# Patient Record
Sex: FEMALE | Race: BLACK OR AFRICAN AMERICAN | Employment: OTHER
[De-identification: names, ages, dates, MRNs, and addresses within clinical notes are randomized per-mention and may not be internally consistent; named-entity substitution may affect disease eponyms.]

---

## 2017-01-20 ENCOUNTER — OFFICE VISIT (OUTPATIENT)
Dept: INTERNAL MEDICINE | Facility: CLINIC | Age: 57
End: 2017-01-20

## 2017-01-20 VITALS
RESPIRATION RATE: 22 BRPM | BODY MASS INDEX: 40.95 KG/M2 | SYSTOLIC BLOOD PRESSURE: 110 MMHG | WEIGHT: 245.8 LBS | DIASTOLIC BLOOD PRESSURE: 80 MMHG | HEIGHT: 65 IN | OXYGEN SATURATION: 98 % | TEMPERATURE: 98 F | HEART RATE: 88 BPM

## 2017-01-20 DIAGNOSIS — J45.20 MILD INTERMITTENT ASTHMA WITHOUT COMPLICATION: ICD-10-CM

## 2017-01-20 DIAGNOSIS — B35.1 ONYCHOMYCOSIS: ICD-10-CM

## 2017-01-20 DIAGNOSIS — M21.069 GENU VALGUM, UNSPECIFIED LATERALITY: ICD-10-CM

## 2017-01-20 DIAGNOSIS — M20.21 HALLUX RIGIDUS OF RIGHT FOOT: Primary | ICD-10-CM

## 2017-01-20 DIAGNOSIS — I10 ESSENTIAL HYPERTENSION: ICD-10-CM

## 2017-01-20 DIAGNOSIS — M10.00 IDIOPATHIC GOUT, UNSPECIFIED CHRONICITY, UNSPECIFIED SITE: ICD-10-CM

## 2017-01-20 DIAGNOSIS — M21.41 PES PLANUS OF BOTH FEET: ICD-10-CM

## 2017-01-20 DIAGNOSIS — E66.9 OBESITY (BMI 30-39.9): ICD-10-CM

## 2017-01-20 DIAGNOSIS — M21.42 PES PLANUS OF BOTH FEET: ICD-10-CM

## 2017-01-20 PROCEDURE — 99214 OFFICE O/P EST MOD 30 MIN: CPT | Performed by: FAMILY MEDICINE

## 2017-01-20 RX ORDER — NAPROXEN 500 MG/1
TABLET ORAL
Refills: 0 | COMMUNITY
Start: 2016-10-14 | End: 2017-03-14 | Stop reason: SDUPTHER

## 2017-01-20 RX ORDER — NAPROXEN 500 MG/1
500 TABLET ORAL 2 TIMES DAILY WITH MEALS
Qty: 60 TABLET | Refills: 0 | Status: SHIPPED | OUTPATIENT
Start: 2017-01-20 | End: 2017-03-09 | Stop reason: SDUPTHER

## 2017-01-20 ASSESSMENT — ENCOUNTER SYMPTOMS
EYES NEGATIVE: 1
ALLERGIC/IMMUNOLOGIC NEGATIVE: 1
GASTROINTESTINAL NEGATIVE: 1
RESPIRATORY NEGATIVE: 1

## 2017-01-20 ASSESSMENT — PATIENT HEALTH QUESTIONNAIRE - PHQ9
2. FEELING DOWN, DEPRESSED OR HOPELESS: 0
1. LITTLE INTEREST OR PLEASURE IN DOING THINGS: 0
SUM OF ALL RESPONSES TO PHQ QUESTIONS 1-9: 0
SUM OF ALL RESPONSES TO PHQ9 QUESTIONS 1 & 2: 0

## 2017-02-28 ENCOUNTER — OFFICE VISIT (OUTPATIENT)
Dept: PODIATRY | Facility: CLINIC | Age: 57
End: 2017-02-28

## 2017-02-28 VITALS
HEIGHT: 65 IN | BODY MASS INDEX: 39.99 KG/M2 | DIASTOLIC BLOOD PRESSURE: 71 MMHG | HEART RATE: 74 BPM | WEIGHT: 240 LBS | SYSTOLIC BLOOD PRESSURE: 113 MMHG

## 2017-02-28 DIAGNOSIS — S90.31XA CONTUSION OF RIGHT FOOT, INITIAL ENCOUNTER: Primary | ICD-10-CM

## 2017-02-28 DIAGNOSIS — I73.9 PERIPHERAL VASCULAR DISEASE (HCC): ICD-10-CM

## 2017-02-28 DIAGNOSIS — B35.1 ONYCHOMYCOSIS OF TOENAIL: ICD-10-CM

## 2017-02-28 DIAGNOSIS — R60.0 EDEMA OF RIGHT FOOT: ICD-10-CM

## 2017-02-28 DIAGNOSIS — M79.674 PAIN IN TOES OF BOTH FEET: ICD-10-CM

## 2017-02-28 DIAGNOSIS — M79.675 PAIN IN TOES OF BOTH FEET: ICD-10-CM

## 2017-02-28 DIAGNOSIS — M79.671 RIGHT FOOT PAIN: ICD-10-CM

## 2017-02-28 PROCEDURE — 73630 X-RAY EXAM OF FOOT: CPT | Performed by: PODIATRIST

## 2017-02-28 PROCEDURE — 11721 DEBRIDE NAIL 6 OR MORE: CPT | Performed by: PODIATRIST

## 2017-02-28 PROCEDURE — 99203 OFFICE O/P NEW LOW 30 MIN: CPT | Performed by: PODIATRIST

## 2017-02-28 RX ORDER — LOSARTAN POTASSIUM 100 MG/1
100 TABLET ORAL DAILY
COMMUNITY
End: 2017-03-12

## 2017-02-28 ASSESSMENT — ENCOUNTER SYMPTOMS
NAUSEA: 0
SHORTNESS OF BREATH: 0
COLOR CHANGE: 0
BACK PAIN: 0
DIARRHEA: 0

## 2017-03-09 RX ORDER — LEVOTHYROXINE SODIUM 175 UG/1
TABLET ORAL
Qty: 30 TABLET | Refills: 3 | Status: SHIPPED | OUTPATIENT
Start: 2017-03-09 | End: 2017-07-09 | Stop reason: SDUPTHER

## 2017-03-12 ENCOUNTER — APPOINTMENT (OUTPATIENT)
Dept: GENERAL RADIOLOGY | Age: 57
End: 2017-03-12
Payer: COMMERCIAL

## 2017-03-12 ENCOUNTER — HOSPITAL ENCOUNTER (EMERGENCY)
Age: 57
Discharge: HOME | End: 2017-03-12
Attending: EMERGENCY MEDICINE
Payer: COMMERCIAL

## 2017-03-12 VITALS
DIASTOLIC BLOOD PRESSURE: 100 MMHG | TEMPERATURE: 97.2 F | SYSTOLIC BLOOD PRESSURE: 130 MMHG | HEART RATE: 81 BPM | OXYGEN SATURATION: 99 % | RESPIRATION RATE: 16 BRPM

## 2017-03-12 DIAGNOSIS — M79.18 MUSCULOSKELETAL PAIN: ICD-10-CM
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
## 2017-03-12 PROCEDURE — 99284 EMERGENCY DEPT VISIT MOD MDM: CPT

## 2017-03-12 PROCEDURE — 72072 X-RAY EXAM THORAC SPINE 3VWS: CPT

## 2017-03-12 RX ORDER — CYCLOBENZAPRINE HCL 5 MG
5 TABLET ORAL 3 TIMES DAILY PRN
Qty: 10 TABLET | Refills: 0 | Status: SHIPPED | OUTPATIENT
Start: 2017-03-12 | End: 2017-03-22

## 2017-03-12 ASSESSMENT — ENCOUNTER SYMPTOMS
VOMITING: 0
COLOR CHANGE: 0
BACK PAIN: 1
FACIAL SWELLING: 0
COUGH: 0
CONSTIPATION: 0
NAUSEA: 0
SORE THROAT: 0
ABDOMINAL PAIN: 0
DIARRHEA: 0
SHORTNESS OF BREATH: 0

## 2017-03-12 ASSESSMENT — PAIN DESCRIPTION - PAIN TYPE: TYPE: ACUTE PAIN

## 2017-03-12 ASSESSMENT — PAIN DESCRIPTION - ONSET: ONSET: AWAKENED FROM SLEEP

## 2017-03-12 ASSESSMENT — PAIN DESCRIPTION - PROGRESSION: CLINICAL_PROGRESSION: NOT CHANGED

## 2017-03-12 ASSESSMENT — PAIN DESCRIPTION - ORIENTATION: ORIENTATION: UPPER

## 2017-03-12 ASSESSMENT — PAIN DESCRIPTION - LOCATION: LOCATION: BACK

## 2017-03-12 ASSESSMENT — PAIN DESCRIPTION - DESCRIPTORS: DESCRIPTORS: CONSTANT;HEAVINESS

## 2017-03-12 ASSESSMENT — PAIN DESCRIPTION - FREQUENCY: FREQUENCY: CONTINUOUS

## 2017-03-12 ASSESSMENT — PAIN SCALES - GENERAL: PAINLEVEL_OUTOF10: 8

## 2017-03-14 ENCOUNTER — OFFICE VISIT (OUTPATIENT)
Dept: INTERNAL MEDICINE CLINIC | Age: 57
End: 2017-03-14
Payer: COMMERCIAL

## 2017-03-14 VITALS
HEART RATE: 72 BPM | WEIGHT: 247 LBS | SYSTOLIC BLOOD PRESSURE: 118 MMHG | DIASTOLIC BLOOD PRESSURE: 80 MMHG | RESPIRATION RATE: 20 BRPM | BODY MASS INDEX: 41.15 KG/M2 | TEMPERATURE: 98.4 F | HEIGHT: 65 IN

## 2017-03-14 DIAGNOSIS — M79.671 RIGHT FOOT PAIN: ICD-10-CM

## 2017-03-14 DIAGNOSIS — M54.2 NECK PAIN: ICD-10-CM

## 2017-03-14 DIAGNOSIS — M54.5 ACUTE BILATERAL LOW BACK PAIN, WITH SCIATICA PRESENCE UNSPECIFIED: ICD-10-CM
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
## 2017-03-14 PROCEDURE — 99214 OFFICE O/P EST MOD 30 MIN: CPT | Performed by: INTERNAL MEDICINE

## 2017-03-14 RX ORDER — NAPROXEN 500 MG/1
TABLET ORAL
Qty: 60 TABLET | Refills: 0 | Status: SHIPPED | OUTPATIENT
Start: 2017-03-14 | End: 2017-05-08 | Stop reason: SDUPTHER

## 2017-03-20 ENCOUNTER — TELEPHONE (OUTPATIENT)
Dept: INTERNAL MEDICINE CLINIC | Age: 57
End: 2017-03-20
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
## 2017-03-20 DIAGNOSIS — M54.2 NECK PAIN: Primary | ICD-10-CM

## 2017-03-20 DIAGNOSIS — M54.5 ACUTE LOW BACK PAIN, UNSPECIFIED BACK PAIN LATERALITY, WITH SCIATICA PRESENCE UNSPECIFIED: ICD-10-CM

## 2017-03-27 ENCOUNTER — HOSPITAL ENCOUNTER (OUTPATIENT)
Dept: PHYSICAL THERAPY | Facility: CLINIC | Age: 57
Setting detail: THERAPIES SERIES
Discharge: HOME | End: 2017-03-27
Payer: COMMERCIAL

## 2017-03-27 PROCEDURE — 97110 THERAPEUTIC EXERCISES: CPT

## 2017-03-27 PROCEDURE — G0283 ELEC STIM OTHER THAN WOUND: HCPCS

## 2017-03-27 PROCEDURE — 97162 PT EVAL MOD COMPLEX 30 MIN: CPT

## 2017-03-29 ENCOUNTER — HOSPITAL ENCOUNTER (OUTPATIENT)
Dept: PHYSICAL THERAPY | Facility: CLINIC | Age: 57
Setting detail: THERAPIES SERIES
Discharge: HOME | End: 2017-03-29
Payer: COMMERCIAL

## 2017-03-29 PROCEDURE — G0283 ELEC STIM OTHER THAN WOUND: HCPCS

## 2017-03-29 PROCEDURE — 97110 THERAPEUTIC EXERCISES: CPT

## 2017-04-03 ENCOUNTER — HOSPITAL ENCOUNTER (OUTPATIENT)
Dept: PHYSICAL THERAPY | Facility: CLINIC | Age: 57
Setting detail: THERAPIES SERIES
Discharge: HOME | End: 2017-04-03
Payer: COMMERCIAL

## 2017-04-03 PROCEDURE — 97110 THERAPEUTIC EXERCISES: CPT

## 2017-04-03 PROCEDURE — G0283 ELEC STIM OTHER THAN WOUND: HCPCS

## 2017-04-05 ENCOUNTER — HOSPITAL ENCOUNTER (OUTPATIENT)
Dept: PHYSICAL THERAPY | Facility: CLINIC | Age: 57
Setting detail: THERAPIES SERIES
Discharge: HOME | End: 2017-04-05
Payer: COMMERCIAL

## 2017-04-05 PROCEDURE — 97140 MANUAL THERAPY 1/> REGIONS: CPT

## 2017-04-05 PROCEDURE — 97110 THERAPEUTIC EXERCISES: CPT

## 2017-04-05 PROCEDURE — G0283 ELEC STIM OTHER THAN WOUND: HCPCS

## 2017-04-10 ENCOUNTER — HOSPITAL ENCOUNTER (OUTPATIENT)
Dept: PHYSICAL THERAPY | Facility: CLINIC | Age: 57
Setting detail: THERAPIES SERIES
Discharge: HOME | End: 2017-04-10

## 2017-05-09 RX ORDER — NAPROXEN 500 MG/1
TABLET ORAL
Qty: 60 TABLET | Refills: 0 | Status: SHIPPED | OUTPATIENT
Start: 2017-05-09 | End: 2017-07-17 | Stop reason: SDUPTHER

## 2017-06-01 ENCOUNTER — OFFICE VISIT (OUTPATIENT)
Dept: INTERNAL MEDICINE CLINIC | Age: 57
End: 2017-06-01
Payer: COMMERCIAL

## 2017-06-01 VITALS
TEMPERATURE: 98.1 F | SYSTOLIC BLOOD PRESSURE: 136 MMHG | DIASTOLIC BLOOD PRESSURE: 76 MMHG | HEIGHT: 65 IN | WEIGHT: 243 LBS | HEART RATE: 72 BPM | RESPIRATION RATE: 16 BRPM | BODY MASS INDEX: 40.48 KG/M2

## 2017-06-01 DIAGNOSIS — L24.3 IRRITANT CONTACT DERMATITIS DUE TO COSMETICS: Primary | ICD-10-CM

## 2017-06-01 PROCEDURE — 99213 OFFICE O/P EST LOW 20 MIN: CPT | Performed by: INTERNAL MEDICINE

## 2017-06-01 RX ORDER — DESONIDE 0.5 MG/G
CREAM TOPICAL
Qty: 90 G | Refills: 0 | Status: SHIPPED | OUTPATIENT
Start: 2017-06-01 | End: 2019-12-16 | Stop reason: SDUPTHER

## 2017-06-01 RX ORDER — PREDNISONE 10 MG/1
10 TABLET ORAL
Qty: 15 TABLET | Refills: 0 | Status: SHIPPED | OUTPATIENT
Start: 2017-06-01 | End: 2017-06-06

## 2017-06-09 ENCOUNTER — TELEPHONE (OUTPATIENT)
Dept: INTERNAL MEDICINE CLINIC | Age: 57
End: 2017-06-09

## 2017-06-09 DIAGNOSIS — L50.9 HIVES: Primary | ICD-10-CM

## 2017-06-09 RX ORDER — PREDNISONE 20 MG/1
TABLET ORAL
Qty: 18 TABLET | Refills: 0 | Status: SHIPPED | OUTPATIENT
Start: 2017-06-09 | End: 2017-07-17 | Stop reason: CLARIF

## 2017-07-02 ENCOUNTER — HOSPITAL ENCOUNTER (EMERGENCY)
Age: 57
Discharge: HOME | End: 2017-07-02
Attending: EMERGENCY MEDICINE
Payer: COMMERCIAL

## 2017-07-02 ENCOUNTER — APPOINTMENT (OUTPATIENT)
Dept: GENERAL RADIOLOGY | Age: 57
End: 2017-07-02
Payer: COMMERCIAL

## 2017-07-02 VITALS
HEART RATE: 85 BPM | OXYGEN SATURATION: 97 % | DIASTOLIC BLOOD PRESSURE: 77 MMHG | RESPIRATION RATE: 16 BRPM | TEMPERATURE: 97 F | SYSTOLIC BLOOD PRESSURE: 121 MMHG

## 2017-07-02 DIAGNOSIS — R53.83 OTHER MALAISE AND FATIGUE: Primary | ICD-10-CM

## 2017-07-02 DIAGNOSIS — R19.7 DIARRHEA, UNSPECIFIED TYPE: ICD-10-CM

## 2017-07-02 DIAGNOSIS — R53.81 OTHER MALAISE AND FATIGUE: Primary | ICD-10-CM

## 2017-07-02 LAB
ABSOLUTE EOS #: 0.3 K/UL (ref 0–0.4)
ABSOLUTE LYMPH #: 2.5 K/UL (ref 1–4.8)
ABSOLUTE MONO #: 0.8 K/UL (ref 0.1–1.2)
ALBUMIN SERPL-MCNC: 3.5 G/DL (ref 3.5–5.2)
ALBUMIN/GLOBULIN RATIO: 1 (ref 1–2.5)
ALP BLD-CCNC: 98 U/L (ref 35–104)
ALT SERPL-CCNC: 26 U/L (ref 5–33)
ANION GAP SERPL CALCULATED.3IONS-SCNC: 16 MMOL/L (ref 9–17)
AST SERPL-CCNC: 30 U/L
BASOPHILS # BLD: 1 %
BASOPHILS ABSOLUTE: 0 K/UL (ref 0–0.2)
BILIRUB SERPL-MCNC: 0.25 MG/DL (ref 0.3–1.2)
BNP INTERPRETATION: NORMAL
BUN BLDV-MCNC: 13 MG/DL (ref 6–20)
BUN/CREAT BLD: ABNORMAL (ref 9–20)
CALCIUM SERPL-MCNC: 9.4 MG/DL (ref 8.6–10.4)
CHLORIDE BLD-SCNC: 100 MMOL/L (ref 98–107)
CO2: 24 MMOL/L (ref 20–31)
CREAT SERPL-MCNC: 0.62 MG/DL (ref 0.5–0.9)
D-DIMER QUANTITATIVE: 0.44 MG/L FEU
DIFFERENTIAL TYPE: NORMAL
EOSINOPHILS RELATIVE PERCENT: 3 %
GFR AFRICAN AMERICAN: >60 ML/MIN
GFR NON-AFRICAN AMERICAN: >60 ML/MIN
GFR SERPL CREATININE-BSD FRML MDRD: ABNORMAL ML/MIN/{1.73_M2}
GFR SERPL CREATININE-BSD FRML MDRD: ABNORMAL ML/MIN/{1.73_M2}
GLUCOSE BLD-MCNC: 137 MG/DL (ref 70–99)
HCT VFR BLD CALC: 44.9 % (ref 36–46)
HEMOGLOBIN: 15.1 G/DL (ref 12–16)
LIPASE: 23 U/L (ref 13–60)
LYMPHOCYTES # BLD: 29 %
MCH RBC QN AUTO: 32.1 PG (ref 26–34)
MCHC RBC AUTO-ENTMCNC: 33.6 G/DL (ref 31–37)
MCV RBC AUTO: 95.6 FL (ref 80–100)
MONOCYTES # BLD: 9 %
PDW BLD-RTO: 13.2 % (ref 12.5–15.4)
PLATELET # BLD: 330 K/UL (ref 140–450)
PLATELET ESTIMATE: NORMAL
PMV BLD AUTO: 7.3 FL (ref 6–12)
POC TROPONIN I: 0 NG/ML (ref 0–0.1)
POC TROPONIN I: 0 NG/ML (ref 0–0.1)
POC TROPONIN INTERP: NORMAL
POC TROPONIN INTERP: NORMAL
POTASSIUM SERPL-SCNC: 3.6 MMOL/L (ref 3.7–5.3)
PRO-BNP: 44 PG/ML
RBC # BLD: 4.7 M/UL (ref 4–5.2)
RBC # BLD: NORMAL 10*6/UL
SEG NEUTROPHILS: 58 %
SEGMENTED NEUTROPHILS ABSOLUTE COUNT: 5 K/UL (ref 1.8–7.7)
SODIUM BLD-SCNC: 140 MMOL/L (ref 135–144)
THYROXINE, FREE: 1.8 NG/DL (ref 0.93–1.7)
TOTAL PROTEIN: 7.1 G/DL (ref 6.4–8.3)
TSH SERPL DL<=0.05 MIU/L-ACNC: <0.01 MIU/L (ref 0.3–5)
WBC # BLD: 8.5 K/UL (ref 3.5–11)
WBC # BLD: NORMAL 10*3/UL

## 2017-07-02 PROCEDURE — 85379 FIBRIN DEGRADATION QUANT: CPT

## 2017-07-02 PROCEDURE — 83880 ASSAY OF NATRIURETIC PEPTIDE: CPT

## 2017-07-02 PROCEDURE — 71020 XR CHEST STANDARD TWO VW: CPT

## 2017-07-02 PROCEDURE — 84443 ASSAY THYROID STIM HORMONE: CPT

## 2017-07-02 PROCEDURE — 93005 ELECTROCARDIOGRAM TRACING: CPT

## 2017-07-02 PROCEDURE — 83690 ASSAY OF LIPASE: CPT

## 2017-07-02 PROCEDURE — 84484 ASSAY OF TROPONIN QUANT: CPT

## 2017-07-02 PROCEDURE — 84439 ASSAY OF FREE THYROXINE: CPT

## 2017-07-02 PROCEDURE — 80053 COMPREHEN METABOLIC PANEL: CPT

## 2017-07-02 PROCEDURE — 99284 EMERGENCY DEPT VISIT MOD MDM: CPT

## 2017-07-02 PROCEDURE — 85025 COMPLETE CBC W/AUTO DIFF WBC: CPT

## 2017-07-02 ASSESSMENT — ENCOUNTER SYMPTOMS
DIARRHEA: 1
BLOOD IN STOOL: 0
SHORTNESS OF BREATH: 1
ABDOMINAL PAIN: 1
NAUSEA: 1
RHINORRHEA: 0
COUGH: 0
VOMITING: 0
SORE THROAT: 0
CONSTIPATION: 0

## 2017-07-02 ASSESSMENT — PAIN DESCRIPTION - LOCATION: LOCATION: HEAD

## 2017-07-02 ASSESSMENT — PAIN SCALES - GENERAL: PAINLEVEL_OUTOF10: 7

## 2017-07-10 RX ORDER — HYDROCHLOROTHIAZIDE 25 MG/1
TABLET ORAL
Qty: 30 TABLET | Refills: 5 | Status: SHIPPED | OUTPATIENT
Start: 2017-07-10 | End: 2017-07-17 | Stop reason: CLARIF

## 2017-07-10 RX ORDER — LEVOTHYROXINE SODIUM 175 UG/1
TABLET ORAL
Qty: 30 TABLET | Refills: 5 | Status: SHIPPED | OUTPATIENT
Start: 2017-07-10 | End: 2018-01-12 | Stop reason: SDUPTHER

## 2017-07-11 LAB
EKG ATRIAL RATE: 92 BPM
EKG P AXIS: 43 DEGREES
EKG P-R INTERVAL: 136 MS
EKG Q-T INTERVAL: 336 MS
EKG QRS DURATION: 70 MS
EKG QTC CALCULATION (BAZETT): 415 MS
EKG R AXIS: -3 DEGREES
EKG T AXIS: 19 DEGREES
EKG VENTRICULAR RATE: 92 BPM

## 2017-07-17 ENCOUNTER — OFFICE VISIT (OUTPATIENT)
Dept: INTERNAL MEDICINE CLINIC | Age: 57
End: 2017-07-17
Payer: COMMERCIAL

## 2017-07-17 ENCOUNTER — HOSPITAL ENCOUNTER (OUTPATIENT)
Dept: VASCULAR LAB | Age: 57
Discharge: HOME | End: 2017-07-17
Payer: COMMERCIAL

## 2017-07-17 ENCOUNTER — TELEPHONE (OUTPATIENT)
Dept: INTERNAL MEDICINE CLINIC | Age: 57
End: 2017-07-17

## 2017-07-17 VITALS
DIASTOLIC BLOOD PRESSURE: 82 MMHG | BODY MASS INDEX: 41.35 KG/M2 | SYSTOLIC BLOOD PRESSURE: 136 MMHG | HEART RATE: 64 BPM | TEMPERATURE: 98.2 F | HEIGHT: 65 IN | RESPIRATION RATE: 16 BRPM | WEIGHT: 248.2 LBS

## 2017-07-17 DIAGNOSIS — L03.115 CELLULITIS OF RIGHT LEG: ICD-10-CM

## 2017-07-17 DIAGNOSIS — I80.9 PHLEBITIS: ICD-10-CM

## 2017-07-17 DIAGNOSIS — L03.115 CELLULITIS OF RIGHT LEG: Primary | ICD-10-CM

## 2017-07-17 PROCEDURE — 99213 OFFICE O/P EST LOW 20 MIN: CPT | Performed by: INTERNAL MEDICINE

## 2017-07-17 PROCEDURE — 93971 EXTREMITY STUDY: CPT

## 2017-07-17 RX ORDER — NAPROXEN 500 MG/1
TABLET ORAL
Qty: 30 TABLET | Refills: 1 | Status: SHIPPED | OUTPATIENT
Start: 2017-07-17 | End: 2017-09-15 | Stop reason: SDUPTHER

## 2017-07-17 RX ORDER — CEPHALEXIN 500 MG/1
500 CAPSULE ORAL 3 TIMES DAILY
Qty: 21 CAPSULE | Refills: 0 | Status: SHIPPED | OUTPATIENT
Start: 2017-07-17 | End: 2018-01-31 | Stop reason: CLARIF

## 2017-07-18 ENCOUNTER — TELEPHONE (OUTPATIENT)
Dept: INTERNAL MEDICINE CLINIC | Age: 57
End: 2017-07-18

## 2017-07-20 ENCOUNTER — TELEPHONE (OUTPATIENT)
Dept: INTERNAL MEDICINE CLINIC | Age: 57
End: 2017-07-20

## 2017-07-20 DIAGNOSIS — M79.89 LEG SWELLING: Primary | ICD-10-CM

## 2017-07-20 DIAGNOSIS — M79.606 PAIN OF LOWER EXTREMITY, UNSPECIFIED LATERALITY: ICD-10-CM

## 2017-08-10 DIAGNOSIS — M79.671 RIGHT FOOT PAIN: ICD-10-CM

## 2017-08-10 RX ORDER — METOPROLOL SUCCINATE 100 MG/1
TABLET, EXTENDED RELEASE ORAL
Qty: 30 TABLET | Refills: 5 | Status: SHIPPED | OUTPATIENT
Start: 2017-08-10 | End: 2018-02-12 | Stop reason: SDUPTHER

## 2017-08-16 DIAGNOSIS — M79.671 RIGHT FOOT PAIN: ICD-10-CM

## 2017-08-17 RX ORDER — METOPROLOL SUCCINATE 100 MG/1
TABLET, EXTENDED RELEASE ORAL
Qty: 30 TABLET | Refills: 5 | OUTPATIENT
Start: 2017-08-17

## 2017-08-27 ENCOUNTER — HOSPITAL ENCOUNTER (EMERGENCY)
Age: 57
Discharge: HOME | End: 2017-08-27
Attending: EMERGENCY MEDICINE
Payer: COMMERCIAL

## 2017-08-27 VITALS
OXYGEN SATURATION: 99 % | RESPIRATION RATE: 18 BRPM | TEMPERATURE: 98 F | SYSTOLIC BLOOD PRESSURE: 123 MMHG | DIASTOLIC BLOOD PRESSURE: 85 MMHG | HEART RATE: 83 BPM

## 2017-08-27 DIAGNOSIS — S61.219A LACERATION OF FINGER, INITIAL ENCOUNTER: Primary | ICD-10-CM

## 2017-08-27 PROCEDURE — G0381 LEV 2 HOSP TYPE B ED VISIT: HCPCS

## 2017-08-27 RX ORDER — DIAPER,BRIEF,INFANT-TODD,DISP
EACH MISCELLANEOUS
Qty: 30 G | Refills: 1 | Status: SHIPPED | OUTPATIENT
Start: 2017-08-27 | End: 2017-09-06

## 2017-08-27 RX ORDER — GINSENG 100 MG
CAPSULE ORAL ONCE
Status: DISCONTINUED | OUTPATIENT
Start: 2017-08-27 | End: 2017-08-27 | Stop reason: HOSPADM

## 2017-08-27 ASSESSMENT — PAIN DESCRIPTION - DESCRIPTORS: DESCRIPTORS: DISCOMFORT

## 2017-08-27 ASSESSMENT — PAIN DESCRIPTION - ORIENTATION: ORIENTATION: LEFT

## 2017-08-27 ASSESSMENT — PAIN SCALES - GENERAL: PAINLEVEL_OUTOF10: 3

## 2017-08-27 ASSESSMENT — PAIN DESCRIPTION - LOCATION: LOCATION: FINGER (COMMENT WHICH ONE)

## 2017-09-18 RX ORDER — NAPROXEN 500 MG/1
TABLET ORAL
Qty: 30 TABLET | Refills: 1 | Status: SHIPPED | OUTPATIENT
Start: 2017-09-18 | End: 2017-11-17 | Stop reason: SDUPTHER

## 2017-11-20 RX ORDER — NAPROXEN 500 MG/1
TABLET ORAL
Qty: 30 TABLET | Refills: 1 | Status: SHIPPED | OUTPATIENT
Start: 2017-11-20 | End: 2018-01-23 | Stop reason: SDUPTHER

## 2018-01-12 RX ORDER — HYDROCHLOROTHIAZIDE 25 MG/1
TABLET ORAL
Qty: 30 TABLET | Refills: 1 | Status: SHIPPED | OUTPATIENT
Start: 2018-01-12 | End: 2018-01-31 | Stop reason: CLARIF

## 2018-01-12 RX ORDER — LEVOTHYROXINE SODIUM 175 UG/1
TABLET ORAL
Qty: 30 TABLET | Refills: 1 | Status: SHIPPED | OUTPATIENT
Start: 2018-01-12 | End: 2018-03-12 | Stop reason: DRUGHIGH

## 2018-01-31 ENCOUNTER — OFFICE VISIT (OUTPATIENT)
Dept: INTERNAL MEDICINE CLINIC | Age: 58
End: 2018-01-31
Payer: COMMERCIAL

## 2018-01-31 VITALS
TEMPERATURE: 97.9 F | DIASTOLIC BLOOD PRESSURE: 64 MMHG | HEART RATE: 76 BPM | SYSTOLIC BLOOD PRESSURE: 102 MMHG | RESPIRATION RATE: 16 BRPM | WEIGHT: 238 LBS | BODY MASS INDEX: 39.65 KG/M2 | HEIGHT: 65 IN

## 2018-01-31 DIAGNOSIS — R13.14 PHARYNGOESOPHAGEAL DYSPHAGIA: ICD-10-CM

## 2018-01-31 DIAGNOSIS — I10 ESSENTIAL HYPERTENSION: Primary | ICD-10-CM

## 2018-01-31 DIAGNOSIS — E03.9 HYPOTHYROIDISM, UNSPECIFIED TYPE: ICD-10-CM

## 2018-01-31 DIAGNOSIS — R07.9 CHEST PAIN, UNSPECIFIED TYPE: ICD-10-CM

## 2018-01-31 DIAGNOSIS — R53.83 OTHER FATIGUE: ICD-10-CM

## 2018-01-31 DIAGNOSIS — R06.02 SOB (SHORTNESS OF BREATH) ON EXERTION: ICD-10-CM

## 2018-01-31 PROCEDURE — 93000 ELECTROCARDIOGRAM COMPLETE: CPT | Performed by: INTERNAL MEDICINE

## 2018-01-31 PROCEDURE — 99214 OFFICE O/P EST MOD 30 MIN: CPT | Performed by: INTERNAL MEDICINE

## 2018-01-31 RX ORDER — BUDESONIDE AND FORMOTEROL FUMARATE DIHYDRATE 160; 4.5 UG/1; UG/1
2 AEROSOL RESPIRATORY (INHALATION) 2 TIMES DAILY
Qty: 1 INHALER | Refills: 0 | COMMUNITY
Start: 2018-01-31 | End: 2019-02-14 | Stop reason: CLARIF

## 2018-01-31 RX ORDER — PANTOPRAZOLE SODIUM 40 MG/1
40 TABLET, DELAYED RELEASE ORAL DAILY
Qty: 30 TABLET | Refills: 0 | Status: SHIPPED | OUTPATIENT
Start: 2018-01-31 | End: 2018-02-13

## 2018-01-31 NOTE — PROGRESS NOTES
Jenn Burgos is a 65 y.o. female who presents for   Chief Complaint   Patient presents with    Shortness of Breath     notices SOB when walking, some chest tightness noted also    Fatigue     notices fatigue, awakens every morning at 2 am and is awake for 1 hour then falls back sleep    Dysphagia     co difficulty swallowing liquids and food- stated her sister and dad has same issue    and follow up of chronic medical problems. Patient Active Problem List   Diagnosis    Asthma    Anxiety    Obesity    Hyperlipidemia    Hypothyroidism    Colon polyps    Vertigo    Mass of left hip region     HPI  Here for follow-up on blood pressure and patient complains of extreme fatigue and and also patient mentions that she is having difficulty swallowing sometimes and also choking sensation when she was eating and it is similar for both the solids and liquids   Shortness of breath on exertion mostly when she was in the cold weather outside but when she was walking inside she doesn't feel short of breath  Also had some chest pain but not now    Current Outpatient Prescriptions   Medication Sig Dispense Refill    pantoprazole (PROTONIX) 40 MG tablet Take 1 tablet by mouth daily 30 tablet 0    budesonide-formoterol (SYMBICORT) 160-4.5 MCG/ACT AERO Inhale 2 puffs into the lungs 2 times daily 1 Inhaler 0    naproxen (NAPROSYN) 500 MG tablet take 1 tablet by mouth once daily 30 tablet 0    levothyroxine (SYNTHROID) 175 MCG tablet take 1 tablet by mouth once daily 30 tablet 1    metoprolol succinate (TOPROL XL) 100 MG extended release tablet take 1 tablet by mouth once daily 30 tablet 5    Compression Stockings MISC Calf length 20-30 mm 1 each 0    desonide (DESOWEN) 0.05 % cream Apply topically 2 times daily.  90 g 0    aspirin 81 MG tablet Take 81 mg by mouth daily      hydrochlorothiazide (HYDRODIURIL) 25 MG tablet take 1 tablet by mouth once daily 30 tablet 2    albuterol sulfate HFA (PROVENTIL HFA) 108 (90

## 2018-02-12 DIAGNOSIS — M79.671 RIGHT FOOT PAIN: ICD-10-CM

## 2018-02-13 ENCOUNTER — OFFICE VISIT (OUTPATIENT)
Dept: GASTROENTEROLOGY | Age: 58
End: 2018-02-13
Payer: COMMERCIAL

## 2018-02-13 VITALS
HEART RATE: 83 BPM | SYSTOLIC BLOOD PRESSURE: 138 MMHG | WEIGHT: 241.5 LBS | HEIGHT: 65 IN | RESPIRATION RATE: 14 BRPM | DIASTOLIC BLOOD PRESSURE: 89 MMHG | BODY MASS INDEX: 40.24 KG/M2

## 2018-02-13 DIAGNOSIS — R53.83 OTHER FATIGUE: ICD-10-CM

## 2018-02-13 DIAGNOSIS — R13.14 PHARYNGOESOPHAGEAL DYSPHAGIA: ICD-10-CM

## 2018-02-13 DIAGNOSIS — K63.5 POLYP OF COLON, UNSPECIFIED PART OF COLON, UNSPECIFIED TYPE: Primary | ICD-10-CM

## 2018-02-13 DIAGNOSIS — E66.8 MODERATE OBESITY: ICD-10-CM

## 2018-02-13 PROCEDURE — 99244 OFF/OP CNSLTJ NEW/EST MOD 40: CPT | Performed by: INTERNAL MEDICINE

## 2018-02-13 RX ORDER — METOPROLOL SUCCINATE 100 MG/1
TABLET, EXTENDED RELEASE ORAL
Qty: 30 TABLET | Refills: 5 | Status: SHIPPED | OUTPATIENT
Start: 2018-02-13 | End: 2018-08-24 | Stop reason: SDUPTHER

## 2018-02-13 ASSESSMENT — ENCOUNTER SYMPTOMS
VOMITING: 0
SORE THROAT: 0
COUGH: 1
NAUSEA: 1
ABDOMINAL DISTENTION: 0
ABDOMINAL PAIN: 0
RECTAL PAIN: 0
DIARRHEA: 0
ANAL BLEEDING: 0
ALLERGIC/IMMUNOLOGIC NEGATIVE: 1
CONSTIPATION: 0
TROUBLE SWALLOWING: 1
BLOOD IN STOOL: 0
VOICE CHANGE: 0

## 2018-02-14 RX ORDER — POLYETHYLENE GLYCOL 3350 17 G/17G
POWDER, FOR SOLUTION ORAL
Qty: 255 G | Refills: 0 | Status: SHIPPED | OUTPATIENT
Start: 2018-02-14 | End: 2018-03-15

## 2018-03-12 ENCOUNTER — OFFICE VISIT (OUTPATIENT)
Dept: INTERNAL MEDICINE CLINIC | Age: 58
End: 2018-03-12
Payer: COMMERCIAL

## 2018-03-12 ENCOUNTER — HOSPITAL ENCOUNTER (OUTPATIENT)
Age: 58
Discharge: HOME | End: 2018-03-12
Payer: COMMERCIAL

## 2018-03-12 ENCOUNTER — HOSPITAL ENCOUNTER (OUTPATIENT)
Dept: GENERAL RADIOLOGY | Age: 58
Discharge: HOME | End: 2018-03-14
Payer: COMMERCIAL

## 2018-03-12 ENCOUNTER — TELEPHONE (OUTPATIENT)
Dept: INTERNAL MEDICINE CLINIC | Age: 58
End: 2018-03-12

## 2018-03-12 ENCOUNTER — HOSPITAL ENCOUNTER (OUTPATIENT)
Age: 58
Discharge: HOME | End: 2018-03-14
Payer: COMMERCIAL

## 2018-03-12 VITALS
RESPIRATION RATE: 15 BRPM | SYSTOLIC BLOOD PRESSURE: 128 MMHG | HEART RATE: 68 BPM | WEIGHT: 239.8 LBS | BODY MASS INDEX: 39.95 KG/M2 | HEIGHT: 65 IN | TEMPERATURE: 98.3 F | DIASTOLIC BLOOD PRESSURE: 76 MMHG

## 2018-03-12 DIAGNOSIS — R06.02 SOB (SHORTNESS OF BREATH) ON EXERTION: ICD-10-CM

## 2018-03-12 DIAGNOSIS — I10 ESSENTIAL HYPERTENSION: ICD-10-CM

## 2018-03-12 DIAGNOSIS — R53.83 OTHER FATIGUE: ICD-10-CM

## 2018-03-12 DIAGNOSIS — E03.9 HYPOTHYROIDISM, UNSPECIFIED TYPE: ICD-10-CM

## 2018-03-12 DIAGNOSIS — R13.14 PHARYNGOESOPHAGEAL DYSPHAGIA: ICD-10-CM

## 2018-03-12 DIAGNOSIS — E03.9 HYPOTHYROIDISM, UNSPECIFIED TYPE: Primary | ICD-10-CM

## 2018-03-12 DIAGNOSIS — E53.8 VITAMIN B12 DEFICIENCY: ICD-10-CM

## 2018-03-12 DIAGNOSIS — R07.9 CHEST PAIN, UNSPECIFIED TYPE: ICD-10-CM

## 2018-03-12 DIAGNOSIS — E53.8 LOW VITAMIN B12 LEVEL: ICD-10-CM

## 2018-03-12 LAB
ALBUMIN SERPL-MCNC: 3.7 G/DL (ref 3.5–5.2)
ALBUMIN/GLOBULIN RATIO: 1.2 (ref 1–2.5)
ALP BLD-CCNC: 110 U/L (ref 35–104)
ALT SERPL-CCNC: 12 U/L (ref 5–33)
ANION GAP SERPL CALCULATED.3IONS-SCNC: 13 MMOL/L (ref 9–17)
AST SERPL-CCNC: 17 U/L
BILIRUB SERPL-MCNC: 0.27 MG/DL (ref 0.3–1.2)
BUN BLDV-MCNC: 16 MG/DL (ref 6–20)
BUN/CREAT BLD: ABNORMAL (ref 9–20)
CALCIUM SERPL-MCNC: 9.4 MG/DL (ref 8.6–10.4)
CHLORIDE BLD-SCNC: 101 MMOL/L (ref 98–107)
CHOLESTEROL/HDL RATIO: 3.5
CHOLESTEROL: 159 MG/DL
CO2: 28 MMOL/L (ref 20–31)
CREAT SERPL-MCNC: 0.49 MG/DL (ref 0.5–0.9)
GFR AFRICAN AMERICAN: >60 ML/MIN
GFR NON-AFRICAN AMERICAN: >60 ML/MIN
GFR SERPL CREATININE-BSD FRML MDRD: ABNORMAL ML/MIN/{1.73_M2}
GFR SERPL CREATININE-BSD FRML MDRD: ABNORMAL ML/MIN/{1.73_M2}
GLUCOSE BLD-MCNC: 94 MG/DL (ref 70–99)
HCT VFR BLD CALC: 39.4 % (ref 36.3–47.1)
HDLC SERPL-MCNC: 46 MG/DL
HEMOGLOBIN: 13.5 G/DL (ref 11.9–15.1)
LDL CHOLESTEROL: 93 MG/DL (ref 0–130)
MCH RBC QN AUTO: 35.3 PG (ref 25.2–33.5)
MCHC RBC AUTO-ENTMCNC: 34.3 G/DL (ref 28.4–34.8)
MCV RBC AUTO: 103.1 FL (ref 82.6–102.9)
NRBC AUTOMATED: 0 PER 100 WBC
PDW BLD-RTO: 13 % (ref 11.8–14.4)
PLATELET # BLD: 374 K/UL (ref 138–453)
PMV BLD AUTO: 9.3 FL (ref 8.1–13.5)
POTASSIUM SERPL-SCNC: 3.6 MMOL/L (ref 3.7–5.3)
RBC # BLD: 3.82 M/UL (ref 3.95–5.11)
SODIUM BLD-SCNC: 142 MMOL/L (ref 135–144)
THYROXINE, FREE: 1.78 NG/DL (ref 0.93–1.7)
TOTAL PROTEIN: 6.7 G/DL (ref 6.4–8.3)
TRIGL SERPL-MCNC: 99 MG/DL
TSH SERPL DL<=0.05 MIU/L-ACNC: <0.01 MIU/L (ref 0.3–5)
VITAMIN B-12: <150 PG/ML (ref 232–1245)
VLDLC SERPL CALC-MCNC: NORMAL MG/DL (ref 1–30)
WBC # BLD: 7.4 K/UL (ref 3.5–11.3)

## 2018-03-12 PROCEDURE — 80061 LIPID PANEL: CPT

## 2018-03-12 PROCEDURE — 96372 THER/PROPH/DIAG INJ SC/IM: CPT | Performed by: INTERNAL MEDICINE

## 2018-03-12 PROCEDURE — 82607 VITAMIN B-12: CPT

## 2018-03-12 PROCEDURE — 84439 ASSAY OF FREE THYROXINE: CPT

## 2018-03-12 PROCEDURE — 80053 COMPREHEN METABOLIC PANEL: CPT

## 2018-03-12 PROCEDURE — 36415 COLL VENOUS BLD VENIPUNCTURE: CPT

## 2018-03-12 PROCEDURE — 99214 OFFICE O/P EST MOD 30 MIN: CPT | Performed by: INTERNAL MEDICINE

## 2018-03-12 PROCEDURE — 71046 X-RAY EXAM CHEST 2 VIEWS: CPT

## 2018-03-12 PROCEDURE — 85027 COMPLETE CBC AUTOMATED: CPT

## 2018-03-12 PROCEDURE — 84443 ASSAY THYROID STIM HORMONE: CPT

## 2018-03-12 RX ORDER — BUDESONIDE AND FORMOTEROL FUMARATE DIHYDRATE 80; 4.5 UG/1; UG/1
2 AEROSOL RESPIRATORY (INHALATION) 2 TIMES DAILY
Qty: 1 INHALER | Refills: 0 | COMMUNITY
Start: 2018-03-12 | End: 2018-06-18 | Stop reason: ALTCHOICE

## 2018-03-12 RX ORDER — LEVOTHYROXINE SODIUM 0.15 MG/1
150 TABLET ORAL DAILY
COMMUNITY
End: 2018-03-12 | Stop reason: SDUPTHER

## 2018-03-12 RX ORDER — LEVOTHYROXINE SODIUM 0.15 MG/1
150 TABLET ORAL DAILY
Qty: 30 TABLET | Refills: 5 | Status: SHIPPED | OUTPATIENT
Start: 2018-03-12 | End: 2018-09-21 | Stop reason: SDUPTHER

## 2018-03-12 RX ORDER — HYDROCHLOROTHIAZIDE 25 MG/1
TABLET ORAL
Qty: 30 TABLET | Refills: 5 | Status: SHIPPED | OUTPATIENT
Start: 2018-03-12 | End: 2018-09-21 | Stop reason: SDUPTHER

## 2018-03-12 RX ORDER — CYANOCOBALAMIN 1000 UG/ML
1000 INJECTION INTRAMUSCULAR; SUBCUTANEOUS ONCE
Status: COMPLETED | OUTPATIENT
Start: 2018-03-12 | End: 2018-03-12

## 2018-03-12 RX ORDER — NAPROXEN 500 MG/1
TABLET ORAL
Qty: 30 TABLET | Refills: 5 | Status: SHIPPED | OUTPATIENT
Start: 2018-03-12 | End: 2018-09-21 | Stop reason: SDUPTHER

## 2018-03-12 RX ADMIN — CYANOCOBALAMIN 1000 MCG: 1000 INJECTION INTRAMUSCULAR; SUBCUTANEOUS at 16:17

## 2018-03-12 ASSESSMENT — PATIENT HEALTH QUESTIONNAIRE - PHQ9
SUM OF ALL RESPONSES TO PHQ QUESTIONS 1-9: 0
2. FEELING DOWN, DEPRESSED OR HOPELESS: 0
SUM OF ALL RESPONSES TO PHQ9 QUESTIONS 1 & 2: 0
1. LITTLE INTEREST OR PLEASURE IN DOING THINGS: 0

## 2018-03-12 NOTE — PROGRESS NOTES
Hypothyroidism, unspecified type    2. Pharyngoesophageal dysphagia    3. Vitamin B12 deficiency        Plan:   patient's TSH was less than by 0.1 and patient is on 175 µg o Synthroid and advised patient to decrease to 150 µg of Synthroid and repeat labs in 2 months   Patient's vitamin B12 levels were low at less than 150 and advised patient to get vitamin B12 injections and repeat labs in 2 months   Patient has seen gastroenterology for dysphagia and will be getting an EGD done in May and patient never picked. the prescription for Protonix given by me and so advised patient to  the prescription and start taking the medications   Patient's symptoms of shortness of breath have improved on Symbicort and advised patient to continue same and as we do not have a sample of 160/4.5 I gave her a sample off for 80/4.5 and advised to continue 2 puffs twice a day   Patient never got the stress test done and will reorder    Patient complains of lump and tenderness over the occipital region of her scalp but I do not feel any significant lumps and will order a CT scan to evaluate   Review in 3 months             1.  Bartolome Lira received counseling on the following healthy behaviors: nutrition and exercise    2. Prior labs and health maintenance reviewed. 3.  Discussed use, benefit, and side effects of prescribed medications. Barriers to medication compliance addressed. All her questions were answered. Pt voiced understanding. Bartolome Lira will continue current medications, diet and exercise. No orders of the defined types were placed in this encounter.          Completed Refills               Requested Prescriptions     Pending Prescriptions Disp Refills    levothyroxine (SYNTHROID) 150 MCG tablet 30 tablet 5     Sig: Take 1 tablet by mouth Daily    naproxen (NAPROSYN) 500 MG tablet 30 tablet 5     Sig: take 1 tablet by mouth once daily    hydrochlorothiazide (HYDRODIURIL) 25 MG tablet 30 tablet 5

## 2018-03-12 NOTE — PROGRESS NOTES
Chronic Disease Visit Information    BP Readings from Last 3 Encounters:   02/13/18 138/89   01/31/18 102/64   08/27/17 123/85          LDL Cholesterol (mg/dL)   Date Value   03/12/2018 93     HDL (mg/dL)   Date Value   03/12/2018 46     BUN (mg/dL)   Date Value   03/12/2018 16     CREATININE (mg/dL)   Date Value   03/12/2018 0.49 (L)     Glucose (mg/dL)   Date Value   03/12/2018 94            Have you changed or started any medications since your last visit including any over-the-counter medicines, vitamins, or herbal medicines? no   Are you having any side effects from any of your medications? -  no  Have you stopped taking any of your medications? Is so, why? -  no    Have you seen any other physician or provider since your last visit? No  Have you had any other diagnostic tests since your last visit? Yes - Records Obtained  Have you been seen in the emergency room and/or had an admission to a hospital since we last saw you? No  Have you had your annual diabetic retinal (eye) exam? No  Have you had your routine dental cleaning in the past 6 months? yes -     Have you activated your for[MD] account? If not, what are your barriers?  Yes     Patient Care Team:  Haritha Waggoner MD as PCP - General  Haritha Waggoner MD as PCP - S Attributed Provider  Tutu Jones MD as Consulting Physician (Gastroenterology)         Medical History Review  Past Medical, Family, and Social History reviewed and does contribute to the patient presenting condition    Health Maintenance   Topic Date Due    Flu vaccine (1) 03/20/2019 (Originally 9/1/2017)    Pneumococcal med risk (1 of 1 - PPSV23) 03/20/2019 (Originally 9/28/1980)    DTaP/Tdap/Td vaccine (1 - Tdap) 03/21/2019 (Originally 9/28/1980)    Hepatitis C screen  03/21/2019 (Originally 1960)    Cervical cancer screen  03/22/2019 (Originally 1/1/2016)    Shingles Vaccine (1 of 2 - 2 Dose Series) 03/22/2019 (Originally 9/28/2011)    HIV screen  03/22/2019 (Originally 9/28/1976)    TSH testing  07/02/2018    Breast cancer screen  10/07/2018    Colon cancer screen colonoscopy  08/03/2019    Lipid screen  08/07/2019

## 2018-03-12 NOTE — TELEPHONE ENCOUNTER
LVM for return call  Decrease synthroid to 150 mcg- repeat  TSH, T4 Free and vitamin B12 in 2 months. Labs ordered, mailed to pt.

## 2018-03-20 ENCOUNTER — NURSE ONLY (OUTPATIENT)
Dept: INTERNAL MEDICINE CLINIC | Age: 58
End: 2018-03-20
Payer: COMMERCIAL

## 2018-03-20 DIAGNOSIS — E53.8 LOW SERUM VITAMIN B12: Primary | ICD-10-CM

## 2018-03-20 PROCEDURE — 96372 THER/PROPH/DIAG INJ SC/IM: CPT | Performed by: INTERNAL MEDICINE

## 2018-03-20 RX ORDER — CYANOCOBALAMIN 1000 UG/ML
1000 INJECTION INTRAMUSCULAR; SUBCUTANEOUS ONCE
Status: COMPLETED | OUTPATIENT
Start: 2018-03-20 | End: 2018-03-20

## 2018-03-20 RX ADMIN — CYANOCOBALAMIN 1000 MCG: 1000 INJECTION INTRAMUSCULAR; SUBCUTANEOUS at 11:18

## 2018-03-26 ENCOUNTER — NURSE ONLY (OUTPATIENT)
Dept: INTERNAL MEDICINE CLINIC | Age: 58
End: 2018-03-26
Payer: COMMERCIAL

## 2018-03-26 DIAGNOSIS — E53.8 LOW VITAMIN B12 LEVEL: Primary | ICD-10-CM

## 2018-03-26 PROCEDURE — 96372 THER/PROPH/DIAG INJ SC/IM: CPT | Performed by: NURSE PRACTITIONER

## 2018-03-26 RX ORDER — CYANOCOBALAMIN 1000 UG/ML
1000 INJECTION INTRAMUSCULAR; SUBCUTANEOUS ONCE
Status: COMPLETED | OUTPATIENT
Start: 2018-03-26 | End: 2018-03-26

## 2018-03-26 RX ADMIN — CYANOCOBALAMIN 1000 MCG: 1000 INJECTION INTRAMUSCULAR; SUBCUTANEOUS at 10:03

## 2018-04-02 ENCOUNTER — HOSPITAL ENCOUNTER (OUTPATIENT)
Dept: CT IMAGING | Age: 58
Discharge: HOME | End: 2018-04-04
Payer: COMMERCIAL

## 2018-04-02 DIAGNOSIS — E03.9 HYPOTHYROIDISM, UNSPECIFIED TYPE: ICD-10-CM

## 2018-04-02 DIAGNOSIS — E53.8 VITAMIN B12 DEFICIENCY: ICD-10-CM

## 2018-04-02 DIAGNOSIS — R13.14 PHARYNGOESOPHAGEAL DYSPHAGIA: ICD-10-CM

## 2018-04-02 PROCEDURE — 70450 CT HEAD/BRAIN W/O DYE: CPT

## 2018-04-04 ENCOUNTER — NURSE ONLY (OUTPATIENT)
Dept: INTERNAL MEDICINE CLINIC | Age: 58
End: 2018-04-04
Payer: COMMERCIAL

## 2018-04-04 DIAGNOSIS — R53.83 FATIGUE, UNSPECIFIED TYPE: Primary | ICD-10-CM

## 2018-04-04 PROCEDURE — 96372 THER/PROPH/DIAG INJ SC/IM: CPT | Performed by: INTERNAL MEDICINE

## 2018-04-04 RX ORDER — CYANOCOBALAMIN 1000 UG/ML
1000 INJECTION INTRAMUSCULAR; SUBCUTANEOUS ONCE
Status: COMPLETED | OUTPATIENT
Start: 2018-04-04 | End: 2018-04-04

## 2018-04-04 RX ADMIN — CYANOCOBALAMIN 1000 MCG: 1000 INJECTION INTRAMUSCULAR; SUBCUTANEOUS at 08:14

## 2018-05-04 ENCOUNTER — NURSE ONLY (OUTPATIENT)
Dept: INTERNAL MEDICINE CLINIC | Age: 58
End: 2018-05-04
Payer: COMMERCIAL

## 2018-05-04 DIAGNOSIS — E53.8 VITAMIN B 12 DEFICIENCY: Primary | ICD-10-CM

## 2018-05-04 PROCEDURE — 96372 THER/PROPH/DIAG INJ SC/IM: CPT | Performed by: INTERNAL MEDICINE

## 2018-05-04 RX ORDER — CYANOCOBALAMIN 1000 UG/ML
1000 INJECTION INTRAMUSCULAR; SUBCUTANEOUS ONCE
Status: COMPLETED | OUTPATIENT
Start: 2018-05-04 | End: 2018-05-04

## 2018-05-04 RX ADMIN — CYANOCOBALAMIN 1000 MCG: 1000 INJECTION INTRAMUSCULAR; SUBCUTANEOUS at 09:31

## 2018-05-07 ENCOUNTER — TELEPHONE (OUTPATIENT)
Dept: GASTROENTEROLOGY | Age: 58
End: 2018-05-07

## 2018-06-18 ENCOUNTER — OFFICE VISIT (OUTPATIENT)
Dept: INTERNAL MEDICINE CLINIC | Age: 58
End: 2018-06-18
Payer: COMMERCIAL

## 2018-06-18 VITALS
TEMPERATURE: 98 F | HEIGHT: 65 IN | WEIGHT: 242 LBS | BODY MASS INDEX: 40.32 KG/M2 | OXYGEN SATURATION: 97 % | SYSTOLIC BLOOD PRESSURE: 110 MMHG | HEART RATE: 80 BPM | DIASTOLIC BLOOD PRESSURE: 80 MMHG

## 2018-06-18 DIAGNOSIS — E03.9 HYPOTHYROIDISM, UNSPECIFIED TYPE: ICD-10-CM

## 2018-06-18 DIAGNOSIS — R13.14 PHARYNGOESOPHAGEAL DYSPHAGIA: ICD-10-CM

## 2018-06-18 DIAGNOSIS — E53.8 VITAMIN B 12 DEFICIENCY: Primary | ICD-10-CM

## 2018-06-18 PROCEDURE — 96372 THER/PROPH/DIAG INJ SC/IM: CPT | Performed by: INTERNAL MEDICINE

## 2018-06-18 PROCEDURE — 99214 OFFICE O/P EST MOD 30 MIN: CPT | Performed by: INTERNAL MEDICINE

## 2018-06-18 RX ORDER — CYANOCOBALAMIN 1000 UG/ML
1000 INJECTION INTRAMUSCULAR; SUBCUTANEOUS ONCE
Status: COMPLETED | OUTPATIENT
Start: 2018-06-18 | End: 2018-06-18

## 2018-06-18 RX ADMIN — CYANOCOBALAMIN 1000 MCG: 1000 INJECTION INTRAMUSCULAR; SUBCUTANEOUS at 15:40

## 2018-06-26 ENCOUNTER — TELEPHONE (OUTPATIENT)
Dept: GASTROENTEROLOGY | Age: 58
End: 2018-06-26

## 2018-06-28 ENCOUNTER — TELEPHONE (OUTPATIENT)
Dept: GASTROENTEROLOGY | Age: 58
End: 2018-06-28

## 2018-07-05 NOTE — TELEPHONE ENCOUNTER
PLEASE CALL PATIENT TO R/S ANYTIME BETWEEN NOW AND Monday [phone]/9 [phone] PATIENT IS OFF WORK UNTIL THEN. Gagan Roberts

## 2018-07-10 RX ORDER — POLYETHYLENE GLYCOL 3350 17 G/17G
POWDER, FOR SOLUTION ORAL
Qty: 255 G | Refills: 0 | Status: SHIPPED | OUTPATIENT
Start: 2018-07-10 | End: 2018-08-08

## 2018-07-19 ENCOUNTER — NURSE ONLY (OUTPATIENT)
Dept: INTERNAL MEDICINE CLINIC | Age: 58
End: 2018-07-19
Payer: COMMERCIAL

## 2018-07-19 PROCEDURE — 96372 THER/PROPH/DIAG INJ SC/IM: CPT | Performed by: INTERNAL MEDICINE

## 2018-07-19 RX ORDER — CYANOCOBALAMIN 1000 UG/ML
1000 INJECTION INTRAMUSCULAR; SUBCUTANEOUS ONCE
Status: COMPLETED | OUTPATIENT
Start: 2018-07-19 | End: 2018-07-19

## 2018-07-19 RX ADMIN — CYANOCOBALAMIN 1000 MCG: 1000 INJECTION INTRAMUSCULAR; SUBCUTANEOUS at 15:08

## 2018-08-24 DIAGNOSIS — M79.671 RIGHT FOOT PAIN: ICD-10-CM

## 2018-08-27 RX ORDER — METOPROLOL SUCCINATE 100 MG/1
TABLET, EXTENDED RELEASE ORAL
Qty: 30 TABLET | Refills: 3 | Status: SHIPPED | OUTPATIENT
Start: 2018-08-27 | End: 2018-08-29 | Stop reason: SDUPTHER

## 2018-08-29 DIAGNOSIS — M79.671 RIGHT FOOT PAIN: ICD-10-CM

## 2018-08-29 RX ORDER — METOPROLOL SUCCINATE 100 MG/1
TABLET, EXTENDED RELEASE ORAL
Qty: 30 TABLET | Refills: 3 | Status: SHIPPED | OUTPATIENT
Start: 2018-08-29 | End: 2018-09-21 | Stop reason: SDUPTHER

## 2018-08-30 ENCOUNTER — NURSE ONLY (OUTPATIENT)
Dept: INTERNAL MEDICINE CLINIC | Age: 58
End: 2018-08-30
Payer: COMMERCIAL

## 2018-08-30 DIAGNOSIS — E53.8 B12 DEFICIENCY: Primary | ICD-10-CM

## 2018-08-30 PROCEDURE — 96372 THER/PROPH/DIAG INJ SC/IM: CPT | Performed by: INTERNAL MEDICINE

## 2018-08-30 RX ORDER — CYANOCOBALAMIN 1000 UG/ML
1000 INJECTION INTRAMUSCULAR; SUBCUTANEOUS ONCE
Status: COMPLETED | OUTPATIENT
Start: 2018-08-30 | End: 2018-08-30

## 2018-08-30 RX ADMIN — CYANOCOBALAMIN 1000 MCG: 1000 INJECTION INTRAMUSCULAR; SUBCUTANEOUS at 14:57

## 2018-08-30 NOTE — PROGRESS NOTES
After obtaining consent, and per orders of Dr. Darrick Sylvester, injection of B12 given in Right deltoid by Brenna Hansen. Patient instructed to remain in clinic for 20 minutes afterwards, and to report any adverse reaction to me immediately.

## 2018-09-21 ENCOUNTER — TELEPHONE (OUTPATIENT)
Dept: GASTROENTEROLOGY | Age: 58
End: 2018-09-21

## 2018-09-21 DIAGNOSIS — M79.671 RIGHT FOOT PAIN: ICD-10-CM

## 2018-09-21 RX ORDER — LEVOTHYROXINE SODIUM 0.15 MG/1
150 TABLET ORAL DAILY
Qty: 30 TABLET | Refills: 0 | Status: SHIPPED | OUTPATIENT
Start: 2018-09-21 | End: 2018-10-23 | Stop reason: SDUPTHER

## 2018-09-21 RX ORDER — NAPROXEN 500 MG/1
TABLET ORAL
Qty: 30 TABLET | Refills: 0 | Status: SHIPPED | OUTPATIENT
Start: 2018-09-21 | End: 2018-10-23 | Stop reason: SDUPTHER

## 2018-09-21 RX ORDER — METOPROLOL SUCCINATE 100 MG/1
TABLET, EXTENDED RELEASE ORAL
Qty: 30 TABLET | Refills: 0 | Status: SHIPPED | OUTPATIENT
Start: 2018-09-21 | End: 2019-04-26 | Stop reason: SDUPTHER

## 2018-09-21 RX ORDER — HYDROCHLOROTHIAZIDE 25 MG/1
TABLET ORAL
Qty: 30 TABLET | Refills: 0 | Status: SHIPPED | OUTPATIENT
Start: 2018-09-21 | End: 2019-06-13 | Stop reason: SDUPTHER

## 2018-09-24 RX ORDER — HYDROCHLOROTHIAZIDE 25 MG/1
TABLET ORAL
Qty: 30 TABLET | Refills: 5 | Status: SHIPPED | OUTPATIENT
Start: 2018-09-24 | End: 2019-02-14 | Stop reason: CLARIF

## 2018-09-24 RX ORDER — LEVOTHYROXINE SODIUM 0.15 MG/1
TABLET ORAL
Qty: 30 TABLET | Refills: 2 | OUTPATIENT
Start: 2018-09-24

## 2018-09-26 ENCOUNTER — TELEPHONE (OUTPATIENT)
Dept: INTERNAL MEDICINE CLINIC | Age: 58
End: 2018-09-26

## 2018-10-22 ENCOUNTER — HOSPITAL ENCOUNTER (OUTPATIENT)
Age: 58
Discharge: HOME | End: 2018-10-22
Payer: COMMERCIAL

## 2018-10-22 ENCOUNTER — TELEPHONE (OUTPATIENT)
Dept: INTERNAL MEDICINE CLINIC | Age: 58
End: 2018-10-22

## 2018-10-22 DIAGNOSIS — R13.14 PHARYNGOESOPHAGEAL DYSPHAGIA: ICD-10-CM

## 2018-10-22 DIAGNOSIS — E07.9 THYROID CONDITION: Primary | ICD-10-CM

## 2018-10-22 DIAGNOSIS — E66.01 CLASS 3 SEVERE OBESITY DUE TO EXCESS CALORIES WITHOUT SERIOUS COMORBIDITY WITH BODY MASS INDEX (BMI) OF 40.0 TO 44.9 IN ADULT (HCC): ICD-10-CM

## 2018-10-22 DIAGNOSIS — E03.9 HYPOTHYROIDISM, UNSPECIFIED TYPE: ICD-10-CM

## 2018-10-22 DIAGNOSIS — E53.8 LOW VITAMIN B12 LEVEL: ICD-10-CM

## 2018-10-22 DIAGNOSIS — R79.89 LOW TSH LEVEL: ICD-10-CM

## 2018-10-22 DIAGNOSIS — E53.8 VITAMIN B 12 DEFICIENCY: ICD-10-CM

## 2018-10-22 LAB
FOLATE: >20 NG/ML
THYROXINE, FREE: 1.52 NG/DL (ref 0.93–1.7)
TSH SERPL DL<=0.05 MIU/L-ACNC: <0.01 MIU/L (ref 0.3–5)
VITAMIN B-12: 225 PG/ML (ref 232–1245)

## 2018-10-22 PROCEDURE — 84443 ASSAY THYROID STIM HORMONE: CPT

## 2018-10-22 PROCEDURE — 36415 COLL VENOUS BLD VENIPUNCTURE: CPT

## 2018-10-22 PROCEDURE — 82746 ASSAY OF FOLIC ACID SERUM: CPT

## 2018-10-22 PROCEDURE — 84439 ASSAY OF FREE THYROXINE: CPT

## 2018-10-22 PROCEDURE — 82607 VITAMIN B-12: CPT

## 2018-10-22 NOTE — TELEPHONE ENCOUNTER
Pt notified to get ultrasound of thyroid and to have labs done.  Ordered thyroid antibodies done now and vitamin b12 levels in 2 months

## 2018-10-24 RX ORDER — LEVOTHYROXINE SODIUM 0.15 MG/1
TABLET ORAL
Qty: 30 TABLET | Refills: 3 | Status: SHIPPED | OUTPATIENT
Start: 2018-10-24 | End: 2019-02-14 | Stop reason: SDUPTHER

## 2018-10-24 RX ORDER — NAPROXEN 500 MG/1
TABLET ORAL
Qty: 30 TABLET | Refills: 1 | Status: SHIPPED | OUTPATIENT
Start: 2018-10-24 | End: 2018-12-28 | Stop reason: SDUPTHER

## 2018-10-31 ENCOUNTER — HOSPITAL ENCOUNTER (OUTPATIENT)
Dept: ULTRASOUND IMAGING | Age: 58
Discharge: HOME | End: 2018-11-02
Payer: COMMERCIAL

## 2018-10-31 ENCOUNTER — HOSPITAL ENCOUNTER (OUTPATIENT)
Age: 58
Discharge: HOME | End: 2018-10-31
Payer: COMMERCIAL

## 2018-10-31 ENCOUNTER — TELEPHONE (OUTPATIENT)
Dept: INTERNAL MEDICINE CLINIC | Age: 58
End: 2018-10-31

## 2018-10-31 DIAGNOSIS — R94.6 ABNORMAL THYROID EXAM: Primary | ICD-10-CM

## 2018-10-31 DIAGNOSIS — E07.9 THYROID CONDITION: ICD-10-CM

## 2018-10-31 DIAGNOSIS — E53.8 LOW VITAMIN B12 LEVEL: ICD-10-CM

## 2018-10-31 DIAGNOSIS — R79.89 LOW TSH LEVEL: ICD-10-CM

## 2018-10-31 LAB
THYROGLOBULIN AB: <20 IU/ML (ref 0–40)
THYROID PEROXIDASE (TPO) AB: 371 IU/ML (ref 0–35)
VITAMIN B-12: 462 PG/ML (ref 232–1245)

## 2018-10-31 PROCEDURE — 82607 VITAMIN B-12: CPT

## 2018-10-31 PROCEDURE — 86376 MICROSOMAL ANTIBODY EACH: CPT

## 2018-10-31 PROCEDURE — 86800 THYROGLOBULIN ANTIBODY: CPT

## 2018-10-31 PROCEDURE — 36415 COLL VENOUS BLD VENIPUNCTURE: CPT

## 2018-10-31 PROCEDURE — 76536 US EXAM OF HEAD AND NECK: CPT

## 2018-11-19 ENCOUNTER — TELEPHONE (OUTPATIENT)
Dept: GASTROENTEROLOGY | Age: 58
End: 2018-11-19

## 2018-11-20 ENCOUNTER — NURSE ONLY (OUTPATIENT)
Dept: INTERNAL MEDICINE CLINIC | Age: 58
End: 2018-11-20
Payer: COMMERCIAL

## 2018-11-20 DIAGNOSIS — E53.8 LOW VITAMIN B12 LEVEL: Primary | ICD-10-CM

## 2018-11-20 PROCEDURE — 96372 THER/PROPH/DIAG INJ SC/IM: CPT | Performed by: INTERNAL MEDICINE

## 2018-11-20 RX ORDER — CYANOCOBALAMIN 1000 UG/ML
1000 INJECTION INTRAMUSCULAR; SUBCUTANEOUS ONCE
Status: COMPLETED | OUTPATIENT
Start: 2018-11-20 | End: 2018-11-20

## 2018-11-20 RX ADMIN — CYANOCOBALAMIN 1000 MCG: 1000 INJECTION INTRAMUSCULAR; SUBCUTANEOUS at 16:55

## 2018-12-31 RX ORDER — NAPROXEN 500 MG/1
TABLET ORAL
Qty: 30 TABLET | Refills: 0 | Status: SHIPPED | OUTPATIENT
Start: 2018-12-31 | End: 2019-01-26 | Stop reason: SDUPTHER

## 2019-01-02 ENCOUNTER — TELEPHONE (OUTPATIENT)
Dept: GASTROENTEROLOGY | Age: 59
End: 2019-01-02

## 2019-01-14 ENCOUNTER — HOSPITAL ENCOUNTER (OUTPATIENT)
Age: 59
Discharge: HOME | End: 2019-01-14
Payer: COMMERCIAL

## 2019-01-14 LAB — VITAMIN B-12: 511 PG/ML (ref 232–1245)

## 2019-01-14 PROCEDURE — 82607 VITAMIN B-12: CPT

## 2019-01-14 PROCEDURE — 36415 COLL VENOUS BLD VENIPUNCTURE: CPT

## 2019-01-28 RX ORDER — NAPROXEN 500 MG/1
TABLET ORAL
Qty: 30 TABLET | Refills: 0 | Status: SHIPPED | OUTPATIENT
Start: 2019-01-28 | End: 2019-09-12

## 2019-02-14 ENCOUNTER — OFFICE VISIT (OUTPATIENT)
Dept: INTERNAL MEDICINE CLINIC | Age: 59
End: 2019-02-14
Payer: COMMERCIAL

## 2019-02-14 VITALS
HEART RATE: 76 BPM | WEIGHT: 253.2 LBS | DIASTOLIC BLOOD PRESSURE: 88 MMHG | BODY MASS INDEX: 42.19 KG/M2 | HEIGHT: 65 IN | TEMPERATURE: 98.6 F | RESPIRATION RATE: 16 BRPM | SYSTOLIC BLOOD PRESSURE: 124 MMHG

## 2019-02-14 DIAGNOSIS — E53.8 LOW VITAMIN B12 LEVEL: Primary | ICD-10-CM

## 2019-02-14 DIAGNOSIS — Z12.39 ENCOUNTER FOR SCREENING BREAST EXAMINATION: ICD-10-CM

## 2019-02-14 DIAGNOSIS — I10 ESSENTIAL HYPERTENSION: ICD-10-CM

## 2019-02-14 DIAGNOSIS — E03.9 HYPOTHYROIDISM, UNSPECIFIED TYPE: ICD-10-CM

## 2019-02-14 PROCEDURE — 99214 OFFICE O/P EST MOD 30 MIN: CPT | Performed by: INTERNAL MEDICINE

## 2019-02-14 RX ORDER — CHOLECALCIFEROL (VITAMIN D3) 125 MCG
500 CAPSULE ORAL DAILY
COMMUNITY
End: 2022-01-28 | Stop reason: ALTCHOICE

## 2019-02-14 RX ORDER — LEVOTHYROXINE SODIUM 0.15 MG/1
TABLET ORAL
Qty: 30 TABLET | Refills: 5 | Status: SHIPPED | OUTPATIENT
Start: 2019-02-14 | End: 2019-08-26 | Stop reason: SDUPTHER

## 2019-02-14 RX ORDER — NAPROXEN 500 MG/1
TABLET ORAL
Qty: 30 TABLET | Refills: 3 | Status: CANCELLED | OUTPATIENT
Start: 2019-02-14

## 2019-02-14 ASSESSMENT — PATIENT HEALTH QUESTIONNAIRE - PHQ9
SUM OF ALL RESPONSES TO PHQ9 QUESTIONS 1 & 2: 0
SUM OF ALL RESPONSES TO PHQ QUESTIONS 1-9: 0
2. FEELING DOWN, DEPRESSED OR HOPELESS: 0
SUM OF ALL RESPONSES TO PHQ QUESTIONS 1-9: 0
1. LITTLE INTEREST OR PLEASURE IN DOING THINGS: 0

## 2019-02-20 ENCOUNTER — TELEPHONE (OUTPATIENT)
Dept: GASTROENTEROLOGY | Age: 59
End: 2019-02-20

## 2019-02-20 ENCOUNTER — INITIAL CONSULT (OUTPATIENT)
Dept: ENDOCRINOLOGY | Age: 59
End: 2019-02-20
Payer: COMMERCIAL

## 2019-02-20 ENCOUNTER — HOSPITAL ENCOUNTER (OUTPATIENT)
Age: 59
Setting detail: SPECIMEN
Discharge: HOME | End: 2019-02-20
Payer: COMMERCIAL

## 2019-02-20 VITALS
DIASTOLIC BLOOD PRESSURE: 70 MMHG | SYSTOLIC BLOOD PRESSURE: 102 MMHG | WEIGHT: 249 LBS | HEART RATE: 76 BPM | TEMPERATURE: 98.6 F | OXYGEN SATURATION: 96 % | BODY MASS INDEX: 41.48 KG/M2 | HEIGHT: 65 IN

## 2019-02-20 DIAGNOSIS — E06.3 HYPOTHYROIDISM DUE TO HASHIMOTO'S THYROIDITIS: Primary | ICD-10-CM

## 2019-02-20 DIAGNOSIS — E03.8 HYPOTHYROIDISM DUE TO HASHIMOTO'S THYROIDITIS: ICD-10-CM

## 2019-02-20 DIAGNOSIS — E06.3 HYPOTHYROIDISM DUE TO HASHIMOTO'S THYROIDITIS: ICD-10-CM

## 2019-02-20 DIAGNOSIS — E28.319 PREMATURE MENOPAUSE: ICD-10-CM

## 2019-02-20 DIAGNOSIS — E03.8 HYPOTHYROIDISM DUE TO HASHIMOTO'S THYROIDITIS: Primary | ICD-10-CM

## 2019-02-20 LAB
THYROXINE, FREE: 1.76 NG/DL (ref 0.93–1.7)
TSH SERPL DL<=0.05 MIU/L-ACNC: <0.01 MIU/L (ref 0.3–5)

## 2019-02-20 PROCEDURE — 99214 OFFICE O/P EST MOD 30 MIN: CPT | Performed by: INTERNAL MEDICINE

## 2019-02-24 ENCOUNTER — APPOINTMENT (OUTPATIENT)
Dept: GENERAL RADIOLOGY | Age: 59
End: 2019-02-24
Payer: COMMERCIAL

## 2019-02-24 ENCOUNTER — HOSPITAL ENCOUNTER (EMERGENCY)
Age: 59
Discharge: HOME | End: 2019-02-24
Attending: EMERGENCY MEDICINE
Payer: COMMERCIAL

## 2019-02-24 ENCOUNTER — APPOINTMENT (OUTPATIENT)
Dept: CT IMAGING | Age: 59
End: 2019-02-24
Payer: COMMERCIAL

## 2019-02-24 VITALS
OXYGEN SATURATION: 98 % | DIASTOLIC BLOOD PRESSURE: 96 MMHG | BODY MASS INDEX: 41.48 KG/M2 | HEIGHT: 65 IN | RESPIRATION RATE: 24 BRPM | TEMPERATURE: 99.5 F | WEIGHT: 249 LBS | HEART RATE: 70 BPM | SYSTOLIC BLOOD PRESSURE: 139 MMHG

## 2019-02-24 DIAGNOSIS — R10.84 GENERALIZED ABDOMINAL PAIN: Primary | ICD-10-CM

## 2019-02-24 LAB
-: NORMAL
ABSOLUTE EOS #: 0.26 K/UL (ref 0–0.44)
ABSOLUTE IMMATURE GRANULOCYTE: <0.03 K/UL (ref 0–0.3)
ABSOLUTE LYMPH #: 2.31 K/UL (ref 1.1–3.7)
ABSOLUTE MONO #: 0.59 K/UL (ref 0.1–1.2)
ALBUMIN SERPL-MCNC: 3.5 G/DL (ref 3.5–5.2)
ALBUMIN/GLOBULIN RATIO: 1 (ref 1–2.5)
ALP BLD-CCNC: 96 U/L (ref 35–104)
ALT SERPL-CCNC: 17 U/L (ref 5–33)
AMORPHOUS: NORMAL
ANION GAP SERPL CALCULATED.3IONS-SCNC: 11 MMOL/L (ref 9–17)
AST SERPL-CCNC: 20 U/L
BACTERIA: NORMAL
BASOPHILS # BLD: 0 % (ref 0–2)
BASOPHILS ABSOLUTE: <0.03 K/UL (ref 0–0.2)
BILIRUB SERPL-MCNC: 0.16 MG/DL (ref 0.3–1.2)
BILIRUBIN URINE: NEGATIVE
BUN BLDV-MCNC: 8 MG/DL (ref 6–20)
BUN/CREAT BLD: ABNORMAL (ref 9–20)
CALCIUM SERPL-MCNC: 8.8 MG/DL (ref 8.6–10.4)
CASTS UA: NORMAL /LPF (ref 0–8)
CHLORIDE BLD-SCNC: 102 MMOL/L (ref 98–107)
CO2: 27 MMOL/L (ref 20–31)
COLOR: YELLOW
COMMENT UA: ABNORMAL
CREAT SERPL-MCNC: 0.61 MG/DL (ref 0.5–0.9)
CRYSTALS, UA: NORMAL /HPF
DIFFERENTIAL TYPE: ABNORMAL
EOSINOPHILS RELATIVE PERCENT: 4 % (ref 1–4)
EPITHELIAL CELLS UA: NORMAL /HPF (ref 0–5)
GFR AFRICAN AMERICAN: >60 ML/MIN
GFR NON-AFRICAN AMERICAN: >60 ML/MIN
GFR SERPL CREATININE-BSD FRML MDRD: ABNORMAL ML/MIN/{1.73_M2}
GFR SERPL CREATININE-BSD FRML MDRD: ABNORMAL ML/MIN/{1.73_M2}
GLUCOSE BLD-MCNC: 127 MG/DL (ref 70–99)
GLUCOSE URINE: NEGATIVE
HCT VFR BLD CALC: 44.7 % (ref 36.3–47.1)
HEMOGLOBIN: 14.7 G/DL (ref 11.9–15.1)
IMMATURE GRANULOCYTES: 0 %
KETONES, URINE: NEGATIVE
LACTIC ACID, SEPSIS WHOLE BLOOD: 1.3 MMOL/L (ref 0.5–1.9)
LACTIC ACID, SEPSIS: NORMAL MMOL/L (ref 0.5–1.9)
LEUKOCYTE ESTERASE, URINE: NEGATIVE
LIPASE: 33 U/L (ref 13–60)
LYMPHOCYTES # BLD: 31 % (ref 24–43)
MCH RBC QN AUTO: 28.3 PG (ref 25.2–33.5)
MCHC RBC AUTO-ENTMCNC: 32.9 G/DL (ref 28.4–34.8)
MCV RBC AUTO: 86 FL (ref 82.6–102.9)
MONOCYTES # BLD: 8 % (ref 3–12)
MUCUS: NORMAL
NITRITE, URINE: NEGATIVE
NRBC AUTOMATED: 0 PER 100 WBC
OTHER OBSERVATIONS UA: NORMAL
PDW BLD-RTO: 12.2 % (ref 11.8–14.4)
PH UA: 5 (ref 5–8)
PLATELET # BLD: 378 K/UL (ref 138–453)
PLATELET ESTIMATE: ABNORMAL
PMV BLD AUTO: 9.4 FL (ref 8.1–13.5)
POTASSIUM SERPL-SCNC: 2.9 MMOL/L (ref 3.7–5.3)
PROTEIN UA: NEGATIVE
RBC # BLD: 5.2 M/UL (ref 3.95–5.11)
RBC # BLD: ABNORMAL 10*6/UL
RBC UA: NORMAL /HPF (ref 0–4)
RENAL EPITHELIAL, UA: NORMAL /HPF
SEG NEUTROPHILS: 57 % (ref 36–65)
SEGMENTED NEUTROPHILS ABSOLUTE COUNT: 4.18 K/UL (ref 1.5–8.1)
SODIUM BLD-SCNC: 140 MMOL/L (ref 135–144)
SPECIFIC GRAVITY UA: 1.02 (ref 1–1.03)
TOTAL PROTEIN: 7 G/DL (ref 6.4–8.3)
TRICHOMONAS: NORMAL
TROPONIN INTERP: NORMAL
TROPONIN T: NORMAL NG/ML
TROPONIN, HIGH SENSITIVITY: <6 NG/L (ref 0–14)
TURBIDITY: ABNORMAL
URINE HGB: NEGATIVE
UROBILINOGEN, URINE: NORMAL
WBC # BLD: 7.4 K/UL (ref 3.5–11.3)
WBC # BLD: ABNORMAL 10*3/UL
WBC UA: NORMAL /HPF (ref 0–5)
YEAST: NORMAL

## 2019-02-24 PROCEDURE — 81001 URINALYSIS AUTO W/SCOPE: CPT

## 2019-02-24 PROCEDURE — 84484 ASSAY OF TROPONIN QUANT: CPT

## 2019-02-24 PROCEDURE — 83605 ASSAY OF LACTIC ACID: CPT

## 2019-02-24 PROCEDURE — 80053 COMPREHEN METABOLIC PANEL: CPT

## 2019-02-24 PROCEDURE — 71046 X-RAY EXAM CHEST 2 VIEWS: CPT

## 2019-02-24 PROCEDURE — 6360000004 HC RX CONTRAST MEDICATION: Performed by: EMERGENCY MEDICINE

## 2019-02-24 PROCEDURE — 99284 EMERGENCY DEPT VISIT MOD MDM: CPT

## 2019-02-24 PROCEDURE — 83690 ASSAY OF LIPASE: CPT

## 2019-02-24 PROCEDURE — 96365 THER/PROPH/DIAG IV INF INIT: CPT

## 2019-02-24 PROCEDURE — 74177 CT ABD & PELVIS W/CONTRAST: CPT

## 2019-02-24 PROCEDURE — 85025 COMPLETE CBC W/AUTO DIFF WBC: CPT

## 2019-02-24 PROCEDURE — 6370000000 HC RX 637 (ALT 250 FOR IP): Performed by: EMERGENCY MEDICINE

## 2019-02-24 PROCEDURE — 93005 ELECTROCARDIOGRAM TRACING: CPT

## 2019-02-24 PROCEDURE — 6360000002 HC RX W HCPCS: Performed by: EMERGENCY MEDICINE

## 2019-02-24 PROCEDURE — 2580000003 HC RX 258: Performed by: EMERGENCY MEDICINE

## 2019-02-24 RX ORDER — FAMOTIDINE 20 MG/1
20 TABLET, FILM COATED ORAL 2 TIMES DAILY PRN
Qty: 60 TABLET | Refills: 0 | Status: SHIPPED | OUTPATIENT
Start: 2019-02-24 | End: 2019-06-13

## 2019-02-24 RX ORDER — POTASSIUM CHLORIDE 20 MEQ/1
40 TABLET, EXTENDED RELEASE ORAL ONCE
Status: COMPLETED | OUTPATIENT
Start: 2019-02-24 | End: 2019-02-24

## 2019-02-24 RX ORDER — POTASSIUM CHLORIDE 7.45 MG/ML
40 INJECTION INTRAVENOUS ONCE
Status: COMPLETED | OUTPATIENT
Start: 2019-02-24 | End: 2019-02-24

## 2019-02-24 RX ORDER — POTASSIUM CHLORIDE 20 MEQ/1
40 TABLET, EXTENDED RELEASE ORAL ONCE
Status: DISCONTINUED | OUTPATIENT
Start: 2019-02-24 | End: 2019-02-24

## 2019-02-24 RX ORDER — DICYCLOMINE HYDROCHLORIDE 10 MG/1
10 CAPSULE ORAL EVERY 6 HOURS PRN
Qty: 20 CAPSULE | Refills: 0 | Status: SHIPPED | OUTPATIENT
Start: 2019-02-24 | End: 2019-06-13

## 2019-02-24 RX ORDER — DICYCLOMINE HYDROCHLORIDE 10 MG/1
10 CAPSULE ORAL ONCE
Status: COMPLETED | OUTPATIENT
Start: 2019-02-24 | End: 2019-02-24

## 2019-02-24 RX ORDER — 0.9 % SODIUM CHLORIDE 0.9 %
1000 INTRAVENOUS SOLUTION INTRAVENOUS ONCE
Status: COMPLETED | OUTPATIENT
Start: 2019-02-24 | End: 2019-02-24

## 2019-02-24 RX ADMIN — SODIUM CHLORIDE 1000 ML: 9 INJECTION, SOLUTION INTRAVENOUS at 15:13

## 2019-02-24 RX ADMIN — DICYCLOMINE HYDROCHLORIDE 10 MG: 10 CAPSULE ORAL at 15:13

## 2019-02-24 RX ADMIN — POTASSIUM CHLORIDE 10 MEQ: 7.46 INJECTION, SOLUTION INTRAVENOUS at 16:14

## 2019-02-24 RX ADMIN — IOVERSOL 75 ML: 741 INJECTION INTRA-ARTERIAL; INTRAVENOUS at 16:12

## 2019-02-24 RX ADMIN — POTASSIUM CHLORIDE 40 MEQ: 20 TABLET, EXTENDED RELEASE ORAL at 16:49

## 2019-02-24 ASSESSMENT — ENCOUNTER SYMPTOMS
DIARRHEA: 0
SHORTNESS OF BREATH: 0
SORE THROAT: 0
CONSTIPATION: 1
BACK PAIN: 0
ABDOMINAL PAIN: 1
VOMITING: 0
COUGH: 0
NAUSEA: 0

## 2019-02-26 LAB
EKG ATRIAL RATE: 69 BPM
EKG P AXIS: 55 DEGREES
EKG P-R INTERVAL: 134 MS
EKG Q-T INTERVAL: 402 MS
EKG QRS DURATION: 80 MS
EKG QTC CALCULATION (BAZETT): 430 MS
EKG R AXIS: -5 DEGREES
EKG T AXIS: -18 DEGREES
EKG VENTRICULAR RATE: 69 BPM

## 2019-03-11 ENCOUNTER — TELEPHONE (OUTPATIENT)
Dept: GASTROENTEROLOGY | Age: 59
End: 2019-03-11

## 2019-03-26 PROBLEM — K63.5 COLON POLYP: Status: ACTIVE | Noted: 2019-02-21

## 2019-03-26 PROBLEM — D3A.8 NEUROENDOCRINE TUMOR: Status: ACTIVE | Noted: 2019-02-21

## 2019-03-27 ENCOUNTER — TELEPHONE (OUTPATIENT)
Dept: GASTROENTEROLOGY | Age: 59
End: 2019-03-27

## 2019-04-02 ENCOUNTER — TELEPHONE (OUTPATIENT)
Dept: INTERNAL MEDICINE CLINIC | Age: 59
End: 2019-04-02

## 2019-04-02 NOTE — TELEPHONE ENCOUNTER
rec'd notice pt No showed for mammogram and DEXA. LM for pt to call scheduling dept to r/s or call office with questions. Notice scanned in. Mirza Salinas MD ([phone])   Sent: Choco Adam March 31, 2019  8:17 PM   To:  Hedwig Kayser, MA   Cc: Yarelis Tyler      Message  Check with patient

## 2019-04-22 RX ORDER — HYDROCHLOROTHIAZIDE 25 MG/1
TABLET ORAL
Qty: 30 TABLET | Refills: 5 | Status: SHIPPED | OUTPATIENT
Start: 2019-04-22 | End: 2019-10-14 | Stop reason: SDUPTHER

## 2019-04-23 ENCOUNTER — HOSPITAL ENCOUNTER (OUTPATIENT)
Age: 59
Setting detail: SPECIMEN
Discharge: HOME | End: 2019-04-23
Payer: COMMERCIAL

## 2019-04-23 ENCOUNTER — OFFICE VISIT (OUTPATIENT)
Dept: ENDOCRINOLOGY | Age: 59
End: 2019-04-23
Payer: COMMERCIAL

## 2019-04-23 VITALS
BODY MASS INDEX: 42.78 KG/M2 | TEMPERATURE: 98.2 F | WEIGHT: 256.8 LBS | HEIGHT: 65 IN | SYSTOLIC BLOOD PRESSURE: 110 MMHG | HEART RATE: 84 BPM | OXYGEN SATURATION: 98 % | DIASTOLIC BLOOD PRESSURE: 70 MMHG

## 2019-04-23 DIAGNOSIS — E03.8 HYPOTHYROIDISM DUE TO HASHIMOTO'S THYROIDITIS: ICD-10-CM

## 2019-04-23 DIAGNOSIS — E06.3 HYPOTHYROIDISM DUE TO HASHIMOTO'S THYROIDITIS: ICD-10-CM

## 2019-04-23 DIAGNOSIS — E03.8 HYPOTHYROIDISM DUE TO HASHIMOTO'S THYROIDITIS: Primary | ICD-10-CM

## 2019-04-23 DIAGNOSIS — D3A.8 NEUROENDOCRINE NEOPLASM OF STOMACH: ICD-10-CM

## 2019-04-23 DIAGNOSIS — E06.3 HYPOTHYROIDISM DUE TO HASHIMOTO'S THYROIDITIS: Primary | ICD-10-CM

## 2019-04-23 LAB
THYROXINE, FREE: 1.2 NG/DL (ref 0.93–1.7)
TSH SERPL DL<=0.05 MIU/L-ACNC: 0.02 MIU/L (ref 0.3–5)

## 2019-04-23 PROCEDURE — 99214 OFFICE O/P EST MOD 30 MIN: CPT | Performed by: INTERNAL MEDICINE

## 2019-04-23 NOTE — PROGRESS NOTES
Dr. Luann Jolly [facility]  [phone] [facility]. [place]    Chief Complaint   Patient presents with    Hypothyroidism     2 month f/u     Menopause    Discuss Labs     Done 2-20-19    Results     Dexa Scan and Mammogram not done yet          /70 (Site: Left Upper Arm, Position: Sitting, Cuff Size: Medium Adult)   Pulse 84 Comment: REGULAR  Temp 98.2 °F (36.8 °C)   Ht 5' 5\" (1.651 m)   Wt 256 lb 12.8 oz (116.5 kg)   LMP 01/01/1994   SpO2 98%   BMI 42.73 kg/m²      Wt Readings from Last 3 Encounters:   04/23/19 256 lb 12.8 oz (116.5 kg)   02/24/19 249 lb (112.9 kg)   02/20/19 249 lb (112.9 kg)     Body mass index is 42.73 kg/m². BP Readings from Last 3 Encounters:   04/23/19 110/70   02/24/19 (!) 139/96   02/20/19 102/70       There are no preventive care reminders to display for this patient. HPI:     HPI  AND REVIEW OF SYSTEMS    PATIENT COMES IN FOR FOLLOW UP OF     HYPOTHYROIDISM. POSITIVE PEROXIDASE ANTIBODIES  SUGGESTS HASHIMOTO'S THYROIDITIS  ON LEVOTHYROXINE     HYPERTENSION    MORBID OBESITY    OCCASIONAL ACID REFLUX    POST MENOPAUSAL  PREMATURE  MENOPAUSLOW BACK PAIN WEIGHT 7LB 2/24    CURRENT MEDS REVIEWED AND PREVIOUS  VISIT FROM 2/20/19 REVIEWED    TAKING LEVOTHYROXINE 150 MCG DAILY 5 DAYS A WEEK SINCE  LAST THYROID LAB. 2/22/19    PATIENT DENIES ANY CHEST PAIN OR PALPITATIONS    FEELS TIRED  WHEN SHE CLIMBS  STAIRS    NO SHORTNESS OF BREATH. NO HX OF SLEEP APNEA. HAD HX OF ASTHMA IN PAST , NO PROBLEMS NOW. NOT USING ANY INHALERS    NO HEAT OR COLD INTOLERANCE    BOWELS REGULAR. HAS OCCASIONAL ACID REFLUX    NO EYE SYMPTOMS    NO HX OF HEAD AND NECK RADIATION IN PAST    NO HX OF THYROID SURGERIES    NO HX OF  GOITER. NO PRESSURE SYMPTOMS IN NECK    NO URINARY SYMPTOMS    NO HEAD ACHES OR DIZZINESS     SHE HAS BEEN OBESE MOST OF HER LIFE    PATIENT HAD MENOPAUSE AT AGE 36.  [address] [facility]    OFF AND ON LOW BACK PAIN  AND RIGHT FOOT PAIN  TAKES NAPROXEN PRN     PATIENT HAS HX OF HYPERTENSION  TAKES METOPROLOL  MG DAILY, HCTZ 25 MG DAILY      NO HX OF HYPERCHOLESTEROLEMIA     NO HX OF DIABETES MELLITUS. PATIENT HAD COLONOSCOPY AND EGD  ON 2/21/19 PATH REPORT SHOWED  Stomach, antrum, biopsy:       Reactive gastropathy.       No histological evidence of H. pylori infection on routine stain. 2. Stomach, biopsy:       Neuroendocrine tumor, grade 2.       Intestinal metapalsia present. 3. Stomach, body, biopsy:       Neuroendocrine tumor, grade 2.           4. Sigmoid colon, polypectomy:       Hyperplastic polyp. 5. Rectum, polypectomy:       Tubular adenoma    Comment: Immunohistochemical stains are performed on 2A and 3A and demonstrate that the tumor cells are diffusely positive for AE1/AE3, synaptophysin, and chromogranin A, and negative for  with adequate controls.  Ki67 stains highlight approximately   5% of the tumor cells with adequate controls. Past Medical History:   Diagnosis Date    Anxiety     Arthritis     knee    Asthma     Colon polyp 02/21/2019    tubular adenoma; hyperplastic polyp    Colon polyps     Cyst of kidney, acquired     bilat.     Fatigue     Hypertension     Hypothyroidism     Neuroendocrine tumor 02/21/2019    of stomach    Obesity     Pharyngoesophageal dysphagia       Past Surgical History:   Procedure Laterality Date    CHOLECYSTECTOMY  10/09/2014    COLONOSCOPY  02/21/2019    tubular adenoma; hyperplastic polyp    COLONOSCOPY      over 5 yrs ago per pt with polyps (2-)    CYSTOURETHROSCOPY/STENT REMOVAL  5/3/11    ERCP      HIP SURGERY Left     THROAT SURGERY      polops removed    UPPER GASTROINTESTINAL ENDOSCOPY  02/21/2019    Neuroendocrine tumor     Family History   Problem Relation Age of Onset    High Blood Pressure Mother     High Blood Pressure Sister     Stomach Cancer Sister     Other Sister epilepsy     Social History     Tobacco Use    Smoking status: Former Smoker     Packs/day: 1.00     Years: 25.00     Pack years: 25.00     Last attempt to quit: 2012     Years since quittin.8    Smokeless tobacco: Never Used    Tobacco comment: quit 2 years   Substance Use Topics    Alcohol use: Yes     Comment: 3 times a month        Current Outpatient Medications   Medication Sig Dispense Refill    hydrochlorothiazide (HYDRODIURIL) 25 MG tablet take 1 tablet by mouth once daily 30 tablet 5    famotidine (PEPCID) 20 MG tablet Take 1 tablet by mouth 2 times daily as needed (indigestion) 60 tablet 0    dicyclomine (BENTYL) 10 MG capsule Take 1 capsule by mouth every 6 hours as needed (cramps) 20 capsule 0    vitamin B-12 (CYANOCOBALAMIN) 500 MCG tablet Take 500 mcg by mouth daily      levothyroxine (SYNTHROID) 150 MCG tablet take 1 tablet by mouth once daily 30 tablet 5    naproxen (NAPROSYN) 500 MG tablet take 1 tablet by mouth once daily 30 tablet 0    metoprolol succinate (TOPROL XL) 100 MG extended release tablet take 1 tablet by mouth once daily 30 tablet 0    hydrochlorothiazide (HYDRODIURIL) 25 MG tablet take 1 tablet by mouth once daily 30 tablet 0    Compression Stockings MISC Calf length 20-30 mm 1 each 0    desonide (DESOWEN) 0.05 % cream Apply topically 2 times daily. 90 g 0    aspirin 81 MG tablet Take 81 mg by mouth daily      albuterol sulfate HFA (PROVENTIL HFA) 108 (90 BASE) MCG/ACT inhaler Inhale 2 puffs into the lungs every 6 hours as needed for Wheezing 1 Inhaler 0     No current facility-administered medications for this visit. Allergies   Allergen Reactions    Erythromycin Diarrhea       Subjective:     Review of Systems    CONSTITUTIONAL: NO SIGNIFICANT WEIGHT CHANGE, NO FATIGUE OR FEVER. Head: NO HEAD ACHES  Eyes: NO VISION CHANGES  ENT: NO COMPLAINTS  Cardiovascular: HYPERTENSION. AND HYPERCHOLESTEROLEMIA AS NOTED IN HPI. NO CHEST PAIN OR ANGINA.   Peripheral controls.  Ki67 stains highlight approximately   5% of the tumor cells with adequate controls. 1/14/2019  9:34 AM - [place], pn Incoming Lab Results From [facility]     Component Results     Component Value Ref Range & Units Status Collected Lab   Vitamin B-12 511        10/31/2018  2:48 PM - [place], pn Incoming Lab Results From [facility]     Component Results     Component Value Ref Range & Units Status Collected Lab   Thyroglobulin Ab <20.0  0.0 - 40.0 IU/mL Final 10/31/2018 12:35  [address]   Thyroid Peroxidase (Tpo) Ab 371.0   0.0 - 35.0 IU/mL Final       10/22/2018  9:50 AM - [place], pn Incoming Lab Results From [facility]     Component Results     Component Value Ref Range & Units Status Collected Lab   Folate >20.0  >4.8 ng/mL Final       10/22/2018  9:35 AM - [place], pn Incoming Lab Results From [facility]     Component Results     Component Value Ref Range & Units Status Collected Lab   TSH <0.01   0.30 - 5.00 mIU/L Final 10/22/2018  8:45  [address]     10/22/2018  9:35 AM - [place], [facility] Incoming Lab Results From [facility] Results     Component Value Ref Range & Units Status Collected Lab   Thyroxine, Free 1.52  0.93 - 1.70 ng/dL Final 10/22/2018  8:45 AM      EXAMINATION:   THYROID ULTRASOUND       10/31/2018         FINDINGS:   Right thyroid lobe:  2.7 x 1.0 x 1.5 cm       Left thyroid lobe:  3.0 x 1.3 x 1.1 cm       Isthmus:  3 mm       Thyroid Gland:  Thyroid gland demonstrates heterogeneous echotexture and   normal vascularity.       Nodules: No definite nodules are identified.  Small areas of measured 7 mm   areas of thyroid parenchyma are noted, which do not appear discretely   distinct from the background heterogeneous parenchyma.       Cervical lymphadenopathy: No abnormal lymph nodes in the imaged portions of   the neck.  A benign-appearing 9 mm lymph node in the area of swelling on the   right side is noted.      Small heterogeneous thyroid, suggestive of chronic thyroiditis.       Subcentimeter benign-appearing lymph node in the area of right neck swelling   described by the patient.         3/12/2018  8:27 AM - [place], [facility] Incoming Lab Results From [facility]     Component Results     Component Value Ref Range & Units Status Collected Lab   WBC 7.4  3.5 - 11.3 k/uL Final 03/12/2018  7:15  [address]   RBC 3.82   3.95 - 5.11 m/uL Final 03/12/2018  7:15  [address]   Hemoglobin 13.5  11.9 - 15.1 g/dL Final 03/12/2018  7:15  [address]   Hematocrit 39.4  36.3 - 47.1 % Final 03/12/2018  7:15  [address]   .1   82.6 - 102.9 fL Final 03/12/2018  7:15  [address]   MCH 35.3   25.2 - 33.5 pg Final 03/12/2018  7:15  [address]   MCHC 34.3  28.4 - 34.8 g/dL Final 03/12/2018  7:15  [address]   RDW 13.0  11.8 - 14.4 % Final 03/12/2018  7:15  [address]   Platelets 923  872 - 453 k/uL Final 03/12/2018  7:15  [address]   MPV 9.3          3/12/2018  8:49 AM - Duran, [facility] Incoming Lab Results From [facility]     Component Results     Component Value Ref Range & Units Status Collected Lab   Glucose 94  70 - 99 mg/dL Final 03/12/2018  7:15  [address]   BUN 16  6 - 20 mg/dL Final 03/12/2018  7:15  [address]   CREATININE 0.49   0.50 - 0.90 mg/dL Final 03/12/2018  7:15  [address]   Bun/Cre Ratio NOT REPORTED  9 - 20 Final 03/12/2018  7:15  [address]   Calcium 9.4  8.6 - 10.4 mg/dL Final 03/12/2018  7:15  [address]   Sodium 142  135 - 144 mmol/L Final 03/12/2018  7:15  [address]   Potassium 3.6   3.7 - 5.3 mmol/L Final 03/12/2018  7:15  [address]   Chloride 101  98 - 107 mmol/L Final 03/12/2018  7:15  [address]   CO2 28  20 - 31 mmol/L Final 03/12/2018  7:15 AM [address]   Anion Gap 13  9 - 17 mmol/L Final 03/12/2018  7:15  [address]   Alkaline Phosphatase 110   35 - 104 U/L Final 03/12/2018  7:15  [address]   ALT 12  5 - 33 U/L Final 03/12/2018  7:15  [address]   AST 17  <32 U/L Final 03/12/2018  7:15  [address]   Total Bilirubin 0.27   0.3 - 1.2 mg/dL Final 03/12/2018  7:15  [address]   Total Protein 6.7  6.4 - 8.3 g/dL Final 03/12/2018  7:15  [address]   Alb 3.7  3.5 - 5.2 g/dL Final 03/12/2018  7:15  [address]   Albumin/Globulin Ratio 1.2          3/12/2018  8:49 AM - [place], pn Incoming Lab Results From [facility]     Component Results     Component Value Ref Range & Units Status Collected Lab   Cholesterol 159  <200 mg/dL Final 03/12/2018  7:15  [address]              HDL 46  >40 mg/dL Final 03/12/2018  7:15  [address]              LDL Cholesterol 93  0 - 130 mg/dL Final 03/12/2018  7:15  [address]                     Triglycerides 99  <150 mg/dL Final 03/12/2018  7:15  [address]              3/12/2018  9:27 AM - [place], pn Incoming Lab Results From [facility] Results     Component Value Ref Range & Units Status Collected Lab   TSH <0.01   0.30 - 5.00 mIU/L        3/12/2018  8:53 AM - [place], pn Incoming Lab Results From [facility]     Component Results     Component Value Ref Range & Units Status Collected Lab   Thyroxine, Free 1.78   0.93 - 1.70 ng/dL            Impression:       HYPOTHYROIDISM. POSITIVE PEROXIDASE ANTIBODIES  SUGGESTS HASHIMOTO'S THYROIDITIS    HYPERTENSION    MORBID OBESITY    OCCASIONAL ACID REFLUX    GRADE 2 NEUROENDOCRINE TUMOR OF STOMACH  REPORTED ON PATH REPORT ON EGD DONE ON 2/21/19    POST MENOPAUSAL  PREMATURE  MENOPAUSE    1. Hypothyroidism due to Hashimoto's thyroiditis          Plan:      1.  ALL

## 2019-04-23 NOTE — PROGRESS NOTES
Chronic Disease Visit Information    BP Readings from Last 3 Encounters:   04/23/19 110/70   02/24/19 (!) 139/96   02/20/19 102/70          LDL Cholesterol (mg/dL)   Date Value   03/12/2018 93     HDL (mg/dL)   Date Value   03/12/2018 46     BUN (mg/dL)   Date Value   02/24/2019 8     CREATININE (mg/dL)   Date Value   02/24/2019 0.61     Glucose (mg/dL)   Date Value   02/24/2019 127 (H)            Have you changed or started any medications since your last visit including any over-the-counter medicines, vitamins, or herbal medicines? no   Are you having any side effects from any of your medications? -  no  Have you stopped taking any of your medications? Is so, why? -  no    Have you seen any other physician or provider since your last visit? Yes - Records Obtained  Have you had any other diagnostic tests since your last visit? Yes - Records Obtained  Have you been seen in the emergency room and/or had an admission to a hospital since we last saw you? No  Have you had your annual diabetic retinal (eye) exam? No  Have you had your routine dental cleaning in the past 6 months? no    Have you activated your [facility] account? If not, what are your barriers?  Yes     Patient Care Team:  David Polanco MD as PCP - General Odette Dai MD as Consulting Physician (Gastroenterology)         Medical History Review  Past Medical, Family, and Social History reviewed and does contribute to the patient presenting condition    Health Maintenance   Topic Date Due    Hepatitis C screen  1960    Pneumococcal 0-64 years Vaccine (1 of 1 - PPSV23) 09/28/1967    HIV screen  09/28/1976    DTaP/Tdap/Td vaccine (1 - Tdap) 09/28/1980    Shingles Vaccine (1 of 2) 09/28/2011    Cervical cancer screen  01/01/2016    Breast cancer screen  10/07/2018    Flu vaccine (Season Ended) 09/01/2019    TSH testing  02/20/2020    Potassium monitoring  02/24/2020    Creatinine monitoring  02/24/2020    Diabetes screen  07/02/2020    Colon cancer screen colonoscopy  02/21/2022    Lipid screen  03/12/2023

## 2019-04-24 ENCOUNTER — TELEPHONE (OUTPATIENT)
Dept: ENDOCRINOLOGY | Age: 59
End: 2019-04-24

## 2019-04-26 DIAGNOSIS — M79.671 RIGHT FOOT PAIN: ICD-10-CM

## 2019-04-26 RX ORDER — METOPROLOL SUCCINATE 100 MG/1
TABLET, EXTENDED RELEASE ORAL
Qty: 30 TABLET | Refills: 3 | Status: SHIPPED | OUTPATIENT
Start: 2019-04-26 | End: 2019-10-06 | Stop reason: SDUPTHER

## 2019-05-06 ENCOUNTER — TELEPHONE (OUTPATIENT)
Dept: GASTROENTEROLOGY | Age: 59
End: 2019-05-06

## 2019-05-09 ENCOUNTER — HOSPITAL ENCOUNTER (OUTPATIENT)
Dept: WOMENS IMAGING | Age: 59
Discharge: HOME | End: 2019-05-11
Payer: COMMERCIAL

## 2019-05-09 DIAGNOSIS — E28.319 PREMATURE MENOPAUSE: ICD-10-CM

## 2019-05-09 DIAGNOSIS — Z12.39 ENCOUNTER FOR SCREENING BREAST EXAMINATION: ICD-10-CM

## 2019-05-09 PROCEDURE — 77080 DXA BONE DENSITY AXIAL: CPT

## 2019-05-09 PROCEDURE — 77063 BREAST TOMOSYNTHESIS BI: CPT

## 2019-06-13 ENCOUNTER — OFFICE VISIT (OUTPATIENT)
Dept: INTERNAL MEDICINE CLINIC | Age: 59
End: 2019-06-13
Payer: COMMERCIAL

## 2019-06-13 VITALS
TEMPERATURE: 98.6 F | WEIGHT: 254.8 LBS | DIASTOLIC BLOOD PRESSURE: 80 MMHG | HEART RATE: 76 BPM | BODY MASS INDEX: 45.15 KG/M2 | RESPIRATION RATE: 16 BRPM | HEIGHT: 63 IN | SYSTOLIC BLOOD PRESSURE: 136 MMHG

## 2019-06-13 DIAGNOSIS — E66.01 CLASS 3 SEVERE OBESITY DUE TO EXCESS CALORIES WITH SERIOUS COMORBIDITY AND BODY MASS INDEX (BMI) OF 45.0 TO 49.9 IN ADULT (HCC): ICD-10-CM

## 2019-06-13 DIAGNOSIS — E53.8 VITAMIN B 12 DEFICIENCY: ICD-10-CM

## 2019-06-13 DIAGNOSIS — E03.9 HYPOTHYROIDISM, UNSPECIFIED TYPE: ICD-10-CM

## 2019-06-13 DIAGNOSIS — R79.89 LOW TSH LEVEL: ICD-10-CM

## 2019-06-13 DIAGNOSIS — J40 BRONCHITIS: Primary | ICD-10-CM

## 2019-06-13 PROCEDURE — 99214 OFFICE O/P EST MOD 30 MIN: CPT | Performed by: INTERNAL MEDICINE

## 2019-06-13 RX ORDER — PREDNISONE 10 MG/1
10 TABLET ORAL
Qty: 15 TABLET | Refills: 0 | Status: SHIPPED | OUTPATIENT
Start: 2019-06-13 | End: 2019-06-18

## 2019-06-13 RX ORDER — CEFUROXIME AXETIL 500 MG/1
500 TABLET ORAL 2 TIMES DAILY
Qty: 20 TABLET | Refills: 0 | Status: SHIPPED | OUTPATIENT
Start: 2019-06-13 | End: 2019-06-23

## 2019-06-13 NOTE — PROGRESS NOTES
Visit Information    Have you changed or started any medications since your last visit including any over-the-counter medicines, vitamins, or herbal medicines? no   Are you having any side effects from any of your medications? -  no  Have you stopped taking any of your medications? Is so, why? -  no    Have you seen any other physician or provider since your last visit? Yes - Records Obtained  Have you had any other diagnostic tests since your last visit? Yes - Records Obtained  Have you been seen in the emergency room and/or had an admission to a hospital since we last saw you? No  Have you had your routine dental cleaning in the past 6 months? no    Have you activated your [facility] account? If not, what are your barriers?  Yes     Patient Care Team:  Richa Rodrigez MD as PCP - General  Richa Rodrigez MD as PCP - [facility]  Ismael Bey MD as Consulting Physician (Gastroenterology)    Medical History Review  Past Medical, Family, and Social History reviewed and does not contribute to the patient presenting condition    Health Maintenance   Topic Date Due    Hepatitis C screen  1960    Pneumococcal 0-64 years Vaccine (1 of 1 - PPSV23) 09/28/1967    HIV screen  09/28/1976    DTaP/Tdap/Td vaccine (1 - Tdap) 09/28/1980    Shingles Vaccine (1 of 2) 09/28/2011    Cervical cancer screen  01/01/2016    Flu vaccine (Season Ended) 09/01/2019    Potassium monitoring  02/24/2020    Creatinine monitoring  02/24/2020    TSH testing  04/23/2020    Diabetes screen  07/02/2020    Breast cancer screen  05/09/2021    Colon cancer screen colonoscopy  02/21/2022    Lipid screen  03/12/2023

## 2019-06-13 NOTE — PROGRESS NOTES
Mina Henderson is a 63 y.o. female who presents for   Chief Complaint   Patient presents with    Follow-up     for b12 levels; has a chest cold,and wants to ge it checked, sister had pneumonia     Health Maintenance     will discuss with provider    and follow up of chronic medical problems. Patient Active Problem List   Diagnosis    Asthma    Anxiety    Obesity    Hyperlipidemia    Hypothyroidism    Colon polyps    Vertigo    Mass of left hip region    Fatigue    Pharyngoesophageal dysphagia    Colon polyp    Neuroendocrine tumor     HPI  Here for follow-up on blood pressure and complains of cough and congestion and wants to discuss lab work    Current Outpatient Medications   Medication Sig Dispense Refill    metoprolol succinate (TOPROL XL) 100 MG extended release tablet take 1 tablet by mouth once daily 30 tablet 3    hydrochlorothiazide (HYDRODIURIL) 25 MG tablet take 1 tablet by mouth once daily 30 tablet 5    vitamin B-12 (CYANOCOBALAMIN) 500 MCG tablet Take 500 mcg by mouth daily      levothyroxine (SYNTHROID) 150 MCG tablet take 1 tablet by mouth once daily (Patient taking differently: Take 150 mcg by mouth Daily take 1 tablet by mouth daily for five days every week) 30 tablet 5    naproxen (NAPROSYN) 500 MG tablet take 1 tablet by mouth once daily (Patient taking differently: take 1 tablet by mouth as needed) 30 tablet 0    Compression Stockings MISC Calf length 20-30 mm 1 each 0    desonide (DESOWEN) 0.05 % cream Apply topically 2 times daily. 90 g 0    aspirin 81 MG tablet Take 81 mg by mouth daily      albuterol sulfate HFA (PROVENTIL HFA) 108 (90 BASE) MCG/ACT inhaler Inhale 2 puffs into the lungs every 6 hours as needed for Wheezing 1 Inhaler 0     No current facility-administered medications for this visit.         Allergies   Allergen Reactions    Erythromycin Diarrhea       Past Medical History:   Diagnosis Date    Anxiety     Arthritis     knee    Asthma     Colon polyp 02/21/2019    tubular adenoma; hyperplastic polyp    Colon polyps     Cyst of kidney, acquired     bilat.     Fatigue     Hypertension     Hypothyroidism     Neuroendocrine tumor 02/21/2019    of stomach    Obesity     Pharyngoesophageal dysphagia        Past Surgical History:   Procedure Laterality Date    CHOLECYSTECTOMY  10/09/2014    COLONOSCOPY  02/21/2019    tubular adenoma; hyperplastic polyp    COLONOSCOPY      over 5 yrs ago per pt with polyps (2-)    CYSTOURETHROSCOPY/STENT REMOVAL  5/3/11    ERCP      HIP SURGERY Left     THROAT SURGERY      polops removed    UPPER GASTROINTESTINAL ENDOSCOPY  02/21/2019    Neuroendocrine tumor       Family History   Problem Relation Age of Onset    High Blood Pressure Mother     High Blood Pressure Sister     Stomach Cancer Sister     Other Sister         epilepsy     ROS   Constitutional:  Negative for fatigue, loss of appetite and unexpected weight change   HEENT            : Negative for neck stiffness and pain, positive for congestion or sinus pressure   Eyes                : No visual disturbance or pain   Cardiovascular: No chest pain or palpitations or leg swelling   Respiratory      : Positive for cough, but no shortness of breath or wheezing   Gastrointestinal: Negative for abdominal pain, constipation or diarrhea and bloating No nausea or vomiting   Genitourinary:     No urgency or frequency, no burning or hematuria   Musculoskeletal: No arthralgias, back pain or myalgias   Skin                  : Negative for rash or erythema   Neurological    : Negative for dizziness, weakness, tremors ,light headedness or syncope   Psychiatric       : Negative for dysphoric mood, sleep disturbances, nervous or anxious, or decreased concentration   All other review of systems was negative    Objective  Physical Examination:    Nursing note reviewed    /80 (Site: Right Upper Arm, Position: Sitting, Cuff Size: Large Adult)   Pulse 76 Temp 98.6 °F (37 °C) (Oral)   Resp 16   Ht 5' 3\" (1.6 m)   Wt 254 lb 12.8 oz (115.6 kg)   LMP 01/01/1994   BMI 45.14 kg/m²   BP Readings from Last 3 Encounters:   06/13/19 136/80   04/23/19 110/70   02/24/19 (!) 139/96         Constitutional:  Cecelia Greene is oriented to place, person and time ,appears well-developed and well-nourished  HEENT:  Atraumatic and normocephalic, external ears normal bilaterally, nose normal no oropharyngeal exudate and is clear and moist  Eyes:  EOCM normal; conjunctivae normal; PERRLA bilaterally  Neck:  Normal range of motion, neck supple, no JVD and no thyromegaly  Cardiovascular:  RRR, normal heart sounds and intact distal pulses  Pulmonary:  effort normal and breath sounds normal bilaterally,no wheezes or rales, no respiratory distress  Abdominal:  Soft, non-tender; normal bowel sounds, no masses  Musculoskeletal:  Normal range of motion and no edema or tenderness bilaterally  No lymphadenopathy  Neurological:  alert, oriented, and normal reflexes bilaterally  Skin: warm and dry  Psychiatric:  normal mood and effect; behavior normal.    Labs:   No results found for: LABA1C  Lab Results   Component Value Date    CHOL 159 03/12/2018     Lab Results   Component Value Date    HDL 46 03/12/2018     No results found for: [facility]  Lab Results   Component Value Date    TRIG 99 03/12/2018     No components found for: [place], [place]  Lab Results   Component Value Date    WBC 7.4 02/24/2019    HGB 14.7 02/24/2019    HCT 44.7 02/24/2019    MCV 86.0 02/24/2019     02/24/2019     No results found for: INR, PROTIME  Lab Results   Component Value Date    GLUCOSE 127 (H) 02/24/2019    CREATININE 0.61 02/24/2019    BUN 8 02/24/2019     02/24/2019    K 2.9 (LL) 02/24/2019     02/24/2019    CO2 27 02/24/2019     Lab Results   Component Value Date    ALT 17 02/24/2019    AST 20 02/24/2019    ALKPHOS 96 02/24/2019    BILITOT 0.16 (L) 02/24/2019     Lab Results   Component Value Date questions were answered. Pt voiced understanding. Nely Keene will continue current medications, diet and exercise. No orders of the defined types were placed in this encounter. Completed Refills               Requested Prescriptions      No prescriptions requested or ordered in this encounter     4. Patient given educational materials - see patient instructions    5. Was a self-tracking handout given in paper form or via [employer]? NO    No orders of the defined types were placed in this encounter. Return in about 6 weeks (around 7/25/2019). Patient voiced understanding and agreed to treatment plan. Electronically signed by Sony Rhodes MD on 6/13/2019 at 3:30 PM    This note is created with a voice recognition program and while intend to generate a document that accurately reflects the content of the visit, no guarantee can be provided that every mistake has been identified and corrected by editing.

## 2019-07-22 ENCOUNTER — HOSPITAL ENCOUNTER (OUTPATIENT)
Age: 59
Discharge: HOME | End: 2019-07-22
Payer: COMMERCIAL

## 2019-07-22 PROCEDURE — 36415 COLL VENOUS BLD VENIPUNCTURE: CPT

## 2019-07-22 PROCEDURE — 86316 IMMUNOASSAY TUMOR OTHER: CPT

## 2019-07-22 NOTE — PROGRESS NOTES
Use    Smoking status: Former Smoker     Packs/day: 1.00     Years: 25.00     Pack years: 25.00     Last attempt to quit: 2012     Years since quittin.1    Smokeless tobacco: Never Used    Tobacco comment: quit 2 years   Substance Use Topics    Alcohol use: Yes     Comment: 3 times a month        Current Outpatient Medications   Medication Sig Dispense Refill    metoprolol succinate (TOPROL XL) 100 MG extended release tablet take 1 tablet by mouth once daily 30 tablet 3    hydrochlorothiazide (HYDRODIURIL) 25 MG tablet take 1 tablet by mouth once daily 30 tablet 5    vitamin B-12 (CYANOCOBALAMIN) 500 MCG tablet Take 500 mcg by mouth daily      levothyroxine (SYNTHROID) 150 MCG tablet take 1 tablet by mouth once daily (Patient taking differently: Take 150 mcg by mouth Daily take 1 tablet by mouth daily for five days every week) 30 tablet 5    naproxen (NAPROSYN) 500 MG tablet take 1 tablet by mouth once daily (Patient taking differently: take 1 tablet by mouth as needed) 30 tablet 0    Compression Stockings MISC Calf length 20-30 mm 1 each 0    desonide (DESOWEN) 0.05 % cream Apply topically 2 times daily. 90 g 0    aspirin 81 MG tablet Take 81 mg by mouth daily      albuterol sulfate HFA (PROVENTIL HFA) 108 (90 BASE) MCG/ACT inhaler Inhale 2 puffs into the lungs every 6 hours as needed for Wheezing 1 Inhaler 0     No current facility-administered medications for this visit. Allergies   Allergen Reactions    Erythromycin Diarrhea       Subjective:     Review of Systems    AS NOTED IN HPI      Objective:     Physical Exam    63 y.o.  FEMALE MORBIDLY OBESE    IN NO DISTRESS  VITALS REVIEWED. Eyes: GUILLERMO.   ENT: THROAT CLEAR. Betha Lute HEARING- NORMAL  Neck: NO MASSES. NO ADENOPATHY. THYROID NOT ENLARGED. NO CAROTID BRUIT  Heart: REGULAR. NO MURMUR   Lungs: BREATHING COMFORTABLY. LUNGS CLEAR. NO WHEEZES  Abdomen: SOFT. NO TENDERNESS.  NO MASSES LIVER AND SPLEEN NOT From [facility]     Component Results     Component Value Ref Range & Units Status Collected Lab   Vitamin B-12 511        10/31/2018  2:48 PM - Duran, pn Incoming Lab Results From [facility]     Component Results     Component Value Ref Range & Units Status Collected Lab   Thyroglobulin Ab <20.0  0.0 - 40.0 IU/mL Final 10/31/2018 12:35  [address]   Thyroid Peroxidase (Tpo) Ab 371.0   0.0 - 35.0 IU/mL Final       10/22/2018  9:50 AM - Duran, pn Incoming Lab Results From [facility]     Component Results     Component Value Ref Range & Units Status Collected Lab   Folate >20.0  >4.8 ng/mL Final       10/22/2018  9:35 AM - Duran, pn Incoming Lab Results From [facility]     Component Results     Component Value Ref Range & Units Status Collected Lab   TSH <0.01   0.30 - 5.00 mIU/L Final 10/22/2018  8:45  [address]     10/22/2018  9:35 AM - Duran, pn Incoming Lab Results From [facility] Results     Component Value Ref Range & Units Status Collected Lab   Thyroxine, Free 1.52  0.93 - 1.70 ng/dL Final 10/22/2018  8:45 AM      EXAMINATION:   THYROID ULTRASOUND       10/31/2018         FINDINGS:   Right thyroid lobe:  2.7 x 1.0 x 1.5 cm       Left thyroid lobe:  3.0 x 1.3 x 1.1 cm       Isthmus:  3 mm       Thyroid Gland:  Thyroid gland demonstrates heterogeneous echotexture and   normal vascularity.       Nodules: No definite nodules are identified.  Small areas of measured 7 mm   areas of thyroid parenchyma are noted, which do not appear discretely   distinct from the background heterogeneous parenchyma.       Cervical lymphadenopathy: No abnormal lymph nodes in the imaged portions of   the neck.  A benign-appearing 9 mm lymph node in the area of swelling on the   right side is noted.      Small heterogeneous thyroid, suggestive of chronic thyroiditis.       Subcentimeter benign-appearing lymph node in the area of right neck swelling   described by the patient.         3/12/2018  8:27 AM - Duran, pn Incoming Lab Results From [facility]     Component Results     Component Value Ref Range & Units Status Collected Lab   WBC 7.4  3.5 - 11.3 k/uL Final 03/12/2018  7:15  [address]   RBC 3.82   3.95 - 5.11 m/uL Final 03/12/2018  7:15  [address]   Hemoglobin 13.5  11.9 - 15.1 g/dL Final 03/12/2018  7:15  [address]   Hematocrit 39.4  36.3 - 47.1 % Final 03/12/2018  7:15  [address]   .1   82.6 - 102.9 fL Final 03/12/2018  7:15  [address]   MCH 35.3   25.2 - 33.5 pg Final 03/12/2018  7:15  [address]   MCHC 34.3  28.4 - 34.8 g/dL Final 03/12/2018  7:15  [address]   RDW 13.0  11.8 - 14.4 % Final 03/12/2018  7:15  [address]   Platelets 328  758 - 453 k/uL Final 03/12/2018  7:15  [address]   MPV 9.3          3/12/2018  8:49 AM - Duran, heidi Incoming Lab Results From [facility]     Component Results     Component Value Ref Range & Units Status Collected Lab   Glucose 94  70 - 99 mg/dL Final 03/12/2018  7:15  [address]   BUN 16  6 - 20 mg/dL Final 03/12/2018  7:15  [address]   CREATININE 0.49   0.50 - 0.90 mg/dL Final 03/12/2018  7:15  [address]   Bun/Cre Ratio NOT REPORTED  9 - 20 Final 03/12/2018  7:15  [address]   Calcium 9.4  8.6 - 10.4 mg/dL Final 03/12/2018  7:15  [address]   Sodium 142  135 - 144 mmol/L Final 03/12/2018  7:15  [address]   Potassium 3.6   3.7 - 5.3 mmol/L Final 03/12/2018  7:15  [address]   Chloride 101  98 - 107 mmol/L Final 03/12/2018  7:15  [address]   CO2 28  20 - 31 mmol/L Final 03/12/2018  7:15  [facility]   Anion Gap 13  9 - 17 mmol/L Final 03/12/2018  7:15  [facility]   Alkaline Phosphatase 110   35 - 104 U/L Final 03/12/2018  7:15  [address]   ALT 12  5 - 33 U/L Final 03/12/2018  7:15  [address]   AST 17  <32 U/L Final 03/12/2018  7:15  [address]   Total Bilirubin 0.27   0.3 - 1.2 mg/dL Final 03/12/2018  7:15  [address]   Total Protein 6.7  6.4 - 8.3 g/dL Final 03/12/2018  7:15  [address]   Alb 3.7  3.5 - 5.2 g/dL Final 03/12/2018  7:15  [address]   Albumin/Globulin Ratio 1.2          3/12/2018  8:49 AM - Duran, Mhpn Incoming Lab Results From [facility]     Component Results     Component Value Ref Range & Units Status Collected Lab   Cholesterol 159  <200 mg/dL Final 03/12/2018  7:15  [address]              HDL 46  >40 mg/dL Final 03/12/2018  7:15  [address]              LDL Cholesterol 93  0 - 130 mg/dL Final 03/12/2018  7:15  [address]                     Triglycerides 99  <150 mg/dL Final 03/12/2018  7:15  [address]              3/12/2018  9:27 AM - Duran, Mhpn Incoming Lab Results From [facility] Results     Component Value Ref Range & Units Status Collected Lab   TSH <0.01   0.30 - 5.00 mIU/L        3/12/2018  8:53 AM - Duran, M[facility] Incoming Lab Results From [facility]     Component Results     Component Value Ref Range & Units Status Collected Lab   Thyroxine, Free 1.78   0.93 - 1.70 ng/dL            Impression:       HYPOTHYROIDISM. POSITIVE PEROXIDASE ANTIBODIES  SUGGESTS HASHIMOTO'S THYROIDITIS    HYPERTENSION    MORBID OBESITY    OCCASIONAL ACID REFLUX    GRADE 2 NEUROENDOCRINE TUMOR OF STOMACH  REPORTED ON PATH REPORT ON EGD DONE ON 2/21/19    POST MENOPAUSAL      OSTEOPENIA SPINE    1. Hypothyroidism due to Hashimoto's thyroiditis    2. Neuroendocrine neoplasm of stomach    3. Premature menopause          Plan:      1. DEXA SCAN REPORT FROM 5/9/19 DISCUSSED  2.  CHROMOGRANIN PENDING  3. CONTINUE

## 2019-07-23 ENCOUNTER — OFFICE VISIT (OUTPATIENT)
Dept: ENDOCRINOLOGY | Age: 59
End: 2019-07-23
Payer: COMMERCIAL

## 2019-07-23 VITALS
SYSTOLIC BLOOD PRESSURE: 110 MMHG | DIASTOLIC BLOOD PRESSURE: 82 MMHG | BODY MASS INDEX: 45.18 KG/M2 | HEART RATE: 80 BPM | HEIGHT: 63 IN | WEIGHT: 255 LBS | TEMPERATURE: 98.2 F | RESPIRATION RATE: 16 BRPM

## 2019-07-23 DIAGNOSIS — E03.8 HYPOTHYROIDISM DUE TO HASHIMOTO'S THYROIDITIS: Primary | ICD-10-CM

## 2019-07-23 DIAGNOSIS — E06.3 HYPOTHYROIDISM DUE TO HASHIMOTO'S THYROIDITIS: Primary | ICD-10-CM

## 2019-07-23 DIAGNOSIS — D3A.8 NEUROENDOCRINE NEOPLASM OF STOMACH: ICD-10-CM

## 2019-07-23 DIAGNOSIS — E28.319 PREMATURE MENOPAUSE: ICD-10-CM

## 2019-07-23 LAB — CHROMOGRANIN A: 1553 NG/ML (ref 0–95)

## 2019-07-23 PROCEDURE — 99213 OFFICE O/P EST LOW 20 MIN: CPT | Performed by: INTERNAL MEDICINE

## 2019-08-07 ENCOUNTER — TELEPHONE (OUTPATIENT)
Dept: INTERNAL MEDICINE CLINIC | Age: 59
End: 2019-08-07

## 2019-08-07 DIAGNOSIS — D3A.8 NEUROENDOCRINE TUMOR: Primary | ICD-10-CM

## 2019-08-07 NOTE — TELEPHONE ENCOUNTER
Pt called in to inquire about lab results. Emphasized importance of seeing (appt was given for next week). Pt states she has seen GI and has f/u appt with them next month (this could not be tracked).

## 2019-08-07 NOTE — TELEPHONE ENCOUNTER
Patient was supposed to follow-up with GI after the biopsy showed neuroendocrine tumor and we did discuss about this issue during her last visit and apparently patient has never made an appointment please make an appointment with Dr. Lacie Fermin as soon as possible- Dr Trinidad Medrano    Per Dr Trinidad Medrano referral placed

## 2019-08-07 NOTE — TELEPHONE ENCOUNTER
----- Message from Pawel Juares MD sent at 8/5/2019  6:25 PM EDT -----  GIVE THIS RESULT TO DR. SHERMAN AND HAVE HIM SEE HER ON FOLLOW UP . I LEFT A MESSAGE WITH PATIENT.  SHE HAS GASTRIC INCIDENTAL NEUROENDOCRINE TUMOR ON ENDOSCOPY BIOPSY

## 2019-08-12 ENCOUNTER — TELEPHONE (OUTPATIENT)
Dept: GASTROENTEROLOGY | Age: 59
End: 2019-08-12

## 2019-08-14 ENCOUNTER — TELEPHONE (OUTPATIENT)
Dept: ENDOCRINOLOGY | Age: 59
End: 2019-08-14

## 2019-08-26 RX ORDER — LEVOTHYROXINE SODIUM 0.15 MG/1
150 TABLET ORAL DAILY
Qty: 20 TABLET | Refills: 2 | Status: SHIPPED | OUTPATIENT
Start: 2019-08-26 | End: 2019-12-30

## 2019-09-11 ENCOUNTER — TELEPHONE (OUTPATIENT)
Dept: GASTROENTEROLOGY | Age: 59
End: 2019-09-11

## 2019-09-12 ENCOUNTER — OFFICE VISIT (OUTPATIENT)
Dept: GASTROENTEROLOGY | Age: 59
End: 2019-09-12
Payer: COMMERCIAL

## 2019-09-12 ENCOUNTER — TELEPHONE (OUTPATIENT)
Dept: GASTROENTEROLOGY | Age: 59
End: 2019-09-12

## 2019-09-12 VITALS
HEART RATE: 82 BPM | SYSTOLIC BLOOD PRESSURE: 135 MMHG | WEIGHT: 256.5 LBS | DIASTOLIC BLOOD PRESSURE: 92 MMHG | BODY MASS INDEX: 45.44 KG/M2

## 2019-09-12 DIAGNOSIS — E66.8 MODERATE OBESITY: ICD-10-CM

## 2019-09-12 DIAGNOSIS — C49.A2 GASTROINTESTINAL STROMAL TUMOR (GIST) OF STOMACH (HCC): ICD-10-CM

## 2019-09-12 DIAGNOSIS — R13.14 PHARYNGOESOPHAGEAL DYSPHAGIA: Primary | ICD-10-CM

## 2019-09-12 PROCEDURE — 99214 OFFICE O/P EST MOD 30 MIN: CPT | Performed by: INTERNAL MEDICINE

## 2019-09-12 ASSESSMENT — ENCOUNTER SYMPTOMS
VOMITING: 0
ANAL BLEEDING: 0
DIARRHEA: 0
CONSTIPATION: 0
ABDOMINAL DISTENTION: 0
BLOOD IN STOOL: 0
ABDOMINAL PAIN: 0
ALLERGIC/IMMUNOLOGIC NEGATIVE: 1
RECTAL PAIN: 0
SORE THROAT: 0
GASTROINTESTINAL NEGATIVE: 1
NAUSEA: 0
COUGH: 1
VOICE CHANGE: 1
TROUBLE SWALLOWING: 0

## 2019-09-12 NOTE — PROGRESS NOTES
Negative. Musculoskeletal: Negative. Skin: Negative. Allergic/Immunologic: Negative. Neurological: Negative for dizziness, weakness and light-headedness. Hematological: Negative. Psychiatric/Behavioral: Negative. Reviewed and agree  Objective:   Physical Exam   Constitutional: She is oriented to person, place, and time. She appears well-developed and well-nourished. Anxious   Mod obesity     HENT:   Head: Normocephalic and atraumatic. Mouth/Throat: Oropharynx is clear and moist.   Eyes: Pupils are equal, round, and reactive to light. Conjunctivae are normal.   Neck: Normal range of motion. Neck supple. Cardiovascular: Normal heart sounds and intact distal pulses. Pulmonary/Chest: Effort normal and breath sounds normal.   Abdominal: Soft. Bowel sounds are normal.   NON TENDER, NON DISTENTED  LIVER SPLEEN AND HERNIAS ARE NOT  PALPABLE  BOWEL SOUNDS ARE POSITIVE      Musculoskeletal: Normal range of motion. Neurological: She is alert and oriented to person, place, and time. Skin: Skin is warm. Psychiatric: Her behavior is normal.   Vitals reviewed.       Assessment:      Patient Active Problem List   Diagnosis    Asthma    Anxiety    Obesity    Hyperlipidemia    Hypothyroidism    Colon polyps    Vertigo    Mass of left hip region    Fatigue    Pharyngoesophageal dysphagia    Colon polyp    Neuroendocrine tumor           Plan:      I will plan upper endoscopy in this patient to reevaluate her gastrointestinal stromal tumor status      The Endoscopic procedure was explained to the patient in detail  The prep and NPO were explained  All the Risks, Benefits, and Alternatives were explained  Risk of Bleeding, Perforation and Cardio Respiratory risks were explained  her questions were answered  The procedure has been scheduled with the  in the office  Patient was asked to give us a call for any questions  The patient has verbalized understanding and agreement to this plan.       She was subsequently need a referral to a surgery for possible partial gastrectomy versus removal of those lesions may also need endoscopy ultrasound    Will order MRCP to evaluate that area in particular     We will also need a referral to hematology oncology    Some lifestyle dietary modifications were explained to her    Her questions were answered    The patient has verbalized understanding and agreement to this plan

## 2019-09-16 ENCOUNTER — TELEPHONE (OUTPATIENT)
Dept: GASTROENTEROLOGY | Age: 59
End: 2019-09-16

## 2019-09-16 NOTE — TELEPHONE ENCOUNTER
LVM for patient to return call to confirm procedure at [address] for Monday 9/23/2019 @ 12:30pm w/arrival at 11:00am   Please confirm patient has a  and if there are any questions regarding prep.

## 2019-09-18 ENCOUNTER — HOSPITAL ENCOUNTER (OUTPATIENT)
Dept: MRI IMAGING | Age: 59
Discharge: HOME | End: 2019-09-20
Payer: COMMERCIAL

## 2019-09-18 DIAGNOSIS — R13.14 PHARYNGOESOPHAGEAL DYSPHAGIA: ICD-10-CM

## 2019-09-18 DIAGNOSIS — C49.A2 GASTROINTESTINAL STROMAL TUMOR (GIST) OF STOMACH (HCC): ICD-10-CM

## 2019-09-18 DIAGNOSIS — E66.8 MODERATE OBESITY: ICD-10-CM

## 2019-09-18 LAB
GFR NON-AFRICAN AMERICAN: >60 ML/MIN
GFR SERPL CREATININE-BSD FRML MDRD: >60 ML/MIN
GFR SERPL CREATININE-BSD FRML MDRD: NORMAL ML/MIN/{1.73_M2}
POC CREATININE: 0.7 MG/DL (ref 0.51–1.19)

## 2019-09-18 PROCEDURE — 6360000004 HC RX CONTRAST MEDICATION: Performed by: INTERNAL MEDICINE

## 2019-09-18 PROCEDURE — A9576 INJ PROHANCE MULTIPACK: HCPCS | Performed by: INTERNAL MEDICINE

## 2019-09-18 PROCEDURE — 74183 MRI ABD W/O CNTR FLWD CNTR: CPT

## 2019-09-18 PROCEDURE — 82565 ASSAY OF CREATININE: CPT

## 2019-09-18 RX ORDER — SODIUM CHLORIDE 0.9 % (FLUSH) 0.9 %
30 SYRINGE (ML) INJECTION PRN
Status: DISCONTINUED | OUTPATIENT
Start: 2019-09-18 | End: 2019-09-21 | Stop reason: HOSPADM

## 2019-09-18 RX ADMIN — GADOTERIDOL 20 ML: 279.3 INJECTION, SOLUTION INTRAVENOUS at 09:11

## 2019-09-20 ENCOUNTER — ANESTHESIA EVENT (OUTPATIENT)
Dept: OPERATING ROOM | Age: 59
End: 2019-09-20
Payer: COMMERCIAL

## 2019-09-23 ENCOUNTER — ANESTHESIA (OUTPATIENT)
Dept: OPERATING ROOM | Age: 59
End: 2019-09-23
Payer: COMMERCIAL

## 2019-09-23 ENCOUNTER — HOSPITAL ENCOUNTER (OUTPATIENT)
Age: 59
Setting detail: OUTPATIENT SURGERY
Discharge: HOME | End: 2019-09-23
Attending: INTERNAL MEDICINE | Admitting: INTERNAL MEDICINE
Payer: COMMERCIAL

## 2019-09-23 VITALS
HEART RATE: 61 BPM | RESPIRATION RATE: 16 BRPM | OXYGEN SATURATION: 99 % | HEIGHT: 64 IN | TEMPERATURE: 96.8 F | BODY MASS INDEX: 43.58 KG/M2 | SYSTOLIC BLOOD PRESSURE: 155 MMHG | DIASTOLIC BLOOD PRESSURE: 95 MMHG | WEIGHT: 255.25 LBS

## 2019-09-23 VITALS — DIASTOLIC BLOOD PRESSURE: 86 MMHG | OXYGEN SATURATION: 99 % | SYSTOLIC BLOOD PRESSURE: 148 MMHG

## 2019-09-23 PROCEDURE — 3700000001 HC ADD 15 MINUTES (ANESTHESIA): Performed by: INTERNAL MEDICINE

## 2019-09-23 PROCEDURE — 88342 IMHCHEM/IMCYTCHM 1ST ANTB: CPT

## 2019-09-23 PROCEDURE — 2580000003 HC RX 258: Performed by: ANESTHESIOLOGY

## 2019-09-23 PROCEDURE — 7100000010 HC PHASE II RECOVERY - FIRST 15 MIN: Performed by: INTERNAL MEDICINE

## 2019-09-23 PROCEDURE — 2500000003 HC RX 250 WO HCPCS: Performed by: NURSE ANESTHETIST, CERTIFIED REGISTERED

## 2019-09-23 PROCEDURE — 6360000002 HC RX W HCPCS: Performed by: NURSE ANESTHETIST, CERTIFIED REGISTERED

## 2019-09-23 PROCEDURE — 88360 TUMOR IMMUNOHISTOCHEM/MANUAL: CPT

## 2019-09-23 PROCEDURE — 3700000000 HC ANESTHESIA ATTENDED CARE: Performed by: INTERNAL MEDICINE

## 2019-09-23 PROCEDURE — 3609012400 HC EGD TRANSORAL BIOPSY SINGLE/MULTIPLE: Performed by: INTERNAL MEDICINE

## 2019-09-23 PROCEDURE — 88305 TISSUE EXAM BY PATHOLOGIST: CPT

## 2019-09-23 PROCEDURE — 2709999900 HC NON-CHARGEABLE SUPPLY: Performed by: INTERNAL MEDICINE

## 2019-09-23 PROCEDURE — 43239 EGD BIOPSY SINGLE/MULTIPLE: CPT | Performed by: INTERNAL MEDICINE

## 2019-09-23 PROCEDURE — 7100000011 HC PHASE II RECOVERY - ADDTL 15 MIN: Performed by: INTERNAL MEDICINE

## 2019-09-23 RX ORDER — PROPOFOL 10 MG/ML
INJECTION, EMULSION INTRAVENOUS PRN
Status: DISCONTINUED | OUTPATIENT
Start: 2019-09-23 | End: 2019-09-23 | Stop reason: SDUPTHER

## 2019-09-23 RX ORDER — LIDOCAINE HYDROCHLORIDE 10 MG/ML
1 INJECTION, SOLUTION EPIDURAL; INFILTRATION; INTRACAUDAL; PERINEURAL
Status: DISCONTINUED | OUTPATIENT
Start: 2019-09-23 | End: 2019-09-23 | Stop reason: HOSPADM

## 2019-09-23 RX ORDER — LIDOCAINE HYDROCHLORIDE 20 MG/ML
INJECTION, SOLUTION EPIDURAL; INFILTRATION; INTRACAUDAL; PERINEURAL PRN
Status: DISCONTINUED | OUTPATIENT
Start: 2019-09-23 | End: 2019-09-23 | Stop reason: SDUPTHER

## 2019-09-23 RX ORDER — SODIUM CHLORIDE, SODIUM LACTATE, POTASSIUM CHLORIDE, CALCIUM CHLORIDE 600; 310; 30; 20 MG/100ML; MG/100ML; MG/100ML; MG/100ML
INJECTION, SOLUTION INTRAVENOUS CONTINUOUS
Status: DISCONTINUED | OUTPATIENT
Start: 2019-09-23 | End: 2019-09-23 | Stop reason: HOSPADM

## 2019-09-23 RX ADMIN — PROPOFOL 20 MG: 10 INJECTION, EMULSION INTRAVENOUS at 12:04

## 2019-09-23 RX ADMIN — SODIUM CHLORIDE, POTASSIUM CHLORIDE, SODIUM LACTATE AND CALCIUM CHLORIDE: 600; 310; 30; 20 INJECTION, SOLUTION INTRAVENOUS at 11:40

## 2019-09-23 RX ADMIN — PROPOFOL 20 MG: 10 INJECTION, EMULSION INTRAVENOUS at 12:06

## 2019-09-23 RX ADMIN — PROPOFOL 50 MG: 10 INJECTION, EMULSION INTRAVENOUS at 12:00

## 2019-09-23 RX ADMIN — SODIUM CHLORIDE, POTASSIUM CHLORIDE, SODIUM LACTATE AND CALCIUM CHLORIDE: 600; 310; 30; 20 INJECTION, SOLUTION INTRAVENOUS at 11:57

## 2019-09-23 RX ADMIN — LIDOCAINE HYDROCHLORIDE 100 MG: 20 INJECTION, SOLUTION EPIDURAL; INFILTRATION; INTRACAUDAL; PERINEURAL at 11:59

## 2019-09-23 RX ADMIN — PROPOFOL 20 MG: 10 INJECTION, EMULSION INTRAVENOUS at 12:08

## 2019-09-23 RX ADMIN — PROPOFOL 20 MG: 10 INJECTION, EMULSION INTRAVENOUS at 12:02

## 2019-09-23 RX ADMIN — PROPOFOL 50 MG: 10 INJECTION, EMULSION INTRAVENOUS at 11:59

## 2019-09-23 ASSESSMENT — PAIN - FUNCTIONAL ASSESSMENT: PAIN_FUNCTIONAL_ASSESSMENT: 0-10

## 2019-09-23 ASSESSMENT — PULMONARY FUNCTION TESTS
PIF_VALUE: 1

## 2019-09-23 ASSESSMENT — PAIN SCALES - GENERAL
PAINLEVEL_OUTOF10: 0
PAINLEVEL_OUTOF10: 0

## 2019-09-23 NOTE — ANESTHESIA PRE PROCEDURE
Department of Anesthesiology  Preprocedure Note       Name:  Beatriz Ortega   Age:  63 y.o.  :  1960                                          MRN:  [id number]         Date:  2019      Surgeon: Elvis Benson):  Cally Barnhart MD    Procedure: EGD ESOPHAGOGASTRODUODENOSCOPY (N/A )    Medications prior to admission:   Prior to Admission medications    Medication Sig Start Date End Date Taking? Authorizing Provider   levothyroxine (SYNTHROID) 150 MCG tablet Take 1 tablet by mouth Daily take 1 tablet by mouth daily for five days every week 19  Yes Amalia Castano MD   metoprolol succinate (TOPROL XL) 100 MG extended release tablet take 1 tablet by mouth once daily 19  Yes Amalia Castano MD   hydrochlorothiazide (HYDRODIURIL) 25 MG tablet take 1 tablet by mouth once daily 19  Yes Amalia Castano MD   vitamin B-12 (CYANOCOBALAMIN) 500 MCG tablet Take 500 mcg by mouth daily   Yes Historical Provider, MD   desonide (DESOWEN) 0.05 % cream Apply topically 2 times daily. 17  Yes Amalia Castano MD   Compression Stockings MISC Calf length 20-30 mm 3/12/18   Amalia Castano MD   aspirin 81 MG tablet Take 81 mg by mouth daily    Historical Provider, MD   albuterol sulfate HFA (PROVENTIL HFA) 108 (90 BASE) MCG/ACT inhaler Inhale 2 puffs into the lungs every 6 hours as needed for Wheezing 16   Amalia Castano MD       Current medications:    Current Facility-Administered Medications   Medication Dose Route Frequency Provider Last Rate Last Dose    lactated ringers infusion   Intravenous Continuous Srikanth Jones MD 50 mL/hr at 19 1140      lidocaine PF 1 % injection 1 mL  1 mL Intradermal Once PRN Srikanth Jones MD           Allergies:     Allergies   Allergen Reactions    Erythromycin Diarrhea       Problem List:    Patient Active Problem List   Diagnosis Code    Asthma J45.909    Anxiety F41.9    Obesity E66.9    Hyperlipidemia E78.5    Hypothyroidism E03.9    Colon polyps Date of last solid food consumption: 09/22/19    BMI:   Wt Readings from Last 3 Encounters:   09/23/19 255 lb 4 oz (115.8 kg)   09/12/19 256 lb 8 oz (116.3 kg)   07/23/19 255 lb (115.7 kg)     Body mass index is 43.81 kg/m². CBC:   Lab Results   Component Value Date    WBC 7.4 02/24/2019    RBC 5.20 02/24/2019    HGB 14.7 02/24/2019    HCT 44.7 02/24/2019    MCV 86.0 02/24/2019    RDW 12.2 02/24/2019     02/24/2019       CMP:   Lab Results   Component Value Date     02/24/2019    K 2.9 02/24/2019     02/24/2019    CO2 27 02/24/2019    BUN 8 02/24/2019    CREATININE 0.70 09/18/2019    CREATININE 0.61 02/24/2019    GFRAA >60 02/24/2019    LABGLOM >60 09/18/2019    GLUCOSE 127 02/24/2019    PROT 7.0 02/24/2019    CALCIUM 8.8 02/24/2019    BILITOT 0.16 02/24/2019    ALKPHOS 96 02/24/2019    AST 20 02/24/2019    ALT 17 02/24/2019       POC Tests: No results for input(s): POCGLU, POCNA, POCK, POCCL, POCBUN, POCHEMO, POCHCT in the last 72 hours.     Coags: No results found for: PROTIME, INR, APTT    HCG (If Applicable): No results found for: PREGTESTUR, PREGSERUM, HCG, HCGQUANT     ABGs: No results found for: PHART, PO2ART, MXX7ZAL, VTD4JDX, BEART, V1OMJESC     Type & Screen (If Applicable):  No results found for: [facility]    Anesthesia Evaluation  Patient summary reviewed and Nursing notes reviewed no history of anesthetic complications:   Airway: Mallampati: II  TM distance: >3 FB   Neck ROM: full  Mouth opening: > = 3 FB Dental: normal exam         Pulmonary:normal exam    (+) asthma:                            Cardiovascular:  Exercise tolerance: no interval change,   (+) hypertension:,     (-) CAD        Rate: normal                    Neuro/Psych:      (-) CVA           GI/Hepatic/Renal:        (-) GERD       Endo/Other:    (+) hypothyroidism::., .                 Abdominal:           Vascular:                                        Anesthesia Plan      MAC and general

## 2019-09-23 NOTE — ANESTHESIA POSTPROCEDURE EVALUATION
Department of Anesthesiology  Postprocedure Note    Patient: Davida Rosales  MRN: [id number]  YOB: 1960  Date of evaluation: 9/23/2019  Time:  12:52 PM     Procedure Summary     Date:  09/23/19 Room / Location:  [facility]    Anesthesia Start:  1157 Anesthesia Stop:  1218    Procedure:  EGD BIOPSY (N/A ) Diagnosis:  (DX GERD,DYSPHAGIA)    Surgeon:  Tammy Gay MD Responsible Provider:  Rosio Schmitz DO    Anesthesia Type:  MAC, general ASA Status:  3          Anesthesia Type: MAC, general    Reji Phase I:      Reji Phase II: Reji Score: 5    Last vitals: Reviewed and per EMR flowsheets.        Anesthesia Post Evaluation    Patient location during evaluation: PACU  Patient participation: complete - patient participated  Level of consciousness: awake and alert  Airway patency: patent  Nausea & Vomiting: no nausea and no vomiting  Complications: no  Cardiovascular status: hemodynamically stable  Respiratory status: acceptable  Hydration status: stable

## 2019-09-25 LAB — SURGICAL PATHOLOGY REPORT: NORMAL

## 2019-09-30 ENCOUNTER — TELEPHONE (OUTPATIENT)
Dept: GASTROENTEROLOGY | Age: 59
End: 2019-09-30

## 2019-09-30 DIAGNOSIS — C49.A2 GASTROINTESTINAL STROMAL TUMOR (GIST) OF STOMACH (HCC): Primary | ICD-10-CM

## 2019-10-02 ENCOUNTER — TELEPHONE (OUTPATIENT)
Dept: GASTROENTEROLOGY | Age: 59
End: 2019-10-02

## 2019-10-06 DIAGNOSIS — M79.671 RIGHT FOOT PAIN: ICD-10-CM

## 2019-10-07 RX ORDER — METOPROLOL SUCCINATE 100 MG/1
TABLET, EXTENDED RELEASE ORAL
Qty: 30 TABLET | Refills: 3 | Status: SHIPPED | OUTPATIENT
Start: 2019-10-07 | End: 2019-10-14 | Stop reason: SDUPTHER

## 2019-10-09 ENCOUNTER — TELEPHONE (OUTPATIENT)
Dept: GASTROENTEROLOGY | Age: 59
End: 2019-10-09

## 2019-10-14 ENCOUNTER — OFFICE VISIT (OUTPATIENT)
Dept: INTERNAL MEDICINE CLINIC | Age: 59
End: 2019-10-14
Payer: COMMERCIAL

## 2019-10-14 VITALS
DIASTOLIC BLOOD PRESSURE: 84 MMHG | WEIGHT: 262 LBS | HEART RATE: 72 BPM | TEMPERATURE: 97.4 F | SYSTOLIC BLOOD PRESSURE: 124 MMHG | BODY MASS INDEX: 44.73 KG/M2 | HEIGHT: 64 IN | RESPIRATION RATE: 16 BRPM

## 2019-10-14 DIAGNOSIS — D3A.8 NEUROENDOCRINE TUMOR: ICD-10-CM

## 2019-10-14 DIAGNOSIS — I10 ESSENTIAL HYPERTENSION: Primary | ICD-10-CM

## 2019-10-14 DIAGNOSIS — E03.9 HYPOTHYROIDISM, UNSPECIFIED TYPE: ICD-10-CM

## 2019-10-14 PROCEDURE — 99214 OFFICE O/P EST MOD 30 MIN: CPT | Performed by: INTERNAL MEDICINE

## 2019-10-14 RX ORDER — HYDROCHLOROTHIAZIDE 25 MG/1
TABLET ORAL
Qty: 90 TABLET | Refills: 1 | Status: SHIPPED | OUTPATIENT
Start: 2019-10-14 | End: 2020-04-21

## 2019-10-14 RX ORDER — METOPROLOL SUCCINATE 100 MG/1
TABLET, EXTENDED RELEASE ORAL
Qty: 90 TABLET | Refills: 1 | Status: SHIPPED | OUTPATIENT
Start: 2019-10-14 | End: 2020-01-16

## 2019-10-23 ENCOUNTER — ANESTHESIA (OUTPATIENT)
Dept: ENDOSCOPY | Age: 59
End: 2019-10-23
Payer: COMMERCIAL

## 2019-10-23 ENCOUNTER — ANESTHESIA EVENT (OUTPATIENT)
Dept: ENDOSCOPY | Age: 59
End: 2019-10-23
Payer: COMMERCIAL

## 2019-10-23 ENCOUNTER — HOSPITAL ENCOUNTER (OUTPATIENT)
Age: 59
Setting detail: OUTPATIENT SURGERY
Discharge: HOME | End: 2019-10-23
Attending: INTERNAL MEDICINE | Admitting: INTERNAL MEDICINE
Payer: COMMERCIAL

## 2019-10-23 ENCOUNTER — TELEPHONE (OUTPATIENT)
Dept: GASTROENTEROLOGY | Age: 59
End: 2019-10-23

## 2019-10-23 VITALS
RESPIRATION RATE: 32 BRPM | SYSTOLIC BLOOD PRESSURE: 148 MMHG | OXYGEN SATURATION: 97 % | DIASTOLIC BLOOD PRESSURE: 94 MMHG

## 2019-10-23 VITALS
WEIGHT: 255 LBS | RESPIRATION RATE: 19 BRPM | SYSTOLIC BLOOD PRESSURE: 164 MMHG | HEART RATE: 65 BPM | BODY MASS INDEX: 43.54 KG/M2 | TEMPERATURE: 98.4 F | HEIGHT: 64 IN | OXYGEN SATURATION: 95 % | DIASTOLIC BLOOD PRESSURE: 93 MMHG

## 2019-10-23 DIAGNOSIS — D49.0: Primary | ICD-10-CM

## 2019-10-23 PROCEDURE — 2709999900 HC NON-CHARGEABLE SUPPLY: Performed by: INTERNAL MEDICINE

## 2019-10-23 PROCEDURE — 43254 EGD ENDO MUCOSAL RESECTION: CPT | Performed by: INTERNAL MEDICINE

## 2019-10-23 PROCEDURE — 2500000003 HC RX 250 WO HCPCS: Performed by: NURSE ANESTHETIST, CERTIFIED REGISTERED

## 2019-10-23 PROCEDURE — 3700000001 HC ADD 15 MINUTES (ANESTHESIA): Performed by: INTERNAL MEDICINE

## 2019-10-23 PROCEDURE — 3609155300 HC EGD W ENDOSCOPIC MUCOSAL RESECTION: Performed by: INTERNAL MEDICINE

## 2019-10-23 PROCEDURE — 88305 TISSUE EXAM BY PATHOLOGIST: CPT

## 2019-10-23 PROCEDURE — 88360 TUMOR IMMUNOHISTOCHEM/MANUAL: CPT

## 2019-10-23 PROCEDURE — 3700000000 HC ANESTHESIA ATTENDED CARE: Performed by: INTERNAL MEDICINE

## 2019-10-23 PROCEDURE — 6360000002 HC RX W HCPCS: Performed by: INTERNAL MEDICINE

## 2019-10-23 PROCEDURE — 6360000002 HC RX W HCPCS: Performed by: NURSE ANESTHETIST, CERTIFIED REGISTERED

## 2019-10-23 PROCEDURE — 7100000011 HC PHASE II RECOVERY - ADDTL 15 MIN: Performed by: INTERNAL MEDICINE

## 2019-10-23 PROCEDURE — 2720000010 HC SURG SUPPLY STERILE: Performed by: INTERNAL MEDICINE

## 2019-10-23 PROCEDURE — 2580000003 HC RX 258: Performed by: NURSE ANESTHETIST, CERTIFIED REGISTERED

## 2019-10-23 PROCEDURE — 7100000010 HC PHASE II RECOVERY - FIRST 15 MIN: Performed by: INTERNAL MEDICINE

## 2019-10-23 RX ORDER — PROPOFOL 10 MG/ML
INJECTION, EMULSION INTRAVENOUS PRN
Status: DISCONTINUED | OUTPATIENT
Start: 2019-10-23 | End: 2019-10-23 | Stop reason: SDUPTHER

## 2019-10-23 RX ORDER — PROPOFOL 10 MG/ML
INJECTION, EMULSION INTRAVENOUS CONTINUOUS PRN
Status: DISCONTINUED | OUTPATIENT
Start: 2019-10-23 | End: 2019-10-23 | Stop reason: SDUPTHER

## 2019-10-23 RX ORDER — SUCRALFATE 1 G/1
1 TABLET ORAL 4 TIMES DAILY
Qty: 120 TABLET | Refills: 0 | Status: SHIPPED | OUTPATIENT
Start: 2019-10-23 | End: 2019-11-15

## 2019-10-23 RX ORDER — FENTANYL CITRATE 50 UG/ML
50 INJECTION, SOLUTION INTRAMUSCULAR; INTRAVENOUS ONCE
Status: COMPLETED | OUTPATIENT
Start: 2019-10-23 | End: 2019-10-23

## 2019-10-23 RX ORDER — GLYCOPYRROLATE 1 MG/5 ML
SYRINGE (ML) INTRAVENOUS PRN
Status: DISCONTINUED | OUTPATIENT
Start: 2019-10-23 | End: 2019-10-23 | Stop reason: SDUPTHER

## 2019-10-23 RX ORDER — LIDOCAINE HYDROCHLORIDE 10 MG/ML
INJECTION, SOLUTION EPIDURAL; INFILTRATION; INTRACAUDAL; PERINEURAL PRN
Status: DISCONTINUED | OUTPATIENT
Start: 2019-10-23 | End: 2019-10-23 | Stop reason: SDUPTHER

## 2019-10-23 RX ORDER — PANTOPRAZOLE SODIUM 40 MG/1
40 TABLET, DELAYED RELEASE ORAL
Qty: 60 TABLET | Refills: 0 | Status: SHIPPED | OUTPATIENT
Start: 2019-10-23 | End: 2019-11-15 | Stop reason: SINTOL

## 2019-10-23 RX ORDER — SODIUM CHLORIDE 9 MG/ML
INJECTION, SOLUTION INTRAVENOUS CONTINUOUS PRN
Status: DISCONTINUED | OUTPATIENT
Start: 2019-10-23 | End: 2019-10-23 | Stop reason: SDUPTHER

## 2019-10-23 RX ORDER — FENTANYL CITRATE 50 UG/ML
50 INJECTION, SOLUTION INTRAMUSCULAR; INTRAVENOUS
Status: DISCONTINUED | OUTPATIENT
Start: 2019-10-23 | End: 2019-10-23 | Stop reason: HOSPADM

## 2019-10-23 RX ADMIN — FENTANYL CITRATE 50 MCG: 50 INJECTION INTRAMUSCULAR; INTRAVENOUS at 15:51

## 2019-10-23 RX ADMIN — SODIUM CHLORIDE: 9 INJECTION, SOLUTION INTRAVENOUS at 15:25

## 2019-10-23 RX ADMIN — LIDOCAINE HYDROCHLORIDE 50 MG: 10 INJECTION, SOLUTION EPIDURAL; INFILTRATION; INTRACAUDAL at 14:44

## 2019-10-23 RX ADMIN — LIDOCAINE HYDROCHLORIDE 50 MG: 10 INJECTION, SOLUTION EPIDURAL; INFILTRATION; INTRACAUDAL at 14:39

## 2019-10-23 RX ADMIN — PROPOFOL 70 MG: 10 INJECTION, EMULSION INTRAVENOUS at 14:40

## 2019-10-23 RX ADMIN — PROPOFOL 150 MCG/KG/MIN: 10 INJECTION, EMULSION INTRAVENOUS at 14:39

## 2019-10-23 RX ADMIN — PROPOFOL 30 MG: 10 INJECTION, EMULSION INTRAVENOUS at 14:43

## 2019-10-23 RX ADMIN — SODIUM CHLORIDE: 9 INJECTION, SOLUTION INTRAVENOUS at 11:49

## 2019-10-23 RX ADMIN — Medication 0.2 MG: at 14:39

## 2019-10-23 ASSESSMENT — PAIN SCALES - GENERAL
PAINLEVEL_OUTOF10: 1
PAINLEVEL_OUTOF10: 9
PAINLEVEL_OUTOF10: 3
PAINLEVEL_OUTOF10: 4
PAINLEVEL_OUTOF10: 7

## 2019-10-23 ASSESSMENT — PAIN DESCRIPTION - LOCATION
LOCATION: ABDOMEN
LOCATION: ABDOMEN

## 2019-10-23 ASSESSMENT — PAIN DESCRIPTION - DESCRIPTORS
DESCRIPTORS: ACHING
DESCRIPTORS: ACHING

## 2019-10-23 ASSESSMENT — PAIN DESCRIPTION - PROGRESSION: CLINICAL_PROGRESSION: GRADUALLY IMPROVING

## 2019-10-23 ASSESSMENT — PAIN - FUNCTIONAL ASSESSMENT: PAIN_FUNCTIONAL_ASSESSMENT: 0-10

## 2019-10-23 ASSESSMENT — PAIN DESCRIPTION - ORIENTATION: ORIENTATION: MID;UPPER;LOWER

## 2019-10-23 ASSESSMENT — PAIN DESCRIPTION - FREQUENCY: FREQUENCY: INTERMITTENT

## 2019-10-23 ASSESSMENT — PAIN DESCRIPTION - PAIN TYPE: TYPE: ACUTE PAIN

## 2019-10-25 ENCOUNTER — TELEPHONE (OUTPATIENT)
Dept: INTERNAL MEDICINE CLINIC | Age: 59
End: 2019-10-25

## 2019-10-26 ENCOUNTER — HOSPITAL ENCOUNTER (OUTPATIENT)
Age: 59
Setting detail: OBSERVATION
Discharge: HOME | DRG: 378 | End: 2019-10-27
Attending: EMERGENCY MEDICINE | Admitting: EMERGENCY MEDICINE
Payer: COMMERCIAL

## 2019-10-26 ENCOUNTER — APPOINTMENT (OUTPATIENT)
Dept: CT IMAGING | Age: 59
DRG: 378 | End: 2019-10-26
Payer: COMMERCIAL

## 2019-10-26 ENCOUNTER — APPOINTMENT (OUTPATIENT)
Dept: GENERAL RADIOLOGY | Age: 59
DRG: 378 | End: 2019-10-26
Payer: COMMERCIAL

## 2019-10-26 DIAGNOSIS — R06.02 SHORTNESS OF BREATH: Primary | ICD-10-CM

## 2019-10-26 PROBLEM — R07.9 CHEST PAIN: Status: ACTIVE | Noted: 2019-10-26

## 2019-10-26 LAB
ABSOLUTE EOS #: 0.12 K/UL (ref 0–0.44)
ABSOLUTE IMMATURE GRANULOCYTE: 0.03 K/UL (ref 0–0.3)
ABSOLUTE LYMPH #: 2.08 K/UL (ref 1.1–3.7)
ABSOLUTE MONO #: 0.57 K/UL (ref 0.1–1.2)
ANION GAP SERPL CALCULATED.3IONS-SCNC: 15 MMOL/L (ref 9–17)
BASOPHILS # BLD: 0 % (ref 0–2)
BASOPHILS ABSOLUTE: 0.03 K/UL (ref 0–0.2)
BUN BLDV-MCNC: 22 MG/DL (ref 6–20)
BUN/CREAT BLD: ABNORMAL (ref 9–20)
CALCIUM SERPL-MCNC: 8.8 MG/DL (ref 8.6–10.4)
CHLORIDE BLD-SCNC: 101 MMOL/L (ref 98–107)
CO2: 22 MMOL/L (ref 20–31)
CREAT SERPL-MCNC: 0.6 MG/DL (ref 0.5–0.9)
DIFFERENTIAL TYPE: ABNORMAL
EOSINOPHILS RELATIVE PERCENT: 1 % (ref 1–4)
GFR AFRICAN AMERICAN: >60 ML/MIN
GFR NON-AFRICAN AMERICAN: >60 ML/MIN
GFR SERPL CREATININE-BSD FRML MDRD: ABNORMAL ML/MIN/{1.73_M2}
GFR SERPL CREATININE-BSD FRML MDRD: ABNORMAL ML/MIN/{1.73_M2}
GLUCOSE BLD-MCNC: 136 MG/DL (ref 70–99)
HCT VFR BLD CALC: 41.5 % (ref 36.3–47.1)
HEMOGLOBIN: 13.7 G/DL (ref 11.9–15.1)
IMMATURE GRANULOCYTES: 0 %
LYMPHOCYTES # BLD: 24 % (ref 24–43)
MCH RBC QN AUTO: 28.7 PG (ref 25.2–33.5)
MCHC RBC AUTO-ENTMCNC: 33 G/DL (ref 28.4–34.8)
MCV RBC AUTO: 86.8 FL (ref 82.6–102.9)
MONOCYTES # BLD: 7 % (ref 3–12)
NRBC AUTOMATED: 0 PER 100 WBC
PDW BLD-RTO: 12.9 % (ref 11.8–14.4)
PLATELET # BLD: 385 K/UL (ref 138–453)
PLATELET ESTIMATE: ABNORMAL
PMV BLD AUTO: 8.9 FL (ref 8.1–13.5)
POTASSIUM SERPL-SCNC: 3.4 MMOL/L (ref 3.7–5.3)
RBC # BLD: 4.78 M/UL (ref 3.95–5.11)
RBC # BLD: ABNORMAL 10*6/UL
SEG NEUTROPHILS: 68 % (ref 36–65)
SEGMENTED NEUTROPHILS ABSOLUTE COUNT: 5.78 K/UL (ref 1.5–8.1)
SODIUM BLD-SCNC: 138 MMOL/L (ref 135–144)
TROPONIN INTERP: NORMAL
TROPONIN T: NORMAL NG/ML
TROPONIN, HIGH SENSITIVITY: <6 NG/L (ref 0–14)
WBC # BLD: 8.6 K/UL (ref 3.5–11.3)
WBC # BLD: ABNORMAL 10*3/UL

## 2019-10-26 PROCEDURE — G0378 HOSPITAL OBSERVATION PER HR: HCPCS

## 2019-10-26 PROCEDURE — 6360000004 HC RX CONTRAST MEDICATION: Performed by: STUDENT IN AN ORGANIZED HEALTH CARE EDUCATION/TRAINING PROGRAM

## 2019-10-26 PROCEDURE — 80048 BASIC METABOLIC PNL TOTAL CA: CPT

## 2019-10-26 PROCEDURE — 6370000000 HC RX 637 (ALT 250 FOR IP): Performed by: STUDENT IN AN ORGANIZED HEALTH CARE EDUCATION/TRAINING PROGRAM

## 2019-10-26 PROCEDURE — 96374 THER/PROPH/DIAG INJ IV PUSH: CPT

## 2019-10-26 PROCEDURE — 2580000003 HC RX 258: Performed by: STUDENT IN AN ORGANIZED HEALTH CARE EDUCATION/TRAINING PROGRAM

## 2019-10-26 PROCEDURE — 36415 COLL VENOUS BLD VENIPUNCTURE: CPT

## 2019-10-26 PROCEDURE — 84484 ASSAY OF TROPONIN QUANT: CPT

## 2019-10-26 PROCEDURE — 85025 COMPLETE CBC W/AUTO DIFF WBC: CPT

## 2019-10-26 PROCEDURE — 71260 CT THORAX DX C+: CPT

## 2019-10-26 PROCEDURE — 6360000002 HC RX W HCPCS: Performed by: STUDENT IN AN ORGANIZED HEALTH CARE EDUCATION/TRAINING PROGRAM

## 2019-10-26 PROCEDURE — 6370000000 HC RX 637 (ALT 250 FOR IP): Performed by: GENERAL PRACTICE

## 2019-10-26 PROCEDURE — 99285 EMERGENCY DEPT VISIT HI MDM: CPT

## 2019-10-26 PROCEDURE — 93005 ELECTROCARDIOGRAM TRACING: CPT | Performed by: STUDENT IN AN ORGANIZED HEALTH CARE EDUCATION/TRAINING PROGRAM

## 2019-10-26 PROCEDURE — 71046 X-RAY EXAM CHEST 2 VIEWS: CPT

## 2019-10-26 RX ORDER — SODIUM CHLORIDE 0.9 % (FLUSH) 0.9 %
10 SYRINGE (ML) INJECTION PRN
Status: DISCONTINUED | OUTPATIENT
Start: 2019-10-26 | End: 2019-10-27 | Stop reason: HOSPADM

## 2019-10-26 RX ORDER — ASPIRIN 81 MG/1
324 TABLET, CHEWABLE ORAL ONCE
Status: COMPLETED | OUTPATIENT
Start: 2019-10-26 | End: 2019-10-26

## 2019-10-26 RX ORDER — SODIUM CHLORIDE 0.9 % (FLUSH) 0.9 %
10 SYRINGE (ML) INJECTION EVERY 12 HOURS SCHEDULED
Status: DISCONTINUED | OUTPATIENT
Start: 2019-10-26 | End: 2019-10-27 | Stop reason: HOSPADM

## 2019-10-26 RX ORDER — METOPROLOL SUCCINATE 100 MG/1
100 TABLET, EXTENDED RELEASE ORAL DAILY
Status: DISCONTINUED | OUTPATIENT
Start: 2019-10-26 | End: 2019-10-27 | Stop reason: HOSPADM

## 2019-10-26 RX ORDER — LEVOTHYROXINE SODIUM 0.07 MG/1
150 TABLET ORAL DAILY
Status: DISCONTINUED | OUTPATIENT
Start: 2019-10-26 | End: 2019-10-27 | Stop reason: HOSPADM

## 2019-10-26 RX ORDER — 0.9 % SODIUM CHLORIDE 0.9 %
1000 INTRAVENOUS SOLUTION INTRAVENOUS ONCE
Status: COMPLETED | OUTPATIENT
Start: 2019-10-26 | End: 2019-10-26

## 2019-10-26 RX ORDER — ONDANSETRON 2 MG/ML
4 INJECTION INTRAMUSCULAR; INTRAVENOUS EVERY 6 HOURS PRN
Status: DISCONTINUED | OUTPATIENT
Start: 2019-10-26 | End: 2019-10-27 | Stop reason: HOSPADM

## 2019-10-26 RX ORDER — CHOLECALCIFEROL (VITAMIN D3) 125 MCG
500 CAPSULE ORAL DAILY
Status: DISCONTINUED | OUTPATIENT
Start: 2019-10-26 | End: 2019-10-27 | Stop reason: HOSPADM

## 2019-10-26 RX ORDER — SIMETHICONE 80 MG
80 TABLET,CHEWABLE ORAL EVERY 6 HOURS PRN
Status: DISCONTINUED | OUTPATIENT
Start: 2019-10-26 | End: 2019-10-27 | Stop reason: HOSPADM

## 2019-10-26 RX ORDER — ACETAMINOPHEN 325 MG/1
650 TABLET ORAL EVERY 4 HOURS PRN
Status: DISCONTINUED | OUTPATIENT
Start: 2019-10-26 | End: 2019-10-27 | Stop reason: HOSPADM

## 2019-10-26 RX ADMIN — SODIUM CHLORIDE 1000 ML: 9 INJECTION, SOLUTION INTRAVENOUS at 14:23

## 2019-10-26 RX ADMIN — IOHEXOL 75 ML: 350 INJECTION, SOLUTION INTRAVENOUS at 15:55

## 2019-10-26 RX ADMIN — ONDANSETRON 4 MG: 2 INJECTION INTRAMUSCULAR; INTRAVENOUS at 14:23

## 2019-10-26 RX ADMIN — SIMETHICONE 80 MG: 80 TABLET, CHEWABLE ORAL at 22:30

## 2019-10-26 RX ADMIN — ASPIRIN 324 MG: 81 TABLET ORAL at 14:24

## 2019-10-26 RX ADMIN — SODIUM CHLORIDE, PRESERVATIVE FREE 10 ML: 5 INJECTION INTRAVENOUS at 22:12

## 2019-10-26 ASSESSMENT — ENCOUNTER SYMPTOMS
SHORTNESS OF BREATH: 1
NAUSEA: 1
COUGH: 0
ABDOMINAL PAIN: 0

## 2019-10-26 ASSESSMENT — PAIN DESCRIPTION - DIRECTION: RADIATING_TOWARDS: CHEST HEAVINESS

## 2019-10-26 ASSESSMENT — PAIN DESCRIPTION - DESCRIPTORS: DESCRIPTORS: CONSTANT

## 2019-10-26 ASSESSMENT — PAIN SCALES - GENERAL
PAINLEVEL_OUTOF10: 6
PAINLEVEL_OUTOF10: 0
PAINLEVEL_OUTOF10: 3

## 2019-10-27 VITALS
DIASTOLIC BLOOD PRESSURE: 69 MMHG | BODY MASS INDEX: 43.02 KG/M2 | RESPIRATION RATE: 18 BRPM | SYSTOLIC BLOOD PRESSURE: 103 MMHG | TEMPERATURE: 98.4 F | HEIGHT: 64 IN | OXYGEN SATURATION: 96 % | WEIGHT: 252 LBS | HEART RATE: 79 BPM

## 2019-10-27 LAB
ABSOLUTE EOS #: 0.21 K/UL (ref 0–0.44)
ABSOLUTE IMMATURE GRANULOCYTE: 0.06 K/UL (ref 0–0.3)
ABSOLUTE LYMPH #: 3.49 K/UL (ref 1.1–3.7)
ABSOLUTE MONO #: 0.93 K/UL (ref 0.1–1.2)
ANION GAP SERPL CALCULATED.3IONS-SCNC: 11 MMOL/L (ref 9–17)
BASOPHILS # BLD: 0 % (ref 0–2)
BASOPHILS ABSOLUTE: 0.04 K/UL (ref 0–0.2)
BUN BLDV-MCNC: 31 MG/DL (ref 6–20)
BUN/CREAT BLD: ABNORMAL (ref 9–20)
CALCIUM SERPL-MCNC: 9.1 MG/DL (ref 8.6–10.4)
CHLORIDE BLD-SCNC: 100 MMOL/L (ref 98–107)
CO2: 25 MMOL/L (ref 20–31)
CREAT SERPL-MCNC: 0.65 MG/DL (ref 0.5–0.9)
DIFFERENTIAL TYPE: ABNORMAL
EOSINOPHILS RELATIVE PERCENT: 2 % (ref 1–4)
GFR AFRICAN AMERICAN: >60 ML/MIN
GFR NON-AFRICAN AMERICAN: >60 ML/MIN
GFR SERPL CREATININE-BSD FRML MDRD: ABNORMAL ML/MIN/{1.73_M2}
GFR SERPL CREATININE-BSD FRML MDRD: ABNORMAL ML/MIN/{1.73_M2}
GLUCOSE BLD-MCNC: 125 MG/DL (ref 70–99)
HCT VFR BLD CALC: 36.3 % (ref 36.3–47.1)
HCT VFR BLD CALC: 36.9 % (ref 36.3–47.1)
HEMOGLOBIN: 11.9 G/DL (ref 11.9–15.1)
HEMOGLOBIN: 12.1 G/DL (ref 11.9–15.1)
IMMATURE GRANULOCYTES: 1 %
LYMPHOCYTES # BLD: 31 % (ref 24–43)
MCH RBC QN AUTO: 28.6 PG (ref 25.2–33.5)
MCH RBC QN AUTO: 28.7 PG (ref 25.2–33.5)
MCHC RBC AUTO-ENTMCNC: 32.8 G/DL (ref 28.4–34.8)
MCHC RBC AUTO-ENTMCNC: 32.8 G/DL (ref 28.4–34.8)
MCV RBC AUTO: 87.3 FL (ref 82.6–102.9)
MCV RBC AUTO: 87.6 FL (ref 82.6–102.9)
MONOCYTES # BLD: 8 % (ref 3–12)
NRBC AUTOMATED: 0 PER 100 WBC
NRBC AUTOMATED: 0 PER 100 WBC
PDW BLD-RTO: 13.2 % (ref 11.8–14.4)
PDW BLD-RTO: 13.3 % (ref 11.8–14.4)
PLATELET # BLD: 364 K/UL (ref 138–453)
PLATELET # BLD: 391 K/UL (ref 138–453)
PLATELET ESTIMATE: ABNORMAL
PMV BLD AUTO: 9.1 FL (ref 8.1–13.5)
PMV BLD AUTO: 9.9 FL (ref 8.1–13.5)
POTASSIUM SERPL-SCNC: 3 MMOL/L (ref 3.7–5.3)
RBC # BLD: 4.16 M/UL (ref 3.95–5.11)
RBC # BLD: 4.21 M/UL (ref 3.95–5.11)
RBC # BLD: ABNORMAL 10*6/UL
SEG NEUTROPHILS: 58 % (ref 36–65)
SEGMENTED NEUTROPHILS ABSOLUTE COUNT: 6.56 K/UL (ref 1.5–8.1)
SODIUM BLD-SCNC: 136 MMOL/L (ref 135–144)
TROPONIN INTERP: NORMAL
TROPONIN T: NORMAL NG/ML
TROPONIN, HIGH SENSITIVITY: <6 NG/L (ref 0–14)
WBC # BLD: 10.9 K/UL (ref 3.5–11.3)
WBC # BLD: 11.3 K/UL (ref 3.5–11.3)
WBC # BLD: ABNORMAL 10*3/UL

## 2019-10-27 PROCEDURE — 85027 COMPLETE CBC AUTOMATED: CPT

## 2019-10-27 PROCEDURE — 36415 COLL VENOUS BLD VENIPUNCTURE: CPT

## 2019-10-27 PROCEDURE — 85025 COMPLETE CBC W/AUTO DIFF WBC: CPT

## 2019-10-27 PROCEDURE — 80048 BASIC METABOLIC PNL TOTAL CA: CPT

## 2019-10-27 PROCEDURE — G0378 HOSPITAL OBSERVATION PER HR: HCPCS

## 2019-10-27 PROCEDURE — 2580000003 HC RX 258: Performed by: STUDENT IN AN ORGANIZED HEALTH CARE EDUCATION/TRAINING PROGRAM

## 2019-10-27 PROCEDURE — 93005 ELECTROCARDIOGRAM TRACING: CPT | Performed by: STUDENT IN AN ORGANIZED HEALTH CARE EDUCATION/TRAINING PROGRAM

## 2019-10-27 PROCEDURE — 99222 1ST HOSP IP/OBS MODERATE 55: CPT | Performed by: INTERNAL MEDICINE

## 2019-10-27 PROCEDURE — 6370000000 HC RX 637 (ALT 250 FOR IP): Performed by: STUDENT IN AN ORGANIZED HEALTH CARE EDUCATION/TRAINING PROGRAM

## 2019-10-27 RX ORDER — POTASSIUM CHLORIDE 20 MEQ/1
40 TABLET, EXTENDED RELEASE ORAL ONCE
Status: COMPLETED | OUTPATIENT
Start: 2019-10-27 | End: 2019-10-27

## 2019-10-27 RX ORDER — PANTOPRAZOLE SODIUM 40 MG/1
40 TABLET, DELAYED RELEASE ORAL
Status: DISCONTINUED | OUTPATIENT
Start: 2019-10-27 | End: 2019-10-27 | Stop reason: HOSPADM

## 2019-10-27 RX ORDER — PANTOPRAZOLE SODIUM 40 MG/1
40 TABLET, DELAYED RELEASE ORAL ONCE
Status: COMPLETED | OUTPATIENT
Start: 2019-10-27 | End: 2019-10-27

## 2019-10-27 RX ADMIN — POTASSIUM CHLORIDE 40 MEQ: 1500 TABLET, EXTENDED RELEASE ORAL at 13:21

## 2019-10-27 RX ADMIN — LEVOTHYROXINE SODIUM 150 MCG: 75 TABLET ORAL at 06:06

## 2019-10-27 RX ADMIN — PANTOPRAZOLE SODIUM 40 MG: 40 TABLET, DELAYED RELEASE ORAL at 15:27

## 2019-10-27 RX ADMIN — SODIUM CHLORIDE, PRESERVATIVE FREE 10 ML: 5 INJECTION INTRAVENOUS at 08:27

## 2019-10-27 ASSESSMENT — ENCOUNTER SYMPTOMS
VOMITING: 0
RHINORRHEA: 0
BACK PAIN: 0

## 2019-10-28 ENCOUNTER — TELEPHONE (OUTPATIENT)
Dept: INTERNAL MEDICINE CLINIC | Age: 59
End: 2019-10-28

## 2019-10-28 ENCOUNTER — HOSPITAL ENCOUNTER (INPATIENT)
Age: 59
LOS: 4 days | Discharge: HOME | DRG: 378 | End: 2019-11-01
Attending: EMERGENCY MEDICINE | Admitting: INTERNAL MEDICINE
Payer: COMMERCIAL

## 2019-10-28 ENCOUNTER — TELEPHONE (OUTPATIENT)
Dept: GASTROENTEROLOGY | Age: 59
End: 2019-10-28

## 2019-10-28 DIAGNOSIS — K92.1 HEMATOCHEZIA: ICD-10-CM

## 2019-10-28 DIAGNOSIS — K92.1 MELENA: Primary | ICD-10-CM

## 2019-10-28 PROBLEM — D3A.8 NEUROENDOCRINE NEOPLASM OF STOMACH: Status: ACTIVE | Noted: 2019-10-28

## 2019-10-28 PROBLEM — K92.2 GI BLEED: Status: ACTIVE | Noted: 2019-10-28

## 2019-10-28 PROBLEM — I10 ESSENTIAL HYPERTENSION: Status: ACTIVE | Noted: 2019-10-28

## 2019-10-28 PROBLEM — K31.7 POLYP, STOMACH: Status: ACTIVE | Noted: 2019-10-28

## 2019-10-28 LAB
ABO/RH: NORMAL
ABSOLUTE EOS #: 0.24 K/UL (ref 0–0.44)
ABSOLUTE IMMATURE GRANULOCYTE: 0.04 K/UL (ref 0–0.3)
ABSOLUTE LYMPH #: 2.68 K/UL (ref 1.1–3.7)
ABSOLUTE MONO #: 0.58 K/UL (ref 0.1–1.2)
ALBUMIN SERPL-MCNC: 3.7 G/DL (ref 3.5–5.2)
ALBUMIN/GLOBULIN RATIO: 1 (ref 1–2.5)
ALP BLD-CCNC: 87 U/L (ref 35–104)
ALT SERPL-CCNC: 20 U/L (ref 5–33)
ANION GAP SERPL CALCULATED.3IONS-SCNC: 12 MMOL/L (ref 9–17)
ANTIBODY SCREEN: NEGATIVE
ARM BAND NUMBER: NORMAL
AST SERPL-CCNC: 23 U/L
BASOPHILS # BLD: 0 % (ref 0–2)
BASOPHILS ABSOLUTE: 0.04 K/UL (ref 0–0.2)
BILIRUB SERPL-MCNC: <0.1 MG/DL (ref 0.3–1.2)
BUN BLDV-MCNC: 19 MG/DL (ref 6–20)
BUN/CREAT BLD: ABNORMAL (ref 9–20)
CALCIUM SERPL-MCNC: 9.1 MG/DL (ref 8.6–10.4)
CHLORIDE BLD-SCNC: 100 MMOL/L (ref 98–107)
CO2: 26 MMOL/L (ref 20–31)
CREAT SERPL-MCNC: 0.64 MG/DL (ref 0.5–0.9)
DIFFERENTIAL TYPE: NORMAL
EKG ATRIAL RATE: 97 BPM
EKG P AXIS: 48 DEGREES
EKG P-R INTERVAL: 148 MS
EKG Q-T INTERVAL: 348 MS
EKG QRS DURATION: 70 MS
EKG QTC CALCULATION (BAZETT): 441 MS
EKG R AXIS: -11 DEGREES
EKG T AXIS: 14 DEGREES
EKG VENTRICULAR RATE: 97 BPM
EOSINOPHILS RELATIVE PERCENT: 2 % (ref 1–4)
EXPIRATION DATE: NORMAL
GFR AFRICAN AMERICAN: >60 ML/MIN
GFR NON-AFRICAN AMERICAN: >60 ML/MIN
GFR SERPL CREATININE-BSD FRML MDRD: ABNORMAL ML/MIN/{1.73_M2}
GFR SERPL CREATININE-BSD FRML MDRD: ABNORMAL ML/MIN/{1.73_M2}
GLUCOSE BLD-MCNC: 115 MG/DL (ref 70–99)
HCT VFR BLD CALC: 36.5 % (ref 36.3–47.1)
HEMOGLOBIN: 12 G/DL (ref 11.9–15.1)
IMMATURE GRANULOCYTES: 0 %
INR BLD: 0.9
LYMPHOCYTES # BLD: 27 % (ref 24–43)
MCH RBC QN AUTO: 28.6 PG (ref 25.2–33.5)
MCHC RBC AUTO-ENTMCNC: 32.9 G/DL (ref 28.4–34.8)
MCV RBC AUTO: 87.1 FL (ref 82.6–102.9)
MONOCYTES # BLD: 6 % (ref 3–12)
NRBC AUTOMATED: 0 PER 100 WBC
PARTIAL THROMBOPLASTIN TIME: 25.8 SEC (ref 20.5–30.5)
PDW BLD-RTO: 13 % (ref 11.8–14.4)
PLATELET # BLD: 372 K/UL (ref 138–453)
PLATELET ESTIMATE: NORMAL
PMV BLD AUTO: 9.4 FL (ref 8.1–13.5)
POTASSIUM SERPL-SCNC: 3 MMOL/L (ref 3.7–5.3)
PROTHROMBIN TIME: 9.9 SEC (ref 9–12)
RBC # BLD: 4.19 M/UL (ref 3.95–5.11)
RBC # BLD: NORMAL 10*6/UL
SEG NEUTROPHILS: 65 % (ref 36–65)
SEGMENTED NEUTROPHILS ABSOLUTE COUNT: 6.51 K/UL (ref 1.5–8.1)
SODIUM BLD-SCNC: 138 MMOL/L (ref 135–144)
SURGICAL PATHOLOGY REPORT: NORMAL
TOTAL PROTEIN: 7.3 G/DL (ref 6.4–8.3)
WBC # BLD: 10.1 K/UL (ref 3.5–11.3)
WBC # BLD: NORMAL 10*3/UL

## 2019-10-28 PROCEDURE — 0W3P8ZZ CONTROL BLEEDING IN GASTROINTESTINAL TRACT, VIA NATURAL OR ARTIFICIAL OPENING ENDOSCOPIC: ICD-10-PCS | Performed by: INTERNAL MEDICINE

## 2019-10-28 PROCEDURE — C9113 INJ PANTOPRAZOLE SODIUM, VIA: HCPCS | Performed by: STUDENT IN AN ORGANIZED HEALTH CARE EDUCATION/TRAINING PROGRAM

## 2019-10-28 PROCEDURE — 86900 BLOOD TYPING SEROLOGIC ABO: CPT

## 2019-10-28 PROCEDURE — 85730 THROMBOPLASTIN TIME PARTIAL: CPT

## 2019-10-28 PROCEDURE — 80053 COMPREHEN METABOLIC PANEL: CPT

## 2019-10-28 PROCEDURE — 2060000000 HC ICU INTERMEDIATE R&B

## 2019-10-28 PROCEDURE — 93010 ELECTROCARDIOGRAM REPORT: CPT | Performed by: INTERNAL MEDICINE

## 2019-10-28 PROCEDURE — 96366 THER/PROPH/DIAG IV INF ADDON: CPT

## 2019-10-28 PROCEDURE — 2580000003 HC RX 258: Performed by: STUDENT IN AN ORGANIZED HEALTH CARE EDUCATION/TRAINING PROGRAM

## 2019-10-28 PROCEDURE — 99284 EMERGENCY DEPT VISIT MOD MDM: CPT

## 2019-10-28 PROCEDURE — 96365 THER/PROPH/DIAG IV INF INIT: CPT

## 2019-10-28 PROCEDURE — 86850 RBC ANTIBODY SCREEN: CPT

## 2019-10-28 PROCEDURE — 85610 PROTHROMBIN TIME: CPT

## 2019-10-28 PROCEDURE — 6360000002 HC RX W HCPCS: Performed by: STUDENT IN AN ORGANIZED HEALTH CARE EDUCATION/TRAINING PROGRAM

## 2019-10-28 PROCEDURE — 99223 1ST HOSP IP/OBS HIGH 75: CPT | Performed by: NURSE PRACTITIONER

## 2019-10-28 PROCEDURE — 86901 BLOOD TYPING SEROLOGIC RH(D): CPT

## 2019-10-28 PROCEDURE — 85025 COMPLETE CBC W/AUTO DIFF WBC: CPT

## 2019-10-28 RX ORDER — ONDANSETRON 2 MG/ML
4 INJECTION INTRAMUSCULAR; INTRAVENOUS EVERY 6 HOURS PRN
Status: DISCONTINUED | OUTPATIENT
Start: 2019-10-28 | End: 2019-11-01 | Stop reason: HOSPADM

## 2019-10-28 RX ORDER — ACETAMINOPHEN 325 MG/1
650 TABLET ORAL EVERY 4 HOURS PRN
Status: DISCONTINUED | OUTPATIENT
Start: 2019-10-28 | End: 2019-11-01 | Stop reason: HOSPADM

## 2019-10-28 RX ORDER — 0.9 % SODIUM CHLORIDE 0.9 %
1000 INTRAVENOUS SOLUTION INTRAVENOUS ONCE
Status: COMPLETED | OUTPATIENT
Start: 2019-10-28 | End: 2019-10-28

## 2019-10-28 RX ORDER — SODIUM CHLORIDE 0.9 % (FLUSH) 0.9 %
10 SYRINGE (ML) INJECTION EVERY 12 HOURS SCHEDULED
Status: DISCONTINUED | OUTPATIENT
Start: 2019-10-28 | End: 2019-11-01 | Stop reason: HOSPADM

## 2019-10-28 RX ORDER — SODIUM CHLORIDE 9 MG/ML
INJECTION, SOLUTION INTRAVENOUS CONTINUOUS
Status: DISCONTINUED | OUTPATIENT
Start: 2019-10-28 | End: 2019-10-29 | Stop reason: SDUPTHER

## 2019-10-28 RX ORDER — SODIUM CHLORIDE 0.9 % (FLUSH) 0.9 %
10 SYRINGE (ML) INJECTION PRN
Status: DISCONTINUED | OUTPATIENT
Start: 2019-10-28 | End: 2019-10-29

## 2019-10-28 RX ADMIN — SODIUM CHLORIDE 1000 ML: 9 INJECTION, SOLUTION INTRAVENOUS at 17:45

## 2019-10-28 RX ADMIN — SODIUM CHLORIDE 8 MG/HR: 9 INJECTION, SOLUTION INTRAVENOUS at 17:53

## 2019-10-28 ASSESSMENT — ENCOUNTER SYMPTOMS
NAUSEA: 0
BLOOD IN STOOL: 1
DIARRHEA: 1
RHINORRHEA: 0
SHORTNESS OF BREATH: 1
EYE REDNESS: 0
SORE THROAT: 1
BACK PAIN: 0
EYE ITCHING: 0
COUGH: 1
ABDOMINAL PAIN: 1
VOMITING: 0

## 2019-10-28 ASSESSMENT — PAIN SCALES - GENERAL: PAINLEVEL_OUTOF10: 8

## 2019-10-28 ASSESSMENT — PAIN DESCRIPTION - LOCATION: LOCATION: ABDOMEN

## 2019-10-28 ASSESSMENT — PAIN DESCRIPTION - PAIN TYPE: TYPE: ACUTE PAIN

## 2019-10-28 ASSESSMENT — PAIN DESCRIPTION - FREQUENCY: FREQUENCY: INTERMITTENT

## 2019-10-28 ASSESSMENT — PAIN DESCRIPTION - DESCRIPTORS: DESCRIPTORS: SHOOTING;SHARP

## 2019-10-28 ASSESSMENT — PAIN DESCRIPTION - ORIENTATION: ORIENTATION: LOWER

## 2019-10-29 ENCOUNTER — ANESTHESIA (OUTPATIENT)
Dept: ENDOSCOPY | Age: 59
DRG: 378 | End: 2019-10-29
Payer: COMMERCIAL

## 2019-10-29 ENCOUNTER — ANESTHESIA EVENT (OUTPATIENT)
Dept: ENDOSCOPY | Age: 59
DRG: 378 | End: 2019-10-29
Payer: COMMERCIAL

## 2019-10-29 VITALS
SYSTOLIC BLOOD PRESSURE: 111 MMHG | DIASTOLIC BLOOD PRESSURE: 63 MMHG | OXYGEN SATURATION: 91 % | RESPIRATION RATE: 32 BRPM

## 2019-10-29 LAB
ABSOLUTE EOS #: 0.3 K/UL (ref 0–0.44)
ABSOLUTE IMMATURE GRANULOCYTE: 0.03 K/UL (ref 0–0.3)
ABSOLUTE LYMPH #: 2.23 K/UL (ref 1.1–3.7)
ABSOLUTE MONO #: 0.49 K/UL (ref 0.1–1.2)
BASOPHILS # BLD: 0 % (ref 0–2)
BASOPHILS ABSOLUTE: 0.03 K/UL (ref 0–0.2)
DIFFERENTIAL TYPE: ABNORMAL
EKG ATRIAL RATE: 94 BPM
EKG P AXIS: 64 DEGREES
EKG P-R INTERVAL: 154 MS
EKG Q-T INTERVAL: 366 MS
EKG QRS DURATION: 78 MS
EKG QTC CALCULATION (BAZETT): 457 MS
EKG R AXIS: 13 DEGREES
EKG T AXIS: 5 DEGREES
EKG VENTRICULAR RATE: 94 BPM
EOSINOPHILS RELATIVE PERCENT: 4 % (ref 1–4)
HCT VFR BLD CALC: 31.6 % (ref 36.3–47.1)
HCT VFR BLD CALC: 36.2 % (ref 36.3–47.1)
HEMOGLOBIN: 10.2 G/DL (ref 11.9–15.1)
HEMOGLOBIN: 11.4 G/DL (ref 11.9–15.1)
IMMATURE GRANULOCYTES: 0 %
LYMPHOCYTES # BLD: 28 % (ref 24–43)
MAGNESIUM: 1.8 MG/DL (ref 1.6–2.6)
MCH RBC QN AUTO: 29.1 PG (ref 25.2–33.5)
MCHC RBC AUTO-ENTMCNC: 32.3 G/DL (ref 28.4–34.8)
MCV RBC AUTO: 90.3 FL (ref 82.6–102.9)
MONOCYTES # BLD: 6 % (ref 3–12)
NRBC AUTOMATED: 0 PER 100 WBC
PDW BLD-RTO: 13.1 % (ref 11.8–14.4)
PLATELET # BLD: 299 K/UL (ref 138–453)
PLATELET ESTIMATE: ABNORMAL
PMV BLD AUTO: 9.3 FL (ref 8.1–13.5)
POTASSIUM SERPL-SCNC: 3.3 MMOL/L (ref 3.7–5.3)
RBC # BLD: 3.5 M/UL (ref 3.95–5.11)
RBC # BLD: ABNORMAL 10*6/UL
SEG NEUTROPHILS: 62 % (ref 36–65)
SEGMENTED NEUTROPHILS ABSOLUTE COUNT: 4.91 K/UL (ref 1.5–8.1)
WBC # BLD: 8 K/UL (ref 3.5–11.3)
WBC # BLD: ABNORMAL 10*3/UL

## 2019-10-29 PROCEDURE — 2580000003 HC RX 258: Performed by: INTERNAL MEDICINE

## 2019-10-29 PROCEDURE — 36415 COLL VENOUS BLD VENIPUNCTURE: CPT

## 2019-10-29 PROCEDURE — 84132 ASSAY OF SERUM POTASSIUM: CPT

## 2019-10-29 PROCEDURE — 6360000002 HC RX W HCPCS: Performed by: INTERNAL MEDICINE

## 2019-10-29 PROCEDURE — C9113 INJ PANTOPRAZOLE SODIUM, VIA: HCPCS | Performed by: INTERNAL MEDICINE

## 2019-10-29 PROCEDURE — 2500000003 HC RX 250 WO HCPCS: Performed by: NURSE ANESTHETIST, CERTIFIED REGISTERED

## 2019-10-29 PROCEDURE — 6370000000 HC RX 637 (ALT 250 FOR IP): Performed by: INTERNAL MEDICINE

## 2019-10-29 PROCEDURE — 43255 EGD CONTROL BLEEDING ANY: CPT | Performed by: INTERNAL MEDICINE

## 2019-10-29 PROCEDURE — 3700000000 HC ANESTHESIA ATTENDED CARE: Performed by: INTERNAL MEDICINE

## 2019-10-29 PROCEDURE — 3609016200 HC ESOPHAGOSCOPY CONTROL HEMORRHAGE: Performed by: INTERNAL MEDICINE

## 2019-10-29 PROCEDURE — 2720000010 HC SURG SUPPLY STERILE: Performed by: INTERNAL MEDICINE

## 2019-10-29 PROCEDURE — 6360000002 HC RX W HCPCS: Performed by: NURSE PRACTITIONER

## 2019-10-29 PROCEDURE — 7100000011 HC PHASE II RECOVERY - ADDTL 15 MIN: Performed by: INTERNAL MEDICINE

## 2019-10-29 PROCEDURE — 2709999900 HC NON-CHARGEABLE SUPPLY: Performed by: INTERNAL MEDICINE

## 2019-10-29 PROCEDURE — 85018 HEMOGLOBIN: CPT

## 2019-10-29 PROCEDURE — 93010 ELECTROCARDIOGRAM REPORT: CPT | Performed by: INTERNAL MEDICINE

## 2019-10-29 PROCEDURE — 43235 EGD DIAGNOSTIC BRUSH WASH: CPT | Performed by: INTERNAL MEDICINE

## 2019-10-29 PROCEDURE — 96366 THER/PROPH/DIAG IV INF ADDON: CPT

## 2019-10-29 PROCEDURE — 99254 IP/OBS CNSLTJ NEW/EST MOD 60: CPT | Performed by: NURSE PRACTITIONER

## 2019-10-29 PROCEDURE — 6360000002 HC RX W HCPCS: Performed by: NURSE ANESTHETIST, CERTIFIED REGISTERED

## 2019-10-29 PROCEDURE — 3700000001 HC ADD 15 MINUTES (ANESTHESIA): Performed by: INTERNAL MEDICINE

## 2019-10-29 PROCEDURE — 83735 ASSAY OF MAGNESIUM: CPT

## 2019-10-29 PROCEDURE — 99232 SBSQ HOSP IP/OBS MODERATE 35: CPT | Performed by: INTERNAL MEDICINE

## 2019-10-29 PROCEDURE — 7100000010 HC PHASE II RECOVERY - FIRST 15 MIN: Performed by: INTERNAL MEDICINE

## 2019-10-29 PROCEDURE — 85014 HEMATOCRIT: CPT

## 2019-10-29 PROCEDURE — 85025 COMPLETE CBC W/AUTO DIFF WBC: CPT

## 2019-10-29 PROCEDURE — 1200000000 HC SEMI PRIVATE

## 2019-10-29 RX ORDER — POTASSIUM CHLORIDE 7.45 MG/ML
10 INJECTION INTRAVENOUS PRN
Status: DISCONTINUED | OUTPATIENT
Start: 2019-10-29 | End: 2019-11-01 | Stop reason: HOSPADM

## 2019-10-29 RX ORDER — POTASSIUM CHLORIDE AND SODIUM CHLORIDE 900; 300 MG/100ML; MG/100ML
INJECTION, SOLUTION INTRAVENOUS CONTINUOUS
Status: DISCONTINUED | OUTPATIENT
Start: 2019-10-29 | End: 2019-10-29 | Stop reason: CLARIF

## 2019-10-29 RX ORDER — SODIUM CHLORIDE, SODIUM LACTATE, POTASSIUM CHLORIDE, CALCIUM CHLORIDE 600; 310; 30; 20 MG/100ML; MG/100ML; MG/100ML; MG/100ML
INJECTION, SOLUTION INTRAVENOUS CONTINUOUS
Status: DISCONTINUED | OUTPATIENT
Start: 2019-10-29 | End: 2019-10-29

## 2019-10-29 RX ORDER — CHOLECALCIFEROL (VITAMIN D3) 125 MCG
500 CAPSULE ORAL DAILY
Status: DISCONTINUED | OUTPATIENT
Start: 2019-10-29 | End: 2019-11-01 | Stop reason: HOSPADM

## 2019-10-29 RX ORDER — ALBUTEROL SULFATE 90 UG/1
2 AEROSOL, METERED RESPIRATORY (INHALATION) EVERY 6 HOURS PRN
Status: DISCONTINUED | OUTPATIENT
Start: 2019-10-29 | End: 2019-11-01 | Stop reason: HOSPADM

## 2019-10-29 RX ORDER — ONDANSETRON 2 MG/ML
4 INJECTION INTRAMUSCULAR; INTRAVENOUS ONCE
Status: DISCONTINUED | OUTPATIENT
Start: 2019-10-29 | End: 2019-11-01 | Stop reason: HOSPADM

## 2019-10-29 RX ORDER — SODIUM CHLORIDE 9 MG/ML
INJECTION, SOLUTION INTRAVENOUS CONTINUOUS
Status: DISCONTINUED | OUTPATIENT
Start: 2019-10-29 | End: 2019-11-01 | Stop reason: HOSPADM

## 2019-10-29 RX ORDER — SODIUM CHLORIDE 0.9 % (FLUSH) 0.9 %
10 SYRINGE (ML) INJECTION PRN
Status: DISCONTINUED | OUTPATIENT
Start: 2019-10-29 | End: 2019-11-01 | Stop reason: HOSPADM

## 2019-10-29 RX ORDER — LEVOTHYROXINE SODIUM 0.07 MG/1
150 TABLET ORAL DAILY
Status: DISCONTINUED | OUTPATIENT
Start: 2019-10-29 | End: 2019-11-01 | Stop reason: HOSPADM

## 2019-10-29 RX ORDER — SODIUM CHLORIDE 0.9 % (FLUSH) 0.9 %
10 SYRINGE (ML) INJECTION EVERY 12 HOURS SCHEDULED
Status: DISCONTINUED | OUTPATIENT
Start: 2019-10-29 | End: 2019-10-29

## 2019-10-29 RX ORDER — PROPOFOL 10 MG/ML
INJECTION, EMULSION INTRAVENOUS PRN
Status: DISCONTINUED | OUTPATIENT
Start: 2019-10-29 | End: 2019-10-29 | Stop reason: SDUPTHER

## 2019-10-29 RX ORDER — LIDOCAINE HYDROCHLORIDE 10 MG/ML
INJECTION, SOLUTION EPIDURAL; INFILTRATION; INTRACAUDAL; PERINEURAL PRN
Status: DISCONTINUED | OUTPATIENT
Start: 2019-10-29 | End: 2019-10-29 | Stop reason: SDUPTHER

## 2019-10-29 RX ORDER — METOPROLOL SUCCINATE 25 MG/1
25 TABLET, EXTENDED RELEASE ORAL DAILY
Status: DISCONTINUED | OUTPATIENT
Start: 2019-10-29 | End: 2019-11-01 | Stop reason: HOSPADM

## 2019-10-29 RX ORDER — FENTANYL CITRATE 50 UG/ML
25 INJECTION, SOLUTION INTRAMUSCULAR; INTRAVENOUS ONCE
Status: DISCONTINUED | OUTPATIENT
Start: 2019-10-29 | End: 2019-11-01 | Stop reason: HOSPADM

## 2019-10-29 RX ADMIN — POTASSIUM CHLORIDE 10 MEQ: 7.46 INJECTION, SOLUTION INTRAVENOUS at 13:05

## 2019-10-29 RX ADMIN — SODIUM CHLORIDE: 9 INJECTION, SOLUTION INTRAVENOUS at 05:09

## 2019-10-29 RX ADMIN — POTASSIUM CHLORIDE 10 MEQ: 7.46 INJECTION, SOLUTION INTRAVENOUS at 20:00

## 2019-10-29 RX ADMIN — POTASSIUM CHLORIDE: 149 INJECTION, SOLUTION, CONCENTRATE INTRAVENOUS at 13:00

## 2019-10-29 RX ADMIN — POTASSIUM CHLORIDE 10 MEQ: 7.46 INJECTION, SOLUTION INTRAVENOUS at 06:59

## 2019-10-29 RX ADMIN — POTASSIUM CHLORIDE 10 MEQ: 7.46 INJECTION, SOLUTION INTRAVENOUS at 16:32

## 2019-10-29 RX ADMIN — POTASSIUM CHLORIDE 10 MEQ: 7.46 INJECTION, SOLUTION INTRAVENOUS at 05:46

## 2019-10-29 RX ADMIN — SODIUM CHLORIDE: 9 INJECTION, SOLUTION INTRAVENOUS at 10:02

## 2019-10-29 RX ADMIN — SODIUM CHLORIDE 8 MG/HR: 9 INJECTION, SOLUTION INTRAVENOUS at 16:33

## 2019-10-29 RX ADMIN — Medication 10 ML: at 13:01

## 2019-10-29 RX ADMIN — LEVOTHYROXINE SODIUM 150 MCG: 75 TABLET ORAL at 06:38

## 2019-10-29 RX ADMIN — LIDOCAINE HYDROCHLORIDE 100 MG: 10 INJECTION, SOLUTION EPIDURAL; INFILTRATION; INTRACAUDAL at 11:06

## 2019-10-29 RX ADMIN — POTASSIUM CHLORIDE 10 MEQ: 7.46 INJECTION, SOLUTION INTRAVENOUS at 04:29

## 2019-10-29 RX ADMIN — SODIUM CHLORIDE 8 MG/HR: 9 INJECTION, SOLUTION INTRAVENOUS at 04:26

## 2019-10-29 RX ADMIN — PROPOFOL 600 MG: 10 INJECTION, EMULSION INTRAVENOUS at 11:06

## 2019-10-29 ASSESSMENT — PAIN SCALES - GENERAL
PAINLEVEL_OUTOF10: 0

## 2019-10-30 LAB
ALBUMIN SERPL-MCNC: 3.1 G/DL (ref 3.5–5.2)
ALBUMIN/GLOBULIN RATIO: 1.1 (ref 1–2.5)
ALP BLD-CCNC: 74 U/L (ref 35–104)
ALT SERPL-CCNC: 17 U/L (ref 5–33)
ANION GAP SERPL CALCULATED.3IONS-SCNC: 13 MMOL/L (ref 9–17)
AST SERPL-CCNC: 20 U/L
BILIRUB SERPL-MCNC: 0.21 MG/DL (ref 0.3–1.2)
BUN BLDV-MCNC: 7 MG/DL (ref 6–20)
BUN/CREAT BLD: ABNORMAL (ref 9–20)
CALCIUM SERPL-MCNC: 8.2 MG/DL (ref 8.6–10.4)
CHLORIDE BLD-SCNC: 107 MMOL/L (ref 98–107)
CO2: 19 MMOL/L (ref 20–31)
CREAT SERPL-MCNC: 0.66 MG/DL (ref 0.5–0.9)
GFR AFRICAN AMERICAN: >60 ML/MIN
GFR NON-AFRICAN AMERICAN: >60 ML/MIN
GFR SERPL CREATININE-BSD FRML MDRD: ABNORMAL ML/MIN/{1.73_M2}
GFR SERPL CREATININE-BSD FRML MDRD: ABNORMAL ML/MIN/{1.73_M2}
GLUCOSE BLD-MCNC: 171 MG/DL (ref 70–99)
HCT VFR BLD CALC: 31.3 % (ref 36.3–47.1)
HCT VFR BLD CALC: 31.5 % (ref 36.3–47.1)
HEMOGLOBIN: 10 G/DL (ref 11.9–15.1)
HEMOGLOBIN: 10.1 G/DL (ref 11.9–15.1)
POTASSIUM SERPL-SCNC: 3.9 MMOL/L (ref 3.7–5.3)
POTASSIUM SERPL-SCNC: 3.9 MMOL/L (ref 3.7–5.3)
SODIUM BLD-SCNC: 139 MMOL/L (ref 135–144)
TOTAL PROTEIN: 6 G/DL (ref 6.4–8.3)

## 2019-10-30 PROCEDURE — 99233 SBSQ HOSP IP/OBS HIGH 50: CPT | Performed by: INTERNAL MEDICINE

## 2019-10-30 PROCEDURE — 1200000000 HC SEMI PRIVATE

## 2019-10-30 PROCEDURE — 36415 COLL VENOUS BLD VENIPUNCTURE: CPT

## 2019-10-30 PROCEDURE — 6370000000 HC RX 637 (ALT 250 FOR IP): Performed by: INTERNAL MEDICINE

## 2019-10-30 PROCEDURE — APPNB45 APP NON BILLABLE 31-45 MINUTES: Performed by: INTERNAL MEDICINE

## 2019-10-30 PROCEDURE — C9113 INJ PANTOPRAZOLE SODIUM, VIA: HCPCS | Performed by: INTERNAL MEDICINE

## 2019-10-30 PROCEDURE — 2580000003 HC RX 258: Performed by: INTERNAL MEDICINE

## 2019-10-30 PROCEDURE — 6360000002 HC RX W HCPCS: Performed by: INTERNAL MEDICINE

## 2019-10-30 PROCEDURE — 99232 SBSQ HOSP IP/OBS MODERATE 35: CPT | Performed by: INTERNAL MEDICINE

## 2019-10-30 PROCEDURE — 85014 HEMATOCRIT: CPT

## 2019-10-30 PROCEDURE — 85018 HEMOGLOBIN: CPT

## 2019-10-30 PROCEDURE — 80053 COMPREHEN METABOLIC PANEL: CPT

## 2019-10-30 RX ADMIN — SODIUM CHLORIDE 8 MG/HR: 9 INJECTION, SOLUTION INTRAVENOUS at 15:12

## 2019-10-30 RX ADMIN — Medication 500 MCG: at 08:56

## 2019-10-30 RX ADMIN — LEVOTHYROXINE SODIUM 150 MCG: 75 TABLET ORAL at 08:56

## 2019-10-30 RX ADMIN — METOPROLOL SUCCINATE 25 MG: 25 TABLET, FILM COATED, EXTENDED RELEASE ORAL at 08:55

## 2019-10-30 RX ADMIN — SODIUM CHLORIDE 8 MG/HR: 9 INJECTION, SOLUTION INTRAVENOUS at 02:36

## 2019-10-30 RX ADMIN — SODIUM CHLORIDE: 9 INJECTION, SOLUTION INTRAVENOUS at 19:12

## 2019-10-30 ASSESSMENT — PAIN SCALES - GENERAL: PAINLEVEL_OUTOF10: 0

## 2019-10-31 LAB
ALBUMIN SERPL-MCNC: 3 G/DL (ref 3.5–5.2)
ALBUMIN/GLOBULIN RATIO: 1 (ref 1–2.5)
ALP BLD-CCNC: 72 U/L (ref 35–104)
ALT SERPL-CCNC: 14 U/L (ref 5–33)
ANION GAP SERPL CALCULATED.3IONS-SCNC: 9 MMOL/L (ref 9–17)
AST SERPL-CCNC: 17 U/L
BILIRUB SERPL-MCNC: 0.2 MG/DL (ref 0.3–1.2)
BUN BLDV-MCNC: 6 MG/DL (ref 6–20)
BUN/CREAT BLD: ABNORMAL (ref 9–20)
CALCIUM SERPL-MCNC: 8.4 MG/DL (ref 8.6–10.4)
CHLORIDE BLD-SCNC: 114 MMOL/L (ref 98–107)
CO2: 22 MMOL/L (ref 20–31)
CREAT SERPL-MCNC: 0.56 MG/DL (ref 0.5–0.9)
GFR AFRICAN AMERICAN: >60 ML/MIN
GFR NON-AFRICAN AMERICAN: >60 ML/MIN
GFR SERPL CREATININE-BSD FRML MDRD: ABNORMAL ML/MIN/{1.73_M2}
GFR SERPL CREATININE-BSD FRML MDRD: ABNORMAL ML/MIN/{1.73_M2}
GLUCOSE BLD-MCNC: 91 MG/DL (ref 70–99)
HCT VFR BLD CALC: 31.2 % (ref 36.3–47.1)
HCT VFR BLD CALC: 31.7 % (ref 36.3–47.1)
HCT VFR BLD CALC: 32.1 % (ref 36.3–47.1)
HEMOGLOBIN: 10 G/DL (ref 11.9–15.1)
POTASSIUM SERPL-SCNC: 3.8 MMOL/L (ref 3.7–5.3)
SODIUM BLD-SCNC: 145 MMOL/L (ref 135–144)
TOTAL PROTEIN: 5.9 G/DL (ref 6.4–8.3)

## 2019-10-31 PROCEDURE — 6370000000 HC RX 637 (ALT 250 FOR IP): Performed by: INTERNAL MEDICINE

## 2019-10-31 PROCEDURE — 85018 HEMOGLOBIN: CPT

## 2019-10-31 PROCEDURE — 1200000000 HC SEMI PRIVATE

## 2019-10-31 PROCEDURE — 80053 COMPREHEN METABOLIC PANEL: CPT

## 2019-10-31 PROCEDURE — 2580000003 HC RX 258: Performed by: INTERNAL MEDICINE

## 2019-10-31 PROCEDURE — 6360000002 HC RX W HCPCS: Performed by: INTERNAL MEDICINE

## 2019-10-31 PROCEDURE — APPNB45 APP NON BILLABLE 31-45 MINUTES: Performed by: INTERNAL MEDICINE

## 2019-10-31 PROCEDURE — 99233 SBSQ HOSP IP/OBS HIGH 50: CPT | Performed by: INTERNAL MEDICINE

## 2019-10-31 PROCEDURE — G0008 ADMIN INFLUENZA VIRUS VAC: HCPCS | Performed by: INTERNAL MEDICINE

## 2019-10-31 PROCEDURE — 36415 COLL VENOUS BLD VENIPUNCTURE: CPT

## 2019-10-31 PROCEDURE — 90686 IIV4 VACC NO PRSV 0.5 ML IM: CPT | Performed by: INTERNAL MEDICINE

## 2019-10-31 PROCEDURE — 85014 HEMATOCRIT: CPT

## 2019-10-31 PROCEDURE — C9113 INJ PANTOPRAZOLE SODIUM, VIA: HCPCS | Performed by: INTERNAL MEDICINE

## 2019-10-31 RX ADMIN — LEVOTHYROXINE SODIUM 150 MCG: 75 TABLET ORAL at 09:53

## 2019-10-31 RX ADMIN — SODIUM CHLORIDE 8 MG/HR: 9 INJECTION, SOLUTION INTRAVENOUS at 12:48

## 2019-10-31 RX ADMIN — SODIUM CHLORIDE: 9 INJECTION, SOLUTION INTRAVENOUS at 18:33

## 2019-10-31 RX ADMIN — SODIUM CHLORIDE 8 MG/HR: 9 INJECTION, SOLUTION INTRAVENOUS at 01:27

## 2019-10-31 RX ADMIN — METOPROLOL SUCCINATE 25 MG: 25 TABLET, FILM COATED, EXTENDED RELEASE ORAL at 09:53

## 2019-10-31 RX ADMIN — Medication 10 ML: at 20:06

## 2019-10-31 RX ADMIN — Medication 500 MCG: at 09:53

## 2019-10-31 RX ADMIN — INFLUENZA A VIRUS A/BRISBANE/02/2018 IVR-190 (H1N1) ANTIGEN (PROPIOLACTONE INACTIVATED), INFLUENZA A VIRUS A/KANSAS/14/2017 X-327 (H3N2) ANTIGEN (PROPIOLACTONE INACTIVATED), INFLUENZA B VIRUS B/MARYLAND/15/2016 ANTIGEN (PROPIOLACTONE INACTIVATED), INFLUENZA B VIRUS B/PHUKET/3073/2013 BVR-1B ANTIGEN (PROPIOLACTONE INACTIVATED) 0.5 ML: 15; 15; 15; 15 INJECTION, SUSPENSION INTRAMUSCULAR at 15:36

## 2019-10-31 ASSESSMENT — PAIN SCALES - GENERAL: PAINLEVEL_OUTOF10: 0

## 2019-11-01 VITALS
HEIGHT: 64 IN | DIASTOLIC BLOOD PRESSURE: 95 MMHG | WEIGHT: 261.91 LBS | RESPIRATION RATE: 16 BRPM | TEMPERATURE: 97.8 F | HEART RATE: 91 BPM | OXYGEN SATURATION: 97 % | BODY MASS INDEX: 44.71 KG/M2 | SYSTOLIC BLOOD PRESSURE: 158 MMHG

## 2019-11-01 LAB
HCT VFR BLD CALC: 33.5 % (ref 36.3–47.1)
HEMOGLOBIN: 10.5 G/DL (ref 11.9–15.1)

## 2019-11-01 PROCEDURE — 2580000003 HC RX 258: Performed by: INTERNAL MEDICINE

## 2019-11-01 PROCEDURE — C9113 INJ PANTOPRAZOLE SODIUM, VIA: HCPCS | Performed by: INTERNAL MEDICINE

## 2019-11-01 PROCEDURE — 99239 HOSP IP/OBS DSCHRG MGMT >30: CPT | Performed by: INTERNAL MEDICINE

## 2019-11-01 PROCEDURE — 6360000002 HC RX W HCPCS: Performed by: INTERNAL MEDICINE

## 2019-11-01 PROCEDURE — 85018 HEMOGLOBIN: CPT

## 2019-11-01 PROCEDURE — 6370000000 HC RX 637 (ALT 250 FOR IP): Performed by: INTERNAL MEDICINE

## 2019-11-01 PROCEDURE — 85014 HEMATOCRIT: CPT

## 2019-11-01 PROCEDURE — 36415 COLL VENOUS BLD VENIPUNCTURE: CPT

## 2019-11-01 RX ORDER — PANTOPRAZOLE SODIUM 40 MG/1
40 TABLET, DELAYED RELEASE ORAL
Status: DISCONTINUED | OUTPATIENT
Start: 2019-11-01 | End: 2019-11-01 | Stop reason: HOSPADM

## 2019-11-01 RX ORDER — HYDROCHLOROTHIAZIDE 25 MG/1
25 TABLET ORAL DAILY
Status: DISCONTINUED | OUTPATIENT
Start: 2019-11-01 | End: 2019-11-01 | Stop reason: HOSPADM

## 2019-11-01 RX ADMIN — Medication 10 ML: at 08:35

## 2019-11-01 RX ADMIN — SODIUM CHLORIDE 8 MG/HR: 9 INJECTION, SOLUTION INTRAVENOUS at 01:15

## 2019-11-01 RX ADMIN — METOPROLOL SUCCINATE 25 MG: 25 TABLET, FILM COATED, EXTENDED RELEASE ORAL at 08:35

## 2019-11-01 RX ADMIN — LEVOTHYROXINE SODIUM 150 MCG: 75 TABLET ORAL at 06:13

## 2019-11-01 RX ADMIN — Medication 500 MCG: at 08:35

## 2019-11-04 ENCOUNTER — TELEPHONE (OUTPATIENT)
Dept: INTERNAL MEDICINE CLINIC | Age: 59
End: 2019-11-04

## 2019-11-06 ENCOUNTER — OFFICE VISIT (OUTPATIENT)
Dept: INTERNAL MEDICINE CLINIC | Age: 59
End: 2019-11-06
Payer: COMMERCIAL

## 2019-11-06 VITALS
RESPIRATION RATE: 16 BRPM | HEIGHT: 64 IN | WEIGHT: 254.4 LBS | DIASTOLIC BLOOD PRESSURE: 76 MMHG | SYSTOLIC BLOOD PRESSURE: 132 MMHG | BODY MASS INDEX: 43.43 KG/M2 | HEART RATE: 72 BPM | TEMPERATURE: 97.8 F

## 2019-11-06 DIAGNOSIS — K92.2 GASTROINTESTINAL HEMORRHAGE, UNSPECIFIED GASTROINTESTINAL HEMORRHAGE TYPE: Primary | ICD-10-CM

## 2019-11-06 DIAGNOSIS — D3A.8 NEUROENDOCRINE TUMOR: ICD-10-CM

## 2019-11-06 DIAGNOSIS — I10 ESSENTIAL HYPERTENSION: ICD-10-CM

## 2019-11-06 PROCEDURE — 99215 OFFICE O/P EST HI 40 MIN: CPT | Performed by: INTERNAL MEDICINE

## 2019-11-06 PROCEDURE — 1111F DSCHRG MED/CURRENT MED MERGE: CPT | Performed by: INTERNAL MEDICINE

## 2019-11-07 ENCOUNTER — TELEPHONE (OUTPATIENT)
Dept: GASTROENTEROLOGY | Age: 59
End: 2019-11-07

## 2019-11-08 ENCOUNTER — OFFICE VISIT (OUTPATIENT)
Dept: GASTROENTEROLOGY | Age: 59
End: 2019-11-08
Payer: COMMERCIAL

## 2019-11-08 ENCOUNTER — TELEPHONE (OUTPATIENT)
Dept: ONCOLOGY | Age: 59
End: 2019-11-08

## 2019-11-08 ENCOUNTER — TELEPHONE (OUTPATIENT)
Dept: GASTROENTEROLOGY | Age: 59
End: 2019-11-08

## 2019-11-08 VITALS
DIASTOLIC BLOOD PRESSURE: 86 MMHG | WEIGHT: 252.4 LBS | SYSTOLIC BLOOD PRESSURE: 138 MMHG | HEART RATE: 77 BPM | BODY MASS INDEX: 43.32 KG/M2

## 2019-11-08 DIAGNOSIS — C49.A2 GASTROINTESTINAL STROMAL TUMOR (GIST) OF STOMACH (HCC): ICD-10-CM

## 2019-11-08 DIAGNOSIS — R13.14 PHARYNGOESOPHAGEAL DYSPHAGIA: ICD-10-CM

## 2019-11-08 DIAGNOSIS — D3A.8 NEUROENDOCRINE NEOPLASM OF STOMACH: Primary | ICD-10-CM

## 2019-11-08 DIAGNOSIS — K92.0 GASTROINTESTINAL HEMORRHAGE WITH HEMATEMESIS: ICD-10-CM

## 2019-11-08 DIAGNOSIS — K59.00 CONSTIPATION, UNSPECIFIED CONSTIPATION TYPE: ICD-10-CM

## 2019-11-08 DIAGNOSIS — D3A.8 NEUROENDOCRINE NEOPLASM OF STOMACH: ICD-10-CM

## 2019-11-08 DIAGNOSIS — D49.0: Primary | ICD-10-CM

## 2019-11-08 PROCEDURE — 99214 OFFICE O/P EST MOD 30 MIN: CPT | Performed by: INTERNAL MEDICINE

## 2019-11-08 RX ORDER — FERROUS SULFATE 325(65) MG
325 TABLET ORAL
Qty: 180 TABLET | Refills: 1 | Status: ON HOLD | OUTPATIENT
Start: 2019-11-08 | End: 2020-01-22 | Stop reason: ALTCHOICE

## 2019-11-08 ASSESSMENT — ENCOUNTER SYMPTOMS
ALLERGIC/IMMUNOLOGIC NEGATIVE: 1
ANAL BLEEDING: 0
BLOOD IN STOOL: 0
VOICE CHANGE: 0
RECTAL PAIN: 0
ABDOMINAL DISTENTION: 1
COUGH: 1
TROUBLE SWALLOWING: 0
CONSTIPATION: 1
NAUSEA: 0
DIARRHEA: 0
ABDOMINAL PAIN: 1
SORE THROAT: 0
VOMITING: 0

## 2019-11-10 ENCOUNTER — HOSPITAL ENCOUNTER (OUTPATIENT)
Age: 59
Discharge: HOME | End: 2019-11-10
Payer: COMMERCIAL

## 2019-11-11 ENCOUNTER — TELEPHONE (OUTPATIENT)
Dept: ONCOLOGY | Age: 59
End: 2019-11-11

## 2019-11-11 ENCOUNTER — HOSPITAL ENCOUNTER (OUTPATIENT)
Age: 59
Discharge: HOME | End: 2019-11-11
Payer: COMMERCIAL

## 2019-11-11 DIAGNOSIS — K59.00 CONSTIPATION, UNSPECIFIED CONSTIPATION TYPE: ICD-10-CM

## 2019-11-11 DIAGNOSIS — D3A.8 NEUROENDOCRINE NEOPLASM OF STOMACH: ICD-10-CM

## 2019-11-11 DIAGNOSIS — K92.0 GASTROINTESTINAL HEMORRHAGE WITH HEMATEMESIS: ICD-10-CM

## 2019-11-11 LAB
ABSOLUTE EOS #: 0.32 K/UL (ref 0–0.44)
ABSOLUTE IMMATURE GRANULOCYTE: <0.03 K/UL (ref 0–0.3)
ABSOLUTE LYMPH #: 2.02 K/UL (ref 1.1–3.7)
ABSOLUTE MONO #: 0.55 K/UL (ref 0.1–1.2)
BASOPHILS # BLD: 1 % (ref 0–2)
BASOPHILS ABSOLUTE: 0.06 K/UL (ref 0–0.2)
DIFFERENTIAL TYPE: NORMAL
EOSINOPHILS RELATIVE PERCENT: 4 % (ref 1–4)
HCT VFR BLD CALC: 38.1 % (ref 36.3–47.1)
HEMOGLOBIN: 12.3 G/DL (ref 11.9–15.1)
IMMATURE GRANULOCYTES: 0 %
LYMPHOCYTES # BLD: 25 % (ref 24–43)
MCH RBC QN AUTO: 28.1 PG (ref 25.2–33.5)
MCHC RBC AUTO-ENTMCNC: 32.3 G/DL (ref 28.4–34.8)
MCV RBC AUTO: 87.2 FL (ref 82.6–102.9)
MONOCYTES # BLD: 7 % (ref 3–12)
NRBC AUTOMATED: 0 PER 100 WBC
PDW BLD-RTO: 12.8 % (ref 11.8–14.4)
PLATELET # BLD: 447 K/UL (ref 138–453)
PLATELET ESTIMATE: NORMAL
PMV BLD AUTO: 9 FL (ref 8.1–13.5)
RBC # BLD: 4.37 M/UL (ref 3.95–5.11)
RBC # BLD: NORMAL 10*6/UL
SEG NEUTROPHILS: 63 % (ref 36–65)
SEGMENTED NEUTROPHILS ABSOLUTE COUNT: 5 K/UL (ref 1.5–8.1)
WBC # BLD: 8 K/UL (ref 3.5–11.3)
WBC # BLD: NORMAL 10*3/UL

## 2019-11-11 PROCEDURE — 36415 COLL VENOUS BLD VENIPUNCTURE: CPT

## 2019-11-11 PROCEDURE — 85025 COMPLETE CBC W/AUTO DIFF WBC: CPT

## 2019-11-12 ENCOUNTER — TELEPHONE (OUTPATIENT)
Dept: GASTROENTEROLOGY | Age: 59
End: 2019-11-12

## 2019-11-13 ENCOUNTER — TELEPHONE (OUTPATIENT)
Dept: GASTROENTEROLOGY | Age: 59
End: 2019-11-13

## 2019-11-13 ENCOUNTER — TELEPHONE (OUTPATIENT)
Dept: INTERNAL MEDICINE CLINIC | Age: 59
End: 2019-11-13

## 2019-11-15 ENCOUNTER — TELEPHONE (OUTPATIENT)
Dept: ONCOLOGY | Age: 59
End: 2019-11-15

## 2019-11-15 ENCOUNTER — INITIAL CONSULT (OUTPATIENT)
Dept: ONCOLOGY | Age: 59
End: 2019-11-15
Payer: COMMERCIAL

## 2019-11-15 ENCOUNTER — HOSPITAL ENCOUNTER (OUTPATIENT)
Age: 59
Discharge: HOME | End: 2019-11-15
Payer: COMMERCIAL

## 2019-11-15 VITALS
TEMPERATURE: 97.9 F | HEART RATE: 96 BPM | SYSTOLIC BLOOD PRESSURE: 132 MMHG | BODY MASS INDEX: 43.17 KG/M2 | WEIGHT: 251.5 LBS | DIASTOLIC BLOOD PRESSURE: 89 MMHG

## 2019-11-15 DIAGNOSIS — C7A.092 MALIGNANT CARCINOID TUMOR OF STOMACH (HCC): Primary | ICD-10-CM

## 2019-11-15 DIAGNOSIS — D3A.8 NEUROENDOCRINE NEOPLASM OF STOMACH: ICD-10-CM

## 2019-11-15 DIAGNOSIS — C7A.092 MALIGNANT CARCINOID TUMOR OF STOMACH (HCC): ICD-10-CM

## 2019-11-15 LAB
ABSOLUTE EOS #: 0.2 K/UL (ref 0–0.4)
ABSOLUTE IMMATURE GRANULOCYTE: ABNORMAL K/UL (ref 0–0.3)
ABSOLUTE LYMPH #: 2.2 K/UL (ref 1–4.8)
ABSOLUTE MONO #: 0.8 K/UL (ref 0.1–1.2)
BASOPHILS # BLD: 1 % (ref 0–2)
BASOPHILS ABSOLUTE: 0.1 K/UL (ref 0–0.2)
DIFFERENTIAL TYPE: ABNORMAL
EOSINOPHILS RELATIVE PERCENT: 2 % (ref 1–4)
FERRITIN: 42 UG/L (ref 13–150)
HCT VFR BLD CALC: 38.7 % (ref 36–46)
HEMOGLOBIN: 12.9 G/DL (ref 12–16)
IMMATURE GRANULOCYTES: ABNORMAL %
LYMPHOCYTES # BLD: 24 % (ref 24–44)
MCH RBC QN AUTO: 28.1 PG (ref 26–34)
MCHC RBC AUTO-ENTMCNC: 33.4 G/DL (ref 31–37)
MCV RBC AUTO: 84.2 FL (ref 80–100)
MONOCYTES # BLD: 8 % (ref 2–11)
NRBC AUTOMATED: ABNORMAL PER 100 WBC
PDW BLD-RTO: 13.7 % (ref 12.5–15.4)
PLATELET # BLD: 464 K/UL (ref 140–450)
PLATELET ESTIMATE: ABNORMAL
PMV BLD AUTO: 6.9 FL (ref 6–12)
RBC # BLD: 4.6 M/UL (ref 4–5.2)
RBC # BLD: ABNORMAL 10*6/UL
SEG NEUTROPHILS: 65 % (ref 36–66)
SEGMENTED NEUTROPHILS ABSOLUTE COUNT: 6.2 K/UL (ref 1.8–7.7)
WBC # BLD: 9.4 K/UL (ref 3.5–11)
WBC # BLD: ABNORMAL 10*3/UL

## 2019-11-15 PROCEDURE — 83550 IRON BINDING TEST: CPT

## 2019-11-15 PROCEDURE — 84260 ASSAY OF SEROTONIN: CPT

## 2019-11-15 PROCEDURE — 99211 OFF/OP EST MAY X REQ PHY/QHP: CPT | Performed by: INTERNAL MEDICINE

## 2019-11-15 PROCEDURE — 85025 COMPLETE CBC W/AUTO DIFF WBC: CPT

## 2019-11-15 PROCEDURE — 86316 IMMUNOASSAY TUMOR OTHER: CPT

## 2019-11-15 PROCEDURE — 83540 ASSAY OF IRON: CPT

## 2019-11-15 PROCEDURE — 36415 COLL VENOUS BLD VENIPUNCTURE: CPT

## 2019-11-15 PROCEDURE — 99245 OFF/OP CONSLTJ NEW/EST HI 55: CPT | Performed by: INTERNAL MEDICINE

## 2019-11-15 PROCEDURE — 82728 ASSAY OF FERRITIN: CPT

## 2019-11-16 LAB
IRON SATURATION: 39 % (ref 20–55)
IRON: 139 UG/DL (ref 37–145)
TOTAL IRON BINDING CAPACITY: 360 UG/DL (ref 250–450)
UNSATURATED IRON BINDING CAPACITY: 221 UG/DL (ref 112–347)

## 2019-11-18 LAB — CHROMOGRANIN A: 1019 NG/ML (ref 0–160)

## 2019-11-18 RX ORDER — PANTOPRAZOLE SODIUM 40 MG/1
TABLET, DELAYED RELEASE ORAL
Qty: 60 TABLET | Refills: 0 | Status: ON HOLD | OUTPATIENT
Start: 2019-11-18 | End: 2020-01-22 | Stop reason: ALTCHOICE

## 2019-11-19 LAB — SEROTONIN BLOOD: 126 NG/ML (ref 50–220)

## 2019-11-20 RX ORDER — HYDROCHLOROTHIAZIDE 25 MG/1
TABLET ORAL
Qty: 90 TABLET | Refills: 1 | OUTPATIENT
Start: 2019-11-20

## 2019-11-26 ENCOUNTER — TELEPHONE (OUTPATIENT)
Dept: GASTROENTEROLOGY | Age: 59
End: 2019-11-26

## 2019-11-26 ENCOUNTER — TELEPHONE (OUTPATIENT)
Dept: ONCOLOGY | Age: 59
End: 2019-11-26

## 2019-12-11 ENCOUNTER — HOSPITAL ENCOUNTER (OUTPATIENT)
Facility: MEDICAL CENTER | Age: 59
End: 2019-12-11
Payer: COMMERCIAL

## 2019-12-16 ENCOUNTER — OFFICE VISIT (OUTPATIENT)
Dept: INTERNAL MEDICINE CLINIC | Age: 59
End: 2019-12-16
Payer: COMMERCIAL

## 2019-12-16 VITALS
BODY MASS INDEX: 43.91 KG/M2 | OXYGEN SATURATION: 93 % | DIASTOLIC BLOOD PRESSURE: 78 MMHG | SYSTOLIC BLOOD PRESSURE: 102 MMHG | TEMPERATURE: 97.7 F | HEIGHT: 64 IN | WEIGHT: 257.2 LBS | HEART RATE: 85 BPM

## 2019-12-16 DIAGNOSIS — D3A.8 NEUROENDOCRINE TUMOR: ICD-10-CM

## 2019-12-16 DIAGNOSIS — R05.9 COUGH: ICD-10-CM

## 2019-12-16 DIAGNOSIS — I10 ESSENTIAL HYPERTENSION: Primary | ICD-10-CM

## 2019-12-16 PROCEDURE — 99214 OFFICE O/P EST MOD 30 MIN: CPT | Performed by: INTERNAL MEDICINE

## 2019-12-16 RX ORDER — DESONIDE 0.5 MG/G
CREAM TOPICAL
Qty: 90 G | Refills: 0 | Status: SHIPPED | OUTPATIENT
Start: 2019-12-16 | End: 2020-08-21 | Stop reason: SDUPTHER

## 2019-12-16 RX ORDER — BUDESONIDE AND FORMOTEROL FUMARATE DIHYDRATE 80; 4.5 UG/1; UG/1
2 AEROSOL RESPIRATORY (INHALATION) 2 TIMES DAILY
Qty: 1 INHALER | Refills: 0 | COMMUNITY
Start: 2019-12-16 | End: 2020-12-30

## 2019-12-18 ENCOUNTER — TELEPHONE (OUTPATIENT)
Dept: ONCOLOGY | Age: 59
End: 2019-12-18

## 2019-12-30 RX ORDER — LEVOTHYROXINE SODIUM 0.15 MG/1
TABLET ORAL
Qty: 60 TABLET | Refills: 2 | Status: SHIPPED | OUTPATIENT
Start: 2019-12-30 | End: 2020-08-18 | Stop reason: SDUPTHER

## 2020-01-10 ENCOUNTER — TELEPHONE (OUTPATIENT)
Dept: GASTROENTEROLOGY | Age: 60
End: 2020-01-10

## 2020-01-16 RX ORDER — METOPROLOL SUCCINATE 100 MG/1
TABLET, EXTENDED RELEASE ORAL
Qty: 90 TABLET | Refills: 1 | Status: SHIPPED | OUTPATIENT
Start: 2020-01-16 | End: 2020-07-24

## 2020-01-20 ENCOUNTER — TELEPHONE (OUTPATIENT)
Dept: INTERNAL MEDICINE CLINIC | Age: 60
End: 2020-01-20

## 2020-01-20 RX ORDER — PREDNISONE 10 MG/1
10 TABLET ORAL
Qty: 15 TABLET | Refills: 0 | Status: SHIPPED | OUTPATIENT
Start: 2020-01-20 | End: 2020-01-25

## 2020-01-20 RX ORDER — AZITHROMYCIN 250 MG/1
250 TABLET, FILM COATED ORAL SEE ADMIN INSTRUCTIONS
Qty: 6 TABLET | Refills: 0 | Status: SHIPPED | OUTPATIENT
Start: 2020-01-20 | End: 2020-01-25

## 2020-01-22 ENCOUNTER — HOSPITAL ENCOUNTER (OUTPATIENT)
Age: 60
Setting detail: OUTPATIENT SURGERY
Discharge: HOME | End: 2020-01-22
Attending: INTERNAL MEDICINE | Admitting: INTERNAL MEDICINE
Payer: COMMERCIAL

## 2020-01-22 ENCOUNTER — ANESTHESIA (OUTPATIENT)
Dept: ENDOSCOPY | Age: 60
End: 2020-01-22
Payer: COMMERCIAL

## 2020-01-22 ENCOUNTER — HOSPITAL ENCOUNTER (OUTPATIENT)
Dept: ULTRASOUND IMAGING | Age: 60
Discharge: HOME | End: 2020-01-24
Attending: INTERNAL MEDICINE
Payer: COMMERCIAL

## 2020-01-22 ENCOUNTER — ANESTHESIA EVENT (OUTPATIENT)
Dept: ENDOSCOPY | Age: 60
End: 2020-01-22
Payer: COMMERCIAL

## 2020-01-22 VITALS
HEART RATE: 61 BPM | BODY MASS INDEX: 39.27 KG/M2 | TEMPERATURE: 96.5 F | HEIGHT: 64 IN | RESPIRATION RATE: 19 BRPM | OXYGEN SATURATION: 96 % | DIASTOLIC BLOOD PRESSURE: 93 MMHG | WEIGHT: 230 LBS | SYSTOLIC BLOOD PRESSURE: 150 MMHG

## 2020-01-22 VITALS
RESPIRATION RATE: 29 BRPM | SYSTOLIC BLOOD PRESSURE: 125 MMHG | OXYGEN SATURATION: 97 % | DIASTOLIC BLOOD PRESSURE: 86 MMHG

## 2020-01-22 PROCEDURE — 3609018500 HC EGD US SCOPE W/ADJACENT STRUCTURES: Performed by: INTERNAL MEDICINE

## 2020-01-22 PROCEDURE — 2580000003 HC RX 258: Performed by: INTERNAL MEDICINE

## 2020-01-22 PROCEDURE — 3700000000 HC ANESTHESIA ATTENDED CARE: Performed by: INTERNAL MEDICINE

## 2020-01-22 PROCEDURE — 43231 ESOPHAGOSCOP ULTRASOUND EXAM: CPT | Performed by: INTERNAL MEDICINE

## 2020-01-22 PROCEDURE — 2500000003 HC RX 250 WO HCPCS: Performed by: NURSE ANESTHETIST, CERTIFIED REGISTERED

## 2020-01-22 PROCEDURE — 3700000001 HC ADD 15 MINUTES (ANESTHESIA): Performed by: INTERNAL MEDICINE

## 2020-01-22 PROCEDURE — 7100000011 HC PHASE II RECOVERY - ADDTL 15 MIN: Performed by: INTERNAL MEDICINE

## 2020-01-22 PROCEDURE — 7100000010 HC PHASE II RECOVERY - FIRST 15 MIN: Performed by: INTERNAL MEDICINE

## 2020-01-22 PROCEDURE — 43235 EGD DIAGNOSTIC BRUSH WASH: CPT | Performed by: INTERNAL MEDICINE

## 2020-01-22 PROCEDURE — 6360000002 HC RX W HCPCS: Performed by: NURSE ANESTHETIST, CERTIFIED REGISTERED

## 2020-01-22 PROCEDURE — 76975 GI ENDOSCOPIC ULTRASOUND: CPT

## 2020-01-22 RX ORDER — FENTANYL CITRATE 50 UG/ML
50 INJECTION, SOLUTION INTRAMUSCULAR; INTRAVENOUS EVERY 5 MIN PRN
Status: DISCONTINUED | OUTPATIENT
Start: 2020-01-22 | End: 2020-01-22 | Stop reason: HOSPADM

## 2020-01-22 RX ORDER — METOPROLOL TARTRATE 5 MG/5ML
INJECTION INTRAVENOUS PRN
Status: DISCONTINUED | OUTPATIENT
Start: 2020-01-22 | End: 2020-01-22 | Stop reason: SDUPTHER

## 2020-01-22 RX ORDER — MIDAZOLAM HYDROCHLORIDE 1 MG/ML
2 INJECTION INTRAMUSCULAR; INTRAVENOUS
Status: CANCELLED | OUTPATIENT
Start: 2020-01-22 | End: 2020-01-22

## 2020-01-22 RX ORDER — SODIUM CHLORIDE, SODIUM LACTATE, POTASSIUM CHLORIDE, CALCIUM CHLORIDE 600; 310; 30; 20 MG/100ML; MG/100ML; MG/100ML; MG/100ML
INJECTION, SOLUTION INTRAVENOUS CONTINUOUS
Status: CANCELLED | OUTPATIENT
Start: 2020-01-22

## 2020-01-22 RX ORDER — PROPOFOL 10 MG/ML
INJECTION, EMULSION INTRAVENOUS PRN
Status: DISCONTINUED | OUTPATIENT
Start: 2020-01-22 | End: 2020-01-22 | Stop reason: SDUPTHER

## 2020-01-22 RX ORDER — ONDANSETRON 2 MG/ML
4 INJECTION INTRAMUSCULAR; INTRAVENOUS
Status: DISCONTINUED | OUTPATIENT
Start: 2020-01-22 | End: 2020-01-22 | Stop reason: HOSPADM

## 2020-01-22 RX ORDER — PROPOFOL 10 MG/ML
INJECTION, EMULSION INTRAVENOUS CONTINUOUS PRN
Status: DISCONTINUED | OUTPATIENT
Start: 2020-01-22 | End: 2020-01-22 | Stop reason: SDUPTHER

## 2020-01-22 RX ORDER — SODIUM CHLORIDE 9 MG/ML
INJECTION, SOLUTION INTRAVENOUS CONTINUOUS
Status: DISCONTINUED | OUTPATIENT
Start: 2020-01-22 | End: 2020-01-22 | Stop reason: HOSPADM

## 2020-01-22 RX ORDER — ONDANSETRON 2 MG/ML
4 INJECTION INTRAMUSCULAR; INTRAVENOUS ONCE
Status: CANCELLED | OUTPATIENT
Start: 2020-01-22 | End: 2020-01-22

## 2020-01-22 RX ORDER — LIDOCAINE HYDROCHLORIDE 10 MG/ML
INJECTION, SOLUTION EPIDURAL; INFILTRATION; INTRACAUDAL; PERINEURAL PRN
Status: DISCONTINUED | OUTPATIENT
Start: 2020-01-22 | End: 2020-01-22 | Stop reason: SDUPTHER

## 2020-01-22 RX ORDER — FENTANYL CITRATE 50 UG/ML
25 INJECTION, SOLUTION INTRAMUSCULAR; INTRAVENOUS ONCE
Status: CANCELLED | OUTPATIENT
Start: 2020-01-22 | End: 2020-01-22

## 2020-01-22 RX ORDER — SODIUM CHLORIDE 0.9 % (FLUSH) 0.9 %
10 SYRINGE (ML) INJECTION EVERY 12 HOURS SCHEDULED
Status: CANCELLED | OUTPATIENT
Start: 2020-01-22

## 2020-01-22 RX ORDER — SODIUM CHLORIDE 0.9 % (FLUSH) 0.9 %
10 SYRINGE (ML) INJECTION PRN
Status: CANCELLED | OUTPATIENT
Start: 2020-01-22

## 2020-01-22 RX ADMIN — PROPOFOL 30 MG: 10 INJECTION, EMULSION INTRAVENOUS at 13:15

## 2020-01-22 RX ADMIN — PROPOFOL 40 MG: 10 INJECTION, EMULSION INTRAVENOUS at 13:04

## 2020-01-22 RX ADMIN — PROPOFOL 80 MG: 10 INJECTION, EMULSION INTRAVENOUS at 12:59

## 2020-01-22 RX ADMIN — SODIUM CHLORIDE: 9 INJECTION, SOLUTION INTRAVENOUS at 10:49

## 2020-01-22 RX ADMIN — LIDOCAINE HYDROCHLORIDE 50 MG: 10 INJECTION, SOLUTION EPIDURAL; INFILTRATION; INTRACAUDAL at 12:59

## 2020-01-22 RX ADMIN — METOPROLOL TARTRATE 1 MG: 1 INJECTION, SOLUTION INTRAVENOUS at 13:08

## 2020-01-22 RX ADMIN — PROPOFOL 100 MCG/KG/MIN: 10 INJECTION, EMULSION INTRAVENOUS at 12:59

## 2020-01-22 RX ADMIN — PROPOFOL 30 MG: 10 INJECTION, EMULSION INTRAVENOUS at 13:10

## 2020-01-22 ASSESSMENT — PAIN SCALES - GENERAL
PAINLEVEL_OUTOF10: 0
PAINLEVEL_OUTOF10: 0

## 2020-01-22 ASSESSMENT — PAIN - FUNCTIONAL ASSESSMENT: PAIN_FUNCTIONAL_ASSESSMENT: 0-10

## 2020-01-22 NOTE — ANESTHESIA PRE PROCEDURE
10/29/2019    EGD CONTROL HEMORRHAGE performed by Irish Bear MD at [facility]       Social History:    Social History     Tobacco Use    Smoking status: Former Smoker     Packs/day: 1.00     Years: 25.00     Pack years: 25.00     Last attempt to quit: 2012     Years since quittin.6    Smokeless tobacco: Never Used    Tobacco comment: quit 2 years   Substance Use Topics    Alcohol use: Yes     Comment: 3 times a month                                Counseling given: Not Answered  Comment: quit 2 years      Vital Signs (Current): There were no vitals filed for this visit. BP Readings from Last 3 Encounters:   20 (!) 146/80   19 102/78   11/15/19 132/89       NPO Status:                                                                                 BMI:   Wt Readings from Last 3 Encounters:   20 230 lb (104.3 kg)   19 257 lb 3.2 oz (116.7 kg)   11/15/19 251 lb 8 oz (114.1 kg)     There is no height or weight on file to calculate BMI.    CBC:   Lab Results   Component Value Date    WBC 9.4 11/15/2019    RBC 4.60 11/15/2019    HGB 12.9 11/15/2019    HCT 38.7 11/15/2019    MCV 84.2 11/15/2019    RDW 13.7 11/15/2019     11/15/2019       CMP:   Lab Results   Component Value Date     10/31/2019    K 3.8 10/31/2019     10/31/2019    CO2 22 10/31/2019    BUN 6 10/31/2019    CREATININE 0.56 10/31/2019    GFRAA >60 10/31/2019    LABGLOM >60 10/31/2019    GLUCOSE 91 10/31/2019    PROT 5.9 10/31/2019    CALCIUM 8.4 10/31/2019    BILITOT 0.20 10/31/2019    ALKPHOS 72 10/31/2019    AST 17 10/31/2019    ALT 14 10/31/2019       POC Tests: No results for input(s): POCGLU, POCNA, POCK, POCCL, POCBUN, POCHEMO, POCHCT in the last 72 hours.     Coags:   Lab Results   Component Value Date    PROTIME 9.9 10/28/2019    INR 0.9 10/28/2019    APTT 25.8 10/28/2019       HCG (If Applicable): No results found for: PREGTESTUR, PREGSERUM, HCG,

## 2020-01-22 NOTE — OP NOTE
Lymphadenopathy: No pathologic lymphadenopathy seen in upper abdomen. Echoendoscope was withdrawn and Gastroscope was advanced down into duodenum. Multiple neuroendocrine tumors were noted in proximal and mid stomach with the largest being a few centimeters distal to GE junction. FINDINGS:   Esophagus: The esophagus was inspected to the Z-line. The endoscopic exam showed no pathology. Stomach: The stomach was inspected in both forward and retroflex fashion and was appropriately distensible. The cardia, fundus, incisura, antrum and pylorus were identified via direct visualization. The endoscopic exam showed Several mucosal lesions were noted in proximal body. Largest lesion was ~ 10 mm x 5 mm (depth). Lesions were located in mucosa. Borders were smooth with no suspicious changes. Duodenum: The proximal small bowel was inspected through the bulb, sweep, and second portion of the duodenum. The endoscopic exam showed no pathology. RECOMMENDATIONS:   1) Follow up with referring provider, as previously scheduled. 2) We will await input from [facility] and genetic testing. Given multiple lesions very likely would need surgery. ESD may be an option. 3) Follow up with me in 3 months.         Renan Mcgovern MD Ursula Skyla

## 2020-01-22 NOTE — H&P
History and Physical    Pt Name: Yun Reich  MRN: [id number]  YOB: 1960  Date of evaluation: 1/22/2020    SUBJECTIVE:   History of Chief Complaint:    Patient complains of carcinoid tumors. She had two removed in the past, had an ulceration \"under one of the polyps\" and so had a bleed after the procedure. she says that initially she was experiencing dysphagia and pain, diagnosed with the tumors. She has two more polyps that are to be removed today, scheduled for EUS with possible EMR. Past Medical History    has a past medical history of Anxiety, Arthritis, Asthma, Colon polyp, Colon polyps, Cyst of kidney, acquired, Fatigue, Hypertension, Hypothyroidism, Neuroendocrine tumor, Obesity, Pharyngoesophageal dysphagia, Vocal cord polyps, and Wears glasses. Past Surgical History   has a past surgical history that includes cystourethroscopy/stent removal (5/3/11); Colonoscopy (02/21/2019); ERCP; Cholecystectomy (10/09/2014); hip surgery (Left); Throat surgery; Colonoscopy; Upper gastrointestinal endoscopy (02/21/2019); Upper gastrointestinal endoscopy (09/23/2019); Upper gastrointestinal endoscopy (N/A, 9/23/2019); Upper gastrointestinal endoscopy (N/A, 10/23/2019); and Upper gastrointestinal endoscopy (N/A, 10/29/2019). Medications  Prior to Admission medications    Medication Sig Start Date End Date Taking?  Authorizing Provider   Multiple Vitamin (MULTI-DAY VITAMINS PO) Take by mouth   Yes Historical Provider, MD   azithromycin (ZITHROMAX) 250 MG tablet Take 1 tablet by mouth See Admin Instructions for 5 days 500mg on day 1 followed by 250mg on days 2 - 5 1/20/20 1/25/20 Yes Tavon Paige MD   predniSONE (DELTASONE) 10 MG tablet Take 1 tablet by mouth 3 times daily (with meals) for 5 days 1/20/20 1/25/20 Yes Tavon Paige MD   metoprolol succinate (TOPROL XL) 100 MG extended release tablet TAKE 1 TABLET BY MOUTH EVERY DAY 1/16/20  Yes Tavon Paige MD   levothyroxine (SYNTHROID) 150 MCG tablet TAKE 1 TABLET BY MOUTH ONCE DAILY FOR 5 DAYS EVERY WEEK 12/30/19  Yes Tavon Paige MD   aspirin 81 MG tablet Take 81 mg by mouth daily   Yes Historical Provider, MD   hydrochlorothiazide (HYDRODIURIL) 25 MG tablet take 1 tablet by mouth once daily 10/14/19  Yes Tavon Paige MD   vitamin B-12 (CYANOCOBALAMIN) 500 MCG tablet Take 500 mcg by mouth daily   Yes Historical Provider, MD   desonide (DESOWEN) 0.05 % cream Apply topically 2 times daily. 12/16/19   Tavon Paige MD   budesonide-formoterol (SYMBICORT) 80-4.5 MCG/ACT AERO Inhale 2 puffs into the lungs 2 times daily 12/16/19   Tavon Paige MD     Allergies  is allergic to erythromycin. Family History  family history includes High Blood Pressure in her mother and sister; Other in her sister; Stomach Cancer in her sister. Social History   reports that she quit smoking about 7 years ago. She has a 25.00 pack-year smoking history. She has never used smokeless tobacco.  1 PPD for 25 years, quit 2012. reports current alcohol use. reports no history of drug use. Marital Status single  Occupation works for [address] [place] [address] Road:   VITALS:  height is 5' 4\" (1.626 m) and weight is 230 lb (104.3 kg). Her oral temperature is 96.9 °F (36.1 °C). Her blood pressure is 146/80 (abnormal) and her pulse is 61. Her respiration is 10 and oxygen saturation is 97%. CONSTITUTIONAL:alert & oriented x 3, no acute distress. Very pleasant. SKIN:  Warm and dry, no rashes   HEAD:  Normocephalic, atraumatic   EYES: PERRL. EOMs intact. Wearing glasses. EARS:  Hearing grossly WNL. NOSE:  Nares patent. No rhinorrhea   MOUTH/THROAT:  benign  NECK:supple, no lymphadenopathy  LUNGS: Clear to auscultation bilaterally, no wheezes. CARDIOVASCULAR: Heart sounds are normal.  Regular rate and rhythm without murmur. ABDOMEN: soft, non tender, non distended. Rotund.   EXTREMITIES: no edema bilateral lower extremities. IMPRESSIONS:   1. Gastric polyps, neuroendocrine/carcinoid tumors  2.  has a past medical history of Anxiety, Arthritis, Asthma, Colon polyp (02/21/2019), Colon polyps, Cyst of kidney, acquired, Fatigue, Hypertension, Hypothyroidism, Neuroendocrine tumor (02/21/2019), Obesity, Pharyngoesophageal dysphagia, Vocal cord polyps, and Wears glasses.    PLANS:   1. EUS with EMR    KAVON SÁNCHEZ PA-C  Electronically signed 1/22/2020 at 11:17 AM

## 2020-01-23 NOTE — ANESTHESIA POSTPROCEDURE EVALUATION
Department of Anesthesiology  Postprocedure Note    Patient: Martin Arvizu  MRN: [id number]  YOB: 1960  Date of evaluation: 1/23/2020  Time:  2:19 PM     Procedure Summary     Date:  01/22/20 Room / Location:  [address] [place] [address]    Anesthesia Start:  1255 Anesthesia Stop:  1326    Procedure:  ENDOSCOPIC ULTRASOUND WITH POSSIBLE EMR (N/A ) Diagnosis:  (NEUROENDOCRINE NEOPLASM OF STOMACH)    Surgeon:  Héctor Fuentes MD Responsible Provider:  Tonny Miller MD    Anesthesia Type:  MAC, TIVA ASA Status:  3          Anesthesia Type: MAC, TIVA    Reji Phase I: Reji Score: 10    Reji Phase II: Reji Score: 10    Last vitals: Reviewed and per EMR flowsheets.        Anesthesia Post Evaluation    Patient location during evaluation: PACU  Patient participation: complete - patient participated  Level of consciousness: awake  Pain score: 0  Airway patency: patent  Nausea & Vomiting: no vomiting and no nausea  Complications: no  Cardiovascular status: blood pressure returned to baseline  Respiratory status: acceptable  Hydration status: euvolemic

## 2020-01-31 ENCOUNTER — TELEPHONE (OUTPATIENT)
Dept: GASTROENTEROLOGY | Age: 60
End: 2020-01-31

## 2020-02-04 ENCOUNTER — TELEPHONE (OUTPATIENT)
Dept: GASTROENTEROLOGY | Age: 60
End: 2020-02-04

## 2020-02-15 ENCOUNTER — HOSPITAL ENCOUNTER (OUTPATIENT)
Age: 60
Discharge: HOME | End: 2020-02-15
Payer: COMMERCIAL

## 2020-02-15 LAB
CALCIUM IONIZED: 1.28 MMOL/L (ref 1.13–1.33)
CALCIUM SERPL-MCNC: 9.1 MG/DL (ref 8.6–10.4)
HCT VFR BLD CALC: 42.4 % (ref 36.3–47.1)
HEMOGLOBIN: 13.1 G/DL (ref 11.9–15.1)
MCH RBC QN AUTO: 25 PG (ref 25.2–33.5)
MCHC RBC AUTO-ENTMCNC: 30.9 G/DL (ref 28.4–34.8)
MCV RBC AUTO: 80.8 FL (ref 82.6–102.9)
NRBC AUTOMATED: 0 PER 100 WBC
PDW BLD-RTO: 14.7 % (ref 11.8–14.4)
PLATELET # BLD: 389 K/UL (ref 138–453)
PMV BLD AUTO: 9.4 FL (ref 8.1–13.5)
PROLACTIN: 10.19 UG/L (ref 4.79–23.3)
PTH INTACT: 109.4 PG/ML (ref 15–65)
RBC # BLD: 5.25 M/UL (ref 3.95–5.11)
VITAMIN D 25-HYDROXY: 21.2 NG/ML (ref 30–100)
WBC # BLD: 6.5 K/UL (ref 3.5–11.3)

## 2020-02-15 PROCEDURE — 36415 COLL VENOUS BLD VENIPUNCTURE: CPT

## 2020-02-15 PROCEDURE — 85027 COMPLETE CBC AUTOMATED: CPT

## 2020-02-15 PROCEDURE — 82310 ASSAY OF CALCIUM: CPT

## 2020-02-15 PROCEDURE — 84146 ASSAY OF PROLACTIN: CPT

## 2020-02-15 PROCEDURE — 82306 VITAMIN D 25 HYDROXY: CPT

## 2020-02-15 PROCEDURE — 82330 ASSAY OF CALCIUM: CPT

## 2020-02-15 PROCEDURE — 83970 ASSAY OF PARATHORMONE: CPT

## 2020-02-15 PROCEDURE — 82941 ASSAY OF GASTRIN: CPT

## 2020-02-15 PROCEDURE — 86316 IMMUNOASSAY TUMOR OTHER: CPT

## 2020-02-17 LAB — GASTRIN: 589 PG/ML (ref 0–100)

## 2020-02-18 LAB — CHROMOGRANIN A: 950 NG/ML (ref 0–160)

## 2020-02-19 ENCOUNTER — TELEPHONE (OUTPATIENT)
Dept: GASTROENTEROLOGY | Age: 60
End: 2020-02-19

## 2020-02-19 NOTE — TELEPHONE ENCOUNTER
Patient missed last appointment and currently rescheduled for 3/2/20. Would like a call back to discuss her stomach pains. Stated that she did have the flu and has tumors. Please call.

## 2020-02-21 ENCOUNTER — TELEPHONE (OUTPATIENT)
Dept: ONCOLOGY | Age: 60
End: 2020-02-21

## 2020-02-21 NOTE — TELEPHONE ENCOUNTER
LVM asking patient to have labs done for upcoming appt. , stated to call office if she has any questions .

## 2020-02-22 ENCOUNTER — HOSPITAL ENCOUNTER (OUTPATIENT)
Age: 60
Discharge: HOME | End: 2020-02-22
Payer: COMMERCIAL

## 2020-02-22 LAB
ABSOLUTE EOS #: 0.26 K/UL (ref 0–0.44)
ABSOLUTE IMMATURE GRANULOCYTE: <0.03 K/UL (ref 0–0.3)
ABSOLUTE LYMPH #: 2.49 K/UL (ref 1.1–3.7)
ABSOLUTE MONO #: 0.58 K/UL (ref 0.1–1.2)
BASOPHILS # BLD: 1 % (ref 0–2)
BASOPHILS ABSOLUTE: 0.04 K/UL (ref 0–0.2)
DIFFERENTIAL TYPE: ABNORMAL
EOSINOPHILS RELATIVE PERCENT: 4 % (ref 1–4)
FERRITIN: 17 UG/L (ref 13–150)
HCT VFR BLD CALC: 41.4 % (ref 36.3–47.1)
HEMOGLOBIN: 13 G/DL (ref 11.9–15.1)
IMMATURE GRANULOCYTES: 0 %
IRON SATURATION: 10 % (ref 20–55)
IRON: 33 UG/DL (ref 37–145)
LYMPHOCYTES # BLD: 35 % (ref 24–43)
MCH RBC QN AUTO: 24.9 PG (ref 25.2–33.5)
MCHC RBC AUTO-ENTMCNC: 31.4 G/DL (ref 28.4–34.8)
MCV RBC AUTO: 79.2 FL (ref 82.6–102.9)
MONOCYTES # BLD: 8 % (ref 3–12)
NRBC AUTOMATED: 0 PER 100 WBC
PDW BLD-RTO: 14.6 % (ref 11.8–14.4)
PLATELET # BLD: 390 K/UL (ref 138–453)
PLATELET ESTIMATE: ABNORMAL
PMV BLD AUTO: 8.9 FL (ref 8.1–13.5)
RBC # BLD: 5.23 M/UL (ref 3.95–5.11)
RBC # BLD: ABNORMAL 10*6/UL
SEG NEUTROPHILS: 52 % (ref 36–65)
SEGMENTED NEUTROPHILS ABSOLUTE COUNT: 3.8 K/UL (ref 1.5–8.1)
TOTAL IRON BINDING CAPACITY: 346 UG/DL (ref 250–450)
UNSATURATED IRON BINDING CAPACITY: 313 UG/DL (ref 112–347)
WBC # BLD: 7.2 K/UL (ref 3.5–11.3)
WBC # BLD: ABNORMAL 10*3/UL

## 2020-02-22 PROCEDURE — 36415 COLL VENOUS BLD VENIPUNCTURE: CPT

## 2020-02-22 PROCEDURE — 83550 IRON BINDING TEST: CPT

## 2020-02-22 PROCEDURE — 85025 COMPLETE CBC W/AUTO DIFF WBC: CPT

## 2020-02-22 PROCEDURE — 82728 ASSAY OF FERRITIN: CPT

## 2020-02-22 PROCEDURE — 83540 ASSAY OF IRON: CPT

## 2020-02-22 PROCEDURE — 84260 ASSAY OF SEROTONIN: CPT

## 2020-02-22 PROCEDURE — 86316 IMMUNOASSAY TUMOR OTHER: CPT

## 2020-02-24 ENCOUNTER — OFFICE VISIT (OUTPATIENT)
Dept: ONCOLOGY | Age: 60
End: 2020-02-24
Payer: COMMERCIAL

## 2020-02-24 ENCOUNTER — TELEPHONE (OUTPATIENT)
Dept: ONCOLOGY | Age: 60
End: 2020-02-24

## 2020-02-24 VITALS
TEMPERATURE: 98.4 F | HEART RATE: 68 BPM | BODY MASS INDEX: 43.12 KG/M2 | WEIGHT: 251.2 LBS | DIASTOLIC BLOOD PRESSURE: 92 MMHG | SYSTOLIC BLOOD PRESSURE: 136 MMHG

## 2020-02-24 PROCEDURE — 99211 OFF/OP EST MAY X REQ PHY/QHP: CPT | Performed by: INTERNAL MEDICINE

## 2020-02-24 PROCEDURE — 99214 OFFICE O/P EST MOD 30 MIN: CPT | Performed by: INTERNAL MEDICINE

## 2020-02-24 NOTE — TELEPHONE ENCOUNTER
Writer met with patient briefly during RV. Pt doing well without any complaints noted. Will continue to follow.

## 2020-02-25 LAB — CHROMOGRANIN A: 973 NG/ML (ref 0–160)

## 2020-02-25 NOTE — PROGRESS NOTES
_           Chief Complaint   Patient presents with    Follow-up     review status of disease    Discuss Labs    Abdominal Pain     on the left side, patient states it is getting better. Has been going on for almost a week     DIAGNOSIS:       Malignant carcinoid tumor of the stomach  Recent GI bleeding after endoscopic mucosal resection of stomach carcinoid on October 23, 2019. Evidence of recurrent disease on repeated EGD January 2020 and continued elevation of tumor marker chromogranin A  Iron deficiency secondary to above  History of hypothyroidism  Multiple comorbidities as listed      CURRENT THERAPY:         Status post endoscopic mucosal resection of stomach carcinoid October 23, 2019    BRIEF CASE HISTORY:      Ms. Baljeet Nicholas is a very pleasant 63 y.o. female with history of multiple comorbidities as listed. The patient seen because of recent diagnosis of carcinoid tumor. Patient had problem with dysphagia for about 4 to 5 months. That problem resolved spontaneously. She was evaluated by gastroenterology. She had EGD in September 2019. She was noted to have gastric tumor which was biopsied and was positive for malignant neuroendocrine tumor. Subsequently patient was evaluated by abdominal MRI. Patient was having no evidence of gastric disease or gastric wall deep penetration. She underwent endoscopic mucosal resection October 23, 2019. Patient had recent admission to [facility] because of GI bleeding. She had EGD again and cauterization of bleeding. She is having weakness and fatigue. No other symptoms. No abdominal pain or cramps. No hot flashes or night sweats. No weight loss or decreased appetite. No wheezing. The patient had lab testing in July 2019 with chromogranin A level 1500.   Patient was not aware of that test.  Patient's twin sister had carcinoid tumor more than 20 years conditions. SOCIAL HISTORY:  reports that she quit smoking about 7 years ago. She has a 25.00 pack-year smoking history. She has never used smokeless tobacco. She reports current alcohol use. She reports that she does not use drugs. REVIEW OF SYSTEMS:     General: Positive for weakness and fatigue. No unanticipated weight loss or decreased appetite. No fever or chills. Eyes: No blurred vision, eye pain or double vision. Ears: No hearing problems or drainage. No tinnitus. Throat: No sore throat, problems with swallowing or dysphagia. Respiratory: No cough, sputum or hemoptysis. No shortness of breath. No pleuritic chest pain. Cardiovascular: No chest pain, orthopnea or PND. No lower extremity edema. No palpitation. Gastrointestinal: As above. Genitourinary: No dysuria, hematuria, frequency or urgency. Musculoskeletal: No muscle aches or pains. No limitation of movement. No back pain. No gait disturbance, No joint complaints. Dermatologic: No skin rashes or pruritus. No skin lesions or discolorations. Psychiatric: No depression, anxiety, or stress or signs of schizophrenia. No change in mood or affect. Hematologic: No history of bleeding tendency. No bruises or ecchymosis. No history of clotting problems. Infectious disease: No fever, chills or frequent infections. Endocrine: No problems with obesity. No polydipsia or polyuria. No temperature intolerance. Neurologic: No headaches or dizziness. No weakness or numbness of the extremities. No changes in balance, coordination,  memory, mentation, behavior. Allergic/Immunologic: No nasal congestion or hives. No repeated infections. PHYSICAL EXAM:  The patient is not in acute distress. Vital signs: Blood pressure (!) 136/92, pulse 68, temperature 98.4 °F (36.9 °C), temperature source Oral, weight 251 lb 3.2 oz (113.9 kg), last menstrual period 01/01/1994, not currently breastfeeding.      General appearance - well appearing, not in pain or distress  Mental status - good mood, alert and oriented  Eyes - pupils equal and reactive, extraocular eye movements intact  Ears - bilateral TM's and external ear canals normal  Nose - normal and patent, no erythema, discharge or polyps  Mouth - mucous membranes moist, pharynx normal without lesions  Neck - supple, no significant adenopathy  Lymphatics - no palpable lymphadenopathy, no hepatosplenomegaly  Chest - clear to auscultation, no wheezes, rales or rhonchi, symmetric air entry  Heart - normal rate, regular rhythm, normal S1, S2, no murmurs, rubs, clicks or gallops  Abdomen - soft, nontender, nondistended, no masses or organomegaly  Neurological - alert, oriented, normal speech, no focal findings or movement disorder noted  Musculoskeletal - no joint tenderness, deformity or swelling  Extremities - peripheral pulses normal, no pedal edema, no clubbing or cyanosis  Skin - normal coloration and turgor, no rashes, no suspicious skin lesions noted     Review of Diagnostic data:   Lab Results   Component Value Date    WBC 7.2 02/22/2020    HGB 13.0 02/22/2020    HCT 41.4 02/22/2020    MCV 79.2 (L) 02/22/2020     02/22/2020       Chemistry        Component Value Date/Time     (H) 10/31/2019 0617    K 3.8 10/31/2019 0617     (H) 10/31/2019 0617    CO2 22 10/31/2019 0617    BUN 6 10/31/2019 0617    CREATININE 0.56 10/31/2019 0617        Component Value Date/Time    CALCIUM 9.1 02/15/2020 1055    ALKPHOS 72 10/31/2019 0617    AST 17 10/31/2019 0617    ALT 14 10/31/2019 0617    BILITOT 0.20 (L) 10/31/2019 0617        Abdominal MRI 9/18/2019:  Impression   Subcentimeter gastric mucosal enhancing masses in the fundus and along the   lesser curvature are demonstrated, corresponding to the patient's known GI   stromal tumors.  No evidence for extension through the gastric wall or   metastatic disease.       Status post cholecystectomy.  MRCP within normal limits.       Pelvic right kidney, incompletely visualized.  Bilateral renal cysts again   demonstrated. CT chest October 26, 2019:     FINDINGS:   Pulmonary Arteries: Suboptimal contrast timing.  Limited evaluation of the   segmental branches.  No evidence for central pulmonary embolism.  The main   pulmonary artery is normal in size.       Mediastinum: No evidence of mediastinal lymphadenopathy.  The heart and   pericardium demonstrate no acute abnormality.  There is no acute abnormality   of the thoracic aorta.       Lungs/pleura: The lungs are without acute process.  No focal consolidation or   pulmonary edema.  No evidence of pleural effusion or pneumothorax.       Upper Abdomen: Partially visualized left renal cysts.  Dense area of   calcification adjacent to the splenic artery is noted, which may represent a   thrombosed aneurysm.  Small lymph node in the gastrohepatic ligament.  Status   post cholecystectomy.       Soft Tissues/Bones: No acute bone or soft tissue abnormality.           Impression   Suboptimal contrast timing.  No evidence for central pulmonary embolism.       No acute airspace disease identified. Pathology results from September 23, 2019:  Collected: 9/23/2019   Received: 9/23/2019   Reported: 9/25/2019 10:17     -- Diagnosis --   1.  STOMACH, ANTRUM, BIOPSY:   - UNREMARKABLE GASTRIC MUCOSA. - NEGATIVE FOR HELICOBACTER PYLORI INFECTION. 2.  STOMACH, LESION, BIOPSIES:   - WELL-DIFFERENTIATED GASTRIC NEUROENDOCRINE TUMOR (CARCINOID TUMOR). - FOCAL ACTIVE CHRONIC GASTRITIS AND INTESTINAL METAPLASIA ARE PRESENT   IN    THE NONNEOPLASTIC GASTRIC MUCOSA.  SEE COMMENT.    Pathology results from October 23, 2019:  Collected: 10/23/2019   Received: 10/24/2019   Reported: 10/28/2019 13:13     -- Diagnosis --   GASTRIC TISSUES, EXCISION:        - WELL DIFFERENTIATED NEUROENDOCRINE TUMOR, GRADE 2.     7/23/2019 10:10 PM - DuranJulia Incoming Lab Results From [facility]     Component Value Ref Range & Units Status Collected Lab   Chromogranin A 1553High   0 - 95 ng/mL Final 07/22/2019  7:55 AM [facility]   (NOTE)      Lab Results   Component Value Date    WBC 7.2 02/22/2020    HGB 13.0 02/22/2020    HCT 41.4 02/22/2020    MCV 79.2 (L) 02/22/2020     02/22/2020       Chemistry        Component Value Date/Time     (H) 10/31/2019 0617    K 3.8 10/31/2019 0617     (H) 10/31/2019 0617    CO2 22 10/31/2019 0617    BUN 6 10/31/2019 0617    CREATININE 0.56 10/31/2019 0617        Component Value Date/Time    CALCIUM 9.1 02/15/2020 1055    ALKPHOS 72 10/31/2019 0617    AST 17 10/31/2019 0617    ALT 14 10/31/2019 0617    BILITOT 0.20 (L) 10/31/2019 0617        Lab Results   Component Value Date    IRON 33 (L) 02/22/2020    TIBC 346 02/22/2020    FERRITIN 17 02/22/2020           IMPRESSION:   Malignant carcinoid tumor of the stomach  Recent GI bleeding after endoscopic mucosal resection of stomach carcinoid on October 23, 2019. Evidence of recurrent disease on repeated EGD January 2020 and continued elevation of tumor marker chromogranin A  Iron deficiency secondary to above  History of hypothyroidism  Multiple comorbidities as listed    PLAN: Records and labs and images were reviewed and discussed with the patient. Patient has minimal symptoms related to her tumor. No systemic symptoms. Repeated EGD in January 2020 showed evidence of carcinoid tumor recurrence. She was referred to [facility] clinic. Management will be determined after reviewing the pathology and images and genetic testing. Obviously patient continues to have elevated chromogranin A. She had clear evidence of gastric mucosa recurrence. Probable need for whole body octreotide scan. If negative she will need surgical resection of the gastric carcinoid tumor. However we will wait for final recommendations from [facility] clinic. If needed Sandostatin will be administered locally.    Patient's questions were answered to the best of her satisfaction and she

## 2020-02-26 ENCOUNTER — TELEPHONE (OUTPATIENT)
Dept: GASTROENTEROLOGY | Age: 60
End: 2020-02-26

## 2020-02-28 ENCOUNTER — TELEPHONE (OUTPATIENT)
Dept: GASTROENTEROLOGY | Age: 60
End: 2020-02-28

## 2020-02-28 LAB — SEROTONIN BLOOD: 235 NG/ML (ref 50–220)

## 2020-02-28 NOTE — TELEPHONE ENCOUNTER
2/28/20 LVM for patient to call the office to cf appointment for 3/2/20.   Ins, id, copay, location-jt

## 2020-03-02 ENCOUNTER — OFFICE VISIT (OUTPATIENT)
Dept: GASTROENTEROLOGY | Age: 60
End: 2020-03-02
Payer: COMMERCIAL

## 2020-03-02 VITALS — HEART RATE: 77 BPM | DIASTOLIC BLOOD PRESSURE: 76 MMHG | SYSTOLIC BLOOD PRESSURE: 110 MMHG

## 2020-03-02 PROCEDURE — 99213 OFFICE O/P EST LOW 20 MIN: CPT | Performed by: INTERNAL MEDICINE

## 2020-03-02 ASSESSMENT — ENCOUNTER SYMPTOMS
SORE THROAT: 0
RECTAL PAIN: 0
BLOOD IN STOOL: 0
ABDOMINAL DISTENTION: 1
BACK PAIN: 0
CHOKING: 0
NAUSEA: 0
ABDOMINAL PAIN: 1
COUGH: 1
VOMITING: 0
ANAL BLEEDING: 0
VOICE CHANGE: 0
CONSTIPATION: 1
SINUS PRESSURE: 0
ALLERGIC/IMMUNOLOGIC NEGATIVE: 1
DIARRHEA: 0
WHEEZING: 0
TROUBLE SWALLOWING: 0

## 2020-03-02 NOTE — PROGRESS NOTES
SURGERY      vocal cord polyps removed    UPPER GASTROINTESTINAL ENDOSCOPY  02/21/2019    Neuroendocrine tumor    UPPER GASTROINTESTINAL ENDOSCOPY  09/23/2019    UPPER GASTROINTESTINAL ENDOSCOPY N/A 9/23/2019    EGD BIOPSY performed by Madiha Villarreal MD at [facility] 10/23/2019    EGD W/ EMR performed by Estelle Loza MD at [facility] N/A 10/29/2019    EGD CONTROL HEMORRHAGE performed by Irish Bear MD at [facility]       CURRENT MEDICATIONS:    Current Outpatient Medications:     Multiple Vitamin (MULTI-DAY VITAMINS PO), Take by mouth, Disp: , Rfl:     metoprolol succinate (TOPROL XL) 100 MG extended release tablet, TAKE 1 TABLET BY MOUTH EVERY DAY, Disp: 90 tablet, Rfl: 1    levothyroxine (SYNTHROID) 150 MCG tablet, TAKE 1 TABLET BY MOUTH ONCE DAILY FOR 5 DAYS EVERY WEEK, Disp: 60 tablet, Rfl: 2    desonide (DESOWEN) 0.05 % cream, Apply topically 2 times daily. , Disp: 90 g, Rfl: 0    budesonide-formoterol (SYMBICORT) 80-4.5 MCG/ACT AERO, Inhale 2 puffs into the lungs 2 times daily, Disp: 1 Inhaler, Rfl: 0    aspirin 81 MG tablet, Take 81 mg by mouth daily, Disp: , Rfl:     hydrochlorothiazide (HYDRODIURIL) 25 MG tablet, take 1 tablet by mouth once daily, Disp: 90 tablet, Rfl: 1    vitamin B-12 (CYANOCOBALAMIN) 500 MCG tablet, Take 500 mcg by mouth daily, Disp: , Rfl:     ALLERGIES:   Allergies   Allergen Reactions    Erythromycin Diarrhea       FAMILY HISTORY:       Problem Relation Age of Onset    High Blood Pressure Mother     High Blood Pressure Sister     Stomach Cancer Sister     Other Sister         epilepsy         SOCIAL HISTORY:   Social History     Socioeconomic History    Marital status: Single     Spouse name: Not on file    Number of children: Not on file    Years of education: Not on file    Highest education level: Not on file   Occupational History    Not on file   Social Needs    Financial resource strain: Not on file    Food insecurity:     Worry: Not on file     Inability: Not on file    Transportation needs:     Medical: Not on file     Non-medical: Not on file   Tobacco Use    Smoking status: Former Smoker     Packs/day: 1.00     Years: 25.00     Pack years: 25.00     Last attempt to quit: 2012     Years since quittin.7    Smokeless tobacco: Never Used    Tobacco comment: quit 2 years   Substance and Sexual Activity    Alcohol use: Yes     Comment: 3 times a month    Drug use: No    Sexual activity: Not on file   Lifestyle    Physical activity:     Days per week: Not on file     Minutes per session: Not on file    Stress: Not on file   Relationships    Social connections:     Talks on phone: Not on file     Gets together: Not on file     Attends Scientologist service: Not on file     Active member of club or organization: Not on file     Attends meetings of clubs or organizations: Not on file     Relationship status: Not on file    Intimate partner violence:     Fear of current or ex partner: Not on file     Emotionally abused: Not on file     Physically abused: Not on file     Forced sexual activity: Not on file   Other Topics Concern    Not on file   Social History Narrative    Not on file       REVIEW OF SYSTEMS: A 12-point review of systemswas obtained and pertinent positives and negatives were enumerated above in the history of present illness. All other reviewed systems / symptoms were negative. Review of Systems   Constitutional: Positive for fatigue. Negative for appetite change and unexpected weight change. HENT: Negative for dental problem, postnasal drip, sinus pressure, sore throat, trouble swallowing and voice change. Eyes: Positive for visual disturbance. Respiratory: Positive for cough. Negative for choking and wheezing. Cardiovascular: Negative. Negative for chest pain, palpitations and leg swelling.    Gastrointestinal: Positive for abdominal

## 2020-03-24 ENCOUNTER — NURSE TRIAGE (OUTPATIENT)
Dept: OTHER | Facility: CLINIC | Age: 60
End: 2020-03-24

## 2020-04-13 ENCOUNTER — TELEPHONE (OUTPATIENT)
Dept: ONCOLOGY | Age: 60
End: 2020-04-13

## 2020-04-21 RX ORDER — HYDROCHLOROTHIAZIDE 25 MG/1
TABLET ORAL
Qty: 90 TABLET | Refills: 0 | Status: SHIPPED | OUTPATIENT
Start: 2020-04-21 | End: 2020-08-18 | Stop reason: SDUPTHER

## 2020-04-23 ENCOUNTER — TELEPHONE (OUTPATIENT)
Dept: ONCOLOGY | Age: 60
End: 2020-04-23

## 2020-06-01 ENCOUNTER — TELEPHONE (OUTPATIENT)
Dept: GASTROENTEROLOGY | Age: 60
End: 2020-06-01

## 2020-06-01 ENCOUNTER — HOSPITAL ENCOUNTER (OUTPATIENT)
Facility: MEDICAL CENTER | Age: 60
End: 2020-06-01
Payer: COMMERCIAL

## 2020-06-04 ENCOUNTER — INITIAL CONSULT (OUTPATIENT)
Dept: ONCOLOGY | Age: 60
End: 2020-06-04
Payer: COMMERCIAL

## 2020-06-04 PROCEDURE — 96040 PR GENETIC COUNSELING, EACH 30 MIN: CPT | Performed by: GENETIC COUNSELOR, MS

## 2020-06-04 NOTE — PROGRESS NOTES
3 [facility] Program   Hereditary Cancer Risk Assessment     Name: Minal Tapia   YOB: 1960   Date of Consultation: 6/4/20     Ms. Radha Deluna was seen at the [address] [place] [address] for genetic counseling on 6/4/20. Ms. Radha Deluna was referred by Dr. Saadia Miner due to her personal and family history of neuroendocrine tumors. Ms. Samuels Resides sister was also present at the appointment. PERSONAL HISTORY   Ms. Radha Deluna is a 63 y.o.  female with a personal history of gastric neuroendocrine tumor. She initially presented with some mild swallowing difficulties and in February 2019 underwent the first of several endoscopies. Biopsy of her stomach demonstrated neuroendocrine tumor grade 2. She had a follow up biopsy in September 2019 with the same findings and in October 2019 for more complete resection of a previously biopsied tumor yielding the same results. She is awaiting follow up from [facility] for further management instructions. FAMILY HISTORY  Ms. Radha Deluna has no biological children. She has one full sister (61y) and a twin sister (identical by report). She reports that her twin sister underwent a partial gastrectomy for multifocal neuroendocrine tumors of the stomach approximately 25 years ago. She apparently has been free of disease since then. There are no other known neuroendocrine tumors in the family or known malignancies. Ms. Radha Deluna reports  ancestry and denies any known Ashkenazi Anglican heritage. RISK ASSESSMENT   We discussed that approximately 5-10% of cancers are due to a hereditary gene mutation which causes an increased risk for certain cancers. Hereditary cancers are typically diagnosed at younger ages (under age 46y) and occur in multiple generations of a family.  Multiple individuals with the same type of cancer (example: breast) or uncommon cancers (example: ovarian, pancreatic, male breast cancer) are also features of hereditary cancers. We discussed that Ms. Almanza's personal history is somewhat concerning for a hereditary predisposition given that she and her identical twin sister have both been diagnosed with multi-focal neuroendocrine tumors. Her physicians are requesting genetic testing, particularly to rule out multiple endocrine neoplasia type 1 (MEN1) syndrome. MEN1 is characterized by the occurrence of parathyroid, pancreatic islet, and anterior pituitary tumors. Some individuals also develop carcinoid tumors, adrenocortical tumors, meningiomas, facial angiofibromas, collagenomas, and lipomas. DISCUSSION  Genetic testing to rule out MEN1 syndrome was discussed. It is reassuring that Ms. Denna Merlin does not have any other personal or family history suspicious for MEN1. We also discussed that genetic testing is available for multiple other genes related to hereditary cancer. Some of these genes are known to carry a significant increased risk for several cancers including colon, breast, uterine, ovarian, stomach, and pancreatic cancer, while some of these genes are believed to have a moderate increased risk for breast and other cancers. We discussed the possibility of finding a mutation in genes with limited information to guide medical management, as well we as the possibility of identifying variants of uncertain significance (VUS). We discussed the risks, benefits, and limitations of genetic testing. Possible test results were discussed as well as potential screening and prevention strategies. Lastly, we discussed that the results of Ms. Almanza's genetic testing may be beneficial in defining her risk for cancer as well as for her family members. SUMMARY & PLAN  1) Genetic testing to rule out MEN1 was recommended due to Ms. Almanza's personal history of multi focal neuroendocrine gastric tumors and her family history of carcinoid tumors.      2) Genetic testing for the MEN1 gene along with other

## 2020-06-05 ENCOUNTER — TELEPHONE (OUTPATIENT)
Dept: ONCOLOGY | Age: 60
End: 2020-06-05

## 2020-06-10 ENCOUNTER — TELEPHONE (OUTPATIENT)
Dept: ONCOLOGY | Age: 60
End: 2020-06-10

## 2020-06-11 ENCOUNTER — OFFICE VISIT (OUTPATIENT)
Dept: GASTROENTEROLOGY | Age: 60
End: 2020-06-11
Payer: COMMERCIAL

## 2020-06-11 VITALS — BODY MASS INDEX: 43.77 KG/M2 | WEIGHT: 255 LBS | TEMPERATURE: 97.7 F

## 2020-06-11 PROCEDURE — 99213 OFFICE O/P EST LOW 20 MIN: CPT | Performed by: INTERNAL MEDICINE

## 2020-06-11 RX ORDER — PANTOPRAZOLE SODIUM 40 MG/1
40 TABLET, DELAYED RELEASE ORAL DAILY
Qty: 30 TABLET | Refills: 0 | Status: SHIPPED | OUTPATIENT
Start: 2020-06-11 | End: 2020-07-01 | Stop reason: SDUPTHER

## 2020-06-11 ASSESSMENT — ENCOUNTER SYMPTOMS
ANAL BLEEDING: 0
VOICE CHANGE: 0
BLOOD IN STOOL: 0
COUGH: 1
VOMITING: 0
ABDOMINAL PAIN: 1
CONSTIPATION: 1
WHEEZING: 0
RECTAL PAIN: 0
BACK PAIN: 0
ALLERGIC/IMMUNOLOGIC NEGATIVE: 1
SORE THROAT: 0
CHOKING: 0
NAUSEA: 0
SINUS PRESSURE: 0
TROUBLE SWALLOWING: 0
DIARRHEA: 0
ABDOMINAL DISTENTION: 1

## 2020-06-11 NOTE — PROGRESS NOTES
removal    THROAT SURGERY      vocal cord polyps removed    UPPER GASTROINTESTINAL ENDOSCOPY  02/21/2019    Neuroendocrine tumor    UPPER GASTROINTESTINAL ENDOSCOPY  09/23/2019    UPPER GASTROINTESTINAL ENDOSCOPY N/A 9/23/2019    EGD BIOPSY performed by Christine Bolaños MD at [facility] 10/23/2019    EGD W/ EMR performed by Rachel Luna MD at [facility] N/A 10/29/2019    EGD CONTROL HEMORRHAGE performed by Flakita Barrett MD at [facility]       CURRENT MEDICATIONS:    Current Outpatient Medications:     hydroCHLOROthiazide (HYDRODIURIL) 25 MG tablet, TAKE 1 TABLET BY MOUTH EVERY DAY, Disp: 90 tablet, Rfl: 0    Multiple Vitamin (MULTI-DAY VITAMINS PO), Take by mouth, Disp: , Rfl:     metoprolol succinate (TOPROL XL) 100 MG extended release tablet, TAKE 1 TABLET BY MOUTH EVERY DAY, Disp: 90 tablet, Rfl: 1    levothyroxine (SYNTHROID) 150 MCG tablet, TAKE 1 TABLET BY MOUTH ONCE DAILY FOR 5 DAYS EVERY WEEK, Disp: 60 tablet, Rfl: 2    desonide (DESOWEN) 0.05 % cream, Apply topically 2 times daily. , Disp: 90 g, Rfl: 0    budesonide-formoterol (SYMBICORT) 80-4.5 MCG/ACT AERO, Inhale 2 puffs into the lungs 2 times daily, Disp: 1 Inhaler, Rfl: 0    aspirin 81 MG tablet, Take 81 mg by mouth daily, Disp: , Rfl:     vitamin B-12 (CYANOCOBALAMIN) 500 MCG tablet, Take 500 mcg by mouth daily, Disp: , Rfl:     ALLERGIES:   Allergies   Allergen Reactions    Erythromycin Diarrhea       FAMILY HISTORY:       Problem Relation Age of Onset    High Blood Pressure Mother     High Blood Pressure Sister     Stomach Cancer Sister     Other Sister         epilepsy    No Known Problems Father          SOCIAL HISTORY:   Social History     Socioeconomic History    Marital status: Single     Spouse name: Not on file    Number of children: Not on file    Years of education: Not on file    Highest education level: Not on file   Occupational cooperative Psych: Normal. and Alert and oriented, appropriate affect. . Normal affect. Mentation normal  HEENT: PERRLA. Clear conjunctivae and sclerae. Moist oral mucosae, no lesions or ulcers. The neck is supple, without lymphadenopathy or jugular venous distension. No masses. Normal thyroid. Cardiovascular: S1 S2 RRR no rubs or murmurs. Pulmonary: clear BL. No accessory muscle usage. Abdominal Exam: Soft, NT ND, no hepato or spleno megaly, +BS, no ascites. IMPRESSION: Ms. Vonnie Tomas is a 63 y.o. female with neuroendocrine tumor of the stomach. We are awaiting further evaluation at [facility] clinic with genetic testing to decide upon further treatment plans. Follow-up in 10 to 12 weeks. Heartburns. Trial of Protonix daily. She has intestinal metaplasia of the stomach. She needs routine follow-up on that. Thank you for allowing me to participate in the care of Ms. Vonnie Tomas. For any further questions please do not hesitate to contact me. I have reviewed and agree with the ROS entered by the MA/LPN. Note is dictated utilizing voice recognition software. Unfortunately this leads to occasional typographical errors. Please contact our office if you have any questions.     Eliud Rivera MD  [facility]  O: #[phone]

## 2020-06-26 ENCOUNTER — TELEPHONE (OUTPATIENT)
Dept: ONCOLOGY | Age: 60
End: 2020-06-26

## 2020-07-01 RX ORDER — PANTOPRAZOLE SODIUM 40 MG/1
40 TABLET, DELAYED RELEASE ORAL DAILY
Qty: 90 TABLET | Refills: 3 | Status: SHIPPED | OUTPATIENT
Start: 2020-07-01 | End: 2020-07-07 | Stop reason: ALTCHOICE

## 2020-07-02 ENCOUNTER — TELEPHONE (OUTPATIENT)
Dept: ONCOLOGY | Age: 60
End: 2020-07-02

## 2020-07-02 NOTE — TELEPHONE ENCOUNTER
Writer called patient to see if VV was set up with CC. No answer, detailed message left. Will await a return call.

## 2020-07-06 ENCOUNTER — TELEPHONE (OUTPATIENT)
Dept: ONCOLOGY | Age: 60
End: 2020-07-06

## 2020-07-06 NOTE — TELEPHONE ENCOUNTER
3 [facility]   Hereditary Cancer Risk Assessment     Name: Monika Miller  YOB: 1960  Date of Results Disclosure: 7/1/20     HISTORY   Ms. Langley Snellen was seen for genetic counseling on 6/4/20 at the request of Dr. Adria Christensen due to her personal and family history of gastric carcinoid tumors. At that time, Ms. Langley Snellen chose to pursue genetic testing via the CancerNext Expanded + RNA gene panel. These results were discussed with Ms. Langley Snellen on 7/1/20 via telephone. A summary of Ms. Almanza's results and recommendations are below. RESULTS  [facility] CancerNext-Expanded Panel + RNAinsight: NEGATIVE - NO CLINICALLY SIGNIFICANT MUTATIONS DETECTED   This panel included the analysis of 67 genes associated with hereditary cancer including: AIP, ALK, APC, SONIA, BAP1, BARD1, BLM, BMPR1A, BRCA1, BRCA2, BRIP1, CDH1, CDK4, CDKN1B, CDKN2A, CHEK2, DICER1, EPCAM, FANCC, FH, FLCN, GALNT12, GREM1, HOXB13, MAX, MEN1, MET, MITF, MLH1, MRE11A, MSH2, MSH6, MUTYH, NBN, NF1, NF2, PALB2, PHOX2B, PMS2, POLD1, POLE, POT1, YCMZE9B, PTCH1, PTEN, RAD50, RAD51C, RAD51D, RB1, RET, SDHA, SDHAF2, SDHB, SDHC, ,SDHD, SMAD4, SMARCA4, SMARCB1, SMARCE1, STK11, SUFU, LFQR935, TP53, TSC1, TSC2, VHL, and XRCC2. In addition, no clinically relevant aberrant RNA transcripts were detected in select genes. Please refer to genetic test report for technical details. We discussed that Ms. Almanza's negative test result greatly reduces the likelihood that her personal history of carcinoid tumors is due to a hereditary mutation. It is possible that her personal history may be due to a gene for which testing was not performed or which has yet to be discovered. RECOMMENDATIONS  1) The outcome of Ms. Dixons genetic test results do not affect her current treatment. Ms. Langley Snellen should continue treatment and surveillance as directed by her physicians. 2) Ms. Langley Snellen should continue general population cancer screening guidelines as directed by her physicians. RECOMMENDATIONS FOR FAMILY MEMBERS   1) There is a low likelihood that the other cancers in Ms. Almanza's family are caused by a hereditary gene mutation. Therefore, based on the family history information provided by Ms. Herminia Starkey, genetic testing is not recommended for other family members at this time. 2) We encourage Ms. Almanza's relatives to discuss their family history with their physicians to determine the most appropriate cancer screening recommendations. SUMMARY & PLAN   1) There are no changes in medical management for Ms. Herminia Starkey based on her negative genetic test results. She should continue surveillance as directed by her physicians. 2) We encourage Ms. Herminia Starkey to contact us every 1-2 years to determine if there are any new genetic testing or research options available. 3) We encourage Ms. Herminia Starkey to contact us with updates to her personal and/or familys cancer history as this information may alter our assessment and/or recommendations. The [address] [place] [address] would be glad to offer our assistance should you have any questions or concerns about this information. Please feel free to contact us at [phone]. Colletta New.  Akua Lechuga MS, [facility]   Licensed Genetic Counselor         CC:  MD Jorge A Leggett MD

## 2020-07-07 ENCOUNTER — OFFICE VISIT (OUTPATIENT)
Dept: ONCOLOGY | Age: 60
End: 2020-07-07
Payer: COMMERCIAL

## 2020-07-07 ENCOUNTER — TELEPHONE (OUTPATIENT)
Dept: ONCOLOGY | Age: 60
End: 2020-07-07

## 2020-07-07 ENCOUNTER — HOSPITAL ENCOUNTER (OUTPATIENT)
Facility: MEDICAL CENTER | Age: 60
Discharge: HOME | End: 2020-07-07
Payer: COMMERCIAL

## 2020-07-07 VITALS
HEART RATE: 88 BPM | TEMPERATURE: 98.1 F | BODY MASS INDEX: 44.05 KG/M2 | SYSTOLIC BLOOD PRESSURE: 140 MMHG | DIASTOLIC BLOOD PRESSURE: 96 MMHG | WEIGHT: 256.6 LBS

## 2020-07-07 DIAGNOSIS — C7A.092 MALIGNANT CARCINOID TUMOR OF STOMACH (HCC): ICD-10-CM

## 2020-07-07 PROCEDURE — 86316 IMMUNOASSAY TUMOR OTHER: CPT

## 2020-07-07 PROCEDURE — 99214 OFFICE O/P EST MOD 30 MIN: CPT | Performed by: INTERNAL MEDICINE

## 2020-07-07 PROCEDURE — 99211 OFF/OP EST MAY X REQ PHY/QHP: CPT | Performed by: INTERNAL MEDICINE

## 2020-07-07 PROCEDURE — 36415 COLL VENOUS BLD VENIPUNCTURE: CPT

## 2020-07-07 PROCEDURE — 84260 ASSAY OF SEROTONIN: CPT

## 2020-07-07 NOTE — TELEPHONE ENCOUNTER
Christina Pittman MD VISIT  DR Diane George IN TO SEE PATIENT  ORDERS RECEIVED  LABS SOON   RV 3-4 MONTHS  WAITING ON [facility] DECISION  LABS SEROTONIN CHROMOGRANIN A ON EXIT 7/7/20  MD VISIT 10/13/20 @ 10:15 AM  AVS PRINTED AND GIVEN TO PATIENT W/ INSTRUCTIONS  PATIENT DISCHARGED AMBULATORY

## 2020-07-09 LAB — CHROMOGRANIN A: 1245 NG/ML (ref 0–160)

## 2020-07-10 LAB — SEROTONIN BLOOD: 139 NG/ML (ref 50–220)

## 2020-07-14 ENCOUNTER — TELEPHONE (OUTPATIENT)
Dept: ONCOLOGY | Age: 60
End: 2020-07-14

## 2020-07-14 NOTE — TELEPHONE ENCOUNTER
Writer called Dr. Ramirez [place] office at [address] [place] [address] to see if f/u appt has been made. Per Angel Husain her appt is scheduled for 7/29/20. Will continue to follow and track pt for CC plan.

## 2020-07-15 NOTE — PROGRESS NOTES
_           Chief Complaint   Patient presents with    Follow-up     Review status of disease    Other     please check neck glands / endocrinologit retited     DIAGNOSIS:       Malignant carcinoid tumor of the stomach  Recent GI bleeding after endoscopic mucosal resection of stomach carcinoid on October 23, 2019. Evidence of recurrent disease on repeated EGD January 2020 and continued elevation of tumor marker chromogranin A  Iron deficiency secondary to above  History of hypothyroidism  Multiple comorbidities as listed      CURRENT THERAPY:         Status post endoscopic mucosal resection of stomach carcinoid October 23, 2019  Further management pending recommendations from [facility]    BRIEF CASE HISTORY:      Ms. Sondra Guzman is a very pleasant 63 y.o. female with history of multiple comorbidities as listed. The patient seen because of recent diagnosis of carcinoid tumor. Patient had problem with dysphagia for about 4 to 5 months. That problem resolved spontaneously. She was evaluated by gastroenterology. She had EGD in September 2019. She was noted to have gastric tumor which was biopsied and was positive for malignant neuroendocrine tumor. Subsequently patient was evaluated by abdominal MRI. Patient was having no evidence of gastric disease or gastric wall deep penetration. She underwent endoscopic mucosal resection October 23, 2019. Patient had recent admission to [facility] because of GI bleeding. She had EGD again and cauterization of bleeding. She is having weakness and fatigue. No other symptoms. No abdominal pain or cramps. No hot flashes or night sweats. No weight loss or decreased appetite. No wheezing. The patient had lab testing in July 2019 with chromogranin A level 1500. Patient was not aware of that test.  Patient's twin sister had carcinoid tumor more than 20 years ago.   She had gastric resection at that time and there is no recurrence. Patient smokes half pack per day for the last 40 years. Social alcohol drinking. INTERIM HISTORY:   The patient seen for follow-up gastric carcinoid. It was resected October 23, 2019. She has repeated EGD in January 2020 and she was found to have local recurrence with multiple lesions. She was referred to [facility] clinic. Management will be determined after reviewing the slides and images. Clinically patient is doing fine. No abdominal pain. No hot flashes. No wheezing. No weight loss. No diarrhea. Repeated tumor marker chromogranin showed very high readings. PAST MEDICAL HISTORY: has a past medical history of Anxiety, Arthritis, Asthma, Colon polyp, Colon polyps, Cyst of kidney, acquired, Fatigue, Hypertension, Hypothyroidism, Neuroendocrine tumor, Obesity, Pharyngoesophageal dysphagia, Vocal cord polyps, and Wears glasses. PAST SURGICAL HISTORY: has a past surgical history that includes cystourethroscopy/stent removal (5/3/11); Colonoscopy (02/21/2019); ERCP; Cholecystectomy (10/09/2014); hip surgery (Left); Throat surgery; Colonoscopy; Upper gastrointestinal endoscopy (02/21/2019); Upper gastrointestinal endoscopy (09/23/2019); Upper gastrointestinal endoscopy (N/A, 9/23/2019); Upper gastrointestinal endoscopy (N/A, 10/23/2019); Upper gastrointestinal endoscopy (N/A, 10/29/2019); and Endoscopic ultrasonography, GI (N/A, 1/22/2020). CURRENT MEDICATIONS:  has a current medication list which includes the following prescription(s): hydrochlorothiazide, multiple vitamin, metoprolol succinate, levothyroxine, desonide, aspirin, vitamin b-12, and budesonide-formoterol. ALLERGIES:  is allergic to erythromycin. FAMILY HISTORY: Patient's twin sister had gastric carcinoid more than 20 years ago status post partial gastric resection with no recurrence. Otherwise negative for any hematological or oncological conditions.      SOCIAL HISTORY:  reports that she quit smoking about 8 years ago. She has a 25.00 pack-year smoking history. She has never used smokeless tobacco. She reports current alcohol use. She reports that she does not use drugs. REVIEW OF SYSTEMS:     General: Positive for weakness and fatigue. No unanticipated weight loss or decreased appetite. No fever or chills. Eyes: No blurred vision, eye pain or double vision. Ears: No hearing problems or drainage. No tinnitus. Throat: No sore throat, problems with swallowing or dysphagia. Respiratory: No cough, sputum or hemoptysis. No shortness of breath. No pleuritic chest pain. Cardiovascular: No chest pain, orthopnea or PND. No lower extremity edema. No palpitation. Gastrointestinal: As above. Genitourinary: No dysuria, hematuria, frequency or urgency. Musculoskeletal: No muscle aches or pains. No limitation of movement. No back pain. No gait disturbance, No joint complaints. Dermatologic: No skin rashes or pruritus. No skin lesions or discolorations. Psychiatric: No depression, anxiety, or stress or signs of schizophrenia. No change in mood or affect. Hematologic: No history of bleeding tendency. No bruises or ecchymosis. No history of clotting problems. Infectious disease: No fever, chills or frequent infections. Endocrine: No problems with obesity. No polydipsia or polyuria. No temperature intolerance. Neurologic: No headaches or dizziness. No weakness or numbness of the extremities. No changes in balance, coordination,  memory, mentation, behavior. Allergic/Immunologic: No nasal congestion or hives. No repeated infections. PHYSICAL EXAM:  The patient is not in acute distress. Vital signs: Blood pressure (!) 140/96, pulse 88, temperature 98.1 °F (36.7 °C), temperature source Temporal, weight 256 lb 9.6 oz (116.4 kg), last menstrual period 01/01/1994, not currently breastfeeding.      General appearance - well appearing, not in pain or distress  Mental status - good mood, alert and oriented  Eyes - pupils equal and reactive, extraocular eye movements intact  Ears - bilateral TM's and external ear canals normal  Nose - normal and patent, no erythema, discharge or polyps  Mouth - mucous membranes moist, pharynx normal without lesions  Neck - supple, no significant adenopathy  Lymphatics - no palpable lymphadenopathy, no hepatosplenomegaly  Chest - clear to auscultation, no wheezes, rales or rhonchi, symmetric air entry  Heart - normal rate, regular rhythm, normal S1, S2, no murmurs, rubs, clicks or gallops  Abdomen - soft, nontender, nondistended, no masses or organomegaly  Neurological - alert, oriented, normal speech, no focal findings or movement disorder noted  Musculoskeletal - no joint tenderness, deformity or swelling  Extremities - peripheral pulses normal, no pedal edema, no clubbing or cyanosis  Skin - normal coloration and turgor, no rashes, no suspicious skin lesions noted     Review of Diagnostic data:   Lab Results   Component Value Date    WBC 7.2 02/22/2020    HGB 13.0 02/22/2020    HCT 41.4 02/22/2020    MCV 79.2 (L) 02/22/2020     02/22/2020       Chemistry        Component Value Date/Time     (H) 10/31/2019 0617    K 3.8 10/31/2019 0617     (H) 10/31/2019 0617    CO2 22 10/31/2019 0617    BUN 6 10/31/2019 0617    CREATININE 0.56 10/31/2019 0617        Component Value Date/Time    CALCIUM 9.1 02/15/2020 1055    ALKPHOS 72 10/31/2019 0617    AST 17 10/31/2019 0617    ALT 14 10/31/2019 0617    BILITOT 0.20 (L) 10/31/2019 0617        Abdominal MRI 9/18/2019:  Impression   Subcentimeter gastric mucosal enhancing masses in the fundus and along the   lesser curvature are demonstrated, corresponding to the patient's known GI   stromal tumors.  No evidence for extension through the gastric wall or   metastatic disease.       Status post cholecystectomy.  MRCP within normal limits.       Pelvic right kidney, incompletely best of her satisfaction and she verbalized full understanding and agreement.

## 2020-07-24 RX ORDER — METOPROLOL SUCCINATE 100 MG/1
TABLET, EXTENDED RELEASE ORAL
Qty: 30 TABLET | Refills: 0 | Status: SHIPPED | OUTPATIENT
Start: 2020-07-24 | End: 2020-08-18 | Stop reason: SDUPTHER

## 2020-07-24 NOTE — TELEPHONE ENCOUNTER
Last filled 1/16/2020 #90 with 1 RF  Last seen 12/16/19. Pended 30 days with note to pharmacy- pt is due for appt.     Next Visit Date:  Future Appointments   Date Time Provider Lito Ulrichi   8/20/2020  2:30 PM Cullen Evans MD [facility]tl[facility] exc [facility]   10/13/2020 10:15 AM Brittany Scott MD [id number] [address] Maintenance   Topic Date Due    Hepatitis C screen  1960    Pneumococcal 0-64 years Vaccine (1 of 1 - PPSV23) 09/28/1967    HIV screen  09/28/1976    DTaP/Tdap/Td vaccine (1 - Tdap) 09/28/1980    Shingles Vaccine (1 of 2) 09/28/2011    Cervical cancer screen  01/01/2016    TSH testing  04/23/2020    Flu vaccine (1) 09/01/2020    Potassium monitoring  10/31/2020    Creatinine monitoring  10/31/2020    Breast cancer screen  05/09/2021    Colon cancer screen colonoscopy  02/21/2022    Lipid screen  03/12/2023    Hepatitis A vaccine  Aged Out    Hepatitis B vaccine  Aged Out    Hib vaccine  Aged Out    Meningococcal (ACWY) vaccine  Aged Out       No results found for: LABA1C          ( goal A1C is < 7)   No results found for: LABMICR  LDL Cholesterol (mg/dL)   Date Value   03/12/2018 93   08/07/2014 140 (H)       (goal LDL is <100)   AST (U/L)   Date Value   10/31/2019 17     ALT (U/L)   Date Value   10/31/2019 14     BUN (mg/dL)   Date Value   10/31/2019 6     BP Readings from Last 3 Encounters:   07/07/20 (!) 140/96   03/02/20 110/76   02/24/20 (!) 136/92          (goal 120/80)    All Future Testing planned in [facility]  Lab Frequency Next Occurrence   EGD Once 12/12/2019   EGD Once 10/01/2019   EGD Once 02/14/2020   Chromogranin A q 3 months    Serotonin, Serum q 3 months    CBC Auto Differential q 3 months    Iron and TIBC q 3 months    Ferritin q 3 months                Patient Active Problem List:     Asthma     Anxiety     Obesity     Hyperlipidemia     Hypothyroidism     Colon polyps     Vertigo     Mass of left hip region     Fatigue     Pharyngoesophageal dysphagia     Colon polyp     Neuroendocrine tumor     Chest pain     Shortness of breath     Anemia     GI bleed     Essential hypertension     Neuroendocrine neoplasm of stomach     Polyp, stomach     Melena     Gastric ulcer with hemorrhage     Constipation

## 2020-08-13 ENCOUNTER — TELEPHONE (OUTPATIENT)
Dept: INTERNAL MEDICINE CLINIC | Age: 60
End: 2020-08-13

## 2020-08-13 NOTE — TELEPHONE ENCOUNTER
Pt called to schedule a med f/u and also let us know that she is completely out of one of her BP meds. Noted that med had been submitted for refill but she has not heard from her pharmacy yet.     Next OV- 8/21/2020  Last OV- 12/16/2019

## 2020-08-13 NOTE — LETTER
[address]  Dept: [phone]  Dept Fax: [phone] [place]  [address]          8/17/2020    Dear Ms. Almanza: We were unable to reach you by phone. Please call our office at your earliest convenience. Please have your medical record number (listed above) available when you call. This message is regarding:  Test Results . Please call between the hours of 8:30am and 4:00pm Monday through Friday if possible. Our number is [phone]. Thank you.        Kashmir Chester

## 2020-08-18 RX ORDER — LEVOTHYROXINE SODIUM 0.15 MG/1
TABLET ORAL
Qty: 5 TABLET | Refills: 0 | Status: SHIPPED | OUTPATIENT
Start: 2020-08-18 | End: 2020-08-21 | Stop reason: SDUPTHER

## 2020-08-18 RX ORDER — METOPROLOL SUCCINATE 100 MG/1
TABLET, EXTENDED RELEASE ORAL
Qty: 7 TABLET | Refills: 0 | Status: SHIPPED | OUTPATIENT
Start: 2020-08-18 | End: 2020-08-21 | Stop reason: SDUPTHER

## 2020-08-18 RX ORDER — HYDROCHLOROTHIAZIDE 25 MG/1
TABLET ORAL
Qty: 90 TABLET | Refills: 0 | OUTPATIENT
Start: 2020-08-18

## 2020-08-18 RX ORDER — METOPROLOL SUCCINATE 100 MG/1
TABLET, EXTENDED RELEASE ORAL
Qty: 30 TABLET | Refills: 0 | OUTPATIENT
Start: 2020-08-18

## 2020-08-18 RX ORDER — HYDROCHLOROTHIAZIDE 25 MG/1
TABLET ORAL
Qty: 7 TABLET | Refills: 0 | Status: SHIPPED | OUTPATIENT
Start: 2020-08-18 | End: 2020-08-21 | Stop reason: SDUPTHER

## 2020-08-18 RX ORDER — LEVOTHYROXINE SODIUM 0.15 MG/1
TABLET ORAL
Qty: 60 TABLET | Refills: 2 | OUTPATIENT
Start: 2020-08-18

## 2020-08-21 ENCOUNTER — OFFICE VISIT (OUTPATIENT)
Dept: INTERNAL MEDICINE CLINIC | Age: 60
End: 2020-08-21
Payer: COMMERCIAL

## 2020-08-21 VITALS
DIASTOLIC BLOOD PRESSURE: 86 MMHG | WEIGHT: 257.4 LBS | BODY MASS INDEX: 43.94 KG/M2 | TEMPERATURE: 97.7 F | RESPIRATION RATE: 18 BRPM | HEIGHT: 64 IN | SYSTOLIC BLOOD PRESSURE: 132 MMHG | OXYGEN SATURATION: 99 % | HEART RATE: 68 BPM

## 2020-08-21 PROCEDURE — 99214 OFFICE O/P EST MOD 30 MIN: CPT | Performed by: INTERNAL MEDICINE

## 2020-08-21 RX ORDER — METOPROLOL SUCCINATE 100 MG/1
TABLET, EXTENDED RELEASE ORAL
Qty: 90 TABLET | Refills: 0 | Status: SHIPPED | OUTPATIENT
Start: 2020-08-21 | End: 2020-12-04 | Stop reason: SDUPTHER

## 2020-08-21 RX ORDER — DESONIDE 0.5 MG/G
CREAM TOPICAL
Qty: 90 G | Refills: 0 | Status: SHIPPED | OUTPATIENT
Start: 2020-08-21 | End: 2021-09-21

## 2020-08-21 RX ORDER — LEVOTHYROXINE SODIUM 0.15 MG/1
TABLET ORAL
Qty: 60 TABLET | Refills: 0 | Status: SHIPPED | OUTPATIENT
Start: 2020-08-21 | End: 2020-10-30 | Stop reason: SDUPTHER

## 2020-08-21 RX ORDER — HYDROCHLOROTHIAZIDE 25 MG/1
TABLET ORAL
Qty: 90 TABLET | Refills: 0 | Status: SHIPPED | OUTPATIENT
Start: 2020-08-21 | End: 2020-12-04 | Stop reason: SDUPTHER

## 2020-08-21 ASSESSMENT — PATIENT HEALTH QUESTIONNAIRE - PHQ9
1. LITTLE INTEREST OR PLEASURE IN DOING THINGS: 0
SUM OF ALL RESPONSES TO PHQ QUESTIONS 1-9: 0
SUM OF ALL RESPONSES TO PHQ QUESTIONS 1-9: 0
2. FEELING DOWN, DEPRESSED OR HOPELESS: 0
SUM OF ALL RESPONSES TO PHQ9 QUESTIONS 1 & 2: 0

## 2020-08-21 NOTE — PROGRESS NOTES
Nallely Jacobson is a 63 y.o. female who presents for   Chief Complaint   Patient presents with    Medication Refill     med refills; having some tumor issues    Health Maintenance     hep c, pneumo, hiv, tdap, shingles, cervical, tsh    and follow up of chronic medical problems. Patient Active Problem List   Diagnosis    Asthma    Anxiety    Obesity    Hyperlipidemia    Hypothyroidism    Colon polyps    Vertigo    Mass of left hip region    Fatigue    Pharyngoesophageal dysphagia    Colon polyp    Neuroendocrine tumor    Chest pain    Shortness of breath    Anemia    GI bleed    Essential hypertension    Neuroendocrine neoplasm of stomach    Polyp, stomach    Melena    Gastric ulcer with hemorrhage    Constipation     HPI  Here for follow-up on blood pressure and thyroid denies any new complaints    Current Outpatient Medications   Medication Sig Dispense Refill    metoprolol succinate (TOPROL XL) 100 MG extended release tablet One daily 90 tablet 0    hydroCHLOROthiazide (HYDRODIURIL) 25 MG tablet TAKE 1 TABLET BY MOUTH EVERY DAY 90 tablet 0    levothyroxine (SYNTHROID) 150 MCG tablet TAKE 1 TABLET BY MOUTH ONCE DAILY FOR 5 DAYS EVERY WEEK 60 tablet 0    desonide (DESOWEN) 0.05 % cream Apply topically 2 times daily. 90 g 0    Multiple Vitamin (MULTI-DAY VITAMINS PO) Take by mouth      aspirin 81 MG tablet Take 81 mg by mouth daily      vitamin B-12 (CYANOCOBALAMIN) 500 MCG tablet Take 500 mcg by mouth daily      budesonide-formoterol (SYMBICORT) 80-4.5 MCG/ACT AERO Inhale 2 puffs into the lungs 2 times daily (Patient not taking: Reported on 7/7/2020) 1 Inhaler 0     No current facility-administered medications for this visit.         Allergies   Allergen Reactions    Erythromycin Diarrhea       Past Medical History:   Diagnosis Date    Anxiety     Arthritis     knee    Asthma     Colon polyp 02/21/2019    tubular adenoma; hyperplastic polyp    Colon polyps     Cyst of kidney, acquired     bilat.     Fatigue     Hypertension     Hypothyroidism     Neuroendocrine tumor 02/21/2019    of stomach    Obesity     Pharyngoesophageal dysphagia     Vocal cord polyps     Wears glasses        Past Surgical History:   Procedure Laterality Date    CHOLECYSTECTOMY  10/09/2014    COLONOSCOPY  02/21/2019    tubular adenoma; hyperplastic polyp    COLONOSCOPY      over 5 yrs ago per pt with polyps (2-)    CYSTOURETHROSCOPY/STENT REMOVAL  5/3/11    ENDOSCOPIC ULTRASOUND (LOWER) N/A 1/22/2020    ENDOSCOPIC ULTRASOUND WITH POSSIBLE EMR performed by Benetta Aase, MD at [facility]    ERCP      HIP SURGERY Left     soft tissue tumor removal    THROAT SURGERY      vocal cord polyps removed    UPPER GASTROINTESTINAL ENDOSCOPY  02/21/2019    Neuroendocrine tumor    UPPER GASTROINTESTINAL ENDOSCOPY  09/23/2019    UPPER GASTROINTESTINAL ENDOSCOPY N/A 9/23/2019    EGD BIOPSY performed by Letty Zepeda MD at [address] N/A 10/23/2019    EGD W/ EMR performed by Benetta Aase, MD at [address] N/A 10/29/2019    EGD CONTROL HEMORRHAGE performed by Suzie Frias MD at [facility]       Family History   Problem Relation Age of Onset    High Blood Pressure Mother     High Blood Pressure Sister     Stomach Cancer Sister     Other Sister         epilepsy    No Known Problems Father      ROS   Constitutional:  Negative for fatigue, loss of appetite and unexpected weight change   HEENT            : Negative for neck stiffness and pain, no congestion or sinus pressure   Eyes                : No visual disturbance or pain   Cardiovascular: No chest pain or palpitations or leg swelling   Respiratory      : Negative for cough, shortness of breath or wheezing   Gastrointestinal: Negative for abdominal pain, constipation or diarrhea and bloating No nausea or vomiting   Genitourinary:     No urgency or frequency, no burning or hematuria   Musculoskeletal: No arthralgias, back pain or myalgias   Skin                  : Negative for rash or erythema   Neurological    : Negative for dizziness, weakness, tremors ,light headedness or syncope   Psychiatric       : Negative for dysphoric mood, sleep disturbances, nervous or anxious, or decreased concentration   All other review of systems was negative    Objective  Physical Examination:    Nursing note reviewed    /86 (Site: Right Upper Arm, Position: Sitting, Cuff Size: Large Adult)   Pulse 68   Temp 97.7 °F (36.5 °C) (Temporal)   Resp 18   Ht 5' 4.02\" (1.626 m)   Wt 257 lb 6.4 oz (116.8 kg)   LMP 01/01/1994   SpO2 99%   Breastfeeding No   BMI 44.16 kg/m²   BP Readings from Last 3 Encounters:   08/21/20 132/86   07/07/20 (!) 140/96   03/02/20 110/76         Constitutional:  Norm Royalty is oriented to place, person and time ,appears well-developed and well-nourished  HEENT:  Atraumatic and normocephalic, external ears normal bilaterally, nose normal no oropharyngeal exudate and is clear and moist  Eyes:  EOCM normal; conjunctivae normal; PERRLA bilaterally  Neck:  Normal range of motion, neck supple, no JVD and no thyromegaly  Cardiovascular:  RRR, normal heart sounds and intact distal pulses  Pulmonary:  effort normal and breath sounds normal bilaterally,no wheezes or rales, no respiratory distress  Abdominal:  Soft, non-tender; normal bowel sounds, no masses  Musculoskeletal:  Normal range of motion and no edema or tenderness bilaterally  No lymphadenopathy  Neurological:  alert, oriented, and normal reflexes bilaterally  Skin: warm and dry  Psychiatric:  normal mood and effect; behavior normal.    Labs:   No results found for: LABA1C  Lab Results   Component Value Date    CHOL 159 03/12/2018     Lab Results   Component Value Date    HDL 46 03/12/2018     No results found for: [address]  Lab Results   Component Value Date    TRIG 99 03/12/2018     No components found for: [place], [place]  Lab Results   Component Value Date    WBC 7.2 02/22/2020    HGB 13.0 02/22/2020    HCT 41.4 02/22/2020    MCV 79.2 (L) 02/22/2020     02/22/2020     Lab Results   Component Value Date    INR 0.9 10/28/2019    PROTIME 9.9 10/28/2019     Lab Results   Component Value Date    GLUCOSE 91 10/31/2019    CREATININE 0.56 10/31/2019    BUN 6 10/31/2019     (H) 10/31/2019    K 3.8 10/31/2019     (H) 10/31/2019    CO2 22 10/31/2019     Lab Results   Component Value Date    ALT 14 10/31/2019    AST 17 10/31/2019    ALKPHOS 72 10/31/2019    BILITOT 0.20 (L) 10/31/2019     Lab Results   Component Value Date    LABALBU 3.0 (L) 10/31/2019     Lab Results   Component Value Date    TSH 0.02 (L) 04/23/2019     Assessment:  1. Essential hypertension    2. Neuroendocrine tumor    3. Hypothyroidism, unspecified type    4. Gastroesophageal reflux disease without esophagitis        Plan:  Blood pressure is stable on current medications and continue same  Patient's visit to the [facility] and reports reviewed and patient is waiting for gastric fluid analysis and also genetic analysis before initiating the treatment plan and patient also seen hematology and oncology specialist here and also seeing GI for follow-up and patient is started on Prilosec for acid reflux and cough and patient states it did not help much and advised her to follow-up with them and today patient sounds clear on auscultation  Patient had no recent lab work done for thyroid and last TSH was 0.02 and new labs ordered  Patient quit smoking 8 years back  Discussed about weight loss medication and advised patient to wait until her work-up is completed  Review in 4 months           1. Gabriel Boyd received counseling on the following healthy behaviors: nutrition and exercise    2. Prior labs and health maintenance reviewed. 3.  Discussed use, benefit, and side effects of prescribed medications.   Barriers to medication compliance addressed. All her questions were answered. Pt voiced understanding. Camron Petty will continue current medications, diet and exercise. Orders Placed This Encounter   Medications    metoprolol succinate (TOPROL XL) 100 MG extended release tablet     Sig: One daily     Dispense:  90 tablet     Refill:  0    hydroCHLOROthiazide (HYDRODIURIL) 25 MG tablet     Sig: TAKE 1 TABLET BY MOUTH EVERY DAY     Dispense:  90 tablet     Refill:  0     REFILLS REQUESTED    levothyroxine (SYNTHROID) 150 MCG tablet     Sig: TAKE 1 TABLET BY MOUTH ONCE DAILY FOR 5 DAYS EVERY WEEK     Dispense:  60 tablet     Refill:  0    desonide (DESOWEN) 0.05 % cream     Sig: Apply topically 2 times daily. Dispense:  90 g     Refill:  0          Completed Refills               Requested Prescriptions     Signed Prescriptions Disp Refills    metoprolol succinate (TOPROL XL) 100 MG extended release tablet 90 tablet 0     Sig: One daily    hydroCHLOROthiazide (HYDRODIURIL) 25 MG tablet 90 tablet 0     Sig: TAKE 1 TABLET BY MOUTH EVERY DAY    levothyroxine (SYNTHROID) 150 MCG tablet 60 tablet 0     Sig: TAKE 1 TABLET BY MOUTH ONCE DAILY FOR 5 DAYS EVERY WEEK    desonide (DESOWEN) 0.05 % cream 90 g 0     Sig: Apply topically 2 times daily. 4. Patient given educational materials - see patient instructions    5. Was a self-tracking handout given in paper form or via [employer]?   NO    Orders Placed This Encounter   Procedures    Comprehensive Metabolic Panel     Standing Status:   Future     Standing Expiration Date:   8/21/2021    CBC     Standing Status:   Future     Standing Expiration Date:   8/21/2021    CK     Standing Status:   Future     Standing Expiration Date:   8/21/2021    Lipid Panel     Standing Status:   Future     Standing Expiration Date:   8/21/2021     Order Specific Question:   Is Patient Fasting?/# of Hours     Answer:   12    TSH without Reflex     Standing Status:   Future     Standing Expiration Date:   8/21/2021    T4, Free     Standing Status:   Future     Standing Expiration Date:   8/21/2021     Return in about 4 months (around 12/21/2020). Patient voiced understanding and agreed to treatment plan. Electronically signed by Melvina Horn MD on 8/21/2020 at 10:09 AM    This note is created with a voice recognition program and while intend to generate a document that accurately reflects the content of the visit, no guarantee can be provided that every mistake has been identified and corrected by editing.

## 2020-08-27 ENCOUNTER — HOSPITAL ENCOUNTER (OUTPATIENT)
Age: 60
Setting detail: SPECIMEN
Discharge: HOME | End: 2020-08-27
Payer: COMMERCIAL

## 2020-08-27 LAB
ALBUMIN SERPL-MCNC: 3.7 G/DL (ref 3.5–5.2)
ALBUMIN/GLOBULIN RATIO: 1 (ref 1–2.5)
ALP BLD-CCNC: 118 U/L (ref 35–104)
ALT SERPL-CCNC: 52 U/L (ref 5–33)
ANION GAP SERPL CALCULATED.3IONS-SCNC: 12 MMOL/L (ref 9–17)
AST SERPL-CCNC: 53 U/L
BILIRUB SERPL-MCNC: 0.39 MG/DL (ref 0.3–1.2)
BUN BLDV-MCNC: 11 MG/DL (ref 6–20)
BUN/CREAT BLD: ABNORMAL (ref 9–20)
CALCIUM SERPL-MCNC: 8.8 MG/DL (ref 8.6–10.4)
CHLORIDE BLD-SCNC: 100 MMOL/L (ref 98–107)
CHOLESTEROL/HDL RATIO: 4
CHOLESTEROL: 182 MG/DL
CO2: 27 MMOL/L (ref 20–31)
CREAT SERPL-MCNC: 0.65 MG/DL (ref 0.5–0.9)
GFR AFRICAN AMERICAN: >60 ML/MIN
GFR NON-AFRICAN AMERICAN: >60 ML/MIN
GFR SERPL CREATININE-BSD FRML MDRD: ABNORMAL ML/MIN/{1.73_M2}
GFR SERPL CREATININE-BSD FRML MDRD: ABNORMAL ML/MIN/{1.73_M2}
GLUCOSE BLD-MCNC: 98 MG/DL (ref 70–99)
HCT VFR BLD CALC: 43.1 % (ref 36.3–47.1)
HDLC SERPL-MCNC: 45 MG/DL
HEMOGLOBIN: 13.8 G/DL (ref 11.9–15.1)
LDL CHOLESTEROL: 116 MG/DL (ref 0–130)
MCH RBC QN AUTO: 27 PG (ref 25.2–33.5)
MCHC RBC AUTO-ENTMCNC: 32 G/DL (ref 28.4–34.8)
MCV RBC AUTO: 84.3 FL (ref 82.6–102.9)
NRBC AUTOMATED: 0 PER 100 WBC
PDW BLD-RTO: 14.6 % (ref 11.8–14.4)
PLATELET # BLD: 346 K/UL (ref 138–453)
PMV BLD AUTO: 10 FL (ref 8.1–13.5)
POTASSIUM SERPL-SCNC: 3.6 MMOL/L (ref 3.7–5.3)
RBC # BLD: 5.11 M/UL (ref 3.95–5.11)
SODIUM BLD-SCNC: 139 MMOL/L (ref 135–144)
THYROXINE, FREE: 1.09 NG/DL (ref 0.93–1.7)
TOTAL CK: 108 U/L (ref 26–192)
TOTAL PROTEIN: 7.3 G/DL (ref 6.4–8.3)
TRIGL SERPL-MCNC: 104 MG/DL
TSH SERPL DL<=0.05 MIU/L-ACNC: 0.17 MIU/L (ref 0.3–5)
VLDLC SERPL CALC-MCNC: NORMAL MG/DL (ref 1–30)
WBC # BLD: 7.4 K/UL (ref 3.5–11.3)

## 2020-09-21 ENCOUNTER — TELEPHONE (OUTPATIENT)
Dept: ONCOLOGY | Age: 60
End: 2020-09-21

## 2020-09-21 NOTE — TELEPHONE ENCOUNTER
Writer called patient to get CC plan. Writer has made acouple of attempts to contact Dr. Steffi Gonsales office at [address] [place] [address] without any return call. No answer, detailed message left. Will await a return call. Pt was reminded of f/u with Dr. Sam Rubin on VM as well for 10/13/2020.

## 2020-10-07 ENCOUNTER — HOSPITAL ENCOUNTER (OUTPATIENT)
Facility: MEDICAL CENTER | Age: 60
End: 2020-10-07
Payer: COMMERCIAL

## 2020-10-30 RX ORDER — LEVOTHYROXINE SODIUM 0.15 MG/1
TABLET ORAL
Qty: 60 TABLET | Refills: 3 | Status: SHIPPED | OUTPATIENT
Start: 2020-10-30 | End: 2021-02-04 | Stop reason: DRUGHIGH

## 2020-11-02 ENCOUNTER — HOSPITAL ENCOUNTER (OUTPATIENT)
Age: 60
Discharge: HOME | End: 2020-11-02
Payer: COMMERCIAL

## 2020-11-02 PROCEDURE — 36415 COLL VENOUS BLD VENIPUNCTURE: CPT

## 2020-11-02 PROCEDURE — 82941 ASSAY OF GASTRIN: CPT

## 2020-11-04 LAB — GASTRIN: 500 PG/ML (ref 0–100)

## 2020-11-10 ENCOUNTER — HOSPITAL ENCOUNTER (OUTPATIENT)
Dept: MRI IMAGING | Age: 60
Discharge: HOME | End: 2020-11-12
Payer: COMMERCIAL

## 2020-11-10 LAB
CREAT SERPL-MCNC: 0.79 MG/DL (ref 0.5–0.9)
GFR AFRICAN AMERICAN: >60 ML/MIN
GFR NON-AFRICAN AMERICAN: >60 ML/MIN
GFR SERPL CREATININE-BSD FRML MDRD: NORMAL ML/MIN/{1.73_M2}
GFR SERPL CREATININE-BSD FRML MDRD: NORMAL ML/MIN/{1.73_M2}

## 2020-11-10 PROCEDURE — A9581 GADOXETATE DISODIUM INJ: HCPCS | Performed by: STUDENT IN AN ORGANIZED HEALTH CARE EDUCATION/TRAINING PROGRAM

## 2020-11-10 PROCEDURE — 82565 ASSAY OF CREATININE: CPT

## 2020-11-10 PROCEDURE — 2580000003 HC RX 258: Performed by: STUDENT IN AN ORGANIZED HEALTH CARE EDUCATION/TRAINING PROGRAM

## 2020-11-10 PROCEDURE — 74183 MRI ABD W/O CNTR FLWD CNTR: CPT

## 2020-11-10 PROCEDURE — 6360000004 HC RX CONTRAST MEDICATION: Performed by: STUDENT IN AN ORGANIZED HEALTH CARE EDUCATION/TRAINING PROGRAM

## 2020-11-10 PROCEDURE — 36415 COLL VENOUS BLD VENIPUNCTURE: CPT

## 2020-11-10 RX ORDER — SODIUM CHLORIDE 0.9 % (FLUSH) 0.9 %
10 SYRINGE (ML) INJECTION
Status: COMPLETED | OUTPATIENT
Start: 2020-11-10 | End: 2020-11-10

## 2020-11-10 RX ORDER — 0.9 % SODIUM CHLORIDE 0.9 %
20 INTRAVENOUS SOLUTION INTRAVENOUS
Status: DISCONTINUED | OUTPATIENT
Start: 2020-11-10 | End: 2020-11-13 | Stop reason: HOSPADM

## 2020-11-10 RX ADMIN — Medication 10 ML: at 10:23

## 2020-11-10 RX ADMIN — GADOXETATE DISODIUM 10 ML: 181.43 INJECTION, SOLUTION INTRAVENOUS at 10:22

## 2020-11-18 ENCOUNTER — TELEPHONE (OUTPATIENT)
Dept: ONCOLOGY | Age: 60
End: 2020-11-18

## 2020-11-18 NOTE — TELEPHONE ENCOUNTER
BUFFY PHONED AND LEFT MESSAGE IN TRIAGE THAT SHE HAD MISSED HER APPT 10/13/2020 DUE TO BEING AT [facility]. SHE IS ACTIVELY FOLLOWING AT  AND IS CURRENTLY SCHEDULED FOR SURGERY 12/10/2020 @ [facility].     LEFT CONTACT NUMBER IF WE WOULD NEED TO REACH  [phone]

## 2020-12-04 RX ORDER — METOPROLOL SUCCINATE 100 MG/1
TABLET, EXTENDED RELEASE ORAL
Qty: 90 TABLET | Refills: 0 | Status: SHIPPED | OUTPATIENT
Start: 2020-12-04 | End: 2021-03-10 | Stop reason: SDUPTHER

## 2020-12-04 RX ORDER — HYDROCHLOROTHIAZIDE 25 MG/1
TABLET ORAL
Qty: 90 TABLET | Refills: 0 | Status: SHIPPED | OUTPATIENT
Start: 2020-12-04 | End: 2021-03-10 | Stop reason: SDUPTHER

## 2020-12-04 NOTE — TELEPHONE ENCOUNTER
Suyapa Serrato is calling to request a refill on the following medication(s):    Medication Request:  Requested Prescriptions     Pending Prescriptions Disp Refills    metoprolol succinate (TOPROL XL) 100 MG extended release tablet 90 tablet 0     Sig: One daily    hydroCHLOROthiazide (HYDRODIURIL) 25 MG tablet 90 tablet 0     Sig: TAKE 1 TABLET BY MOUTH EVERY DAY       Last Visit Date (If Applicable):  9/38/3794    Next Visit Date:    1/21/2021

## 2020-12-10 ENCOUNTER — APPOINTMENT (OUTPATIENT)
Dept: GENERAL RADIOLOGY | Age: 60
End: 2020-12-10
Payer: COMMERCIAL

## 2020-12-10 ENCOUNTER — HOSPITAL ENCOUNTER (EMERGENCY)
Age: 60
Discharge: HOME | End: 2020-12-10
Attending: EMERGENCY MEDICINE
Payer: COMMERCIAL

## 2020-12-10 VITALS
DIASTOLIC BLOOD PRESSURE: 96 MMHG | SYSTOLIC BLOOD PRESSURE: 141 MMHG | OXYGEN SATURATION: 98 % | RESPIRATION RATE: 18 BRPM | TEMPERATURE: 97 F | HEART RATE: 76 BPM

## 2020-12-10 LAB
-: ABNORMAL
ABSOLUTE EOS #: 0.11 K/UL (ref 0–0.44)
ABSOLUTE IMMATURE GRANULOCYTE: 0.03 K/UL (ref 0–0.3)
ABSOLUTE LYMPH #: 1.49 K/UL (ref 1.1–3.7)
ABSOLUTE MONO #: 0.91 K/UL (ref 0.1–1.2)
ALBUMIN SERPL-MCNC: 3.1 G/DL (ref 3.5–5.2)
ALBUMIN/GLOBULIN RATIO: 0.6 (ref 1–2.5)
ALP BLD-CCNC: 129 U/L (ref 35–104)
ALT SERPL-CCNC: 35 U/L (ref 5–33)
AMORPHOUS: ABNORMAL
ANION GAP SERPL CALCULATED.3IONS-SCNC: 16 MMOL/L (ref 9–17)
AST SERPL-CCNC: 37 U/L
BACTERIA: ABNORMAL
BASOPHILS # BLD: 0 % (ref 0–2)
BASOPHILS ABSOLUTE: 0.03 K/UL (ref 0–0.2)
BILIRUB SERPL-MCNC: 0.4 MG/DL (ref 0.3–1.2)
BILIRUBIN URINE: NEGATIVE
BUN BLDV-MCNC: 9 MG/DL (ref 6–20)
BUN/CREAT BLD: ABNORMAL (ref 9–20)
CALCIUM SERPL-MCNC: 9.4 MG/DL (ref 8.6–10.4)
CASTS UA: ABNORMAL /LPF (ref 0–2)
CASTS UA: ABNORMAL /LPF (ref 0–2)
CHLORIDE BLD-SCNC: 95 MMOL/L (ref 98–107)
CO2: 25 MMOL/L (ref 20–31)
COLOR: YELLOW
COMMENT UA: ABNORMAL
CREAT SERPL-MCNC: 0.67 MG/DL (ref 0.5–0.9)
CRYSTALS, UA: ABNORMAL /HPF
DIFFERENTIAL TYPE: ABNORMAL
EOSINOPHILS RELATIVE PERCENT: 1 % (ref 1–4)
EPITHELIAL CELLS UA: ABNORMAL /HPF (ref 0–5)
GFR AFRICAN AMERICAN: >60 ML/MIN
GFR NON-AFRICAN AMERICAN: >60 ML/MIN
GFR SERPL CREATININE-BSD FRML MDRD: ABNORMAL ML/MIN/{1.73_M2}
GFR SERPL CREATININE-BSD FRML MDRD: ABNORMAL ML/MIN/{1.73_M2}
GLUCOSE BLD-MCNC: 90 MG/DL (ref 70–99)
GLUCOSE URINE: NEGATIVE
HCT VFR BLD CALC: 41.1 % (ref 36.3–47.1)
HEMOGLOBIN: 13 G/DL (ref 11.9–15.1)
IMMATURE GRANULOCYTES: 0 %
KETONES, URINE: ABNORMAL
LEUKOCYTE ESTERASE, URINE: NEGATIVE
LYMPHOCYTES # BLD: 19 % (ref 24–43)
MAGNESIUM: 1.7 MG/DL (ref 1.6–2.6)
MCH RBC QN AUTO: 26.7 PG (ref 25.2–33.5)
MCHC RBC AUTO-ENTMCNC: 31.6 G/DL (ref 28.4–34.8)
MCV RBC AUTO: 84.4 FL (ref 82.6–102.9)
MONOCYTES # BLD: 12 % (ref 3–12)
MUCUS: ABNORMAL
NITRITE, URINE: NEGATIVE
NRBC AUTOMATED: 0 PER 100 WBC
OTHER OBSERVATIONS UA: ABNORMAL
PDW BLD-RTO: 14.1 % (ref 11.8–14.4)
PH UA: 5 (ref 5–8)
PLATELET # BLD: 477 K/UL (ref 138–453)
PLATELET ESTIMATE: ABNORMAL
PMV BLD AUTO: 8.9 FL (ref 8.1–13.5)
POTASSIUM SERPL-SCNC: 3.2 MMOL/L (ref 3.7–5.3)
PROTEIN UA: NEGATIVE
RBC # BLD: 4.87 M/UL (ref 3.95–5.11)
RBC # BLD: ABNORMAL 10*6/UL
RBC UA: ABNORMAL /HPF (ref 0–2)
RENAL EPITHELIAL, UA: ABNORMAL /HPF
SEG NEUTROPHILS: 68 % (ref 36–65)
SEGMENTED NEUTROPHILS ABSOLUTE COUNT: 5.25 K/UL (ref 1.5–8.1)
SODIUM BLD-SCNC: 136 MMOL/L (ref 135–144)
SPECIFIC GRAVITY UA: 1.01 (ref 1–1.03)
TOTAL PROTEIN: 7.9 G/DL (ref 6.4–8.3)
TRICHOMONAS: ABNORMAL
TROPONIN INTERP: NORMAL
TROPONIN T: NORMAL NG/ML
TROPONIN, HIGH SENSITIVITY: 6 NG/L (ref 0–14)
TURBIDITY: ABNORMAL
URINE HGB: NEGATIVE
UROBILINOGEN, URINE: NORMAL
WBC # BLD: 7.8 K/UL (ref 3.5–11.3)
WBC # BLD: ABNORMAL 10*3/UL
WBC UA: ABNORMAL /HPF (ref 0–5)
YEAST: ABNORMAL

## 2020-12-10 PROCEDURE — 83735 ASSAY OF MAGNESIUM: CPT

## 2020-12-10 PROCEDURE — 87086 URINE CULTURE/COLONY COUNT: CPT

## 2020-12-10 PROCEDURE — 85025 COMPLETE CBC W/AUTO DIFF WBC: CPT

## 2020-12-10 PROCEDURE — 2580000003 HC RX 258: Performed by: STUDENT IN AN ORGANIZED HEALTH CARE EDUCATION/TRAINING PROGRAM

## 2020-12-10 PROCEDURE — 80053 COMPREHEN METABOLIC PANEL: CPT

## 2020-12-10 PROCEDURE — 93005 ELECTROCARDIOGRAM TRACING: CPT | Performed by: STUDENT IN AN ORGANIZED HEALTH CARE EDUCATION/TRAINING PROGRAM

## 2020-12-10 PROCEDURE — 6370000000 HC RX 637 (ALT 250 FOR IP): Performed by: STUDENT IN AN ORGANIZED HEALTH CARE EDUCATION/TRAINING PROGRAM

## 2020-12-10 PROCEDURE — 84484 ASSAY OF TROPONIN QUANT: CPT

## 2020-12-10 PROCEDURE — 74022 RADEX COMPL AQT ABD SERIES: CPT

## 2020-12-10 PROCEDURE — 99283 EMERGENCY DEPT VISIT LOW MDM: CPT

## 2020-12-10 PROCEDURE — 81001 URINALYSIS AUTO W/SCOPE: CPT

## 2020-12-10 RX ORDER — 0.9 % SODIUM CHLORIDE 0.9 %
500 INTRAVENOUS SOLUTION INTRAVENOUS ONCE
Status: COMPLETED | OUTPATIENT
Start: 2020-12-10 | End: 2020-12-10

## 2020-12-10 RX ADMIN — POTASSIUM & SODIUM PHOSPHATES POWDER PACK 280-160-250 MG 250 MG: 280-160-250 PACK at 07:57

## 2020-12-10 RX ADMIN — SODIUM CHLORIDE 500 ML: 0.9 INJECTION, SOLUTION INTRAVENOUS at 05:59

## 2020-12-10 ASSESSMENT — ENCOUNTER SYMPTOMS
DIARRHEA: 0
RHINORRHEA: 0
SHORTNESS OF BREATH: 0
NAUSEA: 0
CONSTIPATION: 0
EYE PAIN: 0
COUGH: 0
VOMITING: 0
ABDOMINAL PAIN: 1
SORE THROAT: 0

## 2020-12-10 NOTE — ED NOTES
Pt arrives to ED via self. Pt states she has been feeling fatigued over the last few days. Pt states she had her stomach removed on November 30th. Pt states she has been feeling nauseas. rr even and unlabored. VSS.      Camilla Newman RN  12/10/20 [id number]

## 2020-12-10 NOTE — ED PROVIDER NOTES
[facility]     Emergency Department     Faculty Attestation    I performed a history and physical examination of the patient and discussed management with the resident. I have reviewed and agree with the residents findings including all diagnostic interpretations, and treatment plans as written. Any areas of disagreement are noted on the chart. I was personally present for the key portions of any procedures. I have documented in the chart those procedures where I was not present during the key portions. I have reviewed the emergency nurses triage note. I agree with the chief complaint, past medical history, past surgical history, allergies, medications, social and family history as documented unless otherwise noted below. Documentation of the HPI, Physical Exam and Medical Decision Making performed by scribning is based on my personal performance of the HPI, PE and MDM. For Physician Assistant/ Nurse Practitioner cases/documentation I have personally evaluated this patient and have completed at least one if not all key elements of the E/M (history, physical exam, and MDM). Additional findings are as noted. 61 yo F pt had gastric surgery last month, no fever, pt c/o fatigue, weakness, no cp, no syncope, no diaphoresis, no vomit,   pe Fco RN escort for exam vss, mild tender r abdomen, no rebound, no rigidity or distension, laparoscopic sites clean dry intact,   No calf tenderness, trace ankle edema,     Pt being cared for by family member, Emmanuelle Davis, will admit to obs d/t social issue,     EKG Interpretation    Interpreted by me  Normal sinus, hr 61, no ischemia, L axis, t wave inversion 3, avf, v 1 -v4 [consistent with ekg from October 2019]    CRITICAL CARE: There was a high probability of clinically significant/life threatening deterioration in this patient's condition which required my urgent intervention. Total critical care time was 10 minutes.   This excludes any time for separately reportable procedures.        [place], DO  12/10/20 [facility], DO  12/10/20 [facility], DO  12/10/20 1957

## 2020-12-10 NOTE — ED PROVIDER NOTES
[facility] ED  Emergency Department Encounter  Emergency Medicine Resident     Pt Name: Lloyd Mays  MRN: [id number]  [place] 1960  Date of evaluation: 12/10/20  PCP:  Johnell Peabody, MD    [address] [place] [address]       Chief Complaint   Patient presents with    Fatigue       HISTORY [place]  (Location/Symptom, Timing/Onset, Context/Setting, Quality, Duration, Modifying Factors,Severity.)      Lloyd Mays is a 62 y. o.yo female with past medical history significant for neuroendocrine tumor, recent surgery at [facility], who presents with fatigue for the past 2 days. Patient states that she is generally feeling weak and fatigued possibly related to her new diet from gastric surgery. Patient states she has been mainly on liquids including water, broth, protein shakes. Denies fever, chills, nausea, vomiting, bloody emesis, blood in stools. Patient did move her bowels once today this is a first time since surgery, did relate that it was diarrhea. Denies chest pain, shortness of breath. Mild right-sided abdominal pain, patient states is likely related to her surgery. Does not radiate. Patient states that she just feels fatigued. Note patient was recently seen at [facility] and was discharged on 12/5/2020 after a gastric bypass with Qamar-en-Y esophagojejunostomy. Has had no acute changes since discharge from [facility]. PAST MEDICAL / SURGICAL / SOCIAL / FAMILY HISTORY      has a past medical history of Anxiety, Arthritis, Asthma, Colon polyp, Colon polyps, Cyst of kidney, acquired, Fatigue, Hypertension, Hypothyroidism, Neuroendocrine tumor, Obesity, Pharyngoesophageal dysphagia, Vocal cord polyps, and Wears glasses. has a past surgical history that includes cystourethroscopy/stent removal (5/3/11); Colonoscopy (02/21/2019); ERCP; Cholecystectomy (10/09/2014); hip surgery (Left); Throat surgery; Colonoscopy;  Upper gastrointestinal endoscopy (02/21/2019); Upper gastrointestinal endoscopy (09/23/2019); Upper gastrointestinal endoscopy (N/A, 9/23/2019); Upper gastrointestinal endoscopy (N/A, 10/23/2019); Upper gastrointestinal endoscopy (N/A, 10/29/2019); and Endoscopic ultrasonography, GI (N/A, 1/22/2020). Social:  reports that she quit smoking about 8 years ago. She has a 25.00 pack-year smoking history. She has never used smokeless tobacco. She reports current alcohol use. She reports that she does not use drugs. Family Hx:   Family History   Problem Relation Age of Onset    High Blood Pressure Mother     High Blood Pressure Sister     Stomach Cancer Sister     Other Sister         epilepsy    No Known Problems Father         Allergies:  Erythromycin    Home Medications:  Prior to Admission medications    Medication Sig Start Date End Date Taking? Authorizing Provider   metoprolol succinate (TOPROL XL) 100 MG extended release tablet One daily 12/4/20   Anayeli Norman MD   hydroCHLOROthiazide (HYDRODIURIL) 25 MG tablet TAKE 1 TABLET BY MOUTH EVERY DAY 12/4/20   Anayeli Norman MD   levothyroxine (SYNTHROID) 150 MCG tablet TAKE 1 TABLET BY MOUTH ONCE DAILY FOR 5 DAYS EVERY WEEK 10/30/20   Anayeli Norman MD   desonide (DESOWEN) 0.05 % cream Apply topically 2 times daily. 8/21/20   Anayeli Norman MD   Multiple Vitamin (MULTI-DAY VITAMINS PO) Take by mouth    Historical Provider, MD   budesonide-formoterol (SYMBICORT) 80-4.5 MCG/ACT AERO Inhale 2 puffs into the lungs 2 times daily  Patient not taking: Reported on 7/7/2020 12/16/19   Anayeli Norman MD   aspirin 81 MG tablet Take 81 mg by mouth daily    Historical Provider, MD   vitamin B-12 (CYANOCOBALAMIN) 500 MCG tablet Take 500 mcg by mouth daily    Historical Provider, MD       REVIEW OFSYSTEMS    (2-9 systems for level 4, 10 or more for level 5)      Review of Systems   Constitutional: Positive for fatigue (over the past 2 days).  Negative for activity change, chills and fever.   HENT: Negative for congestion, rhinorrhea and sore throat. Eyes: Negative for pain and visual disturbance. Respiratory: Negative for cough and shortness of breath. Cardiovascular: Negative for chest pain and palpitations. Gastrointestinal: Positive for abdominal pain (right sided pain persistent since abdominal surgery ). Negative for constipation, diarrhea, nausea and vomiting. Genitourinary: Negative for difficulty urinating and frequency. Musculoskeletal: Negative for arthralgias and myalgias. Skin: Negative for rash and wound. Neurological: Negative for dizziness, syncope, light-headedness and headaches. PHYSICAL EXAM   (up to 7 for level 4, 8 or more forlevel 5)      INITIAL VITALS:   Vitals:    12/10/20 0522   BP: (!) 141/96   Pulse: 76   Resp: 18   Temp:    SpO2: 98%        Physical Exam  Vitals signs and nursing note reviewed. Constitutional:       General: She is not in acute distress. Appearance: Normal appearance. She is not ill-appearing. HENT:      Head: Normocephalic and atraumatic. Nose: Nose normal.      Mouth/Throat:      Mouth: Mucous membranes are moist.      Pharynx: Oropharynx is clear. Eyes:      General:         Right eye: No discharge. Left eye: No discharge. Cardiovascular:      Rate and Rhythm: Normal rate and regular rhythm. Heart sounds: No murmur. No friction rub. No gallop. Pulmonary:      Effort: Pulmonary effort is normal. No respiratory distress. Breath sounds: Normal breath sounds. Abdominal:      General: There is no distension. Palpations: Abdomen is soft. Tenderness: There is no abdominal tenderness. Comments: Surgical changes healing well, no purulence or erythema noted around incisions. No distension noted. No significant tenderness or rebound. Voluntary guarding noted with palpation of right side. Skin:     General: Skin is warm and dry.    Neurological:      General: No focal deficit present. Mental Status: She is alert and oriented to person, place, and time. Psychiatric:         Mood and Affect: Mood normal.         Thought Content: Thought content normal.         DIFFERENTIAL  DIAGNOSIS       DDX: Gastritis versus abdominal complication postsurgical versus viral illness versus expected changes post surgical.    Initial MDM/Plan: 62 y.o. female who presents with acute fatigue and generalized weakness after surgery 10 days ago. Patient states that she had \"her stomach resected\" has been on liquid diet including water, broth, protein shakes. We will plan for CBC, CMP, troponin, EKG, acute abdominal series with chest x-ray, urinalysis. DIAGNOSTIC RESULTS / EMERGENCYDEPARTMENT COURSE / MDM     LABS:  Labs Reviewed   CBC WITH AUTO DIFFERENTIAL - Abnormal; Notable for the following components:       Result Value    Platelets 829 (*)     Seg Neutrophils 68 (*)     Lymphocytes 19 (*)     All other components within normal limits   COMPREHENSIVE METABOLIC PANEL W/ REFLEX TO MG FOR LOW K - Abnormal; Notable for the following components:    Potassium 3.2 (*)     Chloride 95 (*)     Alkaline Phosphatase 129 (*)     ALT 35 (*)     AST 37 (*)     Alb 3.1 (*)     Albumin/Globulin Ratio 0.6 (*)     All other components within normal limits   URINALYSIS - Abnormal; Notable for the following components:    Turbidity UA CLOUDY (*)     Ketones, Urine MODERATE (*)     All other components within normal limits   MICROSCOPIC URINALYSIS - Abnormal; Notable for the following components:    Bacteria, UA FEW (*)     All other components within normal limits   CULTURE, URINE   TROPONIN   MAGNESIUM         RADIOLOGY:  Xr Acute Abd Series Chest 1 Vw    Result Date: 12/10/2020  EXAMINATION: TWO XRAY VIEWS OF THE ABDOMEN AND SINGLE  XRAY VIEW OF THE CHEST 12/10/2020 7:09 am COMPARISON: None.  HISTORY: ORDERING SYSTEM PROVIDED HISTORY: recent abdominal surgery, fatigue TECHNOLOGIST PROVIDED HISTORY: recent likely secondary to a dirty catch. Will not treat with antibiotics at this time as there is no leukocyte esterase, no nitrates within the urine. Discussed with patient that electrolyte abnormalities are common after GI procedure specifically Qamar-en-Y's. She states that her surgeon went over this with her. Discussed that it is very important for her to follow-up with her surgeon and her primary care provider. Also to ensure adequate hydration and adequate electrolyte, protein in her diet. Patient states that she has her nutrition information from her surgeon at home and that she has been following the diet. Discussed strict return precautions with patient. She is compliant and understanding of plan for discharge home with follow-up with her primary care provider and plan for follow-up as scheduled with her GI surgeon. PROCEDURES:  None    CONSULTS:  None      FINAL IMPRESSION      1. Other fatigue          DISPOSITION / PLAN     DISPOSITION        PATIENT REFERRED TO:  Carmen Stanton MD  [facility]  [address] [id number]    Call   For ED follow up appointment    [facility]      Be sure to keep your follow-up appointment with your surgeon at the [facility] clinic as we discussed.     [facility]  [address] [address] [phone]    As needed, If symptoms worsen      DISCHARGE MEDICATIONS:  New Prescriptions    No medications on file       Paddy Miller DO  Emergency Medicine Resident    (Please note that portions of this note were completed with a voice recognition program.Efforts were made to edit the dictations but occasionally words are mis-transcribed.)       Paddy Miller DO  Resident  12/10/20 [id number]

## 2020-12-11 LAB
CULTURE: NORMAL
EKG ATRIAL RATE: 61 BPM
EKG P AXIS: 17 DEGREES
EKG P-R INTERVAL: 124 MS
EKG Q-T INTERVAL: 428 MS
EKG QRS DURATION: 76 MS
EKG QTC CALCULATION (BAZETT): 430 MS
EKG R AXIS: -2 DEGREES
EKG T AXIS: -27 DEGREES
EKG VENTRICULAR RATE: 61 BPM
Lab: NORMAL
SPECIMEN DESCRIPTION: NORMAL

## 2020-12-14 ENCOUNTER — TELEPHONE (OUTPATIENT)
Dept: INTERNAL MEDICINE CLINIC | Age: 60
End: 2020-12-14

## 2020-12-14 NOTE — TELEPHONE ENCOUNTER
[facility] total gastrectomy [facility] for her neuroendocrine tumor recurrence  Asked patient to get CBC and CMP before her visit to the office

## 2020-12-14 NOTE — TELEPHONE ENCOUNTER
Patient called in stating she was discharged from [facility] last week 12/07 after having surgery there. She was told to schedule an appointment with her primary care to keep an eye on her electrolytes. She was already scheduled 01/21/21 but moved her appointment up to 12/30/20. I did offer a sooner visit, but she wanted the 12/30 appointment instead.

## 2020-12-17 ENCOUNTER — TELEPHONE (OUTPATIENT)
Dept: ONCOLOGY | Age: 60
End: 2020-12-17

## 2020-12-17 NOTE — TELEPHONE ENCOUNTER
Name: Patrick Gardner  : 1960  MRN: [id number]    Oncology Navigation Follow-Up Note    Contact Type:  Telephone    Notes: Writer called patient to check on her and to see how she is recovering from her surgery at [address] [place] [address] from 12/10/20. Shes states she's doing well but she did report losing 30lbs. She reports shes still on a liquid diet. Writer asked her if she was drinking Boost or anything similar. She states she drinks 1-2 slim fast drinks per day. Writer is going to reach out to [facility] to see if she'll qualify for boost under her insurance. VM left for her. Will await a response. F/U appt obtained for Dr. Anh Scott for 2021 at 3pm. Pt updated on f/u and appreciative of call and support. Will continue to follow. .    Electronically signed by Jorge Caraballo RN on 2020 at 2:37 PM

## 2020-12-18 ENCOUNTER — APPOINTMENT (OUTPATIENT)
Dept: CT IMAGING | Age: 60
End: 2020-12-18
Payer: COMMERCIAL

## 2020-12-18 ENCOUNTER — TELEPHONE (OUTPATIENT)
Dept: ONCOLOGY | Age: 60
End: 2020-12-18

## 2020-12-18 ENCOUNTER — TELEPHONE (OUTPATIENT)
Dept: INTERNAL MEDICINE CLINIC | Age: 60
End: 2020-12-18

## 2020-12-18 ENCOUNTER — HOSPITAL ENCOUNTER (EMERGENCY)
Age: 60
Discharge: HOME | End: 2020-12-18
Attending: EMERGENCY MEDICINE
Payer: COMMERCIAL

## 2020-12-18 ENCOUNTER — APPOINTMENT (OUTPATIENT)
Dept: GENERAL RADIOLOGY | Age: 60
End: 2020-12-18
Payer: COMMERCIAL

## 2020-12-18 VITALS
BODY MASS INDEX: 42.03 KG/M2 | RESPIRATION RATE: 18 BRPM | WEIGHT: 245 LBS | SYSTOLIC BLOOD PRESSURE: 122 MMHG | HEART RATE: 86 BPM | TEMPERATURE: 98.1 F | DIASTOLIC BLOOD PRESSURE: 80 MMHG | OXYGEN SATURATION: 97 %

## 2020-12-18 LAB
-: ABNORMAL
ABSOLUTE EOS #: 0.14 K/UL (ref 0–0.44)
ABSOLUTE IMMATURE GRANULOCYTE: <0.03 K/UL (ref 0–0.3)
ABSOLUTE LYMPH #: 1.89 K/UL (ref 1.1–3.7)
ABSOLUTE MONO #: 1.1 K/UL (ref 0.1–1.2)
ALBUMIN SERPL-MCNC: 3.6 G/DL (ref 3.5–5.2)
ALBUMIN/GLOBULIN RATIO: 0.8 (ref 1–2.5)
ALP BLD-CCNC: 122 U/L (ref 35–104)
ALT SERPL-CCNC: 30 U/L (ref 5–33)
AMORPHOUS: ABNORMAL
ANION GAP SERPL CALCULATED.3IONS-SCNC: 19 MMOL/L (ref 9–17)
AST SERPL-CCNC: 50 U/L
BACTERIA: ABNORMAL
BASOPHILS # BLD: 1 % (ref 0–2)
BASOPHILS ABSOLUTE: 0.06 K/UL (ref 0–0.2)
BILIRUB SERPL-MCNC: 0.88 MG/DL (ref 0.3–1.2)
BILIRUBIN DIRECT: 0.52 MG/DL
BILIRUBIN URINE: NEGATIVE
BILIRUBIN, INDIRECT: 0.36 MG/DL (ref 0–1)
BUN BLDV-MCNC: 13 MG/DL (ref 6–20)
BUN/CREAT BLD: ABNORMAL (ref 9–20)
CALCIUM SERPL-MCNC: 9.6 MG/DL (ref 8.6–10.4)
CASTS UA: ABNORMAL /LPF (ref 0–8)
CHLORIDE BLD-SCNC: 93 MMOL/L (ref 98–107)
CO2: 23 MMOL/L (ref 20–31)
COLOR: YELLOW
CREAT SERPL-MCNC: 0.88 MG/DL (ref 0.5–0.9)
CRYSTALS, UA: ABNORMAL /HPF
DIFFERENTIAL TYPE: ABNORMAL
EOSINOPHILS RELATIVE PERCENT: 2 % (ref 1–4)
EPITHELIAL CELLS UA: ABNORMAL /HPF (ref 0–5)
GFR AFRICAN AMERICAN: >60 ML/MIN
GFR NON-AFRICAN AMERICAN: >60 ML/MIN
GFR SERPL CREATININE-BSD FRML MDRD: ABNORMAL ML/MIN/{1.73_M2}
GFR SERPL CREATININE-BSD FRML MDRD: ABNORMAL ML/MIN/{1.73_M2}
GLOBULIN: ABNORMAL G/DL (ref 1.5–3.8)
GLUCOSE BLD-MCNC: 94 MG/DL (ref 70–99)
GLUCOSE URINE: NEGATIVE
HCT VFR BLD CALC: 45.4 % (ref 36.3–47.1)
HEMOGLOBIN: 13.9 G/DL (ref 11.9–15.1)
IMMATURE GRANULOCYTES: 0 %
KETONES, URINE: ABNORMAL
LEUKOCYTE ESTERASE, URINE: ABNORMAL
LIPASE: 48 U/L (ref 13–60)
LYMPHOCYTES # BLD: 23 % (ref 24–43)
MCH RBC QN AUTO: 26.6 PG (ref 25.2–33.5)
MCHC RBC AUTO-ENTMCNC: 30.6 G/DL (ref 28.4–34.8)
MCV RBC AUTO: 86.8 FL (ref 82.6–102.9)
MONOCYTES # BLD: 13 % (ref 3–12)
MUCUS: ABNORMAL
NITRITE, URINE: NEGATIVE
NRBC AUTOMATED: 0 PER 100 WBC
OTHER OBSERVATIONS UA: ABNORMAL
PDW BLD-RTO: 14.6 % (ref 11.8–14.4)
PH UA: 5 (ref 5–8)
PLATELET # BLD: 515 K/UL (ref 138–453)
PLATELET ESTIMATE: ABNORMAL
PMV BLD AUTO: 9.2 FL (ref 8.1–13.5)
POTASSIUM SERPL-SCNC: 2.8 MMOL/L (ref 3.7–5.3)
PROTEIN UA: NEGATIVE
RBC # BLD: 5.23 M/UL (ref 3.95–5.11)
RBC # BLD: ABNORMAL 10*6/UL
RBC UA: ABNORMAL /HPF (ref 0–4)
RENAL EPITHELIAL, UA: ABNORMAL /HPF
SEG NEUTROPHILS: 61 % (ref 36–65)
SEGMENTED NEUTROPHILS ABSOLUTE COUNT: 5.07 K/UL (ref 1.5–8.1)
SODIUM BLD-SCNC: 135 MMOL/L (ref 135–144)
SPECIFIC GRAVITY UA: 1.04 (ref 1–1.03)
TOTAL PROTEIN: 8.3 G/DL (ref 6.4–8.3)
TRICHOMONAS: ABNORMAL
TROPONIN INTERP: NORMAL
TROPONIN T: NORMAL NG/ML
TROPONIN, HIGH SENSITIVITY: 9 NG/L (ref 0–14)
TURBIDITY: ABNORMAL
URINE HGB: NEGATIVE
UROBILINOGEN, URINE: NORMAL
WBC # BLD: 8.3 K/UL (ref 3.5–11.3)
WBC # BLD: ABNORMAL 10*3/UL
WBC UA: ABNORMAL /HPF (ref 0–5)
YEAST: ABNORMAL

## 2020-12-18 PROCEDURE — 80048 BASIC METABOLIC PNL TOTAL CA: CPT

## 2020-12-18 PROCEDURE — 99282 EMERGENCY DEPT VISIT SF MDM: CPT

## 2020-12-18 PROCEDURE — 80076 HEPATIC FUNCTION PANEL: CPT

## 2020-12-18 PROCEDURE — 74177 CT ABD & PELVIS W/CONTRAST: CPT

## 2020-12-18 PROCEDURE — 71045 X-RAY EXAM CHEST 1 VIEW: CPT

## 2020-12-18 PROCEDURE — 85025 COMPLETE CBC W/AUTO DIFF WBC: CPT

## 2020-12-18 PROCEDURE — 6360000004 HC RX CONTRAST MEDICATION: Performed by: GENERAL PRACTICE

## 2020-12-18 PROCEDURE — 96365 THER/PROPH/DIAG IV INF INIT: CPT

## 2020-12-18 PROCEDURE — 83690 ASSAY OF LIPASE: CPT

## 2020-12-18 PROCEDURE — 6370000000 HC RX 637 (ALT 250 FOR IP): Performed by: GENERAL PRACTICE

## 2020-12-18 PROCEDURE — 6360000002 HC RX W HCPCS: Performed by: GENERAL PRACTICE

## 2020-12-18 PROCEDURE — 81001 URINALYSIS AUTO W/SCOPE: CPT

## 2020-12-18 PROCEDURE — 93005 ELECTROCARDIOGRAM TRACING: CPT | Performed by: GENERAL PRACTICE

## 2020-12-18 PROCEDURE — 84484 ASSAY OF TROPONIN QUANT: CPT

## 2020-12-18 RX ORDER — ONDANSETRON 2 MG/ML
4 INJECTION INTRAMUSCULAR; INTRAVENOUS ONCE
Status: DISCONTINUED | OUTPATIENT
Start: 2020-12-18 | End: 2020-12-18 | Stop reason: HOSPADM

## 2020-12-18 RX ORDER — CEPHALEXIN 500 MG/1
500 CAPSULE ORAL 4 TIMES DAILY
Qty: 28 CAPSULE | Refills: 0 | Status: SHIPPED | OUTPATIENT
Start: 2020-12-18 | End: 2020-12-25

## 2020-12-18 RX ORDER — POTASSIUM CHLORIDE 7.45 MG/ML
10 INJECTION INTRAVENOUS ONCE
Status: COMPLETED | OUTPATIENT
Start: 2020-12-18 | End: 2020-12-18

## 2020-12-18 RX ADMIN — POTASSIUM CHLORIDE 10 MEQ: 7.46 INJECTION, SOLUTION INTRAVENOUS at 12:36

## 2020-12-18 RX ADMIN — IOPAMIDOL 75 ML: 755 INJECTION, SOLUTION INTRAVENOUS at 12:10

## 2020-12-18 RX ADMIN — POTASSIUM BICARBONATE 60 MEQ: 782 TABLET, EFFERVESCENT ORAL at 12:36

## 2020-12-18 ASSESSMENT — PAIN DESCRIPTION - PAIN TYPE: TYPE: ACUTE PAIN

## 2020-12-18 ASSESSMENT — PAIN SCALES - GENERAL: PAINLEVEL_OUTOF10: 8

## 2020-12-18 ASSESSMENT — PAIN DESCRIPTION - LOCATION: LOCATION: ABDOMEN

## 2020-12-18 NOTE — ED PROVIDER NOTES
[facility] ED  Emergency Department Encounter  EmergencyMedicine Resident     Pt Garo Ogden  MRN: [id number]  [place] 1960  Date of evaluation: 12/18/20  PCP:  Joe Tucker MD    CHIEF COMPLAINT       Chief Complaint   Patient presents with    Post-op Problem     feels like something is stuck in throat. pt had gastric bypass        HISTORY OF PRESENT ILLNESS  (Location/Symptom, Timing/Onset, Context/Setting, Quality, Duration, Modifying Factors, Severity.)      Lulú Nava is a 62 y.o. female who presents with sensation of something being stuck in her esophagus, and inability to eat or drink anything without nausea or vomiting approximately 30 minutes afterwards. This is been going on for 2 days. Patient is status post Qamar-en-Y gastrectomy on December 5 by Ora Lesches at [facility] clinic for carcinoid tumors. She denies fevers or chills but has come to the emergency department in the last couple weeks for feelings of weakness postop. She states that she cannot recall when she had her last bowel movement. She is reporting epigastric tenderness along with left upper quadrant and right mid abdominal pain that is tender to palpation. She denies alcohol use    PAST MEDICAL / SURGICAL / SOCIAL / FAMILY HISTORY      has a past medical history of Anxiety, Arthritis, Asthma, Colon polyp, Colon polyps, Cyst of kidney, acquired, Fatigue, Hypertension, Hypothyroidism, Neuroendocrine tumor, Obesity, Pharyngoesophageal dysphagia, Vocal cord polyps, and Wears glasses. Denies further past medical hx     has a past surgical history that includes cystourethroscopy/stent removal (5/3/11); Colonoscopy (02/21/2019); ERCP; Cholecystectomy (10/09/2014); hip surgery (Left); Throat surgery; Colonoscopy; Upper gastrointestinal endoscopy (02/21/2019); Upper gastrointestinal endoscopy (09/23/2019); Upper gastrointestinal endoscopy (N/A, 9/23/2019);  Upper gastrointestinal endoscopy (N/A, 10/23/2019); Upper gastrointestinal endoscopy (N/A, 10/29/2019); and Endoscopic ultrasonography, GI (N/A, 2020).   Denies further past surgical hx    Social History     Socioeconomic History    Marital status: Single     Spouse name: Not on file    Number of children: Not on file    Years of education: Not on file    Highest education level: Not on file   Occupational History    Not on file   Social Needs    Financial resource strain: Not on file    Food insecurity     Worry: Not on file     Inability: Not on file    Transportation needs     Medical: Not on file     Non-medical: Not on file   Tobacco Use    Smoking status: Former Smoker     Packs/day: 1.00     Years: 25.00     Pack years: 25.00     Quit date: 2012     Years since quittin.5    Smokeless tobacco: Never Used    Tobacco comment: quit 2 years   Substance and Sexual Activity    Alcohol use: Yes     Comment: 3 times a month    Drug use: No    Sexual activity: Not on file   Lifestyle    Physical activity     Days per week: Not on file     Minutes per session: Not on file    Stress: Not on file   Relationships    Social connections     Talks on phone: Not on file     Gets together: Not on file     Attends Caodaism service: Not on file     Active member of club or organization: Not on file     Attends meetings of clubs or organizations: Not on file     Relationship status: Not on file    Intimate partner violence     Fear of current or ex partner: Not on file     Emotionally abused: Not on file     Physically abused: Not on file     Forced sexual activity: Not on file   Other Topics Concern    Not on file   Social History Narrative    Not on file       Family History   Problem Relation Age of Onset    High Blood Pressure Mother     High Blood Pressure Sister     Stomach Cancer Sister     Other Sister         epilepsy    No Known Problems Father        Allergies:  Erythromycin    Home Medications:  Prior to mmol/L    Anion Gap 19 (H) 9 - 17 mmol/L    GFR Non-African American >60 >60 mL/min    GFR African American >60 >60 mL/min    GFR Comment          GFR Staging NOT REPORTED    CBC Auto Differential   Result Value Ref Range    WBC 8.3 3.5 - 11.3 k/uL    RBC 5.23 (H) 3.95 - 5.11 m/uL    Hemoglobin 13.9 11.9 - 15.1 g/dL    Hematocrit 45.4 36.3 - 47.1 %    MCV 86.8 82.6 - 102.9 fL    MCH 26.6 25.2 - 33.5 pg    MCHC 30.6 28.4 - 34.8 g/dL    RDW 14.6 (H) 11.8 - 14.4 %    Platelets 091 (H) 094 - 453 k/uL    MPV 9.2 8.1 - 13.5 fL    NRBC Automated 0.0 0.0 per 100 WBC    Differential Type NOT REPORTED     Seg Neutrophils 61 36 - 65 %    Lymphocytes 23 (L) 24 - 43 %    Monocytes 13 (H) 3 - 12 %    Eosinophils % 2 1 - 4 %    Basophils 1 0 - 2 %    Immature Granulocytes 0 0 %    Segs Absolute 5.07 1.50 - 8.10 k/uL    Absolute Lymph # 1.89 1.10 - 3.70 k/uL    Absolute Mono # 1.10 0.10 - 1.20 k/uL    Absolute Eos # 0.14 0.00 - 0.44 k/uL    Basophils Absolute 0.06 0.00 - 0.20 k/uL    Absolute Immature Granulocyte <0.03 0.00 - 0.30 k/uL    WBC Morphology NOT REPORTED     RBC Morphology ANISOCYTOSIS PRESENT     Platelet Estimate NOT REPORTED    Urinalysis with Microscopic   Result Value Ref Range    Color, UA YELLOW YELLOW    Turbidity UA CLOUDY (A) CLEAR    Glucose, Ur NEGATIVE NEGATIVE    Bilirubin Urine NEGATIVE NEGATIVE    Ketones, Urine MODERATE (A) NEGATIVE    Specific Gravity, UA 1.045 (H) 1.005 - 1.030    Urine Hgb NEGATIVE NEGATIVE    pH, UA 5.0 5.0 - 8.0    Protein, UA NEGATIVE NEGATIVE    Urobilinogen, Urine Normal Normal    Nitrite, Urine NEGATIVE NEGATIVE    Leukocyte Esterase, Urine TRACE (A) NEGATIVE    -          WBC, UA 10 TO 20 0 - 5 /HPF    RBC, UA 5 TO 10 0 - 4 /HPF    Casts UA  0 - 8 /LPF     10 TO 20 HYALINE Reference range defined for non-centrifuged specimen.     Crystals, UA NOT REPORTED None /HPF    Epithelial Cells UA 10 TO 20 0 - 5 /HPF    Renal Epithelial, UA NOT REPORTED 0 /HPF    Bacteria, UA MODERATE (A) None    Mucus, UA NOT REPORTED None    Trichomonas, UA NOT REPORTED None    Amorphous, UA NOT REPORTED None    Other Observations UA NOT REPORTED NOT REQ. Yeast, UA NOT REPORTED None   Hepatic Function Panel   Result Value Ref Range    Alb 3.6 3.5 - 5.2 g/dL    Alkaline Phosphatase 122 (H) 35 - 104 U/L    ALT 30 5 - 33 U/L    AST 50 (H) <32 U/L    Total Bilirubin 0.88 0.3 - 1.2 mg/dL    Bilirubin, Direct 0.52 (H) <0.31 mg/dL    Bilirubin, Indirect 0.36 0.00 - 1.00 mg/dL    Total Protein 8.3 6.4 - 8.3 g/dL    Globulin NOT REPORTED 1.5 - 3.8 g/dL    Albumin/Globulin Ratio 0.8 (L) 1.0 - 2.5   Lipase   Result Value Ref Range    Lipase 48 13 - 60 U/L   Troponin   Result Value Ref Range    Troponin, High Sensitivity 9 0 - 14 ng/L    Troponin T NOT REPORTED <0.03 ng/mL    Troponin Interp NOT REPORTED        RADIOLOGY:  None    EKG  None    All EKG's are interpreted by the Emergency Department Physician who either signs or Co-signs this chart in the absence of a cardiologist.    Jailene Moura MDM:  62 y.o. female who presents with abdominal pain, nausea, vomiting, 10 weeks postop from Qamar-en-Y gastrectomy for carcinoid tumors. ED Course as of Dec 18 1614   Fri Dec 18, 2020   1105 Called Dr. Gladis Spicer, patients bariatric surgeon at [phone] and was told by staff that she could be reached at her cell phone number [phone], and was sent to a mailbox is not set up. Will reach out to our bariatric team    [DEONTE]   [phone] Pt historically low. Will replete after CT scan to avoid inducing vomiting    Potassium(!!): 2.8 [DEONTE]   1418 Discussed case with surgical resident. No further workup at this time. CT is benign. She is tolerating PO liquid at this time. Pt has mild UTI. Will tx with keflex     [DEONTE]   8174 Patient has continued to keep all medications down including contrast, potassium and fluid without vomiting.   Recommend patient follow prior recommendations by her surgeon, only consume puréed food, and eat very small amounts often throughout the day. Recommend calling her surgeon and scheduling an appointment as soon as possible. Patient instructed return to the emergency department if symptoms worsen. Patient verbalized understanding and agreement    [DEONTE]      ED Course User Index  [DEONTE] Mario Dominguez DO         PROCEDURES:  None    CONSULTS:  IP CONSULT TO BARIATRICS    CRITICAL CARE:  None    FINAL IMPRESSION      1. Nausea    2. Vomiting, intractability of vomiting not specified, presence of nausea not specified, unspecified vomiting type    3. Bariatric surgery status    4.  Acute cystitis without hematuria            DISPOSITION / PLAN     DISPOSITION Decision To Discharge 12/18/2020 02:19:35 PM      PATIENT REFERRED TO:  Bette Lema MD  [facility]  [address] [facility] [phone]    In 3 days        DISCHARGE MEDICATIONS:  Discharge Medication List as of 12/18/2020  2:21 PM          Mario Dominguez DO  Emergency Medicine Resident    (Please note that portions of thisnote were completed with a voice recognition program.  Efforts were made to edit the dictations but occasionally words are mis-transcribed.)       Mario Dominguez DO  Resident  12/18/20 9860

## 2020-12-18 NOTE — ED NOTES
Labeled blood specimen collected and sent to lab via tube system.        Renetta Miller RN  12/18/20 0543

## 2020-12-18 NOTE — TELEPHONE ENCOUNTER
Patient calling   she has been trying to get a hold of the [facility] she had Surgery ( Gastrectomy) on  11/30 was in hospital for an wk came home on the 5th of dec  For last two days been experiencing weakness, a Burning in chest and she can't keep anything down.  Please advise

## 2020-12-18 NOTE — ED PROVIDER NOTES
[facility] [address] [facility]     Emergency Department     Faculty Attestation        I performed a history and physical examination of the patient and discussed management with the resident. I reviewed the residents note and agree with the documented findings and plan of care. Any areas of disagreement are noted on the chart. I was personally present for the key portions of any procedures. I have documented in the chart those procedures where I was not present during the key portions. I have reviewed the emergency nurses triage note. I agree with the chief complaint, past medical history, past surgical history, allergies, medications, social and family history as documented unless otherwise noted below. For mid-level providers such as nurse practitioners as well as physicians assistants:    I have personally seen and evaluated the patient. I find the patient's history and physical exam are consistent with NP/PA documentation. I agree with the care provided, treatment rendered, disposition, & follow-up plan. Additional findings are as noted. Vital Signs: Pulse 96   Resp 18   Wt 245 lb (111.1 kg)   LMP 01/01/1994   SpO2 99%   BMI 42.03 kg/m²   PCP:  Ravindra Carlos MD    Pertinent Comments:     Patient presents to the emergency department for evaluation of abdominal pain and inability eat or drink anything about a month ago she had a gastric bypass secondary to neuroendocrine tumor. She states over the past 2 days she has had this trouble eating and drinking anything she states anything she will drink it will get stuck in her chest and then she will regurgitate it is no chest pain. She is otherwise afebrile nontoxic will obtain labs, CT imaging with IV and oral contrast, discussed with her surgeon    2:31 PM EST    Little attempts were made to get a hold of her surgeon which were unsuccessful including calling her personal cell phone.   Discussed case with bariatric surgery here who felt uncomfortable seeing patient is a did not do the surgery. Labs and CT reviewed with patient she has been able to tolerate liquids here and is had no episodes of vomiting. She tolerated the oral contrast without vomiting. My clinical suspicion for esophageal perforation, esophageal foreign body,  esophageal stricture. Patient comfortable being discharged.       Critical Care  None          Jaida Pappas MD  Attending Emergency Medicine Physician              Christianne Mckeon MD  12/18/20 23 Karyn Mckeon MD  12/18/20 4517

## 2020-12-18 NOTE — PROGRESS NOTES
I did not see, evaluate, or participate in the care of this patient. I signed up for this patient in error. Ninetta Cross Dorrine Dakins  Emergency Medicine Resident Physician, PGY-1  12/18/20 11:06 AM

## 2020-12-21 LAB
EKG ATRIAL RATE: 85 BPM
EKG P AXIS: 47 DEGREES
EKG P-R INTERVAL: 140 MS
EKG Q-T INTERVAL: 400 MS
EKG QRS DURATION: 84 MS
EKG QTC CALCULATION (BAZETT): 476 MS
EKG R AXIS: -5 DEGREES
EKG T AXIS: -15 DEGREES
EKG VENTRICULAR RATE: 85 BPM

## 2020-12-21 PROCEDURE — 93010 ELECTROCARDIOGRAM REPORT: CPT | Performed by: INTERNAL MEDICINE

## 2020-12-22 ENCOUNTER — TELEPHONE (OUTPATIENT)
Dept: ONCOLOGY | Age: 60
End: 2020-12-22

## 2020-12-22 NOTE — TELEPHONE ENCOUNTER
After reviewing chart, writer noted that pt went to local ER for N/V and feeling like something was stuck in her throat. Pt had recent gastrectomy at [address] [place] [address]. Pt was transferred to [address] [place] [address] on 12/19/2020 for further management. Will continue to follow.

## 2020-12-29 ENCOUNTER — HOSPITAL ENCOUNTER (OUTPATIENT)
Age: 60
Setting detail: SPECIMEN
Discharge: HOME | End: 2020-12-29
Payer: COMMERCIAL

## 2020-12-29 LAB
ALBUMIN SERPL-MCNC: 3.6 G/DL (ref 3.5–5.2)
ALBUMIN/GLOBULIN RATIO: 0.9 (ref 1–2.5)
ALP BLD-CCNC: 99 U/L (ref 35–104)
ALT SERPL-CCNC: 47 U/L (ref 5–33)
ANION GAP SERPL CALCULATED.3IONS-SCNC: 15 MMOL/L (ref 9–17)
AST SERPL-CCNC: 89 U/L
BILIRUB SERPL-MCNC: 0.5 MG/DL (ref 0.3–1.2)
BUN BLDV-MCNC: 10 MG/DL (ref 6–20)
BUN/CREAT BLD: ABNORMAL (ref 9–20)
CALCIUM SERPL-MCNC: 9.3 MG/DL (ref 8.6–10.4)
CHLORIDE BLD-SCNC: 101 MMOL/L (ref 98–107)
CO2: 25 MMOL/L (ref 20–31)
CREAT SERPL-MCNC: 0.86 MG/DL (ref 0.5–0.9)
GFR AFRICAN AMERICAN: >60 ML/MIN
GFR NON-AFRICAN AMERICAN: >60 ML/MIN
GFR SERPL CREATININE-BSD FRML MDRD: ABNORMAL ML/MIN/{1.73_M2}
GFR SERPL CREATININE-BSD FRML MDRD: ABNORMAL ML/MIN/{1.73_M2}
GLUCOSE FASTING: 102 MG/DL (ref 70–99)
HCT VFR BLD CALC: 44.1 % (ref 36.3–47.1)
HEMOGLOBIN: 13.8 G/DL (ref 11.9–15.1)
MCH RBC QN AUTO: 26.5 PG (ref 25.2–33.5)
MCHC RBC AUTO-ENTMCNC: 31.3 G/DL (ref 28.4–34.8)
MCV RBC AUTO: 84.6 FL (ref 82.6–102.9)
NRBC AUTOMATED: 0 PER 100 WBC
PDW BLD-RTO: 15.1 % (ref 11.8–14.4)
PLATELET # BLD: 352 K/UL (ref 138–453)
PMV BLD AUTO: 10.9 FL (ref 8.1–13.5)
POTASSIUM SERPL-SCNC: 3.4 MMOL/L (ref 3.7–5.3)
RBC # BLD: 5.21 M/UL (ref 3.95–5.11)
SODIUM BLD-SCNC: 141 MMOL/L (ref 135–144)
TOTAL PROTEIN: 7.5 G/DL (ref 6.4–8.3)
WBC # BLD: 7.6 K/UL (ref 3.5–11.3)

## 2020-12-30 ENCOUNTER — VIRTUAL VISIT (OUTPATIENT)
Dept: INTERNAL MEDICINE CLINIC | Age: 60
End: 2020-12-30
Payer: COMMERCIAL

## 2020-12-30 PROCEDURE — 99215 OFFICE O/P EST HI 40 MIN: CPT | Performed by: INTERNAL MEDICINE

## 2020-12-30 PROCEDURE — 1111F DSCHRG MED/CURRENT MED MERGE: CPT | Performed by: INTERNAL MEDICINE

## 2020-12-30 RX ORDER — ALBUTEROL SULFATE 90 UG/1
2 AEROSOL, METERED RESPIRATORY (INHALATION) EVERY 6 HOURS PRN
Qty: 1 INHALER | Refills: 0 | Status: SHIPPED | OUTPATIENT
Start: 2020-12-30 | End: 2021-01-02

## 2020-12-30 RX ORDER — PANTOPRAZOLE SODIUM 40 MG/1
TABLET, DELAYED RELEASE ORAL
Status: ON HOLD | COMMUNITY
Start: 2020-12-04 | End: 2021-04-29 | Stop reason: SDUPTHER

## 2020-12-30 RX ORDER — FLUTICASONE PROPIONATE 50 MCG
2 SPRAY, SUSPENSION (ML) NASAL DAILY
Qty: 1 BOTTLE | Refills: 0 | Status: SHIPPED | OUTPATIENT
Start: 2020-12-30 | End: 2021-03-25 | Stop reason: ALTCHOICE

## 2020-12-30 ASSESSMENT — PATIENT HEALTH QUESTIONNAIRE - PHQ9
1. LITTLE INTEREST OR PLEASURE IN DOING THINGS: 0
2. FEELING DOWN, DEPRESSED OR HOPELESS: 0
SUM OF ALL RESPONSES TO PHQ9 QUESTIONS 1 & 2: 0
SUM OF ALL RESPONSES TO PHQ QUESTIONS 1-9: 0

## 2020-12-30 NOTE — PROGRESS NOTES
Post-Discharge Transitional Care Management Services or Hospital Follow Up      Meena Waite   YOB: 1960    Date of Office Visit:  12/30/2020  Date of Hospital Admission: 12/18/20  Date of Hospital Discharge: 12/18/20  Risk of hospital readmission (high >=14%. Medium >=10%) :Readmission Risk Score: 10      Care management risk score Rising risk (score 2-5) and Complex Care (Scores >=6): 2     Non face to face  following discharge, date last encounter closed (first attempt may have been earlier): *No documented post hospital discharge outreach found in the last 14 days    Call initiated 2 business days of discharge: *No response recorded in the last 14 days    Patient Active Problem List   Diagnosis    Asthma    Anxiety    Obesity    Hyperlipidemia    Hypothyroidism    Colon polyps    Vertigo    Mass of left hip region    Fatigue    Pharyngoesophageal dysphagia    Colon polyp    Neuroendocrine tumor    Chest pain    Shortness of breath    Anemia    GI bleed    Essential hypertension    Neuroendocrine neoplasm of stomach    Polyp, stomach    Melena    Gastric ulcer with hemorrhage    Constipation       Allergies   Allergen Reactions    Erythromycin Diarrhea       Medications listed as ordered at the time of discharge from [facility] Medication Instructions JANET:    Printed on:12/30/20 8303   Medication Information                      albuterol sulfate HFA (PROVENTIL HFA) 108 (90 Base) MCG/ACT inhaler  Inhale 2 puffs into the lungs every 6 hours as needed for Wheezing             desonide (DESOWEN) 0.05 % cream  Apply topically 2 times daily.              fluticasone (FLONASE) 50 MCG/ACT nasal spray  2 sprays by Nasal route daily             hydroCHLOROthiazide (HYDRODIURIL) 25 MG tablet  TAKE 1 TABLET BY MOUTH EVERY DAY             levothyroxine (SYNTHROID) 150 MCG tablet  TAKE 1 TABLET BY MOUTH ONCE DAILY FOR 5 DAYS EVERY WEEK metoprolol succinate (TOPROL XL) 100 MG extended release tablet  One daily             Multiple Vitamin (MULTI-DAY VITAMINS PO)  Take by mouth             pantoprazole (PROTONIX) 40 MG tablet  TAKE 1 TABLET BY MOUTH EVERY DAY             triamcinolone (KENALOG) 0.1 % ointment  Apply topically 2 times daily for 7 days             vitamin B-12 (CYANOCOBALAMIN) 500 MCG tablet  Take 500 mcg by mouth daily                   Medications marked \"taking\" at this time  Outpatient Medications Marked as Taking for the 12/30/20 encounter (Virtual Visit) with Starlyn Severs, MD   Medication Sig Dispense Refill    pantoprazole (PROTONIX) 40 MG tablet TAKE 1 TABLET BY MOUTH EVERY DAY      albuterol sulfate HFA (PROVENTIL HFA) 108 (90 Base) MCG/ACT inhaler Inhale 2 puffs into the lungs every 6 hours as needed for Wheezing 1 Inhaler 0    fluticasone (FLONASE) 50 MCG/ACT nasal spray 2 sprays by Nasal route daily 1 Bottle 0    triamcinolone (KENALOG) 0.1 % ointment Apply topically 2 times daily for 7 days 1 Tube 0    metoprolol succinate (TOPROL XL) 100 MG extended release tablet One daily 90 tablet 0    hydroCHLOROthiazide (HYDRODIURIL) 25 MG tablet TAKE 1 TABLET BY MOUTH EVERY DAY 90 tablet 0    levothyroxine (SYNTHROID) 150 MCG tablet TAKE 1 TABLET BY MOUTH ONCE DAILY FOR 5 DAYS EVERY WEEK 60 tablet 3    Multiple Vitamin (MULTI-DAY VITAMINS PO) Take by mouth      vitamin B-12 (CYANOCOBALAMIN) 500 MCG tablet Take 500 mcg by mouth daily          Medications patient taking as of now reconciled against medications ordered at time of hospital discharge: Yes    Chief Complaint   Patient presents with    Follow-Up from Hospital     coughing up phlegm; going on for a week; no current vital    Health Maintenance     hiv, pneumo, hiv, tdap, shingles, cervical, flu    Rash     on arm and chest that's not clearing up desonide cream not working would like to try a different cream       History of Present illness - Follow up of Hospital diagnosis(es): Patient had gastrectomy done for carcinoid tumor at [facility] and patient was sent home and started having problems with dysphagia and patient went back had a repeat EGD which did not show any abnormality and wound is healing well and patient was discharged home on Protonix and now patient complaining of cough like phlegm coming out and also sometimes nauseous and vomiting denies any fever or chills and no congestion and no shortness of breath or no chest pains and also complains of occasional sinus drainage and also complains of rash that she had in the past coming back on the chest and the arms and Loypipo Sanchez did not help and wants triamcinolone cream    Inpatient course: Discharge summary reviewed- see chart. Interval history/Current status: See above for details and patient advised to start on albuterol inhaler 2 puffs every 6 hours as needed and also Flonase nasal spray 2 sprays daily and also advised patient increase the Protonix to 40 mg twice daily from once a day and monitor and call me back if no improvement on Monday and also advised to go to the emergency room if symptoms get worse and also patient was advised to start on the triamcinolone cream as requested    A comprehensive review of systems was negative except for what was noted in the HPI. There were no vitals filed for this visit. There is no height or weight on file to calculate BMI. Wt Readings from Last 3 Encounters:   12/18/20 245 lb (111.1 kg)   08/21/20 257 lb 6.4 oz (116.8 kg)   07/07/20 256 lb 9.6 oz (116.4 kg)     BP Readings from Last 3 Encounters:   12/18/20 122/80   12/10/20 (!) 141/96   08/21/20 132/86        Physical Exam:  Unable to perform physical examination as it is a virtual phone call visit    Assessment/Plan:   Diagnosis Orders   1. Neuroendocrine tumor  CT DISCHARGE MEDS RECONCILED W/ CURRENT OUTPATIENT MED LIST   2.  Essential hypertension  CT DISCHARGE MEDS RECONCILED W/ CURRENT OUTPATIENT MED LIST   3. Cough  CA DISCHARGE MEDS RECONCILED W/ CURRENT OUTPATIENT MED LIST   4. Gastroesophageal reflux disease without esophagitis  CA DISCHARGE MEDS RECONCILED W/ CURRENT OUTPATIENT MED LIST   5.  Rash  CA DISCHARGE MEDS RECONCILED W/ CURRENT OUTPATIENT MED LIST           Medical Decision Making: high complexity

## 2020-12-31 ENCOUNTER — TELEPHONE (OUTPATIENT)
Dept: INTERNAL MEDICINE CLINIC | Age: 60
End: 2020-12-31

## 2020-12-31 RX ORDER — POTASSIUM CHLORIDE 20 MEQ/1
20 TABLET, EXTENDED RELEASE ORAL DAILY
Qty: 3 TABLET | Refills: 0 | Status: SHIPPED | OUTPATIENT
Start: 2020-12-31 | End: 2021-03-25

## 2020-12-31 NOTE — TELEPHONE ENCOUNTER
----- Message from Josh Tierney MD sent at 12/31/2020  9:06 AM EST -----  Potassium 20 meq daily for 3 days  Repeat Potassium level in 1 week

## 2021-01-02 ENCOUNTER — APPOINTMENT (OUTPATIENT)
Dept: GENERAL RADIOLOGY | Age: 61
End: 2021-01-02
Payer: COMMERCIAL

## 2021-01-02 ENCOUNTER — HOSPITAL ENCOUNTER (EMERGENCY)
Age: 61
Discharge: HOME | End: 2021-01-02
Attending: EMERGENCY MEDICINE
Payer: COMMERCIAL

## 2021-01-02 ENCOUNTER — APPOINTMENT (OUTPATIENT)
Dept: CT IMAGING | Age: 61
End: 2021-01-02
Payer: COMMERCIAL

## 2021-01-02 VITALS
HEIGHT: 65 IN | TEMPERATURE: 98.8 F | OXYGEN SATURATION: 93 % | DIASTOLIC BLOOD PRESSURE: 82 MMHG | BODY MASS INDEX: 35.82 KG/M2 | HEART RATE: 80 BPM | WEIGHT: 215 LBS | SYSTOLIC BLOOD PRESSURE: 138 MMHG | RESPIRATION RATE: 22 BRPM

## 2021-01-02 DIAGNOSIS — R10.13 ABDOMINAL PAIN, EPIGASTRIC: Primary | ICD-10-CM

## 2021-01-02 DIAGNOSIS — R11.2 NON-INTRACTABLE VOMITING WITH NAUSEA, UNSPECIFIED VOMITING TYPE: ICD-10-CM

## 2021-01-02 LAB
ABSOLUTE EOS #: 0.05 K/UL (ref 0–0.44)
ABSOLUTE IMMATURE GRANULOCYTE: 0.03 K/UL (ref 0–0.3)
ABSOLUTE LYMPH #: 1.75 K/UL (ref 1.1–3.7)
ABSOLUTE MONO #: 0.6 K/UL (ref 0.1–1.2)
ALBUMIN SERPL-MCNC: 3.7 G/DL (ref 3.5–5.2)
ALBUMIN/GLOBULIN RATIO: ABNORMAL (ref 1–2.5)
ALP BLD-CCNC: 111 U/L (ref 35–104)
ALT SERPL-CCNC: 48 U/L (ref 5–33)
ANION GAP SERPL CALCULATED.3IONS-SCNC: 15 MMOL/L (ref 9–17)
AST SERPL-CCNC: 78 U/L
BASOPHILS # BLD: 0 % (ref 0–2)
BASOPHILS ABSOLUTE: 0.03 K/UL (ref 0–0.2)
BILIRUB SERPL-MCNC: 0.73 MG/DL (ref 0.3–1.2)
BUN BLDV-MCNC: 12 MG/DL (ref 6–20)
BUN/CREAT BLD: 12 (ref 9–20)
CALCIUM SERPL-MCNC: 9.7 MG/DL (ref 8.6–10.4)
CHLORIDE BLD-SCNC: 96 MMOL/L (ref 98–107)
CO2: 28 MMOL/L (ref 20–31)
CREAT SERPL-MCNC: 1.02 MG/DL (ref 0.5–0.9)
D-DIMER QUANTITATIVE: 1.66 MG/L FEU (ref 0–0.59)
DIFFERENTIAL TYPE: ABNORMAL
EOSINOPHILS RELATIVE PERCENT: 1 % (ref 1–4)
GFR AFRICAN AMERICAN: >60 ML/MIN
GFR NON-AFRICAN AMERICAN: 55 ML/MIN
GFR SERPL CREATININE-BSD FRML MDRD: ABNORMAL ML/MIN/{1.73_M2}
GFR SERPL CREATININE-BSD FRML MDRD: ABNORMAL ML/MIN/{1.73_M2}
GLUCOSE BLD-MCNC: 128 MG/DL (ref 70–99)
HCT VFR BLD CALC: 48.2 % (ref 36.3–47.1)
HEMOGLOBIN: 15.1 G/DL (ref 11.9–15.1)
IMMATURE GRANULOCYTES: 0 %
LACTIC ACID: 1.4 MMOL/L (ref 0.5–2.2)
LIPASE: 41 U/L (ref 13–60)
LYMPHOCYTES # BLD: 25 % (ref 24–43)
MAGNESIUM: 1.6 MG/DL (ref 1.6–2.6)
MCH RBC QN AUTO: 26.7 PG (ref 25.2–33.5)
MCHC RBC AUTO-ENTMCNC: 31.3 G/DL (ref 28.4–34.8)
MCV RBC AUTO: 85.2 FL (ref 82.6–102.9)
MONOCYTES # BLD: 9 % (ref 3–12)
MYOGLOBIN: 26 NG/ML (ref 25–58)
NRBC AUTOMATED: 0 PER 100 WBC
PDW BLD-RTO: 14.9 % (ref 11.8–14.4)
PLATELET # BLD: 308 K/UL (ref 138–453)
PLATELET ESTIMATE: ABNORMAL
PMV BLD AUTO: 9.9 FL (ref 8.1–13.5)
POTASSIUM SERPL-SCNC: 3.5 MMOL/L (ref 3.7–5.3)
RBC # BLD: 5.66 M/UL (ref 3.95–5.11)
RBC # BLD: ABNORMAL 10*6/UL
SEG NEUTROPHILS: 65 % (ref 36–65)
SEGMENTED NEUTROPHILS ABSOLUTE COUNT: 4.43 K/UL (ref 1.5–8.1)
SODIUM BLD-SCNC: 139 MMOL/L (ref 135–144)
TOTAL PROTEIN: 8.6 G/DL (ref 6.4–8.3)
TROPONIN INTERP: NORMAL
TROPONIN T: NORMAL NG/ML
TROPONIN, HIGH SENSITIVITY: 8 NG/L (ref 0–14)
WBC # BLD: 6.9 K/UL (ref 3.5–11.3)
WBC # BLD: ABNORMAL 10*3/UL

## 2021-01-02 PROCEDURE — 96374 THER/PROPH/DIAG INJ IV PUSH: CPT

## 2021-01-02 PROCEDURE — 6360000004 HC RX CONTRAST MEDICATION: Performed by: EMERGENCY MEDICINE

## 2021-01-02 PROCEDURE — 6360000002 HC RX W HCPCS: Performed by: EMERGENCY MEDICINE

## 2021-01-02 PROCEDURE — 96375 TX/PRO/DX INJ NEW DRUG ADDON: CPT

## 2021-01-02 PROCEDURE — 85025 COMPLETE CBC W/AUTO DIFF WBC: CPT

## 2021-01-02 PROCEDURE — C9113 INJ PANTOPRAZOLE SODIUM, VIA: HCPCS | Performed by: EMERGENCY MEDICINE

## 2021-01-02 PROCEDURE — 83874 ASSAY OF MYOGLOBIN: CPT

## 2021-01-02 PROCEDURE — 83605 ASSAY OF LACTIC ACID: CPT

## 2021-01-02 PROCEDURE — 84484 ASSAY OF TROPONIN QUANT: CPT

## 2021-01-02 PROCEDURE — 99283 EMERGENCY DEPT VISIT LOW MDM: CPT

## 2021-01-02 PROCEDURE — 2580000003 HC RX 258: Performed by: EMERGENCY MEDICINE

## 2021-01-02 PROCEDURE — 71260 CT THORAX DX C+: CPT

## 2021-01-02 PROCEDURE — 83690 ASSAY OF LIPASE: CPT

## 2021-01-02 PROCEDURE — 85379 FIBRIN DEGRADATION QUANT: CPT

## 2021-01-02 PROCEDURE — 80053 COMPREHEN METABOLIC PANEL: CPT

## 2021-01-02 PROCEDURE — 93005 ELECTROCARDIOGRAM TRACING: CPT | Performed by: EMERGENCY MEDICINE

## 2021-01-02 PROCEDURE — 83735 ASSAY OF MAGNESIUM: CPT

## 2021-01-02 RX ORDER — PANTOPRAZOLE SODIUM 40 MG/10ML
40 INJECTION, POWDER, LYOPHILIZED, FOR SOLUTION INTRAVENOUS ONCE
Status: COMPLETED | OUTPATIENT
Start: 2021-01-02 | End: 2021-01-02

## 2021-01-02 RX ORDER — SODIUM CHLORIDE 0.9 % (FLUSH) 0.9 %
10 SYRINGE (ML) INJECTION PRN
Status: DISCONTINUED | OUTPATIENT
Start: 2021-01-02 | End: 2021-01-02 | Stop reason: HOSPADM

## 2021-01-02 RX ORDER — 0.9 % SODIUM CHLORIDE 0.9 %
1000 INTRAVENOUS SOLUTION INTRAVENOUS ONCE
Status: COMPLETED | OUTPATIENT
Start: 2021-01-02 | End: 2021-01-02

## 2021-01-02 RX ORDER — 0.9 % SODIUM CHLORIDE 0.9 %
80 INTRAVENOUS SOLUTION INTRAVENOUS ONCE
Status: COMPLETED | OUTPATIENT
Start: 2021-01-02 | End: 2021-01-02

## 2021-01-02 RX ORDER — ONDANSETRON 2 MG/ML
4 INJECTION INTRAMUSCULAR; INTRAVENOUS ONCE
Status: COMPLETED | OUTPATIENT
Start: 2021-01-02 | End: 2021-01-02

## 2021-01-02 RX ORDER — ONDANSETRON 4 MG/1
4 TABLET, FILM COATED ORAL EVERY 8 HOURS PRN
Status: ON HOLD | COMMUNITY
End: 2021-04-29 | Stop reason: SDUPTHER

## 2021-01-02 RX ADMIN — SODIUM CHLORIDE 80 ML: 9 INJECTION, SOLUTION INTRAVENOUS at 08:34

## 2021-01-02 RX ADMIN — IOPAMIDOL 75 ML: 755 INJECTION, SOLUTION INTRAVENOUS at 08:33

## 2021-01-02 RX ADMIN — SODIUM CHLORIDE 1000 ML: 9 INJECTION, SOLUTION INTRAVENOUS at 07:47

## 2021-01-02 RX ADMIN — ONDANSETRON 4 MG: 2 INJECTION INTRAMUSCULAR; INTRAVENOUS at 07:47

## 2021-01-02 RX ADMIN — Medication 10 ML: at 08:33

## 2021-01-02 RX ADMIN — PANTOPRAZOLE SODIUM 40 MG: 40 INJECTION, POWDER, FOR SOLUTION INTRAVENOUS at 07:47

## 2021-01-02 ASSESSMENT — ENCOUNTER SYMPTOMS
NAUSEA: 1
TROUBLE SWALLOWING: 0
BLOOD IN STOOL: 0
VOMITING: 1
COUGH: 1
ABDOMINAL PAIN: 1
SHORTNESS OF BREATH: 0

## 2021-01-02 ASSESSMENT — PAIN SCALES - GENERAL: PAINLEVEL_OUTOF10: 6

## 2021-01-02 NOTE — ED NOTES
Patient presents to the er with mid-sternal chest pressure and burning. Patient had recent surgery back in November r/t stomach cancer and had a gastrectomy with rerouting. Patient has been evaluated a couple of times due to similar symptoms. Patient verbalizing nausea and having trouble keeping things down since surgery. Patient due to go back for follow up on January 8th at [facility] where she had the surgery. Patient suppose to take Protonix bid, but has not been taking meds as ordered and appears has not been educated well on her plan of care.       Ivonne Stephens RN  01/02/21 9918

## 2021-01-02 NOTE — ED PROVIDER NOTES
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 She states she has had this since her procedure she has had 2 visits to the emergency departments in [facility] and also was readmitted towards the end of December at [facility] for upper GI which revealed no anastomotic leak or abnormality. She states she feels nauseous and she feels a burning discomfort in the epigastric substernal region. Nonradiating. No ripping or tearing sensation to the back. Denies history of heart disease prior PE DVT leg swelling hemoptysis hematemesis. She feels nauseous and she is coughing so much that she feels like she needs to throw up. Denies fevers chills diarrhea dark or bloody stools. Denies urinary symptoms. Denies focal weakness numbness tingling. She does not smoke or drink alcohol. No illicit drug use. She has had poor oral intake, feels sensation of dysphagia after oral intake. REVIEW OF SYSTEMS     Review of Systems   Constitutional: Positive for appetite change and fatigue. Negative for chills and fever. HENT: Negative for trouble swallowing. Eyes: Negative for visual disturbance. Respiratory: Positive for cough. Negative for shortness of breath. Cardiovascular: Positive for chest pain. Gastrointestinal: Positive for abdominal pain, nausea and vomiting. Negative for blood in stool. Genitourinary: Negative for difficulty urinating and dysuria. Musculoskeletal: Negative for neck pain. Skin: Negative for rash. Neurological: Negative for weakness.    Psychiatric/Behavioral: Negative for confusion. PASTMEDICAL HISTORY     Past Medical History:   Diagnosis Date    Anxiety     Arthritis     knee    Asthma     Colon polyp 02/21/2019    tubular adenoma; hyperplastic polyp    Colon polyps     Cyst of kidney, acquired     bilat.  Fatigue     Hypertension     Hypothyroidism     Neuroendocrine tumor 02/21/2019    of stomach    Obesity     Pharyngoesophageal dysphagia     Vocal cord polyps     Wears glasses      SURGICAL HISTORY       Past Surgical History:   Procedure Laterality Date    CHOLECYSTECTOMY  10/09/2014    COLONOSCOPY  02/21/2019    tubular adenoma; hyperplastic polyp    COLONOSCOPY      over 5 yrs ago per pt with polyps (2-)    CYSTOURETHROSCOPY/STENT REMOVAL  5/3/11    ENDOSCOPIC ULTRASOUND (LOWER) N/A 1/22/2020    ENDOSCOPIC ULTRASOUND WITH POSSIBLE EMR performed by Francesca Ludwig MD at [facility]    ERCP      HIP SURGERY Left     soft tissue tumor removal    THROAT SURGERY      vocal cord polyps removed    UPPER GASTROINTESTINAL ENDOSCOPY  02/21/2019    Neuroendocrine tumor    UPPER GASTROINTESTINAL ENDOSCOPY  09/23/2019    UPPER GASTROINTESTINAL ENDOSCOPY N/A 9/23/2019    EGD BIOPSY performed by Katya Spivey MD at [address] [facility] 10/23/2019    EGD W/ EMR performed by Francesca Ludwig MD at [address] [place] [address] N/A 10/29/2019    EGD CONTROL HEMORRHAGE performed by Yanelis Munroe MD at [place] [address]       Previous Medications    DESONIDE (DESOWEN) 0.05 % CREAM    Apply topically 2 times daily.     FLUTICASONE (FLONASE) 50 MCG/ACT NASAL SPRAY    2 sprays by Nasal route daily    HYDROCHLOROTHIAZIDE (HYDRODIURIL) 25 MG TABLET    TAKE 1 TABLET BY MOUTH EVERY DAY    LEVOTHYROXINE (SYNTHROID) 150 MCG TABLET    TAKE 1 TABLET BY MOUTH ONCE DAILY FOR 5 DAYS EVERY WEEK    METOPROLOL SUCCINATE (TOPROL XL) 100 MG EXTENDED RELEASE TABLET    One daily    MULTIPLE VITAMIN (MULTI-DAY VITAMINS PO)    Take by mouth    ONDANSETRON (ZOFRAN) 4 MG TABLET    Take 4 mg by mouth every 8 hours as needed for Nausea or Vomiting    PANTOPRAZOLE (PROTONIX) 40 MG TABLET    TAKE 1 TABLET BY MOUTH EVERY DAY    POTASSIUM CHLORIDE (KLOR-CON M) 20 MEQ EXTENDED RELEASE TABLET    Take 1 tablet by mouth daily for 3 days    TRIAMCINOLONE (KENALOG) 0.1 % OINTMENT    Apply topically 2 times daily for 7 days    VITAMIN B-12 (CYANOCOBALAMIN) 500 MCG TABLET    Take 500 mcg by mouth daily     ALLERGIES     is allergic to erythromycin. FAMILY HISTORY     She indicated that the status of her mother is unknown. She indicated that the status of her father is unknown. She indicated that the status of her sister is unknown. SOCIAL HISTORY       Social History     Tobacco Use    Smoking status: Former Smoker     Packs/day: 1.00     Years: 25.00     Pack years: 25.00     Quit date: 2012     Years since quittin.5    Smokeless tobacco: Never Used    Tobacco comment: quit 2 years   Substance Use Topics    Alcohol use: Yes     Comment: 3 times a month    Drug use: No     PHYSICAL EXAM     INITIAL VITALS: /82   Pulse 80   Temp 98.8 °F (37.1 °C) (Oral)   Resp 22   Ht 5' 5\" (1.651 m)   Wt 215 lb (97.5 kg)   LMP 1994   SpO2 93%   BMI 35.78 kg/m²    Physical Exam  Vitals signs and nursing note reviewed. Constitutional:       General: She is not in acute distress. Appearance: She is not toxic-appearing or diaphoretic. Comments: Actively coughing and posttussive emesis in the room   HENT:      Head: Normocephalic and atraumatic. Mouth/Throat:      Mouth: Mucous membranes are moist.      Pharynx: Oropharynx is clear. Eyes:      Extraocular Movements: Extraocular movements intact. Pupils: Pupils are equal, round, and reactive to light. Neck:      Musculoskeletal: Normal range of motion and neck supple. Cardiovascular:      Rate and Rhythm: Normal rate and regular rhythm. Pulses: Normal pulses. Radial pulses are 2+ on the right side and 2+ on the left side. Heart sounds: Normal heart sounds. No murmur. No friction rub. No gallop. Pulmonary:      Effort: Pulmonary effort is normal. No respiratory distress. Breath sounds: Normal breath sounds. Abdominal:      General: There is no abdominal bruit. Palpations: Abdomen is soft. There is no pulsatile mass. Tenderness: There is abdominal tenderness in the epigastric area. There is no guarding or rebound. Negative signs include Singleton's sign and McBurney's sign. Musculoskeletal: Normal range of motion. General: No deformity. Right lower leg: No edema. Left lower leg: No edema. Skin:     General: Skin is warm and dry. Capillary Refill: Capillary refill takes less than 2 seconds. Findings: No rash. Neurological:      General: No focal deficit present. Mental Status: She is alert and oriented to person, place, and time. GCS: GCS eye subscore is 4. GCS verbal subscore is 5. GCS motor subscore is 6. Cranial Nerves: No cranial nerve deficit. Sensory: Sensation is intact. Motor: Motor function is intact. Psychiatric:         Thought Content: Thought content normal.         MEDICAL DECISION MAKING:          Please see ED Course below for MDM/ED course. DDx: Pancreatitis, mesenteric ischemia, PE, pneumonia, esophageal perforation    All patient's question's and concerns were answered prior to disposition and patient and/or family expressed understanding and agreement of treatment plan.          NIH STROKE SCALE:            PROCEDURES:    Procedures    DIAGNOSTIC RESULTS   EKG:All EKG's are interpreted by the Emergency Department Physician who either signs or Co-signs this chart in the absence of a cardiologist.    Accelerated junctional rhythm rate of 88 QRS 80  normal axis no ST elevations, LVH voltage criteria, T wave inversions inferiorly as well as V2 through V4 consider anterior inferior ischemia, Q wave in lead III, good R wave progression, abnormal EKG; when compared to prior on 12/18/2020 no significant changes    RADIOLOGY:All plain film, CT, MRI, and formal ultrasound images (except ED bedside ultrasound) are read by the radiologist, see reports below, unless otherwisenoted in MDM or here. CT CHEST PULMONARY EMBOLISM W CONTRAST   Final Result   No pulmonary embolism or acute pulmonary abnormality. LABS: All lab results were reviewed by myself, and all abnormals are listed below. Labs Reviewed   CBC WITH AUTO DIFFERENTIAL - Abnormal; Notable for the following components:       Result Value    RBC 5.66 (*)     Hematocrit 48.2 (*)     RDW 14.9 (*)     All other components within normal limits   COMPREHENSIVE METABOLIC PANEL W/ REFLEX TO MG FOR LOW K - Abnormal; Notable for the following components:    Glucose 128 (*)     CREATININE 1.02 (*)     Potassium 3.5 (*)     Chloride 96 (*)     Alkaline Phosphatase 111 (*)     ALT 48 (*)     AST 78 (*)     Total Protein 8.6 (*)     GFR Non- 55 (*)     All other components within normal limits   D-DIMER, QUANTITATIVE - Abnormal; Notable for the following components:    D-Dimer, Quant 1.66 (*)     All other components within normal limits   TROP/MYOGLOBIN   LACTIC ACID   LIPASE   MAGNESIUM       EMERGENCY DEPARTMENTCOURSE:     Patient is a 62-year-old female here with lower chest and upper abdominal discomfort nausea vomiting cough and recent setting of gastrectomy just over a month ago at [facility]. She has had persistent dysphagia since the procedure. She had a recent upper GI which showed no leak. On exam she is dry heaving in the room coughing and spitting up. She appears uncomfortable. She is hypertensive afebrile 94% on room air speaking full sentences. Lungs are clear heart sounds are regular.   She has some epigastric discomfort no rebound or guarding no pulsatile mass. No calf tenderness or asymmetry. Will check labs including lactic acid and D-dimer given recent surgery history of cancer and chest discomfort, repeat chest x-ray, fluids and symptomatic treatment. Patient reassessed she looks much more comfortable she is no longer nauseous or having pain. Her CT chest was obtained due to elevated D-dimer and is negative for PE here acute process. Otherwise potassium is 3.5 improved from prior and creatinine is within normal.  Normal lactic acid and lipase. Patient appears improved. Stressed the importance of taking Protonix twice daily and Zofran as needed and small liquid intake given the recent procedure. She has a follow-up with her surgeon in a few days. Instructed to follow-up with PCP. Strict return precautions given. Stable for discharge. Vitals:    Vitals:    01/02/21 0724 01/02/21 0750   BP: (!) 150/100 138/82   Pulse: 88 80   Resp: 22    Temp: 98.8 °F (37.1 °C)    TempSrc: Oral    SpO2: 94% 93%   Weight: 215 lb (97.5 kg)    Height: 5' 5\" (1.651 m)        The patient was given the following medications while in the emergency department:  Orders Placed This Encounter   Medications    0.9 % sodium chloride bolus    ondansetron (ZOFRAN) injection 4 mg    pantoprazole (PROTONIX) injection 40 mg    0.9 % sodium chloride bolus    sodium chloride flush 0.9 % injection 10 mL    iopamidol (ISOVUE-370) 76 % injection 75 mL     CONSULTS:  None    FINAL IMPRESSION      1. Abdominal pain, epigastric    2.  Non-intractable vomiting with nausea, unspecified vomiting type          DISPOSITION/PLAN   DISPOSITION  Decision to discharge      PATIENT REFERRED TO:  Tressie Oppenheim, MD  [facility] [address]  [address] [facility] [phone]    Schedule an appointment as soon as possible for a visit       [facility] ED  [address] [facility]  [phone]    If symptoms worsen    DISCHARGE MEDICATIONS:  New Prescriptions No medications on file     Marce Ny MD  Attending Emergency Physician    This note was created with the assistance of a speech-recognition program. While intending to generate a document that actually reflects the content of the visit, no guarantees can be provided that every mistake has been identified and corrected by editing.                     Marce Ny MD  01/02/21 4079

## 2021-01-04 ENCOUNTER — TELEPHONE (OUTPATIENT)
Dept: INTERNAL MEDICINE CLINIC | Age: 61
End: 2021-01-04

## 2021-01-04 LAB
EKG ATRIAL RATE: 87 BPM
EKG Q-T INTERVAL: 384 MS
EKG QRS DURATION: 80 MS
EKG QTC CALCULATION (BAZETT): 464 MS
EKG R AXIS: -4 DEGREES
EKG T AXIS: -70 DEGREES
EKG VENTRICULAR RATE: 88 BPM

## 2021-01-04 RX ORDER — SUCRALFATE 1 G/1
1 TABLET ORAL 3 TIMES DAILY
Qty: 30 TABLET | Refills: 0 | Status: SHIPPED | OUTPATIENT
Start: 2021-01-04 | End: 2021-03-25

## 2021-01-04 NOTE — TELEPHONE ENCOUNTER
Patient calling to let you know that she still feeling the same he appt at the [facility] is on Fri she states she not having a problem with food coming up more like phlegm and she has no appetite

## 2021-01-11 ENCOUNTER — TELEPHONE (OUTPATIENT)
Dept: ONCOLOGY | Age: 61
End: 2021-01-11

## 2021-01-11 NOTE — TELEPHONE ENCOUNTER
After reviewing chart, writer notes that pt was readmitted again at [address] [place] [address] on 1/8/2021 for N/V. No interventions needed today. Will continue to follow and track d/c.

## 2021-01-21 ENCOUNTER — HOSPITAL ENCOUNTER (OUTPATIENT)
Facility: MEDICAL CENTER | Age: 61
End: 2021-01-21

## 2021-01-26 ENCOUNTER — APPOINTMENT (OUTPATIENT)
Dept: GENERAL RADIOLOGY | Age: 61
End: 2021-01-26
Payer: COMMERCIAL

## 2021-01-26 ENCOUNTER — TELEPHONE (OUTPATIENT)
Dept: ONCOLOGY | Age: 61
End: 2021-01-26

## 2021-01-26 ENCOUNTER — HOSPITAL ENCOUNTER (EMERGENCY)
Age: 61
Discharge: ANOTHER HOSPITAL | End: 2021-01-26
Attending: EMERGENCY MEDICINE
Payer: COMMERCIAL

## 2021-01-26 VITALS
BODY MASS INDEX: 36.11 KG/M2 | SYSTOLIC BLOOD PRESSURE: 105 MMHG | TEMPERATURE: 98.7 F | DIASTOLIC BLOOD PRESSURE: 72 MMHG | RESPIRATION RATE: 18 BRPM | HEART RATE: 84 BPM | WEIGHT: 217 LBS | OXYGEN SATURATION: 93 %

## 2021-01-26 DIAGNOSIS — R10.84 GENERALIZED ABDOMINAL PAIN: ICD-10-CM

## 2021-01-26 DIAGNOSIS — Z98.0 STATUS POST TOTAL GASTRECTOMY AND ROUX-EN-Y ESOPHAGOJEJUNAL ANASTOMOSIS: Primary | ICD-10-CM

## 2021-01-26 DIAGNOSIS — R11.2 INTRACTABLE VOMITING WITH NAUSEA, UNSPECIFIED VOMITING TYPE: ICD-10-CM

## 2021-01-26 DIAGNOSIS — Z90.3 STATUS POST TOTAL GASTRECTOMY AND ROUX-EN-Y ESOPHAGOJEJUNAL ANASTOMOSIS: Primary | ICD-10-CM

## 2021-01-26 DIAGNOSIS — E86.0 DEHYDRATION: ICD-10-CM

## 2021-01-26 LAB
-: NORMAL
ABSOLUTE EOS #: 0.06 K/UL (ref 0–0.44)
ABSOLUTE IMMATURE GRANULOCYTE: 0.03 K/UL (ref 0–0.3)
ABSOLUTE LYMPH #: 2.3 K/UL (ref 1.1–3.7)
ABSOLUTE MONO #: 0.56 K/UL (ref 0.1–1.2)
ALBUMIN SERPL-MCNC: 3.7 G/DL (ref 3.5–5.2)
ALBUMIN/GLOBULIN RATIO: 0.9 (ref 1–2.5)
ALP BLD-CCNC: 99 U/L (ref 35–104)
ALT SERPL-CCNC: 69 U/L (ref 5–33)
AMORPHOUS: NORMAL
ANION GAP SERPL CALCULATED.3IONS-SCNC: 17 MMOL/L (ref 9–17)
AST SERPL-CCNC: 105 U/L
BACTERIA: NORMAL
BASOPHILS # BLD: 1 % (ref 0–2)
BASOPHILS ABSOLUTE: 0.04 K/UL (ref 0–0.2)
BILIRUB SERPL-MCNC: 0.6 MG/DL (ref 0.3–1.2)
BILIRUBIN URINE: NEGATIVE
BUN BLDV-MCNC: 9 MG/DL (ref 6–20)
BUN/CREAT BLD: ABNORMAL (ref 9–20)
CALCIUM SERPL-MCNC: 9.7 MG/DL (ref 8.6–10.4)
CASTS UA: NORMAL /LPF (ref 0–8)
CHLORIDE BLD-SCNC: 99 MMOL/L (ref 98–107)
CO2: 25 MMOL/L (ref 20–31)
COLOR: YELLOW
COMMENT UA: ABNORMAL
CREAT SERPL-MCNC: 0.69 MG/DL (ref 0.5–0.9)
CRYSTALS, UA: NORMAL /HPF
DIFFERENTIAL TYPE: ABNORMAL
EOSINOPHILS RELATIVE PERCENT: 1 % (ref 1–4)
EPITHELIAL CELLS UA: NORMAL /HPF (ref 0–5)
GFR AFRICAN AMERICAN: >60 ML/MIN
GFR NON-AFRICAN AMERICAN: >60 ML/MIN
GFR SERPL CREATININE-BSD FRML MDRD: ABNORMAL ML/MIN/{1.73_M2}
GFR SERPL CREATININE-BSD FRML MDRD: ABNORMAL ML/MIN/{1.73_M2}
GLUCOSE BLD-MCNC: 123 MG/DL (ref 70–99)
GLUCOSE URINE: NEGATIVE
HCT VFR BLD CALC: 48.1 % (ref 36.3–47.1)
HEMOGLOBIN: 15.3 G/DL (ref 11.9–15.1)
IMMATURE GRANULOCYTES: 0 %
KETONES, URINE: ABNORMAL
LEUKOCYTE ESTERASE, URINE: NEGATIVE
LIPASE: 33 U/L (ref 13–60)
LYMPHOCYTES # BLD: 29 % (ref 24–43)
MAGNESIUM: 1.7 MG/DL (ref 1.6–2.6)
MCH RBC QN AUTO: 26.9 PG (ref 25.2–33.5)
MCHC RBC AUTO-ENTMCNC: 31.8 G/DL (ref 28.4–34.8)
MCV RBC AUTO: 84.7 FL (ref 82.6–102.9)
MONOCYTES # BLD: 7 % (ref 3–12)
MUCUS: NORMAL
NITRITE, URINE: NEGATIVE
NRBC AUTOMATED: 0 PER 100 WBC
OTHER OBSERVATIONS UA: NORMAL
PDW BLD-RTO: 15.6 % (ref 11.8–14.4)
PH UA: 5 (ref 5–8)
PLATELET # BLD: 336 K/UL (ref 138–453)
PLATELET ESTIMATE: ABNORMAL
PMV BLD AUTO: 9.7 FL (ref 8.1–13.5)
POTASSIUM SERPL-SCNC: 3.1 MMOL/L (ref 3.7–5.3)
PROTEIN UA: ABNORMAL
RBC # BLD: 5.68 M/UL (ref 3.95–5.11)
RBC # BLD: ABNORMAL 10*6/UL
RBC UA: NORMAL /HPF (ref 0–4)
RENAL EPITHELIAL, UA: NORMAL /HPF
SARS-COV-2, RAPID: NOT DETECTED
SARS-COV-2: NORMAL
SARS-COV-2: NORMAL
SEG NEUTROPHILS: 62 % (ref 36–65)
SEGMENTED NEUTROPHILS ABSOLUTE COUNT: 5.07 K/UL (ref 1.5–8.1)
SODIUM BLD-SCNC: 141 MMOL/L (ref 135–144)
SOURCE: NORMAL
SPECIFIC GRAVITY UA: 1.02 (ref 1–1.03)
THYROXINE, FREE: 2.24 NG/DL (ref 0.93–1.7)
TOTAL PROTEIN: 7.9 G/DL (ref 6.4–8.3)
TRICHOMONAS: NORMAL
TROPONIN INTERP: NORMAL
TROPONIN T: NORMAL NG/ML
TROPONIN, HIGH SENSITIVITY: 9 NG/L (ref 0–14)
TSH SERPL DL<=0.05 MIU/L-ACNC: 0.04 MIU/L (ref 0.3–5)
TURBIDITY: CLEAR
URINE HGB: NEGATIVE
UROBILINOGEN, URINE: NORMAL
WBC # BLD: 8.1 K/UL (ref 3.5–11.3)
WBC # BLD: ABNORMAL 10*3/UL
WBC UA: NORMAL /HPF (ref 0–5)
YEAST: NORMAL

## 2021-01-26 PROCEDURE — 96365 THER/PROPH/DIAG IV INF INIT: CPT

## 2021-01-26 PROCEDURE — 84443 ASSAY THYROID STIM HORMONE: CPT

## 2021-01-26 PROCEDURE — 96361 HYDRATE IV INFUSION ADD-ON: CPT

## 2021-01-26 PROCEDURE — 96375 TX/PRO/DX INJ NEW DRUG ADDON: CPT

## 2021-01-26 PROCEDURE — 99285 EMERGENCY DEPT VISIT HI MDM: CPT

## 2021-01-26 PROCEDURE — 83690 ASSAY OF LIPASE: CPT

## 2021-01-26 PROCEDURE — 84439 ASSAY OF FREE THYROXINE: CPT

## 2021-01-26 PROCEDURE — 71045 X-RAY EXAM CHEST 1 VIEW: CPT

## 2021-01-26 PROCEDURE — 81001 URINALYSIS AUTO W/SCOPE: CPT

## 2021-01-26 PROCEDURE — 83735 ASSAY OF MAGNESIUM: CPT

## 2021-01-26 PROCEDURE — 6370000000 HC RX 637 (ALT 250 FOR IP): Performed by: STUDENT IN AN ORGANIZED HEALTH CARE EDUCATION/TRAINING PROGRAM

## 2021-01-26 PROCEDURE — 85025 COMPLETE CBC W/AUTO DIFF WBC: CPT

## 2021-01-26 PROCEDURE — 80053 COMPREHEN METABOLIC PANEL: CPT

## 2021-01-26 PROCEDURE — 6360000002 HC RX W HCPCS

## 2021-01-26 PROCEDURE — 2580000003 HC RX 258: Performed by: STUDENT IN AN ORGANIZED HEALTH CARE EDUCATION/TRAINING PROGRAM

## 2021-01-26 PROCEDURE — 6360000002 HC RX W HCPCS: Performed by: STUDENT IN AN ORGANIZED HEALTH CARE EDUCATION/TRAINING PROGRAM

## 2021-01-26 PROCEDURE — 84484 ASSAY OF TROPONIN QUANT: CPT

## 2021-01-26 PROCEDURE — U0002 COVID-19 LAB TEST NON-CDC: HCPCS

## 2021-01-26 PROCEDURE — 93005 ELECTROCARDIOGRAM TRACING: CPT | Performed by: STUDENT IN AN ORGANIZED HEALTH CARE EDUCATION/TRAINING PROGRAM

## 2021-01-26 PROCEDURE — 96376 TX/PRO/DX INJ SAME DRUG ADON: CPT

## 2021-01-26 PROCEDURE — 2500000003 HC RX 250 WO HCPCS: Performed by: STUDENT IN AN ORGANIZED HEALTH CARE EDUCATION/TRAINING PROGRAM

## 2021-01-26 RX ORDER — FENTANYL CITRATE 50 UG/ML
50 INJECTION, SOLUTION INTRAMUSCULAR; INTRAVENOUS ONCE
Status: COMPLETED | OUTPATIENT
Start: 2021-01-26 | End: 2021-01-26

## 2021-01-26 RX ORDER — ONDANSETRON 2 MG/ML
4 INJECTION INTRAMUSCULAR; INTRAVENOUS ONCE
Status: COMPLETED | OUTPATIENT
Start: 2021-01-26 | End: 2021-01-26

## 2021-01-26 RX ORDER — FENTANYL CITRATE 50 UG/ML
INJECTION, SOLUTION INTRAMUSCULAR; INTRAVENOUS
Status: COMPLETED
Start: 2021-01-26 | End: 2021-01-26

## 2021-01-26 RX ORDER — MAGNESIUM HYDROXIDE/ALUMINUM HYDROXICE/SIMETHICONE 120; 1200; 1200 MG/30ML; MG/30ML; MG/30ML
15 SUSPENSION ORAL ONCE
Status: COMPLETED | OUTPATIENT
Start: 2021-01-26 | End: 2021-01-26

## 2021-01-26 RX ORDER — MAGNESIUM SULFATE 1 G/100ML
1000 INJECTION INTRAVENOUS ONCE
Status: COMPLETED | OUTPATIENT
Start: 2021-01-26 | End: 2021-01-26

## 2021-01-26 RX ORDER — 0.9 % SODIUM CHLORIDE 0.9 %
1000 INTRAVENOUS SOLUTION INTRAVENOUS ONCE
Status: COMPLETED | OUTPATIENT
Start: 2021-01-26 | End: 2021-01-26

## 2021-01-26 RX ORDER — MAGNESIUM SULFATE 1 G/100ML
INJECTION INTRAVENOUS
Status: COMPLETED
Start: 2021-01-26 | End: 2021-01-26

## 2021-01-26 RX ORDER — ONDANSETRON 2 MG/ML
INJECTION INTRAMUSCULAR; INTRAVENOUS
Status: COMPLETED
Start: 2021-01-26 | End: 2021-01-26

## 2021-01-26 RX ADMIN — FAMOTIDINE 20 MG: 10 INJECTION INTRAVENOUS at 14:34

## 2021-01-26 RX ADMIN — MAGNESIUM SULFATE 1000 MG: 1 INJECTION INTRAVENOUS at 11:04

## 2021-01-26 RX ADMIN — ONDANSETRON 4 MG: 2 INJECTION INTRAMUSCULAR; INTRAVENOUS at 11:02

## 2021-01-26 RX ADMIN — FENTANYL CITRATE 50 MCG: 50 INJECTION, SOLUTION INTRAMUSCULAR; INTRAVENOUS at 09:21

## 2021-01-26 RX ADMIN — ONDANSETRON 4 MG: 2 INJECTION INTRAMUSCULAR; INTRAVENOUS at 17:05

## 2021-01-26 RX ADMIN — ONDANSETRON 4 MG: 2 INJECTION INTRAMUSCULAR; INTRAVENOUS at 09:21

## 2021-01-26 RX ADMIN — ALUMINUM HYDROXIDE, MAGNESIUM HYDROXIDE, AND SIMETHICONE 15 ML: 200; 200; 20 SUSPENSION ORAL at 14:34

## 2021-01-26 RX ADMIN — POTASSIUM BICARBONATE 40 MEQ: 782 TABLET, EFFERVESCENT ORAL at 11:04

## 2021-01-26 RX ADMIN — SODIUM CHLORIDE 1000 ML: 9 INJECTION, SOLUTION INTRAVENOUS at 09:21

## 2021-01-26 RX ADMIN — MAGNESIUM SULFATE HEPTAHYDRATE 1000 MG: 1 INJECTION, SOLUTION INTRAVENOUS at 11:04

## 2021-01-26 RX ADMIN — FENTANYL CITRATE 50 MCG: 50 INJECTION, SOLUTION INTRAMUSCULAR; INTRAVENOUS at 11:22

## 2021-01-26 ASSESSMENT — ENCOUNTER SYMPTOMS
ABDOMINAL PAIN: 1
VOMITING: 1
NAUSEA: 1
SHORTNESS OF BREATH: 1
COLOR CHANGE: 0
EYE DISCHARGE: 0
EYE REDNESS: 0

## 2021-01-26 ASSESSMENT — PAIN DESCRIPTION - DESCRIPTORS: DESCRIPTORS: BURNING;ACHING

## 2021-01-26 ASSESSMENT — PAIN DESCRIPTION - LOCATION: LOCATION: ABDOMEN

## 2021-01-26 ASSESSMENT — PAIN SCALES - GENERAL: PAINLEVEL_OUTOF10: 9

## 2021-01-26 NOTE — ED NOTES
Per Dr. Niki Escalona pt to be transferred to [address] [place] [address], pt now has placement. Awaiting room assignment and [facility] ETA.       Sweta Mahajan RN  01/26/21 6514

## 2021-01-26 NOTE — ED NOTES
Pt placed on cardiac monitor, BP cuff, and pulse ox. Alarms set.       Florentino Siddiqi RN  01/26/21 1603

## 2021-01-26 NOTE — TELEPHONE ENCOUNTER
Writer notes that pt is in ER and has a f/u scheduled today with Dr. Ramíerz Dawkins at [facility] did alert Gayathri at [facility] via [employer]. Will continue to follow.

## 2021-01-26 NOTE — ED NOTES
Pt to ed with family from home. Pt had abdominal surgery on November 30, 2020 at [address] [place] [address] due to mass in the abdomen. Pt has been nausea since Friday with emesis. Pt also states this am she started to experience chest pain, sob. Pt appears to not feel well.  Pt is alert, oriented, speaking in full, complete sentence     Martin Hdez, RN  01/26/21 [facility], RN  01/26/21 4776

## 2021-01-26 NOTE — ED PROVIDER NOTES
[facility]     Emergency Department     Faculty Note/ Attestation      Pt Name: Kusum Rudd                                       MRN: [id number]  [place] 1960  Date of evaluation: 1/26/2021  Patients PCP:    Brandi Aquino MD    Attestation  I performed a history and physical examination of the patient/ or directly observed  and discussed management with the resident. I reviewed the residents note and agree with the documented findings and plan of care. Any areas of disagreement are noted on the chart. I was personally present for the key portions of any procedures. I have documented in the chart those procedures where I was not present during the key portions. I have reviewed the emergency nurses triage note. I agree with the chief complaint, past medical history, past surgical history, allergies, medications, social and family history as documented unless otherwise noted below. For Physician Assistant/ Nurse Practitioner cases/documentation I have personally evaluated this patient and have completed at least one if not all key elements of the E/M (history, physical exam, and MDM). Additional findings are as noted. This patient was evaluated in the Emergency Department for symptoms described in the history of present illness. The patient was evaluated in the context of the global COVID-19 pandemic, which necessitated consideration that the patient might be at risk for infection with the SARS-CoV-2 virus that causes COVID-19. Institutional protocols and algorithms that pertain to the evaluation of patients at risk for COVID-19 are in a state of rapid change based on information released by regulatory bodies including the CDC and federal and state organizations. These policies and algorithms were followed during the patient's care in the ED.      Initial Screens:             Vitals:    Vitals:    01/26/21 0906 01/26/21 0915 01/26/21 0917   BP: (!) 145/99  (!) 151/107 Pulse: 121     Resp: 18     Temp:  98.7 °F (37.1 °C)    TempSrc:  Oral    SpO2: 98%  96%   Weight: 217 lb (98.4 kg)         Chief Complaint      Chief Complaint   Patient presents with    Nausea          weight is 217 lb (98.4 kg). Her oral temperature is 98.7 °F (37.1 °C). Her blood pressure is 151/107 (abnormal) and her pulse is 121. Her respiration is 18 and oxygen saturation is 96%. DIAGNOSTIC RESULTS       RADIOLOGY:   XR CHEST PORTABLE    (Results Pending)         LABS:  Labs Reviewed   CBC WITH AUTO DIFFERENTIAL - Abnormal; Notable for the following components:       Result Value    RBC 5.68 (*)     Hemoglobin 15.3 (*)     Hematocrit 48.1 (*)     RDW 15.6 (*)     All other components within normal limits   COMPREHENSIVE METABOLIC PANEL   LIPASE   TSH WITH REFLEX   URINE RT REFLEX TO CULTURE   MAGNESIUM   TROPONIN         EMERGENCY DEPARTMENT COURSE:     -------------------------      BP: (!) 151/107, Temp: 98.7 °F (37.1 °C), Pulse: 121, Resp: 18    System Problem List     Patient Active Problem List   Diagnosis    Asthma    Anxiety    Obesity    Hyperlipidemia    Hypothyroidism    Colon polyps    Vertigo    Mass of left hip region    Fatigue    Pharyngoesophageal dysphagia    Colon polyp    Neuroendocrine tumor    Chest pain    Shortness of breath    Anemia    GI bleed    Essential hypertension    Neuroendocrine neoplasm of stomach    Polyp, stomach    Melena    Gastric ulcer with hemorrhage    Constipation         Comments  Chronic Prob List noted          Castellano MD, F.A.C.E.P.   Attending Emergency Physician         Ricardo Gamboa MD  01/26/21 5146

## 2021-01-26 NOTE — ED NOTES
Pt resting on cot, no distress noted at this time, family at bedside. Pt updated on plan to transfer to [address] [place] [address], awaiting bed placement to facilitate transfer. Message left for x ray file room for disk with imaging to take to [address] [place] [address]. Physician filling out transfer paperwork at this time.       Liss Whittington RN  01/26/21 1003

## 2021-01-26 NOTE — ED NOTES
Pt sleeping on cot, rr even and non labored, no distress noted. Per Dr. Danica Mccollum pt to be admitted to OBS. Awaiting admit order.       Kacy Palmer RN  01/26/21 0516

## 2021-01-26 NOTE — ED NOTES
Urine sample collected from pt,  labeled and sent to lab for testing.       Kesha Alvarez RN  01/26/21 7745

## 2021-01-26 NOTE — ED PROVIDER NOTES
[facility]  ED  Emergency Department Encounter  Emergency Medicine Resident     Pt Name: Marcus Duke  MRN: [id number]  Jakubgfkavin 1960  Date of evaluation: 1/26/21  PCP:  MD Avelina Ahuja       Chief Complaint   Patient presents with    Nausea       HISTORY [place]  (Location/Symptom, Timing/Onset, Context/Setting, Quality, Duration, Modifying Catherin Lefort.)      Marcus Duke is a 62 y.o. female who presents with status post total gastrectomy with Qamar-en-Y reconstruction at the end of November 2020 in [facility] for neuroendocrine tumor. Ever since patient has had multiple episodes of nausea and vomiting unable to tolerate oral liquids. Patient has been extremely compliant and appears to be following up frequently with [facility]. Supposed to have follow-up appointment today however due to weather as well as multiple episodes of nausea vomiting unable to go to appointment. States over the weekend patient has been needed to tolerate any oral intake. Of note patient was also admitted  on 22 January for intractable nausea and vomiting at [facility] and required IV fluid hydration as well as was hypokalemic. Did have a CT scan performed approximately 2 weeks ago which was normal.  Does have some moderate generalized abdominal pain. Nonradiating. Worse with nausea and vomiting. PAST MEDICAL / SURGICAL / SOCIAL / FAMILY HISTORY      has a past medical history of Anxiety, Arthritis, Asthma, Colon polyp, Colon polyps, Cyst of kidney, acquired, Fatigue, Hypertension, Hypothyroidism, Neuroendocrine tumor, Obesity, Pharyngoesophageal dysphagia, Vocal cord polyps, and Wears glasses. has a past surgical history that includes cystourethroscopy/stent removal (5/3/11); Colonoscopy (02/21/2019); ERCP; Cholecystectomy (10/09/2014); hip surgery (Left); Throat surgery; Colonoscopy;  Upper gastrointestinal endoscopy (02/21/2019); Upper gastrointestinal endoscopy (2019); Upper gastrointestinal endoscopy (N/A, 2019); Upper gastrointestinal endoscopy (N/A, 10/23/2019); Upper gastrointestinal endoscopy (N/A, 10/29/2019); and Endoscopic ultrasonography, GI (N/A, 2020).     Social History     Socioeconomic History    Marital status: Single     Spouse name: Not on file    Number of children: Not on file    Years of education: Not on file    Highest education level: Not on file   Occupational History    Not on file   Social Needs    Financial resource strain: Not on file    Food insecurity     Worry: Not on file     Inability: Not on file    Transportation needs     Medical: Not on file     Non-medical: Not on file   Tobacco Use    Smoking status: Former Smoker     Packs/day: 1.00     Years: 25.00     Pack years: 25.00     Quit date: 2012     Years since quittin.6    Smokeless tobacco: Never Used    Tobacco comment: quit 2 years   Substance and Sexual Activity    Alcohol use: Yes     Comment: 3 times a month    Drug use: No    Sexual activity: Not on file   Lifestyle    Physical activity     Days per week: Not on file     Minutes per session: Not on file    Stress: Not on file   Relationships    Social connections     Talks on phone: Not on file     Gets together: Not on file     Attends Yarsanism service: Not on file     Active member of club or organization: Not on file     Attends meetings of clubs or organizations: Not on file     Relationship status: Not on file    Intimate partner violence     Fear of current or ex partner: Not on file     Emotionally abused: Not on file     Physically abused: Not on file     Forced sexual activity: Not on file   Other Topics Concern    Not on file   Social History Narrative    Not on file       Family History   Problem Relation Age of Onset    High Blood Pressure Mother     High Blood Pressure Sister     Stomach Cancer Sister     Other Sister         epilepsy    No Known Problems Father        Allergies:  Erythromycin    Home Medications:  Prior to Admission medications    Medication Sig Start Date End Date Taking? Authorizing Provider   ondansetron (ZOFRAN) 4 MG tablet Take 4 mg by mouth every 8 hours as needed for Nausea or Vomiting   Yes Historical Provider, MD   pantoprazole (PROTONIX) 40 MG tablet TAKE 1 TABLET BY MOUTH EVERY DAY 12/4/20  Yes Historical Provider, MD   fluticasone (FLONASE) 50 MCG/ACT nasal spray 2 sprays by Nasal route daily 12/30/20  Yes Hesham Jaimes MD   metoprolol succinate (TOPROL XL) 100 MG extended release tablet One daily 12/4/20  Yes Hesham Jaimes MD   hydroCHLOROthiazide (HYDRODIURIL) 25 MG tablet TAKE 1 TABLET BY MOUTH EVERY DAY 12/4/20  Yes Hesham Jaimes MD   levothyroxine (SYNTHROID) 150 MCG tablet TAKE 1 TABLET BY MOUTH ONCE DAILY FOR 5 DAYS EVERY WEEK 10/30/20  Yes Hesham Jaimes MD   desonide (DESOWEN) 0.05 % cream Apply topically 2 times daily. 8/21/20  Yes Hesham Jaimes MD   Multiple Vitamin (MULTI-DAY VITAMINS PO) Take by mouth   Yes Historical Provider, MD   vitamin B-12 (CYANOCOBALAMIN) 500 MCG tablet Take 500 mcg by mouth daily   Yes Historical Provider, MD   sucralfate (CARAFATE) 1 GM tablet Take 1 tablet by mouth 3 times daily for 10 days 1/4/21 1/14/21  Hesham Jaimes MD   potassium chloride (KLOR-CON M) 20 MEQ extended release tablet Take 1 tablet by mouth daily for 3 days 12/31/20 1/3/21  Hesham Jaimes MD       REVIEW OF SYSTEMS    (2-9 systems for level 4, 10 or more for level 5)      Review of Systems   Constitutional: Negative for chills and fever. Eyes: Negative for discharge and redness. Respiratory: Positive for shortness of breath. Cardiovascular: Positive for chest pain. Gastrointestinal: Positive for abdominal pain, nausea and vomiting. Genitourinary: Negative for flank pain. Musculoskeletal: Negative for myalgias. Skin: Negative for color change and rash. Allergic/Immunologic: Positive for environmental allergies. Neurological: Negative for headaches. Psychiatric/Behavioral: Negative for agitation and confusion. PHYSICAL EXAM   (up to 7 for level 4, 8 or more for level 5)     INITIAL VITALS:    weight is 217 lb (98.4 kg). Her oral temperature is 98.7 °F (37.1 °C). Her blood pressure is 105/72 and her pulse is 84. Her respiration is 18 and oxygen saturation is 93%. Physical Exam  Vitals signs and nursing note reviewed. Constitutional:       Appearance: She is well-developed. HENT:      Head: Normocephalic and atraumatic. Nose: Nose normal.      Mouth/Throat:      Mouth: Mucous membranes are moist.   Eyes:      General: No scleral icterus. Conjunctiva/sclera: Conjunctivae normal.      Pupils: Pupils are equal, round, and reactive to light. Neck:      Musculoskeletal: Neck supple. Trachea: No tracheal deviation. Cardiovascular:      Rate and Rhythm: Regular rhythm. Tachycardia present. Heart sounds: Normal heart sounds. No murmur. No friction rub. No gallop. Pulmonary:      Effort: Pulmonary effort is normal. No respiratory distress. Breath sounds: Normal breath sounds. No wheezing or rales. Abdominal:      General: Bowel sounds are normal. There is no distension. Palpations: Abdomen is soft. There is no mass. Tenderness: There is abdominal tenderness. There is no guarding or rebound. Comments: Nonperitoneal with mild abdominal tenderness to deep palpation across entire abdomen. No guarding no masses no rebound. Musculoskeletal: Normal range of motion. Skin:     General: Skin is warm and dry. Findings: No erythema or rash. Neurological:      Mental Status: She is alert and oriented to person, place, and time.    Psychiatric:         Behavior: Behavior normal.         DIFFERENTIAL  DIAGNOSIS     PLAN (LABS / IMAGING / EKG):  Orders Placed This Encounter   Procedures    XR CHEST PORTABLE    Comprehensive Metabolic Panel    CBC Auto Differential    Lipase    TSH with Reflex    Urinalysis Reflex to Culture    Magnesium    Troponin    T4, Free    COVID-19    Microscopic Urinalysis    EKG 12 Lead       MEDICATIONS ORDERED:  Orders Placed This Encounter   Medications    ondansetron (ZOFRAN) injection 4 mg    0.9 % sodium chloride bolus    fentaNYL (SUBLIMAZE) injection 50 mcg    ondansetron (ZOFRAN) 4 MG/2ML injection     ALBERTO MYLES: cabinet override    fentaNYL (SUBLIMAZE) 100 MCG/2ML injection     ALBERTO MYLES: cabinet override    DISCONTD: potassium bicarb-citric acid (EFFER-K) effervescent tablet 40 mEq    magnesium sulfate 1000 mg in dextrose 5% 100 mL IVPB    potassium bicarb-citric acid (EFFER-K) effervescent tablet 40 mEq    ondansetron (ZOFRAN) injection 4 mg    magnesium sulfate 1-5 GM/100ML-% IVPB (premix)     ALBERTO MYLES: cabinet override    ondansetron (ZOFRAN) 4 MG/2ML injection     ALBERTO MYLES: cabinet override    fentaNYL (SUBLIMAZE) injection 50 mcg    aluminum & magnesium hydroxide-simethicone (MAALOX) 200-200-20 MG/5ML suspension 15 mL    famotidine (PEPCID) injection 20 mg    ondansetron (ZOFRAN) injection 4 mg       DDX: Gastritis versus incarcerated internal hernia versus ulcer versus electrolyte abnormality versus Qamar-en-Y stasis syndrome    Initial MDM/Plan: 62 y.o. female who presents with nausea and vomiting after total gastrectomy with Qamar-en-Y construction approximately 3 months prior. Patient has complicated past medical history multiple visits for intractable nausea vomiting. Will treat symptomatically. Basic labs. Chest x-ray and troponin. Anticipate discussion with [facility] clinic. Consider metoclopramide and Benadryl as well.     DIAGNOSTIC RESULTS / EMERGENCY DEPARTMENT COURSE / MDM     LABS:  Labs Reviewed   COMPREHENSIVE METABOLIC PANEL - Abnormal; Notable for the following components:       Result Value    Glucose 123 (*) Potassium 3.1 (*)     ALT 69 (*)      (*)     Albumin/Globulin Ratio 0.9 (*)     All other components within normal limits   CBC WITH AUTO DIFFERENTIAL - Abnormal; Notable for the following components:    RBC 5.68 (*)     Hemoglobin 15.3 (*)     Hematocrit 48.1 (*)     RDW 15.6 (*)     All other components within normal limits   TSH WITH REFLEX - Abnormal; Notable for the following components:    TSH 0.04 (*)     All other components within normal limits   URINE RT REFLEX TO CULTURE - Abnormal; Notable for the following components:    Ketones, Urine SMALL (*)     Protein, UA TRACE (*)     All other components within normal limits   T4, FREE - Abnormal; Notable for the following components:    Thyroxine, Free 2.24 (*)     All other components within normal limits   LIPASE   MAGNESIUM   TROPONIN   COVID-19   MICROSCOPIC URINALYSIS         RADIOLOGY:  Xr Chest Portable    Result Date: 1/26/2021  EXAMINATION: ONE XRAY VIEW OF THE CHEST 1/26/2021 9:36 am COMPARISON: 12/18/2020, 10/26/2019 HISTORY: ORDERING SYSTEM PROVIDED HISTORY: Chest pain, nausea, vomiting TECHNOLOGIST PROVIDED HISTORY: Chest pain, nausea, vomiting FINDINGS: The cardiomediastinal silhouette is within normal limits. There is no consolidation, pneumothorax or evidence for edema. No evidence for effusion. No acute osseous abnormality is identified. No acute airspace disease identified.        EKG  EKG Interpretation    Interpreted by me    Rhythm: normal sinus   Rate: normal  Axis: normal  Ectopy: none  Conduction: , QRS 72, QTc 446  ST Segments: no acute change  T Waves: Nonspecific T wave flattening  Q Waves: none    Clinical Impression: no acute changes and nonspecific EKG with normal intervals    All EKG's are interpreted by the Emergency Department Physician who either signs or Co-signs this chart in the absence of a cardiologist.    EMERGENCY DEPARTMENT COURSE:  ED Course as of Jan 26 1852   Tue Jan 26, 2021   1002 Patient dictations but occasionally words are mis-transcribed.)       Halima Cordova, DO  Resident  01/26/21 4082

## 2021-01-26 NOTE — ED NOTES
Shira Rodriges, unit clerk called transfer line to evaluate bed placement at [address] [place] [address]. No beds available at this time and may take several days for bed placement. Dr. Elisa Lutz notified.       Martin Mcginnis RN  01/26/21 2838

## 2021-01-27 LAB
EKG ATRIAL RATE: 105 BPM
EKG P AXIS: 63 DEGREES
EKG P-R INTERVAL: 136 MS
EKG Q-T INTERVAL: 338 MS
EKG QRS DURATION: 72 MS
EKG QTC CALCULATION (BAZETT): 446 MS
EKG R AXIS: -3 DEGREES
EKG T AXIS: -68 DEGREES
EKG VENTRICULAR RATE: 105 BPM

## 2021-01-27 NOTE — ED NOTES
Siddhartha Trinidad here to transport patient.  Transport team given report      Mis Sadler RN  01/26/21 2100

## 2021-01-27 NOTE — ED NOTES
Report given to [facility].  No questions at this time     Rogers Trinidad [facility]  01/26/21 2058

## 2021-02-04 ENCOUNTER — TELEPHONE (OUTPATIENT)
Dept: INTERNAL MEDICINE CLINIC | Age: 61
End: 2021-02-04

## 2021-02-04 DIAGNOSIS — E03.9 HYPOTHYROIDISM, UNSPECIFIED TYPE: Primary | ICD-10-CM

## 2021-02-04 RX ORDER — LEVOTHYROXINE SODIUM 0.12 MG/1
125 TABLET ORAL DAILY
COMMUNITY
Start: 2021-01-31 | End: 2021-03-25 | Stop reason: DRUGHIGH

## 2021-02-04 NOTE — TELEPHONE ENCOUNTER
I do not have the results on the TSH and free T4 normal Synthroid she is taking now  But if they just change the dosage need to wait at least 4 to 6 weeks before rechecking the thyroid test  Does she have a follow-up appointment with us

## 2021-02-04 NOTE — TELEPHONE ENCOUNTER
Explained to pt    Mailed order to recheck TSH Free T4 gabriel 6 weeks, then set appt to follow here on 3/25/21  OK?

## 2021-02-04 NOTE — TELEPHONE ENCOUNTER
Okay but do we know what her TSH was from [facility] clinic and how much Synthroid she is taking

## 2021-02-04 NOTE — TELEPHONE ENCOUNTER
Per pt she is on 125mcg levothyroxine now and was told level was low- med list updated    Please send this back to me, I will recheck Care evrywhere next week for results, Thanks

## 2021-02-04 NOTE — TELEPHONE ENCOUNTER
Patient  Was admitted into the [facility]note 2] was discharged on Sunday. State that her thyroid levels were low and they did adjust her medication. Is asking for pcp to retest levels?     Will go to [place]

## 2021-02-11 NOTE — TELEPHONE ENCOUNTER
Dr Jarvis [facility] [phone]    The thyroid results are in Epic from 1/26/21.

## 2021-02-11 NOTE — TELEPHONE ENCOUNTER
Have not seen Thyroid lab results appear in care everywhere yet    Called pt and she will check for a phone number for me to call for results and call back with number.

## 2021-03-10 RX ORDER — METOPROLOL SUCCINATE 100 MG/1
TABLET, EXTENDED RELEASE ORAL
Qty: 90 TABLET | Refills: 0 | Status: SHIPPED | OUTPATIENT
Start: 2021-03-10 | End: 2021-03-25 | Stop reason: ALTCHOICE

## 2021-03-10 RX ORDER — HYDROCHLOROTHIAZIDE 25 MG/1
TABLET ORAL
Qty: 90 TABLET | Refills: 0 | Status: ON HOLD | OUTPATIENT
Start: 2021-03-10 | End: 2021-08-28

## 2021-03-10 NOTE — TELEPHONE ENCOUNTER
Kacey Talamantes is calling to request a refill on the following medication(s):    Medication Request:  Requested Prescriptions     Pending Prescriptions Disp Refills    hydroCHLOROthiazide (HYDRODIURIL) 25 MG tablet 90 tablet 0     Sig: TAKE 1 TABLET BY MOUTH EVERY DAY    metoprolol succinate (TOPROL XL) 100 MG extended release tablet 90 tablet 0     Sig: One daily       Last Visit Date (If Applicable):  03/33/9013    Next Visit Date:    3/25/2021

## 2021-03-18 ENCOUNTER — HOSPITAL ENCOUNTER (OUTPATIENT)
Age: 61
Setting detail: SPECIMEN
Discharge: HOME | End: 2021-03-18
Payer: COMMERCIAL

## 2021-03-18 DIAGNOSIS — E03.9 HYPOTHYROIDISM, UNSPECIFIED TYPE: ICD-10-CM

## 2021-03-18 LAB
THYROXINE, FREE: 1.77 NG/DL (ref 0.93–1.7)
TSH SERPL DL<=0.05 MIU/L-ACNC: 0.05 MIU/L (ref 0.3–5)

## 2021-03-25 ENCOUNTER — HOSPITAL ENCOUNTER (OUTPATIENT)
Facility: MEDICAL CENTER | Age: 61
Discharge: HOME | End: 2021-03-25
Payer: COMMERCIAL

## 2021-03-25 ENCOUNTER — TELEPHONE (OUTPATIENT)
Dept: ONCOLOGY | Age: 61
End: 2021-03-25

## 2021-03-25 ENCOUNTER — HOSPITAL ENCOUNTER (OUTPATIENT)
Facility: MEDICAL CENTER | Age: 61
End: 2021-03-25
Payer: COMMERCIAL

## 2021-03-25 ENCOUNTER — OFFICE VISIT (OUTPATIENT)
Dept: INTERNAL MEDICINE CLINIC | Age: 61
End: 2021-03-25
Payer: COMMERCIAL

## 2021-03-25 ENCOUNTER — OFFICE VISIT (OUTPATIENT)
Dept: ONCOLOGY | Age: 61
End: 2021-03-25
Payer: COMMERCIAL

## 2021-03-25 VITALS
HEIGHT: 65 IN | WEIGHT: 192.2 LBS | TEMPERATURE: 97 F | RESPIRATION RATE: 16 BRPM | HEART RATE: 68 BPM | DIASTOLIC BLOOD PRESSURE: 82 MMHG | SYSTOLIC BLOOD PRESSURE: 106 MMHG | BODY MASS INDEX: 32.02 KG/M2

## 2021-03-25 VITALS
DIASTOLIC BLOOD PRESSURE: 77 MMHG | TEMPERATURE: 97 F | HEART RATE: 74 BPM | WEIGHT: 192 LBS | SYSTOLIC BLOOD PRESSURE: 107 MMHG | BODY MASS INDEX: 31.95 KG/M2

## 2021-03-25 DIAGNOSIS — C7A.092 MALIGNANT CARCINOID TUMOR OF STOMACH (HCC): Primary | ICD-10-CM

## 2021-03-25 DIAGNOSIS — K21.9 GASTROESOPHAGEAL REFLUX DISEASE WITHOUT ESOPHAGITIS: ICD-10-CM

## 2021-03-25 DIAGNOSIS — E87.6 HYPOKALEMIA: Primary | ICD-10-CM

## 2021-03-25 DIAGNOSIS — E03.9 HYPOTHYROIDISM, UNSPECIFIED TYPE: Primary | ICD-10-CM

## 2021-03-25 DIAGNOSIS — Z90.3 S/P GASTRECTOMY: ICD-10-CM

## 2021-03-25 DIAGNOSIS — C7A.092 MALIGNANT CARCINOID TUMOR OF STOMACH (HCC): ICD-10-CM

## 2021-03-25 DIAGNOSIS — I10 ESSENTIAL HYPERTENSION: ICD-10-CM

## 2021-03-25 LAB
ABSOLUTE EOS #: 0.2 K/UL (ref 0–0.44)
ABSOLUTE IMMATURE GRANULOCYTE: 0.04 K/UL (ref 0–0.3)
ABSOLUTE LYMPH #: 2.19 K/UL (ref 1.1–3.7)
ABSOLUTE MONO #: 0.64 K/UL (ref 0.1–1.2)
ALBUMIN SERPL-MCNC: 3.4 G/DL (ref 3.5–5.2)
ALBUMIN/GLOBULIN RATIO: ABNORMAL (ref 1–2.5)
ALP BLD-CCNC: 99 U/L (ref 35–104)
ALT SERPL-CCNC: 25 U/L (ref 5–33)
ANION GAP SERPL CALCULATED.3IONS-SCNC: 13 MMOL/L (ref 9–17)
AST SERPL-CCNC: 35 U/L
BASOPHILS # BLD: 1 % (ref 0–2)
BASOPHILS ABSOLUTE: 0.05 K/UL (ref 0–0.2)
BILIRUB SERPL-MCNC: 0.63 MG/DL (ref 0.3–1.2)
BUN BLDV-MCNC: 6 MG/DL (ref 6–20)
BUN/CREAT BLD: 9 (ref 9–20)
CALCIUM SERPL-MCNC: 9.1 MG/DL (ref 8.6–10.4)
CHLORIDE BLD-SCNC: 100 MMOL/L (ref 98–107)
CO2: 25 MMOL/L (ref 20–31)
CREAT SERPL-MCNC: 0.66 MG/DL (ref 0.5–0.9)
DIFFERENTIAL TYPE: ABNORMAL
EOSINOPHILS RELATIVE PERCENT: 2 % (ref 1–4)
FERRITIN: 116 UG/L (ref 13–150)
FOLATE: >20 NG/ML
GFR AFRICAN AMERICAN: >60 ML/MIN
GFR NON-AFRICAN AMERICAN: >60 ML/MIN
GFR SERPL CREATININE-BSD FRML MDRD: ABNORMAL ML/MIN/{1.73_M2}
GFR SERPL CREATININE-BSD FRML MDRD: ABNORMAL ML/MIN/{1.73_M2}
GLUCOSE BLD-MCNC: 107 MG/DL (ref 70–99)
HCT VFR BLD CALC: 45.7 % (ref 36.3–47.1)
HEMOGLOBIN: 14.8 G/DL (ref 11.9–15.1)
IMMATURE GRANULOCYTES: 1 %
IRON SATURATION: 16 % (ref 20–55)
IRON: 37 UG/DL (ref 37–145)
LYMPHOCYTES # BLD: 26 % (ref 24–43)
MCH RBC QN AUTO: 28.1 PG (ref 25.2–33.5)
MCHC RBC AUTO-ENTMCNC: 32.4 G/DL (ref 28.4–34.8)
MCV RBC AUTO: 86.7 FL (ref 82.6–102.9)
MONOCYTES # BLD: 8 % (ref 3–12)
NRBC AUTOMATED: 0 PER 100 WBC
PDW BLD-RTO: 16.7 % (ref 11.8–14.4)
PLATELET # BLD: 288 K/UL (ref 138–453)
PLATELET ESTIMATE: ABNORMAL
PMV BLD AUTO: 9.3 FL (ref 8.1–13.5)
POTASSIUM SERPL-SCNC: 2.8 MMOL/L (ref 3.7–5.3)
RBC # BLD: 5.27 M/UL (ref 3.95–5.11)
RBC # BLD: ABNORMAL 10*6/UL
SEG NEUTROPHILS: 62 % (ref 36–65)
SEGMENTED NEUTROPHILS ABSOLUTE COUNT: 5.34 K/UL (ref 1.5–8.1)
SODIUM BLD-SCNC: 138 MMOL/L (ref 135–144)
TOTAL IRON BINDING CAPACITY: 228 UG/DL (ref 250–450)
TOTAL PROTEIN: 6.8 G/DL (ref 6.4–8.3)
UNSATURATED IRON BINDING CAPACITY: 191 UG/DL (ref 112–347)
VITAMIN B-12: >2000 PG/ML (ref 232–1245)
WBC # BLD: 8.5 K/UL (ref 3.5–11.3)
WBC # BLD: ABNORMAL 10*3/UL

## 2021-03-25 PROCEDURE — 80053 COMPREHEN METABOLIC PANEL: CPT

## 2021-03-25 PROCEDURE — 86316 IMMUNOASSAY TUMOR OTHER: CPT

## 2021-03-25 PROCEDURE — 99214 OFFICE O/P EST MOD 30 MIN: CPT | Performed by: INTERNAL MEDICINE

## 2021-03-25 PROCEDURE — 83550 IRON BINDING TEST: CPT

## 2021-03-25 PROCEDURE — 82728 ASSAY OF FERRITIN: CPT

## 2021-03-25 PROCEDURE — 84260 ASSAY OF SEROTONIN: CPT

## 2021-03-25 PROCEDURE — 82607 VITAMIN B-12: CPT

## 2021-03-25 PROCEDURE — 36415 COLL VENOUS BLD VENIPUNCTURE: CPT

## 2021-03-25 PROCEDURE — 85025 COMPLETE CBC W/AUTO DIFF WBC: CPT

## 2021-03-25 PROCEDURE — 82746 ASSAY OF FOLIC ACID SERUM: CPT

## 2021-03-25 PROCEDURE — 83540 ASSAY OF IRON: CPT

## 2021-03-25 PROCEDURE — 99211 OFF/OP EST MAY X REQ PHY/QHP: CPT | Performed by: INTERNAL MEDICINE

## 2021-03-25 RX ORDER — CALCIUM CARBONATE 200(500)MG
1 TABLET,CHEWABLE ORAL DAILY
Qty: 30 TABLET | Refills: 0 | Status: SHIPPED | OUTPATIENT
Start: 2021-03-25 | End: 2021-04-01

## 2021-03-25 RX ORDER — POTASSIUM CHLORIDE 20 MEQ/1
40 TABLET, EXTENDED RELEASE ORAL DAILY
Qty: 30 TABLET | Refills: 0 | Status: SHIPPED | OUTPATIENT
Start: 2021-03-25 | End: 2021-04-20

## 2021-03-25 RX ORDER — GLUCOSA SU 2KCL/CHONDROITIN SU 500-400 MG
1 CAPSULE ORAL DAILY
COMMUNITY
Start: 2021-02-22 | End: 2021-03-25

## 2021-03-25 RX ORDER — LEVOTHYROXINE SODIUM 0.1 MG/1
100 TABLET ORAL DAILY
Qty: 90 TABLET | Refills: 1 | Status: SHIPPED | OUTPATIENT
Start: 2021-03-25 | End: 2021-05-26 | Stop reason: DRUGHIGH

## 2021-03-25 ASSESSMENT — PATIENT HEALTH QUESTIONNAIRE - PHQ9
SUM OF ALL RESPONSES TO PHQ QUESTIONS 1-9: 0
SUM OF ALL RESPONSES TO PHQ9 QUESTIONS 1 & 2: 0
1. LITTLE INTEREST OR PLEASURE IN DOING THINGS: 0

## 2021-03-25 NOTE — PROGRESS NOTES
Patient noted to have hypokalemia. Called patient. Patient is doing okay. I will call in 40 mEq daily to patient's pharmacy [facility] in [place]  I will also place orders for BMP repeat on follow-up appointment.   Patient follow-up appointment with Dr. Rusty Crouch on 25th of this month

## 2021-03-25 NOTE — PROGRESS NOTES
Marcus Duke is a 62 y.o. female who presents for   Chief Complaint   Patient presents with    Hypertension     only taking metoprolol and HCTZ twice a week as her BP drops too low    Other     11/30/2020 had \" [facility]    Emesis     ate a grapefruit last night and vomitted all evening- was looking for Maalox OTC but couldnt find any    Hypothyroidism     thyroid labs in Epic    Immunizations     getting first Covid Vaccine tomorrow    Discuss Medications     is not currently kulwinder lopez iron is unsure if she should    and follow up of chronic medical problems.   Patient Active Problem List   Diagnosis    Asthma    Anxiety    Obesity    Hyperlipidemia    Hypothyroidism    Colon polyps    Vertigo    Mass of left hip region    Fatigue    Pharyngoesophageal dysphagia    Colon polyp    Neuroendocrine tumor    Chest pain    Shortness of breath    Anemia    GI bleed    Essential hypertension    Neuroendocrine neoplasm of stomach    Polyp, stomach    Melena    Gastric ulcer with hemorrhage    Constipation     HPI  Here for follow-up on blood pressure and thyroid denies any new complaints and states that she is eating better now and able to keep it down    Current Outpatient Medications   Medication Sig Dispense Refill    vitamin D 25 MCG (1000 UT) CAPS Take 1 capsule by mouth daily      Multiple Vitamins-Minerals (THERAPEUTIC-M/LUTEIN) TABS Take 1 tablet by mouth daily      Folic Acid-Vit R3-SOS Q01 2.2-25-0.5 MG TABS Take 1 tablet by mouth daily 90 tablet 1    calcium carbonate (ANTACID) 500 MG chewable tablet Take 1 tablet by mouth daily 30 tablet 0    levothyroxine (SYNTHROID) 100 MCG tablet Take 1 tablet by mouth daily 90 tablet 1    hydroCHLOROthiazide (HYDRODIURIL) 25 MG tablet TAKE 1 TABLET BY MOUTH EVERY DAY (Patient taking differently: 12.5 mg TAKE 1/2 TABLET BY MOUTH EVERY DAY) 90 tablet 0    ondansetron (ZOFRAN) 4 MG tablet Take 4 mg by mouth every 8 hours as needed for Nausea or Vomiting      pantoprazole (PROTONIX) 40 MG tablet TAKE 1 TABLET BY MOUTH EVERY DAY      fluticasone (FLONASE) 50 MCG/ACT nasal spray 2 sprays by Nasal route daily (Patient taking differently: 2 sprays by Nasal route Twice a Week ) 1 Bottle 0    desonide (DESOWEN) 0.05 % cream Apply topically 2 times daily. 90 g 0    Multiple Vitamin (MULTI-DAY VITAMINS PO) Take by mouth      vitamin B-12 (CYANOCOBALAMIN) 500 MCG tablet Take 500 mcg by mouth daily       No current facility-administered medications for this visit. Allergies   Allergen Reactions    Erythromycin Diarrhea       Past Medical History:   Diagnosis Date    Anxiety     Arthritis     knee    Asthma     Colon polyp 02/21/2019    tubular adenoma; hyperplastic polyp    Colon polyps     Cyst of kidney, acquired     bilat.     Fatigue     Hypertension     Hypothyroidism     Neuroendocrine tumor 02/21/2019    of stomach    Obesity     Pharyngoesophageal dysphagia     Vocal cord polyps     Wears glasses        Past Surgical History:   Procedure Laterality Date    CHOLECYSTECTOMY  10/09/2014    COLONOSCOPY  02/21/2019    tubular adenoma; hyperplastic polyp    COLONOSCOPY      over 5 yrs ago per pt with polyps (2-)    CYSTOURETHROSCOPY/STENT REMOVAL  5/3/11    ENDOSCOPIC ULTRASOUND (LOWER) N/A 1/22/2020    ENDOSCOPIC ULTRASOUND WITH POSSIBLE EMR performed by Neena Johnson MD at [facility]    ERCP      HIP SURGERY Left     soft tissue tumor removal    THROAT SURGERY      vocal cord polyps removed    UPPER GASTROINTESTINAL ENDOSCOPY  02/21/2019    Neuroendocrine tumor    UPPER GASTROINTESTINAL ENDOSCOPY  09/23/2019    UPPER GASTROINTESTINAL ENDOSCOPY N/A 9/23/2019    EGD BIOPSY performed by Edwin Polk MD at [address] 10/23/2019    EGD W/ EMR performed by Neena Johnson MD at [address] [place] [address] N/A 10/29/2019 EGD CONTROL HEMORRHAGE performed by Quan Cano MD at [facility]       Family History   Problem Relation Age of Onset    High Blood Pressure Mother     High Blood Pressure Sister     Stomach Cancer Sister     Other Sister         epilepsy    No Known Problems Father      ROS   Constitutional:  Negative for fatigue, loss of appetite and unexpected weight change   HEENT            : Negative for neck stiffness and pain, no congestion or sinus pressure   Eyes                : No visual disturbance or pain   Cardiovascular: No chest pain or palpitations or leg swelling   Respiratory      : Negative for cough, shortness of breath or wheezing   Gastrointestinal: Negative for abdominal pain, constipation or diarrhea and bloating No nausea or vomiting   Genitourinary:     No urgency or frequency, no burning or hematuria   Musculoskeletal: No arthralgias, back pain or myalgias   Skin                  : Negative for rash or erythema   Neurological    : Negative for dizziness, weakness, tremors ,light headedness or syncope   Psychiatric       : Negative for dysphoric mood, sleep disturbances, nervous or anxious, or decreased concentration   All other review of systems was negative    Objective  Physical Examination:    Nursing note reviewed    /82 Comment: standing  Pulse 68   Temp 97 °F (36.1 °C) (Infrared)   Resp 16   Ht 5' 5\" (1.651 m)   Wt 192 lb 3.2 oz (87.2 kg)   LMP 01/01/1994   BMI 31.98 kg/m²   BP Readings from Last 3 Encounters:   03/25/21 106/82   01/26/21 105/72   01/02/21 138/82         Constitutional:  Adriana Acuna is oriented to place, person and time ,appears well-developed and well-nourished  HEENT:  Atraumatic and normocephalic, external ears normal bilaterally, nose normal no oropharyngeal exudate and is clear and moist  Eyes:  EOCM normal; conjunctivae normal; PERRLA bilaterally  Neck:  Normal range of motion, neck supple, no JVD and no thyromegaly  Cardiovascular:  RRR, normal heart sounds and intact distal pulses  Pulmonary:  effort normal and breath sounds normal bilaterally,no wheezes or rales, no respiratory distress  Abdominal:  Soft, non-tender; normal bowel sounds, no masses  Musculoskeletal:  Normal range of motion and no edema or tenderness bilaterally  No lymphadenopathy  Neurological:  alert, oriented, and normal reflexes bilaterally  Skin: warm and dry  Psychiatric:  normal mood and effect; behavior normal.    Labs:   No results found for: LABA1C  Lab Results   Component Value Date    CHOL 182 08/27/2020     Lab Results   Component Value Date    HDL 45 08/27/2020     No results found for: [address]  Lab Results   Component Value Date    TRIG 104 08/27/2020     No components found for: [place]  Lab Results   Component Value Date    WBC 8.1 01/26/2021    HGB 15.3 (H) 01/26/2021    HCT 48.1 (H) 01/26/2021    MCV 84.7 01/26/2021     01/26/2021     Lab Results   Component Value Date    INR 0.9 10/28/2019    PROTIME 9.9 10/28/2019     Lab Results   Component Value Date    GLUCOSE 123 (H) 01/26/2021    CREATININE 0.69 01/26/2021    BUN 9 01/26/2021     01/26/2021    K 3.1 (L) 01/26/2021    CL 99 01/26/2021    CO2 25 01/26/2021     Lab Results   Component Value Date    ALT 69 (H) 01/26/2021     (H) 01/26/2021    ALKPHOS 99 01/26/2021    BILITOT 0.60 01/26/2021     Lab Results   Component Value Date    LABALBU 3.7 01/26/2021     Lab Results   Component Value Date    TSH 0.05 (L) 03/18/2021     Assessment:  1. Hypothyroidism, unspecified type    2. Essential hypertension    3. Gastroesophageal reflux disease without esophagitis    4.  S/P gastrectomy        Plan:  Patient's TSH is 0.05 and free T4 slightly elevated and advised patient to decrease the Synthroid to 100 mcg daily for 5 days in a week and repeat TSH and free T4 in 6 weeks  Blood pressure is stable but slightly on the low side and patient states her blood pressure is running low and she is feeling very tired and dizzy with the medications and not taking the metoprolol except for 1 or 2 days a week and so advised patient hold the metoprolol 100 mg at this time and also decrease the hydrochlorothiazide to 12.5 mg daily from 25 mg daily and monitor blood pressure and call me back in 4 weeks  Patient's visit to the [facility] surgery department regarding her post gastrectomy visit reviewed and patient is advised to take Maalox according to the patient and requesting a prescription was sent to the pharmacy and patient's blood counts were stable and we will repeat lab work again in 6 weeks  Patient will be going back to work from Monday  Review in 2 months         1. Stuart Dyer received counseling on the following healthy behaviors: nutrition and exercise    2. Prior labs and health maintenance reviewed. 3.  Discussed use, benefit, and side effects of prescribed medications. Barriers to medication compliance addressed. All her questions were answered. Pt voiced understanding. Stuart Dyer will continue current medications, diet and exercise. Orders Placed This Encounter   Medications    Folic Acid-Vit F8-BXL U25 2.2-25-0.5 MG TABS     Sig: Take 1 tablet by mouth daily     Dispense:  90 tablet     Refill:  1    calcium carbonate (ANTACID) 500 MG chewable tablet     Sig: Take 1 tablet by mouth daily     Dispense:  30 tablet     Refill:  0    levothyroxine (SYNTHROID) 100 MCG tablet     Sig: Take 1 tablet by mouth daily     Dispense:  90 tablet     Refill:  1          Completed Refills               Requested Prescriptions     Signed Prescriptions Disp Refills    Folic Acid-Vit R4-KCE H01 2.2-25-0.5 MG TABS 90 tablet 1     Sig: Take 1 tablet by mouth daily    calcium carbonate (ANTACID) 500 MG chewable tablet 30 tablet 0     Sig: Take 1 tablet by mouth daily    levothyroxine (SYNTHROID) 100 MCG tablet 90 tablet 1     Sig: Take 1 tablet by mouth daily     4.  Patient given educational materials - see patient instructions    5. Was a self-tracking handout given in paper form or via [employer]? NO    Orders Placed This Encounter   Procedures    T4, Free     Standing Status:   Future     Standing Expiration Date:   3/25/2022    TSH without Reflex     Standing Status:   Future     Standing Expiration Date:   3/25/2022    CBC     Standing Status:   Future     Standing Expiration Date:   3/25/2022    Magnesium     Standing Status:   Future     Standing Expiration Date:   3/25/2022    Comprehensive Metabolic Panel     Standing Status:   Future     Standing Expiration Date:   3/25/2022    Vitamin B12     Standing Status:   Future     Standing Expiration Date:   3/25/2022     Return in about 2 months (around 5/25/2021). Patient voiced understanding and agreed to treatment plan. Electronically signed by Shira Brownlee MD on 3/25/2021 at 11:14 AM    This note is created with a voice recognition program and while intend to generate a document that accurately reflects the content of the visit, no guarantee can be provided that every mistake has been identified and corrected by editing.

## 2021-03-25 NOTE — TELEPHONE ENCOUNTER
Haven Levy MD VISIT  LABS SOON  AND BEFORE RV  RV  3-4 MTHS  LABS CDP CMP FE TIBC FERRITIN VITAMIN B 12 & FOLATE DONE ON EXIT  LAB ORDERS GIVEN TO PT ON EXIT FOR RV  MD VISIT 6/24/21 @ 2PM  AVS PRINTED W/ INSTRUCTIONS

## 2021-03-26 ENCOUNTER — APPOINTMENT (OUTPATIENT)
Dept: GENERAL RADIOLOGY | Age: 61
DRG: 176 | End: 2021-03-26
Payer: COMMERCIAL

## 2021-03-26 ENCOUNTER — HOSPITAL ENCOUNTER (INPATIENT)
Age: 61
LOS: 2 days | Discharge: HOME | DRG: 176 | End: 2021-03-28
Attending: EMERGENCY MEDICINE | Admitting: INTERNAL MEDICINE
Payer: COMMERCIAL

## 2021-03-26 ENCOUNTER — APPOINTMENT (OUTPATIENT)
Dept: CT IMAGING | Age: 61
DRG: 176 | End: 2021-03-26
Payer: COMMERCIAL

## 2021-03-26 ENCOUNTER — IMMUNIZATION (OUTPATIENT)
Dept: INTERNAL MEDICINE CLINIC | Age: 61
End: 2021-03-26
Payer: COMMERCIAL

## 2021-03-26 DIAGNOSIS — R10.13 ABDOMINAL PAIN, EPIGASTRIC: ICD-10-CM

## 2021-03-26 DIAGNOSIS — I26.99 ACUTE PULMONARY EMBOLISM WITHOUT ACUTE COR PULMONALE, UNSPECIFIED PULMONARY EMBOLISM TYPE (HCC): Primary | ICD-10-CM

## 2021-03-26 DIAGNOSIS — E83.42 HYPOMAGNESEMIA: ICD-10-CM

## 2021-03-26 DIAGNOSIS — E87.6 HYPOKALEMIA: ICD-10-CM

## 2021-03-26 DIAGNOSIS — R79.89 ELEVATED D-DIMER: ICD-10-CM

## 2021-03-26 LAB
ABSOLUTE EOS #: 0.23 K/UL (ref 0–0.44)
ABSOLUTE IMMATURE GRANULOCYTE: <0.03 K/UL (ref 0–0.3)
ABSOLUTE LYMPH #: 1.88 K/UL (ref 1.1–3.7)
ABSOLUTE MONO #: 0.63 K/UL (ref 0.1–1.2)
ANION GAP SERPL CALCULATED.3IONS-SCNC: 17 MMOL/L (ref 9–17)
BASOPHILS # BLD: 1 % (ref 0–2)
BASOPHILS ABSOLUTE: 0.06 K/UL (ref 0–0.2)
BUN BLDV-MCNC: 8 MG/DL (ref 6–20)
BUN/CREAT BLD: ABNORMAL (ref 9–20)
CALCIUM SERPL-MCNC: 9.3 MG/DL (ref 8.6–10.4)
CHLORIDE BLD-SCNC: 97 MMOL/L (ref 98–107)
CO2: 25 MMOL/L (ref 20–31)
CREAT SERPL-MCNC: 0.64 MG/DL (ref 0.5–0.9)
D-DIMER QUANTITATIVE: 4.25 MG/L FEU
DIFFERENTIAL TYPE: ABNORMAL
EOSINOPHILS RELATIVE PERCENT: 3 % (ref 1–4)
GFR AFRICAN AMERICAN: >60 ML/MIN
GFR NON-AFRICAN AMERICAN: >60 ML/MIN
GFR SERPL CREATININE-BSD FRML MDRD: ABNORMAL ML/MIN/{1.73_M2}
GFR SERPL CREATININE-BSD FRML MDRD: ABNORMAL ML/MIN/{1.73_M2}
GLUCOSE BLD-MCNC: 137 MG/DL (ref 70–99)
HCT VFR BLD CALC: 46 % (ref 36.3–47.1)
HEMOGLOBIN: 14.9 G/DL (ref 11.9–15.1)
IMMATURE GRANULOCYTES: 0 %
LYMPHOCYTES # BLD: 22 % (ref 24–43)
MAGNESIUM: 1.4 MG/DL (ref 1.6–2.6)
MCH RBC QN AUTO: 28.3 PG (ref 25.2–33.5)
MCHC RBC AUTO-ENTMCNC: 32.4 G/DL (ref 28.4–34.8)
MCV RBC AUTO: 87.3 FL (ref 82.6–102.9)
MONOCYTES # BLD: 8 % (ref 3–12)
NRBC AUTOMATED: 0 PER 100 WBC
PDW BLD-RTO: 16.5 % (ref 11.8–14.4)
PLATELET # BLD: 282 K/UL (ref 138–453)
PLATELET ESTIMATE: ABNORMAL
PMV BLD AUTO: 9.6 FL (ref 8.1–13.5)
POTASSIUM SERPL-SCNC: 2.4 MMOL/L (ref 3.7–5.3)
RBC # BLD: 5.27 M/UL (ref 3.95–5.11)
RBC # BLD: ABNORMAL 10*6/UL
SEG NEUTROPHILS: 66 % (ref 36–65)
SEGMENTED NEUTROPHILS ABSOLUTE COUNT: 5.62 K/UL (ref 1.5–8.1)
SODIUM BLD-SCNC: 139 MMOL/L (ref 135–144)
THYROXINE, FREE: 1.68 NG/DL (ref 0.93–1.7)
TROPONIN INTERP: NORMAL
TROPONIN INTERP: NORMAL
TROPONIN T: NORMAL NG/ML
TROPONIN T: NORMAL NG/ML
TROPONIN, HIGH SENSITIVITY: 12 NG/L (ref 0–14)
TROPONIN, HIGH SENSITIVITY: 9 NG/L (ref 0–14)
TSH SERPL DL<=0.05 MIU/L-ACNC: 0.03 MIU/L (ref 0.3–5)
WBC # BLD: 8.4 K/UL (ref 3.5–11.3)
WBC # BLD: ABNORMAL 10*3/UL

## 2021-03-26 PROCEDURE — 96361 HYDRATE IV INFUSION ADD-ON: CPT

## 2021-03-26 PROCEDURE — 2580000003 HC RX 258: Performed by: STUDENT IN AN ORGANIZED HEALTH CARE EDUCATION/TRAINING PROGRAM

## 2021-03-26 PROCEDURE — 84484 ASSAY OF TROPONIN QUANT: CPT

## 2021-03-26 PROCEDURE — 83735 ASSAY OF MAGNESIUM: CPT

## 2021-03-26 PROCEDURE — 99284 EMERGENCY DEPT VISIT MOD MDM: CPT

## 2021-03-26 PROCEDURE — 0001A COVID-19, PFIZER VACCINE 30MCG/0.3ML DOSE: CPT | Performed by: INTERNAL MEDICINE

## 2021-03-26 PROCEDURE — 96374 THER/PROPH/DIAG INJ IV PUSH: CPT

## 2021-03-26 PROCEDURE — 6360000004 HC RX CONTRAST MEDICATION: Performed by: STUDENT IN AN ORGANIZED HEALTH CARE EDUCATION/TRAINING PROGRAM

## 2021-03-26 PROCEDURE — 85025 COMPLETE CBC W/AUTO DIFF WBC: CPT

## 2021-03-26 PROCEDURE — 6360000002 HC RX W HCPCS: Performed by: STUDENT IN AN ORGANIZED HEALTH CARE EDUCATION/TRAINING PROGRAM

## 2021-03-26 PROCEDURE — 93005 ELECTROCARDIOGRAM TRACING: CPT | Performed by: EMERGENCY MEDICINE

## 2021-03-26 PROCEDURE — 71045 X-RAY EXAM CHEST 1 VIEW: CPT

## 2021-03-26 PROCEDURE — 84439 ASSAY OF FREE THYROXINE: CPT

## 2021-03-26 PROCEDURE — 2060000000 HC ICU INTERMEDIATE R&B

## 2021-03-26 PROCEDURE — 71260 CT THORAX DX C+: CPT

## 2021-03-26 PROCEDURE — 99223 1ST HOSP IP/OBS HIGH 75: CPT | Performed by: NURSE PRACTITIONER

## 2021-03-26 PROCEDURE — 91300 COVID-19, PFIZER VACCINE 30MCG/0.3ML DOSE: CPT | Performed by: INTERNAL MEDICINE

## 2021-03-26 PROCEDURE — 84443 ASSAY THYROID STIM HORMONE: CPT

## 2021-03-26 PROCEDURE — 80048 BASIC METABOLIC PNL TOTAL CA: CPT

## 2021-03-26 PROCEDURE — 93005 ELECTROCARDIOGRAM TRACING: CPT | Performed by: STUDENT IN AN ORGANIZED HEALTH CARE EDUCATION/TRAINING PROGRAM

## 2021-03-26 PROCEDURE — 85379 FIBRIN DEGRADATION QUANT: CPT

## 2021-03-26 RX ORDER — HEPARIN SODIUM 1000 [USP'U]/ML
80 INJECTION, SOLUTION INTRAVENOUS; SUBCUTANEOUS ONCE
Status: COMPLETED | OUTPATIENT
Start: 2021-03-26 | End: 2021-03-27

## 2021-03-26 RX ORDER — 0.9 % SODIUM CHLORIDE 0.9 %
1000 INTRAVENOUS SOLUTION INTRAVENOUS ONCE
Status: COMPLETED | OUTPATIENT
Start: 2021-03-26 | End: 2021-03-26

## 2021-03-26 RX ORDER — SODIUM CHLORIDE 9 MG/ML
INJECTION, SOLUTION INTRAVENOUS CONTINUOUS
Status: DISCONTINUED | OUTPATIENT
Start: 2021-03-26 | End: 2021-03-28

## 2021-03-26 RX ORDER — MAGNESIUM HYDROXIDE/ALUMINUM HYDROXICE/SIMETHICONE 120; 1200; 1200 MG/30ML; MG/30ML; MG/30ML
15 SUSPENSION ORAL
Status: DISPENSED | OUTPATIENT
Start: 2021-03-26 | End: 2021-03-26

## 2021-03-26 RX ORDER — ONDANSETRON 2 MG/ML
4 INJECTION INTRAMUSCULAR; INTRAVENOUS ONCE
Status: COMPLETED | OUTPATIENT
Start: 2021-03-26 | End: 2021-03-26

## 2021-03-26 RX ORDER — FENTANYL CITRATE 50 UG/ML
50 INJECTION, SOLUTION INTRAMUSCULAR; INTRAVENOUS ONCE
Status: COMPLETED | OUTPATIENT
Start: 2021-03-26 | End: 2021-03-26

## 2021-03-26 RX ORDER — POTASSIUM CHLORIDE 7.45 MG/ML
20 INJECTION INTRAVENOUS ONCE
Status: COMPLETED | OUTPATIENT
Start: 2021-03-26 | End: 2021-03-27

## 2021-03-26 RX ORDER — HEPARIN SODIUM 10000 [USP'U]/100ML
18 INJECTION, SOLUTION INTRAVENOUS CONTINUOUS
Status: DISCONTINUED | OUTPATIENT
Start: 2021-03-26 | End: 2021-03-27

## 2021-03-26 RX ORDER — HEPARIN SODIUM 1000 [USP'U]/ML
80 INJECTION, SOLUTION INTRAVENOUS; SUBCUTANEOUS PRN
Status: DISCONTINUED | OUTPATIENT
Start: 2021-03-26 | End: 2021-03-27

## 2021-03-26 RX ORDER — MAGNESIUM SULFATE IN WATER 40 MG/ML
2000 INJECTION, SOLUTION INTRAVENOUS ONCE
Status: COMPLETED | OUTPATIENT
Start: 2021-03-26 | End: 2021-03-27

## 2021-03-26 RX ORDER — LIDOCAINE HYDROCHLORIDE 20 MG/ML
15 SOLUTION OROPHARYNGEAL
Status: DISCONTINUED | OUTPATIENT
Start: 2021-03-26 | End: 2021-03-28 | Stop reason: HOSPADM

## 2021-03-26 RX ORDER — MORPHINE SULFATE 4 MG/ML
4 INJECTION, SOLUTION INTRAMUSCULAR; INTRAVENOUS ONCE
Status: COMPLETED | OUTPATIENT
Start: 2021-03-26 | End: 2021-03-26

## 2021-03-26 RX ORDER — HEPARIN SODIUM 1000 [USP'U]/ML
40 INJECTION, SOLUTION INTRAVENOUS; SUBCUTANEOUS PRN
Status: DISCONTINUED | OUTPATIENT
Start: 2021-03-26 | End: 2021-03-27

## 2021-03-26 RX ADMIN — FENTANYL CITRATE 50 MCG: 50 INJECTION, SOLUTION INTRAMUSCULAR; INTRAVENOUS at 21:19

## 2021-03-26 RX ADMIN — SODIUM CHLORIDE: 9 INJECTION, SOLUTION INTRAVENOUS at 22:48

## 2021-03-26 RX ADMIN — ONDANSETRON 4 MG: 2 INJECTION INTRAMUSCULAR; INTRAVENOUS at 21:19

## 2021-03-26 RX ADMIN — IOPAMIDOL 75 ML: 755 INJECTION, SOLUTION INTRAVENOUS at 22:31

## 2021-03-26 RX ADMIN — SODIUM CHLORIDE 1000 ML: 9 INJECTION, SOLUTION INTRAVENOUS at 21:19

## 2021-03-26 RX ADMIN — POTASSIUM CHLORIDE 10 MEQ: 10 INJECTION, SOLUTION INTRAVENOUS at 22:26

## 2021-03-26 RX ADMIN — MORPHINE SULFATE 4 MG: 4 INJECTION INTRAVENOUS at 22:47

## 2021-03-26 ASSESSMENT — ENCOUNTER SYMPTOMS
BLOOD IN STOOL: 0
NAUSEA: 0
WHEEZING: 0
STRIDOR: 0
NAUSEA: 1
CONSTIPATION: 0
DIARRHEA: 0
RHINORRHEA: 0
SHORTNESS OF BREATH: 0
VOMITING: 0
VOMITING: 1
COUGH: 0
ABDOMINAL PAIN: 1
PHOTOPHOBIA: 0
SORE THROAT: 0

## 2021-03-26 ASSESSMENT — PAIN SCALES - GENERAL: PAINLEVEL_OUTOF10: 8

## 2021-03-26 NOTE — Clinical Note
Patient Class: Inpatient [101]   REQUIRED: Diagnosis: Chest pain [669623]   Estimated Length of Stay: Estimated stay of more than 2 midnights   Admitting Provider: ADEBAYO BELTRAN [[id number]]   Telemetry Bed Required?: Yes

## 2021-03-26 NOTE — LETTER
[facility] [address] Dorcas Lyons  [address] [facility] [address]  Phone: [phone]      March 28, 2021     Patient: Deneen Bennett   YOB: 1960   Date of Visit: 3/26/2021       To Whom It May Concern: It is my medical opinion that Deneen Bennett may return to work on 4/5/2021. If you have any questions or concerns, please don't hesitate to call.     Sincerely,  Electronically signed by Agustina Gonzalez DO on 3/28/2021 at 12:54 PM

## 2021-03-27 PROBLEM — R11.10 INTRACTABLE VOMITING: Status: ACTIVE | Noted: 2021-03-27

## 2021-03-27 LAB
-: NORMAL
ALBUMIN SERPL-MCNC: 2.4 G/DL (ref 3.5–5.2)
ALBUMIN/GLOBULIN RATIO: 0.8 (ref 1–2.5)
ALP BLD-CCNC: 89 U/L (ref 35–104)
ALT SERPL-CCNC: 26 U/L (ref 5–33)
ANION GAP SERPL CALCULATED.3IONS-SCNC: 12 MMOL/L (ref 9–17)
ANION GAP SERPL CALCULATED.3IONS-SCNC: 12 MMOL/L (ref 9–17)
AST SERPL-CCNC: 57 U/L
BILIRUB SERPL-MCNC: 0.54 MG/DL (ref 0.3–1.2)
BUN BLDV-MCNC: 4 MG/DL (ref 6–20)
BUN BLDV-MCNC: 5 MG/DL (ref 6–20)
BUN/CREAT BLD: ABNORMAL (ref 9–20)
BUN/CREAT BLD: ABNORMAL (ref 9–20)
CALCIUM SERPL-MCNC: 7.7 MG/DL (ref 8.6–10.4)
CALCIUM SERPL-MCNC: 7.9 MG/DL (ref 8.6–10.4)
CHLORIDE BLD-SCNC: 105 MMOL/L (ref 98–107)
CHLORIDE BLD-SCNC: 107 MMOL/L (ref 98–107)
CHROMOGRANIN A: 39 NG/ML (ref 0–103)
CO2: 20 MMOL/L (ref 20–31)
CO2: 25 MMOL/L (ref 20–31)
CREAT SERPL-MCNC: 0.36 MG/DL (ref 0.5–0.9)
CREAT SERPL-MCNC: 0.48 MG/DL (ref 0.5–0.9)
EKG ATRIAL RATE: 103 BPM
EKG P AXIS: 50 DEGREES
EKG P-R INTERVAL: 136 MS
EKG Q-T INTERVAL: 420 MS
EKG QRS DURATION: 74 MS
EKG QTC CALCULATION (BAZETT): 550 MS
EKG R AXIS: -4 DEGREES
EKG T AXIS: -36 DEGREES
EKG VENTRICULAR RATE: 103 BPM
GFR AFRICAN AMERICAN: >60 ML/MIN
GFR AFRICAN AMERICAN: >60 ML/MIN
GFR NON-AFRICAN AMERICAN: >60 ML/MIN
GFR NON-AFRICAN AMERICAN: >60 ML/MIN
GFR SERPL CREATININE-BSD FRML MDRD: ABNORMAL ML/MIN/{1.73_M2}
GLUCOSE BLD-MCNC: 83 MG/DL (ref 70–99)
GLUCOSE BLD-MCNC: 91 MG/DL (ref 70–99)
HCT VFR BLD CALC: 39.6 % (ref 36.3–47.1)
HEMOGLOBIN: 12.6 G/DL (ref 11.9–15.1)
MAGNESIUM: 1.7 MG/DL (ref 1.6–2.6)
MAGNESIUM: 1.7 MG/DL (ref 1.6–2.6)
MCH RBC QN AUTO: 28.5 PG (ref 25.2–33.5)
MCHC RBC AUTO-ENTMCNC: 31.8 G/DL (ref 28.4–34.8)
MCV RBC AUTO: 89.6 FL (ref 82.6–102.9)
MYOGLOBIN: <21 NG/ML (ref 25–58)
MYOGLOBIN: <21 NG/ML (ref 25–58)
NRBC AUTOMATED: 0 PER 100 WBC
PARTIAL THROMBOPLASTIN TIME: 108.1 SEC (ref 20.5–30.5)
PARTIAL THROMBOPLASTIN TIME: 25.9 SEC (ref 20.5–30.5)
PARTIAL THROMBOPLASTIN TIME: >120 SEC (ref 20.5–30.5)
PARTIAL THROMBOPLASTIN TIME: >120 SEC (ref 20.5–30.5)
PDW BLD-RTO: 17 % (ref 11.8–14.4)
PHOSPHORUS: 3 MG/DL (ref 2.6–4.5)
PLATELET # BLD: 224 K/UL (ref 138–453)
PMV BLD AUTO: 9.4 FL (ref 8.1–13.5)
POTASSIUM SERPL-SCNC: 3.1 MMOL/L (ref 3.7–5.3)
POTASSIUM SERPL-SCNC: 3.3 MMOL/L (ref 3.7–5.3)
RBC # BLD: 4.42 M/UL (ref 3.95–5.11)
REASON FOR REJECTION: NORMAL
SARS-COV-2, RAPID: NOT DETECTED
SODIUM BLD-SCNC: 139 MMOL/L (ref 135–144)
SODIUM BLD-SCNC: 142 MMOL/L (ref 135–144)
SPECIMEN DESCRIPTION: NORMAL
TOTAL PROTEIN: 5.3 G/DL (ref 6.4–8.3)
TROPONIN INTERP: ABNORMAL
TROPONIN INTERP: ABNORMAL
TROPONIN T: ABNORMAL NG/ML
TROPONIN T: ABNORMAL NG/ML
TROPONIN, HIGH SENSITIVITY: 10 NG/L (ref 0–14)
TROPONIN, HIGH SENSITIVITY: 11 NG/L (ref 0–14)
WBC # BLD: 7.2 K/UL (ref 3.5–11.3)
ZZ NTE CLEAN UP: ORDERED TEST: NORMAL
ZZ NTE WITH NAME CLEAN UP: SPECIMEN SOURCE: NORMAL

## 2021-03-27 PROCEDURE — 2580000003 HC RX 258: Performed by: NURSE PRACTITIONER

## 2021-03-27 PROCEDURE — 99223 1ST HOSP IP/OBS HIGH 75: CPT | Performed by: INTERNAL MEDICINE

## 2021-03-27 PROCEDURE — 36415 COLL VENOUS BLD VENIPUNCTURE: CPT

## 2021-03-27 PROCEDURE — 6360000002 HC RX W HCPCS: Performed by: NURSE PRACTITIONER

## 2021-03-27 PROCEDURE — 83735 ASSAY OF MAGNESIUM: CPT

## 2021-03-27 PROCEDURE — 2060000000 HC ICU INTERMEDIATE R&B

## 2021-03-27 PROCEDURE — 2580000003 HC RX 258: Performed by: STUDENT IN AN ORGANIZED HEALTH CARE EDUCATION/TRAINING PROGRAM

## 2021-03-27 PROCEDURE — 99233 SBSQ HOSP IP/OBS HIGH 50: CPT | Performed by: INTERNAL MEDICINE

## 2021-03-27 PROCEDURE — 92610 EVALUATE SWALLOWING FUNCTION: CPT

## 2021-03-27 PROCEDURE — 93970 EXTREMITY STUDY: CPT

## 2021-03-27 PROCEDURE — 6360000002 HC RX W HCPCS: Performed by: INTERNAL MEDICINE

## 2021-03-27 PROCEDURE — 80048 BASIC METABOLIC PNL TOTAL CA: CPT

## 2021-03-27 PROCEDURE — 93010 ELECTROCARDIOGRAM REPORT: CPT | Performed by: INTERNAL MEDICINE

## 2021-03-27 PROCEDURE — 84100 ASSAY OF PHOSPHORUS: CPT

## 2021-03-27 PROCEDURE — 84484 ASSAY OF TROPONIN QUANT: CPT

## 2021-03-27 PROCEDURE — 80053 COMPREHEN METABOLIC PANEL: CPT

## 2021-03-27 PROCEDURE — 6370000000 HC RX 637 (ALT 250 FOR IP): Performed by: STUDENT IN AN ORGANIZED HEALTH CARE EDUCATION/TRAINING PROGRAM

## 2021-03-27 PROCEDURE — 85027 COMPLETE CBC AUTOMATED: CPT

## 2021-03-27 PROCEDURE — 85730 THROMBOPLASTIN TIME PARTIAL: CPT

## 2021-03-27 PROCEDURE — 6370000000 HC RX 637 (ALT 250 FOR IP): Performed by: NURSE PRACTITIONER

## 2021-03-27 PROCEDURE — 6360000002 HC RX W HCPCS: Performed by: STUDENT IN AN ORGANIZED HEALTH CARE EDUCATION/TRAINING PROGRAM

## 2021-03-27 PROCEDURE — 83874 ASSAY OF MYOGLOBIN: CPT

## 2021-03-27 PROCEDURE — 87635 SARS-COV-2 COVID-19 AMP PRB: CPT

## 2021-03-27 PROCEDURE — 2500000003 HC RX 250 WO HCPCS: Performed by: NURSE PRACTITIONER

## 2021-03-27 RX ORDER — DEXTROSE, SODIUM CHLORIDE, AND POTASSIUM CHLORIDE 5; .45; .15 G/100ML; G/100ML; G/100ML
INJECTION INTRAVENOUS CONTINUOUS
Status: DISCONTINUED | OUTPATIENT
Start: 2021-03-27 | End: 2021-03-27

## 2021-03-27 RX ORDER — POTASSIUM CHLORIDE 20 MEQ/1
40 TABLET, EXTENDED RELEASE ORAL PRN
Status: DISCONTINUED | OUTPATIENT
Start: 2021-03-27 | End: 2021-03-28 | Stop reason: HOSPADM

## 2021-03-27 RX ORDER — ACETAMINOPHEN 650 MG/1
650 SUPPOSITORY RECTAL EVERY 6 HOURS PRN
Status: DISCONTINUED | OUTPATIENT
Start: 2021-03-27 | End: 2021-03-28 | Stop reason: HOSPADM

## 2021-03-27 RX ORDER — HEPARIN SODIUM 1000 [USP'U]/ML
40 INJECTION, SOLUTION INTRAVENOUS; SUBCUTANEOUS PRN
Status: DISCONTINUED | OUTPATIENT
Start: 2021-03-27 | End: 2021-03-28

## 2021-03-27 RX ORDER — ONDANSETRON 2 MG/ML
4 INJECTION INTRAMUSCULAR; INTRAVENOUS EVERY 6 HOURS PRN
Status: DISCONTINUED | OUTPATIENT
Start: 2021-03-27 | End: 2021-03-28 | Stop reason: HOSPADM

## 2021-03-27 RX ORDER — POTASSIUM CHLORIDE 20 MEQ/1
40 TABLET, EXTENDED RELEASE ORAL DAILY
Status: DISCONTINUED | OUTPATIENT
Start: 2021-03-27 | End: 2021-03-28 | Stop reason: HOSPADM

## 2021-03-27 RX ORDER — CALCIUM CARBONATE 200(500)MG
1 TABLET,CHEWABLE ORAL DAILY
Status: DISCONTINUED | OUTPATIENT
Start: 2021-03-27 | End: 2021-03-28 | Stop reason: HOSPADM

## 2021-03-27 RX ORDER — FOLIC ACID/VIT B COMPLEX AND C 5 MG
1 TABLET ORAL DAILY
Status: DISCONTINUED | OUTPATIENT
Start: 2021-03-27 | End: 2021-03-28 | Stop reason: HOSPADM

## 2021-03-27 RX ORDER — PROMETHAZINE HYDROCHLORIDE 12.5 MG/1
12.5 TABLET ORAL EVERY 6 HOURS PRN
Status: DISCONTINUED | OUTPATIENT
Start: 2021-03-27 | End: 2021-03-28 | Stop reason: HOSPADM

## 2021-03-27 RX ORDER — METOCLOPRAMIDE HYDROCHLORIDE 5 MG/ML
10 INJECTION INTRAMUSCULAR; INTRAVENOUS EVERY 6 HOURS
Status: DISCONTINUED | OUTPATIENT
Start: 2021-03-27 | End: 2021-03-28

## 2021-03-27 RX ORDER — HEPARIN SODIUM 10000 [USP'U]/100ML
5-30 INJECTION, SOLUTION INTRAVENOUS CONTINUOUS
Status: DISCONTINUED | OUTPATIENT
Start: 2021-03-27 | End: 2021-03-28

## 2021-03-27 RX ORDER — MORPHINE SULFATE 4 MG/ML
4 INJECTION, SOLUTION INTRAMUSCULAR; INTRAVENOUS ONCE
Status: COMPLETED | OUTPATIENT
Start: 2021-03-27 | End: 2021-03-27

## 2021-03-27 RX ORDER — SODIUM CHLORIDE 9 MG/ML
25 INJECTION, SOLUTION INTRAVENOUS PRN
Status: DISCONTINUED | OUTPATIENT
Start: 2021-03-27 | End: 2021-03-28 | Stop reason: HOSPADM

## 2021-03-27 RX ORDER — SODIUM CHLORIDE 0.9 % (FLUSH) 0.9 %
10 SYRINGE (ML) INJECTION EVERY 12 HOURS SCHEDULED
Status: DISCONTINUED | OUTPATIENT
Start: 2021-03-27 | End: 2021-03-28 | Stop reason: HOSPADM

## 2021-03-27 RX ORDER — NICOTINE 21 MG/24HR
1 PATCH, TRANSDERMAL 24 HOURS TRANSDERMAL DAILY PRN
Status: DISCONTINUED | OUTPATIENT
Start: 2021-03-27 | End: 2021-03-28 | Stop reason: HOSPADM

## 2021-03-27 RX ORDER — VITAMIN B COMPLEX
1000 TABLET ORAL DAILY
Status: DISCONTINUED | OUTPATIENT
Start: 2021-03-27 | End: 2021-03-28 | Stop reason: HOSPADM

## 2021-03-27 RX ORDER — ACETAMINOPHEN 325 MG/1
650 TABLET ORAL EVERY 6 HOURS PRN
Status: DISCONTINUED | OUTPATIENT
Start: 2021-03-27 | End: 2021-03-28 | Stop reason: HOSPADM

## 2021-03-27 RX ORDER — HEPARIN SODIUM 1000 [USP'U]/ML
80 INJECTION, SOLUTION INTRAVENOUS; SUBCUTANEOUS PRN
Status: DISCONTINUED | OUTPATIENT
Start: 2021-03-27 | End: 2021-03-28

## 2021-03-27 RX ORDER — HYDROCHLOROTHIAZIDE 25 MG/1
12.5 TABLET ORAL DAILY
Status: DISCONTINUED | OUTPATIENT
Start: 2021-03-27 | End: 2021-03-27

## 2021-03-27 RX ORDER — CHOLECALCIFEROL (VITAMIN D3) 125 MCG
500 CAPSULE ORAL DAILY
Status: DISCONTINUED | OUTPATIENT
Start: 2021-03-27 | End: 2021-03-28 | Stop reason: HOSPADM

## 2021-03-27 RX ORDER — SODIUM CHLORIDE 0.9 % (FLUSH) 0.9 %
10 SYRINGE (ML) INJECTION PRN
Status: DISCONTINUED | OUTPATIENT
Start: 2021-03-27 | End: 2021-03-28 | Stop reason: HOSPADM

## 2021-03-27 RX ORDER — POTASSIUM CHLORIDE 7.45 MG/ML
10 INJECTION INTRAVENOUS PRN
Status: DISCONTINUED | OUTPATIENT
Start: 2021-03-27 | End: 2021-03-28 | Stop reason: HOSPADM

## 2021-03-27 RX ORDER — MAGNESIUM SULFATE 1 G/100ML
1000 INJECTION INTRAVENOUS PRN
Status: DISCONTINUED | OUTPATIENT
Start: 2021-03-27 | End: 2021-03-28 | Stop reason: HOSPADM

## 2021-03-27 RX ORDER — POLYETHYLENE GLYCOL 3350 17 G/17G
17 POWDER, FOR SOLUTION ORAL DAILY PRN
Status: DISCONTINUED | OUTPATIENT
Start: 2021-03-27 | End: 2021-03-28 | Stop reason: HOSPADM

## 2021-03-27 RX ORDER — PANTOPRAZOLE SODIUM 40 MG/1
40 TABLET, DELAYED RELEASE ORAL DAILY
Status: DISCONTINUED | OUTPATIENT
Start: 2021-03-27 | End: 2021-03-28 | Stop reason: HOSPADM

## 2021-03-27 RX ORDER — LEVOTHYROXINE SODIUM 88 UG/1
88 TABLET ORAL DAILY
Status: DISCONTINUED | OUTPATIENT
Start: 2021-03-27 | End: 2021-03-28 | Stop reason: HOSPADM

## 2021-03-27 RX ADMIN — LIDOCAINE HYDROCHLORIDE 15 ML: 20 SOLUTION ORAL; TOPICAL at 11:50

## 2021-03-27 RX ADMIN — LEVOTHYROXINE SODIUM 88 MCG: 88 TABLET ORAL at 08:39

## 2021-03-27 RX ADMIN — METOCLOPRAMIDE 10 MG: 5 INJECTION, SOLUTION INTRAMUSCULAR; INTRAVENOUS at 13:46

## 2021-03-27 RX ADMIN — FAMOTIDINE 20 MG: 10 INJECTION INTRAVENOUS at 08:38

## 2021-03-27 RX ADMIN — SODIUM CHLORIDE: 9 INJECTION, SOLUTION INTRAVENOUS at 04:03

## 2021-03-27 RX ADMIN — HEPARIN SODIUM AND DEXTROSE 14 UNITS/KG/HR: 10000; 5 INJECTION INTRAVENOUS at 09:14

## 2021-03-27 RX ADMIN — FAMOTIDINE 20 MG: 10 INJECTION INTRAVENOUS at 21:00

## 2021-03-27 RX ADMIN — POTASSIUM CHLORIDE 40 MEQ: 1500 TABLET, EXTENDED RELEASE ORAL at 08:39

## 2021-03-27 RX ADMIN — HEPARIN SODIUM 6970 UNITS: 1000 INJECTION INTRAVENOUS; SUBCUTANEOUS at 01:31

## 2021-03-27 RX ADMIN — Medication 1000 UNITS: at 08:39

## 2021-03-27 RX ADMIN — HEPARIN SODIUM AND DEXTROSE 87.1 UNITS/KG/HR: 10000; 5 INJECTION INTRAVENOUS at 01:32

## 2021-03-27 RX ADMIN — ANTACID TABLETS 500 MG: 500 TABLET, CHEWABLE ORAL at 08:39

## 2021-03-27 RX ADMIN — HEPARIN SODIUM AND DEXTROSE 10 UNITS/KG/HR: 10000; 5 INJECTION INTRAVENOUS at 17:05

## 2021-03-27 RX ADMIN — HYDROCHLOROTHIAZIDE 12.5 MG: 25 TABLET ORAL at 08:39

## 2021-03-27 RX ADMIN — Medication 1 TABLET: at 08:39

## 2021-03-27 RX ADMIN — METOCLOPRAMIDE 10 MG: 5 INJECTION, SOLUTION INTRAMUSCULAR; INTRAVENOUS at 18:07

## 2021-03-27 RX ADMIN — PANTOPRAZOLE SODIUM 40 MG: 40 TABLET, DELAYED RELEASE ORAL at 08:39

## 2021-03-27 RX ADMIN — POTASSIUM CHLORIDE, DEXTROSE MONOHYDRATE AND SODIUM CHLORIDE: 150; 5; 450 INJECTION, SOLUTION INTRAVENOUS at 03:47

## 2021-03-27 RX ADMIN — MORPHINE SULFATE 4 MG: 4 INJECTION INTRAVENOUS at 11:54

## 2021-03-27 RX ADMIN — SODIUM CHLORIDE, PRESERVATIVE FREE 10 ML: 5 INJECTION INTRAVENOUS at 08:38

## 2021-03-27 RX ADMIN — MAGNESIUM SULFATE 2000 MG: 2 INJECTION INTRAVENOUS at 01:33

## 2021-03-27 RX ADMIN — METOCLOPRAMIDE 10 MG: 5 INJECTION, SOLUTION INTRAMUSCULAR; INTRAVENOUS at 23:52

## 2021-03-27 RX ADMIN — Medication 500 MCG: at 08:39

## 2021-03-27 RX ADMIN — HEPARIN SODIUM AND DEXTROSE 18 UNITS/KG/HR: 10000; 5 INJECTION INTRAVENOUS at 01:32

## 2021-03-27 RX ADMIN — ONDANSETRON 4 MG: 2 INJECTION INTRAMUSCULAR; INTRAVENOUS at 23:52

## 2021-03-27 ASSESSMENT — PAIN SCALES - GENERAL: PAINLEVEL_OUTOF10: 7

## 2021-03-27 NOTE — ED TRIAGE NOTES
Pt to ED with c/o midsternal nonradiating chest pain. Pt stated she had gastric bypass surgery in November. Pt stated her food intake has been decreased due to her pain. Pt placed on full monitor, resting on stretcher, NAD noted. RR even and non labored. Call light in reach.

## 2021-03-27 NOTE — PROGRESS NOTES
[facility]  Office: [address], DO, Pablo Sutton, DO, Rasheed Akhtar, DO, Praveen Arias Blood, DO, Candace Linda MD, Therese Gonzalez MD, Juan Francisco Fabian MD, Smith Nicolas MD, Vida Castillo MD, Rosalva Escalante MD, Faviola Tse MD, Zoila Gramajo MD, Mary Ann Staley, DO, Renetta Kay MD, Dorys Mcmullen, DO, Tonio Lowe MD,  Brodie Gutierrez, DO, Tonny Dorsey MD, Delmy Hartley MD, Tyesha Sewell MD, Scottie Alexander MD, Lyla Holliday, Raphael Moreno, CNP, Rojas Pepper, CNP, Ban Figueroa, CNS, Florentin Mcconnell, CNP, Jeri Castanon, CNP, Baldev Hurst, CNP, Ewelina , CNP, Mynor Tai, CNP, Sayda Garcia, PA-C, Umesh Carlson, [facility], Fany Bradshaw, CNP, Ludin Lantigua, CNP, Rubi Tellezgle, CNP, Lenard Costa, CNP, Madai Aguilar, CNP, Aura Abad, [address] [place] [address]    Progress Note    3/27/2021    2:08 PM    Name:   vEelia Suarez  MRN:     [id number]     Acct:      [de-identified]   Room:   [facility]042-02   Day:  1  Admit Date:  3/26/2021  8:26 PM    PCP:   Edna Pickard MD  Code Status:  Full Code    Subjective:     C/C:   Chief Complaint   Patient presents with    Chest Pain    Abdominal Pain     epigastric, had stomach removed in november Interval History Status: improved. Patient still complaining of some nausea, discussed past medical history of Qamar-en-Y surgery. Discussed pulmonary embolism. Discussed upcoming consultations. Brief History:     64-year-old female with a past medical history for carcinoid tumor of the stomach, hypertension, obesity, COPD presents to the emergency department for abdominal pain and chest pain. Patient underwent evaluation by emergency department, found to have bilateral pulmonary embolisms, patient also with minimally elevated troponins.   Patient was admitted with heparin drip for further evaluation, patient with a history of carcinoid tumor of the stomach and underwent resection at [facility] clinic on 06/5972, she had a complicated hospitalization requiring 10 days of monitoring for intractable vomiting, she continues to have monthly episodes of vomiting and was previously on Reglan and recently taken off. Patient's most recent admission was last month for similar issues. Discussed consultation with general surgery for recommendation regarding vomiting, also discussed oncology consultation for recommendation regarding anticoagulation as patient has had frequent episodes of vomiting and Qamar-en-Y surgery. Patient only complaining of mild chest pain which is tender to palpation. Review of Systems:     Constitutional:  negative for chills, fevers, sweats  Respiratory:  negative for cough, dyspnea on exertion, shortness of breath, wheezing  Cardiovascular: +chest pain negative for chest pressure/discomfort, lower extremity edema, palpitations  Gastrointestinal:  +nausea negative for abdominal pain, constipation, diarrhea,  vomiting  Neurological:  negative for dizziness, headache    Medications: Allergies:     Allergies   Allergen Reactions    Erythromycin Diarrhea       Current Meds:   Scheduled Meds:    calcium carbonate  1 tablet Oral Daily    folbee plus  1 tablet Oral Daily    levothyroxine  88 mcg Oral Daily    pantoprazole  40 mg Oral Daily    vitamin B-12  500 mcg Oral Daily    Vitamin D  1,000 Units Oral Daily    potassium chloride  40 mEq Oral Daily    sodium chloride flush  10 mL Intravenous 2 times per day    famotidine (PEPCID) injection  20 mg Intravenous BID    metoclopramide  10 mg Intravenous Q6H     Continuous Infusions:    sodium chloride      heparin (PORCINE) Infusion 14 Units/kg/hr (03/27/21 0914)    sodium chloride 100 mL/hr at 03/27/21 0403     PRN Meds: sodium chloride flush, sodium chloride, potassium chloride **OR** potassium alternative oral replacement **OR** potassium chloride, magnesium sulfate, nicotine, promethazine **OR** ondansetron, acetaminophen **OR** acetaminophen, polyethylene glycol, heparin (porcine), heparin (porcine), lidocaine viscous hcl    Data:     Past Medical History:   has a past medical history of Anxiety, Arthritis, Asthma, Colon polyp, Colon polyps, Cyst of kidney, acquired, Fatigue, Hypertension, Hypothyroidism, Neuroendocrine tumor, Obesity, Pharyngoesophageal dysphagia, Vocal cord polyps, and Wears glasses. Social History:   reports that she quit smoking about 8 years ago. She has a 25.00 pack-year smoking history. She has never used smokeless tobacco. She reports current alcohol use. She reports that she does not use drugs. Family History:   Family History   Problem Relation Age of Onset    High Blood Pressure Mother     High Blood Pressure Sister     Stomach Cancer Sister     Other Sister         epilepsy    No Known Problems Father        Vitals:  /86   Pulse 101   Temp 97.6 °F (36.4 °C) (Temporal)   Resp 18   Ht 5' 5\" (1.651 m)   Wt 192 lb (87.1 kg)   LMP 1994   SpO2 94%   BMI 31.95 kg/m²   Temp (24hrs), Av.9 °F (36.6 °C), Min:97.6 °F (36.4 °C), Max:98.4 °F (36.9 °C)    No results for input(s): POCGLU in the last 72 hours. I/O (24Hr):     Intake/Output Summary (Last 24 hours) at 3/27/2021 1408  Last data filed at 3/27/2021 0636  Gross per 24 hour   Intake 586 ml   Output --   Net 586 ml       Labs:  Hematology:  Recent Labs     21  1640 21  0817   WBC 8.5 8.4 7.2   RBC 5.27* 5.27* 4.42   HGB 14.8 14.9 12.6   HCT 45.7 46.0 39.6   MCV 86.7 87.3 89.6   MCH 28.1 28.3 28.5   MCHC 32.4 32.4 31.8   RDW 16.7* 16.5* 17.0*    282 224   MPV 9.3 9.6 9.4   DDIMER  --  4.25  --      Chemistry:  Recent Labs     21  1640 213 21  2234 21  0817    139  --  142   K 2.8* 2.4*  --  3.1*    97*  --  105   CO2 25 25  --  25   GLUCOSE 107* 137*  --  91   BUN 6 8  --  5*   CREATININE 0.66 0.64  --  0.48*   MG  -- 1.4*  --  1.7   ANIONGAP 13 17  --  12   LABGLOM >60 >60  --  >60   GFRAA >60 >60  --  >60   CALCIUM 9.1 9.3  --  7.9*   PHOS  --   --   --  3.0   TROPHS  --  9 12 11   MYOGLOBIN  --   --   --  <21*     Recent Labs     03/25/21  1640 03/26/21  2123 03/27/21  0817   PROT 6.8  --  5.3*   LABALBU 3.4*  --  2.4*   TSH  --  0.03*  --    AST 35*  --  57*   ALT 25  --  26   ALKPHOS 99  --  89   BILITOT 0.63  --  0.54     ABG:No results found for: POCPH, PHART, PH, POCPCO2, UKM0DLF, PCO2, POCPO2, PO2ART, PO2, POCHCO3, TZF7QKG, HCO3, NBEA, PBEA, BEART, BE, THGBART, THB, KAV9EIP, MWCG2NSP, T8GEXXXR, O2SAT, FIO2  Lab Results   Component Value Date/Time    SPECIAL NOT REPORTED 12/10/2020 06:36 AM     Lab Results   Component Value Date/Time    CULTURE NO SIGNIFICANT GROWTH 12/10/2020 06:36 AM       Radiology:  [facility] Chest Portable    Result Date: 3/26/2021  Mild subsegmental atelectasis in the left upper lobe. Ct Chest Pulmonary Embolism W Contrast    Result Date: 3/26/2021  Nonobstructive small scattered pulmonary emboli involving the bilateral lower lobes and right upper lobe as described above. No heart strain. Postop changes of Qamar-en-Y and cholecystectomy with hiatal hernia and common duct dilation none of which appear significantly changed from the prior exam. Partially visualized multiple left renal cysts, stable. Critical results were called by Dr. Khanh Dumont to [facility] on 3/26/2021 at 23:11.        Physical Examination:        General appearance:  alert, cooperative and no distress  Mental Status:  oriented to person, place and time and normal affect  Lungs:  clear to auscultation bilaterally, normal effort  Heart:  regular rate and rhythm, no murmur  Abdomen:  soft, nontender, nondistended, normal bowel sounds, no masses, hepatomegaly, splenomegaly  Extremities:  no edema, redness, tenderness in the calves  Skin:  no gross lesions, rashes, induration    Assessment:        Hospital Problems           Last Modified POA    * (Principal) Acute pulmonary embolism without acute cor pulmonale ([facility] [address] 3/27/2021 Yes    Anxiety 3/27/2021 Yes    Obesity 3/27/2021 Yes    Hyperlipidemia 3/27/2021 Yes    Hypothyroidism 3/26/2021 Yes    Neuroendocrine tumor 3/27/2021 Yes    Overview Signed 3/26/2019 10:44 AM by Marnie Reed LPN     of stomach         Essential hypertension 3/26/2021 Yes    Hypokalemia 3/26/2021 Yes    Hypomagnesemia 3/26/2021 Yes    Abdominal pain, epigastric 3/27/2021 Yes    Elevated d-dimer 3/27/2021 Yes    Malignant carcinoid tumor of stomach ([facility] [address] 3/27/2021 Yes    Intractable vomiting 3/27/2021 Yes          Plan:        Bilateral pulmonary embolism -hematology following, patient with a history of Qamar-en-Y surgery and carcinoid tumor. Patient with occasional nausea and vomiting, they are considering using Lovenox for anticoagulation. They will discuss with pharmacist.  History of Qamar-en-Y surgery with intractable vomiting -patient was recently taken off Reglan, will resume for now. There was a question about compliance with oral anticoagulant if she is continues to have intractable vomiting monthly. Bariatric surgery seen and examined, [facility]  Troponin elevation -uptrending originally but now downtrending and undetectable, no EKG changes. Likely demand ischemia  Hypomagnesemia/hypokalemia-replete  Carcinoid tumor the stomach -resected, followed with oncology and no further progression  Essential hypertension-hold hydrochlorothiazide, continue Lopressor  Hypothyroidism-continue Synthroid  Epigastric pain/chest pain-likely secondary to #1. Discharge: Weight further recommendations regarding anticoagulation, hold hydrochlorothiazide, replace electrolytes.   Watch for next 24 to 48 hours    Esme Oropeza, DO  3/27/2021  2:08 PM

## 2021-03-27 NOTE — ED PROVIDER NOTES
[facility]  Emergency Department  Faculty Attestation     I performed a history and physical examination of the patient and discussed management with the resident. I reviewed the residents note and agree with the documented findings and plan of care. Any areas of disagreement are noted on the chart. I was personally present for the key portions of any procedures. I have documented in the chart those procedures where I was not present during the key portions. I have reviewed the emergency nurses triage note. I agree with the chief complaint, past medical history, past surgical history, allergies, medications, social and family history as documented unless otherwise noted below. For Physician Assistant/ Nurse Practitioner cases/documentation I have personally evaluated this patient and have completed at least one if not all key elements of the E/M (history, physical exam, and MDM). Additional findings are as noted. Primary Care Physician:  Clement Machado MD    Screenings:  [unfilled]    CHIEF COMPLAINT       Chief Complaint   Patient presents with    Chest Pain    Abdominal Pain     epigastric, had stomach removed in november       RECENT VITALS:    ,  Pulse: 119, Resp: 20, BP: (!) 166/97    LABS:  Labs Reviewed - No data to display    Radiology  No orders to display         EKG:   Normal EKG EKG Interpretation    Interpreted by me    Rhythm: normal sinus   Rate: Tachycardia  Axis: Left  Ectopy: none  Conduction: normal  ST Segments: Downsloping inferiorly and laterally  T Waves:  Inversion inferiorly  Q Waves: none  Poor R wave progression anteriorly    Clinical Impression: Nonspecific ST and T wave changes consider ischemia    EKG Interpretation #2    Interpreted by me    Rhythm: normal sinus   Rate: Tachycardia  Axis: normal  Ectopy: none  Conduction: Long QTc  ST Segments: Anterior ST depression  T Waves: Anterior-posterior inversion  Q Waves: none    Clinical Impression: Nonspecific ST/T wave changes, consider ischemia, consider pulmonary embolism    Attending Physician Additional  Notes    Patient been over the past 2 days with difficulty swallowing and substernal nonradiating chest heaviness. There is partial improvement with Maalox. She is on a soft food diet but feels the food is not going down. She has painful hiccups. No difficulty breathing. She does have fatigue. No change in her leg edema. She has prior gastrectomy for carcinoid. On exam she is in moderate to severe distress secondary pain, tachycardic, mildly tachypneic, hypertensive, afebrile. Lungs are clear and symmetrical.  There is mild epigastric tenderness. She is able to handle her saliva. Trace nonpitting edema bilaterally. No asymmetry. No calf tenderness. Impression is chest pain, concern for esophagitis versus food bolus impaction, unlikely ACS or STEMI, less likely pulmonary embolism, unlikely aortic dissection, no evidence of Boerhaave syndrome. Plan is IV access, fluids, analgesics, laboratory studies, repeat EKG, anticipate imaging, consider CT, discussion with GI.          Lois Burgos.  Ab Sharma MD, [facility]  Attending Emergency  Physician               Jenelle Salinas MD  03/26/21 1352

## 2021-03-27 NOTE — ED PROVIDER NOTES
Faculty Sign-Out Attestation  Handoff taken on the following patient from prior Attending Physician: Marcos Brewer    I was available and discussed any additional care issues that arose and coordinated the management plans with the resident(s) caring for the patient during my duty period. Any areas of disagreement with residents documentation of care or procedures are noted on the chart. I was personally present for the key portions of any/all procedures during my duty period. I have documented in the chart those procedures where I was not present during the matthews portions. Tachy, ct r/o pe pending, ?  Food bolus, needing iv hydration > needing admit    Roxann Evans DO  Attending Physician     Roxann Evans,     Bilateral PE, treated, admitted     Roxann Evans,   03/27/21 [address] Peter Burton DO  03/27/21 [id number]

## 2021-03-27 NOTE — PROGRESS NOTES
_           Chief Complaint   Patient presents with    Follow-up     discuss [facility]      DIAGNOSIS:       Malignant carcinoid tumor of the stomach  Recent GI bleeding after endoscopic mucosal resection of stomach carcinoid on October 23, 2019. Evidence of recurrent disease on repeated EGD January 2020 and continued elevation of tumor marker chromogranin A  Iron deficiency secondary to above  History of hypothyroidism  Multiple comorbidities as listed      CURRENT THERAPY:         Status post endoscopic mucosal resection of stomach carcinoid October 23, 2019  S/p gastric resection at [facility] [place] November 20, 2020. BRIEF CASE HISTORY:      Ms. Yesenia Bennett is a very pleasant 62 y.o. female with history of multiple comorbidities as listed. The patient seen because of recent diagnosis of carcinoid tumor. Patient had problem with dysphagia for about 4 to 5 months. That problem resolved spontaneously. She was evaluated by gastroenterology. She had EGD in September 2019. She was noted to have gastric tumor which was biopsied and was positive for malignant neuroendocrine tumor. Subsequently patient was evaluated by abdominal MRI. Patient was having no evidence of gastric disease or gastric wall deep penetration. She underwent endoscopic mucosal resection October 23, 2019. Patient had recent admission to [facility] because of GI bleeding. She had EGD again and cauterization of bleeding. She is having weakness and fatigue. No other symptoms. No abdominal pain or cramps. No hot flashes or night sweats. No weight loss or decreased appetite. No wheezing. The patient had lab testing in July 2019 with chromogranin A level 1500. Patient was not aware of that test.  Patient's twin sister had carcinoid tumor more than 20 years ago. She had gastric resection at that time and there is no recurrence.   Patient smokes half pack per day for the last 40 years. Social alcohol drinking. INTERIM HISTORY:   The patient seen for follow-up gastric carcinoid. It was resected October 23, 2019. She has repeated EGD in January 2020 and she was found to have local recurrence with multiple lesions. She was referred to [facility] clinic. She had complete gastric resection 11/30/2020. Following surgery, she had multiple hospitalizations due to dehydration and persistent N/V and dehydration. Patient feels slightly better. Continues to have N/V on treatment. No fever. No CP or Resp distress. Continues to lose weight. PAST MEDICAL HISTORY: has a past medical history of Anxiety, Arthritis, Asthma, Colon polyp, Colon polyps, Cyst of kidney, acquired, Fatigue, Hypertension, Hypothyroidism, Neuroendocrine tumor, Obesity, Pharyngoesophageal dysphagia, Vocal cord polyps, and Wears glasses. PAST SURGICAL HISTORY: has a past surgical history that includes cystourethroscopy/stent removal (5/3/11); Colonoscopy (02/21/2019); ERCP; Cholecystectomy (10/09/2014); hip surgery (Left); Throat surgery; Colonoscopy; Upper gastrointestinal endoscopy (02/21/2019); Upper gastrointestinal endoscopy (09/23/2019); Upper gastrointestinal endoscopy (N/A, 9/23/2019); Upper gastrointestinal endoscopy (N/A, 10/23/2019); Upper gastrointestinal endoscopy (N/A, 10/29/2019); Endoscopic ultrasonography, GI (N/A, 1/22/2020); and Gastric bypass surgery (11/30/2020).      CURRENT MEDICATIONS:  has a current medication list which includes the following prescription(s): vitamin d, folic acid-vit G2-CLM C92, levothyroxine, hydrochlorothiazide, ondansetron, pantoprazole, desonide, multiple vitamin, vitamin b-12, calcium carbonate, and potassium chloride, and the following Facility-Administered Medications: calcium carbonate, folbee plus, levothyroxine, pantoprazole, vitamin b-12, vitamin d, potassium chloride, sodium chloride flush, sodium chloride flush, sodium intolerance. Neurologic: No headaches or dizziness. No weakness or numbness of the extremities. No changes in balance, coordination,  memory, mentation, behavior. Allergic/Immunologic: No nasal congestion or hives. No repeated infections. PHYSICAL EXAM:  The patient is not in acute distress. Vital signs: Blood pressure 107/77, pulse 74, temperature 97 °F (36.1 °C), temperature source Temporal, weight 192 lb (87.1 kg), last menstrual period 01/01/1994, not currently breastfeeding.      General appearance - well appearing, not in pain or distress  Mental status - good mood, alert and oriented  Eyes - pupils equal and reactive, extraocular eye movements intact  Ears - bilateral TM's and external ear canals normal  Nose - normal and patent, no erythema, discharge or polyps  Mouth - mucous membranes moist, pharynx normal without lesions  Neck - supple, no significant adenopathy  Lymphatics - no palpable lymphadenopathy, no hepatosplenomegaly  Chest - clear to auscultation, no wheezes, rales or rhonchi, symmetric air entry  Heart - normal rate, regular rhythm, normal S1, S2, no murmurs, rubs, clicks or gallops  Abdomen - soft, nontender, nondistended, no masses or organomegaly  Neurological - alert, oriented, normal speech, no focal findings or movement disorder noted  Musculoskeletal - no joint tenderness, deformity or swelling  Extremities - peripheral pulses normal, no pedal edema, no clubbing or cyanosis  Skin - normal coloration and turgor, no rashes, no suspicious skin lesions noted     Review of Diagnostic data:   Lab Results   Component Value Date    WBC 7.2 03/27/2021    HGB 12.6 03/27/2021    HCT 39.6 03/27/2021    MCV 89.6 03/27/2021     03/27/2021       Chemistry        Component Value Date/Time     03/27/2021 0817    K 3.1 (L) 03/27/2021 0817     03/27/2021 0817    CO2 25 03/27/2021 0817    BUN 5 (L) 03/27/2021 0817    CREATININE 0.48 (L) 03/27/2021 0817        Component Value Date/Time    CALCIUM 7.9 (L) 03/27/2021 0817    ALKPHOS 89 03/27/2021 0817    AST 57 (H) 03/27/2021 0817    ALT 26 03/27/2021 0817    BILITOT 0.54 03/27/2021 0817        Abdominal MRI 9/18/2019:  Impression   Subcentimeter gastric mucosal enhancing masses in the fundus and along the   lesser curvature are demonstrated, corresponding to the patient's known GI   stromal tumors.  No evidence for extension through the gastric wall or   metastatic disease.       Status post cholecystectomy.  MRCP within normal limits.       Pelvic right kidney, incompletely visualized.  Bilateral renal cysts again   demonstrated. CT chest October 26, 2019:     FINDINGS:   Pulmonary Arteries: Suboptimal contrast timing.  Limited evaluation of the   segmental branches.  No evidence for central pulmonary embolism.  The main   pulmonary artery is normal in size.       Mediastinum: No evidence of mediastinal lymphadenopathy.  The heart and   pericardium demonstrate no acute abnormality.  There is no acute abnormality   of the thoracic aorta.       Lungs/pleura: The lungs are without acute process.  No focal consolidation or   pulmonary edema.  No evidence of pleural effusion or pneumothorax.       Upper Abdomen: Partially visualized left renal cysts.  Dense area of   calcification adjacent to the splenic artery is noted, which may represent a   thrombosed aneurysm.  Small lymph node in the gastrohepatic ligament.  Status   post cholecystectomy.       Soft Tissues/Bones: No acute bone or soft tissue abnormality.           Impression   Suboptimal contrast timing.  No evidence for central pulmonary embolism.       No acute airspace disease identified. Pathology results from September 23, 2019:  Collected: 9/23/2019   Received: 9/23/2019   Reported: 9/25/2019 10:17     -- Diagnosis --   1.  STOMACH, ANTRUM, BIOPSY:   - UNREMARKABLE GASTRIC MUCOSA. - NEGATIVE FOR HELICOBACTER PYLORI INFECTION.      2.  STOMACH, LESION, BIOPSIES:   - monitor with scans and tumors markers. If she continues to have elevated markers, we will then start sandostatin. We will do labs soon and again in 3 months. She will continue follow up in [facility]  Patient's questions were answered to the best of her satisfaction and she verbalized full understanding and agreement.

## 2021-03-27 NOTE — ED NOTES
Report rec'd from [facility]. Pt waiting on heparin gtt, aptt, and room assignment.       Paxton Woodruff RN  03/27/21 1163

## 2021-03-27 NOTE — ED PROVIDER NOTES
[facility] ED  Emergency Department Encounter  EmergencyMedicine Resident     Pt Victoriano Garcia  MRN: [id number]  [place] 1960  Date of evaluation: 3/26/21  PCP:  Adeline Hartman MD    [address] [place] [address]       Chief Complaint   Patient presents with    Chest Pain    Abdominal Pain     epigastric, had stomach removed in november       HISTORY OF PRESENT ILLNESS  (Location/Symptom, Timing/Onset, Context/Setting, Quality, Duration, Modifying Factors, Severity.)      Clara Mcgee is a 62 y.o. female who presents with chest pain. Patient has a history of carcinoid tumor, total gastrectomy from [facility] in November 2020, reported that she had a grapefruit 2 nights ago, developed epigastric pain that has been progressively worsening. Patient reports that she is able to swallow her secretions, no difficulty breathing. This is associated with nausea, vomiting. Patient denies headache, lightheadedness, dizziness, visual changes, congestion, cough, rhinorrhea, shortness of breath, palpitations, dysuria, hematuria, diarrhea, constipation, lower extremity swelling or pain. Patient reports that she is cancer free as far she knows, denies any history of blood clots or bleeding disorders. Patient denies any cardiac catheterizations in the past, no recent stress test.  Patient reports history of HTN, hypothyroidism. PAST MEDICAL / SURGICAL / SOCIAL / FAMILY HISTORY      has a past medical history of Anxiety, Arthritis, Asthma, Colon polyp, Colon polyps, Cyst of kidney, acquired, Fatigue, Hypertension, Hypothyroidism, Neuroendocrine tumor, Obesity, Pharyngoesophageal dysphagia, Vocal cord polyps, and Wears glasses. has a past surgical history that includes cystourethroscopy/stent removal (5/3/11); Colonoscopy (02/21/2019); ERCP; Cholecystectomy (10/09/2014); hip surgery (Left); Throat surgery; Colonoscopy; Upper gastrointestinal endoscopy (02/21/2019);  Upper gastrointestinal endoscopy (2019); Upper gastrointestinal endoscopy (N/A, 2019); Upper gastrointestinal endoscopy (N/A, 10/23/2019); Upper gastrointestinal endoscopy (N/A, 10/29/2019); Endoscopic ultrasonography, GI (N/A, 2020); and Gastric bypass surgery (2020).       Social History     Socioeconomic History    Marital status: Single     Spouse name: Not on file    Number of children: Not on file    Years of education: Not on file    Highest education level: Not on file   Occupational History    Not on file   Social Needs    Financial resource strain: Not on file    Food insecurity     Worry: Not on file     Inability: Not on file    Transportation needs     Medical: Not on file     Non-medical: Not on file   Tobacco Use    Smoking status: Former Smoker     Packs/day: 1.00     Years: 25.00     Pack years: 25.00     Quit date: 2012     Years since quittin.8    Smokeless tobacco: Never Used    Tobacco comment: quit 2 years   Substance and Sexual Activity    Alcohol use: Yes     Comment: 3 times a month    Drug use: No    Sexual activity: Not on file   Lifestyle    Physical activity     Days per week: Not on file     Minutes per session: Not on file    Stress: Not on file   Relationships    Social connections     Talks on phone: Not on file     Gets together: Not on file     Attends Spiritism service: Not on file     Active member of club or organization: Not on file     Attends meetings of clubs or organizations: Not on file     Relationship status: Not on file    Intimate partner violence     Fear of current or ex partner: Not on file     Emotionally abused: Not on file     Physically abused: Not on file     Forced sexual activity: Not on file   Other Topics Concern    Not on file   Social History Narrative    Not on file       Family History   Problem Relation Age of Onset    High Blood Pressure Mother     High Blood Pressure Sister     Stomach Cancer Sister     Other Sister epilepsy    No Known Problems Father        Allergies:  Erythromycin    Home Medications:  Prior to Admission medications    Medication Sig Start Date End Date Taking? Authorizing Provider   vitamin D 25 MCG (1000 UT) CAPS Take 1 capsule by mouth daily    Historical Provider, MD   Folic Acid-Vit F9-UJE D68 2.2-25-0.5 MG TABS Take 1 tablet by mouth daily 3/25/21   Cole Diehl MD   calcium carbonate (ANTACID) 500 MG chewable tablet Take 1 tablet by mouth daily  Patient not taking: Reported on 3/25/2021 3/25/21 4/24/21  Cole Diehl MD   levothyroxine (SYNTHROID) 100 MCG tablet Take 1 tablet by mouth daily 3/25/21   Cole Diehl MD   potassium chloride (KLOR-CON M) 20 MEQ extended release tablet Take 2 tablets by mouth daily 3/25/21   Lore Patel MD   hydroCHLOROthiazide (HYDRODIURIL) 25 MG tablet TAKE 1 TABLET BY MOUTH EVERY DAY  Patient taking differently: 12.5 mg TAKE 1/2 TABLET BY MOUTH EVERY DAY 3/10/21   Cole Diehl MD   ondansetron (ZOFRAN) 4 MG tablet Take 4 mg by mouth every 8 hours as needed for Nausea or Vomiting    Historical Provider, MD   pantoprazole (PROTONIX) 40 MG tablet TAKE 1 TABLET BY MOUTH EVERY DAY 12/4/20   Historical Provider, MD   desonide (DESOWEN) 0.05 % cream Apply topically 2 times daily. 8/21/20   Cole Diehl MD   Multiple Vitamin (MULTI-DAY VITAMINS PO) Take by mouth    Historical Provider, MD   vitamin B-12 (CYANOCOBALAMIN) 500 MCG tablet Take 500 mcg by mouth daily    Historical Provider, MD       REVIEW OF SYSTEMS    (2-9 systems for level 4, 10 or more for level 5)      Review of Systems   Constitutional: Negative for chills, diaphoresis, fatigue and fever. HENT: Negative for congestion, dental problem, drooling, rhinorrhea and sore throat. Eyes: Negative for photophobia and visual disturbance. Respiratory: Negative for cough, shortness of breath and wheezing. Cardiovascular: Positive for chest pain.  Negative for palpitations and leg swelling. Gastrointestinal: Positive for abdominal pain, nausea and vomiting. Negative for blood in stool, constipation and diarrhea. Genitourinary: Negative for dysuria, frequency, hematuria and urgency. Musculoskeletal: Negative for neck pain and neck stiffness. Skin: Negative for pallor and rash. Neurological: Negative for dizziness, syncope, weakness, light-headedness and headaches. Psychiatric/Behavioral: Negative for confusion. PHYSICAL EXAM   (up to 7 for level 4, 8 or more for level 5)      INITIAL VITALS:   BP (!) 174/109   Pulse 106   Temp 98.4 °F (36.9 °C)   Resp 14   LMP 01/01/1994   SpO2 94%     Physical Exam  Constitutional:       General: She is not in acute distress. Appearance: She is well-developed. She is not toxic-appearing or diaphoretic. Comments: Patient is alert and oriented, openly communicative, uncomfortable appearing, appears to be in a moderate amount of pain, but no acute distress, nontoxic-appearing. HENT:      Head: Normocephalic and atraumatic. Right Ear: External ear normal.      Left Ear: External ear normal.      Nose: Nose normal. No congestion or rhinorrhea. Mouth/Throat:      Mouth: Mucous membranes are moist.      Pharynx: Oropharynx is clear. No oropharyngeal exudate or posterior oropharyngeal erythema. Eyes:      General:         Right eye: No discharge. Left eye: No discharge. Extraocular Movements: Extraocular movements intact. Pupils: Pupils are equal, round, and reactive to light. Neck:      Musculoskeletal: Normal range of motion and neck supple. Vascular: No JVD. Trachea: No tracheal deviation. Cardiovascular:      Rate and Rhythm: Normal rate and regular rhythm. Pulses: Normal pulses. Heart sounds: Normal heart sounds. No murmur. No friction rub. No gallop. Pulmonary:      Effort: Pulmonary effort is normal. No respiratory distress. Breath sounds: Normal breath sounds.  No stridor. No wheezing, rhonchi or rales. Chest:      Chest wall: No tenderness. Abdominal:      General: There is no distension. Palpations: Abdomen is soft. There is no mass. Tenderness: There is abdominal tenderness. There is no right CVA tenderness, left CVA tenderness or guarding. Comments: Epigastric abdominal tenderness without overlying skin changes, abdomen soft, nondistended, no other abdominal tenderness, no CVA tenderness bilaterally. Musculoskeletal: Normal range of motion. Right lower leg: No edema. Left lower leg: No edema. Skin:     General: Skin is warm. Capillary Refill: Capillary refill takes less than 2 seconds. Findings: No rash. Neurological:      General: No focal deficit present. Mental Status: She is alert and oriented to person, place, and time. Cranial Nerves: No cranial nerve deficit. Sensory: No sensory deficit. Motor: No weakness.    Psychiatric:         Mood and Affect: Mood normal.         Behavior: Behavior normal.         DIFFERENTIAL  DIAGNOSIS     PLAN (LABS / IMAGING / EKG):  Orders Placed This Encounter   Procedures    XR CHEST PORTABLE    CT CHEST PULMONARY EMBOLISM W CONTRAST    CBC Auto Differential    Basic Metabolic Panel w/ Reflex to MG    Troponin    D-Dimer, Quantitative    TSH with Reflex    T4, Free    Troponin    Magnesium    APTT    Height and weight    Inpatient consult to Hospitalist    Inpatient consult to GI    EKG 12 Lead    PATIENT STATUS (FROM ED OR OR/PROCEDURAL) Inpatient       MEDICATIONS ORDERED:  Orders Placed This Encounter   Medications    aluminum & magnesium hydroxide-simethicone (MAALOX) 200-200-20 MG/5ML suspension 15 mL    lidocaine viscous hcl (XYLOCAINE) 2 % solution 15 mL    0.9 % sodium chloride bolus    fentaNYL (SUBLIMAZE) injection 50 mcg    ondansetron (ZOFRAN) injection 4 mg    potassium chloride 10 mEq/100 mL IVPB (Peripheral Line)    morphine injection 4 mg    0.9 % sodium chloride infusion    iopamidol (ISOVUE-370) 76 % injection 75 mL    magnesium sulfate 2000 mg in 50 mL IVPB premix    heparin (porcine) injection 80 Units/kg    heparin (porcine) injection 80 Units/kg    heparin (porcine) injection 40 Units/kg    heparin 25,000 units in dextrose 5% 250 mL (premix) infusion       DDX: Esophagitis, recurrence of carcinoid tumor, ACS, PE    DIAGNOSTIC RESULTS / EMERGENCY DEPARTMENT COURSE / MDM   LAB RESULTS:  Results for orders placed or performed during the hospital encounter of 03/26/21   CBC Auto Differential   Result Value Ref Range    WBC 8.4 3.5 - 11.3 k/uL    RBC 5.27 (H) 3.95 - 5.11 m/uL    Hemoglobin 14.9 11.9 - 15.1 g/dL    Hematocrit 46.0 36.3 - 47.1 %    MCV 87.3 82.6 - 102.9 fL    MCH 28.3 25.2 - 33.5 pg    MCHC 32.4 28.4 - 34.8 g/dL    RDW 16.5 (H) 11.8 - 14.4 %    Platelets 511 605 - 896 k/uL    MPV 9.6 8.1 - 13.5 fL    NRBC Automated 0.0 0.0 per 100 WBC    Differential Type NOT REPORTED     Seg Neutrophils 66 (H) 36 - 65 %    Lymphocytes 22 (L) 24 - 43 %    Monocytes 8 3 - 12 %    Eosinophils % 3 1 - 4 %    Basophils 1 0 - 2 %    Immature Granulocytes 0 0 %    Segs Absolute 5.62 1.50 - 8.10 k/uL    Absolute Lymph # 1.88 1.10 - 3.70 k/uL    Absolute Mono # 0.63 0.10 - 1.20 k/uL    Absolute Eos # 0.23 0.00 - 0.44 k/uL    Basophils Absolute 0.06 0.00 - 0.20 k/uL    Absolute Immature Granulocyte <0.03 0.00 - 0.30 k/uL    WBC Morphology NOT REPORTED     RBC Morphology ANISOCYTOSIS PRESENT     Platelet Estimate NOT REPORTED    Basic Metabolic Panel w/ Reflex to MG   Result Value Ref Range    Glucose 137 (H) 70 - 99 mg/dL    BUN 8 6 - 20 mg/dL    CREATININE 0.64 0.50 - 0.90 mg/dL    Bun/Cre Ratio NOT REPORTED 9 - 20    Calcium 9.3 8.6 - 10.4 mg/dL    Sodium 139 135 - 144 mmol/L    Potassium 2.4 (LL) 3.7 - 5.3 mmol/L    Chloride 97 (L) 98 - 107 mmol/L    CO2 25 20 - 31 mmol/L    Anion Gap 17 9 - 17 mmol/L    GFR Non-African American >60 >60 mL/min GFR African American >60 >60 mL/min    GFR Comment          GFR Staging NOT REPORTED    Troponin   Result Value Ref Range    Troponin, High Sensitivity 9 0 - 14 ng/L    Troponin T NOT REPORTED <0.03 ng/mL    Troponin Interp NOT REPORTED    D-Dimer, Quantitative   Result Value Ref Range    D-Dimer, Quant 4.25 mg/L FEU   TSH with Reflex   Result Value Ref Range    TSH 0.03 (L) 0.30 - 5.00 mIU/L   T4, Free   Result Value Ref Range    Thyroxine, Free 1.68 0.93 - 1.70 ng/dL   Troponin   Result Value Ref Range    Troponin, High Sensitivity 12 0 - 14 ng/L    Troponin T NOT REPORTED <0.03 ng/mL    Troponin Interp NOT REPORTED    Magnesium   Result Value Ref Range    Magnesium 1.4 (L) 1.6 - 2.6 mg/dL       IMPRESSION: 64-year-old female with history of carcinoid tumor, total gastrectomy in November 2020, presenting with worsening epigastric/chest pain, associated with nausea and vomiting. Patient is nontoxic, no acute distress, handling secretions, no respiratory difficulty. Will obtain cardiac work-up to include dimer to rule out PE, ACS. Will treat symptomatically with GI cocktail, reassess. RADIOLOGY:  Xr Chest Portable    Result Date: 3/26/2021  EXAMINATION: ONE XRAY VIEW OF THE CHEST 3/26/2021 7:05 pm COMPARISON: 01/26/2021 HISTORY: ORDERING SYSTEM PROVIDED HISTORY: chest pain TECHNOLOGIST PROVIDED HISTORY: chest pain Reason for Exam: upr FINDINGS: The cardiac size is normal. No acute infiltrates or pleural effusions are seen. Mild subsegmental atelectasis in the left upper lobe. Pulmonary vascularity appears normal. There is mild ectasia of the thoracic aorta. No acute bony abnormalities. The hilar structures are normal.     Mild subsegmental atelectasis in the left upper lobe.      Ct Chest Pulmonary Embolism W Contrast    Result Date: 3/26/2021  EXAMINATION: CTA OF THE CHEST 3/26/2021 10:25 pm TECHNIQUE: CTA of the chest was performed after the administration of intravenous contrast.  Multiplanar reformatted images are provided for review. MIP images are provided for review. Dose modulation, iterative reconstruction, and/or weight based adjustment of the mA/kV was utilized to reduce the radiation dose to as low as reasonably achievable. COMPARISON: CT PA dated 01/02/2021. HISTORY: ORDERING SYSTEM PROVIDED HISTORY: elevated dimer TECHNOLOGIST PROVIDED HISTORY: elevated dimer Decision Support Exception->Emergency Medical Condition (MA) Reason for Exam: elevated ddimer; CP Acuity: Acute Type of Exam: Initial FINDINGS: Pulmonary Arteries: There is minimal more distal nonobstructing pulmonary embolus involving branches of right lower lobe inferiorly and laterally as well as medial right upper lobe. On the left, there is a single nonobstructing more distal pulmonary embolus involving the inferolateral left lower lobe. There is no heart strain. Mediastinum: No evidence of mediastinal lymphadenopathy. The heart and pericardium demonstrate no acute abnormality. There is no acute abnormality of the thoracic aorta. Lungs/pleura: The lungs are without acute process. No focal consolidation or pulmonary edema. No evidence of pleural effusion or pneumothorax. Upper Abdomen: Limited images of the upper abdomen are noted for small hiatal hernia and postop changes of Qamar-en-Y and cholecystectomy. There is associated stable dilation of the common duct. No intrahepatic ductal dilatation. Stable partially visualized left renal cysts. The left kidney is not seen and known to be in the pelvis. Soft Tissues/Bones: Surrounding osseous and soft tissue structures are unremarkable. Nonobstructive small scattered pulmonary emboli involving the bilateral lower lobes and right upper lobe as described above. No heart strain. Postop changes of Qamar-en-Y and cholecystectomy with hiatal hernia and common duct dilation none of which appear significantly changed from the prior exam. Partially visualized multiple left renal cysts, stable. Critical results were called by Dr. Magdalena Gabriel to Yairel Patel on 3/26/2021 at 23:11. EKG  EKG Interpretation    Interpreted by me    Rhythm: normal sinus   Rate: normal  Axis: normal  Ectopy: none  Conduction: normal  ST Segments: no acute change  T Waves: no acute change, chronic T wave inversions  Q Waves: none    Clinical Impression: no acute changes and normal EKG, chronic T wave inversions    All EKG's are interpreted by the Emergency Department Physician who either signs or Co-signs this chart in the absence of a cardiologist.    EMERGENCY DEPARTMENT COURSE:  Patient came to emergency department, HPI and physical exam were conducted. All nursing notes were reviewed. EKG showed no acute changes, initial troponin IX, follow-up troponin 12, no concern for ACS. D-dimer elevated, CT chest found evidence of nonobstructive pulmonary emboli, no right heart strain, will start patient on heparin. TSH low, T4 1.68. Potassium 2.4, magnesium 1.4, will administer IV potassium and IV magnesium. Patient will be admitted to the Intermed service for further assessment and management of patient's hypokalemia, hypomagnesemia, difficulty swallowing, pulmonary emboli. Consulted gastroenterology as a routine consult, will see patient in the morning. PROCEDURES:      CONSULTS:  IP CONSULT TO HOSPITALIST  IP CONSULT TO GI    CRITICAL CARE:  Please see attending note    FINAL IMPRESSION      1. Acute pulmonary embolism without acute cor pulmonale, unspecified pulmonary embolism type (HCC)    2. Abdominal pain, epigastric    3. Hypokalemia    4. Elevated d-dimer    5. Hypomagnesemia          DISPOSITION / PLAN     DISPOSITION Admitted 03/26/2021 11:21:10 PM      PATIENT REFERRED TO:  No follow-up provider specified.     DISCHARGE MEDICATIONS:  New Prescriptions    No medications on file       Honey Bacon MD  Emergency Medicine Resident    (Please note that portions of thisnote were completed with a voice recognition program.  Efforts were made to edit the dictations but occasionally words are mis-transcribed.)        Nicole Kwon MD  Resident  03/26/21 [id number]

## 2021-03-27 NOTE — CONSULTS
_                         Today's Date: 3/27/2021  Patient Name: Maribel Akers  Date of admission: 3/26/2021  8:26 PM  Patient's age: 62 y.o., 1960  Admission Dx: Chest pain [R07.9]      Requesting Physician: Nayeli Velazquez DO    CHIEF COMPLAINT: Chest pain. Epigastric pain. Pulmonary embolism. History of gastric carcinoid. Consult for anticoagulation. History Obtained From:  patient, electronic medical record    HISTORY OF PRESENT ILLNESS:      The patient is a 62 y.o.  female who is admitted to the hospital for further management of acute PE. The patient had 1 day history of epigastric pain and chest discomfort as well. She had increasing shortness of breath. She had no cough or hemoptysis. She was evaluated emergency room and she had CTA which showed nonocclusive pulmonary embolism. She was admitted and started on heparin. Clinically she is improving. No leg pain or swelling. No previous thrombosis or embolization. The patient is known to our practice. She had history of gastric carcinoid status post partial resection locally. She had complete gastric resection at [facility] November 30, 2020. Since her surgery she had multiple hospitalizations due to recurrent nausea and vomiting and dehydration. Patient continues to have episodes of vomiting and she continues to lose weight. The question is about using oral anticoagulation with such GI symptoms. Past Medical History:   has a past medical history of Anxiety, Arthritis, Asthma, Colon polyp, Colon polyps, Cyst of kidney, acquired, Fatigue, Hypertension, Hypothyroidism, Neuroendocrine tumor, Obesity, Pharyngoesophageal dysphagia, Vocal cord polyps, and Wears glasses. Past Surgical History:   has a past surgical history that includes cystourethroscopy/stent removal (5/3/11); Colonoscopy (02/21/2019); ERCP; Cholecystectomy (10/09/2014); hip surgery (Left);  Throat Cristian Martinez APRN - CNP        magnesium sulfate 1000 mg in dextrose 5% 100 mL IVPB  1,000 mg Intravenous PRN Maple José Luis, APRN - CNP        famotidine (PEPCID) injection 20 mg  20 mg Intravenous BID Maple José Luis, APRN - CNP   20 mg at 03/27/21 0838    nicotine (NICODERM CQ) 21 MG/24HR 1 patch  1 patch Transdermal Daily PRN Maple José Luis, APRN - CNP        promethazine (PHENERGAN) tablet 12.5 mg  12.5 mg Oral Q6H PRN Maple [place], APRN - CNP        Or    ondansetron [facility]) injection 4 mg  4 mg Intravenous Q6H PRN Maple José Luis, APRN - CNP        acetaminophen (TYLENOL) tablet 650 mg  650 mg Oral Q6H PRN Maple [place], APRN - CNP        Or    acetaminophen (TYLENOL) suppository 650 mg  650 mg Rectal Q6H PRN Maple [place], APRN - CNP        polyethylene glycol (GLYCOLAX) packet 17 g  17 g Oral Daily PRN Maple [place], APRN - CNP        heparin (porcine) injection 6,970 Units  80 Units/kg Intravenous PRN Maple [place], APRN - CNP        heparin (porcine) injection 3,480 Units  40 Units/kg Intravenous PRN Maple [place], APRN - CNP        heparin 25,000 units in dextrose 5% 250 mL (premix) infusion  5-30 Units/kg/hr Intravenous Continuous Maple José Luis, APRN - CNP 12.2 mL/hr at 03/27/21 0914 14 Units/kg/hr at 03/27/21 0914    metoclopramide (REGLAN) injection 10 mg  10 mg Intravenous Q6H Dorys Mcmullen DO        lidocaine viscous hcl (XYLOCAINE) 2 % solution 15 mL  15 mL Mouth/Throat Q3H PRN Darlyn Wiggins MD   15 mL at 03/27/21 1150    0.9 % sodium chloride infusion   Intravenous Continuous Darlyn Wiggins  mL/hr at 03/27/21 0403 New Bag at 03/27/21 0403       Allergies:  Erythromycin    REVIEW OF SYSTEMS:      · General: Positive for weakness and fatigue. Positive for weight loss and decreased appetite. No fever or chills. · Eyes: No blurred vision, eye pain or double vision.    · Ears: No hearing problems or drainage. No tinnitus. · Throat: No sore throat, problems with swallowing or dysphagia. · Respiratory: As above. · Cardiovascular: No chest pain, orthopnea or PND. No lower extremity edema. No palpitation. · Gastrointestinal: As above. .   · Genitourinary: No dysuria, hematuria, frequency or urgency. · Musculoskeletal: No muscle aches or pains. No limitation of movement. No back pain. No gait disturbance, No joint complaints. · Dermatologic: No skin rashes or pruritus. No skin lesions or discolorations. · Psychiatric: No depression, anxiety, or stress or signs of schizophrenia. No change in mood or affect. · Hematologic: No history of bleeding tendency. No bruises or ecchymosis. No history of clotting problems. · Infectious disease: No fever, chills or frequent infections. · Endocrine: No polydipsia or polyuria. No temperature intolerance. · Neurologic: No headaches or dizziness. No weakness or numbness of the extremities. No changes in balance, coordination,  memory, mentation, behavior. · Allergic/Immunologic: No nasal congestion or hives. No repeated infections.        PHYSICAL EXAM:      /86   Pulse 101   Temp 97.6 °F (36.4 °C) (Temporal)   Resp 18   Ht 5' 5\" (1.651 m)   Wt 192 lb (87.1 kg)   LMP 1994   SpO2 94%   BMI 31.95 kg/m²    Temp (24hrs), Av.9 °F (36.6 °C), Min:97.6 °F (36.4 °C), Max:98.4 °F (36.9 °C)      General appearance - not in pain or distress  Mental status - alert and oriented  Eyes - pupils equal and reactive, extraocular eye movements intact  Ears - bilateral TM's and external ear canals normal  Nose - normal and patent, no erythema, discharge or polyps  Mouth - mucous membranes moist, pharynx normal without lesions  Neck - supple, no significant adenopathy  Lymphatics - no palpable lymphadenopathy, no hepatosplenomegaly  Chest - clear to auscultation, no wheezes, rales or rhonchi, symmetric air entry  Heart - normal rate, regular rhythm, normal S1, S2, no murmurs, rubs, clicks or gallops  Abdomen - soft, status post total gastrectomy November 15, 2020. Neurological - alert, oriented, normal speech, no focal findings or movement disorder noted  Musculoskeletal - no joint tenderness, deformity or swelling  Extremities - peripheral pulses normal, no pedal edema, no clubbing or cyanosis  Skin - normal coloration and turgor, no rashes, no suspicious skin lesions noted           DATA:      Labs:       CBC:   Recent Labs     03/26/21  2123 03/27/21  0817   WBC 8.4 7.2   HGB 14.9 12.6   HCT 46.0 39.6    224     BMP:   Recent Labs     03/26/21 2123 03/27/21  0817    142   K 2.4* 3.1*   CO2 25 25   BUN 8 5*   CREATININE 0.64 0.48*   LABGLOM >60 >60   GLUCOSE 137* 91     PT/INR: No results for input(s): PROTIME, INR in the last 72 hours. APTT:  Recent Labs     03/27/21  0146 03/27/21  0817   APTT 25.9 >120.0*     LIVER PROFILE:  Recent Labs     03/25/21  1640 03/27/21  0817   AST 35* 57*   ALT 25 26   LABALBU 3.4* 2.4*     CT CHEST PULMONARY EMBOLISM W CONTRAST  Narrative: EXAMINATION:  CTA OF THE CHEST 3/26/2021 10:25 pm    TECHNIQUE:  CTA of the chest was performed after the administration of intravenous  contrast.  Multiplanar reformatted images are provided for review. MIP  images are provided for review. Dose modulation, iterative reconstruction,  and/or weight based adjustment of the mA/kV was utilized to reduce the  radiation dose to as low as reasonably achievable. COMPARISON:  CT PA dated 01/02/2021. HISTORY:  ORDERING SYSTEM PROVIDED HISTORY: elevated dimer  TECHNOLOGIST PROVIDED HISTORY:  elevated dimer  Decision Support Exception->Emergency Medical Condition (MA)  Reason for Exam: elevated ddimer; CP  Acuity: Acute  Type of Exam: Initial    FINDINGS:  Pulmonary Arteries:  There is minimal more distal nonobstructing pulmonary  embolus involving branches of right lower lobe inferiorly and laterally as  well as medial right upper lobe. On the left, there is a single  nonobstructing more distal pulmonary embolus involving the inferolateral left  lower lobe. There is no heart strain. Mediastinum: No evidence of mediastinal lymphadenopathy. The heart and  pericardium demonstrate no acute abnormality. There is no acute abnormality  of the thoracic aorta. Lungs/pleura: The lungs are without acute process. No focal consolidation or  pulmonary edema. No evidence of pleural effusion or pneumothorax. Upper Abdomen: Limited images of the upper abdomen are noted for small hiatal  hernia and postop changes of Qamar-en-Y and cholecystectomy. There is  associated stable dilation of the common duct. No intrahepatic ductal  dilatation. Stable partially visualized left renal cysts. The left kidney  is not seen and known to be in the pelvis. Soft Tissues/Bones: Surrounding osseous and soft tissue structures are  unremarkable. Impression: Nonobstructive small scattered pulmonary emboli involving the bilateral lower  lobes and right upper lobe as described above. No heart strain. Postop changes of Qamar-en-Y and cholecystectomy with hiatal hernia and common  duct dilation none of which appear significantly changed from the prior exam.    Partially visualized multiple left renal cysts, stable. Critical results were called by Dr. Burns Covert to Cande Burnett on  3/26/2021 at 23:11. XR CHEST PORTABLE  Narrative: EXAMINATION:  ONE XRAY VIEW OF THE CHEST    3/26/2021 7:05 pm    COMPARISON:  01/26/2021    HISTORY:  ORDERING SYSTEM PROVIDED HISTORY: chest pain  TECHNOLOGIST PROVIDED HISTORY:  chest pain  Reason for Exam: upr    FINDINGS:  The cardiac size is normal. No acute infiltrates or pleural effusions are  seen. Mild subsegmental atelectasis in the left upper lobe. Pulmonary  vascularity appears normal. There is mild ectasia of the thoracic aorta. No  acute bony abnormalities.  The hilar structures are normal.  Impression: Mild subsegmental atelectasis in the left upper lobe. IMPRESSION:    Primary Problem  Bilateral pulmonary embolism [facility])    Active Hospital Problems    Diagnosis Date Noted    Hypokalemia [E87.6] 03/26/2021    Hypomagnesemia [E83.42] 03/26/2021    Bilateral pulmonary embolism ([address] [I26.99] 03/26/2021    Essential hypertension [I10] 10/28/2019    Hypothyroidism [E03.9]    Bilateral pulmonary embolism, nonocclusive  Gastric carcinoid status post total gastrectomy November 30, 2021. Repeated episodes of intractable nausea and vomiting. Status post multiple hospitalizations. RECOMMENDATIONS:  1. Records and labs and images were reviewed and discussed with the patient. 2. Currently on heparin for nonocclusive pulmonary embolism. 3. Discussed with the patient oral anticoagulation. Although patient had repeated episodes of intractable nausea and vomiting however she states that she is able to take her oral medications with no difficulties. She tolerates oral medications and no problems with throwing up medicines. However am not clear about the effect of total gastrectomy on the absorption of oral anticoagulation. We will check with the pharmacist and will make final decision in regard to oral use. In the interim if released patient will be on Lovenox twice daily. 4. We will follow with you. 5. Patient's questions were answered to the best of her satisfaction and she verbalized full understanding and agreement. Discussed with patient and Nurse.     MD Marcelino [facility]                            This note is created with the assistance of a speech recognition program.  While intending to generate a document that actually reflects the content of the visit, the document can still have some errors including those of syntax and sound a like substitutions which may escape

## 2021-03-27 NOTE — H&P
developed epigastric/chest pain that has progressively worsened. She described it as midsternal with a flipping sensation and burning pressure sensation. There was no radiation, diaphoresis or shortness of breath There is associated nausea and emesis. The emesis occurred a total of 8 times over the past 2 days, that was non-bloody. She denies any  Melena. No fever or chills. She has had long standing dysphagia since surgery. She denies any recent surgery or prolong mobility. The initial work up in the emergency room showed a metabolic panel with a sodium of 139, potassium of 2.4, chloride of 97, co2 of 25, bun of 8 creat of 0.64. Magnesium was 1.4. Cardiac markers without abnormalities x1 with Hs trop of 12. . Hemogram without acute leukocytosis or anemias with a wbc of 8.4 and hgb of 14.9/hct 46.0. However the D-dimer was 4.25. With a cxr that was normal.  CTA was recommended and showed bilateral non-occlusive PE with a RUL PE without heart strain. Past Medical History:     Past Medical History:   Diagnosis Date    Anxiety     Arthritis     knee    Asthma     Colon polyp 02/21/2019    tubular adenoma; hyperplastic polyp    Colon polyps     Cyst of kidney, acquired     bilat.     Fatigue     Hypertension     Hypothyroidism     Neuroendocrine tumor 02/21/2019    of stomach    Obesity     Pharyngoesophageal dysphagia     Vocal cord polyps     Wears glasses         Past Surgical History:     Past Surgical History:   Procedure Laterality Date    CHOLECYSTECTOMY  10/09/2014    COLONOSCOPY  02/21/2019    tubular adenoma; hyperplastic polyp    COLONOSCOPY      over 5 yrs ago per pt with polyps (2-)    CYSTOURETHROSCOPY/STENT REMOVAL  5/3/11    ENDOSCOPIC ULTRASOUND (LOWER) N/A 1/22/2020    ENDOSCOPIC ULTRASOUND WITH POSSIBLE EMR performed by Gilles Boast, MD at [facility]    ERCP      GASTRIC BYPASS SURGERY  11/30/2020    HIP SURGERY Left     soft tissue tumor removal    MD        Allergies:     Erythromycin    Social History:     Tobacco:    reports that she quit smoking about 8 years ago. She has a 25.00 pack-year smoking history. She has never used smokeless tobacco.  Alcohol:      reports current alcohol use. Drug Use:  reports no history of drug use. Family History:     Family History   Problem Relation Age of Onset    High Blood Pressure Mother     High Blood Pressure Sister     Stomach Cancer Sister     Other Sister         epilepsy    No Known Problems Father        Review of Systems:     Positive and Negative as described in HPI. Review of Systems   Constitutional: Negative for chills, diaphoresis and fever. HENT: Negative for congestion and hearing loss. Respiratory: Negative for cough, shortness of breath, wheezing and stridor. Cardiovascular: Negative for chest pain (epgastric pain since eating grape fruit. ), palpitations and leg swelling. Gastrointestinal: Positive for abdominal pain (epigastric region ). Negative for blood in stool, constipation, diarrhea, nausea and vomiting. S/p gastrectomy    Genitourinary: Negative for dysuria and frequency. Musculoskeletal: Negative for myalgias. Skin: Negative for rash. Neurological: Negative for dizziness, seizures and headaches. Psychiatric/Behavioral: The patient is not nervous/anxious. Physical Exam:   BP (!) 174/109   Pulse 106   Temp 98.4 °F (36.9 °C)   Resp 14   LMP 1994   SpO2 94%   Temp (24hrs), Av.4 °F (36.9 °C), Min:98.4 °F (36.9 °C), Max:98.4 °F (36.9 °C)    No results for input(s): POCGLU in the last 72 hours. No intake or output data in the 24 hours ending [id number]    Physical Exam  Vitals signs and nursing note reviewed. Constitutional:       General: She is not in acute distress. Appearance: She is well-developed. She is not diaphoretic. HENT:      Head: Normocephalic and atraumatic.       Right Ear: Hearing normal.      Left Ear: Hearing normal.      Nose: Nose normal. No rhinorrhea. Eyes:      General: Lids are normal.      Extraocular Movements:      Right eye: Normal extraocular motion. Left eye: Normal extraocular motion. Conjunctiva/sclera: Conjunctivae normal.      Right eye: Right conjunctiva is not injected. Left eye: Left conjunctiva is not injected. Pupils: Pupils are equal, round, and reactive to light. Pupils are equal.      Right eye: Pupil is reactive. Left eye: Pupil is reactive. Neck:      Musculoskeletal: Neck supple. Thyroid: No thyromegaly. Vascular: No carotid bruit. Trachea: Trachea and phonation normal. No tracheal deviation. Cardiovascular:      Rate and Rhythm: Normal rate and regular rhythm. Pulses: Normal pulses. Heart sounds: Normal heart sounds. No murmur. Pulmonary:      Effort: Pulmonary effort is normal. No respiratory distress. Breath sounds: Normal breath sounds. No stridor. No decreased breath sounds. Abdominal:      General: Bowel sounds are normal. There is no distension. Palpations: Abdomen is soft. There is no mass. Tenderness: There is no abdominal tenderness. There is no guarding. Musculoskeletal:         General: No tenderness. Skin:     General: Skin is warm and dry. Findings: No erythema, lesion or rash. Neurological:      Mental Status: She is alert and oriented to person, place, and time. She is not disoriented. Cranial Nerves: No cranial nerve deficit. Psychiatric:         Speech: Speech normal.         Behavior: Behavior normal. Behavior is cooperative.          Investigations:      Laboratory Testing:  Recent Results (from the past 24 hour(s))   CBC Auto Differential    Collection Time: 03/26/21  9:23 PM   Result Value Ref Range    WBC 8.4 3.5 - 11.3 k/uL    RBC 5.27 (H) 3.95 - 5.11 m/uL    Hemoglobin 14.9 11.9 - 15.1 g/dL    Hematocrit 46.0 36.3 - 47.1 %    MCV 87.3 82.6 - 102.9 fL    MCH 28.3 25.2 - 33.5 pg MCHC 32.4 28.4 - 34.8 g/dL    RDW 16.5 (H) 11.8 - 14.4 %    Platelets 985 822 - 629 k/uL    MPV 9.6 8.1 - 13.5 fL    NRBC Automated 0.0 0.0 per 100 WBC    Differential Type NOT REPORTED     Seg Neutrophils 66 (H) 36 - 65 %    Lymphocytes 22 (L) 24 - 43 %    Monocytes 8 3 - 12 %    Eosinophils % 3 1 - 4 %    Basophils 1 0 - 2 %    Immature Granulocytes 0 0 %    Segs Absolute 5.62 1.50 - 8.10 k/uL    Absolute Lymph # 1.88 1.10 - 3.70 k/uL    Absolute Mono # 0.63 0.10 - 1.20 k/uL    Absolute Eos # 0.23 0.00 - 0.44 k/uL    Basophils Absolute 0.06 0.00 - 0.20 k/uL    Absolute Immature Granulocyte <0.03 0.00 - 0.30 k/uL    WBC Morphology NOT REPORTED     RBC Morphology ANISOCYTOSIS PRESENT     Platelet Estimate NOT REPORTED    Basic Metabolic Panel w/ Reflex to MG    Collection Time: 03/26/21  9:23 PM   Result Value Ref Range    Glucose 137 (H) 70 - 99 mg/dL    BUN 8 6 - 20 mg/dL    CREATININE 0.64 0.50 - 0.90 mg/dL    Bun/Cre Ratio NOT REPORTED 9 - 20    Calcium 9.3 8.6 - 10.4 mg/dL    Sodium 139 135 - 144 mmol/L    Potassium 2.4 (LL) 3.7 - 5.3 mmol/L    Chloride 97 (L) 98 - 107 mmol/L    CO2 25 20 - 31 mmol/L    Anion Gap 17 9 - 17 mmol/L    GFR Non-African American >60 >60 mL/min    GFR African American >60 >60 mL/min    GFR Comment          GFR Staging NOT REPORTED    Troponin    Collection Time: 03/26/21  9:23 PM   Result Value Ref Range    Troponin, High Sensitivity 9 0 - 14 ng/L    Troponin T NOT REPORTED <0.03 ng/mL    Troponin Interp NOT REPORTED    D-Dimer, Quantitative    Collection Time: 03/26/21  9:23 PM   Result Value Ref Range    D-Dimer, Quant 4.25 mg/L FEU   TSH with Reflex    Collection Time: 03/26/21  9:23 PM   Result Value Ref Range    TSH 0.03 (L) 0.30 - 5.00 mIU/L   T4, Free    Collection Time: 03/26/21  9:23 PM   Result Value Ref Range    Thyroxine, Free 1.68 0.93 - 1.70 ng/dL   Magnesium    Collection Time: 03/26/21  9:23 PM   Result Value Ref Range    Magnesium 1.4 (L) 1.6 - 2.6 mg/dL   Troponin Collection Time: 03/26/21 10:34 PM   Result Value Ref Range    Troponin, High Sensitivity 12 0 - 14 ng/L    Troponin T NOT REPORTED <0.03 ng/mL    Troponin Interp NOT REPORTED        Imaging/Diagnostics:  Xr Chest Portable    Result Date: 3/26/2021  Mild subsegmental atelectasis in the left upper lobe. Assessment :      Hospital Problems           Last Modified POA    * (Principal) Chest pain 3/26/2021 Yes    Hypokalemia 3/26/2021 Yes    Hypomagnesemia 3/26/2021 Yes    Hypothyroidism 3/26/2021 Yes    Essential hypertension 3/26/2021 Yes          Plan:     Patient status inpatient in the Progressive Unit/Step down    Bilateral lower lobe, RUL  pulmonary emboli- Heparin drip started, monitor hh. No evidence of Right heart strain. Venous duplex bilateral lower extremities. Covid swab. Chest Pain/Epigastric pain- Check CTA of chest + PE. Cardiac enzymes negative at this time x1, will continue to trend out. If there is rise in troponin or ekg changes, consult Cardiology  Hypokalemia- replace magnesium and then potassium, absorption is an issue with the lack of stomach. Consider the liquid form. Replacement with sliding scale. On hctz- will hold at this time, was recently decreased per pcp. Hypomagnesemia replacement IV and monitor. Continue with sliding scale coverage. Hypothyroid- PCP just decreased the synthroid to 100 mcg and plans for follow up labs in 6 weeks. Essential hypertension- HCTZ and lopressor were recently decreased per PCP secondary to hypotension. Will monitor. At this time hold hctz given electrolyte imbalance. GI proph  DVT proph     Consultations:   IP CONSULT TO HOSPITALIST  IP CONSULT TO GI     Patient is admitted as inpatient status because of co-morbidities listed above, severity of signs and symptoms as outlined, requirement for current medical therapies and most importantly because of direct risk to patient if care not provided in a hospital setting.   Expected length of stay > 48 hours.     RYAN Condon - [place]  3/26/2021  11:39 PM    Copy sent to Dr. Levy Oshea MD

## 2021-03-27 NOTE — CONSULTS
General Surgery   Consultation        PATIENT NAME: Marlene Da Silva   YOB: 1960  MRN: [id number]    ADMISSION DATE: 3/26/2021  8:26 PM     Consulting Physician: Dr. Carmita Patel Physician: Dr. Umu Mathew: 3/27/2021    Consult regarding: Vomiting with history of laparoscopic gastrectomy with RNY reconstruction at [facility] 11/2020; PE's and [facility] recommendations s/p surgery      Cc: Epigastric/chest pain     HPI:  The patient is a 62 y.o. female  who presented to the emergency department yesterday with epigastric/chest pain, as well as some nausea and vomiting. CT PE performed yesterday revealed nonobstructive small scattered pulmonary emboli involving the bilateral lower lobes and right upper lobe, without evidence of right heart strain. Patient has a history of laparoscopic gastrectomy with RNY reconstruction for a carcinoid tumor at [facility] 11/2020. Patient states that she has had chronic nausea and vomiting since. She has followed up multiple times since her surgery and reports that she has had an EGD since the operation. Her most recent follow up appointment was 3/12/2021. She states that she was on Reglan, and that it was controlling her symptoms. However, this was discontinued at her last visit. She states that zofran does help her some. She reports some continued epigastric and chest discomfort. She states she feels as if she is experiencing some reflux. Patient is on protonix. She states that she has not had any vomiting since being admitted to the floor, and was nauseated only briefly this morning. She has not passed flatus today, last bowel movement was on Wednesday. Past Medical History:        Diagnosis Date    Anxiety     Arthritis     knee    Asthma     Colon polyp 02/21/2019    tubular adenoma; hyperplastic polyp    Colon polyps     Cyst of kidney, acquired     bilat.     Fatigue     Hypertension     Hypothyroidism     Neuroendocrine tumor 02/21/2019    of stomach    Obesity     Pharyngoesophageal dysphagia     Vocal cord polyps     Wears glasses        Past Surgical History:        Procedure Laterality Date    CHOLECYSTECTOMY  10/09/2014    COLONOSCOPY  02/21/2019    tubular adenoma; hyperplastic polyp    COLONOSCOPY      over 5 yrs ago per pt with polyps (2-)    CYSTOURETHROSCOPY/STENT REMOVAL  5/3/11    ENDOSCOPIC ULTRASOUND (LOWER) N/A 1/22/2020    ENDOSCOPIC ULTRASOUND WITH POSSIBLE EMR performed by Heide Pitt MD at [facility]    ERCP     [place]  11/30/2020    HIP SURGERY Left     soft tissue tumor removal    THROAT SURGERY      vocal cord polyps removed    UPPER GASTROINTESTINAL ENDOSCOPY  02/21/2019    Neuroendocrine tumor    UPPER GASTROINTESTINAL ENDOSCOPY  09/23/2019    UPPER GASTROINTESTINAL ENDOSCOPY N/A 9/23/2019    EGD BIOPSY performed by Miguel Hidalgo MD at [address] [place] [address] 10/23/2019    EGD W/ EMR performed by Heide Pitt MD at [address] [place] [address] N/A 10/29/2019    EGD CONTROL HEMORRHAGE performed by Kacy Torres MD at [facility]       Medications Prior to Admission:   Medications Prior to Admission: vitamin D 25 MCG (1000 UT) CAPS, Take 1 capsule by mouth daily  Folic Acid-Vit B4-XHP D60 2.2-25-0.5 MG TABS, Take 1 tablet by mouth daily  calcium carbonate (ANTACID) 500 MG chewable tablet, Take 1 tablet by mouth daily (Patient not taking: Reported on 3/25/2021)  levothyroxine (SYNTHROID) 100 MCG tablet, Take 1 tablet by mouth daily  potassium chloride (KLOR-CON M) 20 MEQ extended release tablet, Take 2 tablets by mouth daily  hydroCHLOROthiazide (HYDRODIURIL) 25 MG tablet, TAKE 1 TABLET BY MOUTH EVERY DAY (Patient taking differently: 12.5 mg TAKE 1/2 TABLET BY MOUTH EVERY DAY)  ondansetron (ZOFRAN) 4 MG tablet, Take 4 mg by mouth every 8 hours as needed for Nausea or Vomiting  pantoprazole (PROTONIX) 40 MG tablet, TAKE 1 TABLET BY MOUTH EVERY DAY  desonide (DESOWEN) 0.05 % cream, Apply topically 2 times daily.   Multiple Vitamin (MULTI-DAY VITAMINS PO), Take by mouth  vitamin B-12 (CYANOCOBALAMIN) 500 MCG tablet, Take 500 mcg by mouth daily    Allergies:  Erythromycin    Social History:   Social History     Socioeconomic History    Marital status: Single     Spouse name: Not on file    Number of children: Not on file    Years of education: Not on file    Highest education level: Not on file   Occupational History    Not on file   Social Needs    Financial resource strain: Not on file    Food insecurity     Worry: Not on file     Inability: Not on file   [employer] needs     Medical: Not on file     Non-medical: Not on file   Tobacco Use    Smoking status: Former Smoker     Packs/day: 1.00     Years: 25.00     Pack years: 25.00     Quit date: 2012     Years since quittin.8    Smokeless tobacco: Never Used    Tobacco comment: quit 2 years   Substance and Sexual Activity    Alcohol use: Yes     Comment: 3 times a month    Drug use: No    Sexual activity: Not on file   Lifestyle    Physical activity     Days per week: Not on file     Minutes per session: Not on file    Stress: Not on file   Relationships    Social connections     Talks on phone: Not on file     Gets together: Not on file     Attends Tenriism service: Not on file     Active member of club or organization: Not on file     Attends meetings of clubs or organizations: Not on file     Relationship status: Not on file    Intimate partner violence     Fear of current or ex partner: Not on file     Emotionally abused: Not on file     Physically abused: Not on file     Forced sexual activity: Not on file   Other Topics Concern    Not on file   Social History Narrative    Not on file       Family History:       Problem Relation Age of Onset    High Blood Pressure Mother     High Blood Pressure Sister     Stomach Cancer Sister     Other Sister epilepsy    No Known Problems Father        Review of Systems:    Gen: No fever or chills  Eyes: No blurry vision or conjunctivitis   ENT: No sinus congestion or sore throat  CV: No chest pain or dyspnea on exertion  Pulm: No cough or shortness of breath  GI: Positive for epigastric/chest discomfort. Mild nausea.  No vomiting  Renal: No dysuria or hematuria  Endo: No excessive sweating or cold intolerance  Heme/Onc: No excessive bruising or swollen lymph nodes  Neuro: No difficulty with speech or acute extremity weakness  Skin: No rashes or ulcers  Musculoskeletal: Denies muscle, joint, back pain      PHYSICAL EXAM:    VITALS:  /86   Pulse 59   Temp 97.6 °F (36.4 °C) (Temporal)   Resp 15   Ht 5' 5\" (1.651 m)   Wt 192 lb (87.1 kg)   LMP 01/01/1994   SpO2 91%   BMI 31.95 kg/m²   INTAKE/OUTPUT:     Intake/Output Summary (Last 24 hours) at 3/27/2021 1027  Last data filed at 3/27/2021 0636  Gross per 24 hour   Intake 586 ml   Output --   Net 586 ml       CONSTITUTIONAL: awake, alert, cooperative, no apparent distress  HEAD: atraumatic, normocephalic  EYES: sclera clear, pupils equal and reactive to light  ENT: ears are symmetric, nares patent, moist mucous membranes  NECK: Supple, symmetrical, trachea midline  LUNGS: no respiratory distress, no audible wheezing  CARDIOVASCULAR: regular rate   ABDOMEN: soft, nontender, nondistended, non-peritoneal on exam  MUSCULOSKELETAL: full range of motion noted  NEUROLOGIC: Awake, alert, oriented to name, place and time  EXTREMITIES: no edema, cyanosis or clubbing  SKIN: normal coloration and turgor      CBC with Differential:    Lab Results   Component Value Date    WBC 7.2 03/27/2021    RBC 4.42 03/27/2021    HGB 12.6 03/27/2021    HCT 39.6 03/27/2021     03/27/2021    MCV 89.6 03/27/2021    MCH 28.5 03/27/2021    MCHC 31.8 03/27/2021    RDW 17.0 03/27/2021    LYMPHOPCT 22 03/26/2021    MONOPCT 8 03/26/2021    BASOPCT 1 03/26/2021    MONOSABS 0.63 03/26/2021    LYMPHSABS 1.88 03/26/2021    EOSABS 0.23 03/26/2021    BASOSABS 0.06 03/26/2021    DIFFTYPE NOT REPORTED 03/26/2021     BMP:    Lab Results   Component Value Date     03/27/2021    K 3.1 03/27/2021     03/27/2021    CO2 25 03/27/2021    BUN 5 03/27/2021    LABALBU 2.4 03/27/2021    CREATININE 0.48 03/27/2021    CALCIUM 7.9 03/27/2021    GFRAA >60 03/27/2021    LABGLOM >60 03/27/2021    GLUCOSE 91 03/27/2021     Magnesium:    Lab Results   Component Value Date    MG 1.7 03/27/2021     Phosphorus:    Lab Results   Component Value Date    PHOS 3.0 03/27/2021       Pertinent Radiology:   Xr Chest Portable    Result Date: 3/26/2021  EXAMINATION: ONE XRAY VIEW OF THE CHEST 3/26/2021 7:05 pm COMPARISON: 01/26/2021 HISTORY: ORDERING SYSTEM PROVIDED HISTORY: chest pain TECHNOLOGIST PROVIDED HISTORY: chest pain Reason for Exam: upr FINDINGS: The cardiac size is normal. No acute infiltrates or pleural effusions are seen. Mild subsegmental atelectasis in the left upper lobe. Pulmonary vascularity appears normal. There is mild ectasia of the thoracic aorta. No acute bony abnormalities. The hilar structures are normal.     Mild subsegmental atelectasis in the left upper lobe. Ct Chest Pulmonary Embolism W Contrast    Result Date: 3/26/2021  EXAMINATION: CTA OF THE CHEST 3/26/2021 10:25 pm TECHNIQUE: CTA of the chest was performed after the administration of intravenous contrast.  Multiplanar reformatted images are provided for review. MIP images are provided for review. Dose modulation, iterative reconstruction, and/or weight based adjustment of the mA/kV was utilized to reduce the radiation dose to as low as reasonably achievable. COMPARISON: CT PA dated 01/02/2021.  HISTORY: ORDERING SYSTEM PROVIDED HISTORY: elevated dimer TECHNOLOGIST PROVIDED HISTORY: elevated dimer Decision Support Exception->Emergency Medical Condition (MA) Reason for Exam: elevated ddimer; CP Acuity: Acute Type of Exam: Initial FINDINGS: Pulmonary Arteries: There is minimal more distal nonobstructing pulmonary embolus involving branches of right lower lobe inferiorly and laterally as well as medial right upper lobe. On the left, there is a single nonobstructing more distal pulmonary embolus involving the inferolateral left lower lobe. There is no heart strain. Mediastinum: No evidence of mediastinal lymphadenopathy. The heart and pericardium demonstrate no acute abnormality. There is no acute abnormality of the thoracic aorta. Lungs/pleura: The lungs are without acute process. No focal consolidation or pulmonary edema. No evidence of pleural effusion or pneumothorax. Upper Abdomen: Limited images of the upper abdomen are noted for small hiatal hernia and postop changes of Qamar-en-Y and cholecystectomy. There is associated stable dilation of the common duct. No intrahepatic ductal dilatation. Stable partially visualized left renal cysts. The left kidney is not seen and known to be in the pelvis. Soft Tissues/Bones: Surrounding osseous and soft tissue structures are unremarkable. Nonobstructive small scattered pulmonary emboli involving the bilateral lower lobes and right upper lobe as described above. No heart strain. Postop changes of Qamar-en-Y and cholecystectomy with hiatal hernia and common duct dilation none of which appear significantly changed from the prior exam. Partially visualized multiple left renal cysts, stable. Critical results were called by Dr. Abdi Barth to Leopoldo Anderson on 3/26/2021 at 23:11.         ASSESSMENT   62year old female with bilateral PE's and nausea/vomiting with history of laparoscopic gastrectomy with RNY reconstruction for a carcinoid tumor, performed 11/2020 at [facility]    1. Continue zofran as needed for nausea and vomiting. May resume reglan if nausea and vomiting is not controlled with zofran.    2. Okay for a bariatric diet from a surgery standpoint. 3. Surgical history reviewed. Patient okay for PO long term anticoagulation. 4. Recommend continued follow up in [facility]; next appointment in May per patient   5.  Medical management per primary     Patient and plan discussed with attending surgeon Dr. Eric Santoro DO PGY-1  Electronically signed by Nishant Myers DO on 3/27/2021 at 10:27 AM

## 2021-03-27 NOTE — FLOWSHEET NOTE
Assessment: Patient is a 62 y.o. female who arrived to the ED due to \"chest pain and abdominal pain. \" Patient was discovered to have \"bilateral PE.\" Patient was resting in hospital bed, when  visited. Intervention:  visited patient per initial rounding visits in the ED.  introduced herself and learned about patient's symptoms. Patient indicated that she is feeling better than she felt when she first arrived. \" Patient indicated no needs at time of visit.  encouraged her to reach out to spiritual care for further support throughout her hospitalization. Outcome: Patient was receptive of 's visit and support. Plan: Chaplains can make follow-up visit, per request. Pippa Arias can be reached 24/7 via [email or web address]. Bro Mosquera     03/27/21 0038   Encounter Summary   Services provided to: Patient   Referral/Consult From: Rounding  (ED Rounding)   Support System Family members   Place of [address] Visiting   (3/26/2021)   Complexity of Encounter Low   Length of Encounter 15 minutes   Spiritual Assessment Completed Yes   Routine   Type Initial   Spiritual/Anabaptism   Type Spiritual support   Assessment Calm; Approachable;Coping   Intervention Sustaining presence/ Ministry of presence   Outcome Receptive

## 2021-03-27 NOTE — PROGRESS NOTES
Speech Language Pathology  Facility/Department: [facility] 4B STEPDOWN   CLINICAL BEDSIDE SWALLOW EVALUATION    NAME: Stan Renteria  : 1960  MRN: [id number]    ADMISSION DATE: 3/26/2021     ADMITTING DIAGNOSIS: has Asthma; Anxiety; Obesity; Hyperlipidemia; Hypothyroidism; Colon polyps; Vertigo; Mass of left hip region; Fatigue; Pharyngoesophageal dysphagia; Colon polyp; Neuroendocrine tumor; Chest pain; Shortness of breath; Anemia; GI bleed; Essential hypertension; Neuroendocrine neoplasm of stomach; Polyp, stomach; Melena; Gastric ulcer with hemorrhage; Constipation; Hypokalemia; Hypomagnesemia; and Bilateral pulmonary embolism ([address] on their problem list.     ONSET DATE: 2021      Recent Chest Xray/CT of Chest:   CT Chest - 3/26/2021  Nonobstructive small scattered pulmonary emboli involving the bilateral lower   lobes and right upper lobe as described above.  No heart strain.       Postop changes of Qamar-en-Y and cholecystectomy with hiatal hernia and common   duct dilation none of which appear significantly changed from the prior exam.       Partially visualized multiple left renal cysts, stable         Date of Eval: 3/27/2021  Evaluating Therapist: Vinny Hernandez    Current Diet level:   NPO       Primary Complaint    Stan Renteria is a 61 yo woman admitted 3/26/21 with epigastric mid-chest pain. In 2020, pt had a total gastrectomy d/t carcinoid tumor (neuroendocrine) and has esophahgeal dysphagia requiring her to eat small meals, not take liquids with foods, etc. Pt is on Protonix for GERD. She denies coughing/choking on liquids or food as she is swallowing them.            IMPRESSIONS  1) WFL Oral-Pharyngeal Swallow as evidenced by no overt clinical s/s of aspiration    2) Ongoing esophageal dysphagia d/t total gastrectomy            RECOMMENDATIONS  1) GI Consult for esophageal dysphagia; may want to consider EGD and/or Esophogram    2) Pt appears safe for PO as tolerated (regular and thin) when ordered by MD    3) Skilled ST does not appear warranted at this time. Pain:  Pain Assessment  Pain Level: 8    Reason for Referral  Rebecca Hoffmann was referred for a bedside swallow evaluation to assess the efficiency of her swallow function, identify signs and symptoms of aspiration and make recommendations regarding safe dietary consistencies, effective compensatory strategies, and safe eating environment. Impression  Dysphagia Diagnosis: Swallow function appears grossly intact  Dysphagia Outcome Severity Scale: Level 6: Within functional limits/Modified independence     Treatment Plan  Requires SLP Intervention: No        Referral To: GI    Recommended Diet and Intervention  Diet Solids Recommendation: Regular  Liquid Consistency Recommendation: Thin  Recommended Form of Meds: PO          Oral Motor Deficits  Oral/Motor  Oral Motor:  Within functional limits    Oral Phase Dysfunction  Oral Phase  Oral Phase: WFL     Indicators of Pharyngeal Phase Dysfunction   Pharyngeal Phase  Pharyngeal Phase: WFL    Prognosis  Prognosis  Prognosis for safe diet advancement: good  Individuals consulted  Consulted and agree with results and recommendations: Patient;RN    Education  Patient Education Response: Verbalizes understanding             Therapy Time  SLP Individual Minutes  Time In: 0299  Time Out: 1054  Minutes: [address] ONE[address] Araiza  3/27/2021 10:54 AM

## 2021-03-27 NOTE — ED PROVIDER NOTES
[facility]  ED  Emergency Department  Senior Resident Attestation     Primary Care Physician  Adeline Hartman MD    I performed a history and physical examination of the patient and discussed management with the ramone resident. I reviewed the ramone residents note and agree with the documented findings and plan of care. Any areas of disagreement are noted on the chart. Case was then discussed with Faculty Attending Supervisor for additional medical management. PERTINENT ATTENDING PHYSICIAN COMMENTS:    HISTORY:   Clara Mcgee is a 62 y.o. female who  has a past medical history of Anxiety, Arthritis, Asthma, Colon polyp (02/21/2019), Colon polyps, Cyst of kidney, acquired, Fatigue, Hypertension, Hypothyroidism, Neuroendocrine tumor (02/21/2019), Obesity, Pharyngoesophageal dysphagia, Vocal cord polyps, and Wears glasses. and presents with complaint of midepigastric chest pain, patient associates this with [facility] with removal of stomach, follows up with [facility] clinic states that she is cancer free from March 12 of this year. Patient states this feels like her heartburn but usually goes away with Maalox, however patient has not had any Maalox. Patient states that she takes all of her medications as prescribed. Patient is obviously uncomfortable, laying flat, laying still, not moving pointing to her chest and groaning. Patient is alert and oriented answering questions appropriately. Patient states is been going on for 3 days duration, worsening in nature. Patient states that she has not been able to eat or drink much in the last few days. Patient denies any diarrhea but does admit to some vomiting. Patient denies any shortness of breath. Patient denies any history of cardiac disease, blood clots in the legs of the lungs.     PHYSICAL:    ,  Pulse: 119, Resp: 20, BP: (!) 166/97,    Gen: Non-toxic, Afebrile  Neck: Supple  Cards: Regular rate and rhythm  Pulm: Lung sounds clear to auscultation  Abdomen: Soft, non-tender, non-distended, no rigidity no guarding  Skin: warm, dry  Extremities: pulses 2+ radial / dorsalis pedis, no clubbing, cyanosis, edema    IMPRESSION:   Uncomfortable appearing adult female, laying flat, laying still, not moving, but alert and oriented answering questions appropriately complaining of midepigastric chest pain, begging for Maalox. Patient states that she went to the store but could not find any medications which is Maalox to take. There is concern for possible cardiac involvement to include ACS, aortic dissection, abdominal aortic aneurysm, pulmonary embolus, sequelae of carcinoid tumor. Plan to do an extensive work-up to include ACS, chest x-ray to assess for any widened mediastinum, troponin and D-dimer. CT chest abdomen pelvis with concern for vascular involvement would be performed if anything comes back abnormal.  Patient has good mentation, stable vital signs, slightly tachycardic, however she is in pain. Patient has not also eaten or drank in the last 3 days with limited attempts due to her discomfort, this could likely also be contributive to tachycardia. Disposition will depend upon patient's work-up. PLAN:   ACS work-up  Concern for PE-order D-dimer  Possible CT scan depending upon laboratory values.   Treat patient's pain with Maalox, viscous lidocaine, opioid pain medication IV, Zofran    CRITICAL CARE TIME:    None    Shasta Durbin DO  Senior Resident Physician    (Please note that portions of this note were completed with a voice recognition program.  Efforts were made to edit the dictations but occasionally words are mis-transcribed.)       Shasta Durbin DO  Resident  03/27/21 6426

## 2021-03-28 VITALS
WEIGHT: 201.5 LBS | RESPIRATION RATE: 16 BRPM | HEIGHT: 65 IN | OXYGEN SATURATION: 94 % | HEART RATE: 72 BPM | TEMPERATURE: 98.1 F | SYSTOLIC BLOOD PRESSURE: 126 MMHG | BODY MASS INDEX: 33.57 KG/M2 | DIASTOLIC BLOOD PRESSURE: 86 MMHG

## 2021-03-28 LAB
ANION GAP SERPL CALCULATED.3IONS-SCNC: 11 MMOL/L (ref 9–17)
BUN BLDV-MCNC: 4 MG/DL (ref 6–20)
BUN/CREAT BLD: ABNORMAL (ref 9–20)
CALCIUM SERPL-MCNC: 8.3 MG/DL (ref 8.6–10.4)
CHLORIDE BLD-SCNC: 105 MMOL/L (ref 98–107)
CO2: 20 MMOL/L (ref 20–31)
CREAT SERPL-MCNC: 0.43 MG/DL (ref 0.5–0.9)
GFR AFRICAN AMERICAN: >60 ML/MIN
GFR NON-AFRICAN AMERICAN: >60 ML/MIN
GFR SERPL CREATININE-BSD FRML MDRD: ABNORMAL ML/MIN/{1.73_M2}
GFR SERPL CREATININE-BSD FRML MDRD: ABNORMAL ML/MIN/{1.73_M2}
GLUCOSE BLD-MCNC: 84 MG/DL (ref 70–99)
HCT VFR BLD CALC: 37.8 % (ref 36.3–47.1)
HEMOGLOBIN: 12.8 G/DL (ref 11.9–15.1)
MCH RBC QN AUTO: 28.5 PG (ref 25.2–33.5)
MCHC RBC AUTO-ENTMCNC: 33.9 G/DL (ref 28.4–34.8)
MCV RBC AUTO: 84.2 FL (ref 82.6–102.9)
NRBC AUTOMATED: 0 PER 100 WBC
PARTIAL THROMBOPLASTIN TIME: 21.6 SEC (ref 20.5–30.5)
PDW BLD-RTO: 16.8 % (ref 11.8–14.4)
PLATELET # BLD: 153 K/UL (ref 138–453)
PMV BLD AUTO: 10.1 FL (ref 8.1–13.5)
POTASSIUM SERPL-SCNC: 3.2 MMOL/L (ref 3.7–5.3)
RBC # BLD: 4.49 M/UL (ref 3.95–5.11)
SODIUM BLD-SCNC: 136 MMOL/L (ref 135–144)
WBC # BLD: 4.9 K/UL (ref 3.5–11.3)

## 2021-03-28 PROCEDURE — 2580000003 HC RX 258: Performed by: STUDENT IN AN ORGANIZED HEALTH CARE EDUCATION/TRAINING PROGRAM

## 2021-03-28 PROCEDURE — 6360000002 HC RX W HCPCS: Performed by: INTERNAL MEDICINE

## 2021-03-28 PROCEDURE — 36415 COLL VENOUS BLD VENIPUNCTURE: CPT

## 2021-03-28 PROCEDURE — 6370000000 HC RX 637 (ALT 250 FOR IP): Performed by: INTERNAL MEDICINE

## 2021-03-28 PROCEDURE — 85027 COMPLETE CBC AUTOMATED: CPT

## 2021-03-28 PROCEDURE — 6370000000 HC RX 637 (ALT 250 FOR IP): Performed by: STUDENT IN AN ORGANIZED HEALTH CARE EDUCATION/TRAINING PROGRAM

## 2021-03-28 PROCEDURE — 6370000000 HC RX 637 (ALT 250 FOR IP): Performed by: NURSE PRACTITIONER

## 2021-03-28 PROCEDURE — 2580000003 HC RX 258: Performed by: NURSE PRACTITIONER

## 2021-03-28 PROCEDURE — 99253 IP/OBS CNSLTJ NEW/EST LOW 45: CPT | Performed by: SURGERY

## 2021-03-28 PROCEDURE — 99231 SBSQ HOSP IP/OBS SF/LOW 25: CPT | Performed by: INTERNAL MEDICINE

## 2021-03-28 PROCEDURE — 80048 BASIC METABOLIC PNL TOTAL CA: CPT

## 2021-03-28 PROCEDURE — 2500000003 HC RX 250 WO HCPCS: Performed by: NURSE PRACTITIONER

## 2021-03-28 PROCEDURE — 85730 THROMBOPLASTIN TIME PARTIAL: CPT

## 2021-03-28 PROCEDURE — 99232 SBSQ HOSP IP/OBS MODERATE 35: CPT | Performed by: INTERNAL MEDICINE

## 2021-03-28 PROCEDURE — 6360000002 HC RX W HCPCS: Performed by: NURSE PRACTITIONER

## 2021-03-28 PROCEDURE — 94761 N-INVAS EAR/PLS OXIMETRY MLT: CPT

## 2021-03-28 RX ORDER — METOCLOPRAMIDE HYDROCHLORIDE 5 MG/ML
10 INJECTION INTRAMUSCULAR; INTRAVENOUS EVERY 6 HOURS PRN
Status: DISCONTINUED | OUTPATIENT
Start: 2021-03-28 | End: 2021-03-28 | Stop reason: HOSPADM

## 2021-03-28 RX ORDER — SUCRALFATE 1 G/1
1 TABLET ORAL 4 TIMES DAILY
Qty: 120 TABLET | Refills: 3 | Status: ON HOLD | OUTPATIENT
Start: 2021-03-28 | End: 2021-04-29 | Stop reason: SDUPTHER

## 2021-03-28 RX ORDER — SUCRALFATE 1 G/1
1 TABLET ORAL EVERY 6 HOURS SCHEDULED
Status: DISCONTINUED | OUTPATIENT
Start: 2021-03-28 | End: 2021-03-28 | Stop reason: HOSPADM

## 2021-03-28 RX ORDER — METOCLOPRAMIDE 10 MG/1
10 TABLET ORAL 3 TIMES DAILY PRN
Qty: 30 TABLET | Refills: 0 | Status: SHIPPED | OUTPATIENT
Start: 2021-03-28 | End: 2021-05-18 | Stop reason: SDUPTHER

## 2021-03-28 RX ADMIN — METOCLOPRAMIDE 10 MG: 5 INJECTION, SOLUTION INTRAMUSCULAR; INTRAVENOUS at 06:06

## 2021-03-28 RX ADMIN — FAMOTIDINE 20 MG: 10 INJECTION INTRAVENOUS at 07:34

## 2021-03-28 RX ADMIN — Medication 1000 UNITS: at 07:34

## 2021-03-28 RX ADMIN — SODIUM CHLORIDE, PRESERVATIVE FREE 10 ML: 5 INJECTION INTRAVENOUS at 07:33

## 2021-03-28 RX ADMIN — APIXABAN 10 MG: 5 TABLET, FILM COATED ORAL at 12:33

## 2021-03-28 RX ADMIN — Medication 500 MCG: at 07:34

## 2021-03-28 RX ADMIN — SODIUM CHLORIDE: 9 INJECTION, SOLUTION INTRAVENOUS at 00:30

## 2021-03-28 RX ADMIN — Medication 1 TABLET: at 07:34

## 2021-03-28 RX ADMIN — ONDANSETRON 4 MG: 2 INJECTION INTRAMUSCULAR; INTRAVENOUS at 07:46

## 2021-03-28 RX ADMIN — PANTOPRAZOLE SODIUM 40 MG: 40 TABLET, DELAYED RELEASE ORAL at 10:39

## 2021-03-28 RX ADMIN — ANTACID TABLETS 500 MG: 500 TABLET, CHEWABLE ORAL at 07:34

## 2021-03-28 RX ADMIN — SUCRALFATE 1 G: 1 TABLET ORAL at 12:32

## 2021-03-28 RX ADMIN — LEVOTHYROXINE SODIUM 88 MCG: 88 TABLET ORAL at 07:34

## 2021-03-28 RX ADMIN — POTASSIUM CHLORIDE 40 MEQ: 1500 TABLET, EXTENDED RELEASE ORAL at 07:34

## 2021-03-28 ASSESSMENT — PAIN SCALES - GENERAL
PAINLEVEL_OUTOF10: 0
PAINLEVEL_OUTOF10: 0

## 2021-03-28 NOTE — DISCHARGE INSTR - COC
Continuity of Care Form    Patient Name: Deneen Bennett   :  1960  MRN:  [id number]    Admit date:  3/26/2021  Discharge date:  ***    Code Status Order: Full Code   Advance Directives:     Admitting Physician:  Agustina Gonzalez DO  PCP: Tahmina Gardner MD    Discharging Nurse: [facility] Unit/Room#: 9761/0285-80  Discharging Unit Phone Number: ***    Emergency Contact:   Extended Emergency Contact Information  Primary Emergency Contact: Skyler Rands *Janeth marquis  Address: [place] [address] Phone: [phone]  Work Phone: [phone]  Mobile Phone: [phone]  Relation: Brother/Sister  Secondary Emergency Contact: chayo urrutia  Address: N/A  Home Phone: [phone]  Mobile Phone: [phone]  Relation: Brother/Sister   needed?  No    Past Surgical History:  Past Surgical History:   Procedure Laterality Date    CHOLECYSTECTOMY  10/09/2014    COLONOSCOPY  2019    tubular adenoma; hyperplastic polyp    COLONOSCOPY      over 5 yrs ago per pt with polyps (2-)    CYSTOURETHROSCOPY/STENT REMOVAL  5/3/11    ENDOSCOPIC ULTRASOUND (LOWER) N/A 2020    ENDOSCOPIC ULTRASOUND WITH POSSIBLE EMR performed by Jeremias Stratton MD at [facility]  2020    HIP SURGERY Left     soft tissue tumor removal    THROAT SURGERY      vocal cord polyps removed    UPPER GASTROINTESTINAL ENDOSCOPY  2019    Neuroendocrine tumor    UPPER GASTROINTESTINAL ENDOSCOPY  2019    UPPER GASTROINTESTINAL ENDOSCOPY N/A 2019    EGD BIOPSY performed by Melisa Scales MD at [address] [place] [address] 10/23/2019    EGD W/ EMR performed by Jeremias Stratton MD at [address] [place] [address] N/A 10/29/2019    EGD CONTROL HEMORRHAGE performed by Fouzia Alfaro MD at [facility]       Immunization History:   Immunization History   Administered Date(s) Administered    COVID-19, Pfizer, PF, 30mcg/0.3mL 2021    Influenza, Quadv, IM, PF (6 mo and older Fluzone, Flulaval, Fluarix, and 3 yrs and older Afluria) 10/31/2019       Active Problems:  Patient Active Problem List   Diagnosis Code    Asthma J45.909    Anxiety F41.9    Obesity E66.9    Hyperlipidemia E78.5    Hypothyroidism E03.9    Colon polyps K63.5    Vertigo R42    Mass of left hip region R22.42    Fatigue R53.83    Pharyngoesophageal dysphagia R13.14    Colon polyp K63.5    Neuroendocrine tumor D3A.8    Chest pain R07.9    Shortness of breath R06.02    Anemia D64.9    GI bleed K92.2    Essential hypertension I10    Neuroendocrine neoplasm of stomach D3A.8    Polyp, stomach K31.7    Melena K92.1    Gastric ulcer with hemorrhage K25.4    Constipation K59.00    Hypokalemia E87.6    Hypomagnesemia E83.42    Acute pulmonary embolism without acute cor pulmonale (HCC) I26.99    Abdominal pain, epigastric R10.13    Elevated d-dimer R79.89    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    Intractable vomiting R11.10       Isolation/Infection:   Isolation          No Isolation        Patient Infection Status     Infection Onset Added Last Indicated Last Indicated By Review Planned Expiration Resolved Resolved By    None active    Resolved    COVID-19 Rule Out 21 COVID-19, Rapid (Ordered)   21 Rule-Out Test Resulted          Nurse Assessment:  Last Vital Signs: /85   Pulse 64   Temp 98.3 °F (36.8 °C) (Oral)   Resp 16   Ht 5' 5\" (1.651 m)   Wt 201 lb 8 oz (91.4 kg)   LMP 1994   SpO2 94%   BMI 33.53 kg/m²     Last documented pain score (0-10 scale): Pain Level: 7  Last Weight:   Wt Readings from Last 1 Encounters:   21 201 lb 8 oz (91.4 kg)     Mental Status:  {IP PT MENTAL STATUS:61366}    IV Access:  {[facility] [place]ESS:087471310}    Nursing Mobility/ADLs:  Walking   {[facility] DME DXOB:151195999}  Transfer  {CHP DME GOE}  Bathing  {VICTOR MANUEL ALVARES BKVZ:734513113}  Dressing  {CHP DME LYJK:278358563}  Toileting  {CHP DME EAWH:883490451}  Feeding  {CHP DME KKWY:696394935}  Med Admin  {CHP DME YVVV:926056558}  Med Delivery   { REESE MED Delivery:759733942}    Wound Care Documentation and Therapy:        Elimination:  Continence:   · Bowel: {YES / VO:98590}  · Bladder: {YES / XR:07177}  Urinary Catheter: {Urinary Catheter:750600242}   Colostomy/Ileostomy/Ileal Conduit: {YES / UL:97922}       Date of Last BM: ***    Intake/Output Summary (Last 24 hours) at 3/28/2021 1120  Last data filed at 3/28/2021 0610  Gross per 24 hour   Intake 2776 ml   Output 600 ml   Net 2176 ml     I/O last 3 completed shifts:   In: 3754 [I.V.:2776]  Out: 600 [Urine:600]    Safety Concerns:     [address] [employer] Safety Concerns:169452857}    Impairments/Disabilities:      [employer] Impairments/Disabilities:419872135}    Nutrition Therapy:  Current Nutrition Therapy:   [employer] Diet List:216938629}    Routes of Feeding: {[facility] DME Other Feedings:042630500}  Liquids: {Slp liquid thickness:17603}  Daily Fluid Restriction: {CHP DME Yes amt example:915907288}  Last Modified Barium Swallow with Video (Video Swallowing Test): {Done Not Done GHIN:427391766}    Treatments at the Time of Hospital Discharge:   Respiratory Treatments: ***  Oxygen Therapy:  {Therapy; copd oxygen:79178}  Ventilator:    { CC Vent SHRH:086089238}    Rehab Therapies: {THERAPEUTIC INTERVENTION:6482862862}  Weight Bearing Status/Restrictions: [employer] Weight Bearin}  Other Medical Equipment (for information only, NOT a DME order):  {EQUIPMENT:313979156}  Other Treatments: ***    Patient's personal belongings (please select all that are sent with patient):  {[facility] DME Belongings:319368386}    RN SIGNATURE:  {Esignature:979629604}    CASE MANAGEMENT/SOCIAL WORK SECTION    Inpatient Status Date: ***    Readmission Risk Assessment Score:  Readmission Risk              Risk of Unplanned Readmission:        24           Discharging to Facility/ Agency   · Name:   · Address:  · Phone:  · Fax:    Dialysis Facility (if applicable)   · Name:  · Address:  · Dialysis Schedule:  · Phone:  · Fax:    / signature: {Esignature:549104632}    PHYSICIAN SECTION    Prognosis: {Prognosis:0287603739}    Condition at Discharge: Cuauhtemoc Newman Patient Condition:696974276}    Rehab Potential (if transferring to Rehab): {Prognosis:5485527335}    Recommended Labs or Other Treatments After Discharge: ***    Physician Certification: I certify the above information and transfer of Clara Mcgee  is necessary for the continuing treatment of the diagnosis listed and that she requires {Admit to Appropriate Level of Care:61263} for {GREATER/LESS:876617638} 30 days.      Update Admission H&P: {CHP DME Changes in IDEFX:995259875}    PHYSICIAN SIGNATURE:  {Esignature:449355290}

## 2021-03-28 NOTE — PLAN OF CARE
Problem: Falls - Risk of:  Goal: Will remain free from falls  Description: Will remain free from falls  3/27/2021 1535 by Guy Ramirez RN  Outcome: Ongoing  Goal: Absence of physical injury  Description: Absence of physical injury  3/27/2021 1535 by Guy Ramirez RN  Outcome: Ongoing

## 2021-03-28 NOTE — PROGRESS NOTES
Progress Note               Date:                           3/28/2021  Patient name:           David Rosales  Date of admission:  3/26/2021  8:26 PM  MRN:   [id number]  YOB: 1960  PCP:                           Cuca Guillen MD        Subjective:      Patient seen and examined. No acute issues overnight. Hemodynamically and vitally stable. Afebrile. Labs reviewed. Feels good    HPI in Brief:     62 y.o.  female who is admitted to the hospital for further management of acute PE. The patient had 1 day history of epigastric pain and chest discomfort as well. She had increasing shortness of breath. She had no cough or hemoptysis. She was evaluated emergency room and she had CTA which showed nonocclusive pulmonary embolism. She was admitted and started on heparin. Clinically she is improving. No leg pain or swelling. No previous thrombosis or embolization. The patient is known to our practice. She had history of gastric carcinoid status post partial resection locally. She had complete gastric resection at [facility] November 30, 2020. Since her surgery she had multiple hospitalizations due to recurrent nausea and vomiting and dehydration. Patient continues to have episodes of vomiting and she continues to lose weight. The question is about using oral anticoagulation with such GI symptoms. Review of systems  General: no fever or night sweats, Weight is stable. ENT: No double or blurred vision, no tinnitus or hearing problem, no dysphagia or sore throat   Respiratory: No chest pain, no shortness of breath, no cough or hemoptysis. Cardiovascular: Denies chest pain, PND or orthopnea. No L E swelling or palpitations. Gastrointestinal:    No nausea or vomiting, abdominal pain, diarrhea or constipation. Genitourinary: Denies dysuria, hematuria, frequency, urgency or incontinence.     Neurological: Denies headaches, decreased LOC, no sensory or motor focal deficits. Medications:   Reviewed in Epic     Objective:   Vitals: /85   Pulse 64   Temp 98.3 °F (36.8 °C) (Oral)   Resp 16   Ht 5' 5\" (1.651 m)   Wt 192 lb (87.1 kg)   LMP 01/01/1994   SpO2 94%   BMI 31.95 kg/m²   Gen. Exam, showed a well-appearing patient without evidence of distress or pain  HEENT, normocephalic and atraumatic, PERRLA extraocular muscles are intact  Neck showed no JVD no carotid bruit, no cervical adenopathy  Chest is clear to auscultation bilaterally  Heart is regular without any murmur  Abdomen soft nontender no hepatosplenomegaly  Lower extremities showed no edema clubbing or cyanosis  Neurological examination was nonfocal, with intact cranial nerves  Skin  No rashes or bruising appreciated      Data:  No intake/output data recorded. In: 7556 [I.V.:2776]  Out: 600 [Urine:600]  CBC:   Recent Labs     03/25/21  1640 03/26/21 2123 03/27/21  0817   WBC 8.5 8.4 7.2   HGB 14.8 14.9 12.6    282 224     BMP:    Recent Labs     03/26/21 2123 03/27/21  0817 03/27/21  1411    142 139   K 2.4* 3.1* 3.3*   CL 97* 105 107   CO2 25 25 20   BUN 8 5* 4*   CREATININE 0.64 0.48* 0.36*   GLUCOSE 137* 91 83     Hepatic:   Recent Labs     03/25/21  1640 03/27/21  0817   AST 35* 57*   ALT 25 26   BILITOT 0.63 0.54   ALKPHOS 99 89     INR: No results for input(s): INR in the last 72 hours.     IMAGING DATA:    Problem Lists:   Primary Problem:  Acute pulmonary embolism without acute cor pulmonale (HCC)   Current Problems:  Active Hospital Problems    Diagnosis Date Noted    Intractable vomiting [R11.10] 03/27/2021    Abdominal pain, epigastric [R10.13]     Elevated d-dimer [R79.89]     Malignant carcinoid tumor of stomach ([address] [C7A.092]     Hypokalemia [E87.6] 03/26/2021    Hypomagnesemia [E83.42] 03/26/2021    Acute pulmonary embolism without acute cor pulmonale ([facility] [address] [I26.99] 03/26/2021    Essential hypertension [I10] 10/28/2019    Neuroendocrine tumor [D3A.8] 02/21/2019  Hypothyroidism [E03.9]     Obesity [E66.9]     Anxiety [F41.9]     Hyperlipidemia [E78.5]      PMH:  Past Medical History:   Diagnosis Date    Anxiety     Arthritis     knee    Asthma     Colon polyp 02/21/2019    tubular adenoma; hyperplastic polyp    Colon polyps     Cyst of kidney, acquired     bilat. 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 Repeated episodes of intractable nausea and vomiting. Status post multiple hospitalizations. Hypokalemia         Plan     1. Records and lab reviewed   2. Continue Heparin for Acute nonocclusive PE. 3. Discussed with the patient oral anticoagulation. Although patient had repeated episodes of intractable nausea and vomiting however she states that she is able to take her oral medications with no difficulties. She tolerates oral medications and no problems with throwing up medicines. 4. Patient had gastrectomy. Eliquis is mainly absorbed in the proximal small intestine. So it will be my recommendations to use Eliquis with a standard dosage. 5. Discussed with primary team.          Plan to be discussed with attending. Nelly Padilla MD  PGY-2, Internal medicine resident  [place], [place]    Attending Physician Statement   I have discussed the care of Eddie Carter, including pertinent history and exam findings with the resident. I have reviewed the key elements of all parts of the encounter with the resident. I have seen and examined the patient with the resident. I agree with the assessment and plan and status of the problem list as documented. [facility]

## 2021-03-28 NOTE — PROGRESS NOTES
CLINICAL PHARMACY NOTE: MEDS TO [address] Select Patient?: No  Total # of Prescriptions Filled: 3   The following medications were delivered to the patient:  · Sucralafate 1gm  · Eliquis Starter Pack  · Metoclopramide 10mg  Total # of Interventions Completed: 1  Time Spent (min): 30    Additional Documentation:  Gave patient a $10 copay card for eliquis and 3 day partial for sucralafate, will deliver rest to home early this week confirmed info

## 2021-03-28 NOTE — CARE COORDINATION
Case Management Initial Discharge Plan  Jabier Bob             Met with:patient to discuss discharge plans. Information verified: address, contacts, phone number, , insurance Yes    Emergency Contact/Next of Kin name & number: Saumya Middleton, [phone]    PCP: Ignacio Mortensen MD  Date of last visit: Thurs, 3/25/21    Insurance Provider: [id number]    Discharge Planning    Living Arrangements:      Support Systems:       Home has 2 stories  4 stairs to climb to get into front door, 1 flight of stairs to climb to reach second floor  Location of bedroom/bathroom in home upstairs    Patient able to perform ADL's:Independent    Current Services (outpatient & in home) none  DME equipment: none  DME provider: n/a    Receiving oral anticoagulation therapy? No    If indicated:   Physician managing anticoagulation treatment: n/a  Where does patient obtain lab work for ATC treatment? n/a      Potential Assistance Needed:       Patient agreeable to home care: No  [place] of choice provided:  n/a    Prior SNF/Rehab Placement and Facility: none  Agreeable to SNF/Rehab: No  [place] of choice provided: n/a     Evaluation: n/a    Expected Discharge date:       Patient expects to be discharged to: Follow Up Appointment: Best Day/ Time:      Transportation provider:   Transportation arrangements needed for discharge: No    Readmission Risk              Risk of Unplanned Readmission:        24             Does patient have a readmission risk score greater than 14?: Yes  If yes, follow-up appointment must be made within 7 days of discharge.      Goals of Care:       Discharge Plan: home with sister, no needs, has ride          Electronically signed by Vazquez Paredes RN on 3/28/21 at 11:16 AM EDT

## 2021-03-28 NOTE — CARE COORDINATION
Pt started on eliquis. Called OP pharmacy to see what the cost is or if she needs coupon card. Spoke with Maira Daniel, she stated that she would look into it and call me back. Pt stated that she does not need a ride, and stated that she has no other needs. 1231- received call from Maira Daniel with Pharmacy, stated that the pt will need the $10 copay card and that she was going to use the free 30 day supply card as well. Gave the pt the coupon card for eliquis, she was very thankful.     Discharge 1 [facility] Case Management Department  Written by: Miguel Angel Aponte RN    Patient Name: Brendan Tipton  Attending Provider: Nolan Roberts DO  Admit Date: 3/26/2021  8:26 PM  MRN: [id number]  Account: [de-identified]                     : 1960  Discharge Date: 3/28/21      Disposition: home    Miguel Angel Aponte RN

## 2021-03-28 NOTE — DISCHARGE SUMMARY
[facility]  Office: [address], , Donald Asa, DO, Ehsan Ori, DO, Linda Avila, DO, Jaquan Tony MD, Larry Deluca MD, Jaquan Stevens MD, Elzbieta Bhakta MD, Jimi Wray MD, Eren Obando MD, Arden Chew MD, Luis Spencer MD, Meeta Salvador DO, Marlena Rodriguez MD, Mercy Rubin DO, Kenya Casiano MD,  Jose Bo DO, Hamilton Brooks MD, Derald Jeans, MD, Leno Almanza MD, Patty Christianson MD, Luan Palumbo, [facility], [facility], [facility], Cristi Woody, CNP, Stefani Trevino, CNS, Corine Muñoz, CNP, James Jeff, CNP, Jewels Hastings, CNP, Roe Charles, CNP, Varun Cruz, CNP, Aron Villegas PA-C, Yusuf Pretty, [facility], Dede Beltran, CNP, Rommel Avelar, CNP, Chad Clinton, CNP, Antoine Holter, CNP, Azeem Adam, CNP, [facility], [address] [facility]. [facility]    Discharge Summary     Patient ID: Ashley Salmon  :  1960   MRN: [id number]     ACCOUNT:  [de-identified]   Patient's PCP: Meg Angulo MD  Admit Date: 3/26/2021   Discharge Date: 3/28/2021     Length of Stay: 2  Code Status:  Full Code  Admitting Physician: Mercy Rubin DO  Discharge Physician: Mercy Rubin DO     Active Discharge Diagnoses:     Hospital Problem Lists:  Principal Problem:    Acute pulmonary embolism without acute cor pulmonale [facility]  Active Problems:    Anxiety    Obesity    Hyperlipidemia    Hypothyroidism    Neuroendocrine tumor    Essential hypertension    Hypokalemia    Hypomagnesemia    Abdominal pain, epigastric    Elevated d-dimer    Malignant carcinoid tumor of stomach (HCC)    Intractable vomiting  Resolved Problems:    * No resolved hospital problems.  *      Admission Condition:  good     Discharged Condition: good    Hospital Stay:     Hospital Course:  Ashley Salmon is a 62 y.o. female who was admitted for the management of   Acute pulmonary embolism without acute cor pulmonale (HCC) , presented to ER with Results   Component Value Date    GLUCOSE 84 03/28/2021     03/28/2021    K 3.2 03/28/2021     03/28/2021    CO2 20 03/28/2021    BUN 4 03/28/2021    CREATININE 0.43 03/28/2021    ANIONGAP 11 03/28/2021    ALKPHOS 89 03/27/2021    ALT 26 03/27/2021    AST 57 03/27/2021    BILITOT 0.54 03/27/2021    LABALBU 2.4 03/27/2021    ALBUMIN 0.8 03/27/2021    LABGLOM >60 03/28/2021    GFRAA >60 03/28/2021    GFR      03/28/2021    GFR NOT REPORTED 03/28/2021    PROT 5.3 03/27/2021    CALCIUM 8.3 03/28/2021      Radiology:  Xr Chest Portable    Result Date: 3/26/2021  Mild subsegmental atelectasis in the left upper lobe. Ct Chest Pulmonary Embolism W Contrast    Result Date: 3/26/2021  Nonobstructive small scattered pulmonary emboli involving the bilateral lower lobes and right upper lobe as described above. No heart strain. Postop changes of Qamar-en-Y and cholecystectomy with hiatal hernia and common duct dilation none of which appear significantly changed from the prior exam. Partially visualized multiple left renal cysts, stable. Critical results were called by Dr. Herminia Mathis to Montserrat Gunter on 3/26/2021 at 23:11. Consultations:    Consults:     Final Specialist Recommendations/Findings:   IP CONSULT TO HOSPITALIST  IP CONSULT TO GI  IP CONSULT TO BARIATRICS  IP CONSULT TO HEM/ONC      The patient was seen and examined on day of discharge and this discharge summary is in conjunction with any daily progress note from day of discharge. Discharge plan:     Disposition: Home    Physician Follow Up:   MD Jere Melchor 36  [address] [facility]  [phone]    Schedule an appointment as soon as possible for a visit in 1 week  hospital followup, will need to see PCP to refill eliquis.  Will need 6 months        Requiring Further Evaluation/Follow Up POST HOSPITALIZATION/Incidental Findings: none    Diet: regular diet    Activity: As tolerated    Instructions to medications, discharge plan and follow up. Electronically signed by   Madelin Ocampo DO  3/28/2021  2:39 PM      Thank you Dr. Levy Oshea MD for the opportunity to be involved in this patient's care.

## 2021-03-28 NOTE — PROGRESS NOTES
Patient discharged to home. She exited the hospital accompanied by a nurse's aide via wheelchair. Discharge instructions reviewed with patient and all questions answered. Medications delivered to the unit. Patient discharged with all documented belongings. LDA's removed.    Electronically signed by Shawn Cabrera RN on 3/28/2021 at 1:54 PM

## 2021-03-28 NOTE — PLAN OF CARE
Problem: Nausea/Vomiting:  Goal: Absence of nausea/vomiting  Description: Absence of nausea/vomiting  Outcome: Ongoing  Goal: Able to drink  Description: Able to drink  Outcome: Ongoing  Goal: Able to eat  Description: Able to eat  Outcome: Ongoing  Goal: Ability to achieve adequate nutritional intake will improve  Description: Ability to achieve adequate nutritional intake will improve  Outcome: Ongoing

## 2021-03-28 NOTE — PROGRESS NOTES
[facility]  Office: [address], DO, Travis Nahun, DO, Karlene Honey, DO, Miguel Angel Adams Blood, DO, Oar Rizo MD, Azeem Cobb MD, Ismael Crespo MD, Ara Genao MD, Rickie Amezquita MD, Caleb Renteria MD, Pernell Costello MD, Vicky Oconnor MD, Norma Zamarripa, DO, Jerman Clarke MD, Neetu La DO, Lluvia Mccray MD,  Yuliet Francois, DO, Carmen Flores MD, Consuelo Solomon MD, Clarissa Palma MD, Jayce Lyman MD, Leona Hernandez, Ritchie Morris, CNP, Kushal Nation, CNP, Dewey Olson, CNS, Greta Husain, [facility], Beryle Fix, CNP, Cheryl Alvarez, CNP, Janay Logan, CNP, Giovani Payton, CNP, Raúl Little PA-C, Alexsander Hager, [facility], Deepthi Galvan, CNP, Ciaran Grove, CNP, Ale Xiao, CNP, Harmony Cristina, CNP, Mendoza Woodruff, [facility], Aime Stratton, [facility]    Progress Note    3/28/2021    10:32 AM    Name:   Daniela Hagan  MRN:     [id number]     Acct:      [de-identified]   Room:   [facility]0421-02   Day:  2  Admit Date:  3/26/2021  8:26 PM    PCP:   Minal Brandon MD  Code Status:  Full Code    Subjective:     C/C:   Chief Complaint   Patient presents with    Chest Pain    Abdominal Pain     epigastric, had stomach removed in november Interval History Status: improved. No issues today, nausea improved with Reglan. Still in discussion regarding choice of anticoagulation with hematology. Discussed possible discharge today    Brief History:     60-year-old female with a past medical history for carcinoid tumor of the stomach, hypertension, obesity, COPD presents to the emergency department for abdominal pain and chest pain. Patient underwent evaluation by emergency department, found to have bilateral pulmonary embolisms, patient also with minimally elevated troponins.   Patient was admitted with heparin drip for further evaluation, patient with a history of carcinoid tumor of the stomach and **OR** ondansetron, acetaminophen **OR** acetaminophen, polyethylene glycol, heparin (porcine), heparin (porcine), lidocaine viscous hcl    Data:     Past Medical History:   has a past medical history of Anxiety, Arthritis, Asthma, Colon polyp, Colon polyps, Cyst of kidney, acquired, Fatigue, Hypertension, Hypothyroidism, Neuroendocrine tumor, Obesity, Pharyngoesophageal dysphagia, Vocal cord polyps, and Wears glasses. Social History:   reports that she quit smoking about 8 years ago. She has a 25.00 pack-year smoking history. She has never used smokeless tobacco. She reports current alcohol use. She reports that she does not use drugs. Family History:   Family History   Problem Relation Age of Onset    High Blood Pressure Mother     High Blood Pressure Sister     Stomach Cancer Sister     Other Sister         epilepsy    No Known Problems Father        Vitals:  /85   Pulse 64   Temp 98.3 °F (36.8 °C) (Oral)   Resp 16   Ht 5' 5\" (1.651 m)   Wt 192 lb (87.1 kg)   LMP 1994   SpO2 94%   BMI 31.95 kg/m²   Temp (24hrs), Av.2 °F (36.8 °C), Min:97.6 °F (36.4 °C), Max:98.7 °F (37.1 °C)    No results for input(s): POCGLU in the last 72 hours. I/O (24Hr):     Intake/Output Summary (Last 24 hours) at 3/28/2021 1032  Last data filed at 3/28/2021 0610  Gross per 24 hour   Intake 2776 ml   Output 600 ml   Net 2176 ml       Labs:  Hematology:  Recent Labs     21  1640 21  0817   WBC 8.5 8.4 7.2   RBC 5.27* 5.27* 4.42   HGB 14.8 14.9 12.6   HCT 45.7 46.0 39.6   MCV 86.7 87.3 89.6   MCH 28.1 28.3 28.5   MCHC 32.4 32.4 31.8   RDW 16.7* 16.5* 17.0*    282 224   MPV 9.3 9.6 9.4   DDIMER  --  4.25  --      Chemistry:  Recent Labs     21  2234 21  0817 21  1411     --  142 139   K 2.4*  --  3.1* 3.3*   CL 97*  --  105 107   CO2 25  --  25 20   GLUCOSE 137*  --  91 83   BUN 8  --  5* 4*   CREATININE 0.64  --  0.48* 0.36*   MG 1.4*  --  1.7 1.7   ANIONGAP 17  --  12 12   LABGLOM >60  --  >60 >60   GFRAA >60  --  >60 >60   CALCIUM 9.3  --  7.9* 7.7*   PHOS  --   --  3.0  --    TROPHS 9 12 11 10   MYOGLOBIN  --   --  <21* <21*     Recent Labs     03/25/21  1640 03/26/21  2123 03/27/21  0817   PROT 6.8  --  5.3*   LABALBU 3.4*  --  2.4*   TSH  --  0.03*  --    AST 35*  --  57*   ALT 25  --  26   ALKPHOS 99  --  89   BILITOT 0.63  --  0.54     ABG:No results found for: POCPH, PHART, PH, POCPCO2, ZMH0FUF, PCO2, POCPO2, PO2ART, PO2, POCHCO3, ZZZ2GGC, HCO3, NBEA, PBEA, BEART, BE, THGBART, THB, BUH0ANX, EXOK5XBW, H4ZIPBAM, O2SAT, FIO2  Lab Results   Component Value Date/Time    SPECIAL NOT REPORTED 12/10/2020 06:36 AM     Lab Results   Component Value Date/Time    CULTURE NO SIGNIFICANT GROWTH 12/10/2020 06:36 AM       Radiology:  Fatemeh Bib Chest Portable    Result Date: 3/26/2021  Mild subsegmental atelectasis in the left upper lobe. Ct Chest Pulmonary Embolism W Contrast    Result Date: 3/26/2021  Nonobstructive small scattered pulmonary emboli involving the bilateral lower lobes and right upper lobe as described above. No heart strain. Postop changes of Qamar-en-Y and cholecystectomy with hiatal hernia and common duct dilation none of which appear significantly changed from the prior exam. Partially visualized multiple left renal cysts, stable. Critical results were called by Dr. Sergio Spencer to [place] on 3/26/2021 at 23:11.        Physical Examination:        General appearance:  alert, cooperative and no distress  Mental Status:  oriented to person, place and time and normal affect  Lungs:  clear to auscultation bilaterally, normal effort  Heart:  regular rate and rhythm, no murmur  Abdomen:  soft, nontender, nondistended, normal bowel sounds, no masses, hepatomegaly, splenomegaly  Extremities:  no edema, redness, tenderness in the calves  Skin:  no gross lesions, rashes, induration    Assessment:        Hospital Problems           Last Modified POA    * (Principal) Acute pulmonary embolism without acute cor pulmonale ([address] 3/27/2021 Yes    Anxiety 3/27/2021 Yes    Obesity 3/27/2021 Yes    Hyperlipidemia 3/27/2021 Yes    Hypothyroidism 3/26/2021 Yes    Neuroendocrine tumor 3/27/2021 Yes    Overview Signed 3/26/2019 10:44 AM by Padmini Silva, LPN     of stomach         Essential hypertension 3/26/2021 Yes    Hypokalemia 3/26/2021 Yes    Hypomagnesemia 3/26/2021 Yes    Abdominal pain, epigastric 3/27/2021 Yes    Elevated d-dimer 3/27/2021 Yes    Malignant carcinoid tumor of stomach ([facility] [address] 3/27/2021 Yes    Intractable vomiting 3/27/2021 Yes          Plan:        Bilateral pulmonary embolism -hematology following, patient with a history of Qamar-en-Y surgery and carcinoid tumor. Patient with occasional nausea and vomiting, they are considering using Lovenox for anticoagulation. They will discuss with pharmacist.  History of Qamar-en-Y surgery with intractable vomiting -patient was recently taken off Reglan, will resume for now. There was a question about compliance with oral anticoagulant if she is continues to have intractable vomiting monthly. Bariatric surgery seen and examined, recommend outpatient follow-up with [facility], [facility]  Troponin elevation -uptrending originally but now downtrending and undetectable, no EKG changes. Likely demand ischemia  Hypomagnesemia/hypokalemia-replete  Carcinoid tumor the stomach -resected, followed with oncology and no further progression  Essential hypertension-hold hydrochlorothiazide, continue Lopressor  Hypothyroidism-continue Synthroid  Epigastric pain/chest pain-likely secondary to #1. Discharge: Will discuss with hematology regarding outpatient anticoagulation, discharge later today    : Neetu La DO  3/28/2021  10:32 AM

## 2021-03-28 NOTE — PROGRESS NOTES
General Surgery:  Daily Progress Note                   PATIENT NAME: Stan Renteria     TODAY'S DATE: 3/28/2021, 7:54 AM  CC: Abdominal pain    SUBJECTIVE:     Pt seen and examined at bedside. Patient one episode of nausea overnight that was relieved with Zofran, no emesis. Patient states abdominal pain is improved. Negative flatus negative BM. Ambulating. OBJECTIVE:   VITALS:  /85   Pulse 64   Temp 98.3 °F (36.8 °C) (Oral)   Resp 16   Ht 5' 5\" (1.651 m)   Wt 192 lb (87.1 kg)   LMP 01/01/1994   SpO2 94%   BMI 31.95 kg/m²      INTAKE/OUTPUT:      Intake/Output Summary (Last 24 hours) at 3/28/2021 0754  Last data filed at 3/28/2021 7826  Gross per 24 hour   Intake 2776 ml   Output 600 ml   Net 2176 ml       PHYSICAL EXAM:  General Appearance: awake, alert, oriented, in no acute distress  HEENT:  Normocephalic, atraumatic, mucus membranes moist   Heart: Heart regular rate and rhythm  Lungs: Normal expansion. Clear to auscultation. No rales, rhonchi, or wheezing. Playing well on room air  Abdomen: Soft, nondistended, minimally tender to palpation  Extremities: No cyanosis, pitting edema, rashes noted. Skin: Skin color, texture, turgor normal. No rashes or lesions.     Data:  CBC with Differential:    Lab Results   Component Value Date    WBC 7.2 03/27/2021    RBC 4.42 03/27/2021    HGB 12.6 03/27/2021    HCT 39.6 03/27/2021     03/27/2021    MCV 89.6 03/27/2021    MCH 28.5 03/27/2021    MCHC 31.8 03/27/2021    RDW 17.0 03/27/2021    LYMPHOPCT 22 03/26/2021    MONOPCT 8 03/26/2021    BASOPCT 1 03/26/2021    MONOSABS 0.63 03/26/2021    LYMPHSABS 1.88 03/26/2021    EOSABS 0.23 03/26/2021    BASOSABS 0.06 03/26/2021    DIFFTYPE NOT REPORTED 03/26/2021     BMP:    Lab Results   Component Value Date     03/27/2021    K 3.3 03/27/2021     03/27/2021    CO2 20 03/27/2021    BUN 4 03/27/2021    LABALBU 2.4 03/27/2021    CREATININE 0.36 03/27/2021    CALCIUM 7.7 03/27/2021    GFRAA >60 03/27/2021    LABGLOM >60 03/27/2021    GLUCOSE 83 03/27/2021       Radiology Review:    CT CHEST PULMONARY EMBOLISM W CONTRAST   Final Result   Nonobstructive small scattered pulmonary emboli involving the bilateral lower   lobes and right upper lobe as described above. No heart strain. Postop changes of Qamar-en-Y and cholecystectomy with hiatal hernia and common   duct dilation none of which appear significantly changed from the prior exam.      Partially visualized multiple left renal cysts, stable. Critical results were called by Dr. Sharon Isaacs to [facility] on   3/26/2021 at 23:11. XR CHEST PORTABLE   Final Result   Mild subsegmental atelectasis in the left upper lobe. VL DUP LOWER EXTREMITY VENOUS BILATERAL    (Results Pending)         ASSESSMENT:  Active Hospital Problems    Diagnosis Date Noted    Intractable vomiting [R11.10] 03/27/2021    Abdominal pain, epigastric [R10.13]     Elevated d-dimer [R79.89]     Malignant carcinoid tumor of stomach ([address] [C7A.092]     Hypokalemia [E87.6] 03/26/2021    Hypomagnesemia [E83.42] 03/26/2021    Acute pulmonary embolism without acute cor pulmonale (HCC) [I26.99] 03/26/2021    Essential hypertension [I10] 10/28/2019    Neuroendocrine tumor [D3A.8] 02/21/2019    Hypothyroidism [E03.9]     Obesity [E66.9]     Anxiety [F41.9]     Hyperlipidemia [E78.5]        62year old female with bilateral PE's and nausea/vomiting with history of laparoscopic gastrectomy with RNY reconstruction for a carcinoid tumor, performed 11/2020 at [address]:  1. Continue medical management per primary  2. Diet: Okay for bariatric diet  3. Patient is okay for long-term anticoagulation. 4. Reglan as needed  5. Recommend he continue follow-up in [facility] clinic, next appointment in May per patient.     Electronically signed by Adry Bedoya MD  on 3/28/2021 at 7:54 AM

## 2021-03-29 ENCOUNTER — TELEPHONE (OUTPATIENT)
Dept: INTERNAL MEDICINE CLINIC | Age: 61
End: 2021-03-29

## 2021-03-29 LAB
EKG ATRIAL RATE: 121 BPM
EKG P AXIS: 51 DEGREES
EKG P-R INTERVAL: 168 MS
EKG Q-T INTERVAL: 434 MS
EKG QRS DURATION: 138 MS
EKG QTC CALCULATION (BAZETT): 616 MS
EKG R AXIS: -3 DEGREES
EKG T AXIS: 33 DEGREES
EKG VENTRICULAR RATE: 121 BPM
SEROTONIN BLOOD: 179 NG/ML (ref 50–220)

## 2021-03-29 PROCEDURE — 93010 ELECTROCARDIOGRAM REPORT: CPT | Performed by: INTERNAL MEDICINE

## 2021-04-01 ENCOUNTER — OFFICE VISIT (OUTPATIENT)
Dept: INTERNAL MEDICINE CLINIC | Age: 61
End: 2021-04-01
Payer: COMMERCIAL

## 2021-04-01 VITALS
BODY MASS INDEX: 32.52 KG/M2 | HEIGHT: 65 IN | WEIGHT: 195.2 LBS | TEMPERATURE: 97 F | HEART RATE: 104 BPM | DIASTOLIC BLOOD PRESSURE: 64 MMHG | SYSTOLIC BLOOD PRESSURE: 102 MMHG | RESPIRATION RATE: 16 BRPM

## 2021-04-01 DIAGNOSIS — I26.99 BILATERAL PULMONARY EMBOLISM (HCC): Primary | ICD-10-CM

## 2021-04-01 DIAGNOSIS — Z90.3 S/P GASTRECTOMY: ICD-10-CM

## 2021-04-01 DIAGNOSIS — D3A.8 NEUROENDOCRINE TUMOR: ICD-10-CM

## 2021-04-01 DIAGNOSIS — K21.9 GASTROESOPHAGEAL REFLUX DISEASE WITHOUT ESOPHAGITIS: ICD-10-CM

## 2021-04-01 PROCEDURE — 1111F DSCHRG MED/CURRENT MED MERGE: CPT | Performed by: INTERNAL MEDICINE

## 2021-04-01 PROCEDURE — 99496 TRANSJ CARE MGMT HIGH F2F 7D: CPT | Performed by: INTERNAL MEDICINE

## 2021-04-01 NOTE — PROGRESS NOTES
Post-Discharge Transitional Care Management Services or Hospital Follow Up      Olga Small   YOB: 1960    Date of Office Visit:  4/1/2021  Date of Hospital Admission: 3/26/21  Date of Hospital Discharge: 3/28/21  Risk of hospital readmission (high >=14%. Medium >=10%) :Readmission Risk Score: 24      Care management risk score Rising risk (score 2-5) and Complex Care (Scores >=6): 2     Non face to face  following discharge, date last encounter closed (first attempt may have been earlier): 3/29/2021 12:20 PM    Call initiated 2 business days of discharge: Yes    Patient Active Problem List   Diagnosis    Asthma    Anxiety    Obesity    Hyperlipidemia    Hypothyroidism    Colon polyps    Vertigo    Mass of left hip region    Fatigue    Pharyngoesophageal dysphagia    Colon polyp    Neuroendocrine tumor    Chest pain    Shortness of breath    Anemia    GI bleed    Essential hypertension    Neuroendocrine neoplasm of stomach    Polyp, stomach    Melena    Gastric ulcer with hemorrhage    Constipation    Hypokalemia    Hypomagnesemia    Bilateral pulmonary embolism (HCC)    Abdominal pain, epigastric    Elevated d-dimer    Malignant carcinoid tumor of stomach (HCC)    Intractable vomiting       Allergies   Allergen Reactions    Erythromycin Diarrhea       Medications listed as ordered at the time of discharge from hospital   Becca Jackson   [place] Medication Instructions JANET:    Printed on:04/01/21 1231   Medication Information                      apixaban starter pack (ELIQUIS) 5 MG TBPK tablet  Take 1 tablet by mouth See Admin Instructions             desonide (DESOWEN) 0.05 % cream  Apply topically 2 times daily.              Folic Acid-Vit J6-FSS S54 2.2-25-0.5 MG TABS  Take 1 tablet by mouth daily             hydroCHLOROthiazide (HYDRODIURIL) 25 MG tablet  TAKE 1 TABLET BY MOUTH EVERY DAY             levothyroxine (SYNTHROID) 100 MCG tablet  Take 1 tablet by mouth daily             metoclopramide (REGLAN) 10 MG tablet  Take 1 tablet by mouth 3 times daily as needed (nausea)             Multiple Vitamin (MULTI-DAY VITAMINS PO)  Take by mouth             ondansetron (ZOFRAN) 4 MG tablet  Take 4 mg by mouth every 8 hours as needed for Nausea or Vomiting             pantoprazole (PROTONIX) 40 MG tablet  TAKE 1 TABLET BY MOUTH EVERY DAY             potassium chloride (KLOR-CON M) 20 MEQ extended release tablet  Take 2 tablets by mouth daily             sucralfate (CARAFATE) 1 GM tablet  Take 1 tablet by mouth 4 times daily             vitamin B-12 (CYANOCOBALAMIN) 500 MCG tablet  Take 500 mcg by mouth daily             vitamin D 25 MCG (1000 UT) CAPS  Take 1 capsule by mouth daily                   Medications marked \"taking\" at this time  Outpatient Medications Marked as Taking for the 4/1/21 encounter (Office Visit) with Aron Vargas MD   Medication Sig Dispense Refill    sucralfate (CARAFATE) 1 GM tablet Take 1 tablet by mouth 4 times daily 120 tablet 3    apixaban starter pack (ELIQUIS) 5 MG TBPK tablet Take 1 tablet by mouth See Admin Instructions 74 tablet 0    metoclopramide (REGLAN) 10 MG tablet Take 1 tablet by mouth 3 times daily as needed (nausea) 30 tablet 0    vitamin D 25 MCG (1000 UT) CAPS Take 1 capsule by mouth daily      Folic Acid-Vit T9-DFY J65 2.2-25-0.5 MG TABS Take 1 tablet by mouth daily 90 tablet 1    levothyroxine (SYNTHROID) 100 MCG tablet Take 1 tablet by mouth daily 90 tablet 1    potassium chloride (KLOR-CON M) 20 MEQ extended release tablet Take 2 tablets by mouth daily 30 tablet 0    hydroCHLOROthiazide (HYDRODIURIL) 25 MG tablet TAKE 1 TABLET BY MOUTH EVERY DAY (Patient taking differently: 12.5 mg TAKE 1/2 TABLET BY MOUTH EVERY DAY) 90 tablet 0    ondansetron (ZOFRAN) 4 MG tablet Take 4 mg by mouth every 8 hours as needed for Nausea or Vomiting      pantoprazole (PROTONIX) 40 MG tablet TAKE 1 TABLET BY MOUTH EVERY DAY      months and patient verbalized understanding and patient planning to go back to work next week  Review in 2 months    A comprehensive review of systems was negative except for what was noted in the HPI. Vitals:    04/01/21 1205   BP: 102/64   Site: Right Upper Arm   Position: Sitting   Cuff Size: Large Adult   Pulse: 104   Resp: 16   Temp: 97 °F (36.1 °C)   TempSrc: Infrared   Weight: 195 lb 3.2 oz (88.5 kg)   Height: 5' 5\" (1.651 m)     Body mass index is 32.48 kg/m². Wt Readings from Last 3 Encounters:   04/01/21 195 lb 3.2 oz (88.5 kg)   03/28/21 201 lb 8 oz (91.4 kg)   03/25/21 192 lb (87.1 kg)     BP Readings from Last 3 Encounters:   04/01/21 102/64   03/28/21 126/86   03/25/21 107/77        Physical Exam:  General Appearance: alert and oriented to person, place and time, well developed and well- nourished, in no acute distress  Skin: warm and dry, no rash or erythema  Head: normocephalic and atraumatic  Eyes: pupils equal, round, and reactive to light, extraocular eye movements intact, conjunctivae normal  ENT: tympanic membrane, external ear and ear canal normal bilaterally, nose without deformity, nasal mucosa and turbinates normal without polyps  Neck: supple and non-tender without mass, no thyromegaly or thyroid nodules, no cervical lymphadenopathy  Pulmonary/Chest: clear to auscultation bilaterally- no wheezes, rales or rhonchi, normal air movement, no respiratory distress  Cardiovascular: normal rate, regular rhythm, normal S1 and S2, no murmurs, rubs, clicks, or gallops, distal pulses intact, no carotid bruits  Abdomen: soft, non-tender, non-distended, normal bowel sounds, no masses or organomegaly  Extremities: no cyanosis, clubbing or edema  Musculoskeletal: normal range of motion, no joint swelling, deformity or tenderness  Neurologic: reflexes normal and symmetric, no cranial nerve deficit, gait, coordination and speech normal    Assessment/Plan:   Diagnosis Orders   1.  Bilateral pulmonary embolism ([facility] [address]  Basic Metabolic Panel    CBC    [facility] DISCHARGE MEDS RECONCILED W/ CURRENT OUTPATIENT MED LIST   2. Gastroesophageal reflux disease without esophagitis  VA DISCHARGE MEDS RECONCILED W/ CURRENT OUTPATIENT MED LIST   3. Neuroendocrine tumor  VA DISCHARGE MEDS RECONCILED W/ CURRENT OUTPATIENT MED LIST   4.  S/P gastrectomy  VA DISCHARGE MEDS RECONCILED W/ CURRENT OUTPATIENT MED LIST           Medical Decision Making: high complexity

## 2021-04-07 ENCOUNTER — APPOINTMENT (OUTPATIENT)
Dept: CT IMAGING | Age: 61
End: 2021-04-07
Payer: COMMERCIAL

## 2021-04-07 ENCOUNTER — HOSPITAL ENCOUNTER (EMERGENCY)
Age: 61
Discharge: HOME | End: 2021-04-07
Attending: EMERGENCY MEDICINE
Payer: COMMERCIAL

## 2021-04-07 ENCOUNTER — TELEPHONE (OUTPATIENT)
Dept: INTERNAL MEDICINE CLINIC | Age: 61
End: 2021-04-07

## 2021-04-07 ENCOUNTER — APPOINTMENT (OUTPATIENT)
Dept: GENERAL RADIOLOGY | Age: 61
End: 2021-04-07
Payer: COMMERCIAL

## 2021-04-07 VITALS
SYSTOLIC BLOOD PRESSURE: 111 MMHG | DIASTOLIC BLOOD PRESSURE: 75 MMHG | TEMPERATURE: 98.3 F | OXYGEN SATURATION: 94 % | WEIGHT: 193 LBS | HEART RATE: 83 BPM | BODY MASS INDEX: 32.12 KG/M2 | RESPIRATION RATE: 15 BRPM

## 2021-04-07 DIAGNOSIS — R07.9 CHEST PAIN, UNSPECIFIED TYPE: Primary | ICD-10-CM

## 2021-04-07 LAB
-: NORMAL
ABSOLUTE EOS #: 0.08 K/UL (ref 0–0.44)
ABSOLUTE IMMATURE GRANULOCYTE: 0.06 K/UL (ref 0–0.3)
ABSOLUTE LYMPH #: 2.3 K/UL (ref 1.1–3.7)
ABSOLUTE MONO #: 0.96 K/UL (ref 0.1–1.2)
ANION GAP SERPL CALCULATED.3IONS-SCNC: 17 MMOL/L (ref 9–17)
BASOPHILS # BLD: 1 % (ref 0–2)
BASOPHILS ABSOLUTE: 0.04 K/UL (ref 0–0.2)
BUN BLDV-MCNC: 8 MG/DL (ref 6–20)
BUN/CREAT BLD: 10 (ref 9–20)
CALCIUM SERPL-MCNC: 8.8 MG/DL (ref 8.6–10.4)
CHLORIDE BLD-SCNC: 93 MMOL/L (ref 98–107)
CO2: 28 MMOL/L (ref 20–31)
CREAT SERPL-MCNC: 0.83 MG/DL (ref 0.5–0.9)
DIFFERENTIAL TYPE: ABNORMAL
EOSINOPHILS RELATIVE PERCENT: 1 % (ref 1–4)
GFR AFRICAN AMERICAN: >60 ML/MIN
GFR NON-AFRICAN AMERICAN: >60 ML/MIN
GFR SERPL CREATININE-BSD FRML MDRD: ABNORMAL ML/MIN/{1.73_M2}
GFR SERPL CREATININE-BSD FRML MDRD: ABNORMAL ML/MIN/{1.73_M2}
GLUCOSE BLD-MCNC: 111 MG/DL (ref 70–99)
HCT VFR BLD CALC: 45.9 % (ref 36.3–47.1)
HEMOGLOBIN: 14.9 G/DL (ref 11.9–15.1)
IMMATURE GRANULOCYTES: 1 %
INR BLD: 1.5
LYMPHOCYTES # BLD: 28 % (ref 24–43)
MCH RBC QN AUTO: 28.2 PG (ref 25.2–33.5)
MCHC RBC AUTO-ENTMCNC: 32.5 G/DL (ref 28.4–34.8)
MCV RBC AUTO: 86.9 FL (ref 82.6–102.9)
MONOCYTES # BLD: 12 % (ref 3–12)
MYOGLOBIN: 36 NG/ML (ref 25–58)
MYOGLOBIN: 51 NG/ML (ref 25–58)
NRBC AUTOMATED: 0 PER 100 WBC
PARTIAL THROMBOPLASTIN TIME: 47.4 SEC (ref 23.9–33.8)
PDW BLD-RTO: 16.3 % (ref 11.8–14.4)
PLATELET # BLD: 335 K/UL (ref 138–453)
PLATELET ESTIMATE: ABNORMAL
PMV BLD AUTO: 9.3 FL (ref 8.1–13.5)
POTASSIUM SERPL-SCNC: 3.2 MMOL/L (ref 3.7–5.3)
PROTHROMBIN TIME: 17.6 SEC (ref 11.5–14.2)
RBC # BLD: 5.28 M/UL (ref 3.95–5.11)
RBC # BLD: ABNORMAL 10*6/UL
REASON FOR REJECTION: NORMAL
SEG NEUTROPHILS: 59 % (ref 36–65)
SEGMENTED NEUTROPHILS ABSOLUTE COUNT: 4.89 K/UL (ref 1.5–8.1)
SODIUM BLD-SCNC: 138 MMOL/L (ref 135–144)
TROPONIN INTERP: NORMAL
TROPONIN INTERP: NORMAL
TROPONIN T: NORMAL NG/ML
TROPONIN T: NORMAL NG/ML
TROPONIN, HIGH SENSITIVITY: 11 NG/L (ref 0–14)
TROPONIN, HIGH SENSITIVITY: 12 NG/L (ref 0–14)
WBC # BLD: 8.3 K/UL (ref 3.5–11.3)
WBC # BLD: ABNORMAL 10*3/UL
ZZ NTE CLEAN UP: ORDERED TEST: NORMAL
ZZ NTE WITH NAME CLEAN UP: SPECIMEN SOURCE: NORMAL

## 2021-04-07 PROCEDURE — 84484 ASSAY OF TROPONIN QUANT: CPT

## 2021-04-07 PROCEDURE — 85610 PROTHROMBIN TIME: CPT

## 2021-04-07 PROCEDURE — 80048 BASIC METABOLIC PNL TOTAL CA: CPT

## 2021-04-07 PROCEDURE — 83874 ASSAY OF MYOGLOBIN: CPT

## 2021-04-07 PROCEDURE — 96374 THER/PROPH/DIAG INJ IV PUSH: CPT

## 2021-04-07 PROCEDURE — 71260 CT THORAX DX C+: CPT

## 2021-04-07 PROCEDURE — 85730 THROMBOPLASTIN TIME PARTIAL: CPT

## 2021-04-07 PROCEDURE — 85025 COMPLETE CBC W/AUTO DIFF WBC: CPT

## 2021-04-07 PROCEDURE — 96375 TX/PRO/DX INJ NEW DRUG ADDON: CPT

## 2021-04-07 PROCEDURE — 2580000003 HC RX 258: Performed by: EMERGENCY MEDICINE

## 2021-04-07 PROCEDURE — 93005 ELECTROCARDIOGRAM TRACING: CPT | Performed by: EMERGENCY MEDICINE

## 2021-04-07 PROCEDURE — 6360000004 HC RX CONTRAST MEDICATION: Performed by: EMERGENCY MEDICINE

## 2021-04-07 PROCEDURE — 99283 EMERGENCY DEPT VISIT LOW MDM: CPT

## 2021-04-07 PROCEDURE — 71045 X-RAY EXAM CHEST 1 VIEW: CPT

## 2021-04-07 PROCEDURE — 6360000002 HC RX W HCPCS: Performed by: EMERGENCY MEDICINE

## 2021-04-07 RX ORDER — ONDANSETRON 2 MG/ML
4 INJECTION INTRAMUSCULAR; INTRAVENOUS ONCE
Status: COMPLETED | OUTPATIENT
Start: 2021-04-07 | End: 2021-04-07

## 2021-04-07 RX ORDER — MORPHINE SULFATE 4 MG/ML
4 INJECTION, SOLUTION INTRAMUSCULAR; INTRAVENOUS ONCE
Status: COMPLETED | OUTPATIENT
Start: 2021-04-07 | End: 2021-04-07

## 2021-04-07 RX ORDER — 0.9 % SODIUM CHLORIDE 0.9 %
80 INTRAVENOUS SOLUTION INTRAVENOUS ONCE
Status: COMPLETED | OUTPATIENT
Start: 2021-04-07 | End: 2021-04-07

## 2021-04-07 RX ORDER — SODIUM CHLORIDE 0.9 % (FLUSH) 0.9 %
10 SYRINGE (ML) INJECTION ONCE
Status: COMPLETED | OUTPATIENT
Start: 2021-04-07 | End: 2021-04-07

## 2021-04-07 RX ADMIN — MORPHINE SULFATE 4 MG: 4 INJECTION, SOLUTION INTRAMUSCULAR; INTRAVENOUS at 13:08

## 2021-04-07 RX ADMIN — SODIUM CHLORIDE, PRESERVATIVE FREE 10 ML: 5 INJECTION INTRAVENOUS at 14:26

## 2021-04-07 RX ADMIN — IOPAMIDOL 75 ML: 755 INJECTION, SOLUTION INTRAVENOUS at 14:26

## 2021-04-07 RX ADMIN — ONDANSETRON 4 MG: 2 INJECTION INTRAMUSCULAR; INTRAVENOUS at 13:07

## 2021-04-07 RX ADMIN — SODIUM CHLORIDE 80 ML: 9 INJECTION, SOLUTION INTRAVENOUS at 14:26

## 2021-04-07 ASSESSMENT — ENCOUNTER SYMPTOMS
SHORTNESS OF BREATH: 0
COUGH: 0
CONSTIPATION: 0
FACIAL SWELLING: 0
EYE REDNESS: 0
VOMITING: 1
ABDOMINAL PAIN: 0
DIARRHEA: 0
EYE DISCHARGE: 0
NAUSEA: 1
COLOR CHANGE: 0

## 2021-04-07 ASSESSMENT — PAIN SCALES - GENERAL: PAINLEVEL_OUTOF10: 8

## 2021-04-07 NOTE — TELEPHONE ENCOUNTER
Pt called in to report she is leaving work now due to severe chest pain/burning sensation in chest.  Going to [facility] ED for evaluation. H/o acute PE 3/26/21. FYI Only.

## 2021-04-07 NOTE — ED PROVIDER NOTES
(CYANOCOBALAMIN) 500 MCG TABLET    Take 500 mcg by mouth daily    VITAMIN D 25 MCG (1000 UT) CAPS    Take 1 capsule by mouth daily       PAST MEDICAL HISTORY         Diagnosis Date    Anxiety     Arthritis     knee    Asthma     Colon polyp 02/21/2019    tubular adenoma; hyperplastic polyp    Colon polyps     Cyst of kidney, acquired     bilat.     Fatigue     Hypertension     Hypothyroidism     Neuroendocrine tumor 02/21/2019    of stomach    Obesity     Pharyngoesophageal dysphagia     Vocal cord polyps     Wears glasses        SURGICAL HISTORY           Procedure Laterality Date    CHOLECYSTECTOMY  10/09/2014    COLONOSCOPY  02/21/2019    tubular adenoma; hyperplastic polyp    COLONOSCOPY      over 5 yrs ago per pt with polyps (2-)    CYSTOURETHROSCOPY/STENT REMOVAL  5/3/11    ENDOSCOPIC ULTRASOUND (LOWER) N/A 1/22/2020    ENDOSCOPIC ULTRASOUND WITH POSSIBLE EMR performed by Steven Bear MD at [facility]    ERCP      GASTRIC BYPASS SURGERY  11/30/2020    HIP SURGERY Left     soft tissue tumor removal    THROAT SURGERY      vocal cord polyps removed    UPPER GASTROINTESTINAL ENDOSCOPY  02/21/2019    Neuroendocrine tumor    UPPER GASTROINTESTINAL ENDOSCOPY  09/23/2019    UPPER GASTROINTESTINAL ENDOSCOPY N/A 9/23/2019    EGD BIOPSY performed by Don Richardson MD at [address] [facility] N/A 10/23/2019    EGD W/ EMR performed by Steven Bear MD at [facility] N/A 10/29/2019    EGD CONTROL HEMORRHAGE performed by Darius Villegas MD at [facility]         FAMILY HISTORY           Problem Relation Age of Onset    High Blood Pressure Mother     High Blood Pressure Sister     Stomach Cancer Sister     Other Sister         epilepsy    No Known Problems Father      Family Status   Relation Name Status    Mother  (Not Specified)    Sister  (Not Specified)    Father  (Not Specified)        SOCIAL HISTORY      reports that she quit smoking about 8 years ago. She has a 25.00 pack-year smoking history. She has never used smokeless tobacco. She reports current alcohol use. She reports that she does not use drugs. REVIEW OF SYSTEMS    (2-9 systems for level 4, 10 or more for level 5)     Review of Systems   Constitutional: Negative for chills, fatigue and fever. HENT: Negative for congestion, ear discharge and facial swelling. Eyes: Negative for discharge and redness. Respiratory: Negative for cough and shortness of breath. Cardiovascular: Positive for chest pain. Gastrointestinal: Positive for nausea and vomiting. Negative for abdominal pain, constipation and diarrhea. Genitourinary: Negative for dysuria and hematuria. Musculoskeletal: Negative for arthralgias. Skin: Negative for color change and rash. Neurological: Negative for syncope, numbness and headaches. Hematological: Negative for adenopathy. Psychiatric/Behavioral: Negative for confusion. The patient is not nervous/anxious. Except as noted above the remainder of the review of systems was reviewed and negative. PHYSICAL EXAM    (up to 7 for level 4, 8 or more for level 5)     Vitals:    04/07/21 1442 04/07/21 1536 04/07/21 1545 04/07/21 1551   BP: 114/77 (!) 131/90  111/75   Pulse:  100 85 83   Resp:  18 17 15   Temp:       SpO2: 95% 96% 93% 94%   Weight:           Physical Exam  Vitals signs reviewed. Constitutional:       General: She is not in acute distress. Appearance: She is well-developed. She is not diaphoretic. HENT:      Head: Normocephalic and atraumatic. Eyes:      General: No scleral icterus. Right eye: No discharge. Left eye: No discharge. Neck:      Musculoskeletal: Neck supple. Cardiovascular:      Rate and Rhythm: Normal rate and regular rhythm. Pulmonary:      Effort: Pulmonary effort is normal. No respiratory distress. Breath sounds: Normal breath sounds. No stridor.  No wheezing or rales.   Abdominal:      General: There is no distension. Palpations: Abdomen is soft. Tenderness: There is no abdominal tenderness. Musculoskeletal: Normal range of motion. Lymphadenopathy:      Cervical: No cervical adenopathy. Skin:     General: Skin is warm and dry. Findings: No erythema or rash. Neurological:      Mental Status: She is alert and oriented to person, place, and time. Psychiatric:         Behavior: Behavior normal.             DIAGNOSTIC RESULTS     EKG: All EKG's are interpreted by the Emergency Department Physician who either signs or Co-signs this chart in the absence of a cardiologist.    EKG on my interpretation shows no acute finding    RADIOLOGY:   Non-plain film images such as CT, Ultrasound and MRI are read by the radiologist. [facility] radiographic images are visualized and preliminarily interpreted by the emergency physician with the below findings:    Interpretation per the Radiologist below, if available at the time of this note:    Xr Chest Portable    Result Date: 4/7/2021  EXAMINATION: ONE XRAY VIEW OF THE CHEST 4/7/2021 12:17 pm COMPARISON: CT chest March 26, 2021 HISTORY: ORDERING SYSTEM PROVIDED HISTORY: Chest Pain TECHNOLOGIST PROVIDED HISTORY: Chest Pain Reason for Exam: Pt c/o chest pain, AP UPRIGHT PORTABLE Acuity: Acute Type of Exam: Initial FINDINGS: Stable normal cardiopericardial silhouette Mild tortuosity of the thoracic aorta There are no significant pleural, parenchymal or osseous findings     No acute cardiopulmonary findings     Ct Chest Pulmonary Embolism W Contrast    Result Date: 4/7/2021  EXAMINATION: CTA OF THE CHEST 4/7/2021 1:18 pm TECHNIQUE: CTA of the chest was performed after the administration of intravenous contrast.  Multiplanar reformatted images are provided for review. MIP images are provided for review.  Dose modulation, iterative reconstruction, and/or weight based adjustment of the mA/kV was utilized to reduce the radiation following components:    PTT 47.4 (*)     All other components within normal limits   TROP/MYOGLOBIN   SPECIMEN REJECTION   TROP/MYOGLOBIN       All other labs were within normal range or not returned as of this dictation. EMERGENCY DEPARTMENT COURSE and DIFFERENTIAL DIAGNOSIS/MDM:   Vitals:    Vitals:    04/07/21 1442 04/07/21 1536 04/07/21 1545 04/07/21 1551   BP: 114/77 (!) 131/90  111/75   Pulse:  100 85 83   Resp:  18 17 15   Temp:       SpO2: 95% 96% 93% 94%   Weight:           Orders Placed This Encounter   Medications    ondansetron (ZOFRAN) injection 4 mg    morphine sulfate (PF) injection 4 mg    0.9 % sodium chloride bolus    iopamidol (ISOVUE-370) 76 % injection 75 mL    sodium chloride flush 0.9 % injection 10 mL       Medical Decision Making: Work-up including CAT scan for PE is negative. Her emboli have resolved. She will continue her blood thinner, Eliquis, and will follow up with her doctor. No evidence of cardiac etiology. Treatment diagnosis and follow-up were discussed with the patient. CONSULTS:  None    PROCEDURES:  None    FINAL IMPRESSION      1.  Chest pain, unspecified type          DISPOSITION/PLAN   DISPOSITION Decision To Discharge 04/07/2021 03:53:50 PM      PATIENT REFERRED TO:   Rodney Thompson MD  [address] [id number]      As needed    [facility] ED  [address] [phone]    If symptoms worsen      DISCHARGE MEDICATIONS:     New Prescriptions    No medications on file         (Please note that portions of this note were completed with a voice recognition program.  Efforts were made to edit the dictations but occasionally words are mis-transcribed.)    Yulia Campos MD  Attending Emergency Physician            Yulia Campos MD  04/07/21 [id number]

## 2021-04-08 LAB
EKG ATRIAL RATE: 122 BPM
EKG P AXIS: -29 DEGREES
EKG P-R INTERVAL: 104 MS
EKG Q-T INTERVAL: 428 MS
EKG QRS DURATION: 70 MS
EKG QTC CALCULATION (BAZETT): 609 MS
EKG R AXIS: -3 DEGREES
EKG T AXIS: -17 DEGREES
EKG VENTRICULAR RATE: 122 BPM

## 2021-04-08 PROCEDURE — 93010 ELECTROCARDIOGRAM REPORT: CPT | Performed by: INTERNAL MEDICINE

## 2021-04-09 ENCOUNTER — APPOINTMENT (OUTPATIENT)
Dept: GENERAL RADIOLOGY | Age: 61
End: 2021-04-09
Payer: COMMERCIAL

## 2021-04-09 ENCOUNTER — TELEPHONE (OUTPATIENT)
Dept: INTERNAL MEDICINE CLINIC | Age: 61
End: 2021-04-09

## 2021-04-09 ENCOUNTER — HOSPITAL ENCOUNTER (EMERGENCY)
Age: 61
Discharge: HOME | End: 2021-04-09
Attending: EMERGENCY MEDICINE
Payer: COMMERCIAL

## 2021-04-09 VITALS
WEIGHT: 193 LBS | SYSTOLIC BLOOD PRESSURE: 116 MMHG | BODY MASS INDEX: 32.15 KG/M2 | TEMPERATURE: 98.1 F | DIASTOLIC BLOOD PRESSURE: 75 MMHG | HEIGHT: 65 IN | OXYGEN SATURATION: 99 % | RESPIRATION RATE: 14 BRPM | HEART RATE: 69 BPM

## 2021-04-09 DIAGNOSIS — N39.0 URINARY TRACT INFECTION WITHOUT HEMATURIA, SITE UNSPECIFIED: ICD-10-CM

## 2021-04-09 DIAGNOSIS — E87.6 HYPOKALEMIA: Primary | ICD-10-CM

## 2021-04-09 LAB
-: ABNORMAL
ABSOLUTE EOS #: 0.1 K/UL (ref 0–0.44)
ABSOLUTE IMMATURE GRANULOCYTE: 0.03 K/UL (ref 0–0.3)
ABSOLUTE LYMPH #: 1.82 K/UL (ref 1.1–3.7)
ABSOLUTE MONO #: 0.7 K/UL (ref 0.1–1.2)
AMORPHOUS: ABNORMAL
ANION GAP SERPL CALCULATED.3IONS-SCNC: 12 MMOL/L (ref 9–17)
ANION GAP SERPL CALCULATED.3IONS-SCNC: 14 MMOL/L (ref 9–17)
BACTERIA: ABNORMAL
BASOPHILS # BLD: 1 % (ref 0–2)
BASOPHILS ABSOLUTE: 0.03 K/UL (ref 0–0.2)
BILIRUBIN URINE: NEGATIVE
BUN BLDV-MCNC: 4 MG/DL (ref 6–20)
BUN BLDV-MCNC: 5 MG/DL (ref 6–20)
BUN/CREAT BLD: ABNORMAL (ref 9–20)
BUN/CREAT BLD: ABNORMAL (ref 9–20)
CALCIUM SERPL-MCNC: 8 MG/DL (ref 8.6–10.4)
CALCIUM SERPL-MCNC: 8.6 MG/DL (ref 8.6–10.4)
CASTS UA: ABNORMAL /LPF (ref 0–2)
CHLORIDE BLD-SCNC: 95 MMOL/L (ref 98–107)
CHLORIDE BLD-SCNC: 97 MMOL/L (ref 98–107)
CO2: 27 MMOL/L (ref 20–31)
CO2: 28 MMOL/L (ref 20–31)
COLOR: YELLOW
COMMENT UA: ABNORMAL
CREAT SERPL-MCNC: 0.4 MG/DL (ref 0.5–0.9)
CREAT SERPL-MCNC: 0.56 MG/DL (ref 0.5–0.9)
CRYSTALS, UA: ABNORMAL /HPF
DIFFERENTIAL TYPE: ABNORMAL
EOSINOPHILS RELATIVE PERCENT: 2 % (ref 1–4)
EPITHELIAL CELLS UA: ABNORMAL /HPF (ref 0–5)
GFR AFRICAN AMERICAN: >60 ML/MIN
GFR AFRICAN AMERICAN: >60 ML/MIN
GFR NON-AFRICAN AMERICAN: >60 ML/MIN
GFR NON-AFRICAN AMERICAN: >60 ML/MIN
GFR SERPL CREATININE-BSD FRML MDRD: ABNORMAL ML/MIN/{1.73_M2}
GLUCOSE BLD-MCNC: 75 MG/DL (ref 70–99)
GLUCOSE BLD-MCNC: 77 MG/DL (ref 70–99)
GLUCOSE URINE: NEGATIVE
HCT VFR BLD CALC: 43.2 % (ref 36.3–47.1)
HEMOGLOBIN: 14.2 G/DL (ref 11.9–15.1)
IMMATURE GRANULOCYTES: 1 %
KETONES, URINE: ABNORMAL
LEUKOCYTE ESTERASE, URINE: ABNORMAL
LYMPHOCYTES # BLD: 27 % (ref 24–43)
MAGNESIUM: 1.4 MG/DL (ref 1.6–2.6)
MCH RBC QN AUTO: 28.5 PG (ref 25.2–33.5)
MCHC RBC AUTO-ENTMCNC: 32.9 G/DL (ref 28.4–34.8)
MCV RBC AUTO: 86.6 FL (ref 82.6–102.9)
MONOCYTES # BLD: 11 % (ref 3–12)
MUCUS: ABNORMAL
NITRITE, URINE: NEGATIVE
NRBC AUTOMATED: 0 PER 100 WBC
OTHER OBSERVATIONS UA: ABNORMAL
PDW BLD-RTO: 15.9 % (ref 11.8–14.4)
PH UA: 6 (ref 5–8)
PLATELET # BLD: 349 K/UL (ref 138–453)
PLATELET ESTIMATE: ABNORMAL
PMV BLD AUTO: 10 FL (ref 8.1–13.5)
POTASSIUM SERPL-SCNC: 2.7 MMOL/L (ref 3.7–5.3)
POTASSIUM SERPL-SCNC: 3.9 MMOL/L (ref 3.7–5.3)
PROTEIN UA: NEGATIVE
RBC # BLD: 4.99 M/UL (ref 3.95–5.11)
RBC # BLD: ABNORMAL 10*6/UL
RBC UA: ABNORMAL /HPF (ref 0–2)
RENAL EPITHELIAL, UA: ABNORMAL /HPF
SEG NEUTROPHILS: 58 % (ref 36–65)
SEGMENTED NEUTROPHILS ABSOLUTE COUNT: 3.96 K/UL (ref 1.5–8.1)
SODIUM BLD-SCNC: 136 MMOL/L (ref 135–144)
SODIUM BLD-SCNC: 137 MMOL/L (ref 135–144)
SPECIFIC GRAVITY UA: 1.01 (ref 1–1.03)
TRICHOMONAS: ABNORMAL
TROPONIN INTERP: NORMAL
TROPONIN T: NORMAL NG/ML
TROPONIN, HIGH SENSITIVITY: 8 NG/L (ref 0–14)
TURBIDITY: ABNORMAL
URINE HGB: NEGATIVE
UROBILINOGEN, URINE: NORMAL
WBC # BLD: 6.6 K/UL (ref 3.5–11.3)
WBC # BLD: ABNORMAL 10*3/UL
WBC UA: ABNORMAL /HPF (ref 0–5)
YEAST: ABNORMAL

## 2021-04-09 PROCEDURE — 93005 ELECTROCARDIOGRAM TRACING: CPT | Performed by: STUDENT IN AN ORGANIZED HEALTH CARE EDUCATION/TRAINING PROGRAM

## 2021-04-09 PROCEDURE — 83735 ASSAY OF MAGNESIUM: CPT

## 2021-04-09 PROCEDURE — 99284 EMERGENCY DEPT VISIT MOD MDM: CPT

## 2021-04-09 PROCEDURE — 96366 THER/PROPH/DIAG IV INF ADDON: CPT

## 2021-04-09 PROCEDURE — 6360000002 HC RX W HCPCS: Performed by: STUDENT IN AN ORGANIZED HEALTH CARE EDUCATION/TRAINING PROGRAM

## 2021-04-09 PROCEDURE — 85025 COMPLETE CBC W/AUTO DIFF WBC: CPT

## 2021-04-09 PROCEDURE — 71045 X-RAY EXAM CHEST 1 VIEW: CPT

## 2021-04-09 PROCEDURE — 96365 THER/PROPH/DIAG IV INF INIT: CPT

## 2021-04-09 PROCEDURE — 96368 THER/DIAG CONCURRENT INF: CPT

## 2021-04-09 PROCEDURE — 6370000000 HC RX 637 (ALT 250 FOR IP): Performed by: STUDENT IN AN ORGANIZED HEALTH CARE EDUCATION/TRAINING PROGRAM

## 2021-04-09 PROCEDURE — 84484 ASSAY OF TROPONIN QUANT: CPT

## 2021-04-09 PROCEDURE — 81001 URINALYSIS AUTO W/SCOPE: CPT

## 2021-04-09 PROCEDURE — 80048 BASIC METABOLIC PNL TOTAL CA: CPT

## 2021-04-09 RX ORDER — MAGNESIUM SULFATE IN WATER 40 MG/ML
2000 INJECTION, SOLUTION INTRAVENOUS ONCE
Status: COMPLETED | OUTPATIENT
Start: 2021-04-09 | End: 2021-04-09

## 2021-04-09 RX ORDER — POTASSIUM CHLORIDE 7.45 MG/ML
20 INJECTION INTRAVENOUS ONCE
Status: COMPLETED | OUTPATIENT
Start: 2021-04-09 | End: 2021-04-09

## 2021-04-09 RX ORDER — CEPHALEXIN 500 MG/1
500 CAPSULE ORAL ONCE
Status: COMPLETED | OUTPATIENT
Start: 2021-04-09 | End: 2021-04-09

## 2021-04-09 RX ORDER — CEPHALEXIN 500 MG/1
500 CAPSULE ORAL 2 TIMES DAILY
Qty: 14 CAPSULE | Refills: 0 | Status: SHIPPED | OUTPATIENT
Start: 2021-04-09 | End: 2021-04-16

## 2021-04-09 RX ORDER — MAGNESIUM SULFATE 1 G/100ML
1000 INJECTION INTRAVENOUS ONCE
Status: DISCONTINUED | OUTPATIENT
Start: 2021-04-09 | End: 2021-04-09

## 2021-04-09 RX ORDER — POTASSIUM CHLORIDE 750 MG/1
20 TABLET, EXTENDED RELEASE ORAL DAILY
Qty: 14 TABLET | Refills: 0 | Status: SHIPPED | OUTPATIENT
Start: 2021-04-09 | End: 2021-04-20

## 2021-04-09 RX ADMIN — POTASSIUM CHLORIDE 20 MEQ: 7.46 INJECTION, SOLUTION INTRAVENOUS at 18:40

## 2021-04-09 RX ADMIN — MAGNESIUM SULFATE HEPTAHYDRATE 2000 MG: 40 INJECTION, SOLUTION INTRAVENOUS at 20:09

## 2021-04-09 RX ADMIN — CEPHALEXIN 500 MG: 500 CAPSULE ORAL at 18:40

## 2021-04-09 RX ADMIN — POTASSIUM BICARBONATE 40 MEQ: 782 TABLET, EFFERVESCENT ORAL at 17:51

## 2021-04-09 ASSESSMENT — ENCOUNTER SYMPTOMS
VOMITING: 0
EYES NEGATIVE: 1
DIARRHEA: 0
CONSTIPATION: 0
ABDOMINAL DISTENTION: 0
NAUSEA: 0
ANAL BLEEDING: 0
RESPIRATORY NEGATIVE: 1
ABDOMINAL PAIN: 1
BLOOD IN STOOL: 1

## 2021-04-09 ASSESSMENT — PAIN SCALES - GENERAL: PAINLEVEL_OUTOF10: 6

## 2021-04-09 ASSESSMENT — PAIN DESCRIPTION - LOCATION: LOCATION: CHEST

## 2021-04-09 ASSESSMENT — PAIN DESCRIPTION - FREQUENCY: FREQUENCY: INTERMITTENT

## 2021-04-09 ASSESSMENT — PAIN DESCRIPTION - DESCRIPTORS: DESCRIPTORS: SHOOTING

## 2021-04-09 NOTE — ED PROVIDER NOTES
[facility]     Emergency Department     Faculty Attestation    I performed a history and physical examination of the patient and discussed management with the resident. I have reviewed and agree with the residents findings including all diagnostic interpretations, and treatment plans as written at the time of my review. Any areas of disagreement are noted on the chart. I was personally present for the key portions of any procedures. I have documented in the chart those procedures where I was not present during the key portions. For Physician Assistant/ Nurse Practitioner cases/documentation I have personally evaluated this patient and have completed at least one if not all key elements of the E/M (history, physical exam, and MDM). Additional findings are as noted. This patient was evaluated in the Emergency Department for symptoms described in the history of present illness. The patient was evaluated in the context of the global COVID-19 pandemic, which necessitated consideration that the patient might be at risk for infection with the SARS-CoV-2 virus that causes COVID-19. Institutional protocols and algorithms that pertain to the evaluation of patients at risk for COVID-19 are in a state of rapid change based on information released by regulatory bodies including the CDC and federal and state organizations. These policies and algorithms were followed during the patient's care in the ED. Primary Care Physician: Andreia Prasad MD    History: This is a 62 y.o. female who presents to the Emergency Department with complaint of concerns for GI bleed. Patient states she noted some black tarry stool this morning and has significant medical issues with her stomach and decided come in for further evaluation. Patient is on Eliquis for previous pulmonary embolism. She also complains of feeling fatigued.   She also says the pain and does radiate into her chest.    Physical:   height is 5' 5\" (1.651 m) and weight is 193 lb (87.5 kg). Her skin temperature is 98.1 °F (36.7 °C). Her blood pressure is 118/88 and her pulse is 88. Her respiration is 18 and oxygen saturation is 98%. Lungs are clear auscultation bilateral, heart regular rate and rhythm, abdomen is soft she has some mild suprapubic tenderness no guarding or rebound, rectal exam per the resident is guaiac negative. Impression: Chest pain, dark stool    Plan: IV fluids CBC, BMP, EKG, troponin, chest x-ray      EKG Interpretation    Interpreted by me  Normal sinus rhythm ventricular 69, normal TX interval, normal QRS duration, septal infarct, age undetermined, T wave abnormality consider anterior ischemia, normal axis, normal QT corrected  Compared EKG of April 7, 2021, ventricular rate has decreased by 53      (Please note that portions of this note were completed with a voice recognition program.  Efforts were made to edit the dictations but occasionally words are mis-transcribed.)    Bonifacio Luna.  Giovanny Adrian MD, [facility]  Attending Emergency Medicine Physician        Laura Garnica MD  04/09/21 [id number]

## 2021-04-09 NOTE — ED PROVIDER NOTES
[facility] ED  Emergency Department Encounter  Emergency Medicine Resident     Pt Name: Daniela Hagan  CFS:[id number]  [place] 1960  Date of evaluation: 4/9/21  PCP:  Minal Brandon MD    [address] [place] [address]       Chief Complaint   Patient presents with    Rectal Bleeding     sent by Edilma Bright, black stools, large amount of blood in stool, fatigue, started at 11 am    Chest Pain     started this morning when bleeding started       HISTORY OF PRESENT ILLNESS  (Location/Symptom, Timing/Onset, Context/Setting, Quality, Duration, ModifyingFactors, Severity.)      Daniela Hagan is a 62 y.o. female with PMH of GI malignancy, colon polyps and aphthous ulceration department for evaluation of rectal bleeding. Patient states that she had 2 bowel movements earlier this morning both which were dark black in color which she believes was filled with blood. Patient states there was no bright red blood on toilet and no dripping sensation when she went to the restroom to 2 times. Patient states that she is follow along with [facility] clinic for GI for her gastrointestinal issues. Patient appears comfortable, talking in full sentences and able to answer questions without issue. Patient states of the past while she has been feeling fatigued, tired and generally feeling under the weather. Patient is also stating that she has mild chest pain described as a shooting sensation is coming up from her abdomen. Hypogastric abdominal pain associated with defecation which began this morning. Patient denies any change in her mental status, no changes in her vision, nausea/vomiting, difficulties breathing, or changes in ability to ambulate.     PAST MEDICAL / SURGICAL / SOCIAL /FAMILY HISTORY      has a past medical history of Anxiety, Arthritis, Asthma, Colon polyp, Colon polyps, Cyst of kidney, acquired, Fatigue, Hypertension, Hypothyroidism, Neuroendocrine tumor, Obesity, Pharyngoesophageal dysphagia, Vocal cord polyps, and Wears glasses. No other pertinent PMH on review with patient/guardian. has a past surgical history that includes cystourethroscopy/stent removal (5/3/11); Colonoscopy (2019); ERCP; Cholecystectomy (10/09/2014); hip surgery (Left); Throat surgery; Colonoscopy; Upper gastrointestinal endoscopy (2019); Upper gastrointestinal endoscopy (2019); Upper gastrointestinal endoscopy (N/A, 2019); Upper gastrointestinal endoscopy (N/A, 10/23/2019); Upper gastrointestinal endoscopy (N/A, 10/29/2019); Endoscopic ultrasonography, GI (N/A, 2020); and Gastric bypass surgery (2020). No other pertinent PSH on review with patient/guardian.   Social History     Socioeconomic History    Marital status: Single     Spouse name: Not on file    Number of children: Not on file    Years of education: Not on file    Highest education level: Not on file   Occupational History    Not on file   Social Needs    Financial resource strain: Not on file    Food insecurity     Worry: Not on file     Inability: Not on file    Transportation needs     Medical: Not on file     Non-medical: Not on file   Tobacco Use    Smoking status: Former Smoker     Packs/day: 1.00     Years: 25.00     Pack years: 25.00     Quit date: 2012     Years since quittin.8    Smokeless tobacco: Never Used    Tobacco comment: quit 2 years   Substance and Sexual Activity    Alcohol use: Not Currently     Comment: 3 times a month    Drug use: No    Sexual activity: Not on file   Lifestyle    Physical activity     Days per week: Not on file     Minutes per session: Not on file    Stress: Not on file   Relationships    Social connections     Talks on phone: Not on file     Gets together: Not on file     Attends [employer]ism service: Not on file     Active member of club or organization: Not on file     Attends meetings of clubs or organizations: Not on file     Relationship status: Not on file    Intimate partner violence     Fear of current or ex partner: Not on file     Emotionally abused: Not on file     Physically abused: Not on file     Forced sexual activity: Not on file   Other Topics Concern    Not on file   Social History Narrative    Not on file       I counseled the patient against using tobacco products. Family History   Problem Relation Age of Onset    High Blood Pressure Mother     High Blood Pressure Sister     Stomach Cancer Sister     Other Sister         epilepsy    No Known Problems Father      No other pertinent FamHx on review with patient/guardian. Allergies:  Erythromycin    Home Medications:  Prior to Admission medications    Medication Sig Start Date End Date Taking?  Authorizing Provider   cephALEXin (KEFLEX) 500 MG capsule Take 1 capsule by mouth 2 times daily for 7 days 4/9/21 4/16/21 Yes Sena Adair, DO   potassium chloride (KLOR-CON M) 10 MEQ extended release tablet Take 2 tablets by mouth daily for 7 days 4/9/21 4/16/21 Yes Sena Adair, DO   sucralfate (CARAFATE) 1 GM tablet Take 1 tablet by mouth 4 times daily 3/28/21   Artelia Lacrosse, DO   apixaban starter pack (ELIQUIS) 5 MG TBPK tablet Take 1 tablet by mouth See Admin Instructions 3/28/21   Artelia Lacrosse, DO   metoclopramide (REGLAN) 10 MG tablet Take 1 tablet by mouth 3 times daily as needed (nausea) 3/28/21   Artelia Lacrosse, DO   vitamin D 25 MCG (1000 UT) CAPS Take 1 capsule by mouth daily    Historical Provider, MD   Folic Acid-Vit A0-VZR F32 2.2-25-0.5 MG TABS Take 1 tablet by mouth daily 3/25/21   Ammon Marinelli MD   levothyroxine (SYNTHROID) 100 MCG tablet Take 1 tablet by mouth daily 3/25/21   Ammon Marinelli MD   potassium chloride (KLOR-CON M) 20 MEQ extended release tablet Take 2 tablets by mouth daily 3/25/21   Chiki Palumbo MD   hydroCHLOROthiazide (HYDRODIURIL) 25 MG tablet TAKE 1 TABLET BY MOUTH EVERY DAY  Patient taking differently: 12.5 mg TAKE 1/2 TABLET BY MOUTH EVERY DAY 3/10/21 Francisca Nance MD   ondansetron (ZOFRAN) 4 MG tablet Take 4 mg by mouth every 8 hours as needed for Nausea or Vomiting    Historical Provider, MD   pantoprazole (PROTONIX) 40 MG tablet TAKE 1 TABLET BY MOUTH EVERY DAY 12/4/20   Historical Provider, MD   desonide (DESOWEN) 0.05 % cream Apply topically 2 times daily. 8/21/20   Francisca Nance MD   Multiple Vitamin (MULTI-DAY VITAMINS PO) Take by mouth    Historical Provider, MD   vitamin B-12 (CYANOCOBALAMIN) 500 MCG tablet Take 500 mcg by mouth daily    Historical Provider, MD       REVIEW OF SYSTEMS    (2-9 systems for level 4, 10 ormore for level 5)      Review of Systems   Constitutional: Positive for activity change and fatigue. Negative for appetite change, chills, diaphoresis, fever and unexpected weight change. HENT: Negative. Eyes: Negative. Respiratory: Negative. Cardiovascular: Positive for chest pain. Negative for palpitations and leg swelling. Gastrointestinal: Positive for abdominal pain and blood in stool. Negative for abdominal distention, anal bleeding, constipation, diarrhea, nausea and vomiting. Genitourinary: Negative. Musculoskeletal: Negative. Skin: Negative. Neurological: Negative. Psychiatric/Behavioral: Negative. PHYSICAL EXAM   (up to 7 for level 4, 8 or more for level 5)      INITIAL VITALS:   /75   Pulse 69   Temp 98.1 °F (36.7 °C) (Skin)   Resp 14   Ht 5' 5\" (1.651 m)   Wt 193 lb (87.5 kg)   LMP 01/01/1994   SpO2 99%   BMI 32.12 kg/m²     Physical Exam  Vitals signs reviewed. Constitutional:       Appearance: She is not ill-appearing or diaphoretic. HENT:      Head: Normocephalic and atraumatic. Nose: Nose normal.      Mouth/Throat:      Mouth: Mucous membranes are moist.      Pharynx: Oropharynx is clear. Eyes:      Extraocular Movements: Extraocular movements intact. Pupils: Pupils are equal, round, and reactive to light.       Comments: Bilateral conjunctival pallor Neck:      Musculoskeletal: Normal range of motion and neck supple. Cardiovascular:      Rate and Rhythm: Normal rate and regular rhythm. Pulses: Normal pulses. Heart sounds: Normal heart sounds. Pulmonary:      Effort: Pulmonary effort is normal.      Breath sounds: Normal breath sounds. Abdominal:      General: Abdomen is scaphoid. Bowel sounds are normal. There is no distension. There are no signs of injury. Palpations: Abdomen is soft. There is no mass. Tenderness: There is abdominal tenderness in the suprapubic area. Musculoskeletal: Normal range of motion. Skin:     General: Skin is warm and dry. Capillary Refill: Capillary refill takes less than 2 seconds. Neurological:      General: No focal deficit present. Mental Status: She is alert. Mental status is at baseline.    Psychiatric:         Mood and Affect: Mood normal.         DIFFERENTIAL  DIAGNOSIS     PLAN (LABS / IMAGING / EKG):  Orders Placed This Encounter   Procedures    XR CHEST PORTABLE    Basic Metabolic Panel w/ Reflex to MG    CBC Auto Differential    Troponin    Urinalysis Reflex to Culture    Magnesium    Microscopic Urinalysis    BASIC METABOLIC PANEL    EKG 12 Lead       MEDICATIONS ORDERED:  Orders Placed This Encounter   Medications    DISCONTD: potassium bicarb-citric acid (EFFER-K) effervescent tablet 40 mEq    potassium chloride 10 mEq/100 mL IVPB (Peripheral Line)    cephALEXin (KEFLEX) capsule 500 mg     Order Specific Question:   Antimicrobial Indications     Answer:   Urinary Tract Infection    DISCONTD: magnesium sulfate 1000 mg in dextrose 5% 100 mL IVPB    magnesium sulfate 2000 mg in 50 mL IVPB premix    cephALEXin (KEFLEX) 500 MG capsule     Sig: Take 1 capsule by mouth 2 times daily for 7 days     Dispense:  14 capsule     Refill:  0    potassium chloride (KLOR-CON M) 10 MEQ extended release tablet     Sig: Take 2 tablets by mouth daily for 7 days     Dispense:  14 tablet     Refill:  0           DIAGNOSTIC RESULTS / EMERGENCY DEPARTMENT COURSE / MDM     LABS:  Results for orders placed or performed during the hospital encounter of 55/32/65   Basic Metabolic Panel w/ Reflex to MG   Result Value Ref Range    Glucose 75 70 - 99 mg/dL    BUN 5 (L) 6 - 20 mg/dL    CREATININE 0.56 0.50 - 0.90 mg/dL    Bun/Cre Ratio NOT REPORTED 9 - 20    Calcium 8.6 8.6 - 10.4 mg/dL    Sodium 136 135 - 144 mmol/L    Potassium 2.7 (LL) 3.7 - 5.3 mmol/L    Chloride 95 (L) 98 - 107 mmol/L    CO2 27 20 - 31 mmol/L    Anion Gap 14 9 - 17 mmol/L    GFR Non-African American >60 >60 mL/min    GFR African American >60 >60 mL/min    GFR Comment          GFR Staging NOT REPORTED    CBC Auto Differential   Result Value Ref Range    WBC 6.6 3.5 - 11.3 k/uL    RBC 4.99 3.95 - 5.11 m/uL    Hemoglobin 14.2 11.9 - 15.1 g/dL    Hematocrit 43.2 36.3 - 47.1 %    MCV 86.6 82.6 - 102.9 fL    MCH 28.5 25.2 - 33.5 pg    MCHC 32.9 28.4 - 34.8 g/dL    RDW 15.9 (H) 11.8 - 14.4 %    Platelets 421 227 - 186 k/uL    MPV 10.0 8.1 - 13.5 fL    NRBC Automated 0.0 0.0 per 100 WBC    Differential Type NOT REPORTED     Seg Neutrophils 58 36 - 65 %    Lymphocytes 27 24 - 43 %    Monocytes 11 3 - 12 %    Eosinophils % 2 1 - 4 %    Basophils 1 0 - 2 %    Immature Granulocytes 1 (H) 0 %    Segs Absolute 3.96 1.50 - 8.10 k/uL    Absolute Lymph # 1.82 1.10 - 3.70 k/uL    Absolute Mono # 0.70 0.10 - 1.20 k/uL    Absolute Eos # 0.10 0.00 - 0.44 k/uL    Basophils Absolute 0.03 0.00 - 0.20 k/uL    Absolute Immature Granulocyte 0.03 0.00 - 0.30 k/uL    WBC Morphology NOT REPORTED     RBC Morphology ANISOCYTOSIS PRESENT     Platelet Estimate NOT REPORTED    Troponin   Result Value Ref Range    Troponin, High Sensitivity 8 0 - 14 ng/L    Troponin T NOT REPORTED <0.03 ng/mL    Troponin Interp NOT REPORTED    Urinalysis Reflex to Culture    Specimen: Urine, clean catch   Result Value Ref Range    Color, UA YELLOW YELLOW    Turbidity UA CLOUDY (A) CLEAR    Glucose, Ur NEGATIVE NEGATIVE    Bilirubin Urine NEGATIVE NEGATIVE    Ketones, Urine SMALL (A) NEGATIVE    Specific Gravity, UA 1.015 1.005 - 1.030    Urine Hgb NEGATIVE NEGATIVE    pH, UA 6.0 5.0 - 8.0    Protein, UA NEGATIVE NEGATIVE    Urobilinogen, Urine Normal Normal    Nitrite, Urine NEGATIVE NEGATIVE    Leukocyte Esterase, Urine SMALL (A) NEGATIVE    Urinalysis Comments NOT REPORTED    Magnesium   Result Value Ref Range    Magnesium 1.4 (L) 1.6 - 2.6 mg/dL   Microscopic Urinalysis   Result Value Ref Range    -          WBC, UA 2 TO 5 0 - 5 /HPF    RBC, UA None 0 - 2 /HPF    Casts UA NOT REPORTED 0 - 2 /LPF    Crystals, UA NOT REPORTED None /HPF    Epithelial Cells UA 0 TO 2 0 - 5 /HPF    Renal Epithelial, UA NOT REPORTED 0 /HPF    Bacteria, UA MANY (A) None    Mucus, UA NOT REPORTED None    Trichomonas, UA NOT REPORTED None    Amorphous, UA NOT REPORTED None    Other Observations UA NOT REPORTED NOT REQ. Yeast, UA NOT REPORTED None   BASIC METABOLIC PANEL   Result Value Ref Range    Glucose 77 70 - 99 mg/dL    BUN 4 (L) 6 - 20 mg/dL    CREATININE 0.40 (L) 0.50 - 0.90 mg/dL    Bun/Cre Ratio NOT REPORTED 9 - 20    Calcium 8.0 (L) 8.6 - 10.4 mg/dL    Sodium 137 135 - 144 mmol/L    Potassium 3.9 3.7 - 5.3 mmol/L    Chloride 97 (L) 98 - 107 mmol/L    CO2 28 20 - 31 mmol/L    Anion Gap 12 9 - 17 mmol/L    GFR Non-African American >60 >60 mL/min    GFR African American >60 >60 mL/min    GFR Comment          GFR Staging NOT REPORTED    EKG 12 Lead   Result Value Ref Range    Ventricular Rate 69 BPM    Atrial Rate 69 BPM    P-R Interval 134 ms    QRS Duration 76 ms    Q-T Interval 404 ms    QTc Calculation (Bazett) 432 ms    P Axis 49 degrees    R Axis 16 degrees         IMPRESSION/MDM/ED COURSE:  [address] y.o. female presented with concerns for blood in the stool.   Given patient's extensive past medical history of GI malignancies and problem decision for abdominal work-up as well as chest pain work-up was DISPOSITION  04/09/2021 10:30:22 PM      PATIENT REFERREDTO:  MD Jere Montanez 36  [address]  [phone]    Schedule an appointment as soon as possible for a visit in 2 days  Recheck potassium      DISCHARGE MEDICATIONS:  Discharge Medication List as of 4/9/2021 10:32 PM      START taking these medications    Details   cephALEXin (KEFLEX) 500 MG capsule Take 1 capsule by mouth 2 times daily for 7 days, Disp-14 capsule, R-0Normal      !! potassium chloride (KLOR-CON M) 10 MEQ extended release tablet Take 2 tablets by mouth daily for 7 days, Disp-14 tablet, R-0Normal       !! - Potential duplicate medications found. Please discuss with provider.           Kimberly Yang MD  PGY 1  Resident Physician Emergency Medicine  04/10/21 11:59 AM        (Please note that portions of this note were completed with a voice recognition program.Efforts were made to edit the dictations but occasionally words are mis-transcribed.)       Kimberly Yang MD  Resident  04/09/21 [id number]       Kimberly Yang MD  Resident  04/10/21 [phone]

## 2021-04-09 NOTE — ED PROVIDER NOTES
[facility] ED  Emergency Department  Emergency Medicine Resident Sign-out     Care of Maribel Akers was assumed from Dr. Caity Guaman and is being seen for Rectal Bleeding (sent by Catrina Ferris, black stools, large amount of blood in stool, fatigue, started at 11 am) and Chest Pain (started this morning when bleeding started)  . The patient's initial evaluation and plan have been discussed with the prior provider who initially evaluated the patient.      EMERGENCY DEPARTMENT COURSE / MEDICAL DECISION MAKING:       MEDICATIONS GIVEN:  Orders Placed This Encounter   Medications    DISCONTD: potassium bicarb-citric acid (EFFER-K) effervescent tablet 40 mEq    potassium chloride 10 mEq/100 mL IVPB (Peripheral Line)    cephALEXin (KEFLEX) capsule 500 mg     Order Specific Question:   Antimicrobial Indications     Answer:   Urinary Tract Infection    DISCONTD: magnesium sulfate 1000 mg in dextrose 5% 100 mL IVPB    magnesium sulfate 2000 mg in 50 mL IVPB premix    cephALEXin (KEFLEX) 500 MG capsule     Sig: Take 1 capsule by mouth 2 times daily for 7 days     Dispense:  14 capsule     Refill:  0    potassium chloride (KLOR-CON M) 10 MEQ extended release tablet     Sig: Take 2 tablets by mouth daily for 7 days     Dispense:  14 tablet     Refill:  0       LABS / RADIOLOGY:     Labs Reviewed   BASIC METABOLIC PANEL W/ REFLEX TO MG FOR LOW K - Abnormal; Notable for the following components:       Result Value    BUN 5 (*)     Potassium 2.7 (*)     Chloride 95 (*)     All other components within normal limits   CBC WITH AUTO DIFFERENTIAL - Abnormal; Notable for the following components:    RDW 15.9 (*)     Immature Granulocytes 1 (*)     All other components within normal limits   URINE RT REFLEX TO CULTURE - Abnormal; Notable for the following components:    Turbidity UA CLOUDY (*)     Ketones, Urine SMALL (*)     Leukocyte Esterase, Urine SMALL (*)     All other components within normal limits normal.     Mild subsegmental atelectasis in the left upper lobe. Ct Chest Pulmonary Embolism W Contrast    Result Date: 4/7/2021  EXAMINATION: CTA OF THE CHEST 4/7/2021 1:18 pm TECHNIQUE: CTA of the chest was performed after the administration of intravenous contrast.  Multiplanar reformatted images are provided for review. MIP images are provided for review. Dose modulation, iterative reconstruction, and/or weight based adjustment of the mA/kV was utilized to reduce the radiation dose to as low as reasonably achievable. COMPARISON: 03/26/2021 HISTORY: ORDERING SYSTEM PROVIDED HISTORY: Chest pain, rule out PE TECHNOLOGIST PROVIDED HISTORY: Chest pain, rule out PE Decision Support Exception->Emergency Medical Condition (MA) Reason for Exam: Pt states nausea, vomiting and mid chest pain since 10:30am today. Pt was treated one week ago for PE. Acuity: Acute Type of Exam: Subsequent/Follow-up FINDINGS: Pulmonary Arteries: Pulmonary arteries are adequately opacified for evaluation. No evidence of intraluminal filling defect to suggest pulmonary embolism. Main pulmonary artery is normal in caliber. Mediastinum: No evidence of mediastinal lymphadenopathy. The heart and pericardium demonstrate no acute abnormality. There is no acute abnormality of the thoracic aorta. Coronary artery calcifications were noted. Lungs/pleura: No parenchymal infiltration, pleural effusion, Trish bronchial thickening or coalesced alveoli were noted. Upper Abdomen: A small fixed hiatal hernia was noted. Postoperative changes were noted from prior gastric bypass. No adrenal mass was seen. Soft Tissues/Bones: No acute bone or soft tissue abnormality. No pulmonary emboli on the current study. The pulmonary emboli noted on 03/26/2021 are no longer seen. No parenchymal infiltration, pleural effusion, peribronchial thickening or coalesced alveoli.      Ct Chest Pulmonary Embolism W Contrast    Result Date: 3/26/2021  EXAMINATION: CTA hernia and common duct dilation none of which appear significantly changed from the prior exam. Partially visualized multiple left renal cysts, stable. Critical results were called by Dr. Magdalena Gabriel to Yariel Amanda on 3/26/2021 at 23:11. Vl Dup Lower Extremity Venous Bilateral    Result Date: 3/28/2021    [facility]  Vascular Lower Extremities DVT Study Procedure   Patient Name   [facility]        Date of Study           03/27/2021                 Mignon Phlegm   Date of Birth  1960   Gender                  Female   Age            62 year(s)   Race                    Black   Room Number    0421   Corporate ID # [id number]   Patient Acct # [de-identified]   MR #           [id number]      Sonographer             Ravindra Lan RVT   Accession #    [id number]   Interpreting Physician  Edwardo Perez   Referring                   Referring Physician  Nurse  Practitioner  Procedure Type of Study:   Veins: Lower Extremities DVT Study, Venous Scan Lower Bilateral.  Patient Status: In Patient. Conclusions   Summary   No evidence of superficial or deep venous thrombosis in both lower  extremities. Signature   ----------------------------------------------------------------  Electronically signed by Ravindra Lan RVT(Sonographer) on  03/27/2021 10:17 AM  ----------------------------------------------------------------   ----------------------------------------------------------------  Electronically signed by Berton Franklin Reyes,Arthur(Interpreting  physician) on 03/28/2021 03:56 PM  ----------------------------------------------------------------  Findings:   Right Impression:                    Left Impression:  The common femoral, femoral,         The common femoral, femoral,  popliteal and tibial veins           popliteal and tibial veins  demonstrate normal compressibility   demonstrate normal compressibility  and augmentation. and augmentation.   Normal compressibility of the great  Normal compressibility of the great  saphenous vein. saphenous vein. Normal compressibility of the small  Normal compressibility of the small  saphenous vein. saphenous vein. Enlarged lymph nodes are noted at  the right groin level. Varicosities are noted in the calf. Risk Factors History +--------------------+----------+------------------------------------------+ ! Diagnosis           ! Date      ! Comments                                  ! +--------------------+----------+------------------------------------------+ ! Previous Scan       !07/17/2017! Right lower extremity--WNL                ! +--------------------+----------+------------------------------------------+   - The patient's risk factor(s) include: dyslipidemia and arterial     hypertension.   - The patient has a former tobacco history. Velocities are measured in cm/s ; Diameters are measured in cm Right Lower Extremities DVT Study Measurements Right 2D Measurements +------------------------------------+----------+---------------+----------+ ! Location                            ! Visualized! Compressibility! Thrombosis! +------------------------------------+----------+---------------+----------+ ! Common Femoral                      !Yes       ! Yes            ! None      ! +------------------------------------+----------+---------------+----------+ ! Prox Femoral                        !Yes       ! Yes            ! None      ! +------------------------------------+----------+---------------+----------+ ! Mid Femoral                         !Yes       ! Yes            ! None      ! +------------------------------------+----------+---------------+----------+ ! Dist Femoral                        !Yes       ! Yes            ! None      ! +------------------------------------+----------+---------------+----------+ ! Deep Femoral                        !Yes       ! Yes            ! None      ! +------------------------------------+----------+---------------+----------+ ! Popliteal                           !Yes       ! Yes            ! None      ! +------------------------------------+----------+---------------+----------+ ! Sapheno Femoral Junction            ! Yes       ! Yes            ! None      ! +------------------------------------+----------+---------------+----------+ ! PTV                                 ! Yes       ! Yes            ! None      ! +------------------------------------+----------+---------------+----------+ ! Peroneal                            !Yes       ! Yes            ! None      ! +------------------------------------+----------+---------------+----------+ ! Gastroc                             ! Yes       ! Yes            ! None      ! +------------------------------------+----------+---------------+----------+ ! GSV Thigh                           ! Yes       ! Yes            ! None      ! +------------------------------------+----------+---------------+----------+ ! GSV Knee                            ! Yes       ! Yes            ! None      ! +------------------------------------+----------+---------------+----------+ ! GSV Ankle                           ! Yes       ! Yes            ! None      ! +------------------------------------+----------+---------------+----------+ ! SSV                                 ! Yes       ! Yes            ! None      ! +------------------------------------+----------+---------------+----------+ Right Doppler Measurements +---------------------------+------+------+--------------------------------+ ! Location                   ! Signal!Reflux! Reflux (msec)                   ! +---------------------------+------+------+--------------------------------+ ! Common Femoral             !Phasic!      !                                ! +---------------------------+------+------+--------------------------------+ ! Prox Femoral               !Phasic!      ! ! +---------------------------+------+------+--------------------------------+ ! Popliteal                  !Phasic!      !                                ! +---------------------------+------+------+--------------------------------+ Left Lower Extremities DVT Study Measurements Left 2D Measurements +------------------------------------+----------+---------------+----------+ ! Location                            ! Visualized! Compressibility! Thrombosis! +------------------------------------+----------+---------------+----------+ ! Common Femoral                      !Yes       ! Yes            ! None      ! +------------------------------------+----------+---------------+----------+ ! Prox Femoral                        !Yes       ! Yes            ! None      ! +------------------------------------+----------+---------------+----------+ ! Mid Femoral                         !Yes       ! Yes            ! None      ! +------------------------------------+----------+---------------+----------+ ! Dist Femoral                        !Yes       ! Yes            ! None      ! +------------------------------------+----------+---------------+----------+ ! Deep Femoral                        !Yes       ! Yes            ! None      ! +------------------------------------+----------+---------------+----------+ ! Popliteal                           !Yes       ! Yes            ! None      ! +------------------------------------+----------+---------------+----------+ ! Sapheno Femoral Junction            ! Yes       ! Yes            ! None      ! +------------------------------------+----------+---------------+----------+ ! PTV                                 ! Yes       ! Yes            ! None      ! +------------------------------------+----------+---------------+----------+ ! Myles                            !Yes       ! Yes            ! None      ! +------------------------------------+----------+---------------+----------+ ! Gastroc !Yes       !Yes            ! None      ! +------------------------------------+----------+---------------+----------+ ! GSV Thigh                           ! Yes       ! Yes            ! None      ! +------------------------------------+----------+---------------+----------+ ! GSV Knee                            ! Yes       ! Yes            ! None      ! +------------------------------------+----------+---------------+----------+ ! GSV Ankle                           ! Yes       ! Yes            ! None      ! +------------------------------------+----------+---------------+----------+ ! SSV                                 ! Yes       ! Yes            ! None      ! +------------------------------------+----------+---------------+----------+ Left Doppler Measurements +---------------------------+------+------+--------------------------------+ ! Location                   ! Signal!Reflux! Reflux (msec)                   ! +---------------------------+------+------+--------------------------------+ ! Common Femoral             !Phasic!      !                                ! +---------------------------+------+------+--------------------------------+ ! Prox Femoral               !Phasic!      !                                ! +---------------------------+------+------+--------------------------------+ ! Popliteal                  !Phasic!      !                                ! +---------------------------+------+------+--------------------------------+      RECENT VITALS:     Temp: 98.1 °F (36.7 °C),  Pulse: 69, Resp: 14, BP: 116/75, SpO2: 99 %    This patient is a 62 y.o. Female with blood in stool, shooting pain to chest    Large hard BM, painful today    Hx gastric ulcer/ carcinoid stomach removed surgically ([facility])    guiac neg, no hemmoroids     K 2.7 - repleting   Mag low- repleating   UTI        OUTSTANDING TASKS / RECOMMENDATIONS:    1. Finish K   2. Recheck K , if >3 home, if not admit      FINAL IMPRESSION:     1. Hypokalemia    2. Urinary tract infection without hematuria, site unspecified        DISPOSITION:         DISPOSITION:  [x]  Discharge     []  Transfer -    []  Admission -     []  Against Medical Advice   []  Eloped   FOLLOW-UP: Sean Arce MD  [address] [place] [address]  [phone]    Schedule an appointment as soon as possible for a visit in 2 days  Recheck potassium     DISCHARGE MEDICATIONS: Discharge Medication List as of 4/9/2021 10:32 PM      START taking these medications    Details   cephALEXin (KEFLEX) 500 MG capsule Take 1 capsule by mouth 2 times daily for 7 days, Disp-14 capsule, R-0Normal      !! potassium chloride (KLOR-CON M) 10 MEQ extended release tablet Take 2 tablets by mouth daily for 7 days, Disp-14 tablet, R-0Normal       !! - Potential duplicate medications found. Please discuss with provider.               Madyson Hein DO  Emergency Medicine Resident  [address], [place]  Resident  04/10/21 1982

## 2021-04-09 NOTE — TELEPHONE ENCOUNTER
Pt called in c/o black stools and lower abdominal pain today. Pt has h/o PE, on eliquis. please advise.

## 2021-04-10 LAB
EKG ATRIAL RATE: 69 BPM
EKG P AXIS: 49 DEGREES
EKG P-R INTERVAL: 134 MS
EKG Q-T INTERVAL: 404 MS
EKG QRS DURATION: 76 MS
EKG QTC CALCULATION (BAZETT): 432 MS
EKG R AXIS: 16 DEGREES
EKG VENTRICULAR RATE: 69 BPM

## 2021-04-12 ENCOUNTER — TELEPHONE (OUTPATIENT)
Dept: INTERNAL MEDICINE CLINIC | Age: 61
End: 2021-04-12

## 2021-04-12 DIAGNOSIS — E87.6 HYPOKALEMIA: Primary | ICD-10-CM

## 2021-04-12 NOTE — TELEPHONE ENCOUNTER
Patient notified [facility] Galvan is availlable for pickup   Patient stated she would like it faxed to the fax numbers provided [phone] and [phone]    Patient will  on 4/13/21    Patient also stated she needed a work letter to return to work on Monday 4/19/2021 stated she is still weak and unable to perform her job duties to return on Wednesday 4/14/21    Please advise

## 2021-04-12 NOTE — TELEPHONE ENCOUNTER
Patient was seen in the Er she is asking for you to review visit as she is till very weak and not able to come in for a follow up at this time

## 2021-04-12 NOTE — TELEPHONE ENCOUNTER
Pt is no longer having any rectal bleeding, she said when in ER they checked stool for blood and was negative    Her concern is her Potassium level- she was told it most likely will continue to drop due to her stomach having been removed- she has no  orders to repeat labs and doesn't go to [facility] until [place]

## 2021-04-12 NOTE — TELEPHONE ENCOUNTER
Repeat potassium levels tomorrow and ask her to call and inform [facility] about her visit

## 2021-04-12 NOTE — TELEPHONE ENCOUNTER
Records reviewed patient was diagnosed with UTI and low potassium and was treated but the basic issue that she went to the emergency room about rectal bleeding not addressed  Does she still have the bleeding or is it improved and she should call her doctor at [facility] and let them know  If she is stable no need for follow-up and can see me after seeing the doctor at [facility]

## 2021-04-13 ENCOUNTER — TELEPHONE (OUTPATIENT)
Dept: INTERNAL MEDICINE CLINIC | Age: 61
End: 2021-04-13

## 2021-04-13 ENCOUNTER — HOSPITAL ENCOUNTER (OUTPATIENT)
Age: 61
Setting detail: SPECIMEN
Discharge: HOME | End: 2021-04-13
Payer: COMMERCIAL

## 2021-04-13 DIAGNOSIS — E87.6 HYPOKALEMIA: Primary | ICD-10-CM

## 2021-04-13 DIAGNOSIS — E87.6 HYPOKALEMIA: ICD-10-CM

## 2021-04-13 LAB — POTASSIUM SERPL-SCNC: 3.2 MMOL/L (ref 3.7–5.3)

## 2021-04-13 NOTE — TELEPHONE ENCOUNTER
Patient HR [employer] calling and asking that the letter that was sent needs to state if she has restrictions when returning to work?     Please fax to [phone]

## 2021-04-13 NOTE — TELEPHONE ENCOUNTER
PT INFORMED- she will go to [id number] [facility], has enough potassium at home to take as ordered

## 2021-04-13 NOTE — TELEPHONE ENCOUNTER
----- Message from Malvin Rasmussen MD sent at 4/13/2021  1:09 PM EDT -----  How much potassium she is taking  If she is taking 20 mEq daily increased to 40 mg daily and repeat potassium in 3 days

## 2021-04-13 NOTE — TELEPHONE ENCOUNTER
Patient notified letter is ready   Letter was faxed to [phone]  And mailed to patient per request

## 2021-04-16 ENCOUNTER — IMMUNIZATION (OUTPATIENT)
Dept: INTERNAL MEDICINE CLINIC | Age: 61
End: 2021-04-16
Payer: COMMERCIAL

## 2021-04-16 ENCOUNTER — HOSPITAL ENCOUNTER (OUTPATIENT)
Age: 61
Setting detail: SPECIMEN
Discharge: HOME | End: 2021-04-16
Payer: COMMERCIAL

## 2021-04-16 DIAGNOSIS — E87.6 HYPOKALEMIA: ICD-10-CM

## 2021-04-16 LAB — -: NORMAL

## 2021-04-16 PROCEDURE — 0002A COVID-19, PFIZER VACCINE 30MCG/0.3ML DOSE: CPT | Performed by: INTERNAL MEDICINE

## 2021-04-16 PROCEDURE — 91300 COVID-19, PFIZER VACCINE 30MCG/0.3ML DOSE: CPT | Performed by: INTERNAL MEDICINE

## 2021-04-19 ENCOUNTER — HOSPITAL ENCOUNTER (OUTPATIENT)
Age: 61
Setting detail: SPECIMEN
Discharge: HOME | End: 2021-04-19
Payer: COMMERCIAL

## 2021-04-19 DIAGNOSIS — E87.6 HYPOKALEMIA: ICD-10-CM

## 2021-04-19 LAB — POTASSIUM SERPL-SCNC: 4.4 MMOL/L (ref 3.7–5.3)

## 2021-04-19 RX ORDER — POTASSIUM CHLORIDE 20 MEQ/1
40 TABLET, EXTENDED RELEASE ORAL DAILY
Qty: 180 TABLET | Refills: 1 | OUTPATIENT
Start: 2021-04-19

## 2021-04-20 DIAGNOSIS — E87.6 HYPOKALEMIA: ICD-10-CM

## 2021-04-20 RX ORDER — POTASSIUM CHLORIDE 1.5 G/1.77G
20 POWDER, FOR SOLUTION ORAL DAILY
COMMUNITY
End: 2021-04-20 | Stop reason: SDUPTHER

## 2021-04-20 RX ORDER — POTASSIUM CHLORIDE 1.5 G/1.77G
20 POWDER, FOR SOLUTION ORAL DAILY
Qty: 90 EACH | Refills: 1 | Status: ON HOLD | OUTPATIENT
Start: 2021-04-20 | End: 2021-09-03 | Stop reason: HOSPADM

## 2021-04-20 NOTE — TELEPHONE ENCOUNTER
Patient calling asking for a refill on her potassium? But also asking what dose should she be taking right now?     States she does need a RX called into her pharmacy today, she is out    Please advise

## 2021-04-20 NOTE — TELEPHONE ENCOUNTER
Advise patient to go back on her previous potassium dosage at 20 mEq daily and repeat potassium levels in 2 weeks

## 2021-04-26 ENCOUNTER — APPOINTMENT (OUTPATIENT)
Dept: CT IMAGING | Age: 61
DRG: 382 | End: 2021-04-26
Payer: COMMERCIAL

## 2021-04-26 ENCOUNTER — APPOINTMENT (OUTPATIENT)
Dept: GENERAL RADIOLOGY | Age: 61
DRG: 382 | End: 2021-04-26
Payer: COMMERCIAL

## 2021-04-26 ENCOUNTER — ANESTHESIA EVENT (OUTPATIENT)
Dept: ENDOSCOPY | Age: 61
DRG: 382 | End: 2021-04-26
Payer: COMMERCIAL

## 2021-04-26 ENCOUNTER — ANESTHESIA (OUTPATIENT)
Dept: ENDOSCOPY | Age: 61
DRG: 382 | End: 2021-04-26
Payer: COMMERCIAL

## 2021-04-26 ENCOUNTER — HOSPITAL ENCOUNTER (INPATIENT)
Age: 61
LOS: 1 days | Discharge: HOME | DRG: 382 | End: 2021-04-29
Attending: EMERGENCY MEDICINE | Admitting: EMERGENCY MEDICINE
Payer: COMMERCIAL

## 2021-04-26 VITALS — SYSTOLIC BLOOD PRESSURE: 141 MMHG | DIASTOLIC BLOOD PRESSURE: 94 MMHG | OXYGEN SATURATION: 99 %

## 2021-04-26 DIAGNOSIS — R07.89 OTHER CHEST PAIN: ICD-10-CM

## 2021-04-26 DIAGNOSIS — R11.2 INTRACTABLE VOMITING WITH NAUSEA, UNSPECIFIED VOMITING TYPE: Primary | ICD-10-CM

## 2021-04-26 LAB
ABSOLUTE EOS #: 0.13 K/UL (ref 0–0.44)
ABSOLUTE IMMATURE GRANULOCYTE: <0.03 K/UL (ref 0–0.3)
ABSOLUTE LYMPH #: 1.31 K/UL (ref 1.1–3.7)
ABSOLUTE MONO #: 0.44 K/UL (ref 0.1–1.2)
ALBUMIN SERPL-MCNC: 3.3 G/DL (ref 3.5–5.2)
ALBUMIN/GLOBULIN RATIO: 0.8 (ref 1–2.5)
ALP BLD-CCNC: 97 U/L (ref 35–104)
ALT SERPL-CCNC: 29 U/L (ref 5–33)
ANION GAP SERPL CALCULATED.3IONS-SCNC: 16 MMOL/L (ref 9–17)
AST SERPL-CCNC: 48 U/L
BASOPHILS # BLD: 0 % (ref 0–2)
BASOPHILS ABSOLUTE: <0.03 K/UL (ref 0–0.2)
BILIRUB SERPL-MCNC: 0.47 MG/DL (ref 0.3–1.2)
BNP INTERPRETATION: NORMAL
BUN BLDV-MCNC: 9 MG/DL (ref 6–20)
BUN/CREAT BLD: ABNORMAL (ref 9–20)
CALCIUM SERPL-MCNC: 9.7 MG/DL (ref 8.6–10.4)
CHLORIDE BLD-SCNC: 100 MMOL/L (ref 98–107)
CO2: 22 MMOL/L (ref 20–31)
CREAT SERPL-MCNC: 0.68 MG/DL (ref 0.5–0.9)
DIFFERENTIAL TYPE: ABNORMAL
EOSINOPHILS RELATIVE PERCENT: 2 % (ref 1–4)
GFR AFRICAN AMERICAN: >60 ML/MIN
GFR NON-AFRICAN AMERICAN: >60 ML/MIN
GFR SERPL CREATININE-BSD FRML MDRD: ABNORMAL ML/MIN/{1.73_M2}
GFR SERPL CREATININE-BSD FRML MDRD: ABNORMAL ML/MIN/{1.73_M2}
GLUCOSE BLD-MCNC: 116 MG/DL (ref 70–99)
HCT VFR BLD CALC: 48.7 % (ref 36.3–47.1)
HEMOGLOBIN: 15.9 G/DL (ref 11.9–15.1)
IMMATURE GRANULOCYTES: 0 %
LIPASE: 13 U/L (ref 13–60)
LYMPHOCYTES # BLD: 21 % (ref 24–43)
MCH RBC QN AUTO: 29.3 PG (ref 25.2–33.5)
MCHC RBC AUTO-ENTMCNC: 32.6 G/DL (ref 28.4–34.8)
MCV RBC AUTO: 89.7 FL (ref 82.6–102.9)
MONOCYTES # BLD: 7 % (ref 3–12)
NRBC AUTOMATED: 0 PER 100 WBC
PDW BLD-RTO: 15.6 % (ref 11.8–14.4)
PLATELET # BLD: 338 K/UL (ref 138–453)
PLATELET ESTIMATE: ABNORMAL
PMV BLD AUTO: 10 FL (ref 8.1–13.5)
POTASSIUM SERPL-SCNC: 3.9 MMOL/L (ref 3.7–5.3)
PRO-BNP: 113 PG/ML
RBC # BLD: 5.43 M/UL (ref 3.95–5.11)
RBC # BLD: ABNORMAL 10*6/UL
SARS-COV-2, RAPID: NOT DETECTED
SEG NEUTROPHILS: 70 % (ref 36–65)
SEGMENTED NEUTROPHILS ABSOLUTE COUNT: 4.41 K/UL (ref 1.5–8.1)
SODIUM BLD-SCNC: 138 MMOL/L (ref 135–144)
SPECIMEN DESCRIPTION: NORMAL
TOTAL PROTEIN: 7.6 G/DL (ref 6.4–8.3)
TROPONIN INTERP: NORMAL
TROPONIN INTERP: NORMAL
TROPONIN T: NORMAL NG/ML
TROPONIN T: NORMAL NG/ML
TROPONIN, HIGH SENSITIVITY: 7 NG/L (ref 0–14)
TROPONIN, HIGH SENSITIVITY: 7 NG/L (ref 0–14)
WBC # BLD: 6.3 K/UL (ref 3.5–11.3)
WBC # BLD: ABNORMAL 10*3/UL

## 2021-04-26 PROCEDURE — 43239 EGD BIOPSY SINGLE/MULTIPLE: CPT | Performed by: INTERNAL MEDICINE

## 2021-04-26 PROCEDURE — 0DB58ZX EXCISION OF ESOPHAGUS, VIA NATURAL OR ARTIFICIAL OPENING ENDOSCOPIC, DIAGNOSTIC: ICD-10-PCS | Performed by: INTERNAL MEDICINE

## 2021-04-26 PROCEDURE — G0378 HOSPITAL OBSERVATION PER HR: HCPCS

## 2021-04-26 PROCEDURE — 84484 ASSAY OF TROPONIN QUANT: CPT

## 2021-04-26 PROCEDURE — 7100000010 HC PHASE II RECOVERY - FIRST 15 MIN: Performed by: INTERNAL MEDICINE

## 2021-04-26 PROCEDURE — 2709999900 HC NON-CHARGEABLE SUPPLY: Performed by: INTERNAL MEDICINE

## 2021-04-26 PROCEDURE — 6370000000 HC RX 637 (ALT 250 FOR IP): Performed by: INTERNAL MEDICINE

## 2021-04-26 PROCEDURE — 83690 ASSAY OF LIPASE: CPT

## 2021-04-26 PROCEDURE — 6360000002 HC RX W HCPCS: Performed by: EMERGENCY MEDICINE

## 2021-04-26 PROCEDURE — 96375 TX/PRO/DX INJ NEW DRUG ADDON: CPT

## 2021-04-26 PROCEDURE — 87635 SARS-COV-2 COVID-19 AMP PRB: CPT

## 2021-04-26 PROCEDURE — 2580000003 HC RX 258: Performed by: STUDENT IN AN ORGANIZED HEALTH CARE EDUCATION/TRAINING PROGRAM

## 2021-04-26 PROCEDURE — C9113 INJ PANTOPRAZOLE SODIUM, VIA: HCPCS | Performed by: EMERGENCY MEDICINE

## 2021-04-26 PROCEDURE — 6360000002 HC RX W HCPCS: Performed by: NURSE PRACTITIONER

## 2021-04-26 PROCEDURE — 2580000003 HC RX 258

## 2021-04-26 PROCEDURE — 2500000003 HC RX 250 WO HCPCS: Performed by: NURSE ANESTHETIST, CERTIFIED REGISTERED

## 2021-04-26 PROCEDURE — 3609012400 HC EGD TRANSORAL BIOPSY SINGLE/MULTIPLE: Performed by: INTERNAL MEDICINE

## 2021-04-26 PROCEDURE — 6360000004 HC RX CONTRAST MEDICATION: Performed by: EMERGENCY MEDICINE

## 2021-04-26 PROCEDURE — 93005 ELECTROCARDIOGRAM TRACING: CPT | Performed by: STUDENT IN AN ORGANIZED HEALTH CARE EDUCATION/TRAINING PROGRAM

## 2021-04-26 PROCEDURE — 96376 TX/PRO/DX INJ SAME DRUG ADON: CPT

## 2021-04-26 PROCEDURE — 85025 COMPLETE CBC W/AUTO DIFF WBC: CPT

## 2021-04-26 PROCEDURE — 99254 IP/OBS CNSLTJ NEW/EST MOD 60: CPT | Performed by: INTERNAL MEDICINE

## 2021-04-26 PROCEDURE — 83880 ASSAY OF NATRIURETIC PEPTIDE: CPT

## 2021-04-26 PROCEDURE — 6360000002 HC RX W HCPCS: Performed by: STUDENT IN AN ORGANIZED HEALTH CARE EDUCATION/TRAINING PROGRAM

## 2021-04-26 PROCEDURE — 3700000000 HC ANESTHESIA ATTENDED CARE: Performed by: INTERNAL MEDICINE

## 2021-04-26 PROCEDURE — 71045 X-RAY EXAM CHEST 1 VIEW: CPT

## 2021-04-26 PROCEDURE — 74177 CT ABD & PELVIS W/CONTRAST: CPT

## 2021-04-26 PROCEDURE — 6360000002 HC RX W HCPCS: Performed by: NURSE ANESTHETIST, CERTIFIED REGISTERED

## 2021-04-26 PROCEDURE — 99284 EMERGENCY DEPT VISIT MOD MDM: CPT

## 2021-04-26 PROCEDURE — 80053 COMPREHEN METABOLIC PANEL: CPT

## 2021-04-26 PROCEDURE — 88305 TISSUE EXAM BY PATHOLOGIST: CPT

## 2021-04-26 PROCEDURE — 2580000003 HC RX 258: Performed by: INTERNAL MEDICINE

## 2021-04-26 PROCEDURE — 88312 SPECIAL STAINS GROUP 1: CPT

## 2021-04-26 RX ORDER — SODIUM CHLORIDE 9 MG/ML
INJECTION, SOLUTION INTRAVENOUS ONCE
Status: DISCONTINUED | OUTPATIENT
Start: 2021-04-26 | End: 2021-04-26

## 2021-04-26 RX ORDER — POTASSIUM CHLORIDE 750 MG/1
20 CAPSULE, EXTENDED RELEASE ORAL DAILY
Status: DISCONTINUED | OUTPATIENT
Start: 2021-04-26 | End: 2021-04-27

## 2021-04-26 RX ORDER — B12/LEVOMEFOLATE CALCIUM/B-6 2-1.13-25
1 TABLET ORAL DAILY
Status: DISCONTINUED | OUTPATIENT
Start: 2021-04-26 | End: 2021-04-29 | Stop reason: HOSPADM

## 2021-04-26 RX ORDER — PROPOFOL 10 MG/ML
INJECTION, EMULSION INTRAVENOUS PRN
Status: DISCONTINUED | OUTPATIENT
Start: 2021-04-26 | End: 2021-04-26 | Stop reason: SDUPTHER

## 2021-04-26 RX ORDER — ONDANSETRON 2 MG/ML
4 INJECTION INTRAMUSCULAR; INTRAVENOUS ONCE
Status: COMPLETED | OUTPATIENT
Start: 2021-04-26 | End: 2021-04-26

## 2021-04-26 RX ORDER — LIDOCAINE HYDROCHLORIDE 10 MG/ML
INJECTION, SOLUTION EPIDURAL; INFILTRATION; INTRACAUDAL; PERINEURAL PRN
Status: DISCONTINUED | OUTPATIENT
Start: 2021-04-26 | End: 2021-04-26 | Stop reason: SDUPTHER

## 2021-04-26 RX ORDER — SUCRALFATE 1 G/1
1 TABLET ORAL 4 TIMES DAILY
Status: DISCONTINUED | OUTPATIENT
Start: 2021-04-26 | End: 2021-04-29 | Stop reason: HOSPADM

## 2021-04-26 RX ORDER — SODIUM CHLORIDE 9 MG/ML
INJECTION INTRAVENOUS
Status: COMPLETED
Start: 2021-04-26 | End: 2021-04-26

## 2021-04-26 RX ORDER — ONDANSETRON 2 MG/ML
4 INJECTION INTRAMUSCULAR; INTRAVENOUS EVERY 6 HOURS PRN
Status: DISCONTINUED | OUTPATIENT
Start: 2021-04-26 | End: 2021-04-29 | Stop reason: HOSPADM

## 2021-04-26 RX ORDER — 0.9 % SODIUM CHLORIDE 0.9 %
1000 INTRAVENOUS SOLUTION INTRAVENOUS ONCE
Status: COMPLETED | OUTPATIENT
Start: 2021-04-26 | End: 2021-04-26

## 2021-04-26 RX ORDER — SODIUM CHLORIDE 9 MG/ML
10 INJECTION INTRAVENOUS ONCE
Status: DISCONTINUED | OUTPATIENT
Start: 2021-04-26 | End: 2021-04-26

## 2021-04-26 RX ORDER — PANTOPRAZOLE SODIUM 40 MG/1
40 TABLET, DELAYED RELEASE ORAL
Status: DISCONTINUED | OUTPATIENT
Start: 2021-04-27 | End: 2021-04-26

## 2021-04-26 RX ORDER — MORPHINE SULFATE 4 MG/ML
4 INJECTION, SOLUTION INTRAMUSCULAR; INTRAVENOUS ONCE
Status: COMPLETED | OUTPATIENT
Start: 2021-04-26 | End: 2021-04-26

## 2021-04-26 RX ORDER — SODIUM CHLORIDE 9 MG/ML
1000 INJECTION, SOLUTION INTRAVENOUS CONTINUOUS
Status: DISCONTINUED | OUTPATIENT
Start: 2021-04-26 | End: 2021-04-28

## 2021-04-26 RX ORDER — METOCLOPRAMIDE 10 MG/1
10 TABLET ORAL 3 TIMES DAILY PRN
Status: DISCONTINUED | OUTPATIENT
Start: 2021-04-26 | End: 2021-04-29 | Stop reason: HOSPADM

## 2021-04-26 RX ORDER — VITAMIN B COMPLEX
1 TABLET ORAL DAILY
Status: DISCONTINUED | OUTPATIENT
Start: 2021-04-26 | End: 2021-04-29 | Stop reason: HOSPADM

## 2021-04-26 RX ORDER — LEVOTHYROXINE SODIUM 0.1 MG/1
100 TABLET ORAL DAILY
Status: DISCONTINUED | OUTPATIENT
Start: 2021-04-26 | End: 2021-04-29 | Stop reason: HOSPADM

## 2021-04-26 RX ORDER — METOCLOPRAMIDE HYDROCHLORIDE 5 MG/ML
10 INJECTION INTRAMUSCULAR; INTRAVENOUS ONCE
Status: COMPLETED | OUTPATIENT
Start: 2021-04-26 | End: 2021-04-26

## 2021-04-26 RX ORDER — ONDANSETRON 2 MG/ML
4 INJECTION INTRAMUSCULAR; INTRAVENOUS EVERY 6 HOURS PRN
Status: DISCONTINUED | OUTPATIENT
Start: 2021-04-26 | End: 2021-04-27 | Stop reason: SDUPTHER

## 2021-04-26 RX ORDER — CHOLECALCIFEROL (VITAMIN D3) 125 MCG
500 CAPSULE ORAL DAILY
Status: DISCONTINUED | OUTPATIENT
Start: 2021-04-26 | End: 2021-04-29 | Stop reason: HOSPADM

## 2021-04-26 RX ORDER — HYDROCHLOROTHIAZIDE 25 MG/1
12.5 TABLET ORAL DAILY
Status: DISCONTINUED | OUTPATIENT
Start: 2021-04-26 | End: 2021-04-29 | Stop reason: HOSPADM

## 2021-04-26 RX ORDER — PANTOPRAZOLE SODIUM 40 MG/10ML
40 INJECTION, POWDER, LYOPHILIZED, FOR SOLUTION INTRAVENOUS 2 TIMES DAILY
Status: DISCONTINUED | OUTPATIENT
Start: 2021-04-26 | End: 2021-04-26

## 2021-04-26 RX ADMIN — APIXABAN 5 MG: 5 TABLET, FILM COATED ORAL at 20:45

## 2021-04-26 RX ADMIN — SUCRALFATE 1 G: 1 TABLET ORAL at 20:40

## 2021-04-26 RX ADMIN — ONDANSETRON 4 MG: 2 INJECTION INTRAMUSCULAR; INTRAVENOUS at 11:27

## 2021-04-26 RX ADMIN — SODIUM CHLORIDE 1000 ML: 9 INJECTION, SOLUTION INTRAVENOUS at 05:43

## 2021-04-26 RX ADMIN — SODIUM CHLORIDE 1000 ML: 9 INJECTION, SOLUTION INTRAVENOUS at 08:45

## 2021-04-26 RX ADMIN — METOCLOPRAMIDE 10 MG: 5 INJECTION, SOLUTION INTRAMUSCULAR; INTRAVENOUS at 13:17

## 2021-04-26 RX ADMIN — LIDOCAINE HYDROCHLORIDE 50 MG: 10 INJECTION, SOLUTION EPIDURAL; INFILTRATION; INTRACAUDAL; PERINEURAL at 15:53

## 2021-04-26 RX ADMIN — PROPOFOL 70 MG: 10 INJECTION, EMULSION INTRAVENOUS at 15:53

## 2021-04-26 RX ADMIN — PANTOPRAZOLE SODIUM 40 MG: 40 INJECTION, POWDER, LYOPHILIZED, FOR SOLUTION INTRAVENOUS at 13:17

## 2021-04-26 RX ADMIN — SODIUM CHLORIDE 10 ML: 9 INJECTION, SOLUTION INTRAMUSCULAR; INTRAVENOUS; SUBCUTANEOUS at 13:18

## 2021-04-26 RX ADMIN — MORPHINE SULFATE 4 MG: 4 INJECTION, SOLUTION INTRAMUSCULAR; INTRAVENOUS at 05:42

## 2021-04-26 RX ADMIN — ONDANSETRON 4 MG: 2 INJECTION INTRAMUSCULAR; INTRAVENOUS at 08:46

## 2021-04-26 RX ADMIN — ONDANSETRON 4 MG: 2 INJECTION INTRAMUSCULAR; INTRAVENOUS at 05:42

## 2021-04-26 RX ADMIN — SODIUM CHLORIDE 1000 ML: 9 INJECTION, SOLUTION INTRAVENOUS at 18:11

## 2021-04-26 RX ADMIN — IOPAMIDOL 75 ML: 755 INJECTION, SOLUTION INTRAVENOUS at 12:05

## 2021-04-26 RX ADMIN — SODIUM CHLORIDE 10 ML: 9 INJECTION INTRAVENOUS at 13:18

## 2021-04-26 RX ADMIN — PROPOFOL 40 MG: 10 INJECTION, EMULSION INTRAVENOUS at 15:54

## 2021-04-26 ASSESSMENT — ENCOUNTER SYMPTOMS
ABDOMINAL PAIN: 0
BACK PAIN: 0
RHINORRHEA: 0
CONSTIPATION: 0
NAUSEA: 1
SHORTNESS OF BREATH: 0
SHORTNESS OF BREATH: 0
COUGH: 0
VOMITING: 1
DIARRHEA: 0

## 2021-04-26 ASSESSMENT — PAIN SCALES - GENERAL
PAINLEVEL_OUTOF10: 0
PAINLEVEL_OUTOF10: 0
PAINLEVEL_OUTOF10: 10
PAINLEVEL_OUTOF10: 0

## 2021-04-26 ASSESSMENT — HEART SCORE: ECG: 0

## 2021-04-26 ASSESSMENT — PAIN DESCRIPTION - ORIENTATION: ORIENTATION: RIGHT;LEFT;MID

## 2021-04-26 ASSESSMENT — PAIN DESCRIPTION - DESCRIPTORS
DESCRIPTORS: SHARP
DESCRIPTORS: SHARP

## 2021-04-26 ASSESSMENT — PAIN DESCRIPTION - PAIN TYPE: TYPE: ACUTE PAIN

## 2021-04-26 NOTE — PLAN OF CARE
Problem: Nutrition  Goal: Optimal nutrition therapy  4/26/2021 1834 by Ervin Hdz RN  Outcome: Ongoing  4/26/2021 1542 by Sharon Sanot RD, LD  Outcome: Ongoing  Note: Nutrition Problem #1: Inadequate oral intake  Intervention: Food and/or Nutrient Delivery: Continue NPO(Monitor for start of oral diet. Will provide ONS as able.)  Nutritional Goals: Meet at least 50% of estimated nutrition needs.      Problem: Falls - Risk of:  Goal: Will remain free from falls  Description: Will remain free from falls  Outcome: Ongoing  Goal: Absence of physical injury  Description: Absence of physical injury  Outcome: Ongoing

## 2021-04-26 NOTE — OP NOTE
Operative Note      Patient: Ashley Salmon  YOB: 1960  MRN: [id number]    Date of Procedure: 4/26/2021    Pre-Op Diagnosis: NAUSEA, VOMITING    Post-Op Diagnosis: Same   · Severe reflux esophagitis  · Evidence of esophago-jejunostomy with a healthy-appearing anastomosis  · Normal appearing mucosa in the Qamar limb       Procedure(s):  EGD BIOPSY    Surgeon(s):  Ismael Ventura MD    Assistant:   First Assistant: Ilene Pelayo RN    Anesthesia: Monitor Anesthesia Care    Estimated Blood Loss (mL): Minimal    Complications: None    Specimens:   ID Type Source Tests Collected by Time Destination   A : esophagus bx Tissue Esophagus [address], MD 4/26/2021 1555        Implants:  * No implants in log *      Drains: * No LDAs found *    Findings:   1. LA grade D severe erosive esophagitis found extending from 25 to 35 cm from the incisors characterized with erythema, and linear erosions most likely due to bile reflux. Biopsies performed for histology. 2. Evidence of prior gastrectomy with an intact esophagojejunostomy. The mucosa at the anastomosis appeared normal without evidence of ulcerations or fistula. 3. The Qamar limb was intubated up to 80 cm and appeared normal.  There was no evidence of excess fluid or bile noted. Recommendations:  1. Await pathology results. 2. Start Carafate 1 g 4 times daily. Patient will need to crush the pill and liquefy to make into a slurry prior to swallowing. 3. Start pantoprazole 40 mg once daily. 4. Start bariatric (post gastrectomy) soft diet. 5. Please consult dietitian for post gastrectomy diet education. 6. Continue supportive care. 7. Once patient is nausea is under control she can be discharged home. 8. No further work-up planned from GI standpoint.     Detailed Description of Procedure:   Informed consent was obtained from the patient after explanation of the procedure including indications, description of the procedure, benefits and possible risks and complications of the procedure, and alternatives. Questions were answered. The patient's history was reviewed and a directed physical examination was performed prior to the procedure. Patient was monitored throughout the procedure with pulse oximetry and periodic assessment of vital signs. Patient was sedated as noted above. With the patient in the left lateral decubitus position, the Olympus videoendoscope was placed in the patient's mouth and under direct visualization passed into the esophagus. Visualization of the esophagus, and jejunum was performed during both introduction and withdrawal of the endoscope. The scope was passed to the 80 cm. The patient tolerated the procedure well and was taken to the recovery area in good condition. The patient  was taken to the recovery area in good condition.     Electronically signed by Odessa Bradford MD on 4/26/2021 at 4:11 PM

## 2021-04-26 NOTE — ED PROVIDER NOTES
guarding. No extremity edema, alert and oriented moving extremities equally. MDM/Plan:   Chest pain and epigastric pain  No signs of any peritoneal symptoms. Cardiac work-up  Symptomatic treatment    EKG Interpretation    Interpreted by emergency department physician    Clinical Impression: Normal sinus left atrial enlargement, T wave inversions in 3 and V2. Compared to EKG on 4/9/2021, T wave inversions are present at that time, no other acute changes.     Jonas Neal      CRITICAL CARE: None    Jonas Neal MD  Attending Emergency Physician         Jonas Neal MD  04/26/21 9132

## 2021-04-26 NOTE — CARE COORDINATION
Case Management Initial Discharge Plan  Melecio Conte,             Met with:patient to discuss discharge plans. Information verified: address, contacts, phone number, , insurance Yes    Emergency Contact/Next of Kin name & number: Lupis Hernandez ([address]) [phone]    PCP: Mario Murray MD  Date of last visit: March    Insurance Provider: Generic Commercial    Discharge Planning    Living Arrangements:  Family Members   Support Systems:  Family Members    Home has 2 stories  4 (with rails)  stairs to climb to get into front door, 1 flight stairs to climb to reach second floor  Location of bedroom/bathroom in home 2nd floor. Patient able to perform ADL's:Independent    Current Services (outpatient & in home) none  DME equipment: none  DME provider:     Receiving oral anticoagulation therapy? Yes-Eliquis    If indicated:   Physician managing anticoagulation treatment: PCP  Where does patient obtain lab work for ATC treatment? Potential Assistance Needed:  N/A    Patient agreeable to home care: No  [place] of choice provided:      Prior SNF/Rehab Placement and Facility:   Agreeable to SNF/Rehab: No  [place] of choice provided: n/a     Evaluation: no    Expected Discharge date:  21    Patient expects to be discharged to:  return to home, no skilled needs identified. Follow Up Appointment: Best Day/ Time:      Transportation provider: self, sister  Transportation arrangements needed for discharge: No, sister will transport. Readmission Risk              Risk of Unplanned Readmission:        0             Does patient have a readmission risk score greater than 14?: not calculated. If yes, follow-up appointment must be made within 7 days of discharge. Goals of Care: decreased nausea.       Discharge Plan: return to home, no skilled needs identified          Electronically signed by Trinity Zepeda RN on 21 at 10:33 AM EDT

## 2021-04-26 NOTE — ED PROVIDER NOTES
[facility]  ED  Emergency Department Encounter  Emergency Medicine Resident     Pt Name: Deneen Bennett  MRN: [id number]  [place] 1960  Date of evaluation: 4/26/21  PCP:  Tahmina Gardner MD    [address] [place] [address]       Chief Complaint   Patient presents with    Chest Pain    Emesis       HISTORY OFPRESENT ILLNESS  (Location/Symptom, Timing/Onset, Context/Setting, Quality, Duration, Modifying Glorine Freedman.)      Deneen Bennett is a 62 y.o. female who presents with nausea, vomiting, and chest pain for the past 3 days. 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 She has had intermittent bouts that are similar with nausea and vomiting. Pain is substernal, somewhat burning in nature at times. Patient also has a history of pulmonary embolism and has been taking Eliquis as prescribed. She states she is taking all of her other medications as prescribed as well. No significant abdominal pain. No urinary complaints. Patient notes some constipation, no diarrhea. No fevers or chills. No lower extremity swelling or pain. PAST MEDICAL / SURGICAL / SOCIAL / FAMILY HISTORY      has a past medical history of Anxiety, Arthritis, Asthma, Colon polyp, Colon polyps, Cyst of kidney, acquired, Fatigue, Hypertension, Hypothyroidism, Neuroendocrine tumor, Obesity, Pharyngoesophageal dysphagia, Vocal cord polyps, and Wears glasses. has a past surgical history that includes cystourethroscopy/stent removal (5/3/11); Colonoscopy (02/21/2019); ERCP; Cholecystectomy (10/09/2014); hip surgery (Left); Throat surgery; Colonoscopy; Upper gastrointestinal endoscopy (02/21/2019); Upper gastrointestinal endoscopy (09/23/2019); Upper gastrointestinal endoscopy (N/A, 9/23/2019); Upper gastrointestinal endoscopy (N/A, 10/23/2019); Upper gastrointestinal endoscopy (N/A, 10/29/2019);  Endoscopic ultrasonography, GI (N/A, 1/22/2020); and Gastric bypass surgery (2020). Social History     Socioeconomic History    Marital status: Single     Spouse name: Not on file    Number of children: Not on file    Years of education: Not on file    Highest education level: Not on file   Occupational History    Not on file   Social Needs    Financial resource strain: Not on file    Food insecurity     Worry: Not on file     Inability: Not on file    Transportation needs     Medical: Not on file     Non-medical: Not on file   Tobacco Use    Smoking status: Former Smoker     Packs/day: 1.00     Years: 25.00     Pack years: 25.00     Quit date: 2012     Years since quittin.9    Smokeless tobacco: Never Used    Tobacco comment: quit 2 years   Substance and Sexual Activity    Alcohol use: Not Currently     Comment: 3 times a month    Drug use: No    Sexual activity: Not on file   Lifestyle    Physical activity     Days per week: Not on file     Minutes per session: Not on file    Stress: Not on file   Relationships    Social connections     Talks on phone: Not on file     Gets together: Not on file     Attends Orthodoxy service: Not on file     Active member of club or organization: Not on file     Attends meetings of clubs or organizations: Not on file     Relationship status: Not on file    Intimate partner violence     Fear of current or ex partner: Not on file     Emotionally abused: Not on file     Physically abused: Not on file     Forced sexual activity: Not on file   Other Topics Concern    Not on file   Social History Narrative    Not on file       Family History   Problem Relation Age of Onset    High Blood Pressure Mother     High Blood Pressure Sister     Stomach Cancer Sister     Other Sister         epilepsy    No Known Problems Father         Allergies:  Erythromycin    Home Medications:  Prior to Admission medications    Medication Sig Start Date End Date Taking?  Authorizing Provider   apixaban (ELIQUIS) 5 MG TABS tablet Take 1 tablet by mouth 2 times daily 4/23/21 5/23/21  Franchesca Russell MD   potassium chloride (KLOR-CON) 20 MEQ packet Take 20 mEq by mouth daily 4/20/21   Franchesca Russell MD   sucralfate (CARAFATE) 1 GM tablet Take 1 tablet by mouth 4 times daily 3/28/21   Nicki Aaron DO   apixaban starter pack (ELIQUIS) 5 MG TBPK tablet Take 1 tablet by mouth See Admin Instructions 3/28/21   Nicki Aaron DO   metoclopramide (REGLAN) 10 MG tablet Take 1 tablet by mouth 3 times daily as needed (nausea) 3/28/21   Nicki Aaron,    vitamin D 25 MCG (1000 UT) CAPS Take 1 capsule by mouth daily    Historical Provider, MD   Folic Acid-Vit P0-PWX P53 2.2-25-0.5 MG TABS Take 1 tablet by mouth daily 3/25/21   Franchesca Russell MD   levothyroxine (SYNTHROID) 100 MCG tablet Take 1 tablet by mouth daily 3/25/21   Franchesca Russell MD   hydroCHLOROthiazide (HYDRODIURIL) 25 MG tablet TAKE 1 TABLET BY MOUTH EVERY DAY  Patient taking differently: 12.5 mg TAKE 1/2 TABLET BY MOUTH EVERY DAY 3/10/21   Franchesca Russell MD   ondansetron (ZOFRAN) 4 MG tablet Take 4 mg by mouth every 8 hours as needed for Nausea or Vomiting    Historical Provider, MD   pantoprazole (PROTONIX) 40 MG tablet TAKE 1 TABLET BY MOUTH EVERY DAY 12/4/20   Historical Provider, MD   desonide (DESOWEN) 0.05 % cream Apply topically 2 times daily. 8/21/20   Franchesca Russell MD   Multiple Vitamin (MULTI-DAY VITAMINS PO) Take by mouth    Historical Provider, MD   vitamin B-12 (CYANOCOBALAMIN) 500 MCG tablet Take 500 mcg by mouth daily    Historical Provider, MD       REVIEW OFSYSTEMS    (2-9 systems for level 4, 10 or more for level 5)      Review of Systems   Constitutional: Negative for chills and fever. HENT: Negative for congestion and rhinorrhea. Eyes: Negative for visual disturbance. Respiratory: Negative for cough and shortness of breath. Cardiovascular: Positive for chest pain. Negative for leg swelling.    Gastrointestinal: Positive for nausea and Status: She is alert and oriented to person, place, and time. Motor: No weakness. Psychiatric:         Mood and Affect: Mood normal.         DIFFERENTIAL  DIAGNOSIS     PLAN (LABS / IMAGING / EKG):  Orders Placed This Encounter   Procedures    XR CHEST PORTABLE    CBC Auto Differential    Comprehensive Metabolic Panel w/ Reflex to MG    Lipase    Troponin    Brain Natriuretic Peptide    EKG 12 Lead    PATIENT STATUS (FROM ED OR OR/PROCEDURAL) Observation       MEDICATIONS ORDERED:  Orders Placed This Encounter   Medications    0.9 % sodium chloride bolus    ondansetron (ZOFRAN) injection 4 mg    morphine injection 4 mg    ondansetron (ZOFRAN) injection 4 mg    0.9 % sodium chloride infusion       Initial MDM/Plan/ED COURSE:    62 y.o. female who presents with nausea, vomiting, and chest pain for the last 3 days. Patient has a history of gastrectomy in November 2020 due to carcinoid tumors. She has had intermittent episodes of similar nausea and vomiting. Chest pain is substernal, fairly persistent. History also notable for pulmonary embolism and patient is on Eliquis. On exam, she is in no acute distress, but did vomit once as I was about to leave the room. Heart is slightly tachycardic, 105 during my examination. Regular rhythm. Blood pressure slightly elevated at 147/100. Vitals otherwise within normal limits. Lung sounds are normal in all fields. No focal abdominal tenderness on exam.  No significant lower extremity swelling or tenderness. Patient is moving all extremities equally. We will start work-up with EKG, chest x-ray, CBC, CMP, lipase, troponin, BNP. ED Course as of Apr 26 0833   Mon Apr 26, 2021   0615 Troponin, High Sensitivity: 7 [JS]   0615 Pro-BNP: 113 [JS]   0627 CBC within normal limits. [JS]   [id number] CMP also unremarkable. Patient has been admitted for hypokalemia in the past, potassium 3.9 today.     [JS]   0628 Lipase: 13 [JS]   0639 IMPRESSION:  No acute

## 2021-04-26 NOTE — CONSULTS
Attestation signed by      Attending Physician Statement:    I have discussed the care of  Rebecca Hoffmann , including pertinent history and exam findings, with the Cardiology fellow/resident. I have seen and examined the patient and the key elements of all parts of the encounter have been performed by me. I agree with the assessment, plan and orders as documented by the fellow/resident, after I modified exam findings and plan of treatments, and the final version is my approved version of the assessment. Additional Comments: Epigastric burning  Extensive history of gastric surgery surgery  Burning increases with eating and posterior the symptoms are most related to her GI problem  We will consult GI so patient can follow here also [facility] with [facility]  We'll get echocardiogram to rule out any structural heart Disease   Dr. Feliciano Moraes Cardiology Cardiology    Consult / H&P               Today's Date: 4/26/2021  Patient Name: Rebecca Hoffmann  Date of admission: 4/26/2021  5:13 AM  Patient's age: 62 y.o., 1960  Admission Dx: Nausea and vomiting [R11.2]    Reason for Consult:  Cardiac evaluation    Requesting Physician: Anna Nayak MD    CHIEF COMPLAINT: Nausea vomiting, chest pain    History Obtained From:  patient    HISTORY OF PRESENT ILLNESS:      The patient is a 62 y.o.  female who is presented to the hospital with nausea, vomiting and chest pain. We are consulted due to chest pain. Patient has a past medical history of anxiety, carcinoid tumor s/p laparoscopic total gastrectomy with esophagojejunostomy, hypertension on HCTZ, PE on Eliquis hypothyroidism. Patient reports of chest pain, radiating to the back, started around midnight, burning in nature, 9 x 10 intensity associated with multiple episodes of vomiting. Relieved with Tums. Denies any smoking or substance abuse. Does report of recent weight loss of 72 pounds after surgery.     Labs showed troponin 7> 7  proBNP 130  Hb 15.9    Past Medical History:   has a past medical history of Anxiety, Arthritis, Asthma, Colon polyp, Colon polyps, Cyst of kidney, acquired, Fatigue, Hypertension, Hypothyroidism, Neuroendocrine tumor, Obesity, Pharyngoesophageal dysphagia, Vocal cord polyps, and Wears glasses. Past Surgical History:   has a past surgical history that includes cystourethroscopy/stent removal (5/3/11); Colonoscopy (02/21/2019); ERCP; Cholecystectomy (10/09/2014); hip surgery (Left); Throat surgery; Colonoscopy; Upper gastrointestinal endoscopy (02/21/2019); Upper gastrointestinal endoscopy (09/23/2019); Upper gastrointestinal endoscopy (N/A, 9/23/2019); Upper gastrointestinal endoscopy (N/A, 10/23/2019); Upper gastrointestinal endoscopy (N/A, 10/29/2019); Endoscopic ultrasonography, GI (N/A, 1/22/2020); and Gastric bypass surgery (11/30/2020). Home Medications:    Prior to Admission medications    Medication Sig Start Date End Date Taking?  Authorizing Provider   apixaban (ELIQUIS) 5 MG TABS tablet Take 1 tablet by mouth 2 times daily 4/23/21 5/23/21  Leno Wren MD   potassium chloride (KLOR-CON) 20 MEQ packet Take 20 mEq by mouth daily 4/20/21   Leno Wren MD   sucralfate (CARAFATE) 1 GM tablet Take 1 tablet by mouth 4 times daily 3/28/21   Kamaljit Raymond DO   apixaban starter pack (ELIQUIS) 5 MG TBPK tablet Take 1 tablet by mouth See Admin Instructions 3/28/21   Kamaljit Raymond DO   metoclopramide (REGLAN) 10 MG tablet Take 1 tablet by mouth 3 times daily as needed (nausea) 3/28/21   Kamaljit Raymond DO   vitamin D 25 MCG (1000 UT) CAPS Take 1 capsule by mouth daily    Historical Provider, MD   Folic Acid-Vit X8-APW U55 2.2-25-0.5 MG TABS Take 1 tablet by mouth daily 3/25/21   Leno Wren MD   levothyroxine (SYNTHROID) 100 MCG tablet Take 1 tablet by mouth daily 3/25/21   Leno Wren MD   hydroCHLOROthiazide (HYDRODIURIL) 25 MG tablet TAKE 1 TABLET BY MOUTH EVERY DAY  Patient taking differently: 12.5 mg TAKE 1/2 TABLET BY MOUTH EVERY DAY 3/10/21   Ammon Marienlli MD   ondansetron (ZOFRAN) 4 MG tablet Take 4 mg by mouth every 8 hours as needed for Nausea or Vomiting    Historical Provider, MD   pantoprazole (PROTONIX) 40 MG tablet TAKE 1 TABLET BY MOUTH EVERY DAY 12/4/20   Mohinder Provider, MD   desonide (DESOWEN) 0.05 % cream Apply topically 2 times daily. 8/21/20   Ammon Marinelli MD   Multiple Vitamin (MULTI-DAY VITAMINS PO) Take by mouth    Historical Provider, MD   vitamin B-12 (CYANOCOBALAMIN) 500 MCG tablet Take 500 mcg by mouth daily    Historical Provider, MD      Current Facility-Administered Medications: 0.9 % sodium chloride infusion, 1,000 mL, Intravenous, Continuous    Allergies:  Erythromycin    Social History:   reports that she quit smoking about 8 years ago. She has a 25.00 pack-year smoking history. She has never used smokeless tobacco. She reports previous alcohol use. She reports that she does not use drugs. Family History: family history includes High Blood Pressure in her mother and sister; No Known Problems in her father; Other in her sister; Stomach Cancer in her sister. REVIEW OF SYSTEMS:    Constitutional: there has been weight loss. There's been change in energy level and change in activity level. Eyes: No visual changes or diplopia. No scleral icterus. ENT: No Headaches  Cardiovascular: Hypertension  Respiratory: No previous pulmonary problems, No cough  Gastrointestinal: No abdominal pain. No change in bowel or bladder habits. Genitourinary: No dysuria, trouble voiding, or hematuria. Musculoskeletal:  No gait disturbance, No weakness or joint complaints. Integumentary: No rash or pruritis. Neurological: No headache, diplopia, change in muscle strength, numbness or tingling. No change in gait, balance, coordination, mood, affect, memory, mentation, behavior. Psychiatric: No anxiety, or depression.   Endocrine: No temperature intolerance. No excessive thirst, fluid intake, or urination. No tremor. Hematologic/Lymphatic: No abnormal bruising or bleeding, blood clots or swollen lymph nodes. Allergic/Immunologic: No nasal congestion or hives. PHYSICAL EXAM:      BP (!) 149/92   Pulse 74   Temp 97.9 °F (36.6 °C) (Temporal)   Resp 15   Ht 5' 5\" (1.651 m)   Wt 190 lb (86.2 kg)   LMP 01/01/1994   SpO2 96%   BMI 31.62 kg/m²    Constitutional and General Appearance: alert, cooperative, no distress and appears stated age  [de-identified]: PERRL, no cervical lymphadenopathy. Respiratory:  Normal excursion and expansion without use of accessory muscles  Resp Auscultation: Good respiratory effort. No for increased work of breathing. On auscultation: clear to auscultation bilaterally  Cardiovascular: The apical impulse is not displaced  Heart tones are crisp and normal. regular S1 and S2. Abdomen:   No masses or tenderness  Bowel sounds present  Extremities:   No Cyanosis or Clubbing   Lower extremity edema: No   Skin: Warm and dry  Neurological:  Alert and oriented. Moves all extremities well  No abnormalities of mood, affect, memory, mentation, or behavior are noted    DATA:    Diagnostics:    EKG:     No acute changes, NSR nonspecific EKG changes  ECHO: 10/2/2013 echo show EF of 50 to 60%, concentric LVH  Stress Test: n/a  Cardiac Angiography: n/a    Labs:     CBC:   Recent Labs     04/26/21  0540   WBC 6.3   HGB 15.9*   HCT 48.7*        BMP:   Recent Labs     04/26/21  0540      K 3.9   CO2 22   BUN 9   CREATININE 0.68   LABGLOM >60   GLUCOSE 116*     BNP: No results for input(s): BNP in the last 72 hours. PT/INR: No results for input(s): PROTIME, INR in the last 72 hours. APTT:No results for input(s): APTT in the last 72 hours. CARDIAC ENZYMES:No results for input(s): CKTOTAL, CKMB, CKMBINDEX, TROPONINI in the last 72 hours.   FASTING LIPID PANEL:  Lab Results   Component Value Date    HDL 45 08/27/2020    TRIG 104 08/27/2020     LIVER PROFILE:  Recent Labs     04/26/21  0540   AST 48*   ALT 29   LABALBU 3.3*       IMPRESSION:    Patient Active Problem List   Diagnosis    Asthma    Anxiety    Obesity    Hyperlipidemia    Hypothyroidism    Colon polyps    Vertigo    Mass of left hip region    Fatigue    Pharyngoesophageal dysphagia    Colon polyp    Neuroendocrine tumor    Chest pain    Shortness of breath    Anemia    GI bleed    Essential hypertension    Neuroendocrine neoplasm of stomach    Polyp, stomach    Melena    Gastric ulcer with hemorrhage    Constipation    Hypokalemia    Hypomagnesemia    Bilateral pulmonary embolism (HCC)    Abdominal pain, epigastric    Elevated d-dimer    Malignant carcinoid tumor of stomach (HCC)    Intractable vomiting    Nausea and vomiting     Atypical chest pain  Hypertension  Hypothyroidism  Carcinoid tumor status post gastrectomy with esophagus jejunal anastomosis  Pulmonary embolism on Eliquis      RECOMMENDATIONS:  Resume home medication  Continue hydrochlorothiazide once daily  Will get an echo to R/o any structural abnormality   No ischemic work-up needed from cardiology standpoint  Consider consulting GI. Discussed with patient.     Electronically signed by Verónica Lebron MD on 4/26/2021 at 8:48 AM      [phone]

## 2021-04-26 NOTE — CONSULTS
[facility]  Consultation Note     . REASON FOR CONSULTATION:  Nausea and vomiting with recent history of carnoid tumor s/p resection with gastrectomy       HISTORY OF PRESENT ILLNESS:     This is a 62 y.o. female who presents with     Chief Complaint : Nausea and Vomiting since the last 4 days. Past Medical History : Hypothyroidism, carcinoid tumor s/p resection, PE on apixaban, bone tumor removed from left hip - benign. Previous GI history :     1. 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 colon polyps tubular adenoma and hyperplastic polyp. HPI : Patient came in with complaints of nausea with recurrent vomiting for the last 4 days along with mild epigastric discomfort since the last 4 days. she has been having these problems intermittently since her laparoscopic total gastrectomy for neuroendocrine carcinoid tumor. Episode started with nausea and had a yellowish bile nonbloody vomiting which got resolved for 1 day and she started having this recurrent nonbloody vomiting soon after that. She is unable to keep down food. She also has mild epigastric discomfort yesterday Patient describes the chest pain pain as steady burning which is radiating to the back occasionally which is moderately relieved with Tums and Pepto-Bismol. Patient also has intermittent dysphagia to liquids especially milk and occasional solids ever since she got total gastrectomy. She also complains of 77 pounds loss of weight because of not eating secondary to nausea and vomiting.   She also has complaints of chronic constipation indicates she has 1-2 bowel movements in a typical week     ROS: + for chills   she denies fevers, abdominal distention, odynophagia, diarrhea, Melena, Hematochezia. Medication history:   Denies over the counter NSAID usage, laxative usage , dietary supplements, over-the-counter medications, radiation/chemotherapy   Any Anticoagulation - yes Eliquis  For PE. Social History and life style risk factors : 12.5 pack yrs quit 12 yrs back., + alcohol use twice / month . Quit 7 months back, denies recreational usage. Recent GI procedures :    A) Colonoscopy : 2/21/2019 :  colon polyps tubular adenoma and hyperplastic polyp. B) EGD 10/29/2019 : The endoscopic exam showed 2 post-resection ulcers (described and treated as explained below). Multiple carcinoid-appearing lesions through out gastric body seen, not removed. Biopsy: GASTRIC TISSUES, EXCISION:        - WELL DIFFERENTIATED NEUROENDOCRINE TUMOR, GRADE 2. EGD 9/23/2019. Stomach: Fundus: abnormal: MULTIPLE LESIONS CONSISTENT WITH GIST LARGEST ONE 12-13 MM WITH CENTRAL ULCERATION BIOPSIES AND PICTURES WERE TAKEN    Body: abnormal: FEW SMALL LESIONS APPEARS TO BE SIMILAR PATHOLOGY    Antrum: abnormal: SMALL EROSION BIOPSIES WERE TAKEN TO R/O ANY UNDERLYING GIST         Biopsy : 1.  STOMACH, ANTRUM, BIOPSY:   - UNREMARKABLE GASTRIC MUCOSA. - NEGATIVE FOR HELICOBACTER PYLORI INFECTION. 2.  STOMACH, LESION, BIOPSIES:   - WELL-DIFFERENTIATED GASTRIC NEUROENDOCRINE TUMOR (CARCINOID TUMOR). - FOCAL ACTIVE CHRONIC GASTRITIS AND INTESTINAL METAPLASIA ARE PRESENT   IN    THE NONNEOPLASTIC GASTRIC MUCOSA.  SEE COMMENT. COMMENT: THE PROGNOSTIC STAGE GROUP IS BASED UPON THE RESECTION   SPECIMEN.       1/8/21 : CT abdomen showed: 1. Laparoscopic total gastrectomy with D1 lymph node dissection  2. Creation of esophagojejunal anastomosis        PAST MEDICAL HISTORY:  Past Medical History:   Diagnosis Date    Anxiety     Arthritis     knee    Asthma     Colon polyp 02/21/2019    tubular adenoma; hyperplastic polyp    Colon polyps     Cyst of kidney, acquired     bilat.     Fatigue     DO   vitamin D 25 MCG (1000 UT) CAPS Take 1 capsule by mouth daily    Historical Provider, MD   Folic Acid-Vit M7-GBO L07 2.2-25-0.5 MG TABS Take 1 tablet by mouth daily 3/25/21   Franchesca Russell MD   levothyroxine (SYNTHROID) 100 MCG tablet Take 1 tablet by mouth daily 3/25/21   Franchesca Russell MD   hydroCHLOROthiazide (HYDRODIURIL) 25 MG tablet TAKE 1 TABLET BY MOUTH EVERY DAY  Patient taking differently: 12.5 mg TAKE 1/2 TABLET BY MOUTH EVERY DAY 3/10/21   Franchesca Russell MD   ondansetron (ZOFRAN) 4 MG tablet Take 4 mg by mouth every 8 hours as needed for Nausea or Vomiting    Historical Provider, MD   pantoprazole (PROTONIX) 40 MG tablet TAKE 1 TABLET BY MOUTH EVERY DAY 20   Historical Provider, MD   desonide (DESOWEN) 0.05 % cream Apply topically 2 times daily. 20   Franchesca Russell MD   Multiple Vitamin (MULTI-DAY VITAMINS PO) Take by mouth    Historical Provider, MD   vitamin B-12 (CYANOCOBALAMIN) 500 MCG tablet Take 500 mcg by mouth daily    Historical Provider, MD     .  CURRENT MEDICATIONS:  Scheduled Meds:  .  Continuous Infusions:   sodium chloride 1,000 mL (21 0845)     . PRN Meds:  .   SOCIAL HISTORY:     Social History     Socioeconomic History    Marital status: Single     Spouse name: Not on file    Number of children: Not on file    Years of education: Not on file    Highest education level: Not on file   Occupational History    Not on file   Social Needs    Financial resource strain: Not on file    Food insecurity     Worry: Not on file     Inability: Not on file    Transportation needs     Medical: Not on file     Non-medical: Not on file   Tobacco Use    Smoking status: Former Smoker     Packs/day: 1.00     Years: 25.00     Pack years: 25.00     Quit date: 2012     Years since quittin.9    Smokeless tobacco: Never Used    Tobacco comment: quit 2 years   Substance and Sexual Activity    Alcohol use: Not Currently     Comment: 3 times a month    Supple, Trachea midline  Lungs:CTA Bilaterally  Heart: RRR, No murmur, No rub, No gallop, PMI nondisplaced. Abdomen:Soft, Non tender, Not distended, BS WNL,  No masses. Ext:No clubbing. No cyanosis. No edema. Skin: No rashes. No jaundice. No stigmata of liver disease. Neuro:  A&O x Three, No focal neurological deficits    LABS and IMAGING:     Hemotological labs:   ANEMIA STUDIES  No results for input(s): LABIRON, TIBC, FERRITIN, TEIVVOKA48, FOLATE, OCCULTBLD in the last 72 hours. CBC  Recent Labs     04/26/21  0540   WBC 6.3   HGB 15.9*   MCV 89.7   RDW 15.6*          PT/INR  No results for input(s): PROTIME, INR in the last 72 hours. BMP  Recent Labs     04/26/21  0540      K 3.9      CO2 22   BUN 9   CREATININE 0.68   GLUCOSE 116*   CALCIUM 9.7       LIVER WORK UP  Hepatitis Functional Panel:  Recent Labs     04/26/21  0540   ALKPHOS 97   ALT 29   AST 48*   PROT 7.6   BILITOT 0.47   LABALBU 3.3*       AMYLASE/LIPASE/AMMONIA  Recent Labs     04/26/21  0540   LIPASE 13       Acute Hepatitis Panel   No results found for: HEPBSAG, HEPCAB, HEPBIGM, HEPAIGM    HCV Genotype   No results found for: HEPATITISCGENOTYPE    HCV Quantitative   No results found for: HCVQNT    Metabolic work up:  Ceruloplasmin  AA work up:  Alpha 1 antitrypsin   No results found for: A1A  AMA:  No results found for: Jalen Saha    ASMA:  No results found for: SMOOTHMUSCAB    ANCA:    WAQAR:  Lab Results   Component Value Date    WAQAR NEGATIVE 08/24/2016       Anti - Liver/Kidney Ab:  No results found for: LIVER-KIDNEYMICROSOMALAB    Ceruloplasmin:  No results found for: CERULOPLSM    Celiac panel:  No results found for: TISSTRNTIIGG, TTGIGA, IGA    Cancer Markers:  CEA:    No results for input(s): CEA in the last 72 hours. Ca 125:   No results for input(s):  in the last 72 hours. Ca 19-9:     Invalid input(s):   AFP: No results for input(s): AFP in the last 72 hours.      ASSESSMENT and PLAN:     62 F with Hx of carcinoid stomach found on biopsy and DOTATATE scan s/p laparoscopic total gastrectomy with esophageal jenonostomy on 11/30/21 presented with C/C of nausea and recurrent vomiting which are chronic and + weight oss of 72 lbs in the last 4 months along with intermittent dysphagia with liquids and rarely with solids. Chronic nausea and vomiting  secondary to complications from total gastrectomy with esophago jejeunal anastomosis possible dumping syndrome ? that might need education regarding dietary approach. Plan of care: Will schedule her for an EGD today  Continue NPO  Rapid COVID 19  F/U on CT abdomen and pelvis with contrast as per primary   D/c pantoprazole and sucralfate. Further recommendations to follow after EGD. GI service will continue to follow. This plan was formulated in collaboration with Dr. Loretta España MD    Electronically signed by:    Lore Palm MD  Internal Medicine Resident, [facility]- [facility]; [facility], [address] [place] [address] Drive  4/26/2021, 9:30 AM       Attending Physician Statement  I have discussed the care of Jabier Bob, including pertinent history and exam findings, and formulating the plan of care with the resident. I have seen and examined the patient and the key elements of all parts of the encounter and agree with the assessment, plan and orders as documented by the resident, with appropriate modifications.      Electronically signed by Lidia Ramírez MD on 4/26/2021 at 2:34 PM

## 2021-04-26 NOTE — ANESTHESIA PRE PROCEDURE
500 mcg by mouth daily    Historical Provider, MD       Current medications:    Current Facility-Administered Medications   Medication Dose Route Frequency Provider Last Rate Last Admin    0.9 % sodium chloride infusion  1,000 mL Intravenous Continuous Tushar Hauser  mL/hr at 04/26/21 0845 1,000 mL at 04/26/21 0845    apixaban (ELIQUIS) tablet 5 mg  5 mg Oral BID Birdie Davidir, DO        folic acid-pyridoxine-cyancobalamin (FOLTX) 1.13-25-2 MG TABS 1 tablet  1 tablet Oral Daily Birdie VELASQUEZ Sampair, DO        hydroCHLOROthiazide (HYDRODIURIL) tablet 12.5 mg  12.5 mg Oral Daily Birdie Bopair, DO        levothyroxine (SYNTHROID) tablet 100 mcg  100 mcg Oral Daily Birdie VELASQUEZ Sampair, DO        metoclopramide (REGLAN) tablet 10 mg  10 mg Oral TID PRN Tushar Hauser, DO        potassium chloride (MICRO-K) extended release capsule 20 mEq  20 mEq Oral Daily Birdie Bopair, DO        sucralfate (CARAFATE) tablet 1 g  1 g Oral 4x Daily Birdie Bopair, DO        vitamin B-12 (CYANOCOBALAMIN) tablet 500 mcg  500 mcg Oral Daily Birdie VELASQUEZ Sampair, DO        Vitamin D (CHOLECALCIFEROL) tablet 1,000 Units  1 tablet Oral Daily Birdie VELASQUEZ Sampair, DO        ondansetron (ZOFRAN) injection 4 mg  4 mg Intravenous Q6H PRN Birdie Bopair, DO        ondansetron (ZOFRAN) injection 4 mg  4 mg Intravenous Q6H PRN Ralph Wallace MD   4 mg at 04/26/21 1127    0.9 % sodium chloride infusion   Intravenous Once Kathrine Mccann MD           Allergies:     Allergies   Allergen Reactions    Erythromycin Diarrhea       Problem List:    Patient Active Problem List   Diagnosis Code    Asthma J45.909    Anxiety F41.9    Obesity E66.9    Hyperlipidemia E78.5    Hypothyroidism E03.9    Colon polyps K63.5    Vertigo R42    Mass of left hip region R22.42    Fatigue R53.83    Pharyngoesophageal dysphagia R13.14    Colon polyp K63.5    Neuroendocrine tumor D3A.8    Chest pain R07.9    Shortness of breath R06.02    Anemia D64.9    GI bleed K92.2    Essential hypertension I10    Neuroendocrine neoplasm of stomach D3A.8    Polyp, stomach K31.7    Melena K92.1    Gastric ulcer with hemorrhage K25.4    Constipation K59.00    Hypokalemia E87.6    Hypomagnesemia E83.42    Bilateral pulmonary embolism (HCC) I26.99    Abdominal pain, epigastric R10.13    Elevated d-dimer R79.89    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    Intractable vomiting R11.10    Nausea and vomiting R11.2       Past Medical History:        Diagnosis Date    Anxiety     Arthritis     knee    Asthma     Colon polyp 02/21/2019    tubular adenoma; hyperplastic polyp    Colon polyps     Cyst of kidney, acquired     bilat.     Fatigue     Hypertension     Hypothyroidism     Neuroendocrine tumor 02/21/2019    of stomach    Obesity     Pharyngoesophageal dysphagia     Vocal cord polyps     Wears glasses        Past Surgical History:        Procedure Laterality Date    CHOLECYSTECTOMY  10/09/2014    COLONOSCOPY  02/21/2019    tubular adenoma; hyperplastic polyp    COLONOSCOPY      over 5 yrs ago per pt with polyps (2-)    CYSTOURETHROSCOPY/STENT REMOVAL  5/3/11    ENDOSCOPIC ULTRASOUND (LOWER) N/A 1/22/2020    ENDOSCOPIC ULTRASOUND WITH POSSIBLE EMR performed by Judie Nava MD at [facility]  11/30/2020    HIP SURGERY Left     soft tissue tumor removal    THROAT SURGERY      vocal cord polyps removed    UPPER GASTROINTESTINAL ENDOSCOPY  02/21/2019    Neuroendocrine tumor    UPPER GASTROINTESTINAL ENDOSCOPY  09/23/2019    UPPER GASTROINTESTINAL ENDOSCOPY N/A 9/23/2019    EGD BIOPSY performed by Erika Collins MD at [facility] 10/23/2019    EGD W/ EMR performed by [facility] MD at [facility] N/A 10/29/2019    EGD CONTROL HEMORRHAGE performed by Ivan Dumont MD at [facility] Social History:    Social History     Tobacco Use    Smoking status: Former Smoker     Packs/day: 1.00     Years: 25.00     Pack years: 25.00     Quit date: 2012     Years since quittin.9    Smokeless tobacco: Never Used    Tobacco comment: quit 2 years   Substance Use Topics    Alcohol use: Not Currently     Comment: 3 times a month                                Counseling given: Not Answered  Comment: quit 2 years      Vital Signs (Current):   Vitals:    21 0510 21 0649 21 0901 21 1030   BP: (!) 147/100 (!) 149/92 (!) 155/96 124/82   Pulse: 99 74 95 84   Resp: 16 15 14 20   Temp: 97.9 °F (36.6 °C)   98.4 °F (36.9 °C)   TempSrc: Temporal   Oral   SpO2: 97% 96% 97% 94%   Weight:    176 lb (79.8 kg)   Height:    5' 5\" (1.651 m)                                              BP Readings from Last 3 Encounters:   21 124/82   21 116/75   21 111/75       NPO Status:                                                                                 BMI:   Wt Readings from Last 3 Encounters:   21 176 lb (79.8 kg)   21 193 lb (87.5 kg)   21 193 lb (87.5 kg)     Body mass index is 29.29 kg/m². CBC:   Lab Results   Component Value Date    WBC 6.3 2021    RBC 5.43 2021    HGB 15.9 2021    HCT 48.7 2021    MCV 89.7 2021    RDW 15.6 2021     2021       CMP:   Lab Results   Component Value Date     2021    K 3.9 2021     2021    CO2 22 2021    BUN 9 2021    CREATININE 0.68 2021    GFRAA >60 2021    LABGLOM >60 2021    GLUCOSE 116 2021    PROT 7.6 2021    CALCIUM 9.7 2021    BILITOT 0.47 2021    ALKPHOS 97 2021    AST 48 2021    ALT 29 2021       POC Tests: No results for input(s): POCGLU, POCNA, POCK, POCCL, POCBUN, POCHEMO, POCHCT in the last 72 hours.     Coags:   Lab Results   Component Value Date

## 2021-04-26 NOTE — PROGRESS NOTES
[facility]  Documentation Note                Admission Dx: Nausea and vomiting [R11.2]    Past Medical History:   has a past medical history of Anxiety, Arthritis, Asthma, Colon polyp, Colon polyps, Cyst of kidney, acquired, Fatigue, Hypertension, Hypothyroidism, Neuroendocrine tumor, Obesity, Pharyngoesophageal dysphagia, Vocal cord polyps, and Wears glasses. Previous Testing:     ECHO 10/2/13: EF 55-60%, concentric LVH, no regurg/stenosis. Previous office/hospital visit:   Myrtie Simmonds, NP 11/15/19:   1. Hypothyroid  2. HTN  3. ECHO 10/2/13 EF 55-60% Concentric LVH  4. Upper/Lower GI bleed  5. Chest pain    Plan --   1. Chest heaviness with dyspnea. Will order ECHO to ensure no WMA or Valvular changes  2. HTN. Stable. Continue BB  3. BMI > 30 Advised to increase activity and start low fat, low sodium diet  4. Will follow up in 6 months or sooner as needed.     Lazara Lopez [facility]

## 2021-04-26 NOTE — PLAN OF CARE
Nutrition Problem #1: Inadequate oral intake  Intervention: Food and/or Nutrient Delivery: Continue NPO(Monitor for start of oral diet. Will provide ONS as able.)  Nutritional Goals: Meet at least 50% of estimated nutrition needs.

## 2021-04-26 NOTE — H&P
[facility]  CDU / OBSERVATION eNCOUnter  Resident Note     Pt Name: Ashley Salmon  MRN: [id number]  [place] 1960  Date of evaluation: 4/26/21  Patient's PCP is :  Meg Angulo MD    [address] [place] [address]       Chief Complaint   Patient presents with    Chest Pain    Emesis         HISTORY OF PRESENT ILLNESS    Aslhey Salmon is a 62 y.o. female who presents with nausea vomiting chest pain for last 3 days. She has significant past medical history of carcinoid tumor status total gastrectomy November 2020 at [facility]. Patient also has history of pulmonary embolism is taking Eliquis for prophylaxis. At this time the patient is reporting that she does have epigastric abdominal pain that she rates as 2 out of 10. She states it is significantly improved from initial presentation yesterday. Cardiology at bedside at time of evaluation. Patient's cardiology work-up in the emergency department showed 2 troponins that were 7, . Chest x-ray that had no acute cardiopulmonary processes identified an EKG that showed T wave inversions noted significant ST abnormalities or repolarization abnormalities.     Location/Symptom: Epigastric abdomen pain, nausea and vomiting  Timing/Onset: 3 days  Provocation: Unknown  Quality: Burning  Radiation: Nowhere  Severity: 2 out of 10  Timing/Duration: 3 days  Modifying Factors: Unknown    REVIEW OF SYSTEMS       General ROS - No fevers, No malaise   Ophthalmic ROS - No discharge, No changes in vision  ENT ROS -  No sore throat, No rhinorrhea,   Respiratory ROS - no shortness of breath, no cough, no  wheezing  Cardiovascular ROS - chest pain, no dyspnea on exertion  Gastrointestinal ROS - abdominal pain, nausea and vomiting, no change in bowel habits, no black or bloody stools  Genito-Urinary ROS - No dysuria, trouble voiding, or hematuria  Musculoskeletal ROS - No myalgias, No arthalgias  Neurological ROS - No headache, no Hematocrit 48.7 (*)     RDW 15.6 (*)     Seg Neutrophils 70 (*)     Lymphocytes 21 (*)     All other components within normal limits   COMPREHENSIVE METABOLIC PANEL W/ REFLEX TO MG FOR LOW K - Abnormal; Notable for the following components:    Glucose 116 (*)     AST 48 (*)     Albumin 3.3 (*)     Albumin/Globulin Ratio 0.8 (*)     All other components within normal limits   LIPASE   TROPONIN   TROPONIN   BRAIN NATRIURETIC PEPTIDE         CDU IMPRESSION / PLAN      Melecio Conte is a 62 y.o. female who presents with chest pain, epigastric abdomen pain, nausea vomiting. Patient states a past medical history gastrectomy in November 2020. · Plan for GI consultation given patient significant past medical history with nausea vomiting and epigastric abdominal tenderness to palpation. · Cardiology was consulted by the emergency medicine staff, stress test ordered  · Plan to get CT scan of the abdomen. Kidney function within normal limits. · Continue pain and nausea control. · Continue home medications and pain control  · Monitor vitals, labs, and imaging  · DISPO: pending consults and clinical improvement    CONSULTS:    IP CONSULT TO GI  IP CONSULT TO CARDIOLOGY    PROCEDURES:  Not indicated       PATIENT REFERRED TO:    No follow-up provider specified. --  Larissa Fontaine MD   Emergency Medicine Resident     This dictation was generated by voice recognition computer software. Although all attempts are made to edit the dictation for accuracy, there may be errors in the transcription that are not intended.

## 2021-04-26 NOTE — ED PROVIDER NOTES
FACULTY SIGN-OUT  ADDENDUM     Care of this patient was assumed from previous attending physician. The patient's initial evaluation and plan have been discussed with the prior provider who initially evaluated the patient. Attestation  I was available and discussed any additional care issues that arose and coordinated the management plans with the resident(s) caring for the patient during my duty period. Any areas of disagreement with resident's documentation of care or procedures are noted on the chart. I was personally present for the key portions of any/all procedures, during my duty period. I have documented in the chart those procedures where I was not present during the key portions. ED COURSE      The patient was given the following medications:  Orders Placed This Encounter   Medications    0.9 % sodium chloride bolus    ondansetron (ZOFRAN) injection 4 mg    morphine injection 4 mg       RECENT VITALS:   Temp: 97.9 °F (36.6 °C), Pulse: 74, Resp: 15, BP: (!) 149/92    MEDICAL DECISION MAKING        Praveen Mi is a 62 y.o. female who presents to the Emergency Department with complaints of chest and epigastric pain. Hx of carcinoid tumor. Await labs with plan to d/c vs ETU admit.       Kaajl Moon MD  Attending Emergency Physician    (Please note that portions of this note were completed with a voice recognition program.  Efforts were made to edit the dictations but occasionally words are mis-transcribed.)          Kajal Moon MD  04/26/21 8920

## 2021-04-26 NOTE — PROGRESS NOTES
[facility]  CDU / OBSERVATION ENCOUNTER  ATTENDING NOTE       I performed a history and physical examination of the patient and discussed management with the resident or midlevel provider. I reviewed the resident or midlevel provider's note and agree with the documented findings and plan of care. Any areas of disagreement are noted on the chart. I was personally present for the key portions of any procedures. I have documented in the chart those procedures where I was not present during the key portions. I have reviewed the nurses notes. I agree with the chief complaint, past medical history, past surgical history, allergies, medications, social and family history as documented unless otherwise noted below. The Family history, social history, and ROS are effectively unchanged since admission unless noted elsewhere in the chart. Patient presents with nausea and vomiting. Patient has had some chest discomfort as well for the past 3 days. Patient symptoms began about 72 hours ago. She has a history of carcinoid tumors of the stomach and status post gastrectomy November 2020. She has intermittent bouts are similar with nausea and vomiting. Patient has substernal burning chest discomfort. Patient has history of PE as well and has been taking Eliquis as prescribed. Patient admitted for symptom relief and ongoing nausea control. Patient for cardiology evaluation secondary to chest discomfort. Pain is somewhat atypical for coronary artery disease and angina but concerning for its duration and persistence. No prior cardiac work-up. Patient had stress test ordered in 2018 but this is still pending. Seen by GI. Patient for endoscopy today. Further recommendations after endoscopy. Will reassess patient on return to the floor.     Isabel Madden MD  Attending Emergency  Physician

## 2021-04-26 NOTE — ED TRIAGE NOTES
Patient states she has had chest pain with vomiting for three days patient states vomiting has worsened.

## 2021-04-26 NOTE — PROGRESS NOTES
Comprehensive Nutrition Assessment    Type and Reason for Visit:  Initial, Consult(General Nutrition Management - Post Gastrectomy Education)    Nutrition Recommendations/Plan: Continue NPO. Monitor for start of oral diet - will provide ONS with meals as able. Will provide diet education for post gastrectomy as able. Monitor plan of care. Nutrition Assessment:  Consulted for General Nutrition Management - Post Gastrectomy Education. Pt admitted with c/o chest and epigastric pain along with nausea/vomiting. Pt currently NPO and off unit for an EGD at visit. Pt with h/o carcinoid tumors - s/p laparoscopic Lap Total Gastrectomy + Esophagojejunostomy 11/30/20. Noted pt reports issues with intermittent nausea/vomiting and dysphagia to liquids and solids. Noted pt also reports weight loss of 70 lb since surgery. Per chart review, weight loss of 31% x 8 months. Handout for diet education for post gastric surgery left at bedside with contact information - will follow-up to review diet and monitor PO intakes. Labs/Meds reviewed. Malnutrition Assessment:  Malnutrition Status: Moderate malnutrition    Context:  Chronic Illness     Findings of the 6 clinical characteristics of malnutrition:  Energy Intake:  7 - 75% or less estimated energy requirements for 1 month or longer(Predicted)  Weight Loss:  7 - Greater than 10% over 6 months     Body Fat Loss:  Unable to assess   Muscle Mass Loss:  Unable to assess  Fluid Accumulation:  No significant fluid accumulation   Strength:  Not Performed    Estimated Daily Nutrient Needs:  Energy (kcal):  22-25 kcal/kg = 9640-9248 kcals/day; Weight Used for Energy Requirements:  Admission     Protein (g):  1.2-1.5 gm/kg = 70-85 gm pro/day; Weight Used for Protein Requirements:  Ideal        Fluid (ml/day):  9009-4493 mL/day or per MD; Method Used for Fluid Requirements:  1 ml/kcal      Nutrition Related Findings:  Labs reviewed. Meds reviewed:  Folic Acid, Synthroid,

## 2021-04-27 PROBLEM — E43 SEVERE MALNUTRITION (HCC): Chronic | Status: ACTIVE | Noted: 2021-04-27

## 2021-04-27 LAB
ANION GAP SERPL CALCULATED.3IONS-SCNC: 13 MMOL/L (ref 9–17)
BUN BLDV-MCNC: 6 MG/DL (ref 6–20)
BUN/CREAT BLD: ABNORMAL (ref 9–20)
CALCIUM SERPL-MCNC: 8.5 MG/DL (ref 8.6–10.4)
CHLORIDE BLD-SCNC: 107 MMOL/L (ref 98–107)
CO2: 21 MMOL/L (ref 20–31)
CREAT SERPL-MCNC: 0.47 MG/DL (ref 0.5–0.9)
EKG ATRIAL RATE: 99 BPM
EKG P AXIS: 56 DEGREES
EKG P-R INTERVAL: 130 MS
EKG Q-T INTERVAL: 354 MS
EKG QRS DURATION: 76 MS
EKG QTC CALCULATION (BAZETT): 454 MS
EKG R AXIS: 10 DEGREES
EKG T AXIS: -22 DEGREES
EKG VENTRICULAR RATE: 99 BPM
GFR AFRICAN AMERICAN: >60 ML/MIN
GFR NON-AFRICAN AMERICAN: >60 ML/MIN
GFR SERPL CREATININE-BSD FRML MDRD: ABNORMAL ML/MIN/{1.73_M2}
GFR SERPL CREATININE-BSD FRML MDRD: ABNORMAL ML/MIN/{1.73_M2}
GLUCOSE BLD-MCNC: 66 MG/DL (ref 70–99)
LV EF: 59 %
LVEF MODALITY: NORMAL
MAGNESIUM: 1.2 MG/DL (ref 1.6–2.6)
POTASSIUM SERPL-SCNC: 3.5 MMOL/L (ref 3.7–5.3)
SODIUM BLD-SCNC: 141 MMOL/L (ref 135–144)

## 2021-04-27 PROCEDURE — 96365 THER/PROPH/DIAG IV INF INIT: CPT

## 2021-04-27 PROCEDURE — 93306 TTE W/DOPPLER COMPLETE: CPT

## 2021-04-27 PROCEDURE — 96366 THER/PROPH/DIAG IV INF ADDON: CPT

## 2021-04-27 PROCEDURE — 6370000000 HC RX 637 (ALT 250 FOR IP): Performed by: INTERNAL MEDICINE

## 2021-04-27 PROCEDURE — G0378 HOSPITAL OBSERVATION PER HR: HCPCS

## 2021-04-27 PROCEDURE — 6360000002 HC RX W HCPCS: Performed by: NURSE PRACTITIONER

## 2021-04-27 PROCEDURE — 83735 ASSAY OF MAGNESIUM: CPT

## 2021-04-27 PROCEDURE — 93010 ELECTROCARDIOGRAM REPORT: CPT | Performed by: INTERNAL MEDICINE

## 2021-04-27 PROCEDURE — 6370000000 HC RX 637 (ALT 250 FOR IP): Performed by: EMERGENCY MEDICINE

## 2021-04-27 PROCEDURE — 96376 TX/PRO/DX INJ SAME DRUG ADON: CPT

## 2021-04-27 PROCEDURE — 2580000003 HC RX 258: Performed by: INTERNAL MEDICINE

## 2021-04-27 PROCEDURE — 6370000000 HC RX 637 (ALT 250 FOR IP): Performed by: NURSE PRACTITIONER

## 2021-04-27 PROCEDURE — 36415 COLL VENOUS BLD VENIPUNCTURE: CPT

## 2021-04-27 PROCEDURE — 80048 BASIC METABOLIC PNL TOTAL CA: CPT

## 2021-04-27 PROCEDURE — 6360000002 HC RX W HCPCS: Performed by: INTERNAL MEDICINE

## 2021-04-27 RX ORDER — MAGNESIUM SULFATE 1 G/100ML
1000 INJECTION INTRAVENOUS PRN
Status: DISCONTINUED | OUTPATIENT
Start: 2021-04-27 | End: 2021-04-29 | Stop reason: HOSPADM

## 2021-04-27 RX ORDER — POTASSIUM CHLORIDE 20 MEQ/1
40 TABLET, EXTENDED RELEASE ORAL PRN
Status: DISCONTINUED | OUTPATIENT
Start: 2021-04-27 | End: 2021-04-29 | Stop reason: HOSPADM

## 2021-04-27 RX ORDER — PANTOPRAZOLE SODIUM 40 MG/1
40 TABLET, DELAYED RELEASE ORAL
Status: DISCONTINUED | OUTPATIENT
Start: 2021-04-28 | End: 2021-04-29 | Stop reason: HOSPADM

## 2021-04-27 RX ORDER — ACETAMINOPHEN 325 MG/1
650 TABLET ORAL EVERY 6 HOURS PRN
Status: DISCONTINUED | OUTPATIENT
Start: 2021-04-27 | End: 2021-04-29 | Stop reason: HOSPADM

## 2021-04-27 RX ORDER — POTASSIUM CHLORIDE 7.45 MG/ML
10 INJECTION INTRAVENOUS PRN
Status: DISCONTINUED | OUTPATIENT
Start: 2021-04-27 | End: 2021-04-29 | Stop reason: HOSPADM

## 2021-04-27 RX ADMIN — APIXABAN 5 MG: 5 TABLET, FILM COATED ORAL at 09:12

## 2021-04-27 RX ADMIN — SODIUM CHLORIDE 1000 ML: 9 INJECTION, SOLUTION INTRAVENOUS at 02:22

## 2021-04-27 RX ADMIN — APIXABAN 5 MG: 5 TABLET, FILM COATED ORAL at 21:32

## 2021-04-27 RX ADMIN — ONDANSETRON 4 MG: 2 INJECTION INTRAMUSCULAR; INTRAVENOUS at 12:27

## 2021-04-27 RX ADMIN — POTASSIUM BICARBONATE 40 MEQ: 782 TABLET, EFFERVESCENT ORAL at 12:24

## 2021-04-27 RX ADMIN — ACETAMINOPHEN 650 MG: 325 TABLET ORAL at 19:00

## 2021-04-27 RX ADMIN — SUCRALFATE 1 G: 1 TABLET ORAL at 16:48

## 2021-04-27 RX ADMIN — Medication 500 MCG: at 09:10

## 2021-04-27 RX ADMIN — METOPROLOL TARTRATE 25 MG: 25 TABLET ORAL at 14:07

## 2021-04-27 RX ADMIN — MAGNESIUM SULFATE HEPTAHYDRATE 1000 MG: 1 INJECTION, SOLUTION INTRAVENOUS at 12:24

## 2021-04-27 RX ADMIN — POTASSIUM BICARBONATE 20 MEQ: 782 TABLET, EFFERVESCENT ORAL at 11:15

## 2021-04-27 RX ADMIN — MAGNESIUM SULFATE HEPTAHYDRATE 1000 MG: 1 INJECTION, SOLUTION INTRAVENOUS at 14:07

## 2021-04-27 RX ADMIN — HYDROCHLOROTHIAZIDE 12.5 MG: 25 TABLET ORAL at 09:11

## 2021-04-27 RX ADMIN — MAGNESIUM SULFATE HEPTAHYDRATE 1000 MG: 1 INJECTION, SOLUTION INTRAVENOUS at 16:48

## 2021-04-27 RX ADMIN — Medication 1 TABLET: at 09:12

## 2021-04-27 RX ADMIN — MAGNESIUM SULFATE HEPTAHYDRATE 1000 MG: 1 INJECTION, SOLUTION INTRAVENOUS at 15:35

## 2021-04-27 RX ADMIN — SUCRALFATE 1 G: 1 TABLET ORAL at 09:10

## 2021-04-27 RX ADMIN — Medication 1000 UNITS: at 09:10

## 2021-04-27 RX ADMIN — SUCRALFATE 1 G: 1 TABLET ORAL at 14:07

## 2021-04-27 RX ADMIN — LEVOTHYROXINE SODIUM 100 MCG: 100 TABLET ORAL at 09:11

## 2021-04-27 ASSESSMENT — PAIN DESCRIPTION - PAIN TYPE
TYPE: ACUTE PAIN
TYPE: ACUTE PAIN

## 2021-04-27 ASSESSMENT — PAIN DESCRIPTION - DESCRIPTORS
DESCRIPTORS: SHARP

## 2021-04-27 ASSESSMENT — PAIN DESCRIPTION - FREQUENCY
FREQUENCY: INTERMITTENT

## 2021-04-27 ASSESSMENT — PAIN SCALES - GENERAL
PAINLEVEL_OUTOF10: 0
PAINLEVEL_OUTOF10: 5
PAINLEVEL_OUTOF10: 0

## 2021-04-27 ASSESSMENT — PAIN DESCRIPTION - ORIENTATION
ORIENTATION: RIGHT;LEFT;MID

## 2021-04-27 ASSESSMENT — PAIN DESCRIPTION - LOCATION
LOCATION: ABDOMEN

## 2021-04-27 NOTE — PROGRESS NOTES
5\" (1.651 m)   Wt 176 lb (79.8 kg) Comment: Patient states she has been gradually losing weight  LMP 01/01/1994   SpO2 97%   BMI 29.29 kg/m²   General appearance: alert and cooperative with exam  HEENT: Head: Normocephalic, no lesions, without obvious abnormality. Neck:no JVD, trachea midline, no adenopathy  Lungs: Clear to auscultation  Heart: Regular rate and rhythm, s1/s2 auscultated, no murmurs  Abdomen: soft, non-tender, bowel sounds active  Extremities: no edema  Neurologic: not done        Assessment / Acute Cardiac Problems:   1. Atypical chest pain  2. Hypertension  3. Hypothyroidism  4. 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 Pulmonary embolism on Eliquis    Patient Active Problem List:     Asthma     Anxiety     Obesity     Hyperlipidemia     Hypothyroidism     Colon polyps     Vertigo     Mass of left hip region     Fatigue     Pharyngoesophageal dysphagia     Colon polyp     Neuroendocrine tumor     Chest pain     Shortness of breath     Anemia     GI bleed     Essential hypertension     Neuroendocrine neoplasm of stomach     Polyp, stomach     Melena     Gastric ulcer with hemorrhage     Constipation     Hypokalemia     Hypomagnesemia     Bilateral pulmonary embolism (HCC)     Abdominal pain, epigastric     Elevated d-dimer     Malignant carcinoid tumor of stomach (HCC)     Intractable vomiting     Nausea and vomiting     S/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     Food intolerance     Severe malnutrition ([address]      Plan of Treatment:   1. Chest pain. Atypical chest pain free. Negative troponin. ECHO reviewed. 2. HTN controlled. Continue HCTZ. Will add BB  3. GI following for epigastric burning  4. No plans for ischemic work up. 5. Keep K > 4.0 and Mag > 2.0  K 3.5 and Mag  Replace K and mag.     6. Rest of care managed per Primary Team    Electronically signed by RYAN Nava NP on 4/27/2021 at 11:58 AM  [facility]

## 2021-04-27 NOTE — PLAN OF CARE
Problem: Nutrition  Goal: Optimal nutrition therapy  4/27/2021 1446 by Bk Desai  Outcome: Ongoing  4/27/2021 1147 by Bladimir Diaz RD, LD  Outcome: Ongoing  Note: Nutrition Problem #1: Inadequate oral intake  Intervention: Food and/or Nutrient Delivery: Continue Current Diet, Start Oral Nutrition Supplement  Nutritional Goals: Meet at least 50% of estimated nutrition needs.   4/27/2021 0521 by Chaparro Mccrary RN  Outcome: Ongoing     Problem: Falls - Risk of:  Goal: Will remain free from falls  Description: Will remain free from falls  4/27/2021 1446 by Bk Desai  Outcome: Ongoing  4/27/2021 0521 by Chaparro Mccrary RN  Outcome: Ongoing  Goal: Absence of physical injury  Description: Absence of physical injury  4/27/2021 1446 by Bk Desai  Outcome: Ongoing  4/27/2021 0521 by Chaparro Mccrary RN  Outcome: Ongoing

## 2021-04-27 NOTE — PROGRESS NOTES
OBS/CDU   RESIDENT NOTE      Patients PCP is:  Bela Berg MD        SUBJECTIVE      No acute events overnight. Patient states that her epigastric pain it is getting better. Patient also states that he feels nauseous this morning, has not had Zofran this morning. He says she has been able to drink water but has not been able to eat a full diet. PHYSICAL EXAM      General: NAD, AO X 3  Heent: EMOI, PERRL  Neck: SUPPLE, NO JVD  Cardiovascular: RRR, S1S2  Pulmonary: CTAB, NO SOB  Abdomen: SOFT, NTTP, ND, +BS  Extremities: +2/4 PULSES DISTAL, NO SWELLING  Neuro / Psych: NO NUMBNESS OR TINGLING, MENTATION AT BASELINE    PERTINENT TEST /EXAMS      I have reviewed all available laboratory results. MEDICATIONS CURRENT   0.9 % sodium chloride infusion, Continuous  apixaban (ELIQUIS) tablet 5 mg, BID  folic acid-pyridoxine-cyancobalamin (FOLTX) 1.13-25-2 MG TABS 1 tablet, Daily  hydroCHLOROthiazide (HYDRODIURIL) tablet 12.5 mg, Daily  levothyroxine (SYNTHROID) tablet 100 mcg, Daily  metoclopramide (REGLAN) tablet 10 mg, TID PRN  potassium chloride (MICRO-K) extended release capsule 20 mEq, Daily  sucralfate (CARAFATE) tablet 1 g, 4x Daily  vitamin B-12 (CYANOCOBALAMIN) tablet 500 mcg, Daily  Vitamin D (CHOLECALCIFEROL) tablet 1,000 Units, Daily  ondansetron (ZOFRAN) injection 4 mg, Q6H PRN  ondansetron (ZOFRAN) injection 4 mg, Q6H PRN        All medication charted and reviewed. CONSULTS      IP CONSULT TO GI  IP CONSULT TO CARDIOLOGY  IP CONSULT TO DIETITIAN    ASSESSMENT/PLAN       Doc  is a 62 y.o. female who presents with acute nausea and vomiting and abdominal pain secondary to severe esophagitis. · EGD demonstrates LA grade D severe erosive esophagitis secondary to bile reflux, histology sent.   · Patient started on Carafate 1 g 4 times daily  · Patient started on Protonix 40 mg daily  · Continue bariatric soft diet  · Dietitian consult for postgastrectomy diet education  · Echo done, awaiting results  · Awaiting cardiology final recommendation  · Continue home medications and pain control  · Monitor vitals, labs, and imaging  · DISPO: pending consults and clinical improvement    --  [facility]  Emergency Medicine Resident Physician     This dictation was generated by voice recognition computer software. Although all attempts are made to edit the dictation for accuracy, there may be errors in the transcription that are not intended.

## 2021-04-27 NOTE — PROGRESS NOTES
Comprehensive Nutrition Assessment    Type and Reason for Visit:  Positive Nutrition Screen, Consult(Weight Loss, Poor Intakes/Appetite; Diet Education)    Nutrition Recommendations/Plan: Continue current Bariatric Soft, Carb Controlled diet. Will provide Ensure Enlive oral supplements (in chocolate) and frozen Magic Cups. Encourage/monitor intakes of meals/ONS as tolerated. Monitor plan of care. Nutrition Assessment:  Consulted for diet education for post gastrectomy. Pt s/p EGD yesterday. Pt reports since gastrectomy she has experienced intermittent nausea/vomiting and dysphagia (x past few months). Pt also reports she has not had much of an appetite for quite some time. Reports she is not able to eat much solid foods but drinking liquids well. States she has been eating potatoes, jello, fruit, pot pies, tuna fish, corned beef hash, and yogurt. Pt also reports she has been drinking Ensure supplements but that she gets tired of them - discussed [place] [employer] Breakfast drinks, making milkshakes, and smoothies. Pt states if she has to chew something for too long she usually spits it out. Reports eating soft foods and purred things at times. Pt reports she did not really eat breakfast as she was not hungry. Provided handouts and diet education on nausea/vomiting tips and post gastric surgery diet. Discussed foods pt could try to included in her meal and ways to increase calorie and protein intakes. Pt very receptive to diet education. Will provide chocolate Ensure supplements and frozen Magic Cups with meals to boost intakes. Encouraged pt to eat what she can, even if it is only bites of food items. Pt states UBW of 260 lb before surgery. Per chart review, weight loss of 31% x 8 months noted. Pt states nausea is constant. Will monitor PO intakes.     Malnutrition Assessment:  Malnutrition Status:  Severe malnutrition    Context:  Chronic Illness     Findings of the 6 clinical characteristics of malnutrition:  Energy Intake:  7 - 75% or less estimated energy requirements for 1 month or longer  Weight Loss:  7 - Greater than 10% over 6 months(31% x 8 months per chart review)     Body Fat Loss:  1 - Mild body fat loss Triceps   Muscle Mass Loss:  1 - Mild muscle mass loss Clavicles (pectoralis & deltoids)  Fluid Accumulation:  No significant fluid accumulation   Strength:  Not Performed    Estimated Daily Nutrient Needs:  Energy (kcal):  22-25 kcal/kg = 7418-0739 kcals/day; Weight Used for Energy Requirements:  Admission     Protein (g):  1.2-1.5 gm/kg = 70-85 gm pro/day; Weight Used for Protein Requirements:  Ideal        Fluid (ml/day):  5387-3649 mL/day or per MD; Method Used for Fluid Requirements:  1 ml/kcal      Nutrition Related Findings:  Labs: K 3.5 mmol/L, Mg 1.2 mg/dL. Meds: Folic Acid, Synthroid, Protonix, Vitamin B12, Vitamin D, Reglan PRN, Zofran PRN. Wounds:  None       Current Nutrition Therapies:    DIET BARIATRIC SOFT; Carb Control: 4 carb choices (60 gms)/meal  Dietary Nutrition Supplements: Standard High Calorie Oral Supplement, Frozen Oral Supplement    Anthropometric Measures:  · Height: 5' 5\" (165.1 cm)  · Current Body Weight: 176 lb (79.8 kg)   · Admission Body Weight: 176 lb (79.8 kg)    · Usual Body Weight: 257 lb 6.4 oz (116.8 kg)(8/21/20 bedscale per chart review)     · Ideal Body Weight: 125 lbs; % Ideal Body Weight 140.8 %   · BMI: 29.3  · BMI Categories: Overweight (BMI 25.0-29. 9)       Nutrition Diagnosis:   · Inadequate oral intake related to altered GI function(previous gastrectomy) as evidenced by poor intake prior to admission, weight loss greater than or equal to 10% in 6 months, nausea, vomiting, intake 0-25%(need for ONS)    Nutrition Interventions:   Food and/or Nutrient Delivery:  Continue Current Diet, Start Oral Nutrition Supplement  Nutrition Education/Counseling:  Education completed(Provide post gastric surgery diet education. Encouraged intakes of meals and ONS.)   Coordination of Nutrition Care:  Continue to monitor while inpatient    Goals:  Meet at least 50% of estimated nutrition needs.        Nutrition Monitoring and Evaluation:   Food/Nutrient Intake Outcomes:  Food and Nutrient Intake, Supplement Intake  Physical Signs/Symptoms Outcomes:  Biochemical Data, GI Status, Nausea or Vomiting, Fluid Status or Edema, Hemodynamic Status, Nutrition Focused Physical Findings, Skin, Weight, Chewing or Swallowing     Electronically signed by Radha aHys RD, LD on 4/27/21 at 11:46 AM EDT    Contact: 1-1200

## 2021-04-27 NOTE — PROGRESS NOTES
[address]  CDU / OBSERVATION ENCOUNTER  ATTENDING NOTE       I performed a history and physical examination of the patient and discussed management with the resident or midlevel provider. I reviewed the resident or midlevel provider's note and agree with the documented findings and plan of care. Any areas of disagreement are noted on the chart. I was personally present for the key portions of any procedures. I have documented in the chart those procedures where I was not present during the key portions. I have reviewed the nurses notes. I agree with the chief complaint, past medical history, past surgical history, allergies, medications, social and family history as documented unless otherwise noted below. The Family history, social history, and ROS are effectively unchanged since admission unless noted elsewhere in the chart. Patient admitted to the ETU for nausea and vomiting as well as chest pain. GI was consulted and performed an EGD yesterday which showed severe reflux esophagitis. They have recommended starting Carafate as well as pantoprazole. Cardiology was also consulted for the chest pain. An echo was performed. Awaiting results of echo and any further cardiology recommendations. Patient states that she is feeling somewhat better today. She denies any new symptoms. Will reassess.     Gregoria Quinteros MD  Attending Emergency  Physician

## 2021-04-27 NOTE — PLAN OF CARE
Problem: Nutrition  Goal: Optimal nutrition therapy  4/27/2021 0521 by Shandra Lua RN  Outcome: Ongoing  4/26/2021 1834 by Татьяна Tim RN  Outcome: Ongoing  4/26/2021 1542 by Keisha Merlos RD, LD  Outcome: Ongoing  Note: Nutrition Problem #1: Inadequate oral intake  Intervention: Food and/or Nutrient Delivery: Continue NPO(Monitor for start of oral diet. Will provide ONS as able.)  Nutritional Goals: Meet at least 50% of estimated nutrition needs.      Problem: Falls - Risk of:  Goal: Will remain free from falls  Description: Will remain free from falls  4/27/2021 0521 by Shandra Lua RN  Outcome: Ongoing  4/26/2021 1834 by Татьяна Tim RN  Outcome: Ongoing  Goal: Absence of physical injury  Description: Absence of physical injury  4/27/2021 0521 by Shandra Lua RN  Outcome: Ongoing  4/26/2021 1834 by Татьяна Tim RN  Outcome: Ongoing

## 2021-04-28 LAB
GLUCOSE BLD-MCNC: 116 MG/DL (ref 65–105)
SURGICAL PATHOLOGY REPORT: NORMAL

## 2021-04-28 PROCEDURE — 6370000000 HC RX 637 (ALT 250 FOR IP): Performed by: NURSE PRACTITIONER

## 2021-04-28 PROCEDURE — 6360000002 HC RX W HCPCS: Performed by: NURSE PRACTITIONER

## 2021-04-28 PROCEDURE — 96366 THER/PROPH/DIAG IV INF ADDON: CPT

## 2021-04-28 PROCEDURE — 6370000000 HC RX 637 (ALT 250 FOR IP): Performed by: INTERNAL MEDICINE

## 2021-04-28 PROCEDURE — 6360000002 HC RX W HCPCS: Performed by: INTERNAL MEDICINE

## 2021-04-28 PROCEDURE — 2580000003 HC RX 258: Performed by: INTERNAL MEDICINE

## 2021-04-28 PROCEDURE — 96376 TX/PRO/DX INJ SAME DRUG ADON: CPT

## 2021-04-28 PROCEDURE — 2500000003 HC RX 250 WO HCPCS: Performed by: STUDENT IN AN ORGANIZED HEALTH CARE EDUCATION/TRAINING PROGRAM

## 2021-04-28 PROCEDURE — 6370000000 HC RX 637 (ALT 250 FOR IP): Performed by: STUDENT IN AN ORGANIZED HEALTH CARE EDUCATION/TRAINING PROGRAM

## 2021-04-28 PROCEDURE — 1200000000 HC SEMI PRIVATE

## 2021-04-28 PROCEDURE — 82947 ASSAY GLUCOSE BLOOD QUANT: CPT

## 2021-04-28 RX ORDER — DEXTROSE, SODIUM CHLORIDE, AND POTASSIUM CHLORIDE 5; .9; .15 G/100ML; G/100ML; G/100ML
INJECTION INTRAVENOUS CONTINUOUS
Status: DISCONTINUED | OUTPATIENT
Start: 2021-04-28 | End: 2021-04-29 | Stop reason: HOSPADM

## 2021-04-28 RX ORDER — SODIUM CHLORIDE 9 MG/ML
INJECTION, SOLUTION INTRAVENOUS CONTINUOUS
Status: DISCONTINUED | OUTPATIENT
Start: 2021-04-28 | End: 2021-04-28

## 2021-04-28 RX ORDER — PROMETHAZINE HYDROCHLORIDE 25 MG/ML
12.5 INJECTION, SOLUTION INTRAMUSCULAR; INTRAVENOUS EVERY 6 HOURS PRN
Status: DISCONTINUED | OUTPATIENT
Start: 2021-04-28 | End: 2021-04-29 | Stop reason: HOSPADM

## 2021-04-28 RX ADMIN — APIXABAN 5 MG: 5 TABLET, FILM COATED ORAL at 09:02

## 2021-04-28 RX ADMIN — HYDROCHLOROTHIAZIDE 12.5 MG: 25 TABLET ORAL at 09:02

## 2021-04-28 RX ADMIN — MAGNESIUM SULFATE HEPTAHYDRATE 1000 MG: 1 INJECTION, SOLUTION INTRAVENOUS at 09:15

## 2021-04-28 RX ADMIN — MAGNESIUM SULFATE HEPTAHYDRATE 1000 MG: 1 INJECTION, SOLUTION INTRAVENOUS at 10:34

## 2021-04-28 RX ADMIN — MAGNESIUM SULFATE HEPTAHYDRATE 1000 MG: 1 INJECTION, SOLUTION INTRAVENOUS at 12:05

## 2021-04-28 RX ADMIN — ONDANSETRON 4 MG: 2 INJECTION INTRAMUSCULAR; INTRAVENOUS at 08:55

## 2021-04-28 RX ADMIN — POTASSIUM BICARBONATE 40 MEQ: 782 TABLET, EFFERVESCENT ORAL at 09:14

## 2021-04-28 RX ADMIN — SODIUM CHLORIDE 1000 ML: 9 INJECTION, SOLUTION INTRAVENOUS at 10:31

## 2021-04-28 RX ADMIN — SUCRALFATE 1 G: 1 TABLET ORAL at 18:18

## 2021-04-28 RX ADMIN — SUCRALFATE 1 G: 1 TABLET ORAL at 13:17

## 2021-04-28 RX ADMIN — APIXABAN 5 MG: 5 TABLET, FILM COATED ORAL at 21:21

## 2021-04-28 RX ADMIN — SUCRALFATE 1 G: 1 TABLET ORAL at 09:01

## 2021-04-28 RX ADMIN — Medication 500 MCG: at 09:09

## 2021-04-28 RX ADMIN — POTASSIUM CHLORIDE, DEXTROSE MONOHYDRATE AND SODIUM CHLORIDE: 150; 5; 900 INJECTION, SOLUTION INTRAVENOUS at 13:46

## 2021-04-28 RX ADMIN — POTASSIUM CHLORIDE, DEXTROSE MONOHYDRATE AND SODIUM CHLORIDE: 150; 5; 900 INJECTION, SOLUTION INTRAVENOUS at 22:28

## 2021-04-28 RX ADMIN — METOCLOPRAMIDE 10 MG: 10 TABLET ORAL at 21:21

## 2021-04-28 RX ADMIN — Medication 1000 UNITS: at 09:09

## 2021-04-28 RX ADMIN — LEVOTHYROXINE SODIUM 100 MCG: 100 TABLET ORAL at 09:01

## 2021-04-28 RX ADMIN — PANTOPRAZOLE SODIUM 40 MG: 40 TABLET, DELAYED RELEASE ORAL at 09:01

## 2021-04-28 RX ADMIN — MAGNESIUM SULFATE HEPTAHYDRATE 1000 MG: 1 INJECTION, SOLUTION INTRAVENOUS at 13:49

## 2021-04-28 ASSESSMENT — PAIN SCALES - GENERAL
PAINLEVEL_OUTOF10: 0

## 2021-04-28 NOTE — PROGRESS NOTES
Patient  began vomiting after ingesting breakfast and medications. RN is unable to ascertain if she absorbed any of her listed medications. Dr. padron. Raphael Elizabeth

## 2021-04-28 NOTE — PROGRESS NOTES
OBS/CDU   RESIDENT NOTE      Patients PCP is:  Clement Machado MD        SUBJECTIVE      Pt states abdominal paint symptoms are improving significantly. Pt also states that chest pain is resolved. Her vitals remain stable. She is tolerating orals without nausea or vomiting. Pt is ambulating without difficulty and urinating on her own without difficulty. Pt denies shortness of breath, nausea or vomiting, leg weakness or numbness. PHYSICAL EXAM      General: NAD, AO X 3  Heent: EMOI, PERRL  Neck: SUPPLE, NO JVD  Cardiovascular: RRR, S1S2  Pulmonary: CTAB, NO SOB  Abdomen: SOFT, NTTP, ND, +BS  Extremities: +2/4 PULSES DISTAL, NO SWELLING  Neuro / Psych: NO NUMBNESS OR TINGLING, MENTATION AT BASELINE    PERTINENT TEST /EXAMS      I have reviewed all available laboratory results. MEDICATIONS CURRENT   pantoprazole (PROTONIX) tablet 40 mg, QAM AC  magnesium sulfate 1000 mg in dextrose 5% 100 mL IVPB, PRN  potassium chloride (KLOR-CON M) extended release tablet 40 mEq, PRN    Or  potassium bicarb-citric acid (EFFER-K) effervescent tablet 40 mEq, PRN    Or  potassium chloride 10 mEq/100 mL IVPB (Peripheral Line), PRN  metoprolol tartrate (LOPRESSOR) tablet 25 mg, BID  acetaminophen (TYLENOL) tablet 650 mg, Q6H PRN  0.9 % sodium chloride infusion, Continuous  apixaban (ELIQUIS) tablet 5 mg, BID  folic acid-pyridoxine-cyancobalamin (FOLTX) 1.13-25-2 MG TABS 1 tablet, Daily  hydroCHLOROthiazide (HYDRODIURIL) tablet 12.5 mg, Daily  levothyroxine (SYNTHROID) tablet 100 mcg, Daily  metoclopramide (REGLAN) tablet 10 mg, TID PRN  sucralfate (CARAFATE) tablet 1 g, 4x Daily  vitamin B-12 (CYANOCOBALAMIN) tablet 500 mcg, Daily  Vitamin D (CHOLECALCIFEROL) tablet 1,000 Units, Daily  ondansetron (ZOFRAN) injection 4 mg, Q6H PRN        All medication charted and reviewed.     CONSULTS      IP CONSULT TO GI  IP CONSULT TO CARDIOLOGY  IP CONSULT TO DIETITIAN  IP CONSULT TO DIETITIAN    ASSESSMENT/PLAN       Amena Abdul is a 62 y.o. female who presents with acute chest pain. ECHO done yesterday EF of 59%, cardiology started pt on Lopressor 25mg BID. Pt continues on protonix and carafate with symptomatic control. Pt overall improving significantly. · Continue home medications and pain control  · Monitor vitals, labs, and imaging  · DISPO: Plan to discharge pt with GI f/u and Cardiology f/u    --  Dayana Bruno  Emergency Medicine Resident Physician     This dictation was generated by voice recognition computer software. Although all attempts are made to edit the dictation for accuracy, there may be errors in the transcription that are not intended.

## 2021-04-28 NOTE — PLAN OF CARE
Problem: Nutrition  Goal: Optimal nutrition therapy  Outcome: Ongoing     Problem: Falls - Risk of:  Goal: Will remain free from falls  Description: Will remain free from falls  Outcome: Ongoing  Goal: Absence of physical injury  Description: Absence of physical injury  Outcome: Ongoing

## 2021-04-28 NOTE — PLAN OF CARE
Problem: Nutrition  Goal: Optimal nutrition therapy  4/28/2021 1808 by Brianna Husain RN  Outcome: Ongoing  4/28/2021 0703 by Martin Rodríguez RN  Outcome: Ongoing     Problem: Falls - Risk of:  Goal: Will remain free from falls  Description: Will remain free from falls  4/28/2021 1808 by Brianna Husain RN  Outcome: Ongoing  4/28/2021 0703 by Martin Rodríguez RN  Outcome: Ongoing  Goal: Absence of physical injury  Description: Absence of physical injury  4/28/2021 1808 by Brianna Husain RN  Outcome: Ongoing  4/28/2021 0703 by Martin Rodríguez RN  Outcome: Ongoing

## 2021-04-29 VITALS
WEIGHT: 176 LBS | BODY MASS INDEX: 29.32 KG/M2 | HEIGHT: 65 IN | TEMPERATURE: 98.8 F | OXYGEN SATURATION: 97 % | HEART RATE: 63 BPM | RESPIRATION RATE: 18 BRPM | DIASTOLIC BLOOD PRESSURE: 92 MMHG | SYSTOLIC BLOOD PRESSURE: 140 MMHG

## 2021-04-29 LAB
ANION GAP SERPL CALCULATED.3IONS-SCNC: 12 MMOL/L (ref 9–17)
BUN BLDV-MCNC: 4 MG/DL (ref 6–20)
BUN/CREAT BLD: ABNORMAL (ref 9–20)
CALCIUM SERPL-MCNC: 8.8 MG/DL (ref 8.6–10.4)
CHLORIDE BLD-SCNC: 108 MMOL/L (ref 98–107)
CO2: 20 MMOL/L (ref 20–31)
CREAT SERPL-MCNC: 0.51 MG/DL (ref 0.5–0.9)
GFR AFRICAN AMERICAN: >60 ML/MIN
GFR NON-AFRICAN AMERICAN: >60 ML/MIN
GFR SERPL CREATININE-BSD FRML MDRD: ABNORMAL ML/MIN/{1.73_M2}
GFR SERPL CREATININE-BSD FRML MDRD: ABNORMAL ML/MIN/{1.73_M2}
GLUCOSE BLD-MCNC: 96 MG/DL (ref 70–99)
MAGNESIUM: 1.6 MG/DL (ref 1.6–2.6)
POTASSIUM SERPL-SCNC: 4 MMOL/L (ref 3.7–5.3)
SODIUM BLD-SCNC: 140 MMOL/L (ref 135–144)

## 2021-04-29 PROCEDURE — 83735 ASSAY OF MAGNESIUM: CPT

## 2021-04-29 PROCEDURE — 6360000002 HC RX W HCPCS: Performed by: INTERNAL MEDICINE

## 2021-04-29 PROCEDURE — 36415 COLL VENOUS BLD VENIPUNCTURE: CPT

## 2021-04-29 PROCEDURE — 6370000000 HC RX 637 (ALT 250 FOR IP): Performed by: INTERNAL MEDICINE

## 2021-04-29 PROCEDURE — 6370000000 HC RX 637 (ALT 250 FOR IP): Performed by: STUDENT IN AN ORGANIZED HEALTH CARE EDUCATION/TRAINING PROGRAM

## 2021-04-29 PROCEDURE — 80048 BASIC METABOLIC PNL TOTAL CA: CPT

## 2021-04-29 RX ORDER — ONDANSETRON 4 MG/1
4 TABLET, FILM COATED ORAL EVERY 8 HOURS PRN
Qty: 20 TABLET | Refills: 1 | Status: ON HOLD | OUTPATIENT
Start: 2021-04-29 | End: 2021-08-28

## 2021-04-29 RX ORDER — SUCRALFATE 1 G/1
1 TABLET ORAL 4 TIMES DAILY
Qty: 120 TABLET | Refills: 3 | Status: SHIPPED | OUTPATIENT
Start: 2021-04-29 | End: 2022-05-02

## 2021-04-29 RX ORDER — PANTOPRAZOLE SODIUM 40 MG/1
40 TABLET, DELAYED RELEASE ORAL 2 TIMES DAILY
Qty: 90 TABLET | Refills: 0 | Status: SHIPPED | OUTPATIENT
Start: 2021-04-29 | End: 2021-11-19

## 2021-04-29 RX ADMIN — HYDROCHLOROTHIAZIDE 12.5 MG: 25 TABLET ORAL at 08:49

## 2021-04-29 RX ADMIN — PANTOPRAZOLE SODIUM 40 MG: 40 TABLET, DELAYED RELEASE ORAL at 08:48

## 2021-04-29 RX ADMIN — SUCRALFATE 1 G: 1 TABLET ORAL at 08:48

## 2021-04-29 RX ADMIN — LEVOTHYROXINE SODIUM 100 MCG: 100 TABLET ORAL at 08:49

## 2021-04-29 RX ADMIN — ONDANSETRON 4 MG: 2 INJECTION INTRAMUSCULAR; INTRAVENOUS at 09:06

## 2021-04-29 RX ADMIN — Medication 500 MCG: at 08:48

## 2021-04-29 RX ADMIN — Medication 1 TABLET: at 08:49

## 2021-04-29 RX ADMIN — Medication 1000 UNITS: at 08:48

## 2021-04-29 RX ADMIN — APIXABAN 5 MG: 5 TABLET, FILM COATED ORAL at 08:49

## 2021-04-29 NOTE — PLAN OF CARE
Problem: Nutrition  Goal: Optimal nutrition therapy  4/29/2021 0025 by Les Trinidad RN  Outcome: Ongoing  4/28/2021 1808 by [place] Husain RN  Outcome: Ongoing     Problem: Falls - Risk of:  Goal: Will remain free from falls  Description: Will remain free from falls  4/29/2021 0025 by Les Trinidad RN  Outcome: Ongoing  4/28/2021 1808 by [place] Husain RN  Outcome: Ongoing  Goal: Absence of physical injury  Description: Absence of physical injury  4/29/2021 0025 by Les Trinidad RN  Outcome: Ongoing  4/28/2021 1808 by [place] Husain RN  Outcome: Ongoing

## 2021-04-29 NOTE — DISCHARGE SUMMARY
CDU Discharge Summary        Patient:  Wicho Salter  YOB: 1960    MRN: [id number]   Acct: [de-identified]    Primary Care Physician: Aliza Baldwin MD    Admit date:  4/26/2021  5:13 AM  Discharge date: 4/29/2021  1:10 PM      Discharge Diagnoses:     1.)  Acute episode of nausea vomiting and epigastric discomfort. 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 Treated with IV Protonix and evaluated further with gastroenterology doing endoscopy. 2.  Status post gastrectomy. This is led to patient having esophagitis. Follow-up:  Call today/tomorrow for a follow up appointment with Aliza Baldwin MD , or return to the Emergency Room with worsening symptoms    Stressed to patient the importance of following up with primary care doctor for further workup/management of symptoms. Pt verbalizes understanding and agrees with plan. Discharge Medication Changes:       Medication List      CHANGE how you take these medications    hydroCHLOROthiazide 25 MG tablet  Commonly known as: HYDRODIURIL  TAKE 1 TABLET BY MOUTH EVERY DAY  What changed:   · how much to take  · additional instructions     pantoprazole 40 MG tablet  Commonly known as: PROTONIX  Take 1 tablet by mouth 2 times daily  What changed: See the new instructions. CONTINUE taking these medications    * apixaban starter pack 5 MG Tbpk tablet  Commonly known as: ELIQUIS  Take 1 tablet by mouth See Admin Instructions     * apixaban 5 MG Tabs tablet  Commonly known as: Eliquis  Take 1 tablet by mouth 2 times daily     desonide 0.05 % cream  Commonly known as: DesOwen  Apply topically 2 times daily.      Folic Acid-Vit S9-SOX R72 2.2-25-0.5 MG Tabs  Take 1 tablet by mouth daily     levothyroxine 100 MCG tablet  Commonly known as: SYNTHROID  Take 1 tablet by mouth daily     metoclopramide 10 MG tablet  Commonly known as: REGLAN  Take 1 tablet by mouth 3 times daily as needed (nausea)     MULTI-DAY VITAMINS PO ondansetron 4 MG tablet  Commonly known as: ZOFRAN  Take 1 tablet by mouth every 8 hours as needed for Nausea or Vomiting     potassium chloride 20 MEQ packet  Commonly known as: KLOR-CON  Take 20 mEq by mouth daily     sucralfate 1 GM tablet  Commonly known as: CARAFATE  Take 1 tablet by mouth 4 times daily     vitamin B-12 500 MCG tablet  Commonly known as: CYANOCOBALAMIN     vitamin D 25 MCG (1000 UT) Caps         * This list has 2 medication(s) that are the same as other medications prescribed for you. Read the directions carefully, and ask your doctor or other care provider to review them with you. Where to Get Your Medications      You can get these medications from any pharmacy    Bring a paper prescription for each of these medications  · ondansetron 4 MG tablet  · pantoprazole 40 MG tablet  · sucralfate 1 GM tablet         Diet:  DIET BARIATRIC SOFT; Carb Control: 4 carb choices (60 gms)/meal  Dietary Nutrition Supplements: Standard High Calorie Oral Supplement, Frozen Oral Supplement, advance as tolerated     Activity:  As tolerated    Consultants: IP CONSULT TO GI  IP CONSULT TO CARDIOLOGY  IP CONSULT TO DIETITIAN  IP CONSULT TO DIETITIAN    Procedures:  Not indicated      Diagnostic Test:   Results for orders placed or performed during the hospital encounter of 04/26/21   COVID-19, Rapid    Specimen: Nasopharyngeal Swab   Result Value Ref Range    Specimen Description . NASOPHARYNGEAL SWAB     SARS-CoV-2, Rapid Not Detected Not Detected   CBC Auto Differential   Result Value Ref Range    WBC 6.3 3.5 - 11.3 k/uL    RBC 5.43 (H) 3.95 - 5.11 m/uL    Hemoglobin 15.9 (H) 11.9 - 15.1 g/dL    Hematocrit 48.7 (H) 36.3 - 47.1 %    MCV 89.7 82.6 - 102.9 fL    MCH 29.3 25.2 - 33.5 pg    MCHC 32.6 28.4 - 34.8 g/dL    RDW 15.6 (H) 11.8 - 14.4 %    Platelets 836 537 - 653 k/uL    MPV 10.0 8.1 - 13.5 fL    NRBC Automated 0.0 0.0 per 100 WBC    Differential Type NOT REPORTED     Seg Neutrophils 70 (H) 36 - 65 %    Lymphocytes 21 (L) 24 - 43 %    Monocytes 7 3 - 12 %    Eosinophils % 2 1 - 4 %    Basophils 0 0 - 2 %    Immature Granulocytes 0 0 %    Segs Absolute 4.41 1.50 - 8.10 k/uL    Absolute Lymph # 1.31 1.10 - 3.70 k/uL    Absolute Mono # 0.44 0.10 - 1.20 k/uL    Absolute Eos # 0.13 0.00 - 0.44 k/uL    Basophils Absolute <0.03 0.00 - 0.20 k/uL    Absolute Immature Granulocyte <0.03 0.00 - 0.30 k/uL    WBC Morphology NOT REPORTED     RBC Morphology ANISOCYTOSIS PRESENT     Platelet Estimate NOT REPORTED    Comprehensive Metabolic Panel w/ Reflex to MG   Result Value Ref Range    Glucose 116 (H) 70 - 99 mg/dL    BUN 9 6 - 20 mg/dL    CREATININE 0.68 0.50 - 0.90 mg/dL    Bun/Cre Ratio NOT REPORTED 9 - 20    Calcium 9.7 8.6 - 10.4 mg/dL    Sodium 138 135 - 144 mmol/L    Potassium 3.9 3.7 - 5.3 mmol/L    Chloride 100 98 - 107 mmol/L    CO2 22 20 - 31 mmol/L    Anion Gap 16 9 - 17 mmol/L    Alkaline Phosphatase 97 35 - 104 U/L    ALT 29 5 - 33 U/L    AST 48 (H) <32 U/L    Total Bilirubin 0.47 0.3 - 1.2 mg/dL    Total Protein 7.6 6.4 - 8.3 g/dL    Albumin 3.3 (L) 3.5 - 5.2 g/dL    Albumin/Globulin Ratio 0.8 (L) 1.0 - 2.5    GFR Non-African American >60 >60 mL/min    GFR African American >60 >60 mL/min    GFR Comment          GFR Staging NOT REPORTED    Lipase   Result Value Ref Range    Lipase 13 13 - 60 U/L   Troponin   Result Value Ref Range    Troponin, High Sensitivity 7 0 - 14 ng/L    Troponin T NOT REPORTED <0.03 ng/mL    Troponin Interp NOT REPORTED    Troponin   Result Value Ref Range    Troponin, High Sensitivity 7 0 - 14 ng/L    Troponin T NOT REPORTED <0.03 ng/mL    Troponin Interp NOT REPORTED    Brain Natriuretic Peptide   Result Value Ref Range    Pro- <300 pg/mL    BNP Interpretation Pro-BNP Reference Range:    Basic Metabolic Panel   Result Value Ref Range    Glucose 66 (L) 70 - 99 mg/dL    BUN 6 6 - 20 mg/dL    CREATININE 0.47 (L) 0.50 - 0.90 mg/dL    Bun/Cre Ratio NOT REPORTED 9 - 20 Calcium 8.5 (L) 8.6 - 10.4 mg/dL    Sodium 141 135 - 144 mmol/L    Potassium 3.5 (L) 3.7 - 5.3 mmol/L    Chloride 107 98 - 107 mmol/L    CO2 21 20 - 31 mmol/L    Anion Gap 13 9 - 17 mmol/L    GFR Non-African American >60 >60 mL/min    GFR African American >60 >60 mL/min    GFR Comment          GFR Staging NOT REPORTED    MAGNESIUM   Result Value Ref Range    Magnesium 1.2 (L) 1.6 - 2.6 mg/dL   Surgical Pathology   Result Value Ref Range    Surgical Pathology Report       -- Diagnosis --  ESOPHAGUS, BIOPSY:            -  SLIGHTLY INCREASED INTRAEPITHELIAL EOSINOPHILS,  CONSISTENT WITH REFLUX ESOPHAGITIS.       -  NEGATIVE FOR FUNGAL ORGANISMS, VIRAL INCLUSIONS, INTESTINAL  METAPLASIA OR DYSPLASIA. Mary Arreola M.D.  **Electronically Signed Out**  jet/4/28/2021       Clinical Information  Pre-op Diagnosis:  NAUSEA, VOMITING   Operative Findings:  ESOPHAGUS BX  Operation Performed:  EGD, ESOPHAGOGASTRODUODENOSCOPY     Source of Specimen  1: ESOPHAGUS BX    Gross Description  \"BUFFY DASILVA, ESOPHAGUS BX\" One tan-white tissue fragment, 0.2 x  0.1 x 0.1 cm. Entirely 1cs. mpb tm       Microscopic Description  Sections of squamous mucosa show basal layer hyperplasia and slightly  increased intraepithelial eosinophils. There is no evidence of  ulceration, viral inclusions, intestinal metaplasia or dysplasia. Focal intramucosal neutrophils are present. A PAS stain appears  negative for fungal organisms. Controls stain appropriately. [facility] CONSULTATION       Patient Name: Sherine Malagon: [id number]  Path Number: [id number]    [address] [place] [address].   [place], [address]  [phone]  Fax: [phone]     BASIC METABOLIC PANEL   Result Value Ref Range    Glucose 96 70 - 99 mg/dL    BUN 4 (L) 6 - 20 mg/dL    CREATININE 0.51 0.50 - 0.90 mg/dL    Bun/Cre Ratio NOT REPORTED 9 - 20    Calcium 8.8 8.6 - 10.4 mg/dL    Sodium 140 135 - 144 mmol/L    Potassium 4.0 3.7 - 5.3 mmol/L    Chloride 108 (H) 98 - 107 mmol/L    CO2 20 20 - 31 mmol/L    Anion Gap 12 9 - 17 mmol/L    GFR Non-African American >60 >60 mL/min    GFR African American >60 >60 mL/min    GFR Comment          GFR Staging NOT REPORTED    MAGNESIUM   Result Value Ref Range    Magnesium 1.6 1.6 - 2.6 mg/dL   POC Glucose Fingerstick   Result Value Ref Range    POC Glucose 116 (H) 65 - 105 mg/dL   EKG 12 Lead   Result Value Ref Range    Ventricular Rate 99 BPM    Atrial Rate 99 BPM    P-R Interval 130 ms    QRS Duration 76 ms    Q-T Interval 354 ms    QTc Calculation (Bazett) 454 ms    P Axis 56 degrees    R Axis 10 degrees    T Axis -22 degrees     Echo Complete 2d W Doppler W Color    Result Date: 4/27/2021  Transthoracic Echocardiography Report (TTE)  Patient Name [facility]       Date of Study               04/27/2021               [facility]   Date of      1960  Gender                      Female  Birth   Age          62 year(s)  Race                        Black   Room Number  2035        Height:                     65 inch, 165.1 cm   Corporate ID [id number]    Weight:                     190 pounds, 86.2 kg  #   Patient Acct [de-identified]   BSA:          1.94 m^2      BMI:      31.62  #                                                              kg/m^2   MR #         [id number]     Sonographer                 NnekaMaxine   Accession #  [id number]  Interpreting Physician      [address] [place] [address]   Fellow                   Referring Nurse                           Practitioner   Interpreting             Referring Physician         [facility] McRae Kai  Fellow  Type of Study   TTE procedure:2D Echocardiogram, M-Mode, Doppler, Color Doppler. Procedure Date Date: 04/27/2021 Start: 08:21 AM Study Location: [facility] Technical Quality: Good visualization Indications:Chest pain. History / Tech. Comments: Procedure explained to patient.  Patient Status: Inpatient Height: 65 inches Weight: 190 pounds BSA: 1.94 m^2 BMI: 31.62 kg/m^2 HR: 69 bpm CONCLUSIONS Summary Left ventricle is normal in size. Global left ventricular systolic function is normal. Calculated EF via 3D Heart Model is 59 %. Trivial mitral regurgitation. Trivial tricuspid regurgitation. Estimated right ventricular systolic pressure is 22 mmHg. Trivial pulmonic insufficiency. Signature ----------------------------------------------------------------------------  Electronically signed by Maxine Early(Sonographer) on 04/27/2021  09:10 AM ---------------------------------------------------------------------------- ----------------------------------------------------------------------------  Electronically signed by Mario Alberto Avelar(Interpreting physician) on 04/27/2021  10:39 AM ---------------------------------------------------------------------------- FINDINGS Left Atrium Left atrium is normal in size. Left Ventricle Left ventricle is normal in size. Global left ventricular systolic function is normal. Estimated ejection fraction is 55 % . Calculated EF via 3D Heart Model is 59 %. Normal left ventricular wall thickness. No wall motion abnormality seen. Right Atrium Right atrium is normal in size. Right Ventricle Normal right ventricular size and function. Mitral Valve Normal mitral valve structure. Trivial mitral regurgitation. No mitral stenosis. Aortic Valve Aortic valve structure and function normal. Aortic valve is trileaflet. No aortic insufficiency. No aortic stenosis. Tricuspid Valve Normal tricuspid valve leaflets. Trivial tricuspid regurgitation. No tricuspid stenosis. Estimated right ventricular systolic pressure is 22 mmHg. Pulmonic Valve Pulmonic valve is normal in structure and function. Trivial pulmonic insufficiency. No evidence of pulmonic stenosis. Pericardial Effusion No significant pericardial effusion is seen. Miscellaneous Normal aortic root dimension. The ascending aorta is normal in size. E/E' average = 11.1. IVC not well visualized. M-mode / 2D Measurements & Calculations:   LVIDd:4.2 cm(3.7 - 5.6 cm)       Diastolic VJGMNW:07 ml  TCDMJ:6.6 cm(2.2 - 4.0 cm)       Systolic APMJLS:30 ml  IVSd:1 cm(0.6 - 1.1 cm)          Aortic Root:2.7 cm(2.0 - 3.7 cm)  LVPWd:0.8 cm(0.6 - 1.1 cm)       LA Dimension: 3.3 cm(1.9 - 4.0 cm)  Fractional Shortenin.95 %    LA volume/Index: 32.3 ml /17m^2  Calculated LVEF (%): 58.95 %     LVOT:1.9 cm                                   RVDd:3.5 cm   Mitral:                                 Aortic   Valve Area (P1/2-Time): 2.86 cm^2       Peak Velocity: 1.99 m/s  Peak E-Wave: 1.09 m/s                   Mean Velocity: 1.28 m/s  Peak A-Wave: 0.82 m/s                   Peak Gradient: 15.84 mmHg  E/A Ratio: 1.33                         Mean Gradient: 7 mmHg  Peak Gradient: 4.75 mmHg  Mean Gradient: 2 mmHg  Deceleration Time: 261 msec             Area (continuity): 2.18 cm^2  P1/2t: 77 msec                          AV VTI: 37.5 cm   Area (continuity): 2.41 cm^2  Mean Velocity: 0.60 m/s   Tricuspid:                              Pulmonic:   Estimated RVSP: 22 mmHg                 Peak Velocity: 1.10 m/s  Peak TR Velocity: 2.08 m/s              Peak Gradient: 4.84 mmHg  Peak TR Gradient: 17.3056 mmHg  Estimated RA Pressure: 5 mmHg                                           Estimated PASP: 22.31 mmHg  Diastology / Tissue Doppler Septal Wall E' velocity:0.09 m/s Septal Wall E/E':12.7 Lateral Wall E' velocity:0.12 m/s Lateral Wall E/E':9.5    Ct Abdomen Pelvis W Iv Contrast Additional Contrast? None    Result Date: 2021  EXAMINATION: CT OF THE ABDOMEN AND PELVIS WITH CONTRAST 2021 12:02 pm TECHNIQUE: CT of the abdomen and pelvis was performed with the administration of intravenous contrast. Multiplanar reformatted images are provided for review.  Dose modulation, iterative reconstruction, and/or weight based adjustment of the mA/kV was utilized to reduce the radiation dose to as low as reasonably achievable. COMPARISON: 12/18/2020 HISTORY: ORDERING SYSTEM PROVIDED HISTORY: hx gastrectomy; abd pain, nausea, vomiting TECHNOLOGIST PROVIDED HISTORY: hx gastrectomy; abd pain, nausea, vomiting Reason for Exam: hx gastrectomy; abd pain, nausea, vomiting Acuity: Unknown Type of Exam: Unknown FINDINGS: Lower Chest: The lung bases are clear. Organs: Status post cholecystectomy. Biliary dilatation again demonstrated, favoring reservoir effect. Findings compatible with focal fat deposition along the falciform ligament. The pancreas, spleen, adrenals and kidneys reveal no acute findings. Right pelvic kidney. Bilateral renal cysts again demonstrated. GI/Bowel: Status post gastric bypass. No bowel dilatation or wall thickening identified. Normal appendix. Oral contrast is present to the level of the rectum. Pelvis: No acute findings. Peritoneum/Retroperitoneum: Fat induration in the left upper quadrant is again demonstrated, likely representing fat necrosis. No free air. No ascites. No fluid collection. Bones/Soft Tissues: No acute osseous abnormality identified. Soft tissue calcification in the deep subcutaneous fat near the left hip again demonstrated, compatible with old hematoma or old fat necrosis. 1.  No acute abnormality identified. 2.  Status post gastric bypass. Mild residual findings consistent with fat necrosis in the left upper quadrant. 3.  Status post gastric bypass. Unchanged appearance of biliary dilatation likely representing post cholecystectomy reservoir effect. Xr Chest Portable    Result Date: 4/26/2021  EXAMINATION: ONE XRAY VIEW OF THE CHEST 4/26/2021 6:22 am COMPARISON: 04/09/2021 HISTORY: ORDERING SYSTEM PROVIDED HISTORY: chest pain TECHNOLOGIST PROVIDED HISTORY: chest pain FINDINGS: The mediastinal and cardiac contours are stable. The lungs are clear. There is no focal consolidation, pleural effusion or pneumothorax evident. The bones are unremarkable. the transcription that are not intended.

## 2021-04-29 NOTE — PROGRESS NOTES
OBS/CDU   RESIDENT NOTE      Patients PCP is:  Mu Dyer MD        SUBJECTIVE      Patient seen and examined at bedside. She reports great improvement in her symptoms and was able to tolerate breakfast.  Esophageal biopsy yesterday showing eosinophilia, consistent with reflux esophagitis. PHYSICAL EXAM      General: NAD, AO X 3  Heent: EMOI, PERRL  Neck: SUPPLE, NO JVD  Cardiovascular: RRR, S1S2  Pulmonary: CTAB, NO SOB  Abdomen: SOFT, NTTP, ND, +BS  Extremities: +2/4 PULSES DISTAL, NO SWELLING  Neuro / Psych: NO NUMBNESS OR TINGLING, MENTATION AT BASELINE    PERTINENT TEST /EXAMS      I have reviewed all available laboratory results. MEDICATIONS CURRENT   promethazine (PHENERGAN) injection 12.5 mg, Q6H PRN  dextrose 5 % and 0.9 % NaCl with KCl 20 mEq infusion, Continuous  pantoprazole (PROTONIX) tablet 40 mg, QAM AC  magnesium sulfate 1000 mg in dextrose 5% 100 mL IVPB, PRN  potassium chloride (KLOR-CON M) extended release tablet 40 mEq, PRN    Or  potassium bicarb-citric acid (EFFER-K) effervescent tablet 40 mEq, PRN    Or  potassium chloride 10 mEq/100 mL IVPB (Peripheral Line), PRN  metoprolol tartrate (LOPRESSOR) tablet 25 mg, BID  acetaminophen (TYLENOL) tablet 650 mg, Q6H PRN  apixaban (ELIQUIS) tablet 5 mg, BID  folic acid-pyridoxine-cyancobalamin (FOLTX) 1.13-25-2 MG TABS 1 tablet, Daily  hydroCHLOROthiazide (HYDRODIURIL) tablet 12.5 mg, Daily  levothyroxine (SYNTHROID) tablet 100 mcg, Daily  metoclopramide (REGLAN) tablet 10 mg, TID PRN  sucralfate (CARAFATE) tablet 1 g, 4x Daily  vitamin B-12 (CYANOCOBALAMIN) tablet 500 mcg, Daily  Vitamin D (CHOLECALCIFEROL) tablet 1,000 Units, Daily  ondansetron (ZOFRAN) injection 4 mg, Q6H PRN        All medication charted and reviewed.     CONSULTS      IP CONSULT TO GI  IP CONSULT TO CARDIOLOGY  IP CONSULT TO DIETITIAN  IP CONSULT TO DIETITIAN    ASSESSMENT/PLAN       Nitish Quiñonez is a 62 y.o. female who presents with severe abdominal pain, likely 2/2 severe esophagitis from gastric bypass. She reports her symptoms have improved with the current medication regimen and she is now able to tolerate PO.      · Continue home medications and pain control  · Monitor vitals, labs, and imaging  · DISPO: pending consults and clinical improvement    --  Marv Villegas  Emergency Medicine Resident Physician     This dictation was generated by voice recognition computer software. Although all attempts are made to edit the dictation for accuracy, there may be errors in the transcription that are not intended.

## 2021-04-29 NOTE — PROGRESS NOTES
[facility]  CDU / OBSERVATION ENCOUNTER  ATTENDING NOTE       I performed a history and physical examination of the patient and discussed management with the resident or midlevel provider. I reviewed the resident or midlevel provider's note and agree with the documented findings and plan of care. Any areas of disagreement are noted on the chart. I was personally present for the key portions of any procedures. I have documented in the chart those procedures where I was not present during the key portions. I have reviewed the nurses notes. I agree with the chief complaint, past medical history, past surgical history, allergies, medications, social and family history as documented unless otherwise noted below. The Family history, social history, and ROS are effectively unchanged since admission unless noted elsewhere in the chart. Patient has been demonstrating improvement then started vomiting again yesterday. Patient with bad esophagitis. On supportive therapy. Will reevaluate today. Will advance diet as tolerated today. If tolerating p.o. patient may be able to be discharged.     Derrick Patrick MD  Attending Emergency  Physician

## 2021-05-07 ENCOUNTER — HOSPITAL ENCOUNTER (OUTPATIENT)
Age: 61
Setting detail: SPECIMEN
Discharge: HOME | End: 2021-05-07
Payer: COMMERCIAL

## 2021-05-07 DIAGNOSIS — E87.6 HYPOKALEMIA: ICD-10-CM

## 2021-05-07 LAB — POTASSIUM SERPL-SCNC: 3.5 MMOL/L (ref 3.7–5.3)

## 2021-05-10 ENCOUNTER — HOSPITAL ENCOUNTER (EMERGENCY)
Age: 61
Discharge: HOME | End: 2021-05-10
Attending: EMERGENCY MEDICINE
Payer: COMMERCIAL

## 2021-05-10 ENCOUNTER — APPOINTMENT (OUTPATIENT)
Dept: GENERAL RADIOLOGY | Age: 61
End: 2021-05-10
Payer: COMMERCIAL

## 2021-05-10 VITALS
HEART RATE: 120 BPM | WEIGHT: 170 LBS | HEIGHT: 65 IN | BODY MASS INDEX: 28.32 KG/M2 | DIASTOLIC BLOOD PRESSURE: 115 MMHG | TEMPERATURE: 97.8 F | SYSTOLIC BLOOD PRESSURE: 175 MMHG | OXYGEN SATURATION: 97 % | RESPIRATION RATE: 20 BRPM

## 2021-05-10 DIAGNOSIS — R11.2 NON-INTRACTABLE VOMITING WITH NAUSEA, UNSPECIFIED VOMITING TYPE: Primary | ICD-10-CM

## 2021-05-10 LAB
-: ABNORMAL
ABSOLUTE EOS #: <0.03 K/UL (ref 0–0.44)
ABSOLUTE IMMATURE GRANULOCYTE: 0.04 K/UL (ref 0–0.3)
ABSOLUTE LYMPH #: 1.16 K/UL (ref 1.1–3.7)
ABSOLUTE MONO #: 0.37 K/UL (ref 0.1–1.2)
ALBUMIN SERPL-MCNC: 3.2 G/DL (ref 3.5–5.2)
ALBUMIN/GLOBULIN RATIO: 0.8 (ref 1–2.5)
ALP BLD-CCNC: 86 U/L (ref 35–104)
ALT SERPL-CCNC: 22 U/L (ref 5–33)
AMORPHOUS: ABNORMAL
ANION GAP SERPL CALCULATED.3IONS-SCNC: 20 MMOL/L (ref 9–17)
AST SERPL-CCNC: 46 U/L
BACTERIA: ABNORMAL
BASOPHILS # BLD: 0 % (ref 0–2)
BASOPHILS ABSOLUTE: 0.03 K/UL (ref 0–0.2)
BILIRUB SERPL-MCNC: 0.59 MG/DL (ref 0.3–1.2)
BILIRUBIN DIRECT: 0.32 MG/DL
BILIRUBIN URINE: NEGATIVE
BILIRUBIN, INDIRECT: 0.27 MG/DL (ref 0–1)
BUN BLDV-MCNC: 8 MG/DL (ref 6–20)
BUN/CREAT BLD: ABNORMAL (ref 9–20)
CALCIUM SERPL-MCNC: 9 MG/DL (ref 8.6–10.4)
CASTS UA: ABNORMAL /LPF (ref 0–8)
CHLORIDE BLD-SCNC: 97 MMOL/L (ref 98–107)
CO2: 18 MMOL/L (ref 20–31)
COLOR: YELLOW
COMMENT UA: ABNORMAL
CREAT SERPL-MCNC: 0.58 MG/DL (ref 0.5–0.9)
CRYSTALS, UA: ABNORMAL /HPF
DIFFERENTIAL TYPE: ABNORMAL
EOSINOPHILS RELATIVE PERCENT: 0 % (ref 1–4)
EPITHELIAL CELLS UA: ABNORMAL /HPF (ref 0–5)
GFR AFRICAN AMERICAN: >60 ML/MIN
GFR NON-AFRICAN AMERICAN: >60 ML/MIN
GFR SERPL CREATININE-BSD FRML MDRD: ABNORMAL ML/MIN/{1.73_M2}
GFR SERPL CREATININE-BSD FRML MDRD: ABNORMAL ML/MIN/{1.73_M2}
GLOBULIN: ABNORMAL G/DL (ref 1.5–3.8)
GLUCOSE BLD-MCNC: 127 MG/DL (ref 70–99)
GLUCOSE URINE: NEGATIVE
HCT VFR BLD CALC: 46.2 % (ref 36.3–47.1)
HEMOGLOBIN: 15 G/DL (ref 11.9–15.1)
IMMATURE GRANULOCYTES: 1 %
KETONES, URINE: ABNORMAL
LEUKOCYTE ESTERASE, URINE: ABNORMAL
LIPASE: 15 U/L (ref 13–60)
LYMPHOCYTES # BLD: 15 % (ref 24–43)
MCH RBC QN AUTO: 29.4 PG (ref 25.2–33.5)
MCHC RBC AUTO-ENTMCNC: 32.5 G/DL (ref 28.4–34.8)
MCV RBC AUTO: 90.6 FL (ref 82.6–102.9)
MONOCYTES # BLD: 5 % (ref 3–12)
MUCUS: ABNORMAL
NITRITE, URINE: NEGATIVE
NRBC AUTOMATED: 0 PER 100 WBC
OTHER OBSERVATIONS UA: ABNORMAL
PDW BLD-RTO: 15.2 % (ref 11.8–14.4)
PH UA: 5.5 (ref 5–8)
PLATELET # BLD: 300 K/UL (ref 138–453)
PLATELET ESTIMATE: ABNORMAL
PMV BLD AUTO: 10.6 FL (ref 8.1–13.5)
POTASSIUM SERPL-SCNC: 4.1 MMOL/L (ref 3.7–5.3)
PROTEIN UA: ABNORMAL
RBC # BLD: 5.1 M/UL (ref 3.95–5.11)
RBC # BLD: ABNORMAL 10*6/UL
RBC UA: ABNORMAL /HPF (ref 0–4)
RENAL EPITHELIAL, UA: ABNORMAL /HPF
SEG NEUTROPHILS: 79 % (ref 36–65)
SEGMENTED NEUTROPHILS ABSOLUTE COUNT: 5.91 K/UL (ref 1.5–8.1)
SODIUM BLD-SCNC: 135 MMOL/L (ref 135–144)
SPECIFIC GRAVITY UA: 1.02 (ref 1–1.03)
TOTAL PROTEIN: 7.3 G/DL (ref 6.4–8.3)
TRICHOMONAS: ABNORMAL
TROPONIN INTERP: NORMAL
TROPONIN T: NORMAL NG/ML
TROPONIN, HIGH SENSITIVITY: 7 NG/L (ref 0–14)
TURBIDITY: CLEAR
URINE HGB: NEGATIVE
UROBILINOGEN, URINE: NORMAL
WBC # BLD: 7.5 K/UL (ref 3.5–11.3)
WBC # BLD: ABNORMAL 10*3/UL
WBC UA: ABNORMAL /HPF (ref 0–5)
YEAST: ABNORMAL

## 2021-05-10 PROCEDURE — 2580000003 HC RX 258: Performed by: GENERAL PRACTICE

## 2021-05-10 PROCEDURE — 83690 ASSAY OF LIPASE: CPT

## 2021-05-10 PROCEDURE — 96375 TX/PRO/DX INJ NEW DRUG ADDON: CPT

## 2021-05-10 PROCEDURE — 6360000002 HC RX W HCPCS: Performed by: GENERAL PRACTICE

## 2021-05-10 PROCEDURE — 93005 ELECTROCARDIOGRAM TRACING: CPT | Performed by: GENERAL PRACTICE

## 2021-05-10 PROCEDURE — 84484 ASSAY OF TROPONIN QUANT: CPT

## 2021-05-10 PROCEDURE — 99283 EMERGENCY DEPT VISIT LOW MDM: CPT

## 2021-05-10 PROCEDURE — 85025 COMPLETE CBC W/AUTO DIFF WBC: CPT

## 2021-05-10 PROCEDURE — 96361 HYDRATE IV INFUSION ADD-ON: CPT

## 2021-05-10 PROCEDURE — 80048 BASIC METABOLIC PNL TOTAL CA: CPT

## 2021-05-10 PROCEDURE — 81001 URINALYSIS AUTO W/SCOPE: CPT

## 2021-05-10 PROCEDURE — 80076 HEPATIC FUNCTION PANEL: CPT

## 2021-05-10 PROCEDURE — 96374 THER/PROPH/DIAG INJ IV PUSH: CPT

## 2021-05-10 RX ORDER — DROPERIDOL 2.5 MG/ML
0.62 INJECTION, SOLUTION INTRAMUSCULAR; INTRAVENOUS ONCE
Status: COMPLETED | OUTPATIENT
Start: 2021-05-10 | End: 2021-05-10

## 2021-05-10 RX ORDER — MORPHINE SULFATE 4 MG/ML
4 INJECTION, SOLUTION INTRAMUSCULAR; INTRAVENOUS ONCE
Status: COMPLETED | OUTPATIENT
Start: 2021-05-10 | End: 2021-05-10

## 2021-05-10 RX ORDER — ONDANSETRON 2 MG/ML
4 INJECTION INTRAMUSCULAR; INTRAVENOUS ONCE
Status: COMPLETED | OUTPATIENT
Start: 2021-05-10 | End: 2021-05-10

## 2021-05-10 RX ORDER — PROMETHAZINE HYDROCHLORIDE 12.5 MG/1
12.5 TABLET ORAL 3 TIMES DAILY PRN
Qty: 12 TABLET | Refills: 0 | Status: SHIPPED | OUTPATIENT
Start: 2021-05-10 | End: 2021-05-17

## 2021-05-10 RX ORDER — 0.9 % SODIUM CHLORIDE 0.9 %
1000 INTRAVENOUS SOLUTION INTRAVENOUS ONCE
Status: COMPLETED | OUTPATIENT
Start: 2021-05-10 | End: 2021-05-10

## 2021-05-10 RX ADMIN — MORPHINE SULFATE 4 MG: 4 INJECTION INTRAVENOUS at 04:53

## 2021-05-10 RX ADMIN — DROPERIDOL 0.62 MG: 2.5 INJECTION, SOLUTION INTRAMUSCULAR; INTRAVENOUS at 04:34

## 2021-05-10 RX ADMIN — ONDANSETRON 4 MG: 2 INJECTION INTRAMUSCULAR; INTRAVENOUS at 04:09

## 2021-05-10 RX ADMIN — SODIUM CHLORIDE 1000 ML: 9 INJECTION, SOLUTION INTRAVENOUS at 04:09

## 2021-05-10 ASSESSMENT — ENCOUNTER SYMPTOMS
SHORTNESS OF BREATH: 0
VOMITING: 1
EYES NEGATIVE: 1
NAUSEA: 1

## 2021-05-10 ASSESSMENT — PAIN SCALES - GENERAL: PAINLEVEL_OUTOF10: 8

## 2021-05-10 NOTE — ED PROVIDER NOTES
murmurs or gallops. Lungs clear to auscultation bilaterally. Does have some mild reproducible epigastric pain, but no rebound or guarding. She is alert and oriented x4 with no focal neuro deficits. Answering questions appropriately. 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 Has had some radiation up in the chest that sounds seem more in the esophageal area. However given age will get cardiac work-up. Will do symptomatic treatment. Of note she is tachycardic, but likely due to dehydration and pain. Patient was given antiemetics and droperidol    Patient is feeling improved after the droperidol. Of note, she was still tachycardic and this was discussed with her, however she still would prefer to be discharged at this time.     EKG Interpretation    Interpreted by emergency department physician      Clinical Impression: Sinus tachycardia with no signs of acute ischemia }    Tj Cox        CRITICAL CARE: None     Tj Cox MD  Attending Emergency Physician        Tj Cox MD  05/13/21 [id number]

## 2021-05-10 NOTE — ED NOTES
Bed: 34  Expected date:   Expected time:   Means of arrival:   Comments:     Ramon Ortiz RN  05/10/21 [id number]

## 2021-05-10 NOTE — ED PROVIDER NOTES
[facility]  ED  Emergency Department Encounter  EmergencyMedicine Resident     Pt Manuel Manzo  MRN: [id number]  [place] 1960  Date of evaluation: 5/10/21  PCP:  Soledad Frank MD    [address] [place] [address]       Chief Complaint   Patient presents with    Chest Pain       HISTORY OF PRESENT ILLNESS  (Location/Symptom, Timing/Onset, Context/Setting, Quality, Duration, Modifying Factors, Severity.)      Olga Small is a 62 y.o. female who presents with intractable nausea and vomiting. Patient has been admitted multiple times for nausea and vomiting following gastric removal for carcinoid tumor. Most recent admission was on 4/26 at which time she was seen by gi, underwent EGD which showed esophagitis and discharged home on Carafate and PPI. She states she has been taking these medications as prescribed. The pain is described as sharp located in the center of her chest rating up into her throat. She states she has not been able to keep anything down and has not vomited multiple times daily for the past 3 days. States that she was only able to keep down a little bit of broth earlier this morning but no solids. PAST MEDICAL / SURGICAL / SOCIAL / FAMILY HISTORY      has a past medical history of Anxiety, Arthritis, Asthma, Colon polyp, Colon polyps, Cyst of kidney, acquired, Fatigue, Hypertension, Hypothyroidism, Neuroendocrine tumor, Obesity, Pharyngoesophageal dysphagia, Vocal cord polyps, and Wears glasses. Denies further past medical hx     has a past surgical history that includes cystourethroscopy/stent removal (5/3/11); Colonoscopy (02/21/2019); ERCP; Cholecystectomy (10/09/2014); hip surgery (Left); Throat surgery; Colonoscopy; Upper gastrointestinal endoscopy (02/21/2019); Upper gastrointestinal endoscopy (09/23/2019); Upper gastrointestinal endoscopy (N/A, 9/23/2019); Upper gastrointestinal endoscopy (N/A, 10/23/2019);  Upper gastrointestinal endoscopy (N/A, 10/29/2019); Endoscopic ultrasonography, GI (N/A, 2020); Gastric bypass surgery (2020); and Upper gastrointestinal endoscopy (N/A, 2021).   Denies further past surgical hx    Social History     Socioeconomic History    Marital status: Single     Spouse name: Not on file    Number of children: Not on file    Years of education: Not on file    Highest education level: Not on file   Occupational History    Not on file   Social Needs    Financial resource strain: Not on file    Food insecurity     Worry: Not on file     Inability: Not on file    Transportation needs     Medical: Not on file     Non-medical: Not on file   Tobacco Use    Smoking status: Former Smoker     Packs/day: 1.00     Years: 25.00     Pack years: 25.00     Quit date: 2012     Years since quittin.9    Smokeless tobacco: Never Used    Tobacco comment: quit 2 years   Substance and Sexual Activity    Alcohol use: Not Currently     Comment: 3 times a month    Drug use: No    Sexual activity: Not on file   Lifestyle    Physical activity     Days per week: Not on file     Minutes per session: Not on file    Stress: Not on file   Relationships    Social connections     Talks on phone: Not on file     Gets together: Not on file     Attends Restorationist service: Not on file     Active member of club or organization: Not on file     Attends meetings of clubs or organizations: Not on file     Relationship status: Not on file    Intimate partner violence     Fear of current or ex partner: Not on file     Emotionally abused: Not on file     Physically abused: Not on file     Forced sexual activity: Not on file   Other Topics Concern    Not on file   Social History Narrative    Not on file       Family History   Problem Relation Age of Onset    High Blood Pressure Mother     High Blood Pressure Sister     Stomach Cancer Sister     Other Sister         epilepsy    No Known Problems Father        Allergies:  Erythromycin    Home Medications:  Prior to Admission medications    Medication Sig Start Date End Date Taking? Authorizing Provider   promethazine (PHENERGAN) 12.5 MG tablet Take 1 tablet by mouth 3 times daily as needed for Nausea 5/10/21 5/17/21 Yes Cleo Calles,    pantoprazole (PROTONIX) 40 MG tablet Take 1 tablet by mouth 2 times daily 4/29/21   Artie Multani MD   ondansetron [facility]) 4 MG tablet Take 1 tablet by mouth every 8 hours as needed for Nausea or Vomiting 4/29/21   Artie Multani MD   sucralfate (CARAFATE) 1 GM tablet Take 1 tablet by mouth 4 times daily 4/29/21   Artie Multani MD   apixaban [facility]) 5 MG TABS tablet Take 1 tablet by mouth 2 times daily 4/23/21 5/23/21  Rikki Antunez MD   potassium chloride (KLOR-CON) 20 MEQ packet Take 20 mEq by mouth daily 4/20/21   Rikki Antunez MD   apixaban starter pack (ELIQUIS) 5 MG TBPK tablet Take 1 tablet by mouth See Admin Instructions 3/28/21   Delfina Shelby DO   metoclopramide (REGLAN) 10 MG tablet Take 1 tablet by mouth 3 times daily as needed (nausea) 3/28/21   Delfina Shelby DO   vitamin D 25 MCG (1000 UT) CAPS Take 1 capsule by mouth daily    Historical Provider, MD   Folic Acid-Vit Q8-QGJ V51 2.2-25-0.5 MG TABS Take 1 tablet by mouth daily 3/25/21   Rikki Antunez MD   levothyroxine (SYNTHROID) 100 MCG tablet Take 1 tablet by mouth daily 3/25/21   Rikki Antunez MD   hydroCHLOROthiazide (HYDRODIURIL) 25 MG tablet TAKE 1 TABLET BY MOUTH EVERY DAY  Patient taking differently: 12.5 mg TAKE 1/2 TABLET BY MOUTH EVERY DAY 3/10/21   Rikki Antunez MD   desonide (DESOWEN) 0.05 % cream Apply topically 2 times daily.  8/21/20   Rikki Antunez MD   Multiple Vitamin (MULTI-DAY VITAMINS PO) Take by mouth    Historical Provider, MD   vitamin B-12 (CYANOCOBALAMIN) 500 MCG tablet Take 500 mcg by mouth daily    Historical Provider, MD       REVIEW OF SYSTEMS    (2-9 systems for level 4, 10 or more for level 5)      Review of Systems   Constitutional: Positive for appetite change. HENT: Negative. Eyes: Negative. Respiratory: Negative for shortness of breath. Cardiovascular: Positive for chest pain. Gastrointestinal: Positive for nausea and vomiting. Endocrine: Negative. Genitourinary: Negative. Musculoskeletal: Negative. Neurological: Negative. Hematological: Negative. Psychiatric/Behavioral: Negative. PHYSICAL EXAM   (up to 7 for level 4, 8 or more for level 5)      INITIAL VITALS:   BP (!) 175/115   Pulse 120   Temp 97.8 °F (36.6 °C) (Skin)   Resp 20   Ht 5' 5\" (1.651 m)   Wt 170 lb (77.1 kg)   LMP 01/01/1994   SpO2 97%   BMI 28.29 kg/m²     Physical Exam   Gen. Appearance: patient appears well, nondistressed. Head: head atraumatic, normocephalic. Eyes: Extraocular movements intact. No scleral icterus  Mouth: Oropharynx clear and moist.  No oral lesions  Neck: Supple. No lymphadenopathy. Pulmonary: Lungs clear to auscultation bilaterally. No wheezing, rales or rhonchi   Cardiovascular: Regular rate and rhythm, no murmurs   Abdomen: Soft, nontender, no guarding or rebound, normal bowel sounds  Neurology: GCS 15. Oriented to person place and time.   moving all extremities   Skin: Warm, dry, well perfused      DIFFERENTIAL  DIAGNOSIS     PLAN (LABS / IMAGING / EKG):  Orders Placed This Encounter   Procedures    XR CHEST PORTABLE    CBC Auto Differential    Basic Metabolic Panel w/ Reflex to MG    Lipase    Hepatic Function Panel    Urinalysis, reflex to microscopic    Troponin    EKG 12 Lead       MEDICATIONS ORDERED:  Orders Placed This Encounter   Medications    0.9 % sodium chloride bolus    ondansetron (ZOFRAN) injection 4 mg    droperidol (INAPSINE) injection 0.625 mg    morphine injection 4 mg    promethazine (PHENERGAN) 12.5 MG tablet     Sig: Take 1 tablet by mouth 3 times daily as needed for Nausea     Dispense:  12 tablet     Refill:  0           DIAGNOSTIC RESULTS / EMERGENCY DEPARTMENT COURSE / MDM     LABS:  Results for orders placed or performed during the hospital encounter of 05/10/21   CBC Auto Differential   Result Value Ref Range    WBC 7.5 3.5 - 11.3 k/uL    RBC 5.10 3.95 - 5.11 m/uL    Hemoglobin 15.0 11.9 - 15.1 g/dL    Hematocrit 46.2 36.3 - 47.1 %    MCV 90.6 82.6 - 102.9 fL    MCH 29.4 25.2 - 33.5 pg    MCHC 32.5 28.4 - 34.8 g/dL    RDW 15.2 (H) 11.8 - 14.4 %    Platelets 281 919 - 509 k/uL    MPV 10.6 8.1 - 13.5 fL    NRBC Automated 0.0 0.0 per 100 WBC    Differential Type NOT REPORTED     Seg Neutrophils 79 (H) 36 - 65 %    Lymphocytes 15 (L) 24 - 43 %    Monocytes 5 3 - 12 %    Eosinophils % 0 (L) 1 - 4 %    Basophils 0 0 - 2 %    Immature Granulocytes 1 (H) 0 %    Segs Absolute 5.91 1.50 - 8.10 k/uL    Absolute Lymph # 1.16 1.10 - 3.70 k/uL    Absolute Mono # 0.37 0.10 - 1.20 k/uL    Absolute Eos # <0.03 0.00 - 0.44 k/uL    Basophils Absolute 0.03 0.00 - 0.20 k/uL    Absolute Immature Granulocyte 0.04 0.00 - 0.30 k/uL    WBC Morphology NOT REPORTED     RBC Morphology ANISOCYTOSIS PRESENT     Platelet Estimate NOT REPORTED    Basic Metabolic Panel w/ Reflex to MG   Result Value Ref Range    Glucose 127 (H) 70 - 99 mg/dL    BUN 8 6 - 20 mg/dL    CREATININE 0.58 0.50 - 0.90 mg/dL    Bun/Cre Ratio NOT REPORTED 9 - 20    Calcium 9.0 8.6 - 10.4 mg/dL    Sodium 135 135 - 144 mmol/L    Potassium 4.1 3.7 - 5.3 mmol/L    Chloride 97 (L) 98 - 107 mmol/L    CO2 18 (L) 20 - 31 mmol/L    Anion Gap 20 (H) 9 - 17 mmol/L    GFR Non-African American >60 >60 mL/min    GFR African American >60 >60 mL/min    GFR Comment          GFR Staging NOT REPORTED    Lipase   Result Value Ref Range    Lipase 15 13 - 60 U/L   Hepatic Function Panel   Result Value Ref Range    Albumin 3.2 (L) 3.5 - 5.2 g/dL    Alkaline Phosphatase 86 35 - 104 U/L    ALT 22 5 - 33 U/L    AST 46 (H) <32 U/L    Total Bilirubin 0.59 0.3 - 1.2 mg/dL    Bilirubin, Direct 0.32 (H) <0.31 mg/dL    Bilirubin, Indirect 0.27 0.00 - 1.00 mg/dL    Total Protein 7.3 6.4 - 8.3 g/dL    Globulin NOT REPORTED 1.5 - 3.8 g/dL    Albumin/Globulin Ratio 0.8 (L) 1.0 - 2.5   Troponin   Result Value Ref Range    Troponin, High Sensitivity 7 0 - 14 ng/L    Troponin T NOT REPORTED <0.03 ng/mL    Troponin Interp NOT REPORTED        RADIOLOGY:  XR CHEST PORTABLE    (Results Pending)         EKG  None    All EKG's are interpreted by the Emergency Department Physician who either signs or Co-signs this chart in the absence of a cardiologist.    [address] MDM:  62 y.o. female who presents with intractable nausea and vomiting. Blood-tinged in most recent episode of vomiting. Likely Shanae-Finley. We will treat symptomatically with Zofran IV fluids and droperidol    On reexamination patient feels significantly improved. Her heart rate remains 110. She ambulated to the restroom on her own. States she feels safe to return home. She was offered admission, however patient feels comfortable going home and would like to do that, and states she will return if her symptoms return or are not improved with Phenergan. Patient will be discharged with Phenergan p.o., and return precautions. She should follow back up with her GI doctor as well as her primary care provider           PROCEDURES:  None    CONSULTS:  None    CRITICAL CARE:  None    FINAL IMPRESSION      1.  Non-intractable vomiting with nausea, unspecified vomiting type            DISPOSITION / PLAN     DISPOSITION        PATIENT REFERRED TO:  Franchesca Russell MD  [facility] [address]  [address] [phone]    In 3 days        DISCHARGE MEDICATIONS:  New Prescriptions    PROMETHAZINE (PHENERGAN) 12.5 MG TABLET    Take 1 tablet by mouth 3 times daily as needed for Nausea       Juli Rodriguez DO  Emergency Medicine Resident    (Please note that portions of thisnote were completed with a voice recognition program.  Efforts were made to edit the dictations but occasionally words are mis-transcribed.)        Marylou Ansari, DO  Resident  05/10/21 1300 Elen Lopez, DO  Resident  05/10/21 [id number]

## 2021-05-10 NOTE — ED NOTES
Pt arrives to eD via self. Pt complains of N/V x3 days. Pt also complains of center chest pain while vomitting. Pt states she had her stomach removed and has been having problems ever since. Pt hypertensive. rr even and unlabored.      Maribel Roy RN  05/10/21 [id number]

## 2021-05-11 LAB
EKG ATRIAL RATE: 109 BPM
EKG P AXIS: 47 DEGREES
EKG P-R INTERVAL: 140 MS
EKG Q-T INTERVAL: 296 MS
EKG QRS DURATION: 68 MS
EKG QTC CALCULATION (BAZETT): 398 MS
EKG R AXIS: 4 DEGREES
EKG T AXIS: -6 DEGREES
EKG VENTRICULAR RATE: 109 BPM

## 2021-05-11 PROCEDURE — 93010 ELECTROCARDIOGRAM REPORT: CPT | Performed by: INTERNAL MEDICINE

## 2021-05-18 ENCOUNTER — OFFICE VISIT (OUTPATIENT)
Dept: GASTROENTEROLOGY | Age: 61
End: 2021-05-18
Payer: COMMERCIAL

## 2021-05-18 VITALS
DIASTOLIC BLOOD PRESSURE: 89 MMHG | SYSTOLIC BLOOD PRESSURE: 119 MMHG | WEIGHT: 168 LBS | HEART RATE: 106 BPM | BODY MASS INDEX: 27.96 KG/M2

## 2021-05-18 DIAGNOSIS — R20.0 LOWER EXTREMITY NUMBNESS: Primary | ICD-10-CM

## 2021-05-18 PROCEDURE — 99213 OFFICE O/P EST LOW 20 MIN: CPT | Performed by: INTERNAL MEDICINE

## 2021-05-18 RX ORDER — METOCLOPRAMIDE 10 MG/1
10 TABLET ORAL
Qty: 90 TABLET | Refills: 0 | Status: SHIPPED | OUTPATIENT
Start: 2021-05-18 | End: 2021-06-16

## 2021-05-18 RX ORDER — AMOXICILLIN 250 MG
2 CAPSULE ORAL DAILY
Qty: 60 TABLET | Refills: 2 | Status: SHIPPED | OUTPATIENT
Start: 2021-05-18 | End: 2021-06-17

## 2021-05-18 ASSESSMENT — ENCOUNTER SYMPTOMS
WHEEZING: 0
VOMITING: 0
BACK PAIN: 0
NAUSEA: 0
ALLERGIC/IMMUNOLOGIC NEGATIVE: 1
RECTAL PAIN: 0
ABDOMINAL PAIN: 1
DIARRHEA: 0
COUGH: 1
CHOKING: 0
BLOOD IN STOOL: 0
TROUBLE SWALLOWING: 1
SINUS PRESSURE: 0
CONSTIPATION: 1
ANAL BLEEDING: 0
ABDOMINAL DISTENTION: 1
SORE THROAT: 0
VOICE CHANGE: 0

## 2021-05-18 NOTE — PROGRESS NOTES
vocal cord polyps removed    UPPER GASTROINTESTINAL ENDOSCOPY  02/21/2019    Neuroendocrine tumor    UPPER GASTROINTESTINAL ENDOSCOPY  09/23/2019    UPPER GASTROINTESTINAL ENDOSCOPY N/A 9/23/2019    EGD BIOPSY performed by Dana Diaz MD at [address] [place] [address] 10/23/2019    EGD W/ EMR performed by Gilles Boast, MD at [address] [place] [address] N/A 10/29/2019    EGD CONTROL HEMORRHAGE performed by Rhiannon Salgado MD at [address] [place] [address] N/A 4/26/2021    EGD BIOPSY performed by Odessa Bradford MD at [address]:    Current Outpatient Medications:     pantoprazole (PROTONIX) 40 MG tablet, Take 1 tablet by mouth 2 times daily, Disp: 90 tablet, Rfl: 0    ondansetron (ZOFRAN) 4 MG tablet, Take 1 tablet by mouth every 8 hours as needed for Nausea or Vomiting, Disp: 20 tablet, Rfl: 1    sucralfate (CARAFATE) 1 GM tablet, Take 1 tablet by mouth 4 times daily, Disp: 120 tablet, Rfl: 3    apixaban (ELIQUIS) 5 MG TABS tablet, Take 1 tablet by mouth 2 times daily, Disp: 60 tablet, Rfl: 2    potassium chloride (KLOR-CON) 20 MEQ packet, Take 20 mEq by mouth daily, Disp: 90 each, Rfl: 1    apixaban starter pack (ELIQUIS) 5 MG TBPK tablet, Take 1 tablet by mouth See Admin Instructions, Disp: 74 tablet, Rfl: 0    metoclopramide (REGLAN) 10 MG tablet, Take 1 tablet by mouth 3 times daily as needed (nausea), Disp: 30 tablet, Rfl: 0    vitamin D 25 MCG (1000 UT) CAPS, Take 1 capsule by mouth daily, Disp: , Rfl:     Folic Acid-Vit S1-LCR F91 2.2-25-0.5 MG TABS, Take 1 tablet by mouth daily, Disp: 90 tablet, Rfl: 1    levothyroxine (SYNTHROID) 100 MCG tablet, Take 1 tablet by mouth daily, Disp: 90 tablet, Rfl: 1    hydroCHLOROthiazide (HYDRODIURIL) 25 MG tablet, TAKE 1 TABLET BY MOUTH EVERY DAY (Patient taking differently: 12.5 mg TAKE 1/2 TABLET BY MOUTH EVERY DAY), Disp: 90 tablet, Rfl: 0    desonide (DESOWEN) 0.05 % cream, Apply topically 2 times daily. , Disp: 90 g, Rfl: 0    Multiple Vitamin (MULTI-DAY VITAMINS PO), Take by mouth, Disp: , Rfl:     vitamin B-12 (CYANOCOBALAMIN) 500 MCG tablet, Take 500 mcg by mouth daily, Disp: , Rfl:     ALLERGIES:   Allergies   Allergen Reactions    Erythromycin Diarrhea       FAMILY HISTORY:       Problem Relation Age of Onset    High Blood Pressure Mother     High Blood Pressure Sister     Stomach Cancer Sister     Other Sister         epilepsy    No Known Problems Father          SOCIAL HISTORY:   Social History     Socioeconomic History    Marital status: Single     Spouse name: Not on file    Number of children: Not on file    Years of education: Not on file    Highest education level: Not on file   Occupational History    Not on file   Tobacco Use    Smoking status: Former Smoker     Packs/day: 1.00     Years: 25.00     Pack years: 25.00     Quit date: 2012     Years since quittin.9    Smokeless tobacco: Never Used    Tobacco comment: quit 2 years   Vaping Use    Vaping Use: Never used   Substance and Sexual Activity    Alcohol use: Not Currently     Comment: 3 times a month    Drug use: No    Sexual activity: Not on file   Other Topics Concern    Not on file   Social History Narrative    Not on file     Social Determinants of Health     Financial Resource Strain:     Difficulty of Paying Living Expenses:    Food Insecurity:     Worried About Running Out of Food in the Last Year:     Ran Out of Food in the Last Year:    Transportation Needs:     Lack of Transportation (Medical):      Lack of Transportation (Non-Medical):    Physical Activity:     Days of Exercise per Week:     Minutes of Exercise per Session:    Stress:     Feeling of Stress :    Social Connections:     Frequency of Communication with Friends and Family:     Frequency of Social Gatherings with Friends and Family:     Attends Hinduism Services:     Active Member of Clubs or Organizations:     Attends Club or Organization Meetings:     Marital Status:    Intimate Partner Violence:     Fear of Current or Ex-Partner:     Emotionally Abused:     Physically Abused:     Sexually Abused:        REVIEW OF SYSTEMS: A 12-point review of systemswas obtained and pertinent positives and negatives were enumerated above in the history of present illness. All other reviewed systems / symptoms were negative. Review of Systems   Constitutional: Positive for appetite change, fatigue and unexpected weight change. HENT: Positive for trouble swallowing. Negative for dental problem, postnasal drip, sinus pressure, sore throat and voice change. Eyes: Positive for visual disturbance. Respiratory: Positive for cough. Negative for choking and wheezing. Cardiovascular: Negative. Negative for chest pain, palpitations and leg swelling. Gastrointestinal: Positive for abdominal distention, abdominal pain and constipation. Negative for anal bleeding, blood in stool, diarrhea, nausea, rectal pain and vomiting. Endocrine: Negative. Genitourinary: Negative. Negative for difficulty urinating. Musculoskeletal: Negative. Negative for arthralgias, back pain, gait problem and myalgias. Skin: Negative. Allergic/Immunologic: Negative. Negative for environmental allergies and food allergies. Neurological: Negative for dizziness, weakness, light-headedness, numbness and headaches. Hematological: Negative. Does not bruise/bleed easily. Psychiatric/Behavioral: Negative. Negative for sleep disturbance. The patient is not nervous/anxious.             LABORATORY DATA: Reviewed  Lab Results   Component Value Date    WBC 7.5 05/10/2021    HGB 15.0 05/10/2021    HCT 46.2 05/10/2021    MCV 90.6 05/10/2021     05/10/2021     05/10/2021    K 4.1 05/10/2021    CL 97 (L) 05/10/2021    CO2 18 (L) 05/10/2021    BUN 8 05/10/2021    CREATININE 0.58 05/10/2021    LABALBU 3.2 (L) 05/10/2021    BILITOT 0.59 05/10/2021    ALKPHOS 86 05/10/2021    AST 46 (H) 05/10/2021    ALT 22 05/10/2021    INR 1.5 04/07/2021         Lab Results   Component Value Date    RBC 5.10 05/10/2021    HGB 15.0 05/10/2021    MCV 90.6 05/10/2021    MCH 29.4 05/10/2021    MCHC 32.5 05/10/2021    RDW 15.2 (H) 05/10/2021    MPV 10.6 05/10/2021    BASOPCT 0 05/10/2021    LYMPHSABS 1.16 05/10/2021    MONOSABS 0.37 05/10/2021    NEUTROABS 5.91 05/10/2021    EOSABS <0.03 05/10/2021    BASOSABS 0.03 05/10/2021         DIAGNOSTIC TESTING:     ECHO Complete 2D W Doppler W Color    Result Date: 4/27/2021  Transthoracic Echocardiography Report (TTE)  Patient Name [facility]       Date of Study               04/27/2021               [facility]   Date of      1960  Gender                      Female  Birth   Age          62 year(s)  Race                        Black   Room Number  3949        Height:                     65 inch, 165.1 cm   Corporate ID [id number]    Weight:                     190 pounds, 86.2 kg  #   Patient Acct [de-identified]   BSA:          1.94 m^2      BMI:      31.62  #                                                              kg/m^2   MR #         [id number]     He[facility] Jorge   Accession #  [id number]  Interpreting Physician      [address] [facility]   Fellow                   Referring Nurse                           Practitioner   Interpreting             Referring Physician         [address]  Fellow  Type of Study   TTE procedure:2D Echocardiogram, M-Mode, Doppler, Color Doppler. Procedure Date Date: 04/27/2021 Start: 08:21 AM Study Location: [facility] Technical Quality: Good visualization Indications:Chest pain. History / Tech. Comments: Procedure explained to patient. Patient Status: Inpatient Height: 65 inches Weight: 190 pounds BSA: 1.94 m^2 BMI: 31.62 kg/m^2 HR: 69 bpm CONCLUSIONS Summary Left ventricle is normal in size.  Global left ventricular systolic function is normal. Calculated EF via 3D Heart Model is 59 %. Trivial mitral regurgitation. Trivial tricuspid regurgitation. Estimated right ventricular systolic pressure is 22 mmHg. Trivial pulmonic insufficiency. Signature ----------------------------------------------------------------------------  Electronically signed by Maxine Early(Sonographer) on 04/27/2021  09:10 AM ---------------------------------------------------------------------------- ----------------------------------------------------------------------------  Electronically signed by Mario Alberto Avelar(Interpreting physician) on 04/27/2021  10:39 AM ---------------------------------------------------------------------------- FINDINGS Left Atrium Left atrium is normal in size. Left Ventricle Left ventricle is normal in size. Global left ventricular systolic function is normal. Estimated ejection fraction is 55 % . Calculated EF via 3D Heart Model is 59 %. Normal left ventricular wall thickness. No wall motion abnormality seen. Right Atrium Right atrium is normal in size. Right Ventricle Normal right ventricular size and function. Mitral Valve Normal mitral valve structure. Trivial mitral regurgitation. No mitral stenosis. Aortic Valve Aortic valve structure and function normal. Aortic valve is trileaflet. No aortic insufficiency. No aortic stenosis. Tricuspid Valve Normal tricuspid valve leaflets. Trivial tricuspid regurgitation. No tricuspid stenosis. Estimated right ventricular systolic pressure is 22 mmHg. Pulmonic Valve Pulmonic valve is normal in structure and function. Trivial pulmonic insufficiency. No evidence of pulmonic stenosis. Pericardial Effusion No significant pericardial effusion is seen. Miscellaneous Normal aortic root dimension. The ascending aorta is normal in size. E/E' average = 11.1. IVC not well visualized.  M-mode / 2D Measurements & Calculations:   LVIDd:4.2 cm(3.7 - 5.6 cm)       Diastolic OVIQMV:72 ml  DTAJH:7.2 cm(2.2 - 4.0 cm) Systolic QNSUSD:48 ml  IVSd:1 cm(0.6 - 1.1 cm)          Aortic Root:2.7 cm(2.0 - 3.7 cm)  LVPWd:0.8 cm(0.6 - 1.1 cm)       LA Dimension: 3.3 cm(1.9 - 4.0 cm)  Fractional Shortenin.95 %    LA volume/Index: 32.3 ml /17m^2  Calculated LVEF (%): 58.95 %     LVOT:1.9 cm                                   RVDd:3.5 cm   Mitral:                                 Aortic   Valve Area (P1/2-Time): 2.86 cm^2       Peak Velocity: 1.99 m/s  Peak E-Wave: 1.09 m/s                   Mean Velocity: 1.28 m/s  Peak A-Wave: 0.82 m/s                   Peak Gradient: 15.84 mmHg  E/A Ratio: 1.33                         Mean Gradient: 7 mmHg  Peak Gradient: 4.75 mmHg  Mean Gradient: 2 mmHg  Deceleration Time: 261 msec             Area (continuity): 2.18 cm^2  P1/2t: 77 msec                          AV VTI: 37.5 cm   Area (continuity): 2.41 cm^2  Mean Velocity: 0.60 m/s   Tricuspid:                              Pulmonic:   Estimated RVSP: 22 mmHg                 Peak Velocity: 1.10 m/s  Peak TR Velocity: 2.08 m/s              Peak Gradient: 4.84 mmHg  Peak TR Gradient: 17.3056 mmHg  Estimated RA Pressure: 5 mmHg                                           Estimated PASP: 22.31 mmHg  Diastology / Tissue Doppler Septal Wall E' velocity:0.09 m/s Septal Wall E/E':12.7 Lateral Wall E' velocity:0.12 m/s Lateral Wall E/E':9.5    CT ABDOMEN PELVIS W IV CONTRAST Additional Contrast? None    Result Date: 2021  EXAMINATION: CT OF THE ABDOMEN AND PELVIS WITH CONTRAST 2021 12:02 pm TECHNIQUE: CT of the abdomen and pelvis was performed with the administration of intravenous contrast. Multiplanar reformatted images are provided for review. Dose modulation, iterative reconstruction, and/or weight based adjustment of the mA/kV was utilized to reduce the radiation dose to as low as reasonably achievable.  COMPARISON: 2020 HISTORY: ORDERING SYSTEM PROVIDED HISTORY: hx gastrectomy; abd pain, nausea, vomiting TECHNOLOGIST PROVIDED HISTORY: hx gastrectomy; abd pain, nausea, vomiting Reason for Exam: hx gastrectomy; abd pain, nausea, vomiting Acuity: Unknown Type of Exam: Unknown FINDINGS: Lower Chest: The lung bases are clear. Organs: Status post cholecystectomy. Biliary dilatation again demonstrated, favoring reservoir effect. Findings compatible with focal fat deposition along the falciform ligament. The pancreas, spleen, adrenals and kidneys reveal no acute findings. Right pelvic kidney. Bilateral renal cysts again demonstrated. GI/Bowel: Status post gastric bypass. No bowel dilatation or wall thickening identified. Normal appendix. Oral contrast is present to the level of the rectum. Pelvis: No acute findings. Peritoneum/Retroperitoneum: Fat induration in the left upper quadrant is again demonstrated, likely representing fat necrosis. No free air. No ascites. No fluid collection. Bones/Soft Tissues: No acute osseous abnormality identified. Soft tissue calcification in the deep subcutaneous fat near the left hip again demonstrated, compatible with old hematoma or old fat necrosis. 1.  No acute abnormality identified. 2.  Status post gastric bypass. Mild residual findings consistent with fat necrosis in the left upper quadrant. 3.  Status post gastric bypass. Unchanged appearance of biliary dilatation likely representing post cholecystectomy reservoir effect. XR CHEST PORTABLE    Result Date: 4/26/2021  EXAMINATION: ONE XRAY VIEW OF THE CHEST 4/26/2021 6:22 am COMPARISON: 04/09/2021 HISTORY: ORDERING SYSTEM PROVIDED HISTORY: chest pain TECHNOLOGIST PROVIDED HISTORY: chest pain FINDINGS: The mediastinal and cardiac contours are stable. The lungs are clear. There is no focal consolidation, pleural effusion or pneumothorax evident. The bones are unremarkable. No acute cardiopulmonary process identified. PHYSICAL EXAMINATION: Vital signs reviewed per the nursing documentation.      LMP 01/01/1994   There is no height or weight on file to calculate BMI. Physical Exam      I personally reviewed the nurse's notes and documentation and I agree with her notes. General: alert, appears stated age and cooperative Psych: Normal. and Alert and oriented, appropriate affect. . Normal affect. Mentation normal  HEENT: PERRLA. Clear conjunctivae and sclerae. Moist oral mucosae, no lesions or ulcers. The neck is supple, without lymphadenopathy or jugular venous distension. No masses. Normal thyroid. Cardiovascular: S1 S2 RRR no rubs or murmurs. Pulmonary: clear BL. No accessory muscle usage. Abdominal Exam: Soft, NT ND, no hepato or spleno megaly, +BS, no ascites. IMPRESSION: Ms. Lisa Price is a 62 y.o. female with gastric neuroendocrine tumors. S/p total gastrectomy. No malignant changes by pathology. She had to go the hospital recently due to vomiting. She was found to have esophagitis. She is on Carafate. We will increase her Reglan to 3 times a day. Trial of Trish-Colace. We recommended to her to sip on Ensure or boost throughout the day aiming for 4 cans/day. She is not able to gain weight in 2 weeks we will again discussed option of NG tube for feeding. Her twin sister has known recurrent tumors of the stomach with history of prior partial gastrectomy long years ago. She has appointment with me at the end of the week. Thank you for allowing me to participate in the care of Ms. Lisa Price. For any further questions please do not hesitate to contact me. I have reviewed and agree with the ROS entered by the MA/LPN. Note is dictated utilizing voice recognition software. Unfortunately this leads to occasional typographical errors. Please contact our office if you have any questions.     Jeri Graham MD  [facility]  O: #[phone]

## 2021-05-25 ENCOUNTER — HOSPITAL ENCOUNTER (OUTPATIENT)
Age: 61
Setting detail: SPECIMEN
Discharge: HOME | End: 2021-05-25
Payer: COMMERCIAL

## 2021-05-25 ENCOUNTER — OFFICE VISIT (OUTPATIENT)
Dept: INTERNAL MEDICINE CLINIC | Age: 61
End: 2021-05-25
Payer: COMMERCIAL

## 2021-05-25 VITALS
HEART RATE: 80 BPM | HEIGHT: 65 IN | BODY MASS INDEX: 27.79 KG/M2 | DIASTOLIC BLOOD PRESSURE: 60 MMHG | SYSTOLIC BLOOD PRESSURE: 96 MMHG | TEMPERATURE: 97.2 F | WEIGHT: 166.8 LBS | RESPIRATION RATE: 16 BRPM

## 2021-05-25 DIAGNOSIS — Z90.3 S/P GASTRECTOMY: ICD-10-CM

## 2021-05-25 DIAGNOSIS — R63.4 WEIGHT LOSS: ICD-10-CM

## 2021-05-25 DIAGNOSIS — R20.0 BILATERAL LEG NUMBNESS: ICD-10-CM

## 2021-05-25 DIAGNOSIS — R11.0 NAUSEA: ICD-10-CM

## 2021-05-25 DIAGNOSIS — R20.0 BILATERAL LEG NUMBNESS: Primary | ICD-10-CM

## 2021-05-25 LAB
ALBUMIN SERPL-MCNC: 3.4 G/DL (ref 3.5–5.2)
ALBUMIN/GLOBULIN RATIO: 0.9 (ref 1–2.5)
ALP BLD-CCNC: 79 U/L (ref 35–104)
ALT SERPL-CCNC: 23 U/L (ref 5–33)
ANION GAP SERPL CALCULATED.3IONS-SCNC: 19 MMOL/L (ref 9–17)
AST SERPL-CCNC: 47 U/L
BILIRUB SERPL-MCNC: 0.57 MG/DL (ref 0.3–1.2)
BUN BLDV-MCNC: 9 MG/DL (ref 6–20)
BUN/CREAT BLD: ABNORMAL (ref 9–20)
CALCIUM SERPL-MCNC: 9.3 MG/DL (ref 8.6–10.4)
CHLORIDE BLD-SCNC: 97 MMOL/L (ref 98–107)
CO2: 25 MMOL/L (ref 20–31)
CREAT SERPL-MCNC: 0.66 MG/DL (ref 0.5–0.9)
FOLATE: >20 NG/ML
GFR AFRICAN AMERICAN: >60 ML/MIN
GFR NON-AFRICAN AMERICAN: >60 ML/MIN
GFR SERPL CREATININE-BSD FRML MDRD: ABNORMAL ML/MIN/{1.73_M2}
GFR SERPL CREATININE-BSD FRML MDRD: ABNORMAL ML/MIN/{1.73_M2}
GLUCOSE BLD-MCNC: 97 MG/DL (ref 70–99)
HCT VFR BLD CALC: 45.5 % (ref 36.3–47.1)
HEMOGLOBIN: 15 G/DL (ref 11.9–15.1)
MAGNESIUM: 1.5 MG/DL (ref 1.6–2.6)
MCH RBC QN AUTO: 29.5 PG (ref 25.2–33.5)
MCHC RBC AUTO-ENTMCNC: 33 G/DL (ref 28.4–34.8)
MCV RBC AUTO: 89.4 FL (ref 82.6–102.9)
NRBC AUTOMATED: 0 PER 100 WBC
PDW BLD-RTO: 15 % (ref 11.8–14.4)
PLATELET # BLD: 339 K/UL (ref 138–453)
PMV BLD AUTO: 11 FL (ref 8.1–13.5)
POTASSIUM SERPL-SCNC: 3.1 MMOL/L (ref 3.7–5.3)
RBC # BLD: 5.09 M/UL (ref 3.95–5.11)
SODIUM BLD-SCNC: 141 MMOL/L (ref 135–144)
TOTAL PROTEIN: 7 G/DL (ref 6.4–8.3)
TSH SERPL DL<=0.05 MIU/L-ACNC: 0.12 MIU/L (ref 0.3–5)
VITAMIN B-12: >2000 PG/ML (ref 232–1245)
WBC # BLD: 7.1 K/UL (ref 3.5–11.3)

## 2021-05-25 PROCEDURE — 99214 OFFICE O/P EST MOD 30 MIN: CPT | Performed by: INTERNAL MEDICINE

## 2021-05-25 SDOH — ECONOMIC STABILITY: FOOD INSECURITY: WITHIN THE PAST 12 MONTHS, THE FOOD YOU BOUGHT JUST DIDN'T LAST AND YOU DIDN'T HAVE MONEY TO GET MORE.: NEVER TRUE

## 2021-05-25 ASSESSMENT — SOCIAL DETERMINANTS OF HEALTH (SDOH): HOW HARD IS IT FOR YOU TO PAY FOR THE VERY BASICS LIKE FOOD, HOUSING, MEDICAL CARE, AND HEATING?: NOT HARD AT ALL

## 2021-05-25 NOTE — PROGRESS NOTES
Clara Mcgee is a 62 y.o. female who presents for   Chief Complaint   Patient presents with    Fatigue    Other     seeing GI DR Sharon Merritt    Weight Loss     down 30 pounds since 4/1/21. drinks 3 ensure per day, eating but not able to eat as much as sjsimona wants to    and follow up of chronic medical problems.   Patient Active Problem List   Diagnosis    Asthma    Anxiety    Obesity    Hyperlipidemia    Hypothyroidism    Colon polyps    Vertigo    Mass of left hip region    Fatigue    Pharyngoesophageal dysphagia    Colon polyp    Neuroendocrine tumor    Chest pain    Shortness of breath    Anemia    GI bleed    Essential hypertension    Neuroendocrine neoplasm of stomach    Polyp, stomach    Melena    Gastric ulcer with hemorrhage    Constipation    Hypokalemia    Hypomagnesemia    Bilateral pulmonary embolism (HCC)    Abdominal pain, epigastric    Elevated d-dimer    Malignant carcinoid tumor of stomach (HCC)    Intractable vomiting    Nausea and vomiting    S/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    Food intolerance    Severe malnutrition (HCC)     HPI  Here for follow-up and denies any new complaints and still have difficulty eating and also fatigue and numbness in feet    Current Outpatient Medications   Medication Sig Dispense Refill    metoclopramide (REGLAN) 10 MG tablet Take 1 tablet by mouth 3 times daily (before meals) 90 tablet 0    senna-docusate (PERICOLACE) 8.6-50 MG per tablet Take 2 tablets by mouth daily 60 tablet 2    pantoprazole (PROTONIX) 40 MG tablet Take 1 tablet by mouth 2 times daily 90 tablet 0    ondansetron (ZOFRAN) 4 MG tablet Take 1 tablet by mouth every 8 hours as needed for Nausea or Vomiting 20 tablet 1    sucralfate (CARAFATE) 1 GM tablet Take 1 tablet by mouth 4 times daily 120 tablet 3    potassium chloride (KLOR-CON) 20 MEQ packet Take 20 mEq by mouth daily 90 each 1    apixaban starter pack (ELIQUIS) 5 MG TBPK tablet Take 1 tablet by mouth See Admin Instructions 74 tablet 0    vitamin D 25 MCG (1000 UT) CAPS Take 1 capsule by mouth daily      Folic Acid-Vit N4-CHM N64 2.2-25-0.5 MG TABS Take 1 tablet by mouth daily 90 tablet 1    levothyroxine (SYNTHROID) 100 MCG tablet Take 1 tablet by mouth daily 90 tablet 1    hydroCHLOROthiazide (HYDRODIURIL) 25 MG tablet TAKE 1 TABLET BY MOUTH EVERY DAY (Patient taking differently: 12.5 mg TAKE 1/2 TABLET BY MOUTH EVERY DAY) 90 tablet 0    desonide (DESOWEN) 0.05 % cream Apply topically 2 times daily. 90 g 0    Multiple Vitamin (MULTI-DAY VITAMINS PO) Take by mouth      vitamin B-12 (CYANOCOBALAMIN) 500 MCG tablet Take 500 mcg by mouth daily       No current facility-administered medications for this visit. Allergies   Allergen Reactions    Erythromycin Diarrhea       Past Medical History:   Diagnosis Date    Anxiety     Arthritis     knee    Asthma     Colon polyp 02/21/2019    tubular adenoma; hyperplastic polyp    Colon polyps     Cyst of kidney, acquired     bilat.     Fatigue     Hypertension     Hypothyroidism     Neuroendocrine tumor 02/21/2019    of stomach    Obesity     Pharyngoesophageal dysphagia     Vocal cord polyps     Wears glasses        Past Surgical History:   Procedure Laterality Date    CHOLECYSTECTOMY  10/09/2014    COLONOSCOPY  02/21/2019    tubular adenoma; hyperplastic polyp    COLONOSCOPY      over 5 yrs ago per pt with polyps (2-)    CYSTOURETHROSCOPY/STENT REMOVAL  5/3/11    ENDOSCOPIC ULTRASOUND (LOWER) N/A 1/22/2020    ENDOSCOPIC ULTRASOUND WITH POSSIBLE EMR performed by Cullen Stovall MD at [facility]    ERCP      GASTRIC BYPASS SURGERY  11/30/2020    HIP SURGERY Left     soft tissue tumor removal    THROAT SURGERY      vocal cord polyps removed    UPPER GASTROINTESTINAL ENDOSCOPY  02/21/2019    Neuroendocrine tumor    UPPER GASTROINTESTINAL ENDOSCOPY  09/23/2019    UPPER GASTROINTESTINAL ENDOSCOPY N/A 9/23/2019    EGD BIOPSY performed by Martha Garcia MD at [address] N/A 10/23/2019    EGD W/ EMR performed by Martin Henson MD at [address] [place] [address] N/A 10/29/2019    EGD CONTROL HEMORRHAGE performed by Falguni Carlos MD at [address] [place] [address] N/A 4/26/2021    EGD BIOPSY performed by Vernon Cast MD at [facility]       Family History   Problem Relation Age of Onset    High Blood Pressure Mother     High Blood Pressure Sister     Stomach Cancer Sister     Other Sister         epilepsy    No Known Problems Father      ROS   Constitutional: Positive for fatigue, loss of appetite and unexpected weight change   HEENT            : Negative for neck stiffness and pain, no congestion or sinus pressure   Eyes                : No visual disturbance or pain   Cardiovascular: No chest pain or palpitations or leg swelling   Respiratory      : Negative for cough, shortness of breath or wheezing   Gastrointestinal: Negative for abdominal pain, constipation or diarrhea and bloating No nausea or vomiting   Genitourinary:     No urgency or frequency, no burning or hematuria   Musculoskeletal: No arthralgias, back pain or myalgias   Skin                  : Negative for rash or erythema   Neurological    : Negative for dizziness, weakness, tremors ,light headedness or syncope   Psychiatric       : Negative for dysphoric mood, sleep disturbances, nervous or anxious, or decreased concentration   All other review of systems was negative    Objective  Physical Examination:    Nursing note reviewed    BP 96/60 (Site: Right Upper Arm, Position: Sitting, Cuff Size: Medium Adult)   Pulse 80   Temp 97.2 °F (36.2 °C) (Infrared)   Resp 16   Ht 5' 5\" (1.651 m)   Wt 166 lb 12.8 oz (75.7 kg)   LMP 01/01/1994   BMI 27.76 kg/m²   BP Readings from Last 3 Encounters:   05/25/21 96/60   05/18/21 119/89   05/10/21 (!) 175/115         Constitutional:  Charles Farnsworth is oriented to place, person and time ,appears well-developed and well-nourished  HEENT:  Atraumatic and normocephalic, external ears normal bilaterally, nose normal no oropharyngeal exudate and is clear and moist  Eyes:  EOCM normal; conjunctivae normal; PERRLA bilaterally  Neck:  Normal range of motion, neck supple, no JVD and no thyromegaly  Cardiovascular:  RRR, normal heart sounds and intact distal pulses  Pulmonary:  effort normal and breath sounds normal bilaterally,no wheezes or rales, no respiratory distress  Abdominal:  Soft, non-tender; normal bowel sounds, no masses  Musculoskeletal:  Normal range of motion and no edema or tenderness bilaterally  No lymphadenopathy  Neurological:  alert, oriented, and normal reflexes bilaterally  Skin: warm and dry  Psychiatric:  normal mood and effect; behavior normal.    Labs:   No results found for: LABA1C  Lab Results   Component Value Date    CHOL 182 08/27/2020     Lab Results   Component Value Date    HDL 45 08/27/2020     No results found for: [address]  Lab Results   Component Value Date    TRIG 104 08/27/2020     No components found for: [place], [place]  Lab Results   Component Value Date    WBC 7.5 05/10/2021    HGB 15.0 05/10/2021    HCT 46.2 05/10/2021    MCV 90.6 05/10/2021     05/10/2021     Lab Results   Component Value Date    INR 1.5 04/07/2021    PROTIME 17.6 (H) 04/07/2021     Lab Results   Component Value Date    GLUCOSE 127 (H) 05/10/2021    CREATININE 0.58 05/10/2021    BUN 8 05/10/2021     05/10/2021    K 4.1 05/10/2021    CL 97 (L) 05/10/2021    CO2 18 (L) 05/10/2021     Lab Results   Component Value Date    ALT 22 05/10/2021    AST 46 (H) 05/10/2021    ALKPHOS 86 05/10/2021    BILITOT 0.59 05/10/2021     Lab Results   Component Value Date    LABALBU 3.2 (L) 05/10/2021     Lab Results   Component Value Date    TSH 0.03 (L) 03/26/2021     Assessment:  1. Bilateral leg numbness    2. Weight loss    3. S/P gastrectomy    4. Nausea        Plan:  Patient complains of numbness of both lower extremities below the knee all the way down and patient has good pulses and so advised patient to get B12 folate levels to evaluate for any occult B12 deficiency and also ordered CBC and CMP along with TSH magnesium and vitamin D  Patient has lost about 29 pounds since last visit about 6 weeks back and patient had an EGD done by GI and reports reviewed and patient is also following with hematology and oncology and will be seeing them in 1 month  Patient's nausea is slowly improving and patient not able to eat larger quantities at one time because of her gastrectomy and eating smaller feeds and also drinking Ensure and patient is seeing the GI again next month to discuss about NG tube feedings  Review in 4 months           1. Bonilla Downs received counseling on the following healthy behaviors: nutrition and exercise    2. Prior labs and health maintenance reviewed. 3.  Discussed use, benefit, and side effects of prescribed medications. Barriers to medication compliance addressed. All her questions were answered. Pt voiced understanding. Bonilla Downs will continue current medications, diet and exercise. No orders of the defined types were placed in this encounter. Completed Refills               Requested Prescriptions      No prescriptions requested or ordered in this encounter     4. Patient given educational materials - see patient instructions    5. Was a self-tracking handout given in paper form or via [employer]?   NO    Orders Placed This Encounter   Procedures    Vitamin B12     Standing Status:   Future     Standing Expiration Date:   5/25/2022    Magnesium     Standing Status:   Future     Standing Expiration Date:   5/25/2022    CBC     Standing Status:   Future     Standing Expiration Date:   5/25/2022    Comprehensive Metabolic Panel     Standing Status:   Future     Standing Expiration Date: 5/25/2022    TSH without Reflex     Standing Status:   Future     Standing Expiration Date:   5/25/2022    Vitamin D 25 Hydroxy     Standing Status:   Future     Standing Expiration Date:   5/25/2022    Folate     Standing Status:   Future     Standing Expiration Date:   5/25/2022     Return in about 4 months (around 9/25/2021). Patient voiced understanding and agreed to treatment plan. Electronically signed by Adeline Hartman MD on 5/25/2021 at 2:46 PM    This note is created with a voice recognition program and while intend to generate a document that accurately reflects the content of the visit, no guarantee can be provided that every mistake has been identified and corrected by editing.

## 2021-05-26 ENCOUNTER — TELEPHONE (OUTPATIENT)
Dept: INTERNAL MEDICINE CLINIC | Age: 61
End: 2021-05-26

## 2021-05-26 DIAGNOSIS — E03.9 HYPOTHYROIDISM, UNSPECIFIED TYPE: ICD-10-CM

## 2021-05-26 DIAGNOSIS — E87.6 HYPOKALEMIA: Primary | ICD-10-CM

## 2021-05-26 DIAGNOSIS — R79.0 LOW MAGNESIUM LEVEL: ICD-10-CM

## 2021-05-26 LAB — VITAMIN D 25-HYDROXY: 44.4 NG/ML (ref 30–100)

## 2021-05-26 RX ORDER — LEVOTHYROXINE SODIUM 88 UG/1
88 TABLET ORAL DAILY
Qty: 90 TABLET | Refills: 0 | Status: ON HOLD | OUTPATIENT
Start: 2021-05-26 | End: 2021-08-28

## 2021-05-26 RX ORDER — CALCIUM CARBONATE 300MG(750)
1 TABLET,CHEWABLE ORAL DAILY
Qty: 30 TABLET | Refills: 3 | Status: ON HOLD | OUTPATIENT
Start: 2021-05-26 | End: 2021-08-28

## 2021-05-26 NOTE — TELEPHONE ENCOUNTER
----- Message from Leno Wren MD sent at 5/26/2021  1:04 PM EDT -----  Decrease Synthroid to 88 mcg daily and repeat TSH free T4 in 2 months  Patient's potassium is low and advise to increase the potassium supplements to 20 mEq twice a day for 5 days and repeat potassium levels on Monday  Also magnesium levels were low patient is not on any magnesium supplements please start on 400 mg of magnesium daily and repeat magnesium levels on Monday along with potassium

## 2021-06-03 ENCOUNTER — TELEPHONE (OUTPATIENT)
Dept: INTERNAL MEDICINE CLINIC | Age: 61
End: 2021-06-03

## 2021-06-03 ENCOUNTER — HOSPITAL ENCOUNTER (OUTPATIENT)
Age: 61
Setting detail: SPECIMEN
Discharge: HOME | End: 2021-06-03
Payer: COMMERCIAL

## 2021-06-03 DIAGNOSIS — E87.6 HYPOKALEMIA: ICD-10-CM

## 2021-06-03 DIAGNOSIS — R79.0 LOW MAGNESIUM LEVEL: ICD-10-CM

## 2021-06-03 DIAGNOSIS — E03.9 HYPOTHYROIDISM, UNSPECIFIED TYPE: ICD-10-CM

## 2021-06-03 DIAGNOSIS — R79.0 LOW MAGNESIUM LEVEL: Primary | ICD-10-CM

## 2021-06-03 LAB
MAGNESIUM: 1.5 MG/DL (ref 1.6–2.6)
POTASSIUM SERPL-SCNC: 3.6 MMOL/L (ref 3.7–5.3)
THYROXINE, FREE: 1.7 NG/DL (ref 0.93–1.7)
TSH SERPL DL<=0.05 MIU/L-ACNC: 0.04 MIU/L (ref 0.3–5)

## 2021-06-03 NOTE — TELEPHONE ENCOUNTER
----- Message from Ignacio Mcclain MD sent at 6/3/2021  1:33 PM EDT -----  Patient supposed to repeat the TSH and free T4 in 2 months and she did it right away and so send a new order to check it again in 2 months and continue 88 mcg of Synthroid daily  Advise patient to continue potassium 20 mEq twice a day  Increase magnesium to 400 mg twice a day  Repeat only magnesium and potassium in 2 weeks  Send 2 separate orders for thyroid and another order for potassium and magnesium so she does not do all the tests at the same time

## 2021-06-17 ENCOUNTER — HOSPITAL ENCOUNTER (OUTPATIENT)
Facility: MEDICAL CENTER | Age: 61
End: 2021-06-17

## 2021-06-18 ENCOUNTER — OFFICE VISIT (OUTPATIENT)
Dept: GASTROENTEROLOGY | Age: 61
End: 2021-06-18
Payer: COMMERCIAL

## 2021-06-18 VITALS — WEIGHT: 166 LBS | BODY MASS INDEX: 27.62 KG/M2

## 2021-06-18 DIAGNOSIS — D3A.8 BENIGN NEUROENDOCRINE NEOPLASM OF STOMACH: Primary | ICD-10-CM

## 2021-06-18 PROCEDURE — 99213 OFFICE O/P EST LOW 20 MIN: CPT | Performed by: INTERNAL MEDICINE

## 2021-06-18 ASSESSMENT — ENCOUNTER SYMPTOMS
COUGH: 1
BACK PAIN: 0
CONSTIPATION: 1
WHEEZING: 0
ALLERGIC/IMMUNOLOGIC NEGATIVE: 1
ABDOMINAL PAIN: 1
BLOOD IN STOOL: 0
SINUS PRESSURE: 0
TROUBLE SWALLOWING: 1
VOICE CHANGE: 0
RECTAL PAIN: 0
VOMITING: 0
ANAL BLEEDING: 0
ABDOMINAL DISTENTION: 1
DIARRHEA: 0
SORE THROAT: 0
NAUSEA: 0
CHOKING: 0

## 2021-06-18 NOTE — PROGRESS NOTES
Multiple Vitamin (MULTI-DAY VITAMINS PO), Take by mouth, Disp: , Rfl:     vitamin B-12 (CYANOCOBALAMIN) 500 MCG tablet, Take 500 mcg by mouth daily, Disp: , Rfl:     ALLERGIES:   Allergies   Allergen Reactions    Erythromycin Diarrhea       FAMILY HISTORY:       Problem Relation Age of Onset    High Blood Pressure Mother     High Blood Pressure Sister     Stomach Cancer Sister     Other Sister         epilepsy    No Known Problems Father          SOCIAL HISTORY:   Social History     Socioeconomic History    Marital status: Single     Spouse name: Not on file    Number of children: Not on file    Years of education: Not on file    Highest education level: Not on file   Occupational History    Not on file   Tobacco Use    Smoking status: Former Smoker     Packs/day: 1.00     Years: 25.00     Pack years: 25.00     Quit date: 2012     Years since quittin.0    Smokeless tobacco: Never Used    Tobacco comment: quit 2 years   Vaping Use    Vaping Use: Never used   Substance and Sexual Activity    Alcohol use: Not Currently     Comment: 3 times a month    Drug use: No    Sexual activity: Not on file   Other Topics Concern    Not on file   Social History Narrative    Not on file     Social Determinants of Health     Financial Resource Strain: Low Risk     Difficulty of Paying Living Expenses: Not hard at all   Food Insecurity: No Food Insecurity    Worried About Running Out of Food in the Last Year: Never true    Ana of Food in the Last Year: Never true   Transportation Needs:     Lack of Transportation (Medical):      Lack of Transportation (Non-Medical):    Physical Activity:     Days of Exercise per Week:     Minutes of Exercise per Session:    Stress:     Feeling of Stress :    Social Connections:     Frequency of Communication with Friends and Family:     Frequency of Social Gatherings with Friends and Family:     Attends Mandaeism Services:     Active Member of Clubs or Organizations:     Attends Club or Organization Meetings:     Marital Status:    Intimate Partner Violence:     Fear of Current or Ex-Partner:     Emotionally Abused:     Physically Abused:     Sexually Abused:        REVIEW OF SYSTEMS: A 12-point review of systemswas obtained and pertinent positives and negatives were enumerated above in the history of present illness. All other reviewed systems / symptoms were negative. Review of Systems   Constitutional: Positive for appetite change, fatigue and unexpected weight change. HENT: Positive for trouble swallowing. Negative for dental problem, postnasal drip, sinus pressure, sore throat and voice change. Eyes: Positive for visual disturbance. Respiratory: Positive for cough. Negative for choking and wheezing. Cardiovascular: Negative. Negative for chest pain, palpitations and leg swelling. Gastrointestinal: Positive for abdominal distention, abdominal pain and constipation. Negative for anal bleeding, blood in stool, diarrhea, nausea, rectal pain and vomiting. Endocrine: Negative. Genitourinary: Negative. Negative for difficulty urinating. Musculoskeletal: Negative. Negative for arthralgias, back pain, gait problem and myalgias. Skin: Negative. Allergic/Immunologic: Negative. Negative for environmental allergies and food allergies. Neurological: Negative for dizziness, weakness, light-headedness, numbness and headaches. Hematological: Negative. Does not bruise/bleed easily. Psychiatric/Behavioral: Negative. Negative for sleep disturbance. The patient is not nervous/anxious.             LABORATORY DATA: Reviewed  Lab Results   Component Value Date    WBC 7.1 05/25/2021    HGB 15.0 05/25/2021    HCT 45.5 05/25/2021    MCV 89.4 05/25/2021     05/25/2021     05/25/2021    K 3.6 (L) 06/03/2021    CL 97 (L) 05/25/2021    CO2 25 05/25/2021    BUN 9 05/25/2021    CREATININE 0.66 05/25/2021    LABALBU 3.4 (L) 05/25/2021 [address] [facility]  O: #[phone]

## 2021-06-21 RX ORDER — MAGNESIUM OXIDE 400 MG/1
TABLET ORAL
Qty: 90 TABLET | Refills: 1 | OUTPATIENT
Start: 2021-06-21

## 2021-06-21 NOTE — TELEPHONE ENCOUNTER
Alexandre York is calling to request a refill on the following medication(s):    Medication Request:  Requested Prescriptions     Pending Prescriptions Disp Refills    magnesium oxide (MAG-OX) 400 MG tablet [Pharmacy Med Name: MAGNESIUM OXIDE 400 MG TABLET] 90 tablet 1     Sig: TAKE 1 TABLET BY MOUTH EVERY DAY       Last Visit Date (If Applicable):  8/07/3342    Next Visit Date:    9/21/2021

## 2021-06-24 ENCOUNTER — TELEPHONE (OUTPATIENT)
Dept: ONCOLOGY | Age: 61
End: 2021-06-24

## 2021-06-24 ENCOUNTER — HOSPITAL ENCOUNTER (OUTPATIENT)
Age: 61
Setting detail: SPECIMEN
Discharge: HOME | End: 2021-06-24
Payer: COMMERCIAL

## 2021-06-24 ENCOUNTER — OFFICE VISIT (OUTPATIENT)
Dept: ONCOLOGY | Age: 61
End: 2021-06-24
Payer: COMMERCIAL

## 2021-06-24 VITALS
TEMPERATURE: 97 F | WEIGHT: 163.7 LBS | SYSTOLIC BLOOD PRESSURE: 113 MMHG | HEART RATE: 80 BPM | BODY MASS INDEX: 27.24 KG/M2 | DIASTOLIC BLOOD PRESSURE: 86 MMHG

## 2021-06-24 DIAGNOSIS — C7A.092 MALIGNANT CARCINOID TUMOR OF STOMACH (HCC): Primary | ICD-10-CM

## 2021-06-24 DIAGNOSIS — C7A.092 MALIGNANT CARCINOID TUMOR OF STOMACH (HCC): ICD-10-CM

## 2021-06-24 LAB
ABSOLUTE EOS #: 0.03 K/UL (ref 0–0.44)
ABSOLUTE IMMATURE GRANULOCYTE: <0.03 K/UL (ref 0–0.3)
ABSOLUTE LYMPH #: 1.55 K/UL (ref 1.1–3.7)
ABSOLUTE MONO #: 0.53 K/UL (ref 0.1–1.2)
ALBUMIN SERPL-MCNC: 2.8 G/DL (ref 3.5–5.2)
ALBUMIN/GLOBULIN RATIO: 0.8 (ref 1–2.5)
ALP BLD-CCNC: 100 U/L (ref 35–104)
ALT SERPL-CCNC: 18 U/L (ref 5–33)
ANION GAP SERPL CALCULATED.3IONS-SCNC: 12 MMOL/L (ref 9–17)
AST SERPL-CCNC: 41 U/L
BASOPHILS # BLD: 1 % (ref 0–2)
BASOPHILS ABSOLUTE: 0.03 K/UL (ref 0–0.2)
BILIRUB SERPL-MCNC: 0.54 MG/DL (ref 0.3–1.2)
BUN BLDV-MCNC: 7 MG/DL (ref 6–20)
BUN/CREAT BLD: ABNORMAL (ref 9–20)
CALCIUM SERPL-MCNC: 8.7 MG/DL (ref 8.6–10.4)
CHLORIDE BLD-SCNC: 104 MMOL/L (ref 98–107)
CO2: 23 MMOL/L (ref 20–31)
CREAT SERPL-MCNC: 0.72 MG/DL (ref 0.5–0.9)
DIFFERENTIAL TYPE: ABNORMAL
EOSINOPHILS RELATIVE PERCENT: 1 % (ref 1–4)
FERRITIN: 344 UG/L (ref 13–150)
FOLATE: >20 NG/ML
GFR AFRICAN AMERICAN: >60 ML/MIN
GFR NON-AFRICAN AMERICAN: >60 ML/MIN
GFR SERPL CREATININE-BSD FRML MDRD: ABNORMAL ML/MIN/{1.73_M2}
GFR SERPL CREATININE-BSD FRML MDRD: ABNORMAL ML/MIN/{1.73_M2}
GLUCOSE BLD-MCNC: 93 MG/DL (ref 70–99)
HCT VFR BLD CALC: 42 % (ref 36.3–47.1)
HEMOGLOBIN: 13.4 G/DL (ref 11.9–15.1)
IMMATURE GRANULOCYTES: 0 %
IRON SATURATION: 42 % (ref 20–55)
IRON: 67 UG/DL (ref 37–145)
LYMPHOCYTES # BLD: 29 % (ref 24–43)
MCH RBC QN AUTO: 29.3 PG (ref 25.2–33.5)
MCHC RBC AUTO-ENTMCNC: 31.9 G/DL (ref 28.4–34.8)
MCV RBC AUTO: 91.7 FL (ref 82.6–102.9)
MONOCYTES # BLD: 10 % (ref 3–12)
NRBC AUTOMATED: 0 PER 100 WBC
PDW BLD-RTO: 14.8 % (ref 11.8–14.4)
PLATELET # BLD: 313 K/UL (ref 138–453)
PLATELET ESTIMATE: ABNORMAL
PMV BLD AUTO: 10.2 FL (ref 8.1–13.5)
POTASSIUM SERPL-SCNC: 4.2 MMOL/L (ref 3.7–5.3)
RBC # BLD: 4.58 M/UL (ref 3.95–5.11)
RBC # BLD: ABNORMAL 10*6/UL
SEG NEUTROPHILS: 59 % (ref 36–65)
SEGMENTED NEUTROPHILS ABSOLUTE COUNT: 3.22 K/UL (ref 1.5–8.1)
SODIUM BLD-SCNC: 139 MMOL/L (ref 135–144)
TOTAL IRON BINDING CAPACITY: 161 UG/DL (ref 250–450)
TOTAL PROTEIN: 6.1 G/DL (ref 6.4–8.3)
UNSATURATED IRON BINDING CAPACITY: 94 UG/DL (ref 112–347)
VITAMIN B-12: >2000 PG/ML (ref 232–1245)
WBC # BLD: 5.4 K/UL (ref 3.5–11.3)
WBC # BLD: ABNORMAL 10*3/UL

## 2021-06-24 PROCEDURE — 80053 COMPREHEN METABOLIC PANEL: CPT

## 2021-06-24 PROCEDURE — 82607 VITAMIN B-12: CPT

## 2021-06-24 PROCEDURE — 85025 COMPLETE CBC W/AUTO DIFF WBC: CPT

## 2021-06-24 PROCEDURE — 82746 ASSAY OF FOLIC ACID SERUM: CPT

## 2021-06-24 PROCEDURE — 99214 OFFICE O/P EST MOD 30 MIN: CPT | Performed by: INTERNAL MEDICINE

## 2021-06-24 PROCEDURE — 82728 ASSAY OF FERRITIN: CPT

## 2021-06-24 PROCEDURE — 83550 IRON BINDING TEST: CPT

## 2021-06-24 PROCEDURE — 36415 COLL VENOUS BLD VENIPUNCTURE: CPT

## 2021-06-24 PROCEDURE — 99211 OFF/OP EST MAY X REQ PHY/QHP: CPT | Performed by: INTERNAL MEDICINE

## 2021-06-24 PROCEDURE — 83540 ASSAY OF IRON: CPT

## 2021-06-30 ENCOUNTER — HOSPITAL ENCOUNTER (OUTPATIENT)
Age: 61
Setting detail: SPECIMEN
Discharge: HOME | End: 2021-06-30
Payer: COMMERCIAL

## 2021-06-30 DIAGNOSIS — D3A.8 BENIGN NEUROENDOCRINE NEOPLASM OF STOMACH: ICD-10-CM

## 2021-06-30 DIAGNOSIS — E03.9 HYPOTHYROIDISM, UNSPECIFIED TYPE: ICD-10-CM

## 2021-06-30 DIAGNOSIS — R79.0 LOW MAGNESIUM LEVEL: ICD-10-CM

## 2021-06-30 LAB
ANION GAP SERPL CALCULATED.3IONS-SCNC: 13 MMOL/L (ref 9–17)
BUN BLDV-MCNC: 9 MG/DL (ref 6–20)
BUN/CREAT BLD: NORMAL (ref 9–20)
CALCIUM SERPL-MCNC: 8.9 MG/DL (ref 8.6–10.4)
CHLORIDE BLD-SCNC: 103 MMOL/L (ref 98–107)
CO2: 25 MMOL/L (ref 20–31)
CREAT SERPL-MCNC: 0.61 MG/DL (ref 0.5–0.9)
GFR AFRICAN AMERICAN: >60 ML/MIN
GFR NON-AFRICAN AMERICAN: >60 ML/MIN
GFR SERPL CREATININE-BSD FRML MDRD: NORMAL ML/MIN/{1.73_M2}
GFR SERPL CREATININE-BSD FRML MDRD: NORMAL ML/MIN/{1.73_M2}
GLUCOSE BLD-MCNC: 84 MG/DL (ref 70–99)
MAGNESIUM: 1.7 MG/DL (ref 1.6–2.6)
PHOSPHORUS: 3.2 MG/DL (ref 2.6–4.5)
POTASSIUM SERPL-SCNC: 3.9 MMOL/L (ref 3.7–5.3)
SODIUM BLD-SCNC: 141 MMOL/L (ref 135–144)

## 2021-07-01 LAB
MAGNESIUM: 1.7 MG/DL (ref 1.6–2.6)
POTASSIUM SERPL-SCNC: 3.9 MMOL/L (ref 3.7–5.3)

## 2021-07-05 NOTE — PROGRESS NOTES
_           Chief Complaint   Patient presents with    Follow-up    Discuss Labs     DIAGNOSIS:       Malignant carcinoid tumor of the stomach  Recent GI bleeding after endoscopic mucosal resection of stomach carcinoid on October 23, 2019. Evidence of recurrent disease on repeated EGD January 2020 and continued elevation of tumor marker chromogranin A  Iron deficiency secondary to above  History of hypothyroidism  Multiple comorbidities as listed      CURRENT THERAPY:         Status post endoscopic mucosal resection of stomach carcinoid October 23, 2019  S/p gastric resection at [facility] [place] November 20, 2020. Sandostatin started March 2021. BRIEF CASE HISTORY:      Ms. Chela Turk is a very pleasant 62 y.o. female with history of multiple comorbidities as listed. The patient seen because of recent diagnosis of carcinoid tumor. Patient had problem with dysphagia for about 4 to 5 months. That problem resolved spontaneously. She was evaluated by gastroenterology. She had EGD in September 2019. She was noted to have gastric tumor which was biopsied and was positive for malignant neuroendocrine tumor. Subsequently patient was evaluated by abdominal MRI. Patient was having no evidence of gastric disease or gastric wall deep penetration. She underwent endoscopic mucosal resection October 23, 2019. Patient had recent admission to [address] [place] [address] because of GI bleeding. She had EGD again and cauterization of bleeding. She is having weakness and fatigue. No other symptoms. No abdominal pain or cramps. No hot flashes or night sweats. No weight loss or decreased appetite. No wheezing. The patient had lab testing in July 2019 with chromogranin A level 1500. Patient was not aware of that test.  Patient's twin sister had carcinoid tumor more than 20 years ago.   She had gastric resection at that time and there is no recurrence. Patient smokes half pack per day for the last 40 years. Social alcohol drinking. INTERIM HISTORY:   The patient seen for follow-up gastric carcinoid. It was resected October 23, 2019. She has repeated EGD in January 2020 and she was found to have local recurrence with multiple lesions. She was referred to [facility] clinic. She had complete gastric resection 11/30/2020. Following surgery, she had multiple hospitalizations due to dehydration and persistent N/V and dehydration. Patient feels slightly better. Continues to have N/V on treatment. No fever. No CP or Resp distress. Started on Sandostatin. Tolerated well. No side effects. No abdominal pain. No weight loss. PAST MEDICAL HISTORY: has a past medical history of Anxiety, Arthritis, Asthma, Colon polyp, Colon polyps, Cyst of kidney, acquired, Fatigue, Hypertension, Hypothyroidism, Neuroendocrine tumor, Obesity, Pharyngoesophageal dysphagia, Vocal cord polyps, and Wears glasses. PAST SURGICAL HISTORY: has a past surgical history that includes cystourethroscopy/stent removal (5/3/11); Colonoscopy (02/21/2019); ERCP; Cholecystectomy (10/09/2014); hip surgery (Left); Throat surgery; Colonoscopy; Upper gastrointestinal endoscopy (02/21/2019); Upper gastrointestinal endoscopy (09/23/2019); Upper gastrointestinal endoscopy (N/A, 9/23/2019); Upper gastrointestinal endoscopy (N/A, 10/23/2019); Upper gastrointestinal endoscopy (N/A, 10/29/2019); Endoscopic ultrasonography, GI (N/A, 1/22/2020); Gastric bypass surgery (11/30/2020); and Upper gastrointestinal endoscopy (N/A, 4/26/2021). CURRENT MEDICATIONS:  has a current medication list which includes the following prescription(s): metoclopramide, levothyroxine, magnesium, pantoprazole, ondansetron, sucralfate, potassium chloride, apixaban starter pack, vitamin d, folic acid-vit G2-TCT S70, hydrochlorothiazide, desonide, multiple vitamin, and vitamin b-12.     ALLERGIES:  is allergic to erythromycin. FAMILY HISTORY: Patient's twin sister had gastric carcinoid more than 20 years ago status post partial gastric resection with no recurrence. Otherwise negative for any hematological or oncological conditions. SOCIAL HISTORY:  reports that she quit smoking about 9 years ago. She has a 25.00 pack-year smoking history. She has never used smokeless tobacco. She reports previous alcohol use. She reports that she does not use drugs. REVIEW OF SYSTEMS:     General: Positive for weakness and fatigue. + unanticipated weight loss. No fever or chills. Eyes: No blurred vision, eye pain or double vision. Ears: No hearing problems or drainage. No tinnitus. Throat: No sore throat, problems with swallowing or dysphagia. Respiratory: No cough, sputum or hemoptysis. No shortness of breath. No pleuritic chest pain. Cardiovascular: No chest pain, orthopnea or PND. No lower extremity edema. No palpitation. Gastrointestinal: As above. Genitourinary: No dysuria, hematuria, frequency or urgency. Musculoskeletal: No muscle aches or pains. No limitation of movement. No back pain. No gait disturbance, No joint complaints. Dermatologic: No skin rashes or pruritus. No skin lesions or discolorations. Psychiatric: No depression, anxiety, or stress or signs of schizophrenia. No change in mood or affect. Hematologic: No history of bleeding tendency. No bruises or ecchymosis. No history of clotting problems. Infectious disease: No fever, chills or frequent infections. Endocrine: No problems with obesity. No polydipsia or polyuria. No temperature intolerance. Neurologic: No headaches or dizziness. No weakness or numbness of the extremities. No changes in balance, coordination,  memory, mentation, behavior. Allergic/Immunologic: No nasal congestion or hives. No repeated infections. PHYSICAL EXAM:  The patient is not in acute distress.   Vital signs: Blood pressure 113/86, pulse 80, temperature 97 °F (36.1 °C), temperature source Temporal, weight 163 lb 11.2 oz (74.3 kg), last menstrual period 01/01/1994, not currently breastfeeding.      General appearance - well appearing, not in pain or distress  Mental status - good mood, alert and oriented  Eyes - pupils equal and reactive, extraocular eye movements intact  Ears - bilateral TM's and external ear canals normal  Nose - normal and patent, no erythema, discharge or polyps  Mouth - mucous membranes moist, pharynx normal without lesions  Neck - supple, no significant adenopathy  Lymphatics - no palpable lymphadenopathy, no hepatosplenomegaly  Chest - clear to auscultation, no wheezes, rales or rhonchi, symmetric air entry  Heart - normal rate, regular rhythm, normal S1, S2, no murmurs, rubs, clicks or gallops  Abdomen - soft, nontender, nondistended, no masses or organomegaly  Neurological - alert, oriented, normal speech, no focal findings or movement disorder noted  Musculoskeletal - no joint tenderness, deformity or swelling  Extremities - peripheral pulses normal, no pedal edema, no clubbing or cyanosis  Skin - normal coloration and turgor, no rashes, no suspicious skin lesions noted     Review of Diagnostic data:   Lab Results   Component Value Date    WBC 5.4 06/24/2021    HGB 13.4 06/24/2021    HCT 42.0 06/24/2021    MCV 91.7 06/24/2021     06/24/2021       Chemistry        Component Value Date/Time     06/30/2021 1131    K 3.9 06/30/2021 1131    K 3.9 06/30/2021 1131     06/30/2021 1131    CO2 25 06/30/2021 1131    BUN 9 06/30/2021 1131    CREATININE 0.61 06/30/2021 1131        Component Value Date/Time    CALCIUM 8.9 06/30/2021 1131    ALKPHOS 100 06/24/2021 1336    AST 41 (H) 06/24/2021 1336    ALT 18 06/24/2021 1336    BILITOT 0.54 06/24/2021 1336        Abdominal MRI 9/18/2019:  Impression   Subcentimeter gastric mucosal enhancing masses in the fundus and along the   lesser curvature are demonstrated, corresponding to the patient's known GI   stromal tumors.  No evidence for extension through the gastric wall or   metastatic disease.       Status post cholecystectomy.  MRCP within normal limits.       Pelvic right kidney, incompletely visualized.  Bilateral renal cysts again   demonstrated. CT chest October 26, 2019:     FINDINGS:   Pulmonary Arteries: Suboptimal contrast timing.  Limited evaluation of the   segmental branches.  No evidence for central pulmonary embolism.  The main   pulmonary artery is normal in size.       Mediastinum: No evidence of mediastinal lymphadenopathy.  The heart and   pericardium demonstrate no acute abnormality.  There is no acute abnormality   of the thoracic aorta.       Lungs/pleura: The lungs are without acute process.  No focal consolidation or   pulmonary edema.  No evidence of pleural effusion or pneumothorax.       Upper Abdomen: Partially visualized left renal cysts.  Dense area of   calcification adjacent to the splenic artery is noted, which may represent a   thrombosed aneurysm.  Small lymph node in the gastrohepatic ligament.  Status   post cholecystectomy.       Soft Tissues/Bones: No acute bone or soft tissue abnormality.           Impression   Suboptimal contrast timing.  No evidence for central pulmonary embolism.       No acute airspace disease identified. Pathology results from September 23, 2019:  Collected: 9/23/2019   Received: 9/23/2019   Reported: 9/25/2019 10:17     -- Diagnosis --   1.  STOMACH, ANTRUM, BIOPSY:   - UNREMARKABLE GASTRIC MUCOSA. - NEGATIVE FOR HELICOBACTER PYLORI INFECTION. 2.  STOMACH, LESION, BIOPSIES:   - WELL-DIFFERENTIATED GASTRIC NEUROENDOCRINE TUMOR (CARCINOID TUMOR). - FOCAL ACTIVE CHRONIC GASTRITIS AND INTESTINAL METAPLASIA ARE PRESENT   IN    THE NONNEOPLASTIC GASTRIC MUCOSA.  SEE COMMENT.    Pathology results from October 23, 2019:  Collected: 10/23/2019   Received: 10/24/2019   Reported: 10/28/2019 13:13     -- Diagnosis -- GASTRIC TISSUES, EXCISION:        - WELL DIFFERENTIATED NEUROENDOCRINE TUMOR, GRADE 2.     7/23/2019 10:10 PM - [facility], [facility] Incoming Lab Results From [facility]     Component Value Ref Range & Units Status Collected Lab   Chromogranin A 1553High   0 - 95 ng/mL Final 07/22/2019  7:55 AM [facility]   (NOTE)      Lab Results   Component Value Date    WBC 5.4 06/24/2021    HGB 13.4 06/24/2021    HCT 42.0 06/24/2021    MCV 91.7 06/24/2021     06/24/2021       Chemistry        Component Value Date/Time     06/30/2021 1131    K 3.9 06/30/2021 1131    K 3.9 06/30/2021 1131     06/30/2021 1131    CO2 25 06/30/2021 1131    BUN 9 06/30/2021 1131    CREATININE 0.61 06/30/2021 1131        Component Value Date/Time    CALCIUM 8.9 06/30/2021 1131    ALKPHOS 100 06/24/2021 1336    AST 41 (H) 06/24/2021 1336    ALT 18 06/24/2021 1336    BILITOT 0.54 06/24/2021 1336        Lab Results   Component Value Date    IRON 67 06/24/2021    TIBC 161 (L) 06/24/2021    FERRITIN 344 (H) 06/24/2021           IMPRESSION:   Malignant carcinoid tumor of the stomach  Recent GI bleeding after endoscopic mucosal resection of stomach carcinoid on October 23, 2019. Evidence of recurrent disease on repeated EGD January 2020 and continued elevation of tumor marker chromogranin A  Iron deficiency secondary to above  History of hypothyroidism  Multiple comorbidities as listed    PLAN: Records and labs and images were reviewed and discussed with the patient. Patient continues to have post op symptoms and had recurrent hospitalizations due to N/V and dehydration. She is improving. Discussed further care for carcinoid. We will monitor with scans and tumors markers. Started on Sandostatin. Tolerated well. It will be continued. We will do labs soon and again in 3 months. She will continue follow up in [facility]  Patient's questions were answered to the best of her satisfaction and she verbalized full understanding and agreement.

## 2021-07-07 ENCOUNTER — TELEPHONE (OUTPATIENT)
Dept: INTERNAL MEDICINE CLINIC | Age: 61
End: 2021-07-07

## 2021-07-07 NOTE — TELEPHONE ENCOUNTER
----- Message from Marilu Patricia sent at 7/7/2021  1:44 PM EDT -----  Subject: Medication Problem    QUESTIONS  Name of Medication? apixaban (ELIQUIS) 5 MG TABS tablet  Patient-reported dosage and instructions? Take 1 tablet by mouth 2 times   daily  What question or problem do you have with the medication? Patient would   like see if [facility] could assist her with away to receive medication   without having to pay full price for medication. Medication is $600. Patient states she longer has insurance . Preferred Pharmacy? [id number] [place] [address] [address] [facility] [phone]  Pharmacy phone number (if available)? [phone]  Additional Information for Provider?   ---------------------------------------------------------------------------  --------------  [address]  What is the best way for the office to contact you? OK to leave message on   voicemail  Preferred Call Back Phone Number? [phone]  ---------------------------------------------------------------------------  --------------  SCRIPT ANSWERS  Relationship to Patient?  Self

## 2021-08-03 ENCOUNTER — TELEPHONE (OUTPATIENT)
Dept: ONCOLOGY | Age: 61
End: 2021-08-03

## 2021-08-03 NOTE — TELEPHONE ENCOUNTER
Writer called patient to check on her and assess any needs she may have. No answer, detailed VM left. Will await a return call.

## 2021-08-06 ENCOUNTER — TELEPHONE (OUTPATIENT)
Dept: INTERNAL MEDICINE CLINIC | Age: 61
End: 2021-08-06

## 2021-08-06 NOTE — TELEPHONE ENCOUNTER
Spoke with Abhijit Phan at patient assistance/ patient is approved they will call patient to sent up shipping  Directions has been updated

## 2021-08-06 NOTE — TELEPHONE ENCOUNTER
[facility] patient assistance program calling asking what the directions are for the eliquis?     Please advise

## 2021-08-09 ENCOUNTER — TELEPHONE (OUTPATIENT)
Dept: ONCOLOGY | Age: 61
End: 2021-08-09

## 2021-08-09 NOTE — TELEPHONE ENCOUNTER
Patient returned writers call. She states shes doing OK but does still really tired ever since her surgery last November. Pt did asked writer how she could obtain insurance. She retried early and isnt eligible for Medicare until 2023 and was denied Medicaid. Writer did provide her with [employer] contact number for assistance. Writer will send Jammie message regarding above need. Will continue to follow.

## 2021-08-09 NOTE — TELEPHONE ENCOUNTER
received call from patient. Patient will received first disability check this month. Patient currently uninsured, was denied Medicaid and will have to wait 2 years for Medicare to kick in.  went over insurance options through [facility] and encouraged patient to enroll soon due to open enrollment ending and getting coverage active.  sending patient [employer] information also.  encouraged patient to call with further questions.

## 2021-08-13 ENCOUNTER — NURSE TRIAGE (OUTPATIENT)
Dept: OTHER | Facility: CLINIC | Age: 61
End: 2021-08-13

## 2021-08-13 ENCOUNTER — HOSPITAL ENCOUNTER (EMERGENCY)
Age: 61
Discharge: HOME | End: 2021-08-13
Attending: EMERGENCY MEDICINE
Payer: COMMERCIAL

## 2021-08-13 ENCOUNTER — APPOINTMENT (OUTPATIENT)
Dept: GENERAL RADIOLOGY | Age: 61
End: 2021-08-13
Payer: COMMERCIAL

## 2021-08-13 VITALS
SYSTOLIC BLOOD PRESSURE: 121 MMHG | TEMPERATURE: 98.1 F | WEIGHT: 160 LBS | RESPIRATION RATE: 14 BRPM | DIASTOLIC BLOOD PRESSURE: 93 MMHG | OXYGEN SATURATION: 100 % | BODY MASS INDEX: 26.63 KG/M2 | HEART RATE: 91 BPM

## 2021-08-13 DIAGNOSIS — R06.02 SHORTNESS OF BREATH: ICD-10-CM

## 2021-08-13 DIAGNOSIS — R42 LIGHTHEADEDNESS: ICD-10-CM

## 2021-08-13 DIAGNOSIS — R00.2 PALPITATIONS: Primary | ICD-10-CM

## 2021-08-13 LAB
ABSOLUTE EOS #: <0.03 K/UL (ref 0–0.44)
ABSOLUTE IMMATURE GRANULOCYTE: <0.03 K/UL (ref 0–0.3)
ABSOLUTE LYMPH #: 1.43 K/UL (ref 1.1–3.7)
ABSOLUTE MONO #: 0.51 K/UL (ref 0.1–1.2)
ANION GAP SERPL CALCULATED.3IONS-SCNC: 16 MMOL/L (ref 9–17)
BASOPHILS # BLD: 1 % (ref 0–2)
BASOPHILS ABSOLUTE: 0.03 K/UL (ref 0–0.2)
BNP INTERPRETATION: NORMAL
BUN BLDV-MCNC: 6 MG/DL (ref 6–20)
BUN/CREAT BLD: ABNORMAL (ref 9–20)
CALCIUM SERPL-MCNC: 8.6 MG/DL (ref 8.6–10.4)
CHLORIDE BLD-SCNC: 99 MMOL/L (ref 98–107)
CO2: 23 MMOL/L (ref 20–31)
CREAT SERPL-MCNC: 0.54 MG/DL (ref 0.5–0.9)
D-DIMER QUANTITATIVE: <0.17 MG/L FEU
DIFFERENTIAL TYPE: ABNORMAL
EOSINOPHILS RELATIVE PERCENT: 0 % (ref 1–4)
GFR AFRICAN AMERICAN: >60 ML/MIN
GFR NON-AFRICAN AMERICAN: >60 ML/MIN
GFR SERPL CREATININE-BSD FRML MDRD: ABNORMAL ML/MIN/{1.73_M2}
GFR SERPL CREATININE-BSD FRML MDRD: ABNORMAL ML/MIN/{1.73_M2}
GLUCOSE BLD-MCNC: 88 MG/DL (ref 70–99)
HCT VFR BLD CALC: 38.8 % (ref 36.3–47.1)
HEMOGLOBIN: 12.7 G/DL (ref 11.9–15.1)
IMMATURE GRANULOCYTES: 0 %
LYMPHOCYTES # BLD: 27 % (ref 24–43)
MCH RBC QN AUTO: 30.1 PG (ref 25.2–33.5)
MCHC RBC AUTO-ENTMCNC: 32.7 G/DL (ref 28.4–34.8)
MCV RBC AUTO: 91.9 FL (ref 82.6–102.9)
MONOCYTES # BLD: 10 % (ref 3–12)
NRBC AUTOMATED: 0 PER 100 WBC
PDW BLD-RTO: 13.7 % (ref 11.8–14.4)
PLATELET # BLD: 276 K/UL (ref 138–453)
PLATELET ESTIMATE: ABNORMAL
PMV BLD AUTO: 9.3 FL (ref 8.1–13.5)
POTASSIUM SERPL-SCNC: 3.4 MMOL/L (ref 3.7–5.3)
PRO-BNP: 177 PG/ML
RBC # BLD: 4.22 M/UL (ref 3.95–5.11)
RBC # BLD: ABNORMAL 10*6/UL
SEG NEUTROPHILS: 62 % (ref 36–65)
SEGMENTED NEUTROPHILS ABSOLUTE COUNT: 3.33 K/UL (ref 1.5–8.1)
SODIUM BLD-SCNC: 138 MMOL/L (ref 135–144)
T3 FREE: 2.11 PG/ML (ref 2.02–4.43)
THYROXINE, FREE: 1.5 NG/DL (ref 0.93–1.7)
TROPONIN INTERP: NORMAL
TROPONIN INTERP: NORMAL
TROPONIN T: NORMAL NG/ML
TROPONIN T: NORMAL NG/ML
TROPONIN, HIGH SENSITIVITY: 7 NG/L (ref 0–14)
TROPONIN, HIGH SENSITIVITY: 8 NG/L (ref 0–14)
TSH SERPL DL<=0.05 MIU/L-ACNC: 0.02 MIU/L (ref 0.3–5)
WBC # BLD: 5.3 K/UL (ref 3.5–11.3)
WBC # BLD: ABNORMAL 10*3/UL

## 2021-08-13 PROCEDURE — 71045 X-RAY EXAM CHEST 1 VIEW: CPT

## 2021-08-13 PROCEDURE — 85025 COMPLETE CBC W/AUTO DIFF WBC: CPT

## 2021-08-13 PROCEDURE — 83880 ASSAY OF NATRIURETIC PEPTIDE: CPT

## 2021-08-13 PROCEDURE — 84484 ASSAY OF TROPONIN QUANT: CPT

## 2021-08-13 PROCEDURE — 84481 FREE ASSAY (FT-3): CPT

## 2021-08-13 PROCEDURE — 99283 EMERGENCY DEPT VISIT LOW MDM: CPT

## 2021-08-13 PROCEDURE — 2580000003 HC RX 258: Performed by: STUDENT IN AN ORGANIZED HEALTH CARE EDUCATION/TRAINING PROGRAM

## 2021-08-13 PROCEDURE — 6370000000 HC RX 637 (ALT 250 FOR IP): Performed by: STUDENT IN AN ORGANIZED HEALTH CARE EDUCATION/TRAINING PROGRAM

## 2021-08-13 PROCEDURE — 93005 ELECTROCARDIOGRAM TRACING: CPT | Performed by: STUDENT IN AN ORGANIZED HEALTH CARE EDUCATION/TRAINING PROGRAM

## 2021-08-13 PROCEDURE — 85379 FIBRIN DEGRADATION QUANT: CPT

## 2021-08-13 PROCEDURE — 84439 ASSAY OF FREE THYROXINE: CPT

## 2021-08-13 PROCEDURE — 80048 BASIC METABOLIC PNL TOTAL CA: CPT

## 2021-08-13 PROCEDURE — 84443 ASSAY THYROID STIM HORMONE: CPT

## 2021-08-13 RX ORDER — 0.9 % SODIUM CHLORIDE 0.9 %
500 INTRAVENOUS SOLUTION INTRAVENOUS ONCE
Status: COMPLETED | OUTPATIENT
Start: 2021-08-13 | End: 2021-08-13

## 2021-08-13 RX ADMIN — POTASSIUM BICARBONATE 40 MEQ: 782 TABLET, EFFERVESCENT ORAL at 13:23

## 2021-08-13 RX ADMIN — SODIUM CHLORIDE 500 ML: 0.9 INJECTION, SOLUTION INTRAVENOUS at 13:00

## 2021-08-13 ASSESSMENT — ENCOUNTER SYMPTOMS
STRIDOR: 0
EYES NEGATIVE: 1
COUGH: 0
WHEEZING: 0
SHORTNESS OF BREATH: 1
CHEST TIGHTNESS: 0
APNEA: 0
GASTROINTESTINAL NEGATIVE: 1
CHOKING: 0

## 2021-08-13 NOTE — ED PROVIDER NOTES
[facility]     Emergency Department     Faculty Attestation    I performed a history and physical examination of the patient and discussed management with the resident. I reviewed the residents note and agree with the documented findings including all diagnostic interpretations and plan of care. Any areas of disagreement are noted on the chart. I was personally present for the key portions of any procedures. I have documented in the chart those procedures where I was not present during the key portions. I have reviewed the emergency nurses triage note. I agree with the chief complaint, past medical history, past surgical history, allergies, medications, social and family history as documented unless otherwise noted below. Documentation of the HPI, Physical Exam and Medical Decision Making performed by scribning is based on my personal performance of the HPI, PE and MDM. For Physician Assistant/ Nurse Practitioner cases/documentation I have personally evaluated this patient and have completed at least one if not all key elements of the E/M (history, physical exam, and MDM). Additional findings are as noted. This patient was evaluated in the Emergency Department for symptoms described in the history of present illness. He/she was evaluated in the context of the global COVID-19 pandemic, which necessitated consideration that the patient might be at risk for infection with the SARS-CoV-2 virus that causes COVID-19. Institutional protocols and algorithms that pertain to the evaluation of patients at risk for COVID-19 are in a state of rapid change based on information released by regulatory bodies including the CDC and federal and state organizations. These policies and algorithms were followed during the patient's care in the ED. Primary Care Physician: Tonio Molina MD    History:  This is a 62 y.o. female who presents to the Emergency Department with complaint of palpitations and fatigue. Occurs with ambulation. Denies any chest pain. History of PE currently on Eliquis which she has been compliant with. No fever. Also has history of gastric surgery less than 1 year ago due to carcinoid tumor    Physical:     weight is 160 lb (72.6 kg). Her temperature is 98.1 °F (36.7 °C). Her blood pressure is 121/93 (abnormal) and her pulse is 91. Her respiration is 14 and oxygen saturation is 100%. 62 y.o. female no acute distress, appears tired, cardiac exam regular rate and rhythm no murmurs rubs gallops and pulmonary clear bilaterally abdomen is soft nontender nondistended.     Impression: Fatigue, palpitations    Plan: Cardiac work-up, D-dimer, symptomatic treatment, reassess    EKG Interpretation    Interpreted by me    Rhythm: normal sinus   Rate: normal  Axis: normal  Ectopy: none  Conduction: normal  ST Segments: no acute change  T Waves: no acute change  Q Waves: none    Clinical Impression: no acute changes and abnormal EKG    Yudith Gay MD, Anna Akers  Attending Emergency Physician         Hoda Lee MD  08/13/21 9668

## 2021-08-13 NOTE — ED NOTES
The following labs were labeled with patient stickers & tubed to lab;    [x]Lavender   []On Ice  [x]Blue  [x]Green/ Yellow  [x]Green/ Black []On Ice  []Pink  []Red  []Yellow    []COVID-19 Swab []Rapid    []Urine Sample  []Pelvic Cultures    []Blood Cultures       Norah Oliveira RN  08/13/21 4081

## 2021-08-13 NOTE — ED NOTES
Patient brought to ER by sister for palpitations and SOB. States SOB and palpitations started after her surgery in November but has recently gotten worse causing SOB. Also states she has had no appetite x 2 weeks.      Za Carroll RN  08/13/21 4551 [facility] Ana Rangel RN  08/13/21 9014 [Stable] : are stable [None] : ~He/She~ has no significant interval events [Difficulty Breathing During Exertion] : stable dyspnea on exertion [Feelings Of Weakness On Exertion] : stable exercise intolerance [Cough] : denies coughing [Wheezing] : denies wheezing [Regional Soft Tissue Swelling Both Lower Extremities] : denies lower extremity edema [Chest Pain Or Discomfort] : denies chest pain [Fever] : denies fever [Date: ___] : was performed [unfilled] [Wt Gain ___ Lbs] : no recent weight gain [Wt Loss ___ Lbs] : no recent weight loss [Oxygen] : the patient uses no supplemental oxygen [de-identified] : emphysema with mucous plugging

## 2021-08-13 NOTE — TELEPHONE ENCOUNTER
Reason for Disposition   Dizziness, lightheadedness, or weakness    Answer Assessment - Initial Assessment Questions  1. DESCRIPTION: \"Please describe your heart rate or heart beat that you are having\" (e.g., fast/slow, regular/irregular, skipped or extra beats, \"palpitations\")      Pt reports that even on minor exertion, she can feel her heart palpitations and becomes lightheaded and dizzy and feel like she is going to pass out    2. ONSET: \"When did it start? \" (Minutes, hours or days)       Since the surgery to remove her stomach, 11/30/2020 but has been getting a lot worse in the last week or so    3. DURATION: \"How long does it last\" (e.g., seconds, minutes, hours)      When pt sits to rest, the symptoms of dizzy, lightheaded, and heart palpitations will ease off    4. PATTERN \"Does it come and go, or has it been constant since it started? \"  \"Does it get worse with exertion? \"   \"Are you feeling it now? \"      Happens whenever she starts to move around. Pt reports that it is even hard to take a good shower because she will become so tired. 5. TAP: \"Using your hand, can you tap out what you are feeling on a chair or table in front of you, so that I can hear? \" (Note: not all patients can do this)          6. HEART RATE: \"Can you tell me your heart rate? \" \"How many beats in 15 seconds? \"  (Note: not all patients can do this)        Pt reports that her heart feels like it is racing in her chest when the palpitations start    7. RECURRENT SYMPTOM: \"Have you ever had this before? \" If so, ask: \"When was the last time? \" and \"What happened that time? \"       Not happened like this before    8. CAUSE: \"What do you think is causing the palpitations? \"      Unsure    9. CARDIAC HISTORY: \"Do you have any history of heart disease? \" (e.g., heart attack, angina, bypass surgery, angioplasty, arrhythmia)       None    10. OTHER SYMPTOMS: \"Do you have any other symptoms? \" (e.g., dizziness, chest pain, sweating, difficulty breathing)     difficulty breathing     11. PREGNANCY: \"Is there any chance you are pregnant? \" \"When was your last menstrual period? \"        n/a    Protocols used: HEART RATE AND HEARTBEAT QUESTIONS-ADULT-[place]    Received call from [facility] with [employer] Complaint. Brief description of triage: pt reports feeling heart palpitations on minor exertion that include dizziness and lightheadedness. Pt had major surgery on 11/30/20 that included gastric area and pt reports that it is difficult to eat and that she is losing weight. Triage indicates for patient to go to ED now. Care advice provided, patient verbalizes understanding; denies any other questions or concerns; instructed to call back for any new or worsening symptoms. Attention Provider: Thank you for allowing me to participate in the care of your patient. The patient was connected to triage in response to information provided to the ECC. Please do not respond through this encounter as the response is not directed to a shared pool.

## 2021-08-13 NOTE — ED PROVIDER NOTES
[facility] ED  Emergency Department Encounter  EmergencyMedicine Resident     Pt Bhupinder Renteria  MRN: [id number]  Jakubgfkavin 1960  Date of evaluation: 8/13/21  PCP:  Parul Wang MD    This patient was evaluated in the Emergency Department for symptoms described in the history of present illness. The patient was evaluated in the context of the global COVID-19 pandemic, which necessitated consideration that the patient might be at risk for infection with the SARS-CoV-2 virus that causes COVID-19. Institutional protocols and algorithms that pertain to the evaluation of patients at risk for COVID-19 are in a state of rapid change based on information released by regulatory bodies including the CDC and federal and state organizations. These policies and algorithms were followed during the patient's care in the ED. CHIEF COMPLAINT       Chief Complaint   Patient presents with    Palpitations       HISTORY OF PRESENT ILLNESS  (Location/Symptom, Timing/Onset, Context/Setting, Quality, Duration, Modifying Factors, Severity.)      Alexis Dykes is a 62 y.o. female who presents with exertional dyspnea and palpitations. Patient states that this has been going on since her gastric bypass last November. However this is gotten gradually worse. Patient does state that she has had one episode of exertional dyspnea leading to syncope. That was a few months ago. Today patient is endorsing severe fatigue, inability to eat much food, and shortness of breath. Patient does have history of PE. Denies fever chills, cough, chest pain, nausea vomiting, abdominal pain, vaginal discharge or bleeding, change in bowel or bladder function.     PAST MEDICAL / SURGICAL / SOCIAL / FAMILY HISTORY      has a past medical history of Anxiety, Arthritis, Asthma, Colon polyp, Colon polyps, Cyst of kidney, acquired, Fatigue, Hypertension, Hypothyroidism, Neuroendocrine tumor, Obesity, Pharyngoesophageal dysphagia, Vocal cord polyps, and Wears glasses. has a past surgical history that includes cystourethroscopy/stent removal (5/3/11); Colonoscopy (2019); ERCP; Cholecystectomy (10/09/2014); hip surgery (Left); Throat surgery; Colonoscopy; Upper gastrointestinal endoscopy (2019); Upper gastrointestinal endoscopy (2019); Upper gastrointestinal endoscopy (N/A, 2019); Upper gastrointestinal endoscopy (N/A, 10/23/2019); Upper gastrointestinal endoscopy (N/A, 10/29/2019); Endoscopic ultrasonography, GI (N/A, 2020); Gastric bypass surgery (2020); and Upper gastrointestinal endoscopy (N/A, 2021). Social History     Socioeconomic History    Marital status: Single     Spouse name: Not on file    Number of children: Not on file    Years of education: Not on file    Highest education level: Not on file   Occupational History    Not on file   Tobacco Use    Smoking status: Former Smoker     Packs/day: 1.00     Years: 25.00     Pack years: 25.00     Quit date: 2012     Years since quittin.2    Smokeless tobacco: Never Used    Tobacco comment: quit 2 years   Vaping Use    Vaping Use: Never used   Substance and Sexual Activity    Alcohol use: Not Currently     Comment: 3 times a month    Drug use: No    Sexual activity: Not on file   Other Topics Concern    Not on file   Social History Narrative    Not on file     Social Determinants of Health     Financial Resource Strain: Low Risk     Difficulty of Paying Living Expenses: Not hard at all   Food Insecurity: No Food Insecurity    Worried About [address] in the Last Year: Never true    [address] [facility] in the Last Year: Never true   Transportation Needs:     Lack of Transportation (Medical):      Lack of Transportation (Non-Medical):    Physical Activity:     Days of Exercise per Week:     Minutes of Exercise per Session:    Stress:     Feeling of Stress :    Social Connections:     Frequency of Communication with Friends and Family:     Frequency of Social Gatherings with Friends and Family:     Attends Gnosticist Services:     Active Member of Clubs or Organizations:     Attends Club or Organization Meetings:     Marital Status:    Intimate Partner Violence:     Fear of Current or Ex-Partner:     Emotionally Abused:     Physically Abused:     Sexually Abused:        Family History   Problem Relation Age of Onset    High Blood Pressure Mother     High Blood Pressure Sister     Stomach Cancer Sister     Other Sister         epilepsy    No Known Problems Father        Allergies:  Erythromycin    Home Medications:  Prior to Admission medications    Medication Sig Start Date End Date Taking?  Authorizing Provider   apixaban (ELIQUIS) 5 MG TABS tablet Take 1 tablet by mouth daily for 28 days 7/23/21 8/20/21  Josh Tierney MD   metoclopramide (REGLAN) 10 MG tablet TAKE 1 TABLET BY MOUTH 3 TIMES DAILY (BEFORE MEALS)  Patient taking differently: Take 10 mg by mouth 3 times daily (before meals) Taking BID 6/16/21   Elise Kamara MD   levothyroxine (SYNTHROID) 88 MCG tablet Take 1 tablet by mouth daily 5/26/21   Josh Tierney MD   Magnesium 400 MG TABS Take 1 tablet by mouth daily  Patient taking differently: Take 1 tablet by mouth 2 times daily  5/26/21   Josh Tierney MD   pantoprazole (PROTONIX) 40 MG tablet Take 1 tablet by mouth 2 times daily 4/29/21   Jorge Bernal MD   ondansetron (ZOFRAN) 4 MG tablet Take 1 tablet by mouth every 8 hours as needed for Nausea or Vomiting 4/29/21   Jorge Bernal MD   sucralfate (CARAFATE) 1 GM tablet Take 1 tablet by mouth 4 times daily 4/29/21   Jorge Bernal MD   potassium chloride (KLOR-CON) 20 MEQ packet Take 20 mEq by mouth daily  Patient taking differently: Take 20 mEq by mouth 2 times daily  4/20/21   Josh Tierney MD   vitamin D 25 MCG (1000 UT) CAPS Take 1 capsule by mouth daily    Historical Provider, MD   Folic Acid-Vit B6-Vit B12 2.2-25-0.5 MG TABS Take 1 tablet by mouth daily 3/25/21   Jorge Workman MD   hydroCHLOROthiazide (HYDRODIURIL) 25 MG tablet TAKE 1 TABLET BY MOUTH EVERY DAY  Patient taking differently: 12.5 mg TAKE 1/2 TABLET BY MOUTH EVERY DAY 3/10/21   Jorge Workman MD   desonide (DESOWEN) 0.05 % cream Apply topically 2 times daily. 8/21/20   Jorge Workman MD   Multiple Vitamin (MULTI-DAY VITAMINS PO) Take by mouth    Historical Provider, MD   vitamin B-12 (CYANOCOBALAMIN) 500 MCG tablet Take 500 mcg by mouth daily    Historical Provider, MD       REVIEW OF SYSTEMS    (2-9 systems for level 4, 10 or more for level 5)      Review of Systems   Constitutional: Positive for appetite change and fatigue. Negative for activity change, chills, diaphoresis, fever and unexpected weight change. HENT: Negative. Eyes: Negative. Respiratory: Positive for shortness of breath. Negative for apnea, cough, choking, chest tightness, wheezing and stridor. Cardiovascular: Negative. Gastrointestinal: Negative. Genitourinary: Negative. Musculoskeletal: Negative. Skin: Negative. Neurological: Negative. Psychiatric/Behavioral: Negative. PHYSICAL EXAM   (up to 7 for level 4, 8 or more for level 5)      INITIAL VITALS:   BP (!) 121/93   Pulse 91   Temp 98.1 °F (36.7 °C)   Resp 14   Wt 160 lb (72.6 kg)   LMP 01/01/1994   SpO2 100%   BMI 26.63 kg/m²   I have reviewed the triage vital signs. Const: Well nourished, well developed, appears stated age  Eyes: PERRL, no conjunctival injection  HENT: NCAT, Neck supple without meningismus   CV: RRR, Warm, well-perfused extremities  RESP: CTAB, Unlabored respiratory effort  GI: soft, non-tender, non-distended, no masses  MSK: No gross deformities appreciated  Skin: Warm, dry. No rashes  Neuro: Alert, CNs II-XII grossly intact. Sensation and motor function of extremities grossly intact. Psych: Appropriate mood and affect.       DIFFERENTIAL DIAGNOSIS     PLAN (LABS / IMAGING / EKG):  Orders Placed This Encounter   Procedures    XR CHEST PORTABLE    Basic Metabolic Panel    Brain Natriuretic Peptide    D-Dimer, Quantitative    Troponin    TSH without Reflex    CBC WITH AUTO DIFFERENTIAL    Troponin    T3, Free    T4, Free    Ambulate patient    EKG 12 Lead    Insert peripheral IV       MEDICATIONS ORDERED:  Orders Placed This Encounter   Medications    0.9 % sodium chloride bolus    potassium bicarb-citric acid (EFFER-K) effervescent tablet 40 mEq       DDX:   COPD exacerbation, asthma, pneumothorax, anaphylaxis, anxiety, PE , pericardial effusion, CHF, ACS/MI, atelectasis, lower airway obstruction, aspiration    DIAGNOSTIC RESULTS / EMERGENCY DEPARTMENT COURSE / MDM   LAB RESULTS:  Results for orders placed or performed during the hospital encounter of 89/66/16   Basic Metabolic Panel   Result Value Ref Range    Glucose 88 70 - 99 mg/dL    BUN 6 6 - 20 mg/dL    CREATININE 0.54 0.50 - 0.90 mg/dL    Bun/Cre Ratio NOT REPORTED 9 - 20    Calcium 8.6 8.6 - 10.4 mg/dL    Sodium 138 135 - 144 mmol/L    Potassium 3.4 (L) 3.7 - 5.3 mmol/L    Chloride 99 98 - 107 mmol/L    CO2 23 20 - 31 mmol/L    Anion Gap 16 9 - 17 mmol/L    GFR Non-African American >60 >60 mL/min    GFR African American >60 >60 mL/min    GFR Comment          GFR Staging NOT REPORTED    Brain Natriuretic Peptide   Result Value Ref Range    Pro- <300 pg/mL    BNP Interpretation Pro-BNP Reference Range:    D-Dimer, Quantitative   Result Value Ref Range    D-Dimer, Quant <0.17 mg/L FEU   Troponin   Result Value Ref Range    Troponin, High Sensitivity 8 0 - 14 ng/L    Troponin T NOT REPORTED <0.03 ng/mL    Troponin Interp NOT REPORTED    TSH without Reflex   Result Value Ref Range    TSH 0.02 (L) 0.30 - 5.00 mIU/L   CBC WITH AUTO DIFFERENTIAL   Result Value Ref Range    WBC 5.3 3.5 - 11.3 k/uL    RBC 4.22 3.95 - 5.11 m/uL    Hemoglobin 12.7 11.9 - 15.1 g/dL    Hematocrit 38.8 36.3 - 47.1 %    MCV 91.9 82.6 - 102.9 fL    MCH 30.1 25.2 - 33.5 pg    MCHC 32.7 28.4 - 34.8 g/dL    RDW 13.7 11.8 - 14.4 %    Platelets 610 649 - 618 k/uL    MPV 9.3 8.1 - 13.5 fL    NRBC Automated 0.0 0.0 per 100 WBC    Differential Type NOT REPORTED     Seg Neutrophils 62 36 - 65 %    Lymphocytes 27 24 - 43 %    Monocytes 10 3 - 12 %    Eosinophils % 0 (L) 1 - 4 %    Basophils 1 0 - 2 %    Immature Granulocytes 0 0 %    Segs Absolute 3.33 1.50 - 8.10 k/uL    Absolute Lymph # 1.43 1.10 - 3.70 k/uL    Absolute Mono # 0.51 0.10 - 1.20 k/uL    Absolute Eos # <0.03 0.00 - 0.44 k/uL    Basophils Absolute 0.03 0.00 - 0.20 k/uL    Absolute Immature Granulocyte <0.03 0.00 - 0.30 k/uL    WBC Morphology NOT REPORTED     RBC Morphology NOT REPORTED     Platelet Estimate NOT REPORTED    Troponin   Result Value Ref Range    Troponin, High Sensitivity 7 0 - 14 ng/L    Troponin T NOT REPORTED <0.03 ng/mL    Troponin Interp NOT REPORTED    T3, Free   Result Value Ref Range    T3, Free 2.11 2.02 - 4.43 pg/mL   T4, Free   Result Value Ref Range    Thyroxine, Free 1.50 0.93 - 1.70 ng/dL     Negative D-dimer leaving low suspicion for pulmonary embolism. Low TSH expected in a setting of hypothyroidism requiring levothyroxine. Rest of labs are unremarkable.       RADIOLOGY:  XR CHEST PORTABLE    Result Date: 8/13/2021  EXAMINATION: ONE XRAY VIEW OF THE CHEST 8/13/2021 12:51 pm COMPARISON: April 26, 2021 chest examination HISTORY: ORDERING SYSTEM PROVIDED HISTORY: SOB TECHNOLOGIST PROVIDED HISTORY: SOB FINDINGS: Stable normal cardiopericardial silhouette/mild tortuosity of the thoracic aorta There are no significant pleural, parenchymal or mediastinal findings     No acute cardiopulmonary findings       EKG  Rhythm: normal sinus   Rate: normal  Axis: left  Ectopy: none  Conduction: normal  ST Segments: no acute change  T Waves: inversion in  v1, v2, v3, v4 and v5  Q Waves: none    EKG  Impression: no acute changes and non-specific EKG All EKG's are interpreted by the Emergency Department Physician who either signs or Co-signs this chart in the absence of a cardiologist.        IMPRESSION: Malnutrition secondary to gastric bypass. Patient presents to the ED with progressive weakness and exertional dyspnea. Patient has recent history of gastric bypass. Patient's dietary intake is minimal.  She states that she is not able to eat much. Cardiac work-up unremarkable. D-dimer negative leaving little concern for PE. EMERGENCY DEPARTMENT COURSE:  ED Course as of Aug 13 1654   Fri Aug 13, 2021   1408 Discussed unremarkable lab results, EKG, chest x-ray with patient. Gave strict return precautions, discussed follow-up with bariatric surgery and PCP. Will ambulate patient to watch for oxygen desaturation and pulse. Patient understands and agrees with the plan.    [AR]      ED Course User Index  [AR] Dany Giraldo DO     Patient did not desat or become tachycardic on ambulation. Patient will be discharged home. Patient understands and agrees with the plan. PROCEDURES:  None    CONSULTS:  None    CRITICAL CARE:  Please see Attending Note    FINAL IMPRESSION      1. Palpitations    2. Lightheadedness    3.  Shortness of breath          DISPOSITION / PLAN     DISPOSITION Decision To Discharge 08/13/2021 03:09:44 PM    PATIENT REFERRED TO:  Alicia Magdaleno MD  [facility] [address]  [address] [facility] [id number]    Schedule an appointment as soon as possible for a visit in 3 days      [facility] ED  [address] [facility] [id number] [phone]  Go to   If symptoms worsen    Darlin Brown MD  [address] [place] [address]  [phone]    Schedule an appointment as soon as possible for a visit in 3 days        DISCHARGE MEDICATIONS:  Discharge Medication List as of 8/13/2021  3:12 PM          Dany Giraldo DO  Emergency Medicine Resident    (Please note that portions of thisnote were completed with a voice recognition program.  Efforts were made to edit the dictations but occasionally words are mis-transcribed.)       Markie Bowman DO  Resident  08/14/21 [id number]

## 2021-08-13 NOTE — ED NOTES
Patient ambulated in hallway with nurse assistance wearing SpO2 monitor. O2 level 97% at start, 99% after ambulating down wang and back to bed. Patient reports she felt heart racing while ambulating, but states no palpitations.      Gus Edwards RN  08/13/21 [id number]

## 2021-08-13 NOTE — ED NOTES
Patient ambulated up and down hallway without a drop in oxygen saturation.      Jaxson Dinero RN  08/13/21 [id number]

## 2021-08-14 LAB
EKG ATRIAL RATE: 82 BPM
EKG P AXIS: 52 DEGREES
EKG P-R INTERVAL: 126 MS
EKG Q-T INTERVAL: 388 MS
EKG QRS DURATION: 72 MS
EKG QTC CALCULATION (BAZETT): 453 MS
EKG R AXIS: 1 DEGREES
EKG T AXIS: -43 DEGREES
EKG VENTRICULAR RATE: 82 BPM

## 2021-08-18 ENCOUNTER — TELEPHONE (OUTPATIENT)
Dept: INTERNAL MEDICINE CLINIC | Age: 61
End: 2021-08-18

## 2021-08-20 ENCOUNTER — OFFICE VISIT (OUTPATIENT)
Dept: INTERNAL MEDICINE CLINIC | Age: 61
End: 2021-08-20

## 2021-08-20 VITALS
WEIGHT: 146 LBS | SYSTOLIC BLOOD PRESSURE: 110 MMHG | RESPIRATION RATE: 25 BRPM | BODY MASS INDEX: 24.32 KG/M2 | OXYGEN SATURATION: 98 % | DIASTOLIC BLOOD PRESSURE: 80 MMHG | TEMPERATURE: 98.1 F | HEART RATE: 89 BPM | HEIGHT: 65 IN

## 2021-08-20 DIAGNOSIS — I10 ESSENTIAL HYPERTENSION: ICD-10-CM

## 2021-08-20 DIAGNOSIS — R63.4 WEIGHT LOSS: Primary | ICD-10-CM

## 2021-08-20 DIAGNOSIS — R00.2 PALPITATIONS: ICD-10-CM

## 2021-08-20 DIAGNOSIS — R68.89 SENSATION OF FEELING COLD: ICD-10-CM

## 2021-08-20 PROCEDURE — 1111F DSCHRG MED/CURRENT MED MERGE: CPT | Performed by: INTERNAL MEDICINE

## 2021-08-20 PROCEDURE — 99212 OFFICE O/P EST SF 10 MIN: CPT | Performed by: INTERNAL MEDICINE

## 2021-08-20 RX ORDER — MEGESTROL ACETATE 40 MG/ML
400 SUSPENSION ORAL DAILY
Qty: 300 ML | Refills: 0 | Status: SHIPPED | OUTPATIENT
Start: 2021-08-20 | End: 2021-09-21

## 2021-08-20 ASSESSMENT — PATIENT HEALTH QUESTIONNAIRE - PHQ9
SUM OF ALL RESPONSES TO PHQ QUESTIONS 1-9: 0
SUM OF ALL RESPONSES TO PHQ QUESTIONS 1-9: 0
2. FEELING DOWN, DEPRESSED OR HOPELESS: 0
SUM OF ALL RESPONSES TO PHQ9 QUESTIONS 1 & 2: 0
SUM OF ALL RESPONSES TO PHQ QUESTIONS 1-9: 0
1. LITTLE INTEREST OR PLEASURE IN DOING THINGS: 0

## 2021-08-20 NOTE — PROGRESS NOTES
Nathaniel Sheikh is a 62 y.o. female who presents for   Chief Complaint   Patient presents with    Follow-Up from Hospital     still having some heart palpitations; easily fatigue; not eating, losing weight; was seen at [address] [place] [address] Maintenance     hep c, pneumo, hiv, tdap, shingles, cervical, breast    and follow up of chronic medical problems.   Patient Active Problem List   Diagnosis    Asthma    Anxiety    Obesity    Hyperlipidemia    Hypothyroidism    Colon polyps    Vertigo    Mass of left hip region    Fatigue    Pharyngoesophageal dysphagia    Colon polyp    Neuroendocrine tumor    Chest pain    Shortness of breath    Anemia    GI bleed    Essential hypertension    Neuroendocrine neoplasm of stomach    Polyp, stomach    Melena    Gastric ulcer with hemorrhage    Constipation    Hypokalemia    Hypomagnesemia    Bilateral pulmonary embolism (HCC)    Abdominal pain, epigastric    Elevated d-dimer    Malignant carcinoid tumor of stomach (HCC)    Intractable vomiting    Nausea and vomiting    S/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    Food intolerance    Severe malnutrition (HCC)     HPI  Here for follow-up from the hospital visit for palpitations and patient is currently stable on palpitations but weight loss is bothering her and abnormal appetite and feeling cold all the time    Current Outpatient Medications   Medication Sig Dispense Refill    apixaban (ELIQUIS) 5 MG TABS tablet Take 1 tablet by mouth daily for 28 days 28 tablet 0    metoclopramide (REGLAN) 10 MG tablet TAKE 1 TABLET BY MOUTH 3 TIMES DAILY (BEFORE MEALS) (Patient taking differently: Take 10 mg by mouth 3 times daily (before meals) Taking BID) 90 tablet 5    levothyroxine (SYNTHROID) 88 MCG tablet Take 1 tablet by mouth daily 90 tablet 0    Magnesium 400 MG TABS Take 1 tablet by mouth daily (Patient taking differently: Take 1 tablet by mouth 2 times daily ) 30 tablet 3    pantoprazole (PROTONIX) 40 MG tablet Take 1 tablet by mouth 2 times daily 90 tablet 0    ondansetron (ZOFRAN) 4 MG tablet Take 1 tablet by mouth every 8 hours as needed for Nausea or Vomiting 20 tablet 1    sucralfate (CARAFATE) 1 GM tablet Take 1 tablet by mouth 4 times daily (Patient taking differently: Take 1 g by mouth 2 times daily ) 120 tablet 3    potassium chloride (KLOR-CON) 20 MEQ packet Take 20 mEq by mouth daily (Patient taking differently: Take 20 mEq by mouth 2 times daily ) 90 each 1    Folic Acid-Vit W6-QTT H55 2.2-25-0.5 MG TABS Take 1 tablet by mouth daily 90 tablet 1    hydroCHLOROthiazide (HYDRODIURIL) 25 MG tablet TAKE 1 TABLET BY MOUTH EVERY DAY (Patient taking differently: 12.5 mg TAKE 1/2 TABLET BY MOUTH EVERY DAY) 90 tablet 0    desonide (DESOWEN) 0.05 % cream Apply topically 2 times daily. 90 g 0    Multiple Vitamin (MULTI-DAY VITAMINS PO) Take by mouth      vitamin B-12 (CYANOCOBALAMIN) 500 MCG tablet Take 500 mcg by mouth daily      vitamin D 25 MCG (1000 UT) CAPS Take 1 capsule by mouth daily (Patient not taking: Reported on 8/20/2021)       No current facility-administered medications for this visit. Allergies   Allergen Reactions    Erythromycin Diarrhea       Past Medical History:   Diagnosis Date    Anxiety     Arthritis     knee    Asthma     Colon polyp 02/21/2019    tubular adenoma; hyperplastic polyp    Colon polyps     Cyst of kidney, acquired     bilat.     Fatigue     Hypertension     Hypothyroidism     Neuroendocrine tumor 02/21/2019    of stomach    Obesity     Pharyngoesophageal dysphagia     Vocal cord polyps     Wears glasses        Past Surgical History:   Procedure Laterality Date    CHOLECYSTECTOMY  10/09/2014    COLONOSCOPY  02/21/2019    tubular adenoma; hyperplastic polyp    COLONOSCOPY      over 5 yrs ago per pt with polyps (2-)    CYSTOURETHROSCOPY/STENT REMOVAL  5/3/11    ENDOSCOPIC ULTRASOUND (LOWER) N/A 1/22/2020    ENDOSCOPIC ULTRASOUND WITH POSSIBLE EMR performed by Gregorio North MD at [facility]    ERCP      GASTRIC BYPASS SURGERY  11/30/2020    HIP SURGERY Left     soft tissue tumor removal    THROAT SURGERY      vocal cord polyps removed    UPPER GASTROINTESTINAL ENDOSCOPY  02/21/2019    Neuroendocrine tumor    UPPER GASTROINTESTINAL ENDOSCOPY  09/23/2019    UPPER GASTROINTESTINAL ENDOSCOPY N/A 9/23/2019    EGD BIOPSY performed by Amanda Hernandez MD at [facility] N/A 10/23/2019    EGD W/ EMR performed by Gregorio North MD at [facility] N/A 10/29/2019    EGD CONTROL HEMORRHAGE performed by James Cheung MD at [facility] N/A 4/26/2021    EGD BIOPSY performed by Ajit Parekh MD at [facility]       Family History   Problem Relation Age of Onset    High Blood Pressure Mother     High Blood Pressure Sister     Stomach Cancer Sister     Other Sister         epilepsy    No Known Problems Father      ROS   Constitutional: Positive for fatigue, loss of appetite and unexpected weight change   HEENT            : Negative for neck stiffness and pain, no congestion or sinus pressure   Eyes                : No visual disturbance or pain   Cardiovascular: No chest pain or palpitations or leg swelling   Respiratory      : Negative for cough, shortness of breath or wheezing   Gastrointestinal: Negative for abdominal pain, constipation or diarrhea and bloating No nausea or vomiting   Genitourinary:     No urgency or frequency, no burning or hematuria   Musculoskeletal: No arthralgias, back pain or myalgias   Skin                  : Negative for rash or erythema   Neurological    : Negative for dizziness, weakness, tremors ,light headedness or syncope   Psychiatric       : Negative for dysphoric mood, sleep disturbances, nervous or anxious, or decreased concentration   All other review of systems was negative    Objective  Physical Examination:    Nursing note reviewed    /80 (Site: Right Upper Arm, Position: Sitting, Cuff Size: Medium Adult)   Pulse 89   Temp 98.1 °F (36.7 °C) (Temporal)   Resp 25   Ht 5' 5\" (1.651 m)   Wt 146 lb (66.2 kg)   LMP 01/01/1994   SpO2 98%   Breastfeeding No   BMI 24.30 kg/m²   BP Readings from Last 3 Encounters:   08/20/21 110/80   08/13/21 (!) 121/93   06/24/21 113/86         Constitutional:  Juliano Chacon is oriented to place, person and time ,appears well-developed and well-nourished  HEENT:  Atraumatic and normocephalic, external ears normal bilaterally, nose normal no oropharyngeal exudate and is clear and moist  Eyes:  EOCM normal; conjunctivae normal; PERRLA bilaterally  Neck:  Normal range of motion, neck supple, no JVD and no thyromegaly  Cardiovascular:  RRR, normal heart sounds and intact distal pulses  Pulmonary:  effort normal and breath sounds normal bilaterally,no wheezes or rales, no respiratory distress  Abdominal:  Soft, non-tender; normal bowel sounds, no masses  Musculoskeletal:  Normal range of motion and no edema or tenderness bilaterally  No lymphadenopathy  Neurological:  alert, oriented, and normal reflexes bilaterally  Skin: warm and dry  Psychiatric:  normal mood and effect; behavior normal.    Labs:   No results found for: LABA1C  Lab Results   Component Value Date    CHOL 182 08/27/2020     Lab Results   Component Value Date    HDL 45 08/27/2020     No results found for: [address]  Lab Results   Component Value Date    TRIG 104 08/27/2020     No components found for: [place], [place]  Lab Results   Component Value Date    WBC 5.3 08/13/2021    HGB 12.7 08/13/2021    HCT 38.8 08/13/2021    MCV 91.9 08/13/2021     08/13/2021     Lab Results   Component Value Date    INR 1.5 04/07/2021    PROTIME 17.6 (H) 04/07/2021     Lab Results   Component Value Date    GLUCOSE 88 08/13/2021    CREATININE 0.54 08/13/2021    BUN 6 08/13/2021     08/13/2021 K 3.4 (L) 08/13/2021    CL 99 08/13/2021    CO2 23 08/13/2021     Lab Results   Component Value Date    ALT 18 06/24/2021    AST 41 (H) 06/24/2021    ALKPHOS 100 06/24/2021    BILITOT 0.54 06/24/2021     Lab Results   Component Value Date    LABALBU 2.8 (L) 06/24/2021     Lab Results   Component Value Date    TSH 0.02 (L) 08/13/2021     Assessment:  1. Weight loss    2. Palpitations    3. Sensation of feeling cold    4. Essential hypertension        Plan:  Patient that it was from the hospital including EKG chest x-ray reviewed and lab work within reasonable limits and patient EKG shows some nonspecific ST depression and would refer to cardiology and patient has no insurance at this time and patient told to call me back if she has any acute symptoms or go to the emergency room otherwise will refer to cardiology after her insurance starts and patient verbalized understanding  Patient is not having any appetite and will try Megace her appetite is stimulation  Patient's TSH was low and patient's thyroid was adjusted and will repeat lab work in 2 months  Patient lost about 20 pounds in 3 months  Blood pressure is stable  Review in 6 weeks           1. Mia Salinas received counseling on the following healthy behaviors: nutrition and exercise    2. Prior labs and health maintenance reviewed. 3.  Discussed use, benefit, and side effects of prescribed medications. Barriers to medication compliance addressed. All her questions were answered. Pt voiced understanding. Mia Salinas will continue current medications, diet and exercise. No orders of the defined types were placed in this encounter. Completed Refills               Requested Prescriptions      No prescriptions requested or ordered in this encounter     4. Patient given educational materials - see patient instructions    5. Was a self-tracking handout given in paper form or via [employer]?   NO    No orders of the defined types were placed in this encounter. No follow-ups on file. Patient voiced understanding and agreed to treatment plan. Electronically signed by Paulett Siemens, MD on 8/20/2021 at 11:31 AM    This note is created with a voice recognition program and while intend to generate a document that accurately reflects the content of the visit, no guarantee can be provided that every mistake has been identified and corrected by editing.

## 2021-08-24 ENCOUNTER — TELEPHONE (OUTPATIENT)
Dept: GASTROENTEROLOGY | Age: 61
End: 2021-08-24

## 2021-08-25 ENCOUNTER — TELEPHONE (OUTPATIENT)
Dept: INTERNAL MEDICINE CLINIC | Age: 61
End: 2021-08-25

## 2021-08-25 NOTE — TELEPHONE ENCOUNTER
Received PA on Megestrol  Spoke to pt, she currently has no Insurance until 9/1/21- she will submit her new insurance to Pharmacy when she receives

## 2021-08-27 ENCOUNTER — APPOINTMENT (OUTPATIENT)
Dept: CT IMAGING | Age: 61
DRG: 640 | End: 2021-08-27
Payer: COMMERCIAL

## 2021-08-27 ENCOUNTER — HOSPITAL ENCOUNTER (INPATIENT)
Age: 61
LOS: 7 days | Discharge: HOME HEALTH | DRG: 640 | End: 2021-09-03
Attending: STUDENT IN AN ORGANIZED HEALTH CARE EDUCATION/TRAINING PROGRAM | Admitting: FAMILY MEDICINE
Payer: COMMERCIAL

## 2021-08-27 ENCOUNTER — APPOINTMENT (OUTPATIENT)
Dept: GENERAL RADIOLOGY | Age: 61
DRG: 640 | End: 2021-08-27
Payer: COMMERCIAL

## 2021-08-27 ENCOUNTER — OFFICE VISIT (OUTPATIENT)
Dept: GASTROENTEROLOGY | Age: 61
End: 2021-08-27
Payer: COMMERCIAL

## 2021-08-27 VITALS
HEART RATE: 114 BPM | BODY MASS INDEX: 23.8 KG/M2 | SYSTOLIC BLOOD PRESSURE: 129 MMHG | DIASTOLIC BLOOD PRESSURE: 95 MMHG | WEIGHT: 143 LBS | OXYGEN SATURATION: 98 %

## 2021-08-27 DIAGNOSIS — E87.6 HYPOKALEMIA: ICD-10-CM

## 2021-08-27 DIAGNOSIS — R63.4 WEIGHT LOSS: Primary | ICD-10-CM

## 2021-08-27 DIAGNOSIS — R53.1 GENERAL WEAKNESS: Primary | ICD-10-CM

## 2021-08-27 DIAGNOSIS — E83.42 HYPOMAGNESEMIA: ICD-10-CM

## 2021-08-27 DIAGNOSIS — E86.0 DEHYDRATION: ICD-10-CM

## 2021-08-27 DIAGNOSIS — R63.4 WEIGHT LOSS: ICD-10-CM

## 2021-08-27 DIAGNOSIS — D3A.8 NEUROENDOCRINE NEOPLASM OF STOMACH: ICD-10-CM

## 2021-08-27 PROBLEM — R00.0 SINUS TACHYCARDIA: Status: ACTIVE | Noted: 2021-08-27

## 2021-08-27 LAB
-: ABNORMAL
ABSOLUTE EOS #: <0.03 K/UL (ref 0–0.44)
ABSOLUTE IMMATURE GRANULOCYTE: 0.02 K/UL (ref 0–0.3)
ABSOLUTE LYMPH #: 1.4 K/UL (ref 1.1–3.7)
ABSOLUTE MONO #: 0.43 K/UL (ref 0.1–1.2)
ALBUMIN SERPL-MCNC: 3 G/DL (ref 3.5–5.2)
ALBUMIN/GLOBULIN RATIO: ABNORMAL (ref 1–2.5)
ALP BLD-CCNC: 112 U/L (ref 35–104)
ALT SERPL-CCNC: 31 U/L (ref 5–33)
AMORPHOUS: ABNORMAL
ANION GAP SERPL CALCULATED.3IONS-SCNC: 11 MMOL/L (ref 9–17)
AST SERPL-CCNC: 77 U/L
BACTERIA: ABNORMAL
BASOPHILS # BLD: 0 % (ref 0–2)
BASOPHILS ABSOLUTE: <0.03 K/UL (ref 0–0.2)
BILIRUB SERPL-MCNC: 0.74 MG/DL (ref 0.3–1.2)
BILIRUBIN URINE: ABNORMAL
BUN BLDV-MCNC: 8 MG/DL (ref 6–20)
BUN/CREAT BLD: 14 (ref 9–20)
CALCIUM SERPL-MCNC: 9 MG/DL (ref 8.6–10.4)
CASTS UA: ABNORMAL /LPF
CASTS UA: ABNORMAL /LPF
CHLORIDE BLD-SCNC: 102 MMOL/L (ref 98–107)
CO2: 26 MMOL/L (ref 20–31)
COLOR: YELLOW
COMMENT UA: ABNORMAL
CREAT SERPL-MCNC: 0.57 MG/DL (ref 0.5–0.9)
CRYSTALS, UA: ABNORMAL /HPF
DIFFERENTIAL TYPE: ABNORMAL
EOSINOPHILS RELATIVE PERCENT: 0 % (ref 1–4)
EPITHELIAL CELLS UA: ABNORMAL /HPF (ref 0–5)
GFR AFRICAN AMERICAN: >60 ML/MIN
GFR NON-AFRICAN AMERICAN: >60 ML/MIN
GFR SERPL CREATININE-BSD FRML MDRD: ABNORMAL ML/MIN/{1.73_M2}
GFR SERPL CREATININE-BSD FRML MDRD: ABNORMAL ML/MIN/{1.73_M2}
GLUCOSE BLD-MCNC: 98 MG/DL (ref 70–99)
GLUCOSE URINE: NEGATIVE
HCT VFR BLD CALC: 43.5 % (ref 36.3–47.1)
HEMOGLOBIN: 13.9 G/DL (ref 11.9–15.1)
IMMATURE GRANULOCYTES: 0 %
KETONES, URINE: NEGATIVE
LACTIC ACID, SEPSIS WHOLE BLOOD: NORMAL MMOL/L (ref 0.5–1.9)
LACTIC ACID, SEPSIS: 1.7 MMOL/L (ref 0.5–1.9)
LEUKOCYTE ESTERASE, URINE: ABNORMAL
LIPASE: 6 U/L (ref 13–60)
LYMPHOCYTES # BLD: 25 % (ref 24–43)
MCH RBC QN AUTO: 29.7 PG (ref 25.2–33.5)
MCHC RBC AUTO-ENTMCNC: 32 G/DL (ref 28.4–34.8)
MCV RBC AUTO: 92.9 FL (ref 82.6–102.9)
MONOCYTES # BLD: 8 % (ref 3–12)
MUCUS: ABNORMAL
NITRITE, URINE: NEGATIVE
NRBC AUTOMATED: 0 PER 100 WBC
OTHER OBSERVATIONS UA: ABNORMAL
PDW BLD-RTO: 14.2 % (ref 11.8–14.4)
PH UA: 6 (ref 5–8)
PLATELET # BLD: 268 K/UL (ref 138–453)
PLATELET ESTIMATE: ABNORMAL
PMV BLD AUTO: 9.5 FL (ref 8.1–13.5)
POTASSIUM SERPL-SCNC: 3.9 MMOL/L (ref 3.7–5.3)
PROTEIN UA: NEGATIVE
RBC # BLD: 4.68 M/UL (ref 3.95–5.11)
RBC # BLD: ABNORMAL 10*6/UL
RBC UA: ABNORMAL /HPF (ref 0–2)
RENAL EPITHELIAL, UA: ABNORMAL /HPF
SEG NEUTROPHILS: 67 % (ref 36–65)
SEGMENTED NEUTROPHILS ABSOLUTE COUNT: 3.67 K/UL (ref 1.5–8.1)
SODIUM BLD-SCNC: 139 MMOL/L (ref 135–144)
SPECIFIC GRAVITY UA: <1.005 (ref 1–1.03)
THYROXINE, FREE: 1.45 NG/DL (ref 0.93–1.7)
TOTAL PROTEIN: 6.7 G/DL (ref 6.4–8.3)
TRICHOMONAS: ABNORMAL
TROPONIN INTERP: NORMAL
TROPONIN T: NORMAL NG/ML
TROPONIN, HIGH SENSITIVITY: 7 NG/L (ref 0–14)
TSH SERPL DL<=0.05 MIU/L-ACNC: 0.03 MIU/L (ref 0.3–5)
TURBIDITY: ABNORMAL
URINE HGB: ABNORMAL
UROBILINOGEN, URINE: ABNORMAL
WBC # BLD: 5.6 K/UL (ref 3.5–11.3)
WBC # BLD: ABNORMAL 10*3/UL
WBC UA: ABNORMAL /HPF (ref 0–5)
YEAST: ABNORMAL

## 2021-08-27 PROCEDURE — 96376 TX/PRO/DX INJ SAME DRUG ADON: CPT

## 2021-08-27 PROCEDURE — 85025 COMPLETE CBC W/AUTO DIFF WBC: CPT

## 2021-08-27 PROCEDURE — 6360000004 HC RX CONTRAST MEDICATION: Performed by: STUDENT IN AN ORGANIZED HEALTH CARE EDUCATION/TRAINING PROGRAM

## 2021-08-27 PROCEDURE — 2580000003 HC RX 258: Performed by: FAMILY MEDICINE

## 2021-08-27 PROCEDURE — 96375 TX/PRO/DX INJ NEW DRUG ADDON: CPT

## 2021-08-27 PROCEDURE — 99222 1ST HOSP IP/OBS MODERATE 55: CPT | Performed by: FAMILY MEDICINE

## 2021-08-27 PROCEDURE — 71045 X-RAY EXAM CHEST 1 VIEW: CPT

## 2021-08-27 PROCEDURE — 96374 THER/PROPH/DIAG INJ IV PUSH: CPT

## 2021-08-27 PROCEDURE — 87635 SARS-COV-2 COVID-19 AMP PRB: CPT

## 2021-08-27 PROCEDURE — 93005 ELECTROCARDIOGRAM TRACING: CPT | Performed by: STUDENT IN AN ORGANIZED HEALTH CARE EDUCATION/TRAINING PROGRAM

## 2021-08-27 PROCEDURE — 6360000002 HC RX W HCPCS: Performed by: FAMILY MEDICINE

## 2021-08-27 PROCEDURE — 83690 ASSAY OF LIPASE: CPT

## 2021-08-27 PROCEDURE — 74177 CT ABD & PELVIS W/CONTRAST: CPT

## 2021-08-27 PROCEDURE — 96361 HYDRATE IV INFUSION ADD-ON: CPT

## 2021-08-27 PROCEDURE — 2580000003 HC RX 258: Performed by: STUDENT IN AN ORGANIZED HEALTH CARE EDUCATION/TRAINING PROGRAM

## 2021-08-27 PROCEDURE — 1200000000 HC SEMI PRIVATE

## 2021-08-27 PROCEDURE — 84484 ASSAY OF TROPONIN QUANT: CPT

## 2021-08-27 PROCEDURE — 81001 URINALYSIS AUTO W/SCOPE: CPT

## 2021-08-27 PROCEDURE — 84443 ASSAY THYROID STIM HORMONE: CPT

## 2021-08-27 PROCEDURE — 83605 ASSAY OF LACTIC ACID: CPT

## 2021-08-27 PROCEDURE — 99283 EMERGENCY DEPT VISIT LOW MDM: CPT

## 2021-08-27 PROCEDURE — 99214 OFFICE O/P EST MOD 30 MIN: CPT | Performed by: INTERNAL MEDICINE

## 2021-08-27 PROCEDURE — 6360000002 HC RX W HCPCS: Performed by: STUDENT IN AN ORGANIZED HEALTH CARE EDUCATION/TRAINING PROGRAM

## 2021-08-27 PROCEDURE — 84439 ASSAY OF FREE THYROXINE: CPT

## 2021-08-27 PROCEDURE — 80053 COMPREHEN METABOLIC PANEL: CPT

## 2021-08-27 RX ORDER — ONDANSETRON 2 MG/ML
4 INJECTION INTRAMUSCULAR; INTRAVENOUS EVERY 6 HOURS PRN
Status: DISCONTINUED | OUTPATIENT
Start: 2021-08-27 | End: 2021-09-03 | Stop reason: HOSPADM

## 2021-08-27 RX ORDER — SUCRALFATE 1 G/1
1 TABLET ORAL 2 TIMES DAILY
Status: DISCONTINUED | OUTPATIENT
Start: 2021-08-27 | End: 2021-09-03 | Stop reason: HOSPADM

## 2021-08-27 RX ORDER — 0.9 % SODIUM CHLORIDE 0.9 %
1000 INTRAVENOUS SOLUTION INTRAVENOUS ONCE
Status: DISCONTINUED | OUTPATIENT
Start: 2021-08-27 | End: 2021-08-27

## 2021-08-27 RX ORDER — POTASSIUM CHLORIDE 20 MEQ/1
40 TABLET, EXTENDED RELEASE ORAL PRN
Status: DISCONTINUED | OUTPATIENT
Start: 2021-08-27 | End: 2021-08-28

## 2021-08-27 RX ORDER — 0.9 % SODIUM CHLORIDE 0.9 %
80 INTRAVENOUS SOLUTION INTRAVENOUS ONCE
Status: COMPLETED | OUTPATIENT
Start: 2021-08-27 | End: 2021-08-27

## 2021-08-27 RX ORDER — DOXEPIN HYDROCHLORIDE 50 MG/1
1 CAPSULE ORAL DAILY
Status: DISCONTINUED | OUTPATIENT
Start: 2021-08-27 | End: 2021-08-28 | Stop reason: RX

## 2021-08-27 RX ORDER — SODIUM CHLORIDE 9 MG/ML
25 INJECTION, SOLUTION INTRAVENOUS PRN
Status: DISCONTINUED | OUTPATIENT
Start: 2021-08-27 | End: 2021-09-03 | Stop reason: HOSPADM

## 2021-08-27 RX ORDER — SODIUM CHLORIDE 0.9 % (FLUSH) 0.9 %
5-40 SYRINGE (ML) INJECTION EVERY 12 HOURS SCHEDULED
Status: DISCONTINUED | OUTPATIENT
Start: 2021-08-27 | End: 2021-09-03 | Stop reason: HOSPADM

## 2021-08-27 RX ORDER — ACETAMINOPHEN 325 MG/1
650 TABLET ORAL EVERY 6 HOURS PRN
Status: DISCONTINUED | OUTPATIENT
Start: 2021-08-27 | End: 2021-09-03 | Stop reason: HOSPADM

## 2021-08-27 RX ORDER — SODIUM CHLORIDE 0.9 % (FLUSH) 0.9 %
10 SYRINGE (ML) INJECTION ONCE
Status: COMPLETED | OUTPATIENT
Start: 2021-08-27 | End: 2021-08-27

## 2021-08-27 RX ORDER — FOLIC ACID/VIT B COMPLEX AND C 0.8 MG
1 TABLET ORAL DAILY
Status: DISCONTINUED | OUTPATIENT
Start: 2021-08-27 | End: 2021-09-03 | Stop reason: HOSPADM

## 2021-08-27 RX ORDER — ONDANSETRON 4 MG/1
4 TABLET, FILM COATED ORAL EVERY 8 HOURS PRN
Status: DISCONTINUED | OUTPATIENT
Start: 2021-08-27 | End: 2021-08-27 | Stop reason: SDUPTHER

## 2021-08-27 RX ORDER — MAGNESIUM SULFATE 1 G/100ML
1000 INJECTION INTRAVENOUS PRN
Status: DISCONTINUED | OUTPATIENT
Start: 2021-08-27 | End: 2021-09-03 | Stop reason: HOSPADM

## 2021-08-27 RX ORDER — ONDANSETRON 2 MG/ML
4 INJECTION INTRAMUSCULAR; INTRAVENOUS ONCE
Status: COMPLETED | OUTPATIENT
Start: 2021-08-27 | End: 2021-08-27

## 2021-08-27 RX ORDER — POLYETHYLENE GLYCOL 3350 17 G/17G
17 POWDER, FOR SOLUTION ORAL DAILY PRN
Status: DISCONTINUED | OUTPATIENT
Start: 2021-08-27 | End: 2021-09-03 | Stop reason: HOSPADM

## 2021-08-27 RX ORDER — POTASSIUM CHLORIDE 7.45 MG/ML
10 INJECTION INTRAVENOUS PRN
Status: DISCONTINUED | OUTPATIENT
Start: 2021-08-27 | End: 2021-08-28

## 2021-08-27 RX ORDER — ACETAMINOPHEN 650 MG/1
650 SUPPOSITORY RECTAL EVERY 6 HOURS PRN
Status: DISCONTINUED | OUTPATIENT
Start: 2021-08-27 | End: 2021-09-03 | Stop reason: HOSPADM

## 2021-08-27 RX ORDER — SODIUM CHLORIDE, SODIUM LACTATE, POTASSIUM CHLORIDE, AND CALCIUM CHLORIDE .6; .31; .03; .02 G/100ML; G/100ML; G/100ML; G/100ML
1000 INJECTION, SOLUTION INTRAVENOUS ONCE
Status: COMPLETED | OUTPATIENT
Start: 2021-08-27 | End: 2021-08-27

## 2021-08-27 RX ORDER — MEGESTROL ACETATE 40 MG/ML
400 SUSPENSION ORAL DAILY
Status: DISCONTINUED | OUTPATIENT
Start: 2021-08-27 | End: 2021-09-03 | Stop reason: HOSPADM

## 2021-08-27 RX ORDER — LEVOTHYROXINE SODIUM 88 UG/1
88 TABLET ORAL DAILY
Status: DISCONTINUED | OUTPATIENT
Start: 2021-08-28 | End: 2021-08-28

## 2021-08-27 RX ORDER — PANTOPRAZOLE SODIUM 40 MG/1
40 TABLET, DELAYED RELEASE ORAL
Status: DISCONTINUED | OUTPATIENT
Start: 2021-08-27 | End: 2021-09-03 | Stop reason: HOSPADM

## 2021-08-27 RX ORDER — SODIUM CHLORIDE 9 MG/ML
INJECTION, SOLUTION INTRAVENOUS CONTINUOUS
Status: DISCONTINUED | OUTPATIENT
Start: 2021-08-27 | End: 2021-09-02

## 2021-08-27 RX ORDER — UBIDECARENONE 75 MG
500 CAPSULE ORAL DAILY
Status: DISCONTINUED | OUTPATIENT
Start: 2021-08-27 | End: 2021-09-03 | Stop reason: HOSPADM

## 2021-08-27 RX ORDER — ONDANSETRON 4 MG/1
4 TABLET, ORALLY DISINTEGRATING ORAL EVERY 8 HOURS PRN
Status: DISCONTINUED | OUTPATIENT
Start: 2021-08-27 | End: 2021-09-03 | Stop reason: HOSPADM

## 2021-08-27 RX ORDER — MORPHINE SULFATE 4 MG/ML
4 INJECTION, SOLUTION INTRAMUSCULAR; INTRAVENOUS ONCE
Status: COMPLETED | OUTPATIENT
Start: 2021-08-27 | End: 2021-08-27

## 2021-08-27 RX ORDER — SODIUM CHLORIDE 0.9 % (FLUSH) 0.9 %
10 SYRINGE (ML) INJECTION PRN
Status: DISCONTINUED | OUTPATIENT
Start: 2021-08-27 | End: 2021-09-03 | Stop reason: HOSPADM

## 2021-08-27 RX ORDER — VITAMIN B COMPLEX
1000 TABLET ORAL DAILY
Status: DISCONTINUED | OUTPATIENT
Start: 2021-08-27 | End: 2021-09-03 | Stop reason: HOSPADM

## 2021-08-27 RX ORDER — METOCLOPRAMIDE 10 MG/1
10 TABLET ORAL
Status: DISCONTINUED | OUTPATIENT
Start: 2021-08-27 | End: 2021-09-03 | Stop reason: HOSPADM

## 2021-08-27 RX ORDER — POTASSIUM CHLORIDE 20 MEQ/1
20 TABLET, EXTENDED RELEASE ORAL 2 TIMES DAILY
Status: DISCONTINUED | OUTPATIENT
Start: 2021-08-27 | End: 2021-09-02

## 2021-08-27 RX ADMIN — SODIUM CHLORIDE, POTASSIUM CHLORIDE, SODIUM LACTATE AND CALCIUM CHLORIDE 1000 ML: 600; 310; 30; 20 INJECTION, SOLUTION INTRAVENOUS at 16:36

## 2021-08-27 RX ADMIN — MORPHINE SULFATE 4 MG: 4 INJECTION, SOLUTION INTRAMUSCULAR; INTRAVENOUS at 14:28

## 2021-08-27 RX ADMIN — ONDANSETRON 4 MG: 2 INJECTION INTRAMUSCULAR; INTRAVENOUS at 14:28

## 2021-08-27 RX ADMIN — ENOXAPARIN SODIUM 40 MG: 40 INJECTION SUBCUTANEOUS at 21:15

## 2021-08-27 RX ADMIN — SODIUM CHLORIDE 80 ML: 9 INJECTION, SOLUTION INTRAVENOUS at 15:11

## 2021-08-27 RX ADMIN — SODIUM CHLORIDE, PRESERVATIVE FREE 10 ML: 5 INJECTION INTRAVENOUS at 15:12

## 2021-08-27 RX ADMIN — SODIUM CHLORIDE, POTASSIUM CHLORIDE, SODIUM LACTATE AND CALCIUM CHLORIDE 1000 ML: 600; 310; 30; 20 INJECTION, SOLUTION INTRAVENOUS at 14:28

## 2021-08-27 RX ADMIN — SODIUM CHLORIDE: 9 INJECTION, SOLUTION INTRAVENOUS at 20:43

## 2021-08-27 RX ADMIN — ONDANSETRON 4 MG: 2 INJECTION INTRAMUSCULAR; INTRAVENOUS at 16:42

## 2021-08-27 RX ADMIN — ONDANSETRON 4 MG: 2 INJECTION INTRAMUSCULAR; INTRAVENOUS at 21:15

## 2021-08-27 RX ADMIN — IOPAMIDOL 75 ML: 755 INJECTION, SOLUTION INTRAVENOUS at 15:11

## 2021-08-27 ASSESSMENT — ENCOUNTER SYMPTOMS
RHINORRHEA: 0
BLOOD IN STOOL: 0
VOMITING: 1
COUGH: 0
SHORTNESS OF BREATH: 0
CONSTIPATION: 0
NAUSEA: 1
CONSTIPATION: 0
VOMITING: 1
DIARRHEA: 0
EYE DISCHARGE: 0
DIARRHEA: 0
ANAL BLEEDING: 0
BACK PAIN: 0
BLOOD IN STOOL: 0
EYE REDNESS: 0
ABDOMINAL PAIN: 0
CHEST TIGHTNESS: 0
RECTAL PAIN: 0
ABDOMINAL PAIN: 1
NAUSEA: 1
ABDOMINAL PAIN: 0
WHEEZING: 0
ABDOMINAL DISTENTION: 0

## 2021-08-27 ASSESSMENT — PAIN SCALES - GENERAL
PAINLEVEL_OUTOF10: 7
PAINLEVEL_OUTOF10: 0
PAINLEVEL_OUTOF10: 0

## 2021-08-27 NOTE — PROGRESS NOTES
GI CLINIC FOLLOW UP    INTERVAL HISTORY:   No referring provider defined for this encounter. Chief Complaint   Patient presents with    Follow-up           HISTORY OF PRESENT ILLNESS: Pawan Bhardwaj is a 62 y.o. female with gastric new endocrine tumors. S/p total gastrectomy. She was doing well on Reglan. This eventually failed. She has not been able to eat or keep anything down. She has lost around 25 pounds of weight. She feels very weak. She has tachycardia. She was referred to cardiology. Past Medical,Family, and Social History reviewed and does not contribute to the patient presentingcondition. Patient's PMH/PSH,SH,PSYCH Hx, MEDs, ALLERGIES, and ROS were all reviewed and updated in the appropriate sections. PAST MEDICAL HISTORY:  Past Medical History:   Diagnosis Date    Anxiety     Arthritis     knee    Asthma     Colon polyp 02/21/2019    tubular adenoma; hyperplastic polyp    Colon polyps     Cyst of kidney, acquired     bilat.     Fatigue     Hypertension     Hypothyroidism     Neuroendocrine tumor 02/21/2019    of stomach    Obesity     Pharyngoesophageal dysphagia     Vocal cord polyps     Wears glasses        Past Surgical History:   Procedure Laterality Date    CHOLECYSTECTOMY  10/09/2014    COLONOSCOPY  02/21/2019    tubular adenoma; hyperplastic polyp    COLONOSCOPY      over 5 yrs ago per pt with polyps (2-)    CYSTOURETHROSCOPY/STENT REMOVAL  5/3/11    ENDOSCOPIC ULTRASOUND (LOWER) N/A 1/22/2020    ENDOSCOPIC ULTRASOUND WITH POSSIBLE EMR performed by Ade Tavarez MD at [facility]    ERCP      GASTRIC BYPASS SURGERY  11/30/2020    HIP SURGERY Left     soft tissue tumor removal    THROAT SURGERY      vocal cord polyps removed    UPPER GASTROINTESTINAL ENDOSCOPY  02/21/2019    Neuroendocrine tumor    UPPER GASTROINTESTINAL ENDOSCOPY  09/23/2019    UPPER GASTROINTESTINAL ENDOSCOPY N/A 9/23/2019    EGD BIOPSY performed by Alexis Balderas MD at [facility] 10/23/2019    EGD W/ EMR performed by Michel Maxwell MD at [address] [place] [address] N/A 10/29/2019    EGD CONTROL HEMORRHAGE performed by Luis Costello MD at [address] [place] [address] N/A 4/26/2021    EGD BIOPSY performed by Katy Garcia MD at [facility]:    Current Outpatient Medications:     megestrol (MEGACE) 40 MG/ML suspension, Take 10 mLs by mouth daily, Disp: 300 mL, Rfl: 0    apixaban (ELIQUIS) 5 MG TABS tablet, Take 1 tablet by mouth 2 times daily, Disp: 28 tablet, Rfl: 0    metoclopramide (REGLAN) 10 MG tablet, TAKE 1 TABLET BY MOUTH 3 TIMES DAILY (BEFORE MEALS) (Patient taking differently: Take 10 mg by mouth 3 times daily (before meals) Taking BID), Disp: 90 tablet, Rfl: 5    levothyroxine (SYNTHROID) 88 MCG tablet, Take 1 tablet by mouth daily, Disp: 90 tablet, Rfl: 0    Magnesium 400 MG TABS, Take 1 tablet by mouth daily (Patient taking differently: Take 1 tablet by mouth 2 times daily ), Disp: 30 tablet, Rfl: 3    pantoprazole (PROTONIX) 40 MG tablet, Take 1 tablet by mouth 2 times daily, Disp: 90 tablet, Rfl: 0    ondansetron (ZOFRAN) 4 MG tablet, Take 1 tablet by mouth every 8 hours as needed for Nausea or Vomiting, Disp: 20 tablet, Rfl: 1    sucralfate (CARAFATE) 1 GM tablet, Take 1 tablet by mouth 4 times daily (Patient taking differently: Take 1 g by mouth 2 times daily ), Disp: 120 tablet, Rfl: 3    potassium chloride (KLOR-CON) 20 MEQ packet, Take 20 mEq by mouth daily (Patient taking differently: Take 20 mEq by mouth 2 times daily ), Disp: 90 each, Rfl: 1    vitamin D 25 MCG (1000 UT) CAPS, Take 1 capsule by mouth daily (Patient not taking: Reported on 8/20/2021), Disp: , Rfl:     Folic Acid-Vit I0-VHZ N80 2.2-25-0.5 MG TABS, Take 1 tablet by mouth daily, Disp: 90 tablet, Rfl: 1    hydroCHLOROthiazide (HYDRODIURIL) 25 MG tablet, TAKE 1 TABLET Social Connections:     Frequency of Communication with Friends and Family:     Frequency of Social Gatherings with Friends and Family:     Attends Jainism Services:     Active Member of Clubs or Organizations:     Attends Club or Organization Meetings:     Marital Status:    Intimate Partner Violence:     Fear of Current or Ex-Partner:     Emotionally Abused:     Physically Abused:     Sexually Abused:        REVIEW OF SYSTEMS: A 12-point review of systemswas obtained and pertinent positives and negatives were enumerated above in the history of present illness. All other reviewed systems / symptoms were negative. Review of Systems   Gastrointestinal: Positive for nausea and vomiting. Negative for abdominal distention, abdominal pain, anal bleeding, blood in stool, constipation, diarrhea and rectal pain.            LABORATORY DATA: Reviewed  Lab Results   Component Value Date    WBC 5.3 08/13/2021    HGB 12.7 08/13/2021    HCT 38.8 08/13/2021    MCV 91.9 08/13/2021     08/13/2021     08/13/2021    K 3.4 (L) 08/13/2021    CL 99 08/13/2021    CO2 23 08/13/2021    BUN 6 08/13/2021    CREATININE 0.54 08/13/2021    LABALBU 2.8 (L) 06/24/2021    BILITOT 0.54 06/24/2021    ALKPHOS 100 06/24/2021    AST 41 (H) 06/24/2021    ALT 18 06/24/2021    INR 1.5 04/07/2021         Lab Results   Component Value Date    RBC 4.22 08/13/2021    HGB 12.7 08/13/2021    MCV 91.9 08/13/2021    MCH 30.1 08/13/2021    MCHC 32.7 08/13/2021    RDW 13.7 08/13/2021    MPV 9.3 08/13/2021    BASOPCT 1 08/13/2021    LYMPHSABS 1.43 08/13/2021    MONOSABS 0.51 08/13/2021    NEUTROABS 3.33 08/13/2021    EOSABS <0.03 08/13/2021    BASOSABS 0.03 08/13/2021         DIAGNOSTIC TESTING:     XR CHEST PORTABLE    Result Date: 8/13/2021  EXAMINATION: ONE XRAY VIEW OF THE CHEST 8/13/2021 12:51 pm COMPARISON: April 26, 2021 chest examination HISTORY: ORDERING SYSTEM PROVIDED HISTORY: SOB TECHNOLOGIST PROVIDED HISTORY: SOB FINDINGS: Stable normal cardiopericardial silhouette/mild tortuosity of the thoracic aorta There are no significant pleural, parenchymal or mediastinal findings     No acute cardiopulmonary findings          PHYSICAL EXAMINATION: Vital signs reviewed per the nursing documentation. LMP 01/01/1994   There is no height or weight on file to calculate BMI. Physical Exam      I personally reviewed the nurse's notes and documentation and I agree with her notes. General: alert, appears stated age and cooperative Psych: Normal. and Alert and oriented, appropriate affect. . Normal affect. Mentation normal  HEENT: PERRLA. Clear conjunctivae and sclerae. Moist oral mucosae, no lesions or ulcers. The neck is supple, without lymphadenopathy or jugular venous distension. No masses. Normal thyroid. Cardiovascular: S1 S2 RRR no rubs or murmurs. Pulmonary: clear BL. No accessory muscle usage. Abdominal Exam: Soft, NT ND, no hepato or spleno megaly, +BS, no ascites. IMPRESSION: Ms. Susan Mcgrath is a 62 y.o. female with prior endocrine tumor of the stomach. Status post total gastrectomy. She is not able to keep anything down. She has been losing weight. She appears tired and dehydrated. She is tachycardic. We will send her back to the emergency room. She would need to be admitted for hydration and to start TPN. She may need EGD to exclude obstructive process. Accordingly she may need surgical J-tube placement. She tried to get in touch with her [facility] [employer] doctors yet they have not called back. Thank you for allowing me to participate in the care of Ms. Susan Mcgrath. For any further questions please do not hesitate to contact me. I have reviewed and agree with the ROS entered by the MA/LPN. Note is dictated utilizing voice recognition software. Unfortunately this leads to occasional typographical errors. Please contact our office if you have any questions.     Nely Soliman MD  [facility]  O: #[phone]

## 2021-08-27 NOTE — Clinical Note
Patient Class: Observation [104]   REQUIRED: Diagnosis: Dehydration [276.51. ICD-9-CM]   Estimated Length of Stay: Estimated stay of less than 2 midnights

## 2021-08-27 NOTE — H&P
[facility]  Office: [address], DO, Jose J Laraf, DO, Kurt Martinez, DO, Kandace Coy Blood, DO, Sen Bright MD, Kody Soto MD, Krys Mcclain MD, Timur Boswell MD, Ulysses Galeas MD, Rosmery Barrera MD, Flora Brian MD, Job Eastman, DO, Manuela Whitaker MD, Jodie Alberto, DO, Monalisa Florence MD,  Tram Arias, DO, Moriah Szymanski MD, Julio César Garza MD, Blanca Moore MD, Sabrina Townsend MD, Selma Nayak MD, Sam Morillo MD, Shelly Kurtz MD, Kristan Robles, [facility], [facility], CNP, Jenise Kim, CNP, Jordan Gamez, CNS, Javier Lainez, CNP, Gerilyn Cockayne, CNP, Hal Sanchez, CNP, Joselyn Lu, CNP, Camillo Schaumann, CNP, Ad Lu PA-C, Josias Bright, [facility], Rena Loud, CNP, Leroy Kaiser, [facility], Ksenia Paula, CNP, Nhan Corrales, CNP, Win Regalado, CNP, Park Wade, CNP, Gladies Kocher, [facility], [facility], [address] [place] [address]      HISTORY AND PHYSICAL EXAMINATION            Date:   8/27/2021  Patient name:  Dena Davila  Date of admission:  8/27/2021  1:23 PM  MRN:   [id number]  Account:  [de-identified]  YOB: 1960  PCP:    Merry Bell MD  Room:   [place] [address]  Code Status:    Full Code    Chief Complaint:     Chief Complaint   Patient presents with    Dehydration     sent by PCP    Other     no appetite    Nausea & Vomiting    Cough    Fatigue       History Obtained From:     Patient, Electronic medical record    History of Present Illness: The patient is a 62 y.o. Non- / non  female who presents with Dehydration (sent by PCP), Other (no appetite), Nausea & Vomiting, Cough, and Fatigue   and she is admitted to the hospital for the management of  Dehydration. Patient was sent to ED by her gastroenterologist. She reported that she has been having intractable nausea and vomiting for many months which is getting worse and was unable to eat and drink for many weeks.  Patient has h/o carcinoid tumour and underwent gastrectomy about 10 months ago. She has lost about 140 lbs weight since . Patient reported that she has lost about 25 lbs in last 1 months. She has poor appetite. Patient denies any fever or chills , blood in emesis or stool. She has been having fatigue. She was found to have tachycardia with heart rate in the 130s in office today and was sent to ED. Patient has tried Megace, Reglan, Zofran, Phenergan but did not help. Patient did not had any EGD in many months. Past Medical History:     Past Medical History:   Diagnosis Date    Anxiety     Arthritis     knee    Asthma     Colon polyp 02/21/2019    tubular adenoma; hyperplastic polyp    Colon polyps     Cyst of kidney, acquired     bilat.     Fatigue     Hypertension     Hypothyroidism     Neuroendocrine tumor 02/21/2019    of stomach    Obesity     Pharyngoesophageal dysphagia     Vocal cord polyps     Wears glasses         Past Surgical History:     Past Surgical History:   Procedure Laterality Date    CHOLECYSTECTOMY  10/09/2014    COLONOSCOPY  02/21/2019    tubular adenoma; hyperplastic polyp    COLONOSCOPY      over 5 yrs ago per pt with polyps (2-)    CYSTOURETHROSCOPY/STENT REMOVAL  05/03/2011    ENDOSCOPIC ULTRASOUND (LOWER) N/A 01/22/2020    ENDOSCOPIC ULTRASOUND WITH POSSIBLE EMR performed by Bijan William MD at [facility]    ERCP     [facility] Left     soft tissue tumor removal    THROAT SURGERY      vocal cord polyps removed    UPPER GASTROINTESTINAL ENDOSCOPY  02/21/2019    Neuroendocrine tumor    UPPER GASTROINTESTINAL ENDOSCOPY  09/23/2019    UPPER GASTROINTESTINAL ENDOSCOPY N/A 09/23/2019    EGD BIOPSY performed by Mariia Rubalcava MD at [facility] 10/23/2019    EGD W/ EMR performed by Bijan William MD at [facility] 1 N/A 10/29/2019    EGD CONTROL HEMORRHAGE performed by Mitch Mittal MD at [facility]  UPPER GASTROINTESTINAL ENDOSCOPY N/A 04/26/2021    EGD BIOPSY performed by Timothy Fu MD at [facility]        Medications Prior to Admission:     Prior to Admission medications    Medication Sig Start Date End Date Taking? Authorizing Provider   megestrol (MEGACE) 40 MG/ML suspension Take 10 mLs by mouth daily 8/20/21 9/19/21 Yes Brie Cannon MD   apixaban (ELIQUIS) 5 MG TABS tablet Take 1 tablet by mouth 2 times daily 8/20/21 9/19/21 Yes Brie Cannon MD   metoclopramide (REGLAN) 10 MG tablet TAKE 1 TABLET BY MOUTH 3 TIMES DAILY (BEFORE MEALS)  Patient taking differently: Take 10 mg by mouth 3 times daily (before meals) Taking BID 6/16/21  Yes Marlena Madrigal MD   levothyroxine (SYNTHROID) 88 MCG tablet Take 1 tablet by mouth daily 5/26/21  Yes Brie Cannon MD   Magnesium 400 MG TABS Take 1 tablet by mouth daily  Patient taking differently: Take 1 tablet by mouth 2 times daily  5/26/21  Yes Brie Cannon MD   pantoprazole (PROTONIX) 40 MG tablet Take 1 tablet by mouth 2 times daily 4/29/21  Yes Marguerite Hudson MD   sucralfate (CARAFATE) 1 GM tablet Take 1 tablet by mouth 4 times daily  Patient taking differently: Take 1 g by mouth 2 times daily  4/29/21  Yes Marguerite Hudson MD   potassium chloride (KLOR-CON) 20 MEQ packet Take 20 mEq by mouth daily  Patient taking differently: Take 20 mEq by mouth 2 times daily  4/20/21  Yes Brie Cannon MD   Folic Acid-Vit L5-MOB V02 2.2-25-0.5 MG TABS Take 1 tablet by mouth daily 3/25/21  Yes Brie Cannon MD   hydroCHLOROthiazide (HYDRODIURIL) 25 MG tablet TAKE 1 TABLET BY MOUTH EVERY DAY  Patient taking differently: 12.5 mg TAKE 1/2 TABLET BY MOUTH EVERY DAY 3/10/21  Yes Brie Cannon MD   desonide (DESOWEN) 0.05 % cream Apply topically 2 times daily.  8/21/20  Yes Brie Cannon MD   Multiple Vitamin (MULTI-DAY VITAMINS PO) Take by mouth   Yes Historical MD Karen   vitamin B-12 (CYANOCOBALAMIN) 500 MCG tablet Take 500 mcg by mouth daily   Yes Historical Provider, MD   ondansetron (ZOFRAN) 4 MG tablet Take 1 tablet by mouth every 8 hours as needed for Nausea or Vomiting  Patient not taking: Reported on 8/27/2021 4/29/21   French Cazares MD   vitamin D 25 MCG (1000 UT) CAPS Take 1 capsule by mouth daily  Patient not taking: Reported on 8/20/2021    Historical Provider, MD        Allergies:     Erythromycin    Social History:     Tobacco:    reports that she quit smoking about 9 years ago. She has a 25.00 pack-year smoking history. She has never used smokeless tobacco.  Alcohol:      reports previous alcohol use. Drug Use:  reports no history of drug use. Family History:     Family History   Problem Relation Age of Onset    High Blood Pressure Mother     High Blood Pressure Sister     Stomach Cancer Sister     Other Sister         epilepsy    No Known Problems Father        Review of Systems:     Positive and Negative as described in HPI. Review of Systems   Constitutional: Positive for appetite change, fatigue and unexpected weight change. Negative for fever. HENT: Negative for congestion, rhinorrhea and sneezing. Eyes: Negative for visual disturbance. Respiratory: Negative for cough, chest tightness, shortness of breath and wheezing. Cardiovascular: Negative for chest pain and palpitations. Gastrointestinal: Positive for nausea and vomiting. Negative for abdominal pain, blood in stool, constipation and diarrhea. Genitourinary: Negative for dysuria, enuresis, frequency and hematuria. Musculoskeletal: Negative for arthralgias and myalgias. Skin: Negative for rash. Neurological: Negative for dizziness, weakness, light-headedness and headaches. Hematological: Does not bruise/bleed easily. Psychiatric/Behavioral: Negative for dysphoric mood and sleep disturbance.        Physical Exam:   BP (!) 150/97   Pulse 98   Temp 99.2 °F (37.3 °C) (Oral)   Resp 19   Ht 5' 5\" (1.651 m)   Wt 143 lb (64.9 kg)   LMP 1994   SpO2 99%   BMI 23.80 kg/m²   Temp (24hrs), Av.2 °F (37.3 °C), Min:99.2 °F (37.3 °C), Max:99.2 °F (37.3 °C)    No results for input(s): POCGLU in the last 72 hours. No intake or output data in the 24 hours ending 21 1755  Physical Exam  Vitals and nursing note reviewed. Constitutional:       General: She is not in acute distress. Appearance: She is underweight. She is ill-appearing. She is not diaphoretic. Comments: Patient actively vomiting, persistently nauseated. HENT:      Head: Normocephalic and atraumatic. Nose:      Right Sinus: No maxillary sinus tenderness or frontal sinus tenderness. Left Sinus: No maxillary sinus tenderness or frontal sinus tenderness. Mouth/Throat:      Pharynx: No oropharyngeal exudate. Eyes:      General: No scleral icterus. Conjunctiva/sclera: Conjunctivae normal.      Pupils: Pupils are equal, round, and reactive to light. Neck:      Thyroid: No thyromegaly. Vascular: No JVD. Cardiovascular:      Rate and Rhythm: Normal rate and regular rhythm. Pulses:           Dorsalis pedis pulses are 2+ on the right side and 2+ on the left side. Heart sounds: Normal heart sounds. No murmur heard. Pulmonary:      Effort: Pulmonary effort is normal.      Breath sounds: Normal breath sounds. No wheezing or rales. Abdominal:      Palpations: Abdomen is soft. There is no mass. Tenderness: There is no abdominal tenderness. Musculoskeletal:      Cervical back: Full passive range of motion without pain and neck supple. Lymphadenopathy:      Head:      Right side of head: No submandibular adenopathy. Left side of head: No submandibular adenopathy. Cervical: No cervical adenopathy. Skin:     General: Skin is warm. Neurological:      Mental Status: She is alert and oriented to person, place, and time. Motor: No tremor. Psychiatric:         Behavior: Behavior is cooperative. Investigations:      Laboratory Testing:  Recent Results (from the past 24 hour(s))   EKG 12 Lead    Collection Time: 08/27/21  2:03 PM   Result Value Ref Range    Ventricular Rate 89 BPM    Atrial Rate 89 BPM    P-R Interval 130 ms    QRS Duration 66 ms    Q-T Interval 380 ms    QTc Calculation (Bazett) 462 ms    P Axis 39 degrees    R Axis 10 degrees    T Axis -40 degrees   CBC Auto Differential    Collection Time: 08/27/21  2:15 PM   Result Value Ref Range    WBC 5.6 3.5 - 11.3 k/uL    RBC 4.68 3.95 - 5.11 m/uL    Hemoglobin 13.9 11.9 - 15.1 g/dL    Hematocrit 43.5 36.3 - 47.1 %    MCV 92.9 82.6 - 102.9 fL    MCH 29.7 25.2 - 33.5 pg    MCHC 32.0 28.4 - 34.8 g/dL    RDW 14.2 11.8 - 14.4 %    Platelets 599 341 - 903 k/uL    MPV 9.5 8.1 - 13.5 fL    NRBC Automated 0.0 0.0 per 100 WBC    Differential Type NOT REPORTED     Seg Neutrophils 67 (H) 36 - 65 %    Lymphocytes 25 24 - 43 %    Monocytes 8 3 - 12 %    Eosinophils % 0 (L) 1 - 4 %    Basophils 0 0 - 2 %    Immature Granulocytes 0 0 %    Segs Absolute 3.67 1.50 - 8.10 k/uL    Absolute Lymph # 1.40 1.10 - 3.70 k/uL    Absolute Mono # 0.43 0.10 - 1.20 k/uL    Absolute Eos # <0.03 0.00 - 0.44 k/uL    Basophils Absolute <0.03 0.00 - 0.20 k/uL    Absolute Immature Granulocyte 0.02 0.00 - 0.30 k/uL    WBC Morphology NOT REPORTED     RBC Morphology NOT REPORTED     Platelet Estimate NOT REPORTED    Comprehensive Metabolic Panel    Collection Time: 08/27/21  2:15 PM   Result Value Ref Range    Glucose 98 70 - 99 mg/dL    BUN 8 6 - 20 mg/dL    CREATININE 0.57 0.50 - 0.90 mg/dL    Bun/Cre Ratio 14 9 - 20    Calcium 9.0 8.6 - 10.4 mg/dL    Sodium 139 135 - 144 mmol/L    Potassium 3.9 3.7 - 5.3 mmol/L    Chloride 102 98 - 107 mmol/L    CO2 26 20 - 31 mmol/L    Anion Gap 11 9 - 17 mmol/L    Alkaline Phosphatase 112 (H) 35 - 104 U/L    ALT 31 5 - 33 U/L    AST 77 (H) <32 U/L    Total Bilirubin 0.74 0.3 - 1.2 mg/dL    Total Protein 6.7 6.4 - 8.3 g/dL    Albumin 3.0 (L) 3.5 - 5.2 g/dL    Albumin/Globulin Ratio NOT REPORTED 1.0 - 2.5    GFR Non-African American >60 >60 mL/min    GFR African American >60 >60 mL/min    GFR Comment          GFR Staging NOT REPORTED    Lipase    Collection Time: 08/27/21  2:15 PM   Result Value Ref Range    Lipase 6 (L) 13 - 60 U/L   Troponin    Collection Time: 08/27/21  2:15 PM   Result Value Ref Range    Troponin, High Sensitivity 7 0 - 14 ng/L    Troponin T NOT REPORTED <0.03 ng/mL    Troponin Interp NOT REPORTED    Lactate, Sepsis    Collection Time: 08/27/21  2:15 PM   Result Value Ref Range    Lactic Acid, Sepsis 1.7 0.5 - 1.9 mmol/L    Lactic Acid, Sepsis, Whole Blood NOT REPORTED 0.5 - 1.9 mmol/L   TSH with Reflex    Collection Time: 08/27/21  2:15 PM   Result Value Ref Range    TSH 0.03 (L) 0.30 - 5.00 mIU/L   T4, Free    Collection Time: 08/27/21  2:15 PM   Result Value Ref Range    Thyroxine, Free 1.45 0.93 - 1.70 ng/dL       Imaging/Diagonstics:    CT ABDOMEN PELVIS W IV CONTRAST Additional Contrast? None    Result Date: 8/27/2021  1. No acute process seen in the abdomen or pelvis. Patient is status post gastrectomy, and detailed assessment of the anastomosis in the upper abdomen is limited by bowel peristalsis and respiratory motion 2. Hepatic steatosis. 3.  Unchanged intrahepatic and extrahepatic biliary dilatation status post cholecystectomy. 4.  Pelvic right kidney is redemonstrated. Multiple bilateral renal cysts. XR CHEST PORTABLE    Result Date: 8/27/2021  No acute cardiopulmonary process and no change from prior studies.         Assessment :      Primary Problem  Dehydration    Active Hospital Problems    Diagnosis Date Noted    Dehydration [E86.0] 08/27/2021    Sinus tachycardia [R00.0] 08/27/2021    Weight loss of more than 10% body weight [R63.4] 08/27/2021    Severe malnutrition ([address] [E43] 04/27/2021    Intractable nausea and vomiting [R11.2] 04/26/2021    S/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 [Z90.3, Z98.0]     Neuroendocrine neoplasm of stomach [D3A.8] 10/28/2019       Plan:     Patient status Admit as inpatient in the  Med/Surge    Intractable nausea vomiting with dehydration and abnormal weight loss -IV fluids, antiemetics. GI consult. Patient may benefit from EGD to rule out secondary cause for nausea vomiting. Severe malnutrition -may need TPN versus G-tube placement. H/o carcinoid s/p total gastrectomy and Qamar-en-Y esophageal jejunal anastomosis -     Consultations:   IP CONSULT TO PRIMARY CARE PROVIDER    Patient is admitted as inpatient status because of co-morbidities listed above, severity of signs and symptoms as outlined, requirement for current medical therapies and most importantly because of direct risk to patient if care not provided in a hospital setting.     Suhas Anglin MD  8/27/2021    Copy sent to Dr. Onur Leonard MD    (Please note that portions of this note were completed with a voice recognition program. Efforts were made to edit the dictations but occasionally words are mis-transcribed.)

## 2021-08-28 LAB
ANION GAP SERPL CALCULATED.3IONS-SCNC: 10 MMOL/L (ref 9–17)
BUN BLDV-MCNC: 5 MG/DL (ref 6–20)
BUN/CREAT BLD: 11 (ref 9–20)
CALCIUM SERPL-MCNC: 8.3 MG/DL (ref 8.6–10.4)
CHLORIDE BLD-SCNC: 104 MMOL/L (ref 98–107)
CO2: 25 MMOL/L (ref 20–31)
CREAT SERPL-MCNC: 0.45 MG/DL (ref 0.5–0.9)
EKG ATRIAL RATE: 89 BPM
EKG P AXIS: 39 DEGREES
EKG P-R INTERVAL: 130 MS
EKG Q-T INTERVAL: 380 MS
EKG QRS DURATION: 66 MS
EKG QTC CALCULATION (BAZETT): 462 MS
EKG R AXIS: 10 DEGREES
EKG T AXIS: -40 DEGREES
EKG VENTRICULAR RATE: 89 BPM
GFR AFRICAN AMERICAN: >60 ML/MIN
GFR NON-AFRICAN AMERICAN: >60 ML/MIN
GFR SERPL CREATININE-BSD FRML MDRD: ABNORMAL ML/MIN/{1.73_M2}
GFR SERPL CREATININE-BSD FRML MDRD: ABNORMAL ML/MIN/{1.73_M2}
GLUCOSE BLD-MCNC: 79 MG/DL (ref 70–99)
HCT VFR BLD CALC: 37.8 % (ref 36.3–47.1)
HEMOGLOBIN: 11.9 G/DL (ref 11.9–15.1)
INR BLD: 1.1
MAGNESIUM: 1.3 MG/DL (ref 1.6–2.6)
MCH RBC QN AUTO: 30.1 PG (ref 25.2–33.5)
MCHC RBC AUTO-ENTMCNC: 31.5 G/DL (ref 28.4–34.8)
MCV RBC AUTO: 95.7 FL (ref 82.6–102.9)
NRBC AUTOMATED: 0 PER 100 WBC
PDW BLD-RTO: 14.2 % (ref 11.8–14.4)
PLATELET # BLD: 229 K/UL (ref 138–453)
PMV BLD AUTO: 9.8 FL (ref 8.1–13.5)
POTASSIUM SERPL-SCNC: 3.5 MMOL/L (ref 3.7–5.3)
PROTHROMBIN TIME: 14.4 SEC (ref 11.5–14.2)
RBC # BLD: 3.95 M/UL (ref 3.95–5.11)
SODIUM BLD-SCNC: 139 MMOL/L (ref 135–144)
WBC # BLD: 5.4 K/UL (ref 3.5–11.3)

## 2021-08-28 PROCEDURE — 97535 SELF CARE MNGMENT TRAINING: CPT

## 2021-08-28 PROCEDURE — 97166 OT EVAL MOD COMPLEX 45 MIN: CPT

## 2021-08-28 PROCEDURE — 6360000002 HC RX W HCPCS: Performed by: NURSE PRACTITIONER

## 2021-08-28 PROCEDURE — 2580000003 HC RX 258: Performed by: FAMILY MEDICINE

## 2021-08-28 PROCEDURE — APPNB30 APP NON BILLABLE TIME 0-30 MINS: Performed by: NURSE PRACTITIONER

## 2021-08-28 PROCEDURE — 93010 ELECTROCARDIOGRAM REPORT: CPT | Performed by: INTERNAL MEDICINE

## 2021-08-28 PROCEDURE — C9113 INJ PANTOPRAZOLE SODIUM, VIA: HCPCS | Performed by: NURSE PRACTITIONER

## 2021-08-28 PROCEDURE — 36415 COLL VENOUS BLD VENIPUNCTURE: CPT

## 2021-08-28 PROCEDURE — 99254 IP/OBS CNSLTJ NEW/EST MOD 60: CPT | Performed by: INTERNAL MEDICINE

## 2021-08-28 PROCEDURE — 99232 SBSQ HOSP IP/OBS MODERATE 35: CPT | Performed by: FAMILY MEDICINE

## 2021-08-28 PROCEDURE — 6370000000 HC RX 637 (ALT 250 FOR IP): Performed by: FAMILY MEDICINE

## 2021-08-28 PROCEDURE — 85610 PROTHROMBIN TIME: CPT

## 2021-08-28 PROCEDURE — 83735 ASSAY OF MAGNESIUM: CPT

## 2021-08-28 PROCEDURE — 85027 COMPLETE CBC AUTOMATED: CPT

## 2021-08-28 PROCEDURE — 6360000002 HC RX W HCPCS: Performed by: FAMILY MEDICINE

## 2021-08-28 PROCEDURE — 1200000000 HC SEMI PRIVATE

## 2021-08-28 PROCEDURE — 80048 BASIC METABOLIC PNL TOTAL CA: CPT

## 2021-08-28 RX ORDER — LEVOTHYROXINE SODIUM 88 UG/1
88 TABLET ORAL
COMMUNITY
End: 2021-10-12 | Stop reason: SDUPTHER

## 2021-08-28 RX ORDER — METOCLOPRAMIDE HYDROCHLORIDE 5 MG/ML
10 INJECTION INTRAMUSCULAR; INTRAVENOUS 3 TIMES DAILY
Status: DISCONTINUED | OUTPATIENT
Start: 2021-08-28 | End: 2021-09-02

## 2021-08-28 RX ORDER — PANTOPRAZOLE SODIUM 40 MG/10ML
40 INJECTION, POWDER, LYOPHILIZED, FOR SOLUTION INTRAVENOUS 2 TIMES DAILY
Status: DISCONTINUED | OUTPATIENT
Start: 2021-08-28 | End: 2021-09-02

## 2021-08-28 RX ORDER — SCOLOPAMINE TRANSDERMAL SYSTEM 1 MG/1
1 PATCH, EXTENDED RELEASE TRANSDERMAL
Status: DISCONTINUED | OUTPATIENT
Start: 2021-08-28 | End: 2021-09-03 | Stop reason: HOSPADM

## 2021-08-28 RX ORDER — HYDROCHLOROTHIAZIDE 25 MG/1
12.5 TABLET ORAL DAILY
Status: ON HOLD | COMMUNITY
End: 2021-09-03 | Stop reason: HOSPADM

## 2021-08-28 RX ORDER — POTASSIUM CHLORIDE 7.45 MG/ML
10 INJECTION INTRAVENOUS PRN
Status: DISCONTINUED | OUTPATIENT
Start: 2021-08-28 | End: 2021-09-03 | Stop reason: HOSPADM

## 2021-08-28 RX ORDER — MAGNESIUM OXIDE 400 MG/1
400 TABLET ORAL 2 TIMES DAILY
COMMUNITY
End: 2021-09-21 | Stop reason: SDUPTHER

## 2021-08-28 RX ORDER — LEVOTHYROXINE SODIUM 88 UG/1
88 TABLET ORAL
Status: DISCONTINUED | OUTPATIENT
Start: 2021-08-29 | End: 2021-09-03 | Stop reason: HOSPADM

## 2021-08-28 RX ADMIN — SODIUM CHLORIDE: 9 INJECTION, SOLUTION INTRAVENOUS at 09:44

## 2021-08-28 RX ADMIN — MAGNESIUM SULFATE HEPTAHYDRATE 1000 MG: 1 INJECTION, SOLUTION INTRAVENOUS at 11:30

## 2021-08-28 RX ADMIN — METOCLOPRAMIDE 10 MG: 5 INJECTION, SOLUTION INTRAMUSCULAR; INTRAVENOUS at 09:43

## 2021-08-28 RX ADMIN — POTASSIUM CHLORIDE 10 MEQ: 10 INJECTION, SOLUTION INTRAVENOUS at 20:11

## 2021-08-28 RX ADMIN — SODIUM CHLORIDE, PRESERVATIVE FREE 10 ML: 5 INJECTION INTRAVENOUS at 09:42

## 2021-08-28 RX ADMIN — METOCLOPRAMIDE 10 MG: 5 INJECTION, SOLUTION INTRAMUSCULAR; INTRAVENOUS at 20:10

## 2021-08-28 RX ADMIN — MAGNESIUM SULFATE HEPTAHYDRATE 1000 MG: 1 INJECTION, SOLUTION INTRAVENOUS at 12:34

## 2021-08-28 RX ADMIN — PANTOPRAZOLE SODIUM 40 MG: 40 INJECTION, POWDER, FOR SOLUTION INTRAVENOUS at 20:10

## 2021-08-28 RX ADMIN — MAGNESIUM SULFATE HEPTAHYDRATE 1000 MG: 1 INJECTION, SOLUTION INTRAVENOUS at 09:43

## 2021-08-28 RX ADMIN — ONDANSETRON 4 MG: 2 INJECTION INTRAMUSCULAR; INTRAVENOUS at 05:47

## 2021-08-28 RX ADMIN — SODIUM CHLORIDE, PRESERVATIVE FREE 10 ML: 5 INJECTION INTRAVENOUS at 20:10

## 2021-08-28 RX ADMIN — PANTOPRAZOLE SODIUM 40 MG: 40 INJECTION, POWDER, FOR SOLUTION INTRAVENOUS at 09:42

## 2021-08-28 RX ADMIN — POTASSIUM CHLORIDE 10 MEQ: 10 INJECTION, SOLUTION INTRAVENOUS at 17:15

## 2021-08-28 RX ADMIN — MAGNESIUM SULFATE HEPTAHYDRATE 1000 MG: 1 INJECTION, SOLUTION INTRAVENOUS at 13:47

## 2021-08-28 RX ADMIN — POTASSIUM CHLORIDE 10 MEQ: 10 INJECTION, SOLUTION INTRAVENOUS at 18:28

## 2021-08-28 RX ADMIN — POTASSIUM CHLORIDE 10 MEQ: 10 INJECTION, SOLUTION INTRAVENOUS at 16:04

## 2021-08-28 RX ADMIN — METOCLOPRAMIDE 10 MG: 5 INJECTION, SOLUTION INTRAMUSCULAR; INTRAVENOUS at 13:47

## 2021-08-28 ASSESSMENT — ENCOUNTER SYMPTOMS
BLOOD IN STOOL: 0
VOMITING: 1
CONSTIPATION: 0
DIARRHEA: 0
WHEEZING: 0
RHINORRHEA: 0
NAUSEA: 1
COUGH: 0
ABDOMINAL PAIN: 0
CHEST TIGHTNESS: 0
SHORTNESS OF BREATH: 0
TROUBLE SWALLOWING: 1

## 2021-08-28 ASSESSMENT — PAIN SCALES - GENERAL: PAINLEVEL_OUTOF10: 0

## 2021-08-28 NOTE — FLOWSHEET NOTE
Pt admitted to room 2007 from ED  Oriented to room and surroundings  Bed in lowest position, wheels locked, 2/4 side rails up  Call light in reach, room free of clutter, adequate lighting provided  Denies any further questions at this time  Instructed to call out with any questions/concerns/new onset of pain and/or n/v   White board updated  Continue to monitor with hourly rounding  Non-skid socks on  [address] in place for patient/family to view & ask questions.

## 2021-08-28 NOTE — FLOWSHEET NOTE
Patient has episode of vomiting shortly after arriving on unit;150ml clear emesis. Patient states she feels better after vomiting and resting in bed.

## 2021-08-28 NOTE — FLOWSHEET NOTE
Patient states about her invasive surgery to remove her stomach. States her worries, fears. , states she is always anxious , worried. States great support from her twin sister.  shared in presence, listening, prayers. follow up as needed. 08/28/21 1331   Encounter Summary   Services provided to: Patient   Referral/Consult From: [facility] Family members   Continue Visiting   (8-28-21)   Complexity of Encounter Moderate   Length of Encounter 15 minutes   Spiritual Assessment Completed Yes   Routine   Type Initial   Assessment Anxious; Fearful   Intervention Active listening;Explored feelings, thoughts, concerns;Prayer;Discussed illness/injury and it's impact; Discussed belief system/Scientology practices/lenin;Sustaining presence/ Ministry of presence   Outcome Expressed gratitude;Receptive;Engaged in conversation;Expressed feelings/needs/concerns

## 2021-08-28 NOTE — PROGRESS NOTES
[facility]  Office: [address], DO, Jose J Laraf, DO, Kurt Martinez, DO, Kandace Cespedes Blood, DO, Sen Bright MD, Kody Soto MD, Krys Mcclain MD, Timur Boswell MD, Ulysses Galeas MD, Rosmery Barrera MD, Flora Brian MD, Job Eastman, DO, Manuela Whitaker MD, Jodie Alberto, DO, Monalisa Florence MD,  Tram Arias, DO, Moriah Szymanski MD, Julio César Garza MD, Blanca Moore MD, Sabrina Townsend MD, Selma Nayak MD, Sam Morillo MD, Shelly Kurtz MD, Kristan Robles, [facility], [facility], CNP, Jenise Kim, CNP, Jordan Gamez, CNS, Javier Lainez, CNP, Gerilyn Cockayne, CNP, Hal Sanchez, CNP, Joselyn Lu, CNP, Camillo Schaumann, CNP, Ad Lu PA-C, Josias Bright, [facility], Rena Loud, CNP, Leroy Kaiser, [facility], Ksenia Paula, CNP, Nhan Corrales, CNP, Win Regalado, CNP, Park Wade, CNP, Gladies Kocher, [facility], [facility]      Daily Progress Note     Admit Date: 8/27/2021  Bed/Room No.  2007/2007-02  Admitting Physician : Krys Mcclain MD  Code Status :Full Code  Hospital Day:  LOS: 1 day   Chief Complaint:     Chief Complaint   Patient presents with    Dehydration     sent by PCP    Other     no appetite    Nausea & Vomiting    Cough    Fatigue     Principal Problem:    Dehydration  Active Problems:    Neuroendocrine neoplasm of stomach    Intractable nausea and vomiting    S/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    Severe malnutrition (HCC)    Sinus tachycardia    Weight loss of more than 10% body weight  Resolved Problems:    * No resolved hospital problems. *    Subjective : Interval History/Significant events :  08/28/21    Patient continues to have persistent nausea and multiple episodes of nonbilious nonbiliary emesis. Patient is only able to tolerate liquid. She also complains of esophageal phase dysphagia with solids. She is passing gas but no bowel movement since admission.   Vitals - Stable afebrile  Labs -hypokalemia 3.5. Nursing notes , Consults notes reviewed. Overnight events and updates discussed with Nursing staff . Background History:         Presley Johnston is 62 y.o. female  Who was admitted to the hospital on 8/27/2021 for treatment of Dehydration. Patient was sent to ED by her gastroenterologist. She reported that she has been having intractable nausea and vomiting for many months which is getting worse and was unable to eat and drink for many weeks. Patient has h/o carcinoid tumour and underwent gastrectomy about 10 months ago. She has lost about 140 lbs weight since . Patient reported that she has lost about 25 lbs in last 1 months. She has poor appetite. Patient denies any fever or chills , blood in emesis or stool. She has been having fatigue. She was found to have tachycardia with heart rate in the 130s in office today and was sent to ED. Patient has tried Megace, Reglan, Zofran, Phenergan but did not help. She has also been having esophageal phase dysphagia. Patient did not had any EGD in many months. PMH:  Past Medical History:   Diagnosis Date    Anxiety     Arthritis     knee    Asthma     Colon polyp 02/21/2019    tubular adenoma; hyperplastic polyp    Colon polyps     Cyst of kidney, acquired     bilat.  Fatigue     Hypertension     Hypothyroidism     Neuroendocrine tumor 02/21/2019    of stomach    Obesity     Pharyngoesophageal dysphagia     Vocal cord polyps     Wears glasses       Allergies:    Allergies   Allergen Reactions    Erythromycin Diarrhea      Medications :  pantoprazole, 40 mg, IntraVENous, BID  metoclopramide, 10 mg, IntraVENous, TID  soledad-nelly, 1 tablet, Oral, Daily  levothyroxine, 88 mcg, Oral, Daily  magnesium oxide, 400 mg, Oral, BID  [Held by provider] metoclopramide, 10 mg, Oral, TID AC  megestrol, 400 mg, Oral, Daily  Tab-A-Nelly/Beta Carotene, 1 tablet, Oral, Daily  [Held by provider] pantoprazole, 40 mg, Oral, BID AC  potassium chloride, 20 mEq, Oral, BID  vitamin B-12, 500 mcg, Oral, Daily  Vitamin D, 1,000 Units, Oral, Daily  sucralfate, 1 g, Oral, BID  sodium chloride flush, 5-40 mL, IntraVENous, 2 times per day  enoxaparin, 40 mg, Subcutaneous, Daily        Review of Systems   Review of Systems   Constitutional: Positive for unexpected weight change. Negative for appetite change, fatigue and fever. HENT: Positive for trouble swallowing. Negative for congestion, rhinorrhea and sneezing. Eyes: Negative for visual disturbance. Respiratory: Negative for cough, chest tightness, shortness of breath and wheezing. Cardiovascular: Negative for chest pain and palpitations. Gastrointestinal: Positive for nausea and vomiting. Negative for abdominal pain, blood in stool, constipation and diarrhea. Genitourinary: Negative for dysuria, enuresis, frequency and hematuria. Musculoskeletal: Negative for arthralgias and myalgias. Skin: Negative for rash. Neurological: Negative for dizziness, weakness, light-headedness and headaches. Hematological: Does not bruise/bleed easily. Psychiatric/Behavioral: Negative for dysphoric mood and sleep disturbance.      Objective :      Current Vitals : Temp: 98.8 °F (37.1 °C),  Pulse: 87, Resp: 16, BP: (!) 140/91, SpO2: 93 %  Last 24 Hrs Vitals   Patient Vitals for the past 24 hrs:   BP Temp Temp src Pulse Resp SpO2 Height Weight   08/28/21 0755 (!) 140/91 98.8 °F (37.1 °C) Oral 87 16 93 % -- --   08/27/21 2221 -- -- -- -- -- -- -- 144 lb 8 oz (65.5 kg)   08/27/21 2152 (!) 144/83 98.4 °F (36.9 °C) Oral 99 16 99 % -- --   08/27/21 2100 127/87 -- -- 91 17 97 % -- --   08/27/21 2000 (!) 143/96 -- -- 102 26 94 % -- --   08/27/21 1900 (!) 142/99 -- -- 111 16 97 % -- --   08/27/21 1845 (!) 147/106 -- -- 115 17 98 % -- --   08/27/21 1830 (!) 142/102 -- -- 118 20 96 % -- --   08/27/21 1815 (!) 139/97 -- -- 110 21 97 % -- --   08/27/21 1800 (!) 146/102 -- -- 111 20 97 % -- --   08/27/21 6534 (!) 139/97 -- -- 113 18 96 % -- --   08/27/21 1715 (!) 150/97 -- -- 98 19 99 % -- --   08/27/21 1700 (!) 171/115 -- -- 147 19 96 % -- --   08/27/21 1645 (!) 143/100 -- -- 108 15 98 % -- --   08/27/21 1630 114/87 -- -- 113 19 99 % -- --   08/27/21 1615 114/83 -- -- 107 25 97 % -- --   08/27/21 1600 129/89 -- -- 109 18 97 % -- --   08/27/21 1545 123/86 -- -- 107 19 97 % -- --   08/27/21 1536 (!) 147/100 -- -- 113 23 98 % -- --   08/27/21 1235 (!) 141/104 99.2 °F (37.3 °C) Oral 136 18 97 % 5' 5\" (1.651 m) 143 lb (64.9 kg)     Intake / output   08/27 0701 - 08/28 0700  In: 675 [I.V.:675]  Out: 500 [Urine:350]  Physical Exam:  Physical Exam  Vitals and nursing note reviewed. Constitutional:       General: She is not in acute distress. Appearance: She is underweight. She is not diaphoretic. HENT:      Head: Normocephalic and atraumatic. Nose:      Right Sinus: No maxillary sinus tenderness or frontal sinus tenderness. Left Sinus: No maxillary sinus tenderness or frontal sinus tenderness. Mouth/Throat:      Pharynx: No oropharyngeal exudate. Eyes:      General: No scleral icterus. Conjunctiva/sclera: Conjunctivae normal.      Pupils: Pupils are equal, round, and reactive to light. Neck:      Thyroid: No thyromegaly. Vascular: No JVD. Cardiovascular:      Rate and Rhythm: Normal rate and regular rhythm. Pulses:           Dorsalis pedis pulses are 2+ on the right side and 2+ on the left side. Heart sounds: Normal heart sounds. No murmur heard. Pulmonary:      Effort: Pulmonary effort is normal.      Breath sounds: Normal breath sounds. No wheezing or rales. Abdominal:      Palpations: Abdomen is soft. There is no mass. Tenderness: There is no abdominal tenderness. Musculoskeletal:      Cervical back: Full passive range of motion without pain and neck supple. Lymphadenopathy:      Head:      Right side of head: No submandibular adenopathy.       Left side of head: CHEST PORTABLE    Result Date: 8/27/2021  No acute cardiopulmonary process and no change from prior studies. Clinical Course : unchanged  Assessment and Plan  :        Intractable nausea vomiting with dehydration and abnormal weight loss - continue IV fluids, antiemetics. add scopolamine patch , GI consult. Patient may benefit from EGD to rule out secondary cause for nausea vomiting and evaluate dysphagia. Severe malnutrition - may need TPN versus J-tube placement. H/o carcinoid s/p total gastrectomy and Qamar-en-Y esophageal jejunal anastomosis -     Discussed with Dr Fawad Patel . EGD soon   Continue to monitor vitals , Intake / output ,  Cell count , HGB , Kidney function, oxygenation  as indicated . Plan and updates discussed with patient ,  answers  explained to satisfaction.    Plan discussed with Staff Castillo Benoit RN     (Please note that portions of this note were completed with a voice recognition program. Efforts were made to edit the dictations but occasionally words are mis-transcribed.)      Leslie Rubalcava MD  8/28/2021

## 2021-08-28 NOTE — PLAN OF CARE
Problem: Falls - Risk of:  Goal: Will remain free from falls  Description: Will remain free from falls  Outcome: Ongoing  Note: Siderails up x 2  Hourly rounding  Call light in reach  Instructed to call for assist before attempting out of bed.   Remains free from falls and accidental injury at this time   Floor free from obstacles  Bed is locked and in lowest position  Adequate lighting provided  Bed alarm on, [place] star and Stay with Me signs posted   Goal: Absence of physical injury  Description: Absence of physical injury  Outcome: Ongoing     Problem: Nausea/Vomiting:  Goal: Absence of nausea/vomiting  Description: Absence of nausea/vomiting  Outcome: Ongoing  Goal: Able to drink  Description: Able to drink  Outcome: Ongoing  Goal: Able to eat  Description: Able to eat  Outcome: Ongoing  Goal: Ability to achieve adequate nutritional intake will improve  Description: Ability to achieve adequate nutritional intake will improve  Outcome: Ongoing     Problem: Fluid Volume:  Goal: Signs and symptoms of dehydration will decrease  Description: Signs and symptoms of dehydration will decrease  Outcome: Ongoing  Goal: Ability to achieve a balanced intake and output will improve  Description: Ability to achieve a balanced intake and output will improve  Outcome: Ongoing  Goal: Diagnostic test results will improve  Description: Diagnostic test results will improve  Outcome: Ongoing     Problem: Physical Regulation:  Goal: Complications related to the disease process, condition or treatment will be avoided or minimized  Description: Complications related to the disease process, condition or treatment will be avoided or minimized  Outcome: Ongoing  Goal: Ability to maintain vital signs within normal range will improve  Description: Ability to maintain vital signs within normal range will improve  Outcome: Ongoing     Problem: Pain:  Goal: Pain level will decrease  Description: Pain level will decrease  Outcome: Ongoing  Goal: Control of acute pain  Description: Control of acute pain  Outcome: Ongoing  Goal: Control of chronic pain  Description: Control of chronic pain  Outcome: Ongoing

## 2021-08-28 NOTE — CARE COORDINATION
Case Management Initial Discharge Plan  Kateri Landau,         Readmission Risk              Risk of Unplanned Readmission:  30             Met with:patient to discuss discharge plans. Information verified: address, contacts, phone number, , insurance Yes  PCP: Kalen Lazo MD  Date of last visit: 2021     Insurance Provider: Generic commercial     Discharge Planning  Current Residence:  2 story home with twin sister   Living Arrangements:  Family Members   Home has 2 stories/4 stairs to climb into home   Support Systems:  Family Members, Children, Friends/Neighbors  Current Services PTA:  Na  Agency: na   Patient able to perform ADL's:Independent  DME in home:  Na   DME used to aid ambulation prior to admission:   Na   DME used during admission:  Na     Potential Assistance Needed:  [address]: [facility] Medications:  No  Does patient want to participate in local refill/ meds to beds program?       Patient agreeable to home care: No  [place] of choice provided:  n/a      Type of Home Care Services:  None  Patient expects to be discharged to:       Prior SNF/Rehab Placement and Facility: no   Agreeable to SNF/Rehab: No  [place] of choice provided: n/a   Evaluation: n/a    Expected Discharge date:  21  Follow Up Appointment: Best Day/ Time:      Transportation provider: self   Transportation arrangements needed for discharge: No    Discharge Plan: Pt is independent, drives, no dme. Lives with family. Declines vns/ecf. Eliquis is a home med for hx PE     Discussed vns/ecf-pt relays she is weak dt n/v but does not think she will need vns/ecf. She used to work in an snf and retired. She lives with her twin sister and has family support. She drove self here. Poss EGD today.    NPO  IVF  Reglan IV  MS IV  Zofran IV          Electronically signed by Addison Carlton RN on 21 at 10:53 AM EDT

## 2021-08-28 NOTE — ACP (ADVANCE CARE PLANNING)
Advance Care Planning     Advance Care Planning Activator (Inpatient)  Conversation Note      Date of ACP Conversation: 8/28/2021     Conversation Conducted with: Patient with Decision Making Capacity    ACP Activator: Geraldo May RN        Health Care Decision Maker: self/sisters     Current Designated Health Care Decision Maker: self/sisters    Click here to complete Healthcare Decision Makers including section of the Healthcare Decision Maker Relationship (ie \"Primary\")  Today we documented Decision Maker(s) consistent with Legal Next of Kin hierarchy. Care Preferences    Ventilation: \"If you were in your present state of health and suddenly became very ill and were unable to breathe on your own, what would your preference be about the use of a ventilator (breathing machine) if it were available to you? \"      Would the patient desire the use of ventilator (breathing machine)?: yes    \"If your health worsens and it becomes clear that your chance of recovery is unlikely, what would your preference be about the use of a ventilator (breathing machine) if it were available to you? \"     Would the patient desire the use of ventilator (breathing machine)?: undecided       Resuscitation  \"CPR works best to restart the heart when there is a sudden event, like a heart attack, in someone who is otherwise healthy. Unfortunately, CPR does not typically restart the heart for people who have serious health conditions or who are very sick. \"    \"In the event your heart stopped as a result of an underlying serious health condition, would you want attempts to be made to restart your heart (answer \"yes\" for attempt to resuscitate) or would you prefer a natural death (answer \"no\" for do not attempt to resuscitate)? \" undecided       [] Yes   [] No   Educated Patient / Prabhu Mariee regarding differences between Advance Directives and portable DNR orders.     Length of ACP Conversation in minutes:  10  Conversation Outcomes:  [x] ACP discussion completed  [] Existing advance directive reviewed with patient; no changes to patient's previously recorded wishes  [] New Advance Directive completed  [] Portable Do Not Rescitate prepared for Provider review and signature  [] POLST/POST/MOLST/MOST prepared for Provider review and signature      Follow-up plan:    [] Schedule follow-up conversation to continue planning  [x] Referred individual to Provider for additional questions/concerns   [] Advised patient/agent/surrogate to review completed ACP document and update if needed with changes in condition, patient preferences or care setting    [] This note routed to one or more involved healthcare providers        Pt is single, no kids, parents are . Siblings are legal next of kin.

## 2021-08-28 NOTE — PROGRESS NOTES
Transitions of Care Pharmacy Service   Medication Review    The patient's list of current home medications has been reviewed. Source(s) of information: patient, Epic, Surescripts refill report    Medications that need to be addressed by a physician/nurse practitioner: none -- discrepancies resolved      Please feel free to call me with any questions about this encounter. Thank you.     Sagar [facility]   Transitions of Care Pharmacy Service  Phone:  [phone]  Fax: [phone]      Electronically signed by Sagar [facility] on 8/28/2021 at 4:42 PM         Medications Prior to Admission:   hydroCHLOROthiazide (HYDRODIURIL) 25 MG tablet, Take 12.5 mg by mouth daily Takes 1/2 tab (=12.5mg) daily  magnesium oxide (MAG-OX) 400 MG tablet, Take 400 mg by mouth 2 times daily  levothyroxine (SYNTHROID) 88 MCG tablet, Take 88 mcg by mouth Five times weekly Indications: Takes only Wed-Sun (skips Mon/Tues)  megestrol (MEGACE) 40 MG/ML suspension, Take 10 mLs by mouth daily  apixaban (ELIQUIS) 5 MG TABS tablet, Take 1 tablet by mouth 2 times daily  metoclopramide (REGLAN) 10 MG tablet, TAKE 1 TABLET BY MOUTH 3 TIMES DAILY (BEFORE MEALS)  pantoprazole (PROTONIX) 40 MG tablet, Take 1 tablet by mouth 2 times daily  sucralfate (CARAFATE) 1 GM tablet, Take 1 tablet by mouth 4 times daily (Patient taking differently: Take 1 g by mouth 2 times daily for convenience)  potassium chloride (KLOR-CON) 20 MEQ packet, Take 20 mEq by mouth daily  Folic Acid-Vit A4-ZWV D98 2.2-25-0.5 MG TABS, Take 1 tablet by mouth daily  desonide (DESOWEN) 0.05 % cream, Apply topically 2 times daily as needed   Multiple Vitamin (MULTI-DAY VITAMINS PO), Take 1 tablet by mouth daily   vitamin B-12 (CYANOCOBALAMIN) 500 MCG tablet, Take 500 mcg by mouth daily

## 2021-08-28 NOTE — PLAN OF CARE
PRE CONSULT ROUNDING NOTE  HPI  62year old female with pmh of total gastrectomy in 2020 due to stomach neuroendocrine tumors,  DVT asthma kidney cysts htn hypothyroidism who was sent from dr Vogel Favor office to be admitted for dehydration, continual nausea and emesis for the last three weeks. She has lost 25# in the last several weeks and has fatigue, was tachycardic in the office. States she has nausea and vomiting of food and liquids every time she tries to eat/drink. Has mild intermittent umbilical pain. No fevers or chills. She takes eliquis for prior DVT. inr 1.1alk phos 112 ast 77. Ct abd yesterday shows a diffusely hypoattenuating liver with mild intra and extrahepatic biliary dilatation, prior cholecystectomy, previous gastrectomy hepatic steatosis. She has not had dysphagia melena hematemesis hematochezia change in the bowel habits rash.      Endoscopy 4/6/21 egd (kelsie) severe reflux esophagitis healthy anastomosis 1/22/20 EUS (bleibel) gastric neuroendocrine tumors no record of colonoscopy -but tubular adenomas and hyperplastic polyps were removed in 2019 per [facility] notes    Family reports sister had stomach neuroendocrine tumor removed, no hx of liver pancreatic or colon cancer no uc/crohns  Social quit  smoking drinks etoh three times per month no illicit drugs   /62   Pulse 85   Temp 98.2 °F (36.8 °C) (Oral)   Resp 16   Ht 5' 5\" (1.651 m)   Wt 144 lb 8 oz (65.5 kg)   LMP 01/01/1994   SpO2 99%   BMI 24.05 kg/m²     ROS as above meds labs imaging and past medical records were reviewed    Exam  General Appearance: alert and oriented to person, place and time, well-developed and mal-nourished, in no acute distress  Skin: warm and dry, no rash or erythema  Head: normocephalic and atraumatic  Eyes: pupils equal, round, and reactive to light, extraocular eye movements intact, conjunctivae normal  ENT: hearing grossly normal bilaterally  Neck: neck supple and non tender without mass, no thyromegaly or thyroid nodules, no cervical lymphadenopathy   Pulmonary/Chest: clear to auscultation bilaterally- no wheezes, rales or rhonchi, normal air movement, no respiratory distress  Cardiovascular: normal rate, regular rhythm, normal S1 and S2, no murmurs, rubs, clicks or gallops, distal pulses intact, no carotid bruits  Abdomen: soft, obese non tender, non-distended, normal bowel sounds, no masses or organomegaly no ascites  Extremities: no cyanosis, clubbing or edema  Musculoskeletal: normal range of motion, no joint swelling, deformity or tenderness  Neurologic: no cranial nerve deficit and muscle strength normal    Assessment  Dehydration with nausea and emesis with weight loss  Previous gastrectomy for neuroendocrine tumor  Elevated lt's, imaging showing steatosis    Plan  S/w md, will plan for egd Monday to r/o obstruction  Antiemetics prn  Hold the [facility]  Continue IV rehydration  She is not a candidate for peg tube due to previous surgery if nutrition is needed may need TPN vs jejunostomy placement by surgery   Formal gi consult to follow  . Monique Conti, APRN - CNP

## 2021-08-28 NOTE — PROGRESS NOTES
Occupational Therapy   Occupational Therapy Initial Assessment  Date: 2021   Patient Name: Chela Turk  MRN: [id number]     : 1960    Date of Service: 2021    Discharge Recommendations:  Patient would benefit from continued therapy after discharge   d/t recent hospitalization and medical condition, pt would benefit from additonal therapy at time of discharge to ensure safety. Please refer to AM-PAC score for current functional status. Assessment   Performance deficits / Impairments: Decreased functional mobility ; Decreased ADL status; Decreased strength;Decreased endurance;Decreased safe awareness;Decreased balance;Decreased high-level IADLs  Assessment: Skilled OT is indicated to increase overall safety and IND with self-care and functional tasks to return home with assistance as needed  Prognosis: Good  Decision Making: Medium Complexity  OT Education: Plan of Care;OT Role;ADL Adaptive Strategies;Transfer Training;Energy Conservation;Home Exercise Program;IADL Safety;Equipment; Family Education  Patient Education: OT POC, benefits of OOB activity, call light use fall safety  REQUIRES OT FOLLOW UP: Yes  Activity Tolerance  Activity Tolerance: Patient Tolerated treatment well  Safety Devices  Safety Devices in place: Yes  Type of devices: All fall risk precautions in place;Gait belt;Bed alarm in place; Left in bed;Call light within reach;Nurse notified           Patient Diagnosis(es): The primary encounter diagnosis was General weakness. Diagnoses of Dehydration and Weight loss were also pertinent to this visit. has a past medical history of Anxiety, Arthritis, Asthma, Colon polyp, Colon polyps, Cyst of kidney, acquired, Fatigue, Hypertension, Hypothyroidism, Neuroendocrine tumor, Obesity, Pharyngoesophageal dysphagia, Vocal cord polyps, and Wears glasses. has a past surgical history that includes cystourethroscopy/stent removal (2011); Colonoscopy (2019); ERCP;  Cholecystectomy (10/09/2014); hip surgery (Left); Throat surgery; Colonoscopy; Upper gastrointestinal endoscopy (02/21/2019); Upper gastrointestinal endoscopy (09/23/2019); Upper gastrointestinal endoscopy (N/A, 09/23/2019); Upper gastrointestinal endoscopy (N/A, 10/23/2019); Upper gastrointestinal endoscopy (N/A, 10/29/2019); Endoscopic ultrasonography, GI (N/A, 01/22/2020); Upper gastrointestinal endoscopy (N/A, 04/26/2021); and gastrectomy (N/A, 11/30/2020). H&P: The patient is a 62 y.o. Non- / non  female who presents with Dehydration (sent by PCP), Other (no appetite), Nausea & Vomiting, Cough, and Fatigue   and she is admitted to the hospital for the management of  Dehydration. Patient was sent to ED by her gastroenterologist. She reported that she has been having intractable nausea and vomiting for many months which is getting worse and was unable to eat and drink for many weeks. Patient has h/o carcinoid tumour and underwent gastrectomy about 10 months ago. She has lost about 140 lbs weight since . Patient reported that she has lost about 25 lbs in last 1 months. She has poor appetite. Patient denies any fever or chills , blood in emesis or stool. She has been having fatigue. She was found to have tachycardia with heart rate in the 130s in office today and was sent to ED. Patient has tried Megace, Reglan, Zofran, Phenergan but did not help. Patient did not had any EGD in many months.          Restrictions  Restrictions/Precautions  Restrictions/Precautions: General Precautions, Up as Tolerated  Required Braces or Orthoses?: No  Position Activity Restriction  Other position/activity restrictions: RUE IV    Subjective   General  Chart Reviewed: Yes  Patient assessed for rehabilitation services?: Yes  Family / Caregiver Present: No  Patient Currently in Pain: Denies  Vital Signs  Temp: 98.2 °F (36.8 °C)  Temp Source: Oral  Pulse: 85  Heart Rate Source: Monitor  Resp: 16  BP: 127/80  BP Location: Left Arm  MAP (mmHg): 91  Level of Consciousness: Alert (0)  MEWS Score: 1  Patient Currently in Pain: Denies  Oxygen Therapy  SpO2: 99 %  O2 Device: None (Room air)  Social/Functional History  Social/Functional History  Lives With: Family (sister)  Type of Home: House  Home Layout: Two level  Home Access: Stairs to enter with rails  Entrance Stairs - Number of Steps: 4  Entrance Stairs - Rails: Right  Bathroom Shower/Tub: Tub/Shower unit  Bathroom Toilet: Standard  Bathroom Equipment: Grab bars in shower, Shower chair  Home Equipment:  (no dme)  ADL Assistance: [address]: Independent  Homemaking Responsibilities: No (sister does cooking/cleaning/laundry)  Ambulation Assistance: Independent  Transfer Assistance: Independent  Active : Yes  Occupation: Retired  Type of occupation: activites director  Leisure & Hobbies: no hobbies  Additional Comments: pt reports no recent falls       Objective   Vision: Impaired  Vision Exceptions: Wears glasses at all times  Hearing: Within functional limits       Observation/Palpation  Posture: Good  Observation: pt resting in bed upon arrival  Balance  Sitting Balance: Supervision  Standing Balance: Stand by assistance  Standing Balance  Time: ~5+ min  Activity: self-care tasks  Functional Mobility  Functional - Mobility Device: No device  Activity: To/from bathroom  Assist Level: Stand by assistance  Functional Mobility Comments: Pt with no LOB this date  Toilet Transfers  Toilet - Technique: Ambulating  Equipment Used: Grab bars  Toilet Transfer: Stand by assistance  ADL  Grooming: Stand by assistance;Setup (Pt stood at sink to wash her face and complete oral care with SBA)  UE Bathing: Stand by assistance  LE Bathing: Stand by assistance;Setup (While standing at the sink pt washed her hari area and upper legs with SBA)  UE Dressing: Stand by assistance  LE Dressing:  (Pt adjusted B socks with SBA)  Toileting: Stand by assistance (pt performed all toileting tasks with SBA)  Additional Comments: Pt with dec act pancho, and increased fatigue/weakness.   Tone RUE  RUE Tone: Normotonic  Tone LUE  LUE Tone: Normotonic  Coordination  Movements Are Fluid And Coordinated: Yes     Bed mobility  Supine to Sit: Modified independent  Sit to Supine: Modified independent  Scooting: Modified independent  Transfers  Sit to stand: Stand by assistance  Stand to sit: Stand by assistance  Transfer Comments: Pt with good transfer tech     Cognition  Overall Cognitive Status: WFL        Sensation  Overall Sensation Status: WFL        LUE AROM (degrees)  LUE AROM : WFL  Left Hand AROM (degrees)  Left Hand AROM: WFL  RUE AROM (degrees)  RUE AROM : WFL  Right Hand AROM (degrees)  Right Hand AROM: WFL  LUE Strength  LUE Strength Comment: ~4/5  RUE Strength  RUE Strength Comment: ~4/5                   Plan   Plan  Times per week: 4-5x per week  Times per day: Daily  Current Treatment Recommendations: Strengthening, Patient/Caregiver Education & Training, Endurance Training, Self-Care / ADL, Equipment Evaluation, Education, & procurement, Balance Training, Pain Management, Safety Education & Training, Functional Mobility Training             AM-PAC Score        [facility] Inpatient Daily Activity Raw Score: 19 (08/28/21 1220)  AM-PAC Inpatient ADL T-Scale Score : 40.22 (08/28/21 1220)  ADL Inpatient CMS 0-100% Score: 42.8 (08/28/21 1220)  ADL Inpatient CMS G-Code Modifier : CK (08/28/21 1220)    Goals  Short term goals  Time Frame for Short term goals: by discharge, pt to demo  Short term goal 1: increase in BUE strength by 1/2 grade to assist with self-care and be IND with BUE HEP with use of handouts as needed  Short term goal 2: I/MI for UB and LB ADLs with AE as needed and Good safety  Short term goal 3: I/MI for toileting tasks with use of grab bars as needed and Good safety  Short term goal 4: I/MI for ADL transfers and functional mob with use of AD as needed and Good safety  Short term goal 5: Pt/family to be IND with EC/WS and fall prevention tech as well as DME/AE recommendations with use of handouts as needed  Patient Goals   Patient goals : To go home       Therapy Time   Individual Concurrent Group Co-treatment   Time In 0957         Time Out 1025         Minutes 28                 Upon writer exit, call light within reach, pt retired to bed. All lines intact and patient positioned comfortably. All patient needs addressed prior to ending therapy session. Chart reviewed prior to treatment and patient is agreeable for therapy. RN reports patient is medically stable for therapy treatment this date.     Majo German OTR/L

## 2021-08-28 NOTE — ED PROVIDER NOTES
[facility] ED  Emergency Department Encounter     Pt Name: Chela Turk  MRN: [id number]  [place] 1960  Date of evaluation: 8/27/21  PCP:  Norina Lesch, MD    [address] [place] [address]       Chief Complaint   Patient presents with    Dehydration     sent by PCP    Other     no appetite    Nausea & Vomiting    Cough    Fatigue       HISTORY OFPRESENT ILLNESS  (Location/Symptom, Timing/Onset, Context/Setting, Quality, Duration, Modifying Debara Bearded.)      Chela Turk is a 62 y.o. female who presents with status post gastrectomy approximately a year and a half ago, history of gastric cancer. She does follow with GI doctor Dr. Vitaliy Swift who reportedly saw patient in office today and sent patient for further evaluation. Is reportedly lost about 25 to 30 pounds. Not eating and drinking very much. States any liquid she drinks immediately vomit. She denies any diarrhea or loose stools. Also felt very tired as well as malaise. Did receive Covid vaccination. PAST MEDICAL / SURGICAL / SOCIAL / FAMILY HISTORY      has a past medical history of Anxiety, Arthritis, Asthma, Colon polyp, Colon polyps, Cyst of kidney, acquired, Fatigue, Hypertension, Hypothyroidism, Neuroendocrine tumor, Obesity, Pharyngoesophageal dysphagia, Vocal cord polyps, and Wears glasses. has a past surgical history that includes cystourethroscopy/stent removal (05/03/2011); Colonoscopy (02/21/2019); ERCP; Cholecystectomy (10/09/2014); hip surgery (Left); Throat surgery; Colonoscopy; Upper gastrointestinal endoscopy (02/21/2019); Upper gastrointestinal endoscopy (09/23/2019); Upper gastrointestinal endoscopy (N/A, 09/23/2019); Upper gastrointestinal endoscopy (N/A, 10/23/2019); Upper gastrointestinal endoscopy (N/A, 10/29/2019); Endoscopic ultrasonography, GI (N/A, 01/22/2020); Upper gastrointestinal endoscopy (N/A, 04/26/2021); and gastrectomy (N/A).     Social History     Socioeconomic History    Marital status: Single     Spouse name: Not on file    Number of children: Not on file    Years of education: Not on file    Highest education level: Not on file   Occupational History    Not on file   Tobacco Use    Smoking status: Former Smoker     Packs/day: 1.00     Years: 25.00     Pack years: 25.00     Quit date: 2012     Years since quittin.2    Smokeless tobacco: Never Used    Tobacco comment: quit 2 years   Vaping Use    Vaping Use: Never used   Substance and Sexual Activity    Alcohol use: Not Currently     Comment: 3 times a month    Drug use: No    Sexual activity: Not on file   Other Topics Concern    Not on file   Social History Narrative    Not on file     Social Determinants of Health     Financial Resource Strain: Low Risk     Difficulty of Paying Living Expenses: Not hard at all   Food Insecurity: No Food Insecurity    Worried About [employer] [employer] in the Last Year: Never true    [facility] [employer] in the Last Year: Never true   Transportation Needs:     Lack of Transportation (Medical):      Lack of Transportation (Non-Medical):    Physical Activity:     Days of Exercise per Week:     Minutes of Exercise per Session:    Stress:     Feeling of Stress :    Social Connections:     Frequency of Communication with Friends and Family:     Frequency of Social Gatherings with Friends and Family:     Attends Muslim Services:     Active Member of Clubs or Organizations:     Attends Club or Organization Meetings:     Marital Status:    Intimate Partner Violence:     Fear of Current or Ex-Partner:     Emotionally Abused:     Physically Abused:     Sexually Abused:        Family History   Problem Relation Age of Onset    High Blood Pressure Mother     High Blood Pressure Sister     Stomach Cancer Sister     Other Sister         epilepsy    No Known Problems Father        Allergies:  Erythromycin    Home Medications:  Prior to Admission medications    Medication Sig Start Date End Date Taking? Authorizing Provider   megestrol (MEGACE) 40 MG/ML suspension Take 10 mLs by mouth daily 8/20/21 9/19/21 Yes Klarissa Bedoya MD   apixaban (ELIQUIS) 5 MG TABS tablet Take 1 tablet by mouth 2 times daily 8/20/21 9/19/21 Yes Klarissa Bedoya MD   metoclopramide (REGLAN) 10 MG tablet TAKE 1 TABLET BY MOUTH 3 TIMES DAILY (BEFORE MEALS)  Patient taking differently: Take 10 mg by mouth 3 times daily (before meals) Taking BID 6/16/21  Yes Carla Gil MD   levothyroxine (SYNTHROID) 88 MCG tablet Take 1 tablet by mouth daily 5/26/21  Yes Klarissa Bedoya MD   Magnesium 400 MG TABS Take 1 tablet by mouth daily  Patient taking differently: Take 1 tablet by mouth 2 times daily  5/26/21  Yes Klarissa Bedoya MD   pantoprazole (PROTONIX) 40 MG tablet Take 1 tablet by mouth 2 times daily 4/29/21  Yes Natalia Garnett MD   sucralfate (CARAFATE) 1 GM tablet Take 1 tablet by mouth 4 times daily  Patient taking differently: Take 1 g by mouth 2 times daily  4/29/21  Yes Natalia Garnett MD   potassium chloride (KLOR-CON) 20 MEQ packet Take 20 mEq by mouth daily  Patient taking differently: Take 20 mEq by mouth 2 times daily  4/20/21  Yes Klarissa Bedoya MD   Folic Acid-Vit U6-ZTG F39 2.2-25-0.5 MG TABS Take 1 tablet by mouth daily 3/25/21  Yes Klarissa Bedoya MD   hydroCHLOROthiazide (HYDRODIURIL) 25 MG tablet TAKE 1 TABLET BY MOUTH EVERY DAY  Patient taking differently: 12.5 mg TAKE 1/2 TABLET BY MOUTH EVERY DAY 3/10/21  Yes Klarissa Bedoya MD   desonide (DESOWEN) 0.05 % cream Apply topically 2 times daily.  8/21/20  Yes Klarissa Bedoya MD   Multiple Vitamin (MULTI-DAY VITAMINS PO) Take by mouth   Yes Historical Provider, MD   vitamin B-12 (CYANOCOBALAMIN) 500 MCG tablet Take 500 mcg by mouth daily   Yes Historical Provider, MD   ondansetron (ZOFRAN) 4 MG tablet Take 1 tablet by mouth every 8 hours as needed for Nausea or Vomiting  Patient not taking: Reported on 8/27/2021 4/29/21   Evelyn Garcia Dennis Carter MD   vitamin D 25 MCG (1000 UT) CAPS Take 1 capsule by mouth daily  Patient not taking: Reported on 8/20/2021    Historical Provider, MD       REVIEW OF SYSTEMS    (2-9 systems for level 4, 10 or more for level 5)      Review of Systems   Constitutional: Positive for chills, fatigue and unexpected weight change. Eyes: Negative for discharge and redness. Respiratory: Negative for shortness of breath. Cardiovascular: Negative for chest pain. Gastrointestinal: Positive for abdominal pain, nausea and vomiting. Genitourinary: Negative for flank pain. Musculoskeletal: Negative for back pain. Skin: Negative for rash. Allergic/Immunologic: Positive for environmental allergies. Neurological: Negative for headaches. Psychiatric/Behavioral: Negative for agitation and confusion. PHYSICAL EXAM   (up to 7 for level 4, 8 or more for level 5)     INITIAL VITALS:    height is 5' 5\" (1.651 m) and weight is 143 lb (64.9 kg). Her oral temperature is 99.2 °F (37.3 °C). Her blood pressure is 146/102 (abnormal) and her pulse is 111. Her respiration is 20 and oxygen saturation is 97%. Physical Exam  Vitals and nursing note reviewed. Constitutional:       Appearance: She is well-developed. HENT:      Head: Normocephalic and atraumatic. Nose: Nose normal.      Mouth/Throat:      Mouth: Mucous membranes are dry. Eyes:      General: No scleral icterus. Conjunctiva/sclera: Conjunctivae normal.      Pupils: Pupils are equal, round, and reactive to light. Neck:      Trachea: No tracheal deviation. Cardiovascular:      Rate and Rhythm: Regular rhythm. Tachycardia present. Heart sounds: Normal heart sounds. No murmur heard. No friction rub. No gallop. Pulmonary:      Effort: Pulmonary effort is normal. No respiratory distress. Breath sounds: Normal breath sounds. No wheezing or rales. Abdominal:      General: There is no distension. Palpations: Abdomen is soft.  There is no mass. Tenderness: There is abdominal tenderness. There is no guarding. Hernia: No hernia is present. Comments: Minimal tenderness to epigastric palpation, no guarding no mass no rebound   Musculoskeletal:         General: Normal range of motion. Cervical back: Neck supple. Skin:     General: Skin is warm and dry. Findings: No erythema or rash. Neurological:      Mental Status: She is alert and oriented to person, place, and time.    Psychiatric:         Behavior: Behavior normal.         DIFFERENTIAL  DIAGNOSIS     PLAN (LABS / IMAGING / EKG):  Orders Placed This Encounter   Procedures    COVID-19, Rapid    CT ABDOMEN PELVIS W IV CONTRAST Additional Contrast? None    XR CHEST PORTABLE    CBC Auto Differential    Comprehensive Metabolic Panel    Lipase    Troponin    Lactate, Sepsis    Urinalysis Reflex to Culture    TSH with Reflex    Basic Metabolic Panel w/ Reflex to MG    CBC    Protime-INR    T4, Free    Microscopic Urinalysis    Vital signs per unit routine    Notify physician    Full Code    Inpatient consult to Primary Care Provider    OT eval and treat    PT evaluation and treat    Initiate Oxygen Therapy Protocol    Pulse Oximetry Spot Check    EKG 12 Lead    PATIENT STATUS (FROM ED OR OR/PROCEDURAL) Inpatient       MEDICATIONS ORDERED:  Orders Placed This Encounter   Medications    ondansetron (ZOFRAN) injection 4 mg    DISCONTD: 0.9 % sodium chloride bolus    morphine sulfate (PF) injection 4 mg    lactated ringers bolus    0.9 % sodium chloride bolus    iopamidol (ISOVUE-370) 76 % injection 75 mL    sodium chloride flush 0.9 % injection 10 mL    lactated ringers bolus    ondansetron (ZOFRAN) injection 4 mg    soledad-nelly tablet 1 tablet    levothyroxine (SYNTHROID) tablet 88 mcg    magnesium oxide (MAG-OX) tablet 400 mg    metoclopramide (REGLAN) tablet 10 mg    megestrol (MEGACE) 40 MG/ML suspension 400 mg    Tab-A-Nelly/Beta Carotene TABS 1 tablet    DISCONTD: ondansetron (ZOFRAN) tablet 4 mg    pantoprazole (PROTONIX) tablet 40 mg    potassium chloride (KLOR-CON M) extended release tablet 20 mEq    vitamin B-12 (CYANOCOBALAMIN) tablet 500 mcg    Vitamin D (CHOLECALCIFEROL) tablet 1,000 Units    sucralfate (CARAFATE) tablet 1 g    sodium chloride flush 0.9 % injection 5-40 mL    sodium chloride flush 0.9 % injection 10 mL    0.9 % sodium chloride infusion    OR Linked Order Group     potassium chloride (KLOR-CON M) extended release tablet 40 mEq     potassium bicarb-citric acid (EFFER-K) effervescent tablet 40 mEq     potassium chloride 10 mEq/100 mL IVPB (Peripheral Line)    magnesium sulfate 1000 mg in dextrose 5% 100 mL IVPB    enoxaparin (LOVENOX) injection 40 mg    OR Linked Order Group     ondansetron (ZOFRAN-ODT) disintegrating tablet 4 mg     ondansetron (ZOFRAN) injection 4 mg    polyethylene glycol (GLYCOLAX) packet 17 g    OR Linked Order Group     acetaminophen (TYLENOL) tablet 650 mg     acetaminophen (TYLENOL) suppository 650 mg    0.9 % sodium chloride infusion       DDX: Dehydration versus electrolyte abnormality versus bowel obstruction    Initial MDM/Plan: 62 y.o. female who presents with multiple complaints and significant history of gastric cancer with gastrectomy. We will get extensive laboratory work-up, CT imaging, Covid testing given nonspecific nature of complaints. As discussed with the GI doctor we will plan admission for fluid hydration, TPN versus J-tube placement.     DIAGNOSTIC RESULTS / EMERGENCY DEPARTMENT COURSE / MDM     LABS:  Labs Reviewed   CBC WITH AUTO DIFFERENTIAL - Abnormal; Notable for the following components:       Result Value    Seg Neutrophils 67 (*)     Eosinophils % 0 (*)     All other components within normal limits   COMPREHENSIVE METABOLIC PANEL - Abnormal; Notable for the following components:    Alkaline Phosphatase 112 (*)     AST 77 (*)     Albumin 3.0 (*) All other components within normal limits   LIPASE - Abnormal; Notable for the following components:    Lipase 6 (*)     All other components within normal limits   URINE RT REFLEX TO CULTURE - Abnormal; Notable for the following components:    Turbidity UA SLIGHTLY CLOUDY (*)     Bilirubin Urine   (*)     Value: Presumptive positive. Unable to confirm due to unavailability of reagent. Specific Gravity, UA <1.005 (*)     Urine Hgb TRACE (*)     Urobilinogen, Urine ELEVATED (*)     Leukocyte Esterase, Urine SMALL (*)     All other components within normal limits   TSH WITH REFLEX - Abnormal; Notable for the following components:    TSH 0.03 (*)     All other components within normal limits   MICROSCOPIC URINALYSIS - Abnormal; Notable for the following components:    Bacteria, UA FEW (*)     All other components within normal limits   COVID-19, RAPID   TROPONIN   LACTATE, SEPSIS   T4, FREE   BASIC METABOLIC PANEL W/ REFLEX TO MG FOR LOW K   CBC   PROTIME-INR         RADIOLOGY:  CT ABDOMEN PELVIS W IV CONTRAST Additional Contrast? None    Result Date: 8/27/2021  EXAMINATION: CT OF THE ABDOMEN AND PELVIS WITH CONTRAST 8/27/2021 3:10 pm TECHNIQUE: CT of the abdomen and pelvis was performed with the administration of intravenous contrast. Multiplanar reformatted images are provided for review. Dose modulation, iterative reconstruction, and/or weight based adjustment of the mA/kV was utilized to reduce the radiation dose to as low as reasonably achievable.  COMPARISON: CT abdomen and pelvis dated 04/26/2021 HISTORY: ORDERING SYSTEM PROVIDED HISTORY: 25 pound weight loss, history of gastrectomy for gastric cancer, nausea, vomiting, assess for obstruction TECHNOLOGIST PROVIDED HISTORY: 25 pound weight loss, history of gastrectomy for gastric cancer, nausea, vomiting, assess for obstruction Decision Support Exception - unselect if not a suspected or confirmed emergency medical condition->Emergency Medical Condition (MA) Reason for Exam: Pt states loss of appetite for 3 weeks, weakness, low abd pain. 25 pound weight loss in 3 weeks. Acuity: Acute Type of Exam: Initial FINDINGS: Lower Chest:  Visualized portion of the lower chest demonstrates no acute abnormality. Organs: Liver is diffusely hypoattenuating. There is mild intrahepatic and extrahepatic biliary dilatation, which is unchanged. Gallbladder is surgically absent. Spleen, pancreas, and adrenal glands are unremarkable. There is symmetric enhancement of the kidneys. Multiple bilateral renal cysts are noted. Right kidney is intrapelvic, which is unchanged. No hydronephrosis or perinephric inflammatory change. GI/Bowel: There is no abnormal bowel distention or pericolonic inflammation. No free intraperitoneal air or fluid. Appendix is normal.  Postoperative changes related to previous gastrectomy are noted in the upper abdomen, similar to the prior study. Detailed assessment is limited by bowel peristalsis and respiratory motion in the upper abdomen. Pelvis: Urinary bladder is incompletely distended but appears to be unremarkable. Uterus and adnexal structures are grossly unremarkable. No pelvic lymphadenopathy. Peritoneum/Retroperitoneum: The abdominal aorta is normal in caliber. There is no retroperitoneal or mesenteric lymphadenopathy. Bones/Soft Tissues: There is irregular soft tissue density and coarse calcifications in the subcutaneous fat lateral to the left hip, which is unchanged. Soft tissues are otherwise unremarkable. No acute or suspicious osseous abnormality. 1.  No acute process seen in the abdomen or pelvis. Patient is status post gastrectomy, and detailed assessment of the anastomosis in the upper abdomen is limited by bowel peristalsis and respiratory motion 2. Hepatic steatosis. 3.  Unchanged intrahepatic and extrahepatic biliary dilatation status post cholecystectomy. 4.  Pelvic right kidney is redemonstrated.   Multiple bilateral renal DISPOSITION Admitted 08/27/2021 05:54:57 PM        PATIENTREFERRED TO:  No follow-up provider specified.     DISCHARGE MEDICATIONS:  New Prescriptions    No medications on file       David Egan DO  EmergencyMedicine Attending    (Please note that portions of this note were completed with a voice recognition program.  Efforts were made to edit the dictations but occasionally words are mis-transcribed.)       David Egan DO  08/27/21 2123

## 2021-08-28 NOTE — PLAN OF CARE
Problem: Falls - Risk of:  Goal: Will remain free from falls  Description: Will remain free from falls  8/28/2021 1218 by Xavier Bosworth, RN  Outcome: Ongoing   Patient ambulatory with steady gait  Patient independent of ADLs  Hourly rounding  Standard safety precautions  Problem: Nausea/Vomiting:  Goal: Absence of nausea/vomiting  Description: Absence of nausea/vomiting  8/28/2021 1218 by Xavier Bosworth, RN  Outcome: Ongoing     Problem: Fluid Volume:  Goal: Signs and symptoms of dehydration will decrease  Description: Signs and symptoms of dehydration will decrease  8/28/2021 1218 by Xavier Bosworth, RN  Outcome: Ongoing

## 2021-08-29 PROCEDURE — 2580000003 HC RX 258: Performed by: FAMILY MEDICINE

## 2021-08-29 PROCEDURE — 6370000000 HC RX 637 (ALT 250 FOR IP): Performed by: FAMILY MEDICINE

## 2021-08-29 PROCEDURE — 99232 SBSQ HOSP IP/OBS MODERATE 35: CPT | Performed by: FAMILY MEDICINE

## 2021-08-29 PROCEDURE — 6360000002 HC RX W HCPCS: Performed by: FAMILY MEDICINE

## 2021-08-29 PROCEDURE — C9113 INJ PANTOPRAZOLE SODIUM, VIA: HCPCS | Performed by: NURSE PRACTITIONER

## 2021-08-29 PROCEDURE — APPSS30 APP SPLIT SHARED TIME 16-30 MINUTES: Performed by: NURSE PRACTITIONER

## 2021-08-29 PROCEDURE — 99232 SBSQ HOSP IP/OBS MODERATE 35: CPT | Performed by: INTERNAL MEDICINE

## 2021-08-29 PROCEDURE — 6360000002 HC RX W HCPCS: Performed by: NURSE PRACTITIONER

## 2021-08-29 PROCEDURE — 1200000000 HC SEMI PRIVATE

## 2021-08-29 RX ADMIN — METOCLOPRAMIDE 10 MG: 5 INJECTION, SOLUTION INTRAMUSCULAR; INTRAVENOUS at 14:30

## 2021-08-29 RX ADMIN — SODIUM CHLORIDE, PRESERVATIVE FREE 10 ML: 5 INJECTION INTRAVENOUS at 20:36

## 2021-08-29 RX ADMIN — PANTOPRAZOLE SODIUM 40 MG: 40 INJECTION, POWDER, FOR SOLUTION INTRAVENOUS at 07:55

## 2021-08-29 RX ADMIN — ONDANSETRON 4 MG: 2 INJECTION INTRAMUSCULAR; INTRAVENOUS at 11:36

## 2021-08-29 RX ADMIN — PANTOPRAZOLE SODIUM 40 MG: 40 INJECTION, POWDER, FOR SOLUTION INTRAVENOUS at 20:37

## 2021-08-29 RX ADMIN — SODIUM CHLORIDE: 9 INJECTION, SOLUTION INTRAVENOUS at 00:23

## 2021-08-29 RX ADMIN — LEVOTHYROXINE SODIUM 88 MCG: 0.09 TABLET ORAL at 06:35

## 2021-08-29 RX ADMIN — ENOXAPARIN SODIUM 40 MG: 40 INJECTION SUBCUTANEOUS at 07:55

## 2021-08-29 RX ADMIN — METOCLOPRAMIDE 10 MG: 5 INJECTION, SOLUTION INTRAMUSCULAR; INTRAVENOUS at 07:55

## 2021-08-29 RX ADMIN — SODIUM CHLORIDE: 9 INJECTION, SOLUTION INTRAVENOUS at 14:30

## 2021-08-29 ASSESSMENT — ENCOUNTER SYMPTOMS
COUGH: 0
TROUBLE SWALLOWING: 1
ABDOMINAL PAIN: 0
DIARRHEA: 0
WHEEZING: 0
NAUSEA: 1
SHORTNESS OF BREATH: 0
RHINORRHEA: 0
BLOOD IN STOOL: 0
CHEST TIGHTNESS: 0
CONSTIPATION: 0
VOMITING: 1

## 2021-08-29 NOTE — PROGRESS NOTES
or rhonchi, normal air movement, no respiratory distress  Cardiovascular: normal rate, regular rhythm, normal S1 and S2, no murmurs, rubs, clicks or gallops, distal pulses intact, no carotid bruits  Abdomen: soft, non-tender, non-distended, normal bowel sounds, no masses or organomegaly  Extremities: no cyanosis, clubbing or edema  Musculoskeletal: normal range of motion, no joint swelling, deformity or tenderness  Neurologic: no cranial nerve deficit and muscle strength normal    Lab and Imaging Review     CBC  Recent Labs     08/27/21  1415 08/28/21  0616   WBC 5.6 5.4   HGB 13.9 11.9   HCT 43.5 37.8   MCV 92.9 95.7    229       BMP  Recent Labs     08/27/21  1415 08/28/21  0616    139   K 3.9 3.5*    104   CO2 26 25   BUN 8 5*   CREATININE 0.57 0.45*   GLUCOSE 98 79   CALCIUM 9.0 8.3*       LFTS  Recent Labs     08/27/21  1415   ALKPHOS 112*   ALT 31   AST 77*   PROT 6.7   BILITOT 0.74   LABALBU 3.0*       AMYLASE/LIPASE/AMMONIA  Recent Labs     08/27/21  1415   LIPASE 6*       PT/INR  Recent Labs     08/28/21  0616   PROTIME 14.4*   INR 1.1     FINDINGS:ct abd 8/27/21   Lower Chest:  Visualized portion of the lower chest demonstrates no acute   abnormality.       Organs: Liver is diffusely hypoattenuating.  There is mild intrahepatic and   extrahepatic biliary dilatation, which is unchanged.  Gallbladder is   surgically absent.  Spleen, pancreas, and adrenal glands are unremarkable.       There is symmetric enhancement of the kidneys.  Multiple bilateral renal   cysts are noted.  Right kidney is intrapelvic, which is unchanged.  No   hydronephrosis or perinephric inflammatory change.       GI/Bowel: There is no abnormal bowel distention or pericolonic inflammation.    No free intraperitoneal air or fluid.  Appendix is normal.  Postoperative   changes related to previous gastrectomy are noted in the upper abdomen,   similar to the prior study.  Detailed assessment is limited by bowel peristalsis and respiratory motion in the upper abdomen.       Pelvis: Urinary bladder is incompletely distended but appears to be   unremarkable.  Uterus and adnexal structures are grossly unremarkable.  No   pelvic lymphadenopathy.       Peritoneum/Retroperitoneum: The abdominal aorta is normal in caliber.  There   is no retroperitoneal or mesenteric lymphadenopathy.       Bones/Soft Tissues: There is irregular soft tissue density and coarse   calcifications in the subcutaneous fat lateral to the left hip, which is   unchanged.  Soft tissues are otherwise unremarkable.  No acute or suspicious   osseous abnormality.           Impression   1.  No acute process seen in the abdomen or pelvis.  Patient is status post   gastrectomy, and detailed assessment of the anastomosis in the upper abdomen   is limited by bowel peristalsis and respiratory motion       2.  Hepatic steatosis.       3.  Unchanged intrahepatic and extrahepatic biliary dilatation status post   cholecystectomy.       4.  Pelvic right kidney is redemonstrated.  Multiple bilateral renal cysts.               ASSESSMENT/plan  Dehydration with nausea and emesis, weight loss previous gastrectomy for neuroendocrine tumor; unchanged  - egd tomorrow to evaluate symptoms  -hold the [facility]  -antiemetics prn  -continue IV hydration  She is not a candidate for peg tube due to previous surgery if nutrition is needed may need TPN vs jejunostomy placement by surgery       This plan was formulated in collaboration with Dr. Mendez Meyers . Electronically signed by: RYAN Mosquera - CNP on 8/29/2021 at 9:01 AM     Attending Physician Statement  I have discussed the care of Eveline Chandler and   I have examined the patient myselft independently, and taken ros and hpi , including pertinent history and exam findings,  with the author of this note . I have reviewed the key elements of all parts of the encounter with the nurse practitioner/resident.     I agree with the assessment, plan and orders as documented by the above health care provider       EGD tomorrow  Electronically signed by Starr Mcmillan MD

## 2021-08-29 NOTE — PLAN OF CARE
Problem: Falls - Risk of:  Goal: Will remain free from falls  Description: Will remain free from falls  8/29/2021 1147 by Chasidy Colaldo RN  Outcome: Ongoing  Note: Siderails up x 2  Hourly rounding  Call light in reach  Instructed to call for assist before attempting out of bed.   Remains free from falls and accidental injury at this time   Floor free from obstacles  Bed is locked and in lowest position  Adequate lighting provided  Bed alarm on, [place] star and Stay with Me signs posted         Problem: Nausea/Vomiting:  Goal: Absence of nausea/vomiting  Description: Absence of nausea/vomiting  8/29/2021 1147 by Chasidy Collado RN  Outcome: Ongoing     Problem: Fluid Volume:  Goal: Signs and symptoms of dehydration will decrease  Description: Signs and symptoms of dehydration will decrease  8/29/2021 1147 by Chasidy Collado RN  Outcome: Ongoing     Problem: Nausea/Vomiting:  Goal: Able to drink  Description: Able to drink  8/29/2021 1147 by Chasidy Collado RN  Outcome: Not Met This Shift     Problem: Nausea/Vomiting:  Goal: Able to eat  Description: Able to eat  8/29/2021 1147 by Chasidy Collado RN  Outcome: Not Met This Shift     Problem: Nausea/Vomiting:  Goal: Ability to achieve adequate nutritional intake will improve  Description: Ability to achieve adequate nutritional intake will improve  8/29/2021 1147 by Chasidy Collado RN  Outcome: Not Met This Shift

## 2021-08-29 NOTE — PLAN OF CARE
Problem: Falls - Risk of:  Goal: Will remain free from falls  Description: Will remain free from falls  Outcome: Ongoing  Goal: Absence of physical injury  Description: Absence of physical injury  Outcome: Ongoing     Problem: Nausea/Vomiting:  Goal: Absence of nausea/vomiting  Description: Absence of nausea/vomiting  Outcome: Ongoing  Goal: Able to drink  Description: Able to drink  Outcome: Ongoing  Goal: Able to eat  Description: Able to eat  Outcome: Ongoing  Goal: Ability to achieve adequate nutritional intake will improve  Description: Ability to achieve adequate nutritional intake will improve  Outcome: Ongoing     Problem: Fluid Volume:  Goal: Signs and symptoms of dehydration will decrease  Description: Signs and symptoms of dehydration will decrease  Outcome: Ongoing  Goal: Ability to achieve a balanced intake and output will improve  Description: Ability to achieve a balanced intake and output will improve  Outcome: Ongoing  Goal: Diagnostic test results will improve  Description: Diagnostic test results will improve  Outcome: Ongoing     Problem: Physical Regulation:  Goal: Complications related to the disease process, condition or treatment will be avoided or minimized  Description: Complications related to the disease process, condition or treatment will be avoided or minimized  Outcome: Ongoing  Goal: Ability to maintain vital signs within normal range will improve  Description: Ability to maintain vital signs within normal range will improve  Outcome: Ongoing     Problem: Pain:  Goal: Pain level will decrease  Description: Pain level will decrease  Outcome: Ongoing  Goal: Control of acute pain  Description: Control of acute pain  Outcome: Ongoing  Goal: Control of chronic pain  Description: Control of chronic pain  Outcome: Ongoing     Problem: Skin Integrity:  Goal: Will show no infection signs and symptoms  Description: Will show no infection signs and symptoms  Outcome: Ongoing  Goal: Absence of new skin breakdown  Description: Absence of new skin breakdown  Outcome: Ongoing

## 2021-08-29 NOTE — CONSULTS
Gastroenterology Consult Note      Patient: Eveline Chandler  : 1960  Acct#:  [de-identified]     Date:  2021    Subjective:       History of Present Illness  Patient is a 62 y.o.  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 She get multiple other medical issues she does follow with my partner who sent her to be admitted because of dehydration as she has been having nausea and emesis for the last 3 weeks not able to tolerate anything even water is making her sick. She lost 25 lb in the last several weeks. She has been very fatigued and tired. When she was in the office she was tachycardic for which she was thought to be dehydrated and sent to be admitted. She did admit to some periumbilical intermittent abdominal pain. She is on the Eliquis for DVT.     CT of the abdomen was done yesterday showed diffuse hypoattenuating liver with mild intra and extrahepatic biliary dilatation, prior cholecystectomy, previous gastrectomy, hepatic   steatosis,   she denied any other GI symptoms    Endoscopy 21 egd (graciei) severe reflux esophagitis healthy anastomosis 20 EUS (bleibel) gastric neuroendocrine tumors no record of colonoscopy -but tubular adenomas and hyperplastic polyps were removed in 2019 per [facility] notes     her labs showed elevated alkaline phosphatase 212 elevated AST to 77 the rest of the liver  Enzymes normal, this elevation in the liver enzymes been going on since at least 2020   her hemoglobin is  Low at 11.9 today   no leukocytosis and her   platelet count is normal     the results of the CT from yesterday as below     1.  No acute process seen in the abdomen or pelvis.  Patient is status post gastrectomy, and detailed assessment of the anastomosis in the upper abdomen   is limited by bowel peristalsis and respiratory motion       2.  Hepatic steatosis.       3.  Unchanged intrahepatic and extrahepatic biliary dilatation status post   cholecystectomy.       4.  Pelvic right kidney is redemonstrated.  Multiple bilateral renal cysts.             Past Medical History:   Diagnosis Date    Anxiety     Arthritis     knee    Asthma     Colon polyp 02/21/2019    tubular adenoma; hyperplastic polyp    Colon polyps     Cyst of kidney, acquired     bilat.     Fatigue     Hypertension     Hypothyroidism     Neuroendocrine tumor 02/21/2019    of stomach    Obesity     Pharyngoesophageal dysphagia     Vocal cord polyps     Wears glasses       Past Surgical History:   Procedure Laterality Date    CHOLECYSTECTOMY  10/09/2014    COLONOSCOPY  02/21/2019    tubular adenoma; hyperplastic polyp    COLONOSCOPY      over 5 yrs ago per pt with polyps (2-)    CYSTOURETHROSCOPY/STENT REMOVAL  05/03/2011    ENDOSCOPIC ULTRASOUND (LOWER) N/A 01/22/2020    ENDOSCOPIC ULTRASOUND WITH POSSIBLE EMR performed by Renae Gracia MD at [facility]    ERCP      GASTRECTOMY N/A 11/30/2020    HIP SURGERY Left     soft tissue tumor removal    THROAT SURGERY      vocal cord polyps removed    UPPER GASTROINTESTINAL ENDOSCOPY  02/21/2019    Neuroendocrine tumor    UPPER GASTROINTESTINAL ENDOSCOPY  09/23/2019    UPPER GASTROINTESTINAL ENDOSCOPY N/A 09/23/2019    EGD BIOPSY performed by Tavares Bowser MD at [address] [place] [address] 10/23/2019    [facility]/ EMR [facility] N/A 10/29/2019    EGD CONTROL HEMORRHAGE [facility] N/A 04/26/2021    EGD BIOPSY performed by Cleo Day MD at [facility]      Past Endoscopic History as abov    Admission Meds  No current facility-administered medications on file prior to encounter. Current Outpatient Medications on File Prior to Encounter   Medication Sig Dispense Refill    hydroCHLOROthiazide (HYDRODIURIL) 25 MG tablet Take 12.5 mg by mouth daily Takes 1/2 tab (=12.5mg) daily      magnesium oxide (MAG-OX) 400 MG tablet Take 400 mg by mouth 2 times daily      levothyroxine (SYNTHROID) 88 MCG tablet Take 88 mcg by mouth Five times weekly Indications: Takes only Wed-Sun (skips Mon/Tues)      megestrol (MEGACE) 40 MG/ML suspension Take 10 mLs by mouth daily 300 mL 0    apixaban (ELIQUIS) 5 MG TABS tablet Take 1 tablet by mouth 2 times daily 28 tablet 0    metoclopramide (REGLAN) 10 MG tablet TAKE 1 TABLET BY MOUTH 3 TIMES DAILY (BEFORE MEALS) 90 tablet 5    pantoprazole (PROTONIX) 40 MG tablet Take 1 tablet by mouth 2 times daily 90 tablet 0    sucralfate (CARAFATE) 1 GM tablet Take 1 tablet by mouth 4 times daily (Patient taking differently: Take 1 g by mouth 2 times daily ) 120 tablet 3    potassium chloride (KLOR-CON) 20 MEQ packet Take 20 mEq by mouth daily 90 each 1    desonide (DESOWEN) 0.05 % cream Apply topically 2 times daily.  (Patient taking differently: Apply topically 2 times daily as needed ) 90 g 0    Multiple Vitamin (MULTI-DAY VITAMINS PO) Take 1 tablet by mouth daily       vitamin B-12 (CYANOCOBALAMIN) 500 MCG tablet Take 500 mcg by mouth daily      Folic Acid-Vit K3-MND L54 2.2-25-0.5 MG TABS Take 1 tablet by mouth daily 90 tablet 1       Patient   Does Use ASA, NSAID No  Allergies  Allergies   Allergen Reactions    Erythromycin Diarrhea        Social   Social History     Tobacco Use    Smoking status: Former Smoker     Packs/day: 1.00     Years: 25.00     Pack years: 25.00     Quit date: 2012     Years since quittin.2    Smokeless tobacco: Never Used    Tobacco comment: quit 2 years   Substance Use Topics    Alcohol use: Not Currently     Comment: 3 times a month PSYCH HISTORY:  Depression No  Anxiety No  Suicide No       Family History   Problem Relation Age of Onset    High Blood Pressure Mother     High Blood Pressure Sister     Stomach Cancer Sister     Other Sister         epilepsy    No Known Problems Father       No family history of colon cancer, Crohn's disease, or ulcerative colitis. Review of Systems  Constitutional: negative  Eyes: negative  Ears, nose, mouth, throat, and face: negative  Respiratory: negative  Cardiovascular: negative  Gastrointestinal: negative  Genitourinary:negative  Integument/breast: negative  Hematologic/lymphatic: negative  Musculoskeletal:negative  Endocrine: negative           Physical Exam  Blood pressure (!) 155/96, pulse 93, temperature 97.6 °F (36.4 °C), temperature source Oral, resp. rate 16, height 5' 5\" (1.651 m), weight 144 lb 8 oz (65.5 kg), last menstrual period 01/01/1994, SpO2 100 %, not currently breastfeeding.          General Appearance: alert and oriented to person, place and time, well-developed and well-nourished, in no acute distress  Skin: warm and dry, no rash or erythema  Head: normocephalic and atraumatic  Eyes: pupils equal, round, and reactive to light, extraocular eye movements intact, conjunctivae normal  ENT: hearing grossly normal bilaterally  Neck: neck supple and non tender without mass, no thyromegaly or thyroid nodules, no cervical lymphadenopathy   Pulmonary/Chest: clear to auscultation bilaterally- no wheezes, rales or rhonchi, normal air movement, no respiratory distress  Cardiovascular: normal rate, regular rhythm, normal S1 and S2, no murmurs, rubs, clicks or gallops, distal pulses intact, no carotid bruits  Abdomen: soft, non-tender, non-distended, normal bowel sounds, no masses or organomegaly  Extremities: no cyanosis, clubbing or edema  Musculoskeletal: normal range of motion, no joint swelling, deformity or tenderness  Neurologic: no cranial nerve deficit and muscle strength normal    Data Review:    Recent Labs     08/27/21  1415 08/28/21  0616   WBC 5.6 5.4   HGB 13.9 11.9   HCT 43.5 37.8   MCV 92.9 95.7    229     Recent Labs     08/27/21  1415 08/28/21  0616    139   K 3.9 3.5*    104   CO2 26 25   BUN 8 5*   CREATININE 0.57 0.45*     Recent Labs     08/27/21  1415   AST 77*   ALT 31   BILITOT 0.74   ALKPHOS 112*     Recent Labs     08/27/21  1415   LIPASE 6*     Recent Labs     08/28/21  0616   PROTIME 14.4*   INR 1.1     No results for input(s): PTT in the last 72 hours. No results for input(s): OCCULTBLD in the last 72 hours. CEA:  No results found for: CEA  Ca 125:  No results found for:   Ca 19-9:  No results found for:   Ca 15-3:  No results found for:   AFP:  No components found for: AFAFP  Beta HCG:  No components found for: BHCG  Neuron Specific Enolase:  No results found for: NSE  Imaging Studies:                           All appropriate imaging studies and reports reviewed: Yes     1.  No acute process seen in the abdomen or pelvis.  Patient is status post   gastrectomy, and detailed assessment of the anastomosis in the upper abdomen   is limited by bowel peristalsis and respiratory motion       2.  Hepatic steatosis.       3.  Unchanged intrahepatic and extrahepatic biliary dilatation status post   cholecystectomy.       4.  Pelvic right kidney is redemonstrated.  Multiple bilateral renal cysts.                        Assessment:     Principal Problem:    Dehydration  Active Problems:    General weakness    Neuroendocrine neoplasm of stomach    Intractable nausea and vomiting    S/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    Severe malnutrition (HCC)    Sinus tachycardia    Weight loss  Resolved Problems:    * No resolved hospital problems.  *   nausea vomiting and dehydration   status post gastrectomy for neuroendocrine tumors, no masses seen in the liver on CT scan   elevated liver enzymes probably related to chronic fatty   Liver as those are been elevated since 2020   weight loss      Recommendations:    rehydrate   EGD rule out superimposed peptic ulcer disease on the anastomosis   Ppi   liver disease workup if not done in the past   my need octreotide scan looking for any recurrence of her neuroendocrine tumor anywhere in her abdomen   based on progress will evaluate the need for G/J-tube                      Thank you for allowing me to participate in the care of your patient. Please feel free to contact me with any questions or concerns.      Jaquan Aiken MD

## 2021-08-29 NOTE — PROGRESS NOTES
Kateri Landau was ordered Tab-A-beta catotene. As per the [facility], herbals and certain dietary supplements will be discontinued. The herbal or dietary supplement may be continued after discharge from the hospital.     If you feel the patient needs to continue their home herbal therapy during the inpatient stay, the patient may bring an unopened bottle for verification and administration pursuant to our home medication use policy. Please contact the pharmacy with any questions or concerns. Thank you.   Reginaldo Interiano [facility], [place]   8/28/2021  9:32 PM

## 2021-08-29 NOTE — PROGRESS NOTES
Daily  Vitamin D, 1,000 Units, Oral, Daily  sucralfate, 1 g, Oral, BID  sodium chloride flush, 5-40 mL, IntraVENous, 2 times per day  enoxaparin, 40 mg, Subcutaneous, Daily        Review of Systems   Review of Systems   Constitutional: Positive for unexpected weight change. Negative for appetite change, fatigue and fever. HENT: Positive for trouble swallowing. Negative for congestion, rhinorrhea and sneezing. Eyes: Negative for visual disturbance. Respiratory: Negative for cough, chest tightness, shortness of breath and wheezing. Cardiovascular: Negative for chest pain and palpitations. Gastrointestinal: Positive for nausea and vomiting. Negative for abdominal pain, blood in stool, constipation and diarrhea. Genitourinary: Negative for dysuria, enuresis, frequency and hematuria. Musculoskeletal: Negative for arthralgias and myalgias. Skin: Negative for rash. Neurological: Negative for dizziness, weakness, light-headedness and headaches. Hematological: Does not bruise/bleed easily. Psychiatric/Behavioral: Negative for dysphoric mood and sleep disturbance. Objective :      Current Vitals : Temp: 98.2 °F (36.8 °C),  Pulse: 96, Resp: 16, BP: 125/86, SpO2: 97 %  Last 24 Hrs Vitals   Patient Vitals for the past 24 hrs:   BP Temp Temp src Pulse Resp SpO2 Weight   08/29/21 0727 125/86 98.2 °F (36.8 °C) Oral 96 16 97 % --   08/29/21 0444 -- -- -- -- -- -- 150 lb (68 kg)   08/28/21 1918 (!) 155/96 97.6 °F (36.4 °C) Oral 93 16 100 % --   08/28/21 1529 132/85 98.4 °F (36.9 °C) Oral 88 16 98 % --   08/28/21 1147 127/80 98.2 °F (36.8 °C) Oral 85 16 99 % --     Intake / output   08/28 0701 - 08/29 0700  In: 980 [I.V.:700]  Out: 1200 [Urine:1200]  Physical Exam:  Physical Exam  Vitals and nursing note reviewed. Constitutional:       General: She is not in acute distress. Appearance: She is underweight. She is not diaphoretic. HENT:      Head: Normocephalic and atraumatic.       Nose:      Right Sinus: No maxillary sinus tenderness or frontal sinus tenderness. Left Sinus: No maxillary sinus tenderness or frontal sinus tenderness. Mouth/Throat:      Pharynx: No oropharyngeal exudate. Eyes:      General: No scleral icterus. Conjunctiva/sclera: Conjunctivae normal.      Pupils: Pupils are equal, round, and reactive to light. Neck:      Thyroid: No thyromegaly. Vascular: No JVD. Cardiovascular:      Rate and Rhythm: Normal rate and regular rhythm. Pulses:           Dorsalis pedis pulses are 2+ on the right side and 2+ on the left side. Heart sounds: Normal heart sounds. No murmur heard. Pulmonary:      Effort: Pulmonary effort is normal.      Breath sounds: Normal breath sounds. No wheezing or rales. Abdominal:      Palpations: Abdomen is soft. There is no mass. Tenderness: There is no abdominal tenderness. Musculoskeletal:      Cervical back: Full passive range of motion without pain and neck supple. Lymphadenopathy:      Head:      Right side of head: No submandibular adenopathy. Left side of head: No submandibular adenopathy. Cervical: No cervical adenopathy. Skin:     General: Skin is warm. Neurological:      Mental Status: She is alert and oriented to person, place, and time. Motor: No tremor. Psychiatric:         Behavior: Behavior is cooperative.            Laboratory findings:    Recent Labs     08/27/21  1415 08/28/21  0616   WBC 5.6 5.4   HGB 13.9 11.9   HCT 43.5 37.8    229   INR  --  1.1     Recent Labs     08/27/21  1415 08/28/21  0616    139   K 3.9 3.5*    104   CO2 26 25   GLUCOSE 98 79   BUN 8 5*   CREATININE 0.57 0.45*   MG  --  1.3*   CALCIUM 9.0 8.3*     Recent Labs     08/27/21  1415   PROT 6.7   LABALBU 3.0*   TSH 0.03*   AST 77*   ALT 31   ALKPHOS 112*   BILITOT 0.74   LIPASE 6*          Specific Gravity, UA   Date Value Ref Range Status   08/27/2021 <1.005 (L) 1.005 - 1.030 Final     Comment: UNABLE TO DO SPECIFIC GRAVITY DUE TO CT SCAN     Protein, UA   Date Value Ref Range Status   08/27/2021 NEGATIVE NEGATIVE Final     RBC, UA   Date Value Ref Range Status   08/27/2021 2 TO 5 0 - 2 /HPF Final     Bacteria, UA   Date Value Ref Range Status   08/27/2021 FEW (A) None Final     Nitrite, Urine   Date Value Ref Range Status   08/27/2021 NEGATIVE NEGATIVE Final     WBC, UA   Date Value Ref Range Status   08/27/2021 5 TO 10 0 - 5 /HPF Final     Leukocyte Esterase, Urine   Date Value Ref Range Status   08/27/2021 SMALL (A) NEGATIVE Final       Imaging / Clinical Data :-   CT ABDOMEN PELVIS W IV CONTRAST Additional Contrast? None    Result Date: 8/27/2021  1. No acute process seen in the abdomen or pelvis. Patient is status post gastrectomy, and detailed assessment of the anastomosis in the upper abdomen is limited by bowel peristalsis and respiratory motion 2. Hepatic steatosis. 3.  Unchanged intrahepatic and extrahepatic biliary dilatation status post cholecystectomy. 4.  Pelvic right kidney is redemonstrated. Multiple bilateral renal cysts. XR CHEST PORTABLE    Result Date: 8/27/2021  No acute cardiopulmonary process and no change from prior studies. Clinical Course : unchanged  Assessment and Plan  :        Intractable nausea vomiting with dehydration and abnormal weight loss - continue IV fluids, antiemetics. add scopolamine patch , GI consult. Patient may benefit from EGD to rule out secondary cause for nausea vomiting and evaluate dysphagia. Severe malnutrition - may need TPN versus J-tube placement. H/o carcinoid s/p total gastrectomy and Qamar-en-Y esophageal jejunal anastomosis -     EGD tomorrow. May need G-tube insertion for supplement nutrition. Continue IVF    Continue to monitor vitals , Intake / output ,  Cell count , HGB , Kidney function, oxygenation  as indicated . Plan and updates discussed with patient ,  answers  explained to satisfaction.    Plan discussed with Staff Melly Reveles RN     (Please note that portions of this note were completed with a voice recognition program. Efforts were made to edit the dictations but occasionally words are mis-transcribed.)      Darryle So, MD  8/29/2021

## 2021-08-30 ENCOUNTER — ANESTHESIA (OUTPATIENT)
Dept: OPERATING ROOM | Age: 61
DRG: 640 | End: 2021-08-30
Payer: COMMERCIAL

## 2021-08-30 ENCOUNTER — ANESTHESIA EVENT (OUTPATIENT)
Dept: OPERATING ROOM | Age: 61
DRG: 640 | End: 2021-08-30
Payer: COMMERCIAL

## 2021-08-30 VITALS — SYSTOLIC BLOOD PRESSURE: 162 MMHG | DIASTOLIC BLOOD PRESSURE: 116 MMHG | OXYGEN SATURATION: 99 %

## 2021-08-30 PROBLEM — E43 SEVERE MALNUTRITION (HCC): Chronic | Status: ACTIVE | Noted: 2021-08-30

## 2021-08-30 PROCEDURE — 6370000000 HC RX 637 (ALT 250 FOR IP): Performed by: INTERNAL MEDICINE

## 2021-08-30 PROCEDURE — 3609017100 HC EGD: Performed by: INTERNAL MEDICINE

## 2021-08-30 PROCEDURE — 2580000003 HC RX 258: Performed by: INTERNAL MEDICINE

## 2021-08-30 PROCEDURE — 2709999900 HC NON-CHARGEABLE SUPPLY: Performed by: INTERNAL MEDICINE

## 2021-08-30 PROCEDURE — 6360000002 HC RX W HCPCS: Performed by: NURSE ANESTHETIST, CERTIFIED REGISTERED

## 2021-08-30 PROCEDURE — 3700000001 HC ADD 15 MINUTES (ANESTHESIA): Performed by: INTERNAL MEDICINE

## 2021-08-30 PROCEDURE — 7100000001 HC PACU RECOVERY - ADDTL 15 MIN: Performed by: INTERNAL MEDICINE

## 2021-08-30 PROCEDURE — 97116 GAIT TRAINING THERAPY: CPT

## 2021-08-30 PROCEDURE — 97535 SELF CARE MNGMENT TRAINING: CPT

## 2021-08-30 PROCEDURE — 6360000002 HC RX W HCPCS: Performed by: INTERNAL MEDICINE

## 2021-08-30 PROCEDURE — 2500000003 HC RX 250 WO HCPCS: Performed by: NURSE ANESTHETIST, CERTIFIED REGISTERED

## 2021-08-30 PROCEDURE — 0DB98ZX EXCISION OF DUODENUM, VIA NATURAL OR ARTIFICIAL OPENING ENDOSCOPIC, DIAGNOSTIC: ICD-10-PCS | Performed by: INTERNAL MEDICINE

## 2021-08-30 PROCEDURE — 3700000000 HC ANESTHESIA ATTENDED CARE: Performed by: INTERNAL MEDICINE

## 2021-08-30 PROCEDURE — 6360000002 HC RX W HCPCS: Performed by: NURSE PRACTITIONER

## 2021-08-30 PROCEDURE — 2580000003 HC RX 258: Performed by: NURSE ANESTHETIST, CERTIFIED REGISTERED

## 2021-08-30 PROCEDURE — 2580000003 HC RX 258: Performed by: FAMILY MEDICINE

## 2021-08-30 PROCEDURE — 97530 THERAPEUTIC ACTIVITIES: CPT

## 2021-08-30 PROCEDURE — 97163 PT EVAL HIGH COMPLEX 45 MIN: CPT

## 2021-08-30 PROCEDURE — 7100000000 HC PACU RECOVERY - FIRST 15 MIN: Performed by: INTERNAL MEDICINE

## 2021-08-30 PROCEDURE — C9113 INJ PANTOPRAZOLE SODIUM, VIA: HCPCS | Performed by: INTERNAL MEDICINE

## 2021-08-30 PROCEDURE — C9113 INJ PANTOPRAZOLE SODIUM, VIA: HCPCS | Performed by: NURSE PRACTITIONER

## 2021-08-30 PROCEDURE — 43235 EGD DIAGNOSTIC BRUSH WASH: CPT | Performed by: INTERNAL MEDICINE

## 2021-08-30 PROCEDURE — 1200000000 HC SEMI PRIVATE

## 2021-08-30 PROCEDURE — 99232 SBSQ HOSP IP/OBS MODERATE 35: CPT | Performed by: FAMILY MEDICINE

## 2021-08-30 RX ORDER — PROPOFOL 10 MG/ML
INJECTION, EMULSION INTRAVENOUS PRN
Status: DISCONTINUED | OUTPATIENT
Start: 2021-08-30 | End: 2021-08-30 | Stop reason: SDUPTHER

## 2021-08-30 RX ORDER — LIDOCAINE HYDROCHLORIDE 20 MG/ML
INJECTION, SOLUTION INFILTRATION; PERINEURAL PRN
Status: DISCONTINUED | OUTPATIENT
Start: 2021-08-30 | End: 2021-08-30 | Stop reason: SDUPTHER

## 2021-08-30 RX ORDER — SODIUM CHLORIDE, SODIUM LACTATE, POTASSIUM CHLORIDE, CALCIUM CHLORIDE 600; 310; 30; 20 MG/100ML; MG/100ML; MG/100ML; MG/100ML
INJECTION, SOLUTION INTRAVENOUS CONTINUOUS PRN
Status: DISCONTINUED | OUTPATIENT
Start: 2021-08-30 | End: 2021-08-30 | Stop reason: SDUPTHER

## 2021-08-30 RX ORDER — ONDANSETRON 2 MG/ML
4 INJECTION INTRAMUSCULAR; INTRAVENOUS
Status: DISCONTINUED | OUTPATIENT
Start: 2021-08-30 | End: 2021-08-30 | Stop reason: HOSPADM

## 2021-08-30 RX ADMIN — SODIUM CHLORIDE, PRESERVATIVE FREE 10 ML: 5 INJECTION INTRAVENOUS at 21:29

## 2021-08-30 RX ADMIN — PROPOFOL 50 MG: 10 INJECTION, EMULSION INTRAVENOUS at 12:27

## 2021-08-30 RX ADMIN — SODIUM CHLORIDE: 9 INJECTION, SOLUTION INTRAVENOUS at 13:34

## 2021-08-30 RX ADMIN — METOCLOPRAMIDE 10 MG: 5 INJECTION, SOLUTION INTRAMUSCULAR; INTRAVENOUS at 17:34

## 2021-08-30 RX ADMIN — PROPOFOL 50 MG: 10 INJECTION, EMULSION INTRAVENOUS at 12:28

## 2021-08-30 RX ADMIN — POTASSIUM CHLORIDE 20 MEQ: 20 TABLET, EXTENDED RELEASE ORAL at 21:29

## 2021-08-30 RX ADMIN — SUCRALFATE 1 G: 1 TABLET ORAL at 21:29

## 2021-08-30 RX ADMIN — SODIUM CHLORIDE, POTASSIUM CHLORIDE, SODIUM LACTATE AND CALCIUM CHLORIDE: 600; 310; 30; 20 INJECTION, SOLUTION INTRAVENOUS at 12:15

## 2021-08-30 RX ADMIN — METOCLOPRAMIDE 10 MG: 5 INJECTION, SOLUTION INTRAMUSCULAR; INTRAVENOUS at 21:29

## 2021-08-30 RX ADMIN — PROPOFOL 50 MG: 10 INJECTION, EMULSION INTRAVENOUS at 12:29

## 2021-08-30 RX ADMIN — PANTOPRAZOLE SODIUM 40 MG: 40 INJECTION, POWDER, FOR SOLUTION INTRAVENOUS at 08:04

## 2021-08-30 RX ADMIN — ONDANSETRON 4 MG: 2 INJECTION INTRAMUSCULAR; INTRAVENOUS at 17:34

## 2021-08-30 RX ADMIN — LIDOCAINE HYDROCHLORIDE 50 MG: 20 INJECTION, SOLUTION INFILTRATION; PERINEURAL at 12:27

## 2021-08-30 RX ADMIN — PANTOPRAZOLE SODIUM 40 MG: 40 INJECTION, POWDER, FOR SOLUTION INTRAVENOUS at 21:29

## 2021-08-30 RX ADMIN — SODIUM CHLORIDE: 9 INJECTION, SOLUTION INTRAVENOUS at 03:57

## 2021-08-30 ASSESSMENT — ENCOUNTER SYMPTOMS
VOMITING: 1
RHINORRHEA: 0
WHEEZING: 0
NAUSEA: 1
CONSTIPATION: 0
ABDOMINAL PAIN: 0
CHEST TIGHTNESS: 0
SHORTNESS OF BREATH: 1
COUGH: 0
DIARRHEA: 0
BLOOD IN STOOL: 0
TROUBLE SWALLOWING: 1
SHORTNESS OF BREATH: 0

## 2021-08-30 ASSESSMENT — PAIN SCALES - GENERAL
PAINLEVEL_OUTOF10: 0

## 2021-08-30 ASSESSMENT — PULMONARY FUNCTION TESTS
PIF_VALUE: 1

## 2021-08-30 NOTE — PLAN OF CARE
Nutrition Problem #1: Severe malnutrition, In context of chronic, non-illness related  Intervention: Food and/or Nutrient Delivery: Continue Current Diet, Start Oral Nutrition Supplement  Nutritional Goals: Adequate nutritional intakes from oral or tube feeding

## 2021-08-30 NOTE — OP NOTE
Operative Note  PROCEDURE NOTE    DATE OF PROCEDURE: 8/30/2021     SURGEON: Jessica Manzano MD  Facility: [facility]  ASSISTANT: None  Anesthesia: MAC  PREOPERATIVE DIAGNOSIS:   Nausea vomiting and dehydration status post gastrectomy for neuroendocrine tumors    Diagnosis:  Status post total gastrectomy  No stomach is left  The esophagus is anastomosed with small bowel    There is 2 lm of small bowel 1 is short and blind measuring around 2 to 3 cm  The other 1 is patent . With no abnormality seen for 20 cm     No abnormality could be seen in the esophagus or the small        POSTOPERATIVE DIAGNOSIS: As described below    OPERATION: Upper GI endoscopy with Biopsy    ANESTHESIA: Moderate Sedation     ESTIMATED BLOOD LOSS: Less than 50 ml    COMPLICATIONS: None. SPECIMENS:  Was Not Obtained    HISTORY: The patient is a 62y.o. year old female with history of above preop diagnosis. I recommended esophagogastroduodenoscopy with possible biopsy and I explained the risk, benefits, expected outcome, and alternatives to the procedure. Risks included but are not limited to bleeding, infection, respiratory distress, hypotension, and perforation of the esophagus, stomach, or duodenum. Patient understands and is in agreement. The patient was counseled at length about the risks of godwin Covid-19 during their perioperative period and any recovery window from their procedure. The patient was made aware that godwin Covid-19  may worsen their prognosis for recovering from their procedure  and lend to a higher morbidity and/or mortality risk. All material risks, benefits, and reasonable alternatives including postponing the procedure were discussed. The patient does wish to proceed with the procedure at this time. PROCEDURE: The patient was given IV conscious sedation. The patient's SPO2 remained above 90% throughout the procedure. The gastroscope was inserted orally and advanced under direct vision through the esophagus, through the stomach, through the pylorus, and into the descending duodenum. Post sedation note : The patient's SPO2 remained above 90% throughout the procedure. the vital signs remained stable , and no immediate complication form the procedure noted, patient will be ready for d/c when criteria is met . Findings:    Retropharyngeal area was grossly normal appearing    Status post total gastrectomy  No stomach is left  The esophagus is anastomosed with small bowel    There is 2 lm of small bowel 1 is short and blind measuring around 2 to 3 cm  The other 1 is patent     No abnormality could be seen in the esophagus or the small  The scope was removed and the patient tolerated the procedure well. Recommendations/Plan:   1. Surgical consult for jejunostomy-tube placement  2.  Small frequent meals with upright position and liquid    Electronically signed by Merle Friedman MD  on 8/30/2021 at 12:40 PM

## 2021-08-30 NOTE — ANESTHESIA PRE PROCEDURE
Department of Anesthesiology  Preprocedure Note       Name:  Gloria Levy   Age:  62 y.o.  :  1960                                          MRN:  [id number]         Date:  2021      Surgeon: Angie Torres):  Phong Kirk MD    Procedure: Procedure(s):  EGD ESOPHAGOGASTRODUODENOSCOPY    Medications prior to admission:   Prior to Admission medications    Medication Sig Start Date End Date Taking? Authorizing Provider   hydroCHLOROthiazide (HYDRODIURIL) 25 MG tablet Take 12.5 mg by mouth daily Takes 1/2 tab (=12.5mg) daily   Yes Historical Provider, MD   magnesium oxide (MAG-OX) 400 MG tablet Take 400 mg by mouth 2 times daily   Yes Historical Provider, MD   levothyroxine (SYNTHROID) 88 MCG tablet Take 88 mcg by mouth Five times weekly Indications: Takes only Wed-Sun (skips Mon/Tues)   Yes Historical Provider, MD   megestrol (MEGACE) 40 MG/ML suspension Take 10 mLs by mouth daily 21 Yes Jamison Taylor MD   apixaban (ELIQUIS) 5 MG TABS tablet Take 1 tablet by mouth 2 times daily 21 Yes Jamison Taylor MD   metoclopramide (REGLAN) 10 MG tablet TAKE 1 TABLET BY MOUTH 3 TIMES DAILY (BEFORE MEALS) 21  Yes Heloise Peabody, MD   pantoprazole (PROTONIX) 40 MG tablet Take 1 tablet by mouth 2 times daily 21  Yes Brittni Mathur MD   sucralfate (CARAFATE) 1 GM tablet Take 1 tablet by mouth 4 times daily  Patient taking differently: Take 1 g by mouth 2 times daily  21  Yes Brittni Mathur MD   potassium chloride (KLOR-CON) 20 MEQ packet Take 20 mEq by mouth daily 21  Yes Jamison Taylor MD   desonide (DESOWEN) 0.05 % cream Apply topically 2 times daily.   Patient taking differently: Apply topically 2 times daily as needed  20  Yes Jamison Taylor MD   Multiple Vitamin (MULTI-DAY VITAMINS PO) Take 1 tablet by mouth daily    Yes Historical Provider, MD   vitamin B-12 (CYANOCOBALAMIN) 500 MCG tablet Take 500 mcg by mouth daily   Yes Historical Provider, MD sodium chloride infusion  25 mL IntraVENous PRN Shane Root MD        magnesium sulfate 1000 mg in dextrose 5% 100 mL IVPB  1,000 mg IntraVENous PRN Shane Root MD   Stopped at 08/28/21 1447    [Held by provider] enoxaparin (LOVENOX) injection 40 mg  40 mg SubCUTAneous Daily Shane Root MD   40 mg at 08/29/21 0755    ondansetron (ZOFRAN-ODT) disintegrating tablet 4 mg  4 mg Oral Q8H PRN Shane Root MD        Or    ondansetron [facility]) injection 4 mg  4 mg IntraVENous Q6H PRN Shane Root MD   4 mg at 08/29/21 1136    polyethylene glycol (GLYCOLAX) packet 17 g  17 g Oral Daily PRN Shane Root MD        acetaminophen (TYLENOL) tablet 650 mg  650 mg Oral Q6H PRN Shane Root MD        Or   Bernestine Zheng acetaminophen (TYLENOL) suppository 650 mg  650 mg Rectal Q6H PRN Shane Root MD        0.9 % sodium chloride infusion   IntraVENous Continuous Shane Root MD 75 mL/hr at 08/30/21 0357 New Bag at 08/30/21 0357       Allergies:     Allergies   Allergen Reactions    Erythromycin Diarrhea       Problem List:    Patient Active Problem List   Diagnosis Code    Asthma J45.909    Anxiety F41.9    Obesity E66.9    Hyperlipidemia E78.5    Hypothyroidism E03.9    Colon polyps K63.5    Vertigo R42    Mass of left hip region R22.42    General weakness R53.1    Pharyngoesophageal dysphagia R13.14    Colon polyp K63.5    Neuroendocrine tumor D3A.8    Chest pain R07.9    Shortness of breath R06.02    Anemia D64.9    GI bleed K92.2    Essential hypertension I10    Neuroendocrine neoplasm of stomach D3A.8    Polyp, stomach K31.7    Melena K92.1    Gastric ulcer with hemorrhage K25.4    Constipation K59.00    Hypokalemia E87.6    Hypomagnesemia E83.42    Bilateral pulmonary embolism (HCC) I26.99    Abdominal pain, epigastric R10.13    Elevated d-dimer R79.89    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    Intractable vomiting R11.10    Intractable nausea and vomiting R11.2    years: 25.00     Quit date: 2012     Years since quittin.2    Smokeless tobacco: Never Used    Tobacco comment: quit 2 years   Substance Use Topics    Alcohol use: Not Currently     Comment: 3 times a month                                Counseling given: Not Answered  Comment: quit 2 years      Vital Signs (Current):   Vitals:    21 1534 21 1907 21 0427 21 0740   BP: 131/81 131/85  136/82   Pulse: 87 83  75   Resp: 16 16  16   Temp: 98.1 °F (36.7 °C) 98.1 °F (36.7 °C)  98.4 °F (36.9 °C)   TempSrc: Oral Oral  Oral   SpO2: 95% 99%  100%   Weight:   150 lb (68 kg)    Height:                                                  BP Readings from Last 3 Encounters:   21 136/82   21 (!) 129/95   21 110/80       NPO Status:                                                                                 BMI:   Wt Readings from Last 3 Encounters:   21 150 lb (68 kg)   21 143 lb (64.9 kg)   21 146 lb (66.2 kg)     Body mass index is 24.96 kg/m². CBC:   Lab Results   Component Value Date    WBC 5.4 2021    RBC 3.95 2021    HGB 11.9 2021    HCT 37.8 2021    MCV 95.7 2021    RDW 14.2 2021     2021       CMP:   Lab Results   Component Value Date     2021    K 3.5 2021     2021    CO2 25 2021    BUN 5 2021    CREATININE 0.45 2021    GFRAA >60 2021    LABGLOM >60 2021    GLUCOSE 79 2021    PROT 6.7 2021    CALCIUM 8.3 2021    BILITOT 0.74 2021    ALKPHOS 112 2021    AST 77 2021    ALT 31 2021       POC Tests: No results for input(s): POCGLU, POCNA, POCK, POCCL, POCBUN, POCHEMO, POCHCT in the last 72 hours.     Coags:   Lab Results   Component Value Date    PROTIME 14.4 2021    INR 1.1 2021    APTT 47.4 2021       HCG (If Applicable): No results found for: PREGTESTUR, PREGSERUM, HCG, HCGQUANT

## 2021-08-30 NOTE — PLAN OF CARE
Problem: Falls - Risk of:  Goal: Will remain free from falls  Description: Will remain free from falls  8/29/2021 2356 by Stacey Roa RN  Outcome: Ongoing  Note: Patient is a fall risk during this admission. Fall risk assessment was performed. Patient is absent of falls. Bed is in the lowest position. Wheels on the bed are locked. Call light and bed side table are within reach. Clutter is removed. Patient was educated to call out when needing assistance or wanting to get out of bed. Patient offered toileting assistance during rounding. Hourly rounds have been performed. Problem: Nausea/Vomiting:  Goal: Absence of nausea/vomiting  Description: Absence of nausea/vomiting  8/29/2021 2356 by Stacey Roa RN  Outcome: Ongoing  Note: Assess gastrointestinal status. Elevate the head of the bed. Help the patient identify the cause of nausea and reduce the cause, if possible. Suggest avoiding offensive foods and odors. Monitor the amount, frequency, and characteristics of vomitus. Monitor intake and output, as ordered     Problem: Fluid Volume:  Goal: Signs and symptoms of dehydration will decrease  Description: Signs and symptoms of dehydration will decrease  8/29/2021 2356 by Stacey Roa RN  Outcome: Ongoing  Note: IVF\"s continued as ordered. Patient able to tolerate PO fluids well. Laboratory results continue to be monitored closely.        Problem: Physical Regulation:  Goal: Ability to maintain vital signs within normal range will improve  Description: Ability to maintain vital signs within normal range will improve  Outcome: Ongoing Imaging Studies

## 2021-08-30 NOTE — PROGRESS NOTES
Comprehensive Nutrition Assessment    Type and Reason for Visit:  Positive Nutrition Screen (Unplanned weight loss, decreased appetite. Home diet: Easy to Chew. Difficulty chewing or swallowing. Dietitian consult needed: nausea/ vomiting weight loss)    Nutrition Recommendations/Plan:   1. Continue Full liquid diet  2. Start Ensure Enlive 3x/day (chocolate)   3. Monitor p.o intakes, diet tolerance and labs  4. When medically appropriate. Tube feeding recommendation: Osmolite 1.2 Varghese goal rate 65 mL/hr. Start at 15 mL/hr and advance by 15 mL every 4 hours until goal rate is reached. Nutrition Assessment:  Patient admission is related to dehydration, nausea, vomiting, loss of appetite, weight loss and generalized weakness. Patient is status post total gastrectomy 11/30/20. Patient reports poor oral intake after surgery related to intolerance to oral intakes. Patient reports a weight loss of 140 lbs since December 2020 and 43 lb weight loss since April 2021. Patient states she is unable to tolerate most foods that are not smooth like mashed potatoes and grits. Patient reports vomiting for the past month. Patient is severely malnourished and has fat and muscle mass loss. Patient is severely malnourished. Patient is being evaluated for J-tube placement. Continue Full liquid diet. Start Ensure Enlive 3x/day. Monitor p.o intakes, diet tolerance and labs.  Tube feeding recommendation is for Osmolite 1.2 Varghese goal rate 65 mL/hr (1872 kcal, 87 gm of protein and 1560 ml total volume)    Malnutrition Assessment:  Malnutrition Status:  Severe malnutrition    Context:  Chronic Illness     Findings of the 6 clinical characteristics of malnutrition:  Energy Intake:  7 - 75% or less estimated energy requirements for 1 month or longer  Weight Loss:  7 - Greater than 10% over 6 months     Body Fat Loss:  1 - Mild body fat loss Triceps   Muscle Mass Loss:  1 - Mild muscle mass loss Clavicles (pectoralis & deltoids)  Fluid Accumulation:  No significant fluid accumulation Extremities   Strength:  Not Performed    Estimated Daily Nutrient Needs:  Energy (kcal):  1333-3003 kcal (25-28 kcal/kg); Weight Used for Energy Requirements:  Current     Protein (g):  82-95 gm of protein (1.2-1.4 gm/kg); Weight Used for Protein Requirements:  Current          Nutrition Related Findings:  No edema. GI status: Hypoactive bowel sounds. Nausea/ vomiting. Loss of appetite. Difficulty swallowing. Status post total gastrectomy 11/30/20. [place] at home      Wounds:  None       Current Nutrition Therapies:    ADULT DIET; Full Liquid  Diet NPO    Anthropometric Measures:  · Height: 5' 5\" (165.1 cm)  · Current Body Weight: 150 lb (68 kg)   · Admission Body Weight: 143 lb (64.9 kg) (stated)    · Usual Body Weight: 193 lb (87.5 kg) (4/7/21)     · Ideal Body Weight: 125 lbs; % Ideal Body Weight 120 %   · BMI: 25  · BMI Categories: Normal Weight (BMI 18.5-24. 9)       Nutrition Diagnosis:   · Severe malnutrition, In context of chronic, non-illness related related to inadequate protein-energy intake as evidenced by intake 0-25%, weight loss greater than or equal to 10% in 6 months    · Altered GI function related to altered GI structure (total gastrectomy) as evidenced by GI abnormality, nausea, vomiting, intake 0-25%      Nutrition Interventions:   Food and/or Nutrient Delivery:  Continue Current Diet, Start Oral Nutrition Supplement  Nutrition Education/Counseling:  Education not indicated   Coordination of Nutrition Care:  Continue to monitor while inpatient    Goals:  Adequate nutritional intakes from oral or tube feeding       Nutrition Monitoring and Evaluation:   Food/Nutrient Intake Outcomes:  Supplement Intake, Food and Nutrient Intake  Physical Signs/Symptoms Outcomes:  Biochemical Data, Fluid Status or Edema, Skin, Weight, Chewing or Swallowing, GI Status     Discharge Planning:    Continue current diet, Continue Oral Nutrition Supplement Ruth Setting  MFN, RDN, LDN  Lead Clinical Dietitian  RD Office Phone [phone]

## 2021-08-30 NOTE — PROGRESS NOTES
updates discussed with Nursing staff . Background History:         Cathy Polanco is 62 y.o. female  Who was admitted to the hospital on 8/27/2021 for treatment of Dehydration. Patient was sent to ED by her gastroenterologist. She reported that she has been having intractable nausea and vomiting for many months which is getting worse and was unable to eat and drink for many weeks. Patient has h/o carcinoid tumour and underwent gastrectomy about 10 months ago. She has lost about 140 lbs weight since . Patient reported that she has lost about 25 lbs in last 1 months. She has poor appetite. Patient denies any fever or chills , blood in emesis or stool. She has been having fatigue. She was found to have tachycardia with heart rate in the 130s in office today and was sent to ED. Patient has tried Megace, Reglan, Zofran, Phenergan but did not help. She has also been having esophageal phase dysphagia. Patient did not had any EGD in many months. Patient underwent EGD and did not show negative for any stricture. Gen surgery was consulted for Jejunostomy placement. PMH:  Past Medical History:   Diagnosis Date    Anxiety     Arthritis     knee    Asthma     Colon polyp 02/21/2019    tubular adenoma; hyperplastic polyp    Colon polyps     Cyst of kidney, acquired     bilat.  Fatigue     Hypertension     Hypothyroidism     Neuroendocrine tumor 02/21/2019    of stomach    Obesity     Pharyngoesophageal dysphagia     Vocal cord polyps     Wears glasses       Allergies:    Allergies   Allergen Reactions    Erythromycin Diarrhea      Medications :  pantoprazole, 40 mg, IntraVENous, BID  metoclopramide, 10 mg, IntraVENous, TID  scopolamine, 1 patch, Transdermal, Q72H  levothyroxine, 88 mcg, Oral, Once per day on Sun Wed Thu Fri Sat  soledad-nelly, 1 tablet, Oral, Daily  magnesium oxide, 400 mg, Oral, BID  [Held by provider] metoclopramide, 10 mg, Oral, TID AC  megestrol, 400 mg, Oral, Daily  [Held by provider] pantoprazole, 40 mg, Oral, BID AC  potassium chloride, 20 mEq, Oral, BID  vitamin B-12, 500 mcg, Oral, Daily  Vitamin D, 1,000 Units, Oral, Daily  sucralfate, 1 g, Oral, BID  sodium chloride flush, 5-40 mL, IntraVENous, 2 times per day  [Held by provider] enoxaparin, 40 mg, Subcutaneous, Daily        Review of Systems   Review of Systems   Constitutional: Positive for unexpected weight change. Negative for appetite change, fatigue and fever. HENT: Positive for trouble swallowing. Negative for congestion, rhinorrhea and sneezing. Eyes: Negative for visual disturbance. Respiratory: Negative for cough, chest tightness, shortness of breath and wheezing. Cardiovascular: Negative for chest pain and palpitations. Gastrointestinal: Positive for nausea and vomiting. Negative for abdominal pain, blood in stool, constipation and diarrhea. Genitourinary: Negative for dysuria, enuresis, frequency and hematuria. Musculoskeletal: Negative for arthralgias and myalgias. Skin: Negative for rash. Neurological: Negative for dizziness, weakness, light-headedness and headaches. Hematological: Does not bruise/bleed easily. Psychiatric/Behavioral: Negative for dysphoric mood and sleep disturbance. Objective :      Current Vitals : Temp: 98.4 °F (36.9 °C),  Pulse: 75, Resp: 16, BP: 136/82, SpO2: 100 %  Last 24 Hrs Vitals   Patient Vitals for the past 24 hrs:   BP Temp Temp src Pulse Resp SpO2 Weight   08/30/21 0740 136/82 98.4 °F (36.9 °C) Oral 75 16 100 % --   08/30/21 0427 -- -- -- -- -- -- 150 lb (68 kg)   08/29/21 1907 131/85 98.1 °F (36.7 °C) Oral 83 16 99 % --   08/29/21 1534 131/81 98.1 °F (36.7 °C) Oral 87 16 95 % --   08/29/21 1117 125/79 98.4 °F (36.9 °C) Oral 91 16 98 % --     Intake / output   08/29 0701 - 08/30 0700  In: 1954 [P.O.:100; I.V.:1854]  Out: 8764 [Urine:2950]  Physical Exam:  Physical Exam  Vitals and nursing note reviewed.    Constitutional:       General: She is not in acute distress. Appearance: She is underweight. She is not diaphoretic. HENT:      Head: Normocephalic and atraumatic. Nose:      Right Sinus: No maxillary sinus tenderness or frontal sinus tenderness. Left Sinus: No maxillary sinus tenderness or frontal sinus tenderness. Mouth/Throat:      Pharynx: No oropharyngeal exudate. Eyes:      General: No scleral icterus. Conjunctiva/sclera: Conjunctivae normal.      Pupils: Pupils are equal, round, and reactive to light. Neck:      Thyroid: No thyromegaly. Vascular: No JVD. Cardiovascular:      Rate and Rhythm: Normal rate and regular rhythm. Pulses:           Dorsalis pedis pulses are 2+ on the right side and 2+ on the left side. Heart sounds: Normal heart sounds. No murmur heard. Pulmonary:      Effort: Pulmonary effort is normal.      Breath sounds: Normal breath sounds. No wheezing or rales. Abdominal:      Palpations: Abdomen is soft. There is no mass. Tenderness: There is no abdominal tenderness. Musculoskeletal:      Cervical back: Full passive range of motion without pain and neck supple. Lymphadenopathy:      Head:      Right side of head: No submandibular adenopathy. Left side of head: No submandibular adenopathy. Cervical: No cervical adenopathy. Skin:     General: Skin is warm. Neurological:      Mental Status: She is alert and oriented to person, place, and time. Motor: No tremor. Psychiatric:         Behavior: Behavior is cooperative.            Laboratory findings:    Recent Labs     08/27/21  1415 08/28/21  0616   WBC 5.6 5.4   HGB 13.9 11.9   HCT 43.5 37.8    229   INR  --  1.1     Recent Labs     08/27/21  1415 08/28/21  0616    139   K 3.9 3.5*    104   CO2 26 25   GLUCOSE 98 79   BUN 8 5*   CREATININE 0.57 0.45*   MG  --  1.3*   CALCIUM 9.0 8.3*     Recent Labs     08/27/21  1415   PROT 6.7   LABALBU 3.0*   TSH 0.03*   AST 77*   ALT 31   ALKPHOS 112*   BILITOT 0. 74   LIPASE 6*          Specific Gravity, UA   Date Value Ref Range Status   08/27/2021 <1.005 (L) 1.005 - 1.030 Final     Comment:     UNABLE TO DO SPECIFIC GRAVITY DUE TO CT SCAN     Protein, UA   Date Value Ref Range Status   08/27/2021 NEGATIVE NEGATIVE Final     RBC, UA   Date Value Ref Range Status   08/27/2021 2 TO 5 0 - 2 /HPF Final     Bacteria, UA   Date Value Ref Range Status   08/27/2021 FEW (A) None Final     Nitrite, Urine   Date Value Ref Range Status   08/27/2021 NEGATIVE NEGATIVE Final     WBC, UA   Date Value Ref Range Status   08/27/2021 5 TO 10 0 - 5 /HPF Final     Leukocyte Esterase, Urine   Date Value Ref Range Status   08/27/2021 SMALL (A) NEGATIVE Final       Imaging / Clinical Data :-   CT ABDOMEN PELVIS W IV CONTRAST Additional Contrast? None    Result Date: 8/27/2021  1. No acute process seen in the abdomen or pelvis. Patient is status post gastrectomy, and detailed assessment of the anastomosis in the upper abdomen is limited by bowel peristalsis and respiratory motion 2. Hepatic steatosis. 3.  Unchanged intrahepatic and extrahepatic biliary dilatation status post cholecystectomy. 4.  Pelvic right kidney is redemonstrated. Multiple bilateral renal cysts. XR CHEST PORTABLE    Result Date: 8/27/2021  No acute cardiopulmonary process and no change from prior studies. Clinical Course : unchanged  Assessment and Plan  :        Intractable nausea vomiting with dehydration and abnormal weight loss - continue IV fluids, antiemetics. s/p EGD. Severe malnutrition - Plan for  J-tube placement. Consult Gen Surgery. H/o carcinoid s/p total gastrectomy and Qamar-en-Y esophageal jejunal anastomosis -     Needs J tube placement. Continue to monitor vitals , Intake / output ,  Cell count , HGB , Kidney function, oxygenation  as indicated . Plan and updates discussed with patient ,  answers  explained to satisfaction.    Plan discussed with Staff Velia Stevenson RN     (Please note that portions of this note were completed with a voice recognition program. Efforts were made to edit the dictations but occasionally words are mis-transcribed.)      Boris Bui MD  8/30/2021

## 2021-08-30 NOTE — PLAN OF CARE
Problem: Nausea/Vomiting:  Goal: Absence of nausea/vomiting  Description: Absence of nausea/vomiting  8/30/2021 1209 by Haven Pappas RN  Outcome: Ongoing  8/29/2021 2356 by Freddy Chowdary RN  Outcome: Ongoing  Note: Assess gastrointestinal status. Elevate the head of the bed. Help the patient identify the cause of nausea and reduce the cause, if possible. Suggest avoiding offensive foods and odors. Monitor the amount, frequency, and characteristics of vomitus.   Monitor intake and output, as ordered  Goal: Able to drink  Description: Able to drink  8/30/2021 1209 by Haven Pappas RN  Outcome: Ongoing  8/29/2021 2356 by Freddy Chowdary RN  Outcome: Ongoing  Goal: Able to eat  Description: Able to eat  8/30/2021 1209 by Haven Pappas RN  Outcome: Ongoing  8/29/2021 2356 by Freddy Chowdary RN  Outcome: Ongoing  Goal: Ability to achieve adequate nutritional intake will improve  Description: Ability to achieve adequate nutritional intake will improve  8/30/2021 1209 by Haven Pappas RN  Outcome: Ongoing  8/29/2021 2356 by Freddy Chowdary RN  Outcome: Ongoing

## 2021-08-30 NOTE — PROGRESS NOTES
Physical Therapy    Facility/Department: [facility]  Initial Assessment    NAME: Presley Johnston  : 1960  MRN: [id number]    Date of Service: 2021    Discharge Recommendations:  Patient would benefit from continued therapy after discharge        Assessment   Body structures, Functions, Activity limitations: Decreased endurance;Decreased strength  Assessment: Pt limited by lack of activity tolerance  Prognosis: Good  Decision Making: High Complexity  PT Education: PT Role;Plan of Care;Home Exercise Program;General Safety  Patient Education: Handout: LE HEP  REQUIRES PT FOLLOW UP: Yes  Activity Tolerance  Activity Tolerance: Patient limited by endurance; Patient limited by fatigue       Patient Diagnosis(es): The primary encounter diagnosis was General weakness. Diagnoses of Dehydration and Weight loss were also pertinent to this visit. has a past medical history of Anxiety, Arthritis, Asthma, Colon polyp, Colon polyps, Cyst of kidney, acquired, Fatigue, Hypertension, Hypothyroidism, Neuroendocrine tumor, Obesity, Pharyngoesophageal dysphagia, Vocal cord polyps, and Wears glasses. has a past surgical history that includes cystourethroscopy/stent removal (2011); Colonoscopy (2019); ERCP; Cholecystectomy (10/09/2014); hip surgery (Left); Throat surgery; Colonoscopy; Upper gastrointestinal endoscopy (2019); Upper gastrointestinal endoscopy (2019); Upper gastrointestinal endoscopy (N/A, 2019); Upper gastrointestinal endoscopy (N/A, 10/23/2019); Upper gastrointestinal endoscopy (N/A, 10/29/2019); Endoscopic ultrasonography, GI (N/A, 2020); Upper gastrointestinal endoscopy (N/A, 2021); and gastrectomy (N/A, 2020).     Restrictions  Restrictions/Precautions  Restrictions/Precautions: General Precautions, Up as Tolerated  Required Braces or Orthoses?: No  Position Activity Restriction  Other position/activity restrictions: RUE IV  Vision/Hearing Subjective  General  Chart Reviewed: Yes  Patient assessed for rehabilitation services?: Yes  Response To Previous Treatment: Not applicable  Family / Caregiver Present: Yes (Sister)  Follows Commands: Within Functional Limits  General Comment  Comments: OK for PT per Idalia RN  Pain Screening  Patient Currently in Pain: Yes  Vital Signs  Patient Currently in Pain: Yes       Orientation  Orientation  Overall Orientation Status: Within Normal Limits  Social/Functional History  Social/Functional History  Lives With: Family (sister)  Type of Home: House  Home Layout: Two level  Home Access: Stairs to enter with rails  Entrance Stairs - Number of Steps: 4  Entrance Stairs - Rails: Right  Bathroom Shower/Tub: Tub/Shower unit  Bathroom Toilet: Standard  Bathroom Equipment: Grab bars in shower, Shower chair  Home Equipment:  (no dme)  ADL Assistance: Independent  Homemaking Assistance: Independent  Homemaking Responsibilities: No (sister does cooking/cleaning/laundry)  Ambulation Assistance: Independent  Transfer Assistance: Independent  Active : Yes  Occupation: Retired  Type of occupation: activites director  Leisure & Hobbies: no hobbies  Additional Comments: pt reports no recent falls  Cognition        Objective     Observation/Palpation  Posture: Good  Observation: Pt in chair when PT arrived    AROM RLE (degrees)  RLE AROM: WNL  AROM LLE (degrees)  LLE AROM : WNL  AROM RUE (degrees)  RUE General AROM: See OT eval for B UE ROM  Strength RLE  Strength RLE: WFL  Comment: 4/5 to 4+/5 B LE's  Strength LLE  Strength LLE: WFL  Strength RUE  Comment: See OT eval for B UE MMT  Tone RLE  RLE Tone: Normotonic  Tone LLE  LLE Tone: Normotonic  Motor Control  Gross Motor?: WNL  Sensation  Overall Sensation Status: WNL  Bed mobility  Comment: Pt in chair when PT arrived  Transfers  Sit to Stand: Contact guard assistance  Stand to sit: Contact guard assistance  Stand Pivot Transfers: Contact guard assistance  Ambulation  Ambulation?: Yes  Ambulation 1  Surface: level tile  Device: No Device  Assistance: Contact guard assistance  Gait Deviations: Slow Janet  Distance: 105' x 1  Comments: Pt fatigues quickly  Stairs/Curb  Stairs?: No     Balance  Posture: Good  Sitting - Static: Good  Sitting - Dynamic: Good  Standing - Static: Good  Standing - Dynamic: Good        Plan   Plan  Times per week: 1-2x/day,5-6 days/week  Current Treatment Recommendations: Strengthening, Transfer Training, Endurance Training, Gait Training, Stair training  Safety Devices  Type of devices: Gait belt, Left in chair, Call light within reach  Restraints  Initially in place: No    G-Code       OutComes Score  Dynamic Gait Total Score: 22 (08/30/21 1128)                                               AM-PAC Score  AM-PAC Inpatient Mobility Raw Score : 20 (08/30/21 1127)  AM-PAC Inpatient T-Scale Score : 47.67 (08/30/21 1127)  Mobility Inpatient CMS 0-100% Score: 35.83 (08/30/21 1127)  Mobility Inpatient CMS G-Code Modifier : Marilee Sanchez (08/30/21 1127)          Goals  Short term goals  Time Frame for Short term goals: 12 treatments  Short term goal 1: Independent transfers  Short term goal 2: Independent ambulation 300' x 1  Short term goal 3: Tolerate 30 min ther act  Short term goal 4: 5/5 B LE strength  Short term goal 5:  Independently ascend/descend 1 flight of steps(~ 12 steps w/ 1 HR)  Patient Goals   Patient goals : Feel better       Therapy Time   Individual Concurrent Group Co-treatment   Time In 0946         Time Out 1018         Minutes 32          23 minutes treatment time       Natasha Viera, PT

## 2021-08-30 NOTE — CONSULTS
Date    CHOLECYSTECTOMY  10/09/2014    COLONOSCOPY  02/21/2019    tubular adenoma; hyperplastic polyp    COLONOSCOPY      over 5 yrs ago per pt with polyps (2-)    CYSTOURETHROSCOPY/STENT REMOVAL  05/03/2011    ENDOSCOPIC ULTRASOUND (LOWER) N/A 01/22/2020    ENDOSCOPIC ULTRASOUND WITH POSSIBLE EMR performed by Sheri Pride MD at [facility]    ERCP      GASTRECTOMY N/A 11/30/2020    HIP SURGERY Left     soft tissue tumor removal    THROAT SURGERY      vocal cord polyps removed    UPPER GASTROINTESTINAL ENDOSCOPY  02/21/2019    Neuroendocrine tumor    UPPER GASTROINTESTINAL ENDOSCOPY  09/23/2019    UPPER GASTROINTESTINAL ENDOSCOPY N/A 09/23/2019    EGD BIOPSY performed by Matt Murray MD at [address] [facility] 10/23/2019    EGD W/ EMR performed by Sheri Pride MD at [address] [place] [address] N/A 10/29/2019    EGD CONTROL HEMORRHAGE performed by Rahul Fermin MD at [address] [place] [address] N/A 04/26/2021    EGD BIOPSY performed by Kristyn Orona MD at [address] [place] [address] N/A 8/30/2021    EGD ESOPHAGOGASTRODUODENOSCOPY performed by Adrian Waite MD at [address] [place] [address]       Medications Prior to Admission:   Medications Prior to Admission: hydroCHLOROthiazide (HYDRODIURIL) 25 MG tablet, Take 12.5 mg by mouth daily Takes 1/2 tab (=12.5mg) daily  magnesium oxide (MAG-OX) 400 MG tablet, Take 400 mg by mouth 2 times daily  levothyroxine (SYNTHROID) 88 MCG tablet, Take 88 mcg by mouth Five times weekly Indications: Takes only Wed-Sun (skips Mon/Tues)  megestrol (MEGACE) 40 MG/ML suspension, Take 10 mLs by mouth daily  apixaban (ELIQUIS) 5 MG TABS tablet, Take 1 tablet by mouth 2 times daily  metoclopramide (REGLAN) 10 MG tablet, TAKE 1 TABLET BY MOUTH 3 TIMES DAILY (BEFORE MEALS)  pantoprazole (PROTONIX) 40 MG tablet, Take 1 tablet by mouth 2 times daily  sucralfate (CARAFATE) 1 GM tablet, Take 1 tablet by mouth 4 times daily (Patient taking differently: Take 1 g by mouth 2 times daily )  potassium chloride (KLOR-CON) 20 MEQ packet, Take 20 mEq by mouth daily  desonide (DESOWEN) 0.05 % cream, Apply topically 2 times daily.  (Patient taking differently: Apply topically 2 times daily as needed )  Multiple Vitamin (MULTI-DAY VITAMINS PO), Take 1 tablet by mouth daily   vitamin B-12 (CYANOCOBALAMIN) 500 MCG tablet, Take 500 mcg by mouth daily  [DISCONTINUED] levothyroxine (SYNTHROID) 88 MCG tablet, Take 1 tablet by mouth daily (Patient taking differently: Take 88 mcg by mouth Five times weekly )  [DISCONTINUED] Magnesium 400 MG TABS, Take 1 tablet by mouth daily (Patient taking differently: Take 1 tablet by mouth 2 times daily )  [DISCONTINUED] ondansetron (ZOFRAN) 4 MG tablet, Take 1 tablet by mouth every 8 hours as needed for Nausea or Vomiting (Patient not taking: Reported on 9154)  Folic Acid-Vit R3-VAN D74 2.2-25-0.5 MG TABS, Take 1 tablet by mouth daily  [DISCONTINUED] vitamin D 25 MCG (1000 UT) CAPS, Take 1 capsule by mouth daily (Patient not taking: Reported on 2021)  [DISCONTINUED] hydroCHLOROthiazide (HYDRODIURIL) 25 MG tablet, TAKE 1 TABLET BY MOUTH EVERY DAY (Patient taking differently: 12.5 mg TAKE 1/2 TABLET BY MOUTH EVERY DAY)    Allergies:  Erythromycin    Social History:   Social History     Socioeconomic History    Marital status: Single     Spouse name: Not on file    Number of children: Not on file    Years of education: Not on file    Highest education level: Not on file   Occupational History    Not on file   Tobacco Use    Smoking status: Former Smoker     Packs/day: 1.00     Years: 25.00     Pack years: 25.00     Quit date: 2012     Years since quittin.2    Smokeless tobacco: Never Used    Tobacco comment: quit 2 years   Vaping Use    Vaping Use: Never used   Substance and Sexual Activity    Alcohol use: Not Currently     Comment: 3 times a month    Drug use: No    Sexual activity: Not on file   Other Topics Concern    Not on file   Social History Narrative    Not on file     Social Determinants of Health     Financial Resource Strain: Low Risk     Difficulty of Paying Living Expenses: Not hard at all   Food Insecurity: No Food Insecurity    Worried About Running Out of Food in the Last Year: Never true    [address] in the Last Year: Never true   Transportation Needs:     Lack of Transportation (Medical):  Lack of Transportation (Non-Medical):    Physical Activity:     Days of Exercise per Week:     Minutes of Exercise per Session:    Stress:     Feeling of Stress :    Social Connections:     Frequency of Communication with Friends and Family:     Frequency of Social Gatherings with Friends and Family:     Attends [employer] Services:     Active Member of Clubs or Organizations:     Attends Club or Organization Meetings:     Marital Status:    Intimate Partner Violence:     Fear of Current or Ex-Partner:     Emotionally Abused:     Physically Abused:     Sexually Abused:        Family History:       Problem Relation Age of Onset    High Blood Pressure Mother     High Blood Pressure Sister     Stomach Cancer Sister     Other Sister         epilepsy    No Known Problems Father        REVIEW OF SYSTEMS:    CONSTITUTIONAL: Denies recent weight loss, fatigue, fevers, chills. HEENT: Denies rhinorrhea, dysphagia, odynphagia. CARDIOVASCULAR: Denies history of MI, recent chest pain. RESPIRATORY: Denies recent history of shortness of breath, +Hx of PE  GASTROINTESTINAL: per hpi   GENITOURINARY: Denies increased frequency or dysuria. HEMATOLOGIC/LYMPHATIC: Denies history of anemia or DVTs. ENDOCRINE: Denies history of thyroid problems or diabetes. NEURO: Denies history of CVA, TIA. Review of systems negative unless listed above.     PHYSICAL EXAM:    VITALS:  /78   Pulse 69   Temp 97.3 °F (36.3 °C) (Oral)   Resp 16   Ht 5' 5\" iterative reconstruction, and/or weight based adjustment of the mA/kV was utilized to reduce the radiation dose to as low as reasonably achievable. COMPARISON: CT abdomen and pelvis dated 04/26/2021 HISTORY: ORDERING SYSTEM PROVIDED HISTORY: 25 pound weight loss, history of gastrectomy for gastric cancer, nausea, vomiting, assess for obstruction TECHNOLOGIST PROVIDED HISTORY: 25 pound weight loss, history of gastrectomy for gastric cancer, nausea, vomiting, assess for obstruction Decision Support Exception - unselect if not a suspected or confirmed emergency medical condition->Emergency Medical Condition (MA) Reason for Exam: Pt states loss of appetite for 3 weeks, weakness, low abd pain. 25 pound weight loss in 3 weeks. Acuity: Acute Type of Exam: Initial FINDINGS: Lower Chest:  Visualized portion of the lower chest demonstrates no acute abnormality. Organs: Liver is diffusely hypoattenuating. There is mild intrahepatic and extrahepatic biliary dilatation, which is unchanged. Gallbladder is surgically absent. Spleen, pancreas, and adrenal glands are unremarkable. There is symmetric enhancement of the kidneys. Multiple bilateral renal cysts are noted. Right kidney is intrapelvic, which is unchanged. No hydronephrosis or perinephric inflammatory change. GI/Bowel: There is no abnormal bowel distention or pericolonic inflammation. No free intraperitoneal air or fluid. Appendix is normal.  Postoperative changes related to previous gastrectomy are noted in the upper abdomen, similar to the prior study. Detailed assessment is limited by bowel peristalsis and respiratory motion in the upper abdomen. Pelvis: Urinary bladder is incompletely distended but appears to be unremarkable. Uterus and adnexal structures are grossly unremarkable. No pelvic lymphadenopathy. Peritoneum/Retroperitoneum: The abdominal aorta is normal in caliber. There is no retroperitoneal or mesenteric lymphadenopathy.  Bones/Soft Tissues:

## 2021-08-30 NOTE — CARE COORDINATION
Discharge Planning:    Patient had EGD today and GI is recommending surgical consult for J-tube placement, as well as small frequent meals with upright position and liquid. Patient is not a candidate for PEG d/t gastrectomy.

## 2021-08-30 NOTE — ANESTHESIA POSTPROCEDURE EVALUATION
Department of Anesthesiology  Postprocedure Note    Patient: Marquis Chappell  MRN: [id number]  YOB: 1960  Date of evaluation: 8/30/2021  Time:  3:32 PM     Procedure Summary     Date: 08/30/21 Room / Location: [place] [address] / [facility] - INPATIENT    Anesthesia Start: 5331 Anesthesia Stop: 1243    Procedure: EGD ESOPHAGOGASTRODUODENOSCOPY (N/A ) Diagnosis: (DX NAUSEA  / VOMITTING)    Surgeons: Casey Curry MD Responsible Provider: Isela Ferreira MD    Anesthesia Type: general ASA Status: 3          Anesthesia Type: general    Reji Phase I: Reji Score: 10    Reji Phase II:      Last vitals: Reviewed and per EMR flowsheets.        Anesthesia Post Evaluation    Complications: no

## 2021-08-30 NOTE — PROGRESS NOTES
Occupational Therapy  Facility/Department: [facility]  Daily Treatment Note  NAME: Suyapa Serrato  : 1960  MRN: [id number]    Date of Service: 2021    Discharge Recommendations:  Patient would benefit from continued therapy after discharge   d/t recent hospitalization and medical condition, pt would benefit from additional therapy at time of discharge to ensure safety. Please refer to AM-PAC score for current functional status. RN reports patient is medically stable for therapy treatment this date. Chart reviewed prior to treatment and patient is agreeable for therapy. All lines intact and patient positioned comfortably at end of treatment. All patient needs addressed prior to ending therapy session. Assessment   Performance deficits / Impairments: Decreased functional mobility ; Decreased ADL status; Decreased strength;Decreased endurance;Decreased safe awareness;Decreased balance;Decreased high-level IADLs  Assessment: Skilled OT is indicated to increase overall safety and IND with self-care and functional tasks to return home with assistance as needed  Prognosis: Good  OT Education: OT Role;Plan of Care;Energy Conservation  Patient Education: OT POC, safety in function, energy conservation/fall risk handouts, call light use fall safety  REQUIRES OT FOLLOW UP: Yes  Activity Tolerance  Activity Tolerance: Patient Tolerated treatment well  Safety Devices  Safety Devices in place: Yes  Type of devices: All fall risk precautions in place;Call light within reach;Nurse notified; Left in chair         Patient Diagnosis(es): The primary encounter diagnosis was General weakness. Diagnoses of Dehydration and Weight loss were also pertinent to this visit.       has a past medical history of Anxiety, Arthritis, Asthma, Colon polyp, Colon polyps, Cyst of kidney, acquired, Fatigue, Hypertension, Hypothyroidism, Neuroendocrine tumor, Obesity, Pharyngoesophageal dysphagia, Vocal cord polyps, and Wears glasses. has a past surgical history that includes cystourethroscopy/stent removal (05/03/2011); Colonoscopy (02/21/2019); ERCP; Cholecystectomy (10/09/2014); hip surgery (Left); Throat surgery; Colonoscopy; Upper gastrointestinal endoscopy (02/21/2019); Upper gastrointestinal endoscopy (09/23/2019); Upper gastrointestinal endoscopy (N/A, 09/23/2019); Upper gastrointestinal endoscopy (N/A, 10/23/2019); Upper gastrointestinal endoscopy (N/A, 10/29/2019); Endoscopic ultrasonography, GI (N/A, 01/22/2020); Upper gastrointestinal endoscopy (N/A, 04/26/2021); and gastrectomy (N/A, 11/30/2020). Restrictions  Restrictions/Precautions  Restrictions/Precautions: General Precautions, Up as Tolerated  Required Braces or Orthoses?: No  Position Activity Restriction  Other position/activity restrictions: RUE IV  Subjective   General  Chart Reviewed: Yes  Patient assessed for rehabilitation services?: Yes  Family / Caregiver Present: No      Orientation  Orientation  Overall Orientation Status: Within Functional Limits  Objective    ADL  Grooming: Supervision;Setup (pt performed oral care and applied lotion while standing at sink)  UE Bathing: Supervision;Setup (seated on shower chair)  LE Bathing: Supervision;Setup (seated on shower chair)  UE Dressing: Supervision  LE Dressing: Supervision  Toileting: Supervision  Additional Comments: Pt performed all showering and toileting tasks with SUP d./t hospital environment/for safety. Pt took rest breaks and had good pacing tech. Pt performed all tasks safely. Pt provided with energy conservation and fall risk handout.       Written and verbal edu provided to pt on safe ADL completion techniques and tips during bathing/showering, and toileting; EC/WS techniques of pacing, posturing, body mechanics, planning and organizing tasks, avoiding fatigue, and task simplification in regards to ADLs of eating, grooming, bathing/showering, dressing, and IADLs of cooking, meal cleanup, marketing and meal planning, laundry, bed making, and housework. Written and verbal edu provided to pt regarding fall prevention in the areas of community safety, transportation, proper footwear and clothing, reducing risk of falls, environmental modifications, importance of exercise, consequences of falling, plan if a fall occurs (\"rest and wait\" vs \"up and about\"). Pt with Good understanding of handouts. Pt will have sx today at 11:45, pt may need additonal assistance after.         Balance  Sitting Balance: Modified independent   Standing Balance: Supervision  Standing Balance  Time: ~7+ min  Activity: self-care tasks  Functional Mobility  Functional - Mobility Device: No device  Activity: To/from bathroom  Assist Level: Supervision  Functional Mobility Comments: Pt with no LOB this date  Toilet Transfers  Toilet - Technique: Ambulating  Equipment Used: Grab bars  Toilet Transfer: Supervision  Bed mobility  Supine to Sit: Modified independent  Sit to Supine: Modified independent  Scooting: Modified independent  Transfers  Sit to stand: Supervision  Stand to sit: Supervision  Transfer Comments: Pt with good transfer tech                       Cognition  Overall Cognitive Status: [facility]                                         Plan   Plan  Times per week: 4-5x per week  Times per day: Daily  Current Treatment Recommendations: Strengthening, Patient/Caregiver Education & Training, Endurance Training, Self-Care / ADL, Equipment Evaluation, Education, & procurement, Balance Training, Pain Management, Safety Education & Training, Functional Mobility Training    AM-PAC Score        AM-[facility] Inpatient Daily Activity Raw Score: 19 (08/30/21 1001)  AM-PAC Inpatient ADL T-Scale Score : 40.22 (08/30/21 1001)  ADL Inpatient CMS 0-100% Score: 42.8 (08/30/21 1001)  ADL Inpatient CMS G-Code Modifier : CK (08/30/21 1001)    Goals  Short term goals  Time Frame for Short term goals: by discharge, pt to demo  Short term goal 1: increase in BUE strength by 1/2 grade to assist with self-care and be IND with BUE HEP with use of handouts as needed  Short term goal 2: I/MI for UB and LB ADLs with AE as needed and Good safety  Short term goal 3: I/MI for toileting tasks with use of grab bars as needed and Good safety  Short term goal 4: I/MI for ADL transfers and functional mob with use of AD as needed and Good safety  Short term goal 5: Pt/family to be IND with EC/WS and fall prevention tech as well as DME/AE recommendations with use of handouts as needed  Patient Goals   Patient goals :  To go home       Therapy Time   Individual Concurrent Group Co-treatment   Time In 0826         Time Out 0854         Minutes 28                 Majo German OTR/L

## 2021-08-31 ENCOUNTER — APPOINTMENT (OUTPATIENT)
Dept: GENERAL RADIOLOGY | Age: 61
DRG: 640 | End: 2021-08-31
Payer: COMMERCIAL

## 2021-08-31 LAB
ALBUMIN SERPL-MCNC: 2.2 G/DL (ref 3.5–5.2)
ALBUMIN/GLOBULIN RATIO: ABNORMAL (ref 1–2.5)
ALP BLD-CCNC: 95 U/L (ref 35–104)
ALT SERPL-CCNC: 26 U/L (ref 5–33)
ANION GAP SERPL CALCULATED.3IONS-SCNC: 11 MMOL/L (ref 9–17)
AST SERPL-CCNC: 64 U/L
BILIRUB SERPL-MCNC: 0.77 MG/DL (ref 0.3–1.2)
BUN BLDV-MCNC: 3 MG/DL (ref 6–20)
BUN/CREAT BLD: 7 (ref 9–20)
CALCIUM SERPL-MCNC: 8.2 MG/DL (ref 8.6–10.4)
CHLORIDE BLD-SCNC: 103 MMOL/L (ref 98–107)
CO2: 22 MMOL/L (ref 20–31)
CREAT SERPL-MCNC: 0.41 MG/DL (ref 0.5–0.9)
GFR AFRICAN AMERICAN: >60 ML/MIN
GFR NON-AFRICAN AMERICAN: >60 ML/MIN
GFR SERPL CREATININE-BSD FRML MDRD: ABNORMAL ML/MIN/{1.73_M2}
GFR SERPL CREATININE-BSD FRML MDRD: ABNORMAL ML/MIN/{1.73_M2}
GLUCOSE BLD-MCNC: 75 MG/DL (ref 70–99)
HCT VFR BLD CALC: 35.3 % (ref 36.3–47.1)
HEMOGLOBIN: 11.2 G/DL (ref 11.9–15.1)
MCH RBC QN AUTO: 29.9 PG (ref 25.2–33.5)
MCHC RBC AUTO-ENTMCNC: 31.7 G/DL (ref 28.4–34.8)
MCV RBC AUTO: 94.4 FL (ref 82.6–102.9)
NRBC AUTOMATED: 0 PER 100 WBC
PDW BLD-RTO: 14.1 % (ref 11.8–14.4)
PLATELET # BLD: 216 K/UL (ref 138–453)
PMV BLD AUTO: 9.3 FL (ref 8.1–13.5)
POTASSIUM SERPL-SCNC: 3.6 MMOL/L (ref 3.7–5.3)
RBC # BLD: 3.74 M/UL (ref 3.95–5.11)
SODIUM BLD-SCNC: 136 MMOL/L (ref 135–144)
TOTAL PROTEIN: 5.1 G/DL (ref 6.4–8.3)
WBC # BLD: 5.3 K/UL (ref 3.5–11.3)

## 2021-08-31 PROCEDURE — 6370000000 HC RX 637 (ALT 250 FOR IP): Performed by: INTERNAL MEDICINE

## 2021-08-31 PROCEDURE — 36415 COLL VENOUS BLD VENIPUNCTURE: CPT

## 2021-08-31 PROCEDURE — 2580000003 HC RX 258: Performed by: INTERNAL MEDICINE

## 2021-08-31 PROCEDURE — 80053 COMPREHEN METABOLIC PANEL: CPT

## 2021-08-31 PROCEDURE — 6360000002 HC RX W HCPCS: Performed by: INTERNAL MEDICINE

## 2021-08-31 PROCEDURE — APPSS30 APP SPLIT SHARED TIME 16-30 MINUTES: Performed by: NURSE PRACTITIONER

## 2021-08-31 PROCEDURE — C9113 INJ PANTOPRAZOLE SODIUM, VIA: HCPCS | Performed by: INTERNAL MEDICINE

## 2021-08-31 PROCEDURE — 74248 X-RAY SM INT F-THRU STD: CPT

## 2021-08-31 PROCEDURE — 6360000002 HC RX W HCPCS: Performed by: FAMILY MEDICINE

## 2021-08-31 PROCEDURE — 85027 COMPLETE CBC AUTOMATED: CPT

## 2021-08-31 PROCEDURE — 99232 SBSQ HOSP IP/OBS MODERATE 35: CPT | Performed by: STUDENT IN AN ORGANIZED HEALTH CARE EDUCATION/TRAINING PROGRAM

## 2021-08-31 PROCEDURE — 99232 SBSQ HOSP IP/OBS MODERATE 35: CPT | Performed by: INTERNAL MEDICINE

## 2021-08-31 PROCEDURE — 1200000000 HC SEMI PRIVATE

## 2021-08-31 PROCEDURE — 6360000004 HC RX CONTRAST MEDICATION: Performed by: INTERNAL MEDICINE

## 2021-08-31 RX ADMIN — MAGNESIUM GLUCONATE 500 MG ORAL TABLET 400 MG: 500 TABLET ORAL at 12:21

## 2021-08-31 RX ADMIN — SODIUM CHLORIDE: 9 INJECTION, SOLUTION INTRAVENOUS at 16:42

## 2021-08-31 RX ADMIN — PANTOPRAZOLE SODIUM 40 MG: 40 INJECTION, POWDER, FOR SOLUTION INTRAVENOUS at 07:39

## 2021-08-31 RX ADMIN — ENOXAPARIN SODIUM 40 MG: 40 INJECTION SUBCUTANEOUS at 12:21

## 2021-08-31 RX ADMIN — METOCLOPRAMIDE 10 MG: 5 INJECTION, SOLUTION INTRAMUSCULAR; INTRAVENOUS at 19:17

## 2021-08-31 RX ADMIN — SUCRALFATE 1 G: 1 TABLET ORAL at 20:52

## 2021-08-31 RX ADMIN — METOCLOPRAMIDE 10 MG: 5 INJECTION, SOLUTION INTRAMUSCULAR; INTRAVENOUS at 07:39

## 2021-08-31 RX ADMIN — PANTOPRAZOLE SODIUM 40 MG: 40 INJECTION, POWDER, FOR SOLUTION INTRAVENOUS at 19:17

## 2021-08-31 RX ADMIN — MAGNESIUM GLUCONATE 500 MG ORAL TABLET 400 MG: 500 TABLET ORAL at 17:49

## 2021-08-31 RX ADMIN — POTASSIUM CHLORIDE 20 MEQ: 20 TABLET, EXTENDED RELEASE ORAL at 20:50

## 2021-08-31 RX ADMIN — ONDANSETRON 4 MG: 2 INJECTION INTRAMUSCULAR; INTRAVENOUS at 19:16

## 2021-08-31 RX ADMIN — SODIUM CHLORIDE: 9 INJECTION, SOLUTION INTRAVENOUS at 01:06

## 2021-08-31 RX ADMIN — DIATRIZOATE MEGLUMINE AND DIATRIZOATE SODIUM 240 ML: 660; 100 LIQUID ORAL; RECTAL at 08:40

## 2021-08-31 RX ADMIN — ONDANSETRON 4 MG: 2 INJECTION INTRAMUSCULAR; INTRAVENOUS at 06:28

## 2021-08-31 NOTE — PLAN OF CARE
Problem: Falls - Risk of:  Goal: Will remain free from falls  Description: Will remain free from falls  Outcome: Ongoing  Note: Room free of clutter  Hourly rounding   Non-skid socks worn  Side rails up x2  Bed low and locked  Call light in reach  Instructed to call out before getting out of bed  Anticipatory needs met  Bed alarm on  Falling star at the door and on wristband        Problem: Nausea/Vomiting:  Goal: Absence of nausea/vomiting  Description: Absence of nausea/vomiting  Outcome: Ongoing  Note: Patient has not complained of N/V. Will continue to monitor. PRN medications ordered.      Problem: Pain:  Goal: Control of acute pain  Description: Control of acute pain  Outcome: Ongoing  Note: Pain level assessed and rated on a 0-10 scale  Assess characteristics of pain  PRN pain medication given per pt request  Non-pharmacological interventions implemented  Report ineffective pain management to physician  Update pt and family of any changes  Pt instructed to call out with new onset of pain  Continue to monitor

## 2021-08-31 NOTE — PLAN OF CARE
Problem: Falls - Risk of:  Goal: Will remain free from falls  Description: Will remain free from falls  Outcome: Ongoing     Problem: Falls - Risk of:  Goal: Absence of physical injury  Description: Absence of physical injury  Outcome: Ongoing     Problem: Nausea/Vomiting:  Goal: Absence of nausea/vomiting  Description: Absence of nausea/vomiting  8/30/2021 2254 by Nat Herman RN  Outcome: Ongoing     Problem: Nausea/Vomiting:  Goal: Able to drink  Description: Able to drink  8/30/2021 2254 by Nat Herman RN  Outcome: Ongoing     Problem: Nausea/Vomiting:  Goal: Able to eat  Description: Able to eat  8/30/2021 2254 by Nat Herman RN  Outcome: Ongoing     Problem: Nausea/Vomiting:  Goal: Ability to achieve adequate nutritional intake will improve  Description: Ability to achieve adequate nutritional intake will improve  8/30/2021 2254 by Nat Herman RN  Outcome: Ongoing     Problem: Fluid Volume:  Goal: Signs and symptoms of dehydration will decrease  Description: Signs and symptoms of dehydration will decrease  Outcome: Ongoing     Problem: Fluid Volume:  Goal: Ability to achieve a balanced intake and output will improve  Description: Ability to achieve a balanced intake and output will improve  Outcome: Ongoing     Problem: Fluid Volume:  Goal: Diagnostic test results will improve  Description: Diagnostic test results will improve  Outcome: Ongoing     Problem: Physical Regulation:  Goal: Complications related to the disease process, condition or treatment will be avoided or minimized  Description: Complications related to the disease process, condition or treatment will be avoided or minimized  Outcome: Ongoing     Problem: Physical Regulation:  Goal: Ability to maintain vital signs within normal range will improve  Description: Ability to maintain vital signs within normal range will improve  Outcome: Ongoing     Problem: Pain:  Goal: Pain level will decrease  Description: Pain level will decrease  Outcome: Ongoing     Problem: Pain:  Goal: Control of acute pain  Description: Control of acute pain  Outcome: Ongoing     Problem: Pain:  Goal: Control of chronic pain  Description: Control of chronic pain  Outcome: Ongoing     Problem: Skin Integrity:  Goal: Will show no infection signs and symptoms  Description: Will show no infection signs and symptoms  Outcome: Ongoing     Problem: Nutrition  Goal: Optimal nutrition therapy  Outcome: Ongoing     Problem: Skin Integrity:  Goal: Absence of new skin breakdown  Description: Absence of new skin breakdown  Outcome: Ongoing

## 2021-08-31 NOTE — CARE COORDINATION
PICC line placed and TPN will start 9-1. Waiting for insurance verification from [place] and [facility].

## 2021-08-31 NOTE — PROGRESS NOTES
[facility]  Office: [address], DO, Roya Henley, DO, Natalie Blair, DO, Tomy Avila, DO, Lindsey Bonds MD, Vaughn Roach MD, Rahel Mcdonough MD, Yasmeen Francis MD, Saloni Fernández MD, Orin Pickard MD, Mary Carmen Nunez MD, Estefany Lin, DO, Dolly Darling MD, Melchor Quiroga, DO, Marv Mark MD,  Marquis Pineda, DO, Bebo Martin MD, Jennifer Hsu MD, Juli Rodriguez MD, Deb Hawkins MD, Ana Watts MD, Cyndy Castleman, MD, Carmita Pride MD, Zuhair Miles [facility], [facility], [facility], Sharmila Amato, CNP, Mayra Ashton, CNS, Balaji Webb, CNP, Tomasz Grullon, CNP, Juma Mitchell, CNP, Dionicia Frankel, [facility], Paula Dimas, CNP, IMELDA GraceC, Santos Jules, [facility], Ansley Otero, CNP, Vitaliy Stout, CNP, Ron Avila, CNP, Phylicia Madrigal, CNP, Conchita Morris, CNP, Kwaku Capps, CNP, Melani Palencia, CNP, Yael Richards, [facility]    Progress Note    8/31/2021    3:24 PM    Name:   Cathy Polanco  MRN:     [id number]     [place]:      [de-identified]   Room:   2007/2007-02  IP Day:  4  Admit Date:  8/27/2021  1:23 PM    PCP:   Bhupinder Duffy MD  Code Status:  Full Code    Subjective:     C/C:   Chief Complaint   Patient presents with   Ingrid Lubin Dehydration     sent by PCP   Ingrid Lubin Other     no appetite    Nausea & Vomiting    Cough    Fatigue     Interval History Status: improved. Patient seen after small bowel follow-through, general surgery progress diet to liquids. Discussed in detail with the patient, she is able to tolerate fluids, she will call the system at [facility] to schedule an appointment outpatient for a sooner date. Agreeable for the plan of getting PICC line with initiation of TPN. Brief History:     15-year-old female admitted to the hospital for treatment of dehydration. Patient was sent to ER by her gastroenterologist for intractable nausea and vomiting getting worse.   Patient has history of carcinoid tumor and underwent gastrectomy in November 2020 at [facility]. Patient has lost around 140 pound. She has poor appetite. Review of Systems:     Constitutional: positive for fatigue, poor appetite  Respiratory:  negative for cough, dyspnea on exertion, shortness of breath, wheezing  Cardiovascular:  negative for chest pain, chest pressure/discomfort, lower extremity edema, palpitations  Gastrointestinal: Positive for nausea, vomiting  Neurological:  negative for dizziness, headache    Medications: Allergies: Allergies   Allergen Reactions    Erythromycin Diarrhea       Current Meds:   Scheduled Meds:    pantoprazole  40 mg IntraVENous BID    metoclopramide  10 mg IntraVENous TID    scopolamine  1 patch Transdermal Q72H    levothyroxine  88 mcg Oral Once per day on Sun Wed Thu Fri Sat    soleadd-nelly  1 tablet Oral Daily    magnesium oxide  400 mg Oral BID    [Held by provider] metoclopramide  10 mg Oral TID AC    megestrol  400 mg Oral Daily    [Held by provider] pantoprazole  40 mg Oral BID AC    potassium chloride  20 mEq Oral BID    vitamin B-12  500 mcg Oral Daily    Vitamin D  1,000 Units Oral Daily    sucralfate  1 g Oral BID    sodium chloride flush  5-40 mL IntraVENous 2 times per day    enoxaparin  40 mg SubCUTAneous Daily     Continuous Infusions:    sodium chloride      sodium chloride 75 mL/hr at 08/31/21 0106     PRN Meds: diatrizoate meglumine-sodium, potassium chloride, sodium chloride flush, sodium chloride, magnesium sulfate, ondansetron **OR** ondansetron, polyethylene glycol, acetaminophen **OR** acetaminophen    Data:     Past Medical History:   has a past medical history of Anxiety, Arthritis, Asthma, Colon polyp, Colon polyps, Cyst of kidney, acquired, Fatigue, Hypertension, Hypothyroidism, Neuroendocrine tumor, Obesity, Pharyngoesophageal dysphagia, Vocal cord polyps, and Wears glasses.     Social History:   reports that she quit smoking about 9 years ago. She has a 25.00 pack-year smoking history. She has never used smokeless tobacco. She reports previous alcohol use. She reports that she does not use drugs. Family History:   Family History   Problem Relation Age of Onset    High Blood Pressure Mother     High Blood Pressure Sister     Stomach Cancer Sister     Other Sister         epilepsy    No Known Problems Father        Vitals:  BP (!) 144/90   Pulse 90   Temp 99 °F (37.2 °C) (Oral)   Resp 16   Ht 5' 5\" (1.651 m)   Wt 148 lb (67.1 kg)   LMP 1994   SpO2 98%   BMI 24.63 kg/m²   Temp (24hrs), Av.4 °F (36.9 °C), Min:98.1 °F (36.7 °C), Max:99 °F (37.2 °C)    No results for input(s): POCGLU in the last 72 hours. I/O (24Hr): Intake/Output Summary (Last 24 hours) at 2021 1524  Last data filed at 2021 1506  Gross per 24 hour   Intake 1447 ml   Output 1525 ml   Net -78 ml       Labs:  Hematology:  Recent Labs     21  0558   WBC 5.3   RBC 3.74*   HGB 11.2*   HCT 35.3*   MCV 94.4   MCH 29.9   MCHC 31.7   RDW 14.1      MPV 9.3     Chemistry:  Recent Labs     21  0558      K 3.6*      CO2 22   GLUCOSE 75   BUN 3*   CREATININE 0.41*   ANIONGAP 11   LABGLOM >60   GFRAA >60   CALCIUM 8.2*     Recent Labs     21  0558   PROT 5.1*   LABALBU 2.2*   AST 64*   ALT 26   ALKPHOS 95   BILITOT 0.77     ABG:No results found for: POCPH, PHART, PH, POCPCO2, MLP1ZEA, PCO2, POCPO2, PO2ART, PO2, POCHCO3, YJC6ESQ, HCO3, NBEA, PBEA, BEART, BE, THGBART, THB, QGL0KUF, QIXX4AQC, F2IJLMTL, O2SAT, FIO2  Lab Results   Component Value Date/Time    SPECIAL NOT REPORTED 12/10/2020 06:36 AM     Lab Results   Component Value Date/Time    CULTURE NO SIGNIFICANT GROWTH 12/10/2020 06:36 AM       Radiology:  CT ABDOMEN PELVIS W IV CONTRAST Additional Contrast? None    Result Date: 2021  1. No acute process seen in the abdomen or pelvis.   Patient is status post gastrectomy, and detailed assessment of the anastomosis in the upper abdomen is limited by bowel peristalsis and respiratory motion 2. Hepatic steatosis. 3.  Unchanged intrahepatic and extrahepatic biliary dilatation status post cholecystectomy. 4.  Pelvic right kidney is redemonstrated. Multiple bilateral renal cysts. FL UGI W SMALL BOWEL    Result Date: 8/31/2021  Postsurgical changes as described with normal progression of contrast through the bowel without evidence of dilatation or obstruction. XR CHEST PORTABLE    Result Date: 8/27/2021  No acute cardiopulmonary process and no change from prior studies. Physical Examination:        General appearance:  alert, cooperative and no distress, malnourished  Mental Status:  oriented to person, place and time and normal affect  Lungs:  clear to auscultation bilaterally, normal effort  Heart:  regular rate and rhythm, no murmur  Abdomen:  soft, nontender, nondistended, normal bowel sounds, no masses, hepatomegaly, splenomegaly  Extremities:  no edema, redness, tenderness in the calves  Skin:  no gross lesions, rashes, induration    Assessment:        Hospital Problems         Last Modified POA    * (Principal) Dehydration 8/27/2021 Yes    General weakness 8/28/2021 Yes    Neuroendocrine neoplasm of stomach 8/27/2021 Yes    Intractable nausea and vomiting 8/27/2021 Yes    S/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 8/27/2021 Yes    Severe malnutrition (HCC) (Chronic) 8/30/2021 Yes    Sinus tachycardia 8/27/2021 Yes    Weight loss 8/28/2021 Yes    Projectile vomiting with nausea 8/28/2021 Yes          Plan:        Intractable nausea and vomiting- continue antiemetics, iv fluids. Continue reglan 10mg tid, protonix 40 bid. Plan to start TPN for nutrition. S/p small bowel follow through. General surgery  okayed for liquid diet. Patient will call at [address] [place] [address] to make appointment, she will need J tube eventually.     Severe malnutrition- dietician consult for tpn    H/o carcinoid s/p total

## 2021-08-31 NOTE — CARE COORDINATION
[facility] [address] and insurance is showing as inactive as of 8-31 and will re verify insurance tomorrow 9-1. Pt informed.

## 2021-08-31 NOTE — PROGRESS NOTES
[facility]    Gastroenterology Daily Progress Note      Patient:   Rylee Flight   :    1960   Facility:   [facility]  Date:     2021  Consultant:   RYAN Soriano CNP, CNP      SUBJECTIVE  62 y.o. female admitted 2021 with Dehydration [E86.0]  Weight loss [R63.4]  General weakness [R53.1] and seen for  Nausea and vomiting. The pt was seen and examined. She is s/p egd that is discussed below. Still c/o nausea and vomiting after eating and drinking. No abdominal pain. Has been evaluated by surgery who ordered esophagram with SBFT. .        OBJECTIVE  Scheduled Meds:   pantoprazole  40 mg IntraVENous BID    metoclopramide  10 mg IntraVENous TID    scopolamine  1 patch Transdermal Q72H    levothyroxine  88 mcg Oral Once per day on Sun  Sat    soledad-nelly  1 tablet Oral Daily    magnesium oxide  400 mg Oral BID    [Held by provider] metoclopramide  10 mg Oral TID AC    megestrol  400 mg Oral Daily    [Held by provider] pantoprazole  40 mg Oral BID AC    potassium chloride  20 mEq Oral BID    vitamin B-12  500 mcg Oral Daily    Vitamin D  1,000 Units Oral Daily    sucralfate  1 g Oral BID    sodium chloride flush  5-40 mL IntraVENous 2 times per day    enoxaparin  40 mg SubCUTAneous Daily       Vital Signs:  BP (!) 137/96   Pulse 90   Temp 98.1 °F (36.7 °C) (Oral)   Resp 16   Ht 5' 5\" (1.651 m)   Wt 148 lb (67.1 kg)   LMP 1994   SpO2 97%   BMI 24.63 kg/m²      Physical Exam:   General Appearance: alert and oriented to person, place and time, well-developed and well-nourished, in no acute distress  Skin: warm and dry, no rash or erythema  Head: normocephalic and atraumatic  Eyes: pupils equal, round, and reactive to light, extraocular eye movements intact, conjunctivae normal  ENT: hearing grossly normal bilaterally  Neck: neck supple and non tender without mass, no thyromegaly or thyroid nodules, no cervical lymphadenopathy Pulmonary/Chest: clear to auscultation bilaterally- no wheezes, rales or rhonchi, normal air movement, no respiratory distress  Cardiovascular: normal rate, regular rhythm, normal S1 and S2, no murmurs, rubs, clicks or gallops, distal pulses intact, no carotid bruits  Abdomen: soft, non-tender, non-distended, normal bowel sounds, no masses or organomegaly  Extremities: no cyanosis, clubbing or edema  Musculoskeletal: normal range of motion, no joint swelling, deformity or tenderness  Neurologic: no cranial nerve deficit and muscle strength normal    Lab and Imaging Review     CBC  Recent Labs     08/31/21  0558   WBC 5.3   HGB 11.2*   HCT 35.3*   MCV 94.4          BMP  Recent Labs     08/31/21  0558      K 3.6*      CO2 22   BUN 3*   CREATININE 0.41*   GLUCOSE 75   CALCIUM 8.2*       LFTS  Recent Labs     08/31/21  0558   ALKPHOS 95   ALT 26   AST 64*   PROT 5.1*   BILITOT 0.77   LABALBU 2.2*     Findings:egd dr Lizy Ruiz 8/30/21     Retropharyngeal area was grossly normal appearing     Status post total gastrectomy  No stomach is left  The esophagus is anastomosed with small bowel     There is 2 lm of small bowel 1 is short and blind measuring around 2 to 3 cm  The other 1 is patent      No abnormality could be seen in the esophagus or the small  The scope was removed and the patient tolerated the procedure well      ASSESSMENT/plan  1. Dehydration with nausea and vomiting; hx gastrectomy for neuroendocrine tumors s/p egd yesterday showing that the esophagus is anastomosed with small bowel  -SBFT with esophagram today  -recommend starting TPN  -will likely need jejunostomy tube for feeding-will discuss having pt see surgeons at [address] [place] [address] with md due to her previous surgery  -antiemetics prn        This plan was formulated in collaboration with .     Electronically signed by: RYAN [place] - CNP on 8/31/2021 at 7:40 AM     Attending Physician Statement  I have discussed the care of Meena Waite and   I have examined the patient myselft independently, and taken ros and hpi , including pertinent history and exam findings,  with the author of this note . I have reviewed the key elements of all parts of the encounter with the nurse practitioner/resident.     I agree with the assessment, plan and orders as documented by the above health care provider     Patient will need J-tube  She has an appointment with the [facility] clinic as an outpatient  TPN until then    Electronically signed by Wendy Harris MD

## 2021-08-31 NOTE — PROGRESS NOTES
Occupational Therapy    DATE: 2021    NAME: Robert Dunn  MRN: [id number]   : 1960      [facility]  Occupational Therapy Not Seen Note    Patient not available for Occupational Therapy due to:    [] Testing:    [] Hemodialysis    [] Cancelled by RN:    []Refusal by Patient:    [] Surgery:     [] Intubation:     [] Pain Medication:    [] Sedation:     [] Spine Precautions :    [] Medical Instability:    [x] Other: Pt out of room for a procedure.     Melvin PresidentGRISEL/L

## 2021-08-31 NOTE — DISCHARGE INSTR - COC
Continuity of Care Form    Patient Name: Aster Craig   :  1960  MRN:  [id number]    Admit date:  2021  Discharge date:  9/3/21    Code Status Order: Full Code   Advance Directives:      Admitting Physician:  Jessie Barreto MD  PCP: Winnie Bray MD    Discharging Nurse: [facility] Unit/Room#:   Discharging Unit Phone Number: [phone]    Emergency Contact:   Extended Emergency Contact Information  Primary Emergency Contact: Petey Townsend *Janeth marquis  Address: [place]ríg[place] [address] Phone: [phone]  Work Phone: [phone]  Mobile Phone: [phone]  Relation: Brother/Sister  Secondary Emergency Contact: monika *chayo marquis  Address: N/A  Home Phone: [phone]  Mobile Phone: [phone]  Relation: Brother/Sister   needed?  No    Past Surgical History:  Past Surgical History:   Procedure Laterality Date    CHOLECYSTECTOMY  10/09/2014    COLONOSCOPY  2019    tubular adenoma; hyperplastic polyp    COLONOSCOPY      over 5 yrs ago per pt with polyps (2-)    CYSTOURETHROSCOPY/STENT REMOVAL  2011    ENDOSCOPIC ULTRASOUND (LOWER) N/A 2020    ENDOSCOPIC ULTRASOUND WITH POSSIBLE EMR performed [facility] Kelly [facility] at [facility]    ERCP      GASTRECTOMY N/A 2020    HIP SURGERY Left     soft tissue tumor removal    THROAT SURGERY      vocal cord polyps removed    UPPER GASTROINTESTINAL ENDOSCOPY  2019    Neuroendocrine tumor    UPPER GASTROINTESTINAL ENDOSCOPY  2019    UPPER GASTROINTESTINAL ENDOSCOPY N/A 2019    EGD BIOPSY performed by Zeinab Smith MD at [facility]. 10/23/2019    EGD W/ EMR performed by Kevin Kelly MD at [address] [place] [address] N/A 10/29/2019    EGD CONTROL HEMORRHAGE performed by Henry Murrell MD at [address] [place] [address] N/A 2021    EGD BIOPSY performed by Marcellus Babinski MD [facility] at [facility]    UPPER GASTROINTESTINAL ENDOSCOPY N/A 8/30/2021    EGD ESOPHAGOGASTRODUODENOSCOPY performed by Pepe Gunter MD at [facility] History:   Immunization History   Administered Date(s) Administered    COVID-19, Pfizer, PF, 30mcg/0.3mL 03/26/2021, 04/16/2021    Influenza A (T5E6-89) Vaccine PF IM 11/24/2009    Influenza, Quadv, IM, PF (6 mo and older Fluzone, Flulaval, Fluarix, and 3 yrs and older Afluria) 10/31/2019       Active Problems:  Patient Active Problem List   Diagnosis Code    Asthma J45.909    Anxiety F41.9    Obesity E66.9    Hyperlipidemia E78.5    Hypothyroidism E03.9    Colon polyps K63.5    Vertigo R42    Mass of left hip region R22.42    General weakness R53.1    Pharyngoesophageal dysphagia R13.14    Colon polyp K63.5    Neuroendocrine tumor D3A.8    Chest pain R07.9    Shortness of breath R06.02    Anemia D64.9    GI bleed K92.2    Essential hypertension I10    Neuroendocrine neoplasm of stomach D3A.8    Polyp, stomach K31.7    Melena K92.1    Gastric ulcer with hemorrhage K25.4    Constipation K59.00    Hypokalemia E87.6    Hypomagnesemia E83.42    Bilateral pulmonary embolism (HCC) I26.99    Abdominal pain, epigastric R10.13    Elevated d-dimer R79.89    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    Intractable vomiting R11.10    Intractable nausea and vomiting R11.2    S/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 Z90.3, Z98.0    Food intolerance K90.49    Severe malnutrition (HCC) E43    Dehydration E86.0    Sinus tachycardia R00.0    Weight loss R63.4    Projectile vomiting with nausea R11.12       Isolation/Infection:   Isolation            No Isolation          Patient Infection Status       Infection Onset Added Last Indicated Last Indicated By Review Planned Expiration Resolved Resolved By    None active    Resolved    COVID-19 Rule Out 08/27/21 08/27/21 08/27/21 COVID-19, Rapid (Ordered)   08/30/21 Natasha Bone RN    COVID-19 Rule Out 04/26/21 04/26/21 04/26/21 COVID-19, Rapid (Ordered)   04/26/21 Rule-Out Test Resulted    COVID-19 Rule Out 03/27/21 03/27/21 03/27/21 COVID-19, Rapid (Ordered)   03/27/21 Rule-Out Test Resulted            Nurse Assessment:  Last Vital Signs: /86   Pulse 72   Temp 98.4 °F (36.9 °C) (Oral)   Resp 16   Ht 5' 5\" (1.651 m)   Wt 148 lb (67.1 kg)   LMP 01/01/1994   SpO2 99%   BMI 24.63 kg/m²     Last documented pain score (0-10 scale): Pain Level: 0  Last Weight:   Wt Readings from Last 1 Encounters:   08/31/21 148 lb (67.1 kg)     Mental Status:  oriented and alert    IV Access:  - PICC - site  R Basilic, insertion date: 8/31/21    Nursing Mobility/ADLs:  Walking   Independent  Transfer  Independent  Bathing  Independent  Dressing  Independent  [facility]  Independent  Med Delivery   whole    Wound Care Documentation and Therapy:        Elimination:  Continence:   · Bowel: Yes  · Bladder: Yes  Urinary Catheter: None   Colostomy/Ileostomy/Ileal Conduit: No       Date of Last BM: 9/2/21    Intake/Output Summary (Last 24 hours) at 8/31/2021 1504  Last data filed at 8/31/2021 0622  Gross per 24 hour   Intake 1447 ml   Output 1025 ml   Net 422 ml     I/O last 3 completed shifts: In: 9926 [I.V.:1447]  Out: 1025 [Urine:1025]    Safety Concerns:     None    Impairments/Disabilities:      None    Nutrition Therapy:  Current Nutrition Therapy:   - Oral Diet:  Soft diet    Routes of Feeding: Oral  Liquids: No Restrictions  Daily Fluid Restriction: no  Last Modified Barium Swallow with Video (Video Swallowing Test): not done    Treatments at the Time of Hospital Discharge:   Respiratory Treatments: ***  Oxygen Therapy:  is not on home oxygen therapy.   Ventilator:    - No ventilator support                        Rehab Therapies: Physical Therapy  Weight Bearing Status/Restrictions: No weight bearing restirctions  Other Medical Equipment (for information only, NOT a DME order):  {EQUIPMENT:[id number]}  Other Treatments: Skilled nursing assessment  Med teaching and compliance. TPN as ordered  PICC care per agency protocol    Patient's personal belongings (please select all that are sent with patient):  {CHP DME Belongings:[id number]}    RN SIGNATURE:  Electronically signed by Frank Freeman RN on 9/3/21 at 4:19 PM EDT    CASE MANAGEMENT/SOCIAL WORK SECTION    Inpatient Status Date: ***    Readmission Risk Assessment Score:  Readmission Risk              Risk of Unplanned Readmission:  33           Discharging to Facility/ Agency   · Name: [facility]  · Address:  · Phone: [phone]  · Fax: [phone]    [facility]    Phone: [phone]  Fax:[phone]       / signature: Electronically signed by Arnulfo Mohan RN on 8/31/21 at 3:04 PM EDT    PHYSICIAN SECTION    Prognosis: Fair    Condition at Discharge: Stable    Rehab Potential (if transferring to Rehab): Fair    Recommended Labs or Other Treatments After Discharge: follow up at [place] [address] tpn  Picc line care  Bmp in a week    Physician Certification: I certify the above information and transfer of Wolf Schultz  is necessary for the continuing treatment of the diagnosis listed and that she requires [address] for greater 30 days.      Update Admission H&P: Changes in H&P as follows - discharge summary to follow    PHYSICIAN SIGNATURE:  Electronically signed by Olivia Dyer MD on 9/2/21 at 10:48 AM EDT

## 2021-08-31 NOTE — PROGRESS NOTES
General Surgery:  Daily Progress Note             PATIENT NAME: Meena Waite     TODAY'S DATE: 8/31/2021, 3:48 AM  CC: Nausea, emesis    SUBJECTIVE:     Pt seen and examined at bedside. Overnight, patient continued to have her baseline nausea, last emesis was 10 PM last night. Had a bowel movement yesterday. Otherwise denies CP, S OB, C/F, abdominal pain. VSS, afebrile. OBJECTIVE:   VITALS:  BP (!) 137/96   Pulse 90   Temp 98.1 °F (36.7 °C) (Oral)   Resp 16   Ht 5' 5\" (1.651 m)   Wt 150 lb (68 kg)   LMP 01/01/1994   SpO2 97%   BMI 24.96 kg/m²      INTAKE/OUTPUT:      Intake/Output Summary (Last 24 hours) at 8/31/2021 0348  Last data filed at 8/30/2021 2137  Gross per 24 hour   Intake 1731 ml   Output 1350 ml   Net 381 ml       PHYSICAL EXAM:  CONSTITUTIONAL:  awake, alert, not distressed   HEENT: Normocephalic/atraumatic, without obvious abnormality. NECK:  Supple, symmetrical, trachea midline   CARDIOVASCULAR: Regular rate and rhythm   LUNGS: Unlabored breathing on RA  ABDOMEN: soft, NT, ND, no rebound    MUSCULOSKELETAL: Muscle strength intact in all extremities bilaterally. NEUROLOGIC:  Gross motor intact without focal weakness.   SKIN: No cyanosis, rashes, or edema noted.        Data:  CBC with Differential:    Lab Results   Component Value Date    WBC 5.3 08/31/2021    RBC 3.74 08/31/2021    HGB 11.2 08/31/2021    HCT 35.3 08/31/2021     08/31/2021    MCV 94.4 08/31/2021    MCH 29.9 08/31/2021    MCHC 31.7 08/31/2021    RDW 14.1 08/31/2021    LYMPHOPCT 25 08/27/2021    MONOPCT 8 08/27/2021    BASOPCT 0 08/27/2021    MONOSABS 0.43 08/27/2021    LYMPHSABS 1.40 08/27/2021    EOSABS <0.03 08/27/2021    BASOSABS <0.03 08/27/2021    DIFFTYPE NOT REPORTED 08/27/2021     CMP:    Lab Results   Component Value Date     08/31/2021    K 3.6 08/31/2021     08/31/2021    CO2 22 08/31/2021    BUN 3 08/31/2021    CREATININE 0.41 08/31/2021    GFRAA >60 08/31/2021    LABGLOM >60 08/31/2021    GLUCOSE 75 08/31/2021    PROT 5.1 08/31/2021    LABALBU 2.2 08/31/2021    CALCIUM 8.2 08/31/2021    BILITOT 0.77 08/31/2021    ALKPHOS 95 08/31/2021    AST 64 08/31/2021    ALT 26 08/31/2021     BMP:    Lab Results   Component Value Date     08/31/2021    K 3.6 08/31/2021     08/31/2021    CO2 22 08/31/2021    BUN 3 08/31/2021    LABALBU 2.2 08/31/2021    CREATININE 0.41 08/31/2021    CALCIUM 8.2 08/31/2021    GFRAA >60 08/31/2021    LABGLOM >60 08/31/2021    GLUCOSE 75 08/31/2021     Hepatic Function Panel:    Lab Results   Component Value Date    ALKPHOS 95 08/31/2021    ALT 26 08/31/2021    AST 64 08/31/2021    PROT 5.1 08/31/2021    BILITOT 0.77 08/31/2021    BILIDIR 0.32 05/10/2021    IBILI 0.27 05/10/2021    LABALBU 2.2 08/31/2021       Radiology Review:    No results found. ASSESSMENT:  Active Hospital Problems    Diagnosis Date Noted    Severe malnutrition ([address] [E43] 08/30/2021    Projectile vomiting with nausea [R11.12]     Dehydration [E86.0] 08/27/2021    Sinus tachycardia [R00.0] 08/27/2021    Weight loss [R63.4] 08/27/2021    Intractable nausea and vomiting [R11.2] 04/26/2021    S/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 [Z90.3, Z98.0]     Neuroendocrine neoplasm of stomach [D3A.8] 10/28/2019    General weakness [R53.1]        63 yo F s/p lap gastrectomy with RnY reconstruction for Hx of gastric carcinoid tumor, issues with persistent nausea, emesis and intolerance of food       Plan:  1. Esophogram/small bowel follow through 2-day  2. Check pre-albumin levels, obtain calorie count and nutritional deficiencies assessment  3. Ultimately pt would be best served and worked up by operating team in [facility] given complexity of previous surgery and long term follow up needed by the [facility] team.   4. No immediate surgery planned at this time from gen surg stance. Consider transfer to .    5. General surgery will review esophagram and small bowel follow-through, after which will sign off. Call with any questions  6.  Continue medical management per primary    Electronically signed by Raisa Calloway DO  on 8/31/2021 at 3:48 AM

## 2021-09-01 PROBLEM — Z78.9 ON TOTAL PARENTERAL NUTRITION: Status: ACTIVE | Noted: 2021-09-01

## 2021-09-01 LAB
ANION GAP SERPL CALCULATED.3IONS-SCNC: 13 MMOL/L (ref 9–17)
BUN BLDV-MCNC: 3 MG/DL (ref 6–20)
BUN/CREAT BLD: 7 (ref 9–20)
CALCIUM SERPL-MCNC: 8.3 MG/DL (ref 8.6–10.4)
CHLORIDE BLD-SCNC: 102 MMOL/L (ref 98–107)
CO2: 22 MMOL/L (ref 20–31)
CREAT SERPL-MCNC: 0.43 MG/DL (ref 0.5–0.9)
GFR AFRICAN AMERICAN: >60 ML/MIN
GFR NON-AFRICAN AMERICAN: >60 ML/MIN
GFR SERPL CREATININE-BSD FRML MDRD: ABNORMAL ML/MIN/{1.73_M2}
GFR SERPL CREATININE-BSD FRML MDRD: ABNORMAL ML/MIN/{1.73_M2}
GLUCOSE BLD-MCNC: 102 MG/DL (ref 65–105)
GLUCOSE BLD-MCNC: 102 MG/DL (ref 70–99)
MAGNESIUM: 1.2 MG/DL (ref 1.6–2.6)
PHOSPHORUS: 2.2 MG/DL (ref 2.6–4.5)
POTASSIUM SERPL-SCNC: 3.8 MMOL/L (ref 3.7–5.3)
SODIUM BLD-SCNC: 137 MMOL/L (ref 135–144)
TRIGL SERPL-MCNC: 67 MG/DL

## 2021-09-01 PROCEDURE — 6360000002 HC RX W HCPCS: Performed by: INTERNAL MEDICINE

## 2021-09-01 PROCEDURE — 99232 SBSQ HOSP IP/OBS MODERATE 35: CPT | Performed by: INTERNAL MEDICINE

## 2021-09-01 PROCEDURE — 84100 ASSAY OF PHOSPHORUS: CPT

## 2021-09-01 PROCEDURE — 6360000002 HC RX W HCPCS: Performed by: FAMILY MEDICINE

## 2021-09-01 PROCEDURE — 82947 ASSAY GLUCOSE BLOOD QUANT: CPT

## 2021-09-01 PROCEDURE — APPSS30 APP SPLIT SHARED TIME 16-30 MINUTES: Performed by: NURSE PRACTITIONER

## 2021-09-01 PROCEDURE — 1200000000 HC SEMI PRIVATE

## 2021-09-01 PROCEDURE — 2500000003 HC RX 250 WO HCPCS: Performed by: STUDENT IN AN ORGANIZED HEALTH CARE EDUCATION/TRAINING PROGRAM

## 2021-09-01 PROCEDURE — 36415 COLL VENOUS BLD VENIPUNCTURE: CPT

## 2021-09-01 PROCEDURE — C9113 INJ PANTOPRAZOLE SODIUM, VIA: HCPCS | Performed by: INTERNAL MEDICINE

## 2021-09-01 PROCEDURE — 84478 ASSAY OF TRIGLYCERIDES: CPT

## 2021-09-01 PROCEDURE — 80048 BASIC METABOLIC PNL TOTAL CA: CPT

## 2021-09-01 PROCEDURE — 2580000003 HC RX 258: Performed by: INTERNAL MEDICINE

## 2021-09-01 PROCEDURE — 83735 ASSAY OF MAGNESIUM: CPT

## 2021-09-01 PROCEDURE — 6370000000 HC RX 637 (ALT 250 FOR IP): Performed by: INTERNAL MEDICINE

## 2021-09-01 PROCEDURE — 99232 SBSQ HOSP IP/OBS MODERATE 35: CPT | Performed by: STUDENT IN AN ORGANIZED HEALTH CARE EDUCATION/TRAINING PROGRAM

## 2021-09-01 PROCEDURE — 97530 THERAPEUTIC ACTIVITIES: CPT

## 2021-09-01 RX ADMIN — ENOXAPARIN SODIUM 40 MG: 40 INJECTION SUBCUTANEOUS at 09:19

## 2021-09-01 RX ADMIN — MAGNESIUM SULFATE HEPTAHYDRATE 1000 MG: 1 INJECTION, SOLUTION INTRAVENOUS at 10:52

## 2021-09-01 RX ADMIN — I.V. FAT EMULSION 100 ML: 20 EMULSION INTRAVENOUS at 18:53

## 2021-09-01 RX ADMIN — ONDANSETRON 4 MG: 2 INJECTION INTRAMUSCULAR; INTRAVENOUS at 05:26

## 2021-09-01 RX ADMIN — PANTOPRAZOLE SODIUM 40 MG: 40 INJECTION, POWDER, FOR SOLUTION INTRAVENOUS at 09:18

## 2021-09-01 RX ADMIN — METOCLOPRAMIDE 10 MG: 5 INJECTION, SOLUTION INTRAMUSCULAR; INTRAVENOUS at 21:35

## 2021-09-01 RX ADMIN — METOCLOPRAMIDE 10 MG: 5 INJECTION, SOLUTION INTRAMUSCULAR; INTRAVENOUS at 09:18

## 2021-09-01 RX ADMIN — CALCIUM GLUCONATE: 94 INJECTION, SOLUTION INTRAVENOUS at 18:53

## 2021-09-01 RX ADMIN — SODIUM CHLORIDE, PRESERVATIVE FREE 10 ML: 5 INJECTION INTRAVENOUS at 21:35

## 2021-09-01 RX ADMIN — MAGNESIUM SULFATE HEPTAHYDRATE 1000 MG: 1 INJECTION, SOLUTION INTRAVENOUS at 09:19

## 2021-09-01 RX ADMIN — MEGESTROL ACETATE 400 MG: 40 SUSPENSION ORAL at 10:55

## 2021-09-01 RX ADMIN — SODIUM CHLORIDE: 9 INJECTION, SOLUTION INTRAVENOUS at 05:24

## 2021-09-01 RX ADMIN — PANTOPRAZOLE SODIUM 40 MG: 40 INJECTION, POWDER, FOR SOLUTION INTRAVENOUS at 21:35

## 2021-09-01 RX ADMIN — MAGNESIUM SULFATE HEPTAHYDRATE 1000 MG: 1 INJECTION, SOLUTION INTRAVENOUS at 12:25

## 2021-09-01 RX ADMIN — METOCLOPRAMIDE 10 MG: 5 INJECTION, SOLUTION INTRAMUSCULAR; INTRAVENOUS at 14:12

## 2021-09-01 RX ADMIN — MAGNESIUM SULFATE HEPTAHYDRATE 1000 MG: 1 INJECTION, SOLUTION INTRAVENOUS at 14:12

## 2021-09-01 RX ADMIN — LEVOTHYROXINE SODIUM 88 MCG: 0.09 TABLET ORAL at 05:27

## 2021-09-01 NOTE — PROGRESS NOTES
[facility]    Gastroenterology Daily Progress Note      Patient:   Patrick Gardner   :    1960   Facility:   [facility]  Date:     2021  Consultant:   RYAN Lara CNP, CNP      SUBJECTIVE  62 y.o. female admitted 2021 with Dehydration [E86.0]  Weight loss [R63.4]  General weakness [R53.1] and seen for nausea and vomiting. The pt was seen and examined. Continues to have vomiting with trying to eat or drink. TPN to start tonight. SBFT did not show any obstruction. No abdominal pain.         OBJECTIVE  Scheduled Meds:   pantoprazole  40 mg IntraVENous BID    metoclopramide  10 mg IntraVENous TID    scopolamine  1 patch TransDERmal Q72H    levothyroxine  88 mcg Oral Once per day on Sun Wed Thu Fri Sat    soledad-nelly  1 tablet Oral Daily    magnesium oxide  400 mg Oral BID    [Held by provider] metoclopramide  10 mg Oral TID AC    megestrol  400 mg Oral Daily    [Held by provider] pantoprazole  40 mg Oral BID AC    potassium chloride  20 mEq Oral BID    vitamin B-12  500 mcg Oral Daily    Vitamin D  1,000 Units Oral Daily    sucralfate  1 g Oral BID    sodium chloride flush  5-40 mL IntraVENous 2 times per day    enoxaparin  40 mg SubCUTAneous Daily       Vital Signs:  BP (!) 141/86   Pulse 77   Temp 98.4 °F (36.9 °C) (Oral)   Resp 16   Ht 5' 5\" (1.651 m)   Wt 147 lb (66.7 kg)   LMP 1994   SpO2 100%   BMI 24.46 kg/m²      Physical Exam:     General Appearance: alert and oriented to person, place and time, well-developed and well-nourished, in no acute distress  Skin: warm and dry, no rash or erythema  Head: normocephalic and atraumatic  Eyes: pupils equal, round, and reactive to light, extraocular eye movements intact, conjunctivae normal  ENT: hearing grossly normal bilaterally  Neck: neck supple and non tender without mass, no thyromegaly or thyroid nodules, no cervical lymphadenopathy   Pulmonary/Chest: clear to auscultation bilaterally- no wheezes, rales or rhonchi, normal air movement, no respiratory distress  Cardiovascular: normal rate, regular rhythm, normal S1 and S2, no murmurs, rubs, clicks or gallops, distal pulses intact, no carotid bruits  Abdomen: soft, non-tender, non-distended, normal bowel sounds, no masses or organomegaly  Extremities: no cyanosis, clubbing or edema  Musculoskeletal: normal range of motion, no joint swelling, deformity or tenderness  Neurologic: no cranial nerve deficit and muscle strength normal    Lab and Imaging Review     CBC  Recent Labs     08/31/21  0558   WBC 5.3   HGB 11.2*   HCT 35.3*   MCV 94.4          BMP  Recent Labs     08/31/21  0558 09/01/21  0832    137   K 3.6* 3.8    102   CO2 22 22   BUN 3* 3*   CREATININE 0.41* 0.43*   GLUCOSE 75 102*   CALCIUM 8.2* 8.3*       LFTS  Recent Labs     08/31/21  0558   ALKPHOS 95   ALT 26   AST 64*   PROT 5.1*   BILITOT 0.77   LABALBU 2.2*   Findings:egd dr Cecile Husain 8/30/21     Retropharyngeal area was grossly normal appearing     Status post total gastrectomy  No stomach is left  The esophagus is anastomosed with small bowel     There is 2 lm of small bowel 1 is short and blind measuring around 2 to 3 cm  The other 1 is patent      No abnormality could be seen in the esophagus or the small  The scope was removed and the patient tolerated the procedure well      FINDINGS:sbft 8/31/21   Patient is status post gastrectomy.  Contrast transits from the distal   esophagus into the small bowel without evidence of leak.  Contrast transits   through the Qamar limb and past the anastomosis (exact site of which is   difficult to discern).  No dilated bowel loops.  Contrast reaches the colon   by approximately 45 minutes.           Impression   Postsurgical changes as described with normal progression of contrast through   the bowel without evidence of dilatation or obstruction.        ASSESSMENT/plan  Dehydration with nausea and vomiting; hx gastrectomy for

## 2021-09-01 NOTE — PLAN OF CARE
Problem: Nausea/Vomiting:  Goal: Absence of nausea/vomiting  Description: Absence of nausea/vomiting  Outcome: Ongoing  Note: Patient still complaining of N/V. PRN medications ordered and given as needed. Will continue to monitor.       Problem: Pain:  Goal: Control of acute pain  Description: Control of acute pain  Outcome: Ongoing  Note: Pain level assessed and rated on a 0-10 scale  Assess characteristics of pain  PRN pain medication given per pt request  Non-pharmacological interventions implemented  Report ineffective pain management to physician  Update pt and family of any changes  Pt instructed to call out with new onset of pain  Continue to monitor

## 2021-09-01 NOTE — PLAN OF CARE
Problem: Falls - Risk of:  Goal: Will remain free from falls  Description: Will remain free from falls  8/31/2021 2002 by Allison Prasad RN  Outcome: Ongoing     Problem: Falls - Risk of:  Goal: Absence of physical injury  Description: Absence of physical injury  Outcome: Ongoing     Problem: Nausea/Vomiting:  Goal: Absence of nausea/vomiting  Description: Absence of nausea/vomiting  8/31/2021 2002 by Allison Prasad RN  Outcome: Ongoing     Problem: Nausea/Vomiting:  Goal: Able to drink  Description: Able to drink  Outcome: Ongoing     Problem: Nausea/Vomiting:  Goal: Able to eat  Description: Able to eat  Outcome: Ongoing     Problem: Nausea/Vomiting:  Goal: Ability to achieve adequate nutritional intake will improve  Description: Ability to achieve adequate nutritional intake will improve  Outcome: Ongoing     Problem: Fluid Volume:  Goal: Signs and symptoms of dehydration will decrease  Description: Signs and symptoms of dehydration will decrease  Outcome: Ongoing     Problem: Fluid Volume:  Goal: Ability to achieve a balanced intake and output will improve  Description: Ability to achieve a balanced intake and output will improve  Outcome: Ongoing     Problem: Fluid Volume:  Goal: Diagnostic test results will improve  Description: Diagnostic test results will improve  Outcome: Ongoing     Problem: Physical Regulation:  Goal: Complications related to the disease process, condition or treatment will be avoided or minimized  Description: Complications related to the disease process, condition or treatment will be avoided or minimized  Outcome: Ongoing     Problem: Physical Regulation:  Goal: Ability to maintain vital signs within normal range will improve  Description: Ability to maintain vital signs within normal range will improve  Outcome: Ongoing     Problem: Pain:  Goal: Pain level will decrease  Description: Pain level will decrease  Outcome: Ongoing     Problem: Pain:  Goal: Control of acute pain  Description: Control of acute pain  8/31/2021 2002 by Helder Mortensen RN  Outcome: Ongoing     Problem: Pain:  Goal: Control of chronic pain  Description: Control of chronic pain  Outcome: Ongoing     Problem: Skin Integrity:  Goal: Will show no infection signs and symptoms  Description: Will show no infection signs and symptoms  Outcome: Ongoing     Problem: Skin Integrity:  Goal: Absence of new skin breakdown  Description: Absence of new skin breakdown  Outcome: Ongoing     Problem: Nutrition  Goal: Optimal nutrition therapy  Outcome: Ongoing

## 2021-09-01 NOTE — PROGRESS NOTES
Precautions, Up as Tolerated  Required Braces or Orthoses?: No  Position Activity Restriction  Other position/activity restrictions: RUE IV  Subjective   General  Chart Reviewed: Yes  Patient assessed for rehabilitation services?: Yes  Response to previous treatment: Patient with no complaints from previous session  Family / Caregiver Present: No  Subjective  Subjective: Pt in bed upon arrival. Pt is pleasant and agreeable to therapy. Orientation  Orientation  Overall Orientation Status: Within Normal Limits  Objective    ADL  Additional Comments: Pt reports being independent with all self care tasks and nuñez snot require assist from staff. Balance  Sitting Balance: Independent  Standing Balance: Independent  Functional Mobility  Functional - Mobility Device: No device  Activity: Other  Assist Level: Independent  Functional Mobility Comments: Pt with no LOB this date  Bed mobility  Supine to Sit: Independent  Transfers  Stand Step Transfers: Independent  Sit to stand: Independent  Stand to sit: Independent  Transfer Comments: Pt with good transfer tech                       Cognition  Overall Cognitive Status: WNL     Perception  Overall Perceptual Status: WFL     Issued pt the following HEP with resistance band. Pt nauseated and not able to complete HEP at this time but verbalized understanding of how to access HEP thru smart phone. Pt verbalized understanding and will complete at a later time. Access Code: [email or web address]  URL: [email or web address]  Date: 09/01/2021  Prepared by:    Exercises  Seated Elbow Flexion with Self-Anchored Resistance - 1 x daily - 7 x weekly - 3 sets - 10 reps  Seated Shoulder Horizontal Abduction with Resistance - 1 x daily - 7 x weekly - 3 sets - 10 reps  Seated Shoulder Diagonal Pulls with Resistance - 1 x daily - 7 x weekly - 3 sets - 10 reps  Seated Bilateral Shoulder External Rotation with Resistance - 1 x daily - 7 x weekly - 3 sets - 10 reps  Seated Single Shoulder PNF D2 with Resistance - 1 x daily - 7 x weekly - 3 sets - 10 reps                                     Plan   Plan  Times per week: 4-5x per week  Times per day: Daily  Current Treatment Recommendations: Strengthening, Patient/Caregiver Education & Training, Endurance Training, Self-Care / ADL, Equipment Evaluation, Education, & procurement, Balance Training, Pain Management, Safety Education & Training, Functional Mobility Training                                   AM-PAC Score        AM-PAC Inpatient Daily Activity Raw Score: 24 (09/01/21 1138)  AM-PAC Inpatient ADL T-Scale Score : 57.54 (09/01/21 1138)  ADL Inpatient CMS 0-100% Score: 0 (09/01/21 1138)  ADL Inpatient CMS G-Code Modifier : [address] (09/01/21 1138)    Goals  Short term goals  Time Frame for Short term goals: by discharge, pt to demo  Short term goal 1: increase in BUE strength by 1/2 grade to assist with self-care and be IND with BUE HEP with use of handouts as needed  Short term goal 2: I/MI for UB and LB ADLs with AE as needed and Good safety  Short term goal 3: I/MI for toileting tasks with use of grab bars as needed and Good safety  Short term goal 4: I/MI for ADL transfers and functional mob with use of AD as needed and Good safety  Short term goal 5: Pt/family to be IND with EC/WS and fall prevention tech as well as DME/AE recommendations with use of handouts as needed  Patient Goals   Patient goals :  To go home       Therapy Time   Individual Concurrent Group Co-treatment   Time In 1015         Time Out 1039         Minutes Catarino Bautista GRISEL

## 2021-09-01 NOTE — CARE COORDINATION
Discharge Planning:    Writer spoke with Kathy from [facility] and informed of potential d/c home tomorrow per Dr. Shaila Perry note. [facility] able to [facility]1 in the evening, however all arrangements will need to be arranged first thing in the morning. Writer then contacted Janet with [facility] to see what she needed and where we were at in the process of benefits. Janet informed writer that we will need a provider who is going to follow for TPN orders, and writer will need to contact patient's PCP, Dr. Priscilla Koyanagi at [phone] to see if he will follow for TPN, which will need to be done Thursday as office was closed Wednesday. If he will follow we must obtain the office fax number and provide to [facility], as well as the office phone number. Writer faxed baseline labs, except triglycerides and phosphorous to [facility], along with initial TPN order. Triglycerides and phosphorous to be drawn before initiation of TPN. Janet explained to writer that patient's generally do not d/c home on TPN for 48-72h after initiation of TPN to ensure patient is tolerating and labs are stable. CM to touch base with Janet in the morning. Patient may not be discharging until Friday/Saturday. Will need to touch base with [facility] if patient is not able to d/c home tomorrow to ensure they can still provide assistance. If not, a new HC agency will need to be established. [facility] is looking to see if the [facility] have availability and if patient's insurance will cover their nursing service for back up.

## 2021-09-01 NOTE — PROGRESS NOTES
[facility]  Office: [address], DO, Ricki Zelaya, DO, Marito Shay, DO, Brandi Hauser Blood, DO, Erna George MD, Zachary Husain MD, Kristine Cardoso MD, Jeanine Monique MD, Melody Tomlin MD, Lynnette Rolle MD, Caity Shay MD, Francoise Atkins, DO, Franciso Schaumann, MD, Arjun Rg DO, Paul Mg MD,  Gay Dia DO, Rosalva Loza MD, Denman Najjar, MD, Hussein Barbosa MD, Wellington White MD, Tessa Reyes MD, Vanita Rubio MD, Flex Lundy MD, Eddie Schultz, [facility], [facility], CNP, Nereyda Newton, CNP, Esmer Solis, CNS, Douglas Martinez, CNP, Edwin White, CNP, Jeri Goodson, CNP, Miranda Merlos, CNP, Ana De Leon, CNP, Pawel Newell PA-C, Anthony Reilly, [facility], Feliciano Ramírez, CNP, Jorge Cordova, CNP, Renetta Amanda, CNP, Steve Puentes CNP, Leo Martines CNP, Milo Yoder CNP, Chanell Simpson, CNP, [facility]    Progress Note    9/1/2021    2:34 PM    Name:   Meena Waite  MRN:     [id number]     [place]:      [de-identified]   Room:   2007/2007-02  IP Day:  5  Admit Date:  8/27/2021  1:23 PM    PCP:   Irma Arizmendi MD  Code Status:  Full Code    Subjective:     C/C:   Chief Complaint   Patient presents with   Jose.Pilling Dehydration     sent by PCP   Jose.Pilling Other     no appetite    Nausea & Vomiting    Cough    Fatigue     Interval History Status: improved. Patient is slightly better. Tolerated liquids well initially but since morning threw up twice after breakfast and lunch. No abdominal pain. No nausea. PICC line placed 8/31  Patient to be started on TPN. Brief History:     52-year-old female admitted to the hospital for treatment of dehydration. Patient was sent to ER by her gastroenterologist for intractable nausea and vomiting getting worse. Patient has history of carcinoid tumor and [facility] gast[facility]. Patient has lost around 140 pound.  She has poor appetite. Review of Systems:     Constitutional: positive for fatigue, poor appetite  Respiratory:  negative for cough, dyspnea on exertion, shortness of breath, wheezing  Cardiovascular:  negative for chest pain, chest pressure/discomfort, lower extremity edema, palpitations  Gastrointestinal: Positive for nausea, vomiting  Neurological:  negative for dizziness, headache    Medications: Allergies: Allergies   Allergen Reactions    Erythromycin Diarrhea       Current Meds:   Scheduled Meds:    fat emulsion  100 mL IntraVENous Daily    insulin lispro  0-6 Units SubCUTAneous 4 times per day    pantoprazole  40 mg IntraVENous BID    metoclopramide  10 mg IntraVENous TID    scopolamine  1 patch TransDERmal Q72H    levothyroxine  88 mcg Oral Once per day on Sun Wed Thu Fri Sat    soledad-nelly  1 tablet Oral Daily    magnesium oxide  400 mg Oral BID    [Held by provider] metoclopramide  10 mg Oral TID AC    megestrol  400 mg Oral Daily    [Held by provider] pantoprazole  40 mg Oral BID AC    potassium chloride  20 mEq Oral BID    vitamin B-12  500 mcg Oral Daily    Vitamin D  1,000 Units Oral Daily    sucralfate  1 g Oral BID    sodium chloride flush  5-40 mL IntraVENous 2 times per day    enoxaparin  40 mg SubCUTAneous Daily     Continuous Infusions:    PN-Adult 2-in-1 Central Line (Standard)      sodium chloride      sodium chloride 75 mL/hr at 09/01/21 0524     PRN Meds: diatrizoate meglumine-sodium, potassium chloride, sodium chloride flush, sodium chloride, magnesium sulfate, ondansetron **OR** ondansetron, polyethylene glycol, acetaminophen **OR** acetaminophen    Data:     Past Medical History:   has a past medical history of Anxiety, Arthritis, Asthma, Colon polyp, Colon polyps, Cyst of kidney, acquired, Fatigue, Hypertension, Hypothyroidism, Neuroendocrine tumor, Obesity, Pharyngoesophageal dysphagia, Vocal cord polyps, and Wears glasses.     Social History:   reports that she quit smoking about 9 years ago. She has a 25.00 pack-year smoking history. She has never used smokeless tobacco. She reports previous alcohol use. She reports that she does not use drugs. Family History:   Family History   Problem Relation Age of Onset    High Blood Pressure Mother     High Blood Pressure Sister     Stomach Cancer Sister     Other Sister         epilepsy    No Known Problems Father        Vitals:  BP (!) 141/86   Pulse 77   Temp 98.4 °F (36.9 °C) (Oral)   Resp 16   Ht 5' 5\" (1.651 m)   Wt 147 lb (66.7 kg)   LMP 1994   SpO2 100%   BMI 24.46 kg/m²   Temp (24hrs), Av.5 °F (36.9 °C), Min:98.2 °F (36.8 °C), Max:99 °F (37.2 °C)    No results for input(s): POCGLU in the last 72 hours. I/O (24Hr): Intake/Output Summary (Last 24 hours) at 2021 1434  Last data filed at 2021 0533  Gross per 24 hour   Intake 1419.5 ml   Output 1900 ml   Net -480.5 ml       Labs:  Hematology:  Recent Labs     21  0558   WBC 5.3   RBC 3.74*   HGB 11.2*   HCT 35.3*   MCV 94.4   MCH 29.9   MCHC 31.7   RDW 14.1      MPV 9.3     Chemistry:  Recent Labs     21  0558 21  0832    137   K 3.6* 3.8    102   CO2 22 22   GLUCOSE 75 102*   BUN 3* 3*   CREATININE 0.41* 0.43*   MG  --  1.2*   ANIONGAP 11 13   LABGLOM >60 >60   GFRAA >60 >60   CALCIUM 8.2* 8.3*     Recent Labs     21  0558   PROT 5.1*   LABALBU 2.2*   AST 64*   ALT 26   ALKPHOS 95   BILITOT 0.77     ABG:No results found for: POCPH, PHART, PH, POCPCO2, WVP0PVZ, PCO2, POCPO2, PO2ART, PO2, POCHCO3, FEX9XHJ, HCO3, NBEA, PBEA, BEART, BE, THGBART, THB, VOJ6LYF, XITQ1OSB, C3IOAAOF, O2SAT, FIO2  Lab Results   Component Value Date/Time    SPECIAL NOT REPORTED 12/10/2020 06:36 AM     Lab Results   Component Value Date/Time    CULTURE NO SIGNIFICANT GROWTH 12/10/2020 06:36 AM       Radiology:  CT ABDOMEN PELVIS W IV CONTRAST Additional Contrast? None    Result Date: 2021  1.   No acute process seen in the to make appointment, she will need J tube eventually. Severe malnutrition- dietician consult for tpn    H/o carcinoid s/p total gastrectomy and Qamar-en-Y esophageal jejunal anastomosis - Needs J tube placement. Discharge tomorrow after arrangement for TPN for home is done.     Melvern Essex, MD  9/1/2021  2:34 PM

## 2021-09-01 NOTE — PROGRESS NOTES
DATE: 2021    NAME: Cathy Polanco  MRN: [id number]   : 1960    Patient not seen this date for Physical Therapy due to:  [] Blood transfusion in progress  [] Cancel by RN  [] Hemodialysis  [x]  Refusal by Patient Pt reports she already walked down the wang, and completed LE ex with  SPARKS, too tired to participate now   [] Spine Precautions   [] Strict Bedrest  [] Surgery  [] Testing      [] Other        [] PT being discontinued at this time. Patient independent. No further needs. [] PT being discontinued at this time as the patient has been transferred to hospice care. No further needs.     CORAZON MISHRA, PTA

## 2021-09-01 NOTE — PROGRESS NOTES
Comprehensive Nutrition Assessment    Type and Reason for Visit:  Consult (PN ordering and management)    Nutrition Recommendations/Plan:   1. Continue ADULT DIET; Full Liquid  2. Start TPN at 41.7 ml/hr; 1000 ml total volume, MVI and trace elements  3. Monitor p.o intakes, diet tolerance, labs, TPN rate and tolerance  4. Consider modifying TPN to nocturnal/ cyclic on day 2      Nutrition Assessment:  Due to poor oral intakes, food tolerance and severe malnutrition status post total gastrectomy Nov. 2020 TPN will start today. Continue Full liquid diet as tolerated. Will start TPN at 41.7 mL/hr (1000 ml total volume) and 100 mL lipids. Will monitor p.o intakes, diet tolerance, labs, TPN rate and tolerance. Patient will be going home on TPN. Consider transitioning TPN to nocturnal/ cyclic. Malnutrition Assessment:  Malnutrition Status:  Severe malnutrition    Context:  Chronic Illness     Findings of the 6 clinical characteristics of malnutrition:  Energy Intake:  7 - 75% or less estimated energy requirements for 1 month or longer  Weight Loss:  7 - Greater than 10% over 6 months     Body Fat Loss:  1 - Mild body fat loss Triceps   Muscle Mass Loss:  1 - Mild muscle mass loss Clavicles (pectoralis & deltoids)  Fluid Accumulation:  No significant fluid accumulation Extremities   Strength:  Not Performed    Estimated Daily Nutrient Needs:  Energy (kcal):  3980-2189 kcal (25-28 kcal/kg); Weight Used for Energy Requirements:  Admission     Protein (g):  82-95 gm of protein (1.2-1.4 gm/kg); Weight Used for Protein Requirements:  Current          Nutrition Related Findings:  No edema. GI status: hypoactive bowel sounds, nausea. Loss of appetite. Difficulty swallowing. S/P total gastrectomy 11/30/20      Wounds:  None       Current Nutrition Therapies:    ADULT DIET;  Full Liquid  PN-Adult 2-in-1 Central Line (Custom)  Current Parenteral Nutrition Orders:  · Type and Formula: Premix Central, 2-in-1 Custom · Lipids: 100ml  · Duration: Continuous  · Rate/Volume: 41.7 mL/hr; 1000 mL total volume  · Current PN Order Provides: 1080 kcal and 50 gm of protein  · Goal PN Orders Provides: 5793-4699 kcal and 82-95 gm of protein      Anthropometric Measures:  · Height: 5' 5\" (165.1 cm)  · Current Body Weight: 147 lb (66.7 kg)   · Admission Body Weight: 143 lb (64.9 kg) (stated)    · Usual Body Weight: 193 lb (87.5 kg) (4/7/21)     · Ideal Body Weight: 125 lbs; % Ideal Body Weight 117.6 %   · BMI: 24.5    · BMI Categories: Normal Weight (BMI 18.5-24. 9)       Nutrition Diagnosis:   · Severe malnutrition, In context of chronic, non-illness related related to inadequate protein-energy intake as evidenced by intake 0-25%, weight loss greater than or equal to 10% in 6 months    · Altered GI function related to altered GI structure (total gastrectomy) as evidenced by GI abnormality, nausea, vomiting, intake 0-25%      Nutrition Interventions:   Food and/or Nutrient Delivery:  Continue Current Diet, Start Parenteral Nutrition  Nutrition Education/Counseling:  Education not indicated   Coordination of Nutrition Care:  Continue to monitor while inpatient    Goals:  PN will meet greater than 75% of estimated nutrition needs       Nutrition Monitoring and Evaluation:   Food/Nutrient Intake Outcomes:  Parenteral Nutrition Intake/Tolerance, Diet Advancement/Tolerance  Physical Signs/Symptoms Outcomes:  Biochemical Data, Fluid Status or Edema, Nausea or Vomiting, Skin, Weight, GI Status     Discharge Planning:    Parenteral Nutrition, Continue current diet     Brennen YEBOAH, RDN, LDN  Lead Clinical Dietitian  RD Office Phone [phone]

## 2021-09-02 ENCOUNTER — TELEPHONE (OUTPATIENT)
Dept: INTERNAL MEDICINE CLINIC | Age: 61
End: 2021-09-02

## 2021-09-02 PROBLEM — Z86.718 H/O BLOOD CLOTS: Status: ACTIVE | Noted: 2021-09-02

## 2021-09-02 LAB
ALBUMIN SERPL-MCNC: 2.3 G/DL (ref 3.5–5.2)
ALBUMIN/GLOBULIN RATIO: ABNORMAL (ref 1–2.5)
ALP BLD-CCNC: 88 U/L (ref 35–104)
ALT SERPL-CCNC: 26 U/L (ref 5–33)
ANION GAP SERPL CALCULATED.3IONS-SCNC: 9 MMOL/L (ref 9–17)
AST SERPL-CCNC: 51 U/L
BILIRUB SERPL-MCNC: 0.47 MG/DL (ref 0.3–1.2)
BUN BLDV-MCNC: 3 MG/DL (ref 6–20)
BUN/CREAT BLD: 6 (ref 9–20)
CALCIUM SERPL-MCNC: 8.3 MG/DL (ref 8.6–10.4)
CHLORIDE BLD-SCNC: 103 MMOL/L (ref 98–107)
CO2: 26 MMOL/L (ref 20–31)
CREAT SERPL-MCNC: 0.47 MG/DL (ref 0.5–0.9)
GFR AFRICAN AMERICAN: >60 ML/MIN
GFR NON-AFRICAN AMERICAN: >60 ML/MIN
GFR SERPL CREATININE-BSD FRML MDRD: ABNORMAL ML/MIN/{1.73_M2}
GFR SERPL CREATININE-BSD FRML MDRD: ABNORMAL ML/MIN/{1.73_M2}
GLUCOSE BLD-MCNC: 105 MG/DL (ref 70–99)
GLUCOSE BLD-MCNC: 111 MG/DL (ref 65–105)
GLUCOSE BLD-MCNC: 118 MG/DL (ref 65–105)
GLUCOSE BLD-MCNC: 121 MG/DL (ref 65–105)
GLUCOSE BLD-MCNC: 129 MG/DL (ref 65–105)
GLUCOSE BLD-MCNC: 132 MG/DL (ref 65–105)
HCT VFR BLD CALC: 33.4 % (ref 36.3–47.1)
HEMOGLOBIN: 10.9 G/DL (ref 11.9–15.1)
MAGNESIUM: 1.8 MG/DL (ref 1.6–2.6)
MCH RBC QN AUTO: 29.9 PG (ref 25.2–33.5)
MCHC RBC AUTO-ENTMCNC: 32.6 G/DL (ref 28.4–34.8)
MCV RBC AUTO: 91.5 FL (ref 82.6–102.9)
NRBC AUTOMATED: 0 PER 100 WBC
PDW BLD-RTO: 13.7 % (ref 11.8–14.4)
PHOSPHORUS: 2.4 MG/DL (ref 2.6–4.5)
PLATELET # BLD: 224 K/UL (ref 138–453)
PMV BLD AUTO: 9.3 FL (ref 8.1–13.5)
POTASSIUM SERPL-SCNC: 3 MMOL/L (ref 3.7–5.3)
PREALBUMIN: 9.4 MG/DL (ref 20–40)
RBC # BLD: 3.65 M/UL (ref 3.95–5.11)
SODIUM BLD-SCNC: 138 MMOL/L (ref 135–144)
TOTAL PROTEIN: 5.1 G/DL (ref 6.4–8.3)
TRIGL SERPL-MCNC: 93 MG/DL
WBC # BLD: 5.2 K/UL (ref 3.5–11.3)

## 2021-09-02 PROCEDURE — 85027 COMPLETE CBC AUTOMATED: CPT

## 2021-09-02 PROCEDURE — 6370000000 HC RX 637 (ALT 250 FOR IP): Performed by: STUDENT IN AN ORGANIZED HEALTH CARE EDUCATION/TRAINING PROGRAM

## 2021-09-02 PROCEDURE — 6360000002 HC RX W HCPCS: Performed by: STUDENT IN AN ORGANIZED HEALTH CARE EDUCATION/TRAINING PROGRAM

## 2021-09-02 PROCEDURE — 6370000000 HC RX 637 (ALT 250 FOR IP): Performed by: INTERNAL MEDICINE

## 2021-09-02 PROCEDURE — 84100 ASSAY OF PHOSPHORUS: CPT

## 2021-09-02 PROCEDURE — 6370000000 HC RX 637 (ALT 250 FOR IP): Performed by: FAMILY MEDICINE

## 2021-09-02 PROCEDURE — 84134 ASSAY OF PREALBUMIN: CPT

## 2021-09-02 PROCEDURE — 36415 COLL VENOUS BLD VENIPUNCTURE: CPT

## 2021-09-02 PROCEDURE — 2500000003 HC RX 250 WO HCPCS: Performed by: STUDENT IN AN ORGANIZED HEALTH CARE EDUCATION/TRAINING PROGRAM

## 2021-09-02 PROCEDURE — 1200000000 HC SEMI PRIVATE

## 2021-09-02 PROCEDURE — 6360000002 HC RX W HCPCS: Performed by: FAMILY MEDICINE

## 2021-09-02 PROCEDURE — 82947 ASSAY GLUCOSE BLOOD QUANT: CPT

## 2021-09-02 PROCEDURE — 99232 SBSQ HOSP IP/OBS MODERATE 35: CPT | Performed by: STUDENT IN AN ORGANIZED HEALTH CARE EDUCATION/TRAINING PROGRAM

## 2021-09-02 PROCEDURE — 84478 ASSAY OF TRIGLYCERIDES: CPT

## 2021-09-02 PROCEDURE — 6360000002 HC RX W HCPCS: Performed by: INTERNAL MEDICINE

## 2021-09-02 PROCEDURE — 80053 COMPREHEN METABOLIC PANEL: CPT

## 2021-09-02 PROCEDURE — C9113 INJ PANTOPRAZOLE SODIUM, VIA: HCPCS | Performed by: INTERNAL MEDICINE

## 2021-09-02 PROCEDURE — 97110 THERAPEUTIC EXERCISES: CPT

## 2021-09-02 PROCEDURE — 97116 GAIT TRAINING THERAPY: CPT

## 2021-09-02 PROCEDURE — 83735 ASSAY OF MAGNESIUM: CPT

## 2021-09-02 RX ORDER — MAGNESIUM SULFATE IN WATER 40 MG/ML
2000 INJECTION, SOLUTION INTRAVENOUS ONCE
Status: COMPLETED | OUTPATIENT
Start: 2021-09-02 | End: 2021-09-02

## 2021-09-02 RX ORDER — POTASSIUM CHLORIDE 20 MEQ/1
20 TABLET, EXTENDED RELEASE ORAL 2 TIMES DAILY
Status: DISCONTINUED | OUTPATIENT
Start: 2021-09-03 | End: 2021-09-03 | Stop reason: HOSPADM

## 2021-09-02 RX ORDER — POTASSIUM CHLORIDE 20 MEQ/1
40 TABLET, EXTENDED RELEASE ORAL
Status: COMPLETED | OUTPATIENT
Start: 2021-09-02 | End: 2021-09-02

## 2021-09-02 RX ADMIN — Medication 1000 UNITS: at 12:10

## 2021-09-02 RX ADMIN — CALCIUM GLUCONATE: 94 INJECTION, SOLUTION INTRAVENOUS at 18:41

## 2021-09-02 RX ADMIN — METOCLOPRAMIDE 10 MG: 5 INJECTION, SOLUTION INTRAMUSCULAR; INTRAVENOUS at 12:10

## 2021-09-02 RX ADMIN — VITAM B12 500 MCG: 100 TAB at 12:09

## 2021-09-02 RX ADMIN — Medication 1 TABLET: at 12:14

## 2021-09-02 RX ADMIN — ENOXAPARIN SODIUM 40 MG: 40 INJECTION SUBCUTANEOUS at 12:09

## 2021-09-02 RX ADMIN — MAGNESIUM GLUCONATE 500 MG ORAL TABLET 400 MG: 500 TABLET ORAL at 12:14

## 2021-09-02 RX ADMIN — METOCLOPRAMIDE 10 MG: 10 TABLET ORAL at 18:39

## 2021-09-02 RX ADMIN — APIXABAN 5 MG: 5 TABLET, FILM COATED ORAL at 20:37

## 2021-09-02 RX ADMIN — SUCRALFATE 1 G: 1 TABLET ORAL at 12:10

## 2021-09-02 RX ADMIN — POTASSIUM CHLORIDE 40 MEQ: 20 TABLET, EXTENDED RELEASE ORAL at 12:53

## 2021-09-02 RX ADMIN — SUCRALFATE 1 G: 1 TABLET ORAL at 20:37

## 2021-09-02 RX ADMIN — LEVOTHYROXINE SODIUM 88 MCG: 0.09 TABLET ORAL at 06:15

## 2021-09-02 RX ADMIN — MEGESTROL ACETATE 400 MG: 40 SUSPENSION ORAL at 12:10

## 2021-09-02 RX ADMIN — I.V. FAT EMULSION 100 ML: 20 EMULSION INTRAVENOUS at 18:41

## 2021-09-02 RX ADMIN — MAGNESIUM SULFATE HEPTAHYDRATE 2000 MG: 40 INJECTION, SOLUTION INTRAVENOUS at 12:54

## 2021-09-02 RX ADMIN — MAGNESIUM GLUCONATE 500 MG ORAL TABLET 400 MG: 500 TABLET ORAL at 18:39

## 2021-09-02 RX ADMIN — PANTOPRAZOLE SODIUM 40 MG: 40 TABLET, DELAYED RELEASE ORAL at 18:38

## 2021-09-02 RX ADMIN — PANTOPRAZOLE SODIUM 40 MG: 40 INJECTION, POWDER, FOR SOLUTION INTRAVENOUS at 12:10

## 2021-09-02 RX ADMIN — POTASSIUM CHLORIDE 40 MEQ: 20 TABLET, EXTENDED RELEASE ORAL at 14:13

## 2021-09-02 ASSESSMENT — PAIN SCALES - GENERAL: PAINLEVEL_OUTOF10: 0

## 2021-09-02 NOTE — PLAN OF CARE
Problem: Nausea/Vomiting:  Goal: Absence of nausea/vomiting  Description: Absence of nausea/vomiting  Outcome: Ongoing  Goal: Able to drink  Description: Able to drink  Outcome: Ongoing  Goal: Able to eat  Description: Able to eat  Outcome: Ongoing  Goal: Ability to achieve adequate nutritional intake will improve  Description: Ability to achieve adequate nutritional intake will improve  Outcome: Ongoing     Problem: Fluid Volume:  Goal: Signs and symptoms of dehydration will decrease  Description: Signs and symptoms of dehydration will decrease  Outcome: Ongoing  Goal: Ability to achieve a balanced intake and output will improve  Description: Ability to achieve a balanced intake and output will improve  Outcome: Ongoing  Goal: Diagnostic test results will improve  Description: Diagnostic test results will improve  Outcome: Ongoing

## 2021-09-02 NOTE — CARE COORDINATION
Call from Kathy with [facility] and they can accept pt for start of care Friday or Saturday.

## 2021-09-02 NOTE — TELEPHONE ENCOUNTER
[facility]  Asking if you will follow her for home care at discharge?  Going home on TPN    Please advise

## 2021-09-02 NOTE — PROGRESS NOTES
Physical Therapy  Facility/Department: [facility] MED SURG  Daily Treatment Note  NAME: Yemi Edwards  : 1960  MRN: [id number]    Date of Service: 2021    Discharge Recommendations:  Patient would benefit from continued therapy after discharge        Assessment   Body structures, Functions, Activity limitations: Decreased endurance;Decreased strength  Assessment: Pt limited by lack of activity tolerance  Activity Tolerance  Activity Tolerance: Patient limited by endurance; Patient limited by fatigue     Patient Diagnosis(es): The primary encounter diagnosis was General weakness. Diagnoses of Dehydration and Weight loss were also pertinent to this visit. has a past medical history of Anxiety, Arthritis, Asthma, Colon polyp, Colon polyps, Cyst of kidney, acquired, Fatigue, Hypertension, Hypothyroidism, Neuroendocrine tumor, Obesity, Pharyngoesophageal dysphagia, Vocal cord polyps, and Wears glasses. has a past surgical history that includes cystourethroscopy/stent removal (2011); Colonoscopy (2019); ERCP; Cholecystectomy (10/09/2014); hip surgery (Left); Throat surgery; Colonoscopy; Upper gastrointestinal endoscopy (2019); Upper gastrointestinal endoscopy (2019); Upper gastrointestinal endoscopy (N/A, 2019); Upper gastrointestinal endoscopy (N/A, 10/23/2019); Upper gastrointestinal endoscopy (N/A, 10/29/2019); Endoscopic ultrasonography, GI (N/A, 2020); Upper gastrointestinal endoscopy (N/A, 2021); gastrectomy (N/A, 2020); and Upper gastrointestinal endoscopy (N/A, 2021).     Restrictions  Restrictions/Precautions  Restrictions/Precautions: General Precautions, Up as Tolerated  Required Braces or Orthoses?: No  Position Activity Restriction  Other position/activity restrictions: RUE IV  Subjective   General  Chart Reviewed: Yes  Subjective  Subjective: pt pleasant and agreeable  Pain Screening  Patient Currently in Pain: Denies  Vital Signs  Patient Currently in Pain: Denies       Orientation     Cognition      Objective      Transfers  Sit to Stand: Contact guard assistance  Stand to sit: Contact guard assistance  Stand Pivot Transfers: Stand by assistance  Ambulation  Ambulation?: Yes  Ambulation 1  Surface: level tile  Device: No Device  Assistance: Stand by assistance  Quality of Gait: gait steady  Gait Deviations: Slow Janet  Distance: 140 ft     Balance  Posture: Good  Sitting - Static: Good  Sitting - Dynamic: Good  Standing - Static: Good  Standing - Dynamic: Good  Exercises  Comments: issued pt HEP for seated and standing ex reviewed all ex with pt. G-Code     OutComes Score                                                     AM-PAC Score  AM-PAC Inpatient Mobility Raw Score : 21 (09/02/21 1255)  AM-PAC Inpatient T-Scale Score : 50.25 (09/02/21 1255)  Mobility Inpatient CMS 0-100% Score: 28.97 (09/02/21 1255)  Mobility Inpatient CMS G-Code Modifier : CJ (09/02/21 1255)          Goals  Short term goals  Time Frame for Short term goals: 12 treatments  Short term goal 1: Independent transfers  Short term goal 2: Independent ambulation 300' x 1  Short term goal 3: Tolerate 30 min ther act  Short term goal 4: 5/5 B LE strength  Short term goal 5:  Independently ascend/descend 1 flight of steps(~ 12 steps w/ 1 HR)  Patient Goals   Patient goals : Feel better    Plan    Plan  Times per week: 1-2x/day,5-6 days/week  Current Treatment Recommendations: Strengthening, Transfer Training, Endurance Training, Gait Training, Stair training  Safety Devices  Type of devices: Gait belt, Left in chair, Call light within reach  Restraints  Initially in place: No     Therapy Time   Individual Concurrent Group Co-treatment   Time In  1130         Time Out  1153         Minutes  23                 [address], PTA

## 2021-09-02 NOTE — CARE COORDINATION
Spoke to Janae with [facility] and still working on benefits for TPN. Was told by Janae pt will not be stable for discharge with TPN until earliest tomorrow afternoon. Spoke to Kathy with [facility] and she is confirming start of care for tomorrow. Spoke to Dr. Zee [place] office and they will call back with his response to follow for home care.

## 2021-09-02 NOTE — PROGRESS NOTES
Nutrition Assessment     Type and Reason for Visit: Reassess    Nutrition Recommendations/Plan:   1. Continue Easy to Chew  2. Increase TPN rate from 41.7 mL/hr to 75 mL/hr (1800 mL total volume). Additives: Na ace: 20 to 35 mEq; NaCl: 35 to 65 mEq; K+ ace: 15 to 25 mEq; KPO4: 15 to 27 mmol; KCl: 0 to 30 mEq; Ca gluconate: 5 to 10 mEq; MgSO4: 10 to 15 mEq. Remove MVI and trace elements. Continue 100 mL of lipids. 3.TPN and lipids provides 1784 kcal and 90 gm of protein  4. Monitor p.o intakes, diet tolerance, labs, TPN rate and tolerance  5. Consider changing TPN to cyclic tomorrow (9/3)    Nutrition Assessment:  Patient remains on TPN and rate will increase to goal rate 75 ml/hr (1800 mL total volume) and 100 mL of lipids. Additives will be adjusted. Diet advanced to Easy to Chew. Oral intake is not tolerated well. Patient will go home on TPN and lipids. Monitor p.o intakes, diet tolerance, labs, TPN rate and tolerance. Consider changing TPN to cyclic tomorrow (9/3). Malnutrition Assessment:  Malnutrition Status: Severe malnutrition    Estimated Daily Nutrient Needs:  Energy (kcal): 6019-7498 kcal (25-28 kcal/kg); Weight Used for Energy Requirements:  Admission     Protein (g): 82-95 gm of protein (1.2-1.4 gm/kg); Weight Used for Protein Requirements:  Current          Nutrition Related Findings: No edema. GI status: hypoactive bowel sounds, nausea. Loss of appetite. Difficulty swallowing.  S/P total gastrectomy 11/30/20      Current Nutrition Therapies:    PN-Adult 2-in-1 Central Line (Custom)  ADULT DIET; Easy to Ali  PN-Adult [facility]-in-1 [facility] (Custom)    Anthropometric Measures:  · Height: 5' 5\" (165.1 cm)  · Current Body Wt: 147 lb (66.7 kg)   · BMI: 24.5    Nutrition Diagnosis:   · Severe malnutrition, In context of chronic, non-illness related related to inadequate protein-energy intake as evidenced by intake 0-25%, weight loss greater than or equal to 10% in 6 months    · Altered GI function related to altered GI structure (total gastrectomy) as evidenced by GI abnormality, nausea, vomiting, intake 0-25%      Nutrition Interventions:   Food and/or Nutrient Delivery:  Continue Current Diet, Modify Parenteral Nutrition  Nutrition Education/Counseling:  Education not indicated   Coordination of Nutrition Care:  Continue to monitor while inpatient    Goals:  PN will meet greater than 75% of estimated nutrition needs       Nutrition Monitoring and Evaluation:    Food/Nutrient Intake Outcomes:  Parenteral Nutrition Intake/Tolerance, Food and Nutrient Intake  Physical Signs/Symptoms Outcomes:  Biochemical Data, Fluid Status or Edema, Skin, Weight     Discharge Planning:    Parenteral Nutrition, Continue current diet       Brandon GAMBINON, RDN, LDN  Lead Clinical Dietitian  RD Office Phone [phone]

## 2021-09-02 NOTE — PROGRESS NOTES
[facility]  Office: [address], DO, Gena Stephania, DO, Lashoneric Marley, DO, Sameera Jessica Blood, DO, Jose Padilla MD, Moriah Hearn MD, Darryle So, MD, Nakita Guevara MD, Ganesh Gutierrez MD, Diogenes Motley MD, Callie Woo MD, Stefanie Kaplan, DO, Narcisa Tovar MD, Les Osborne DO, Nayeli Murray MD,  Shanika Cuba DO, Konstantin James MD, Tristan Guillen MD, Jhon Hernandez MD, Michael Giles MD, Nadia Mason MD, Abhinav Newman MD, Melanie Montanez MD, Nataly Alvarado, [facility], [facility], CNP, Ta Barnhart, CNP, Kyle Mortensen, CNS, David Hendricks, CNP, Gaston Graham, CNP, Mayra Mayorga, CNP, Henry Carvajal, CNP, Fatimah Irizarry, CNP, Jeimy Simpson PA-C, Dori Robbins, NICHOL, Gustavo Carlos, CNP, Mendoza Tabares, CNP, Danilo Duran, CNP, Carmen Bartlett, CNP, Phong Monk, CNP, Tawana Rubinstein, CNP, Tre Harris, CNP, Karen Marcano, Froy Lemus    Progress Note    9/2/2021    12:33 PM    Name:   Horacio White  MRN:     [id number]     [place]:      [de-identified]   Room:   2007/2007-02  IP Day:  6  Admit Date:  8/27/2021  1:23 PM    PCP:   Nikhil Bond MD  Code Status:  Full Code    Subjective:     C/C:   Chief Complaint   Patient presents with   [employer] Dehydration     sent by PCP   [employer] Other     no appetite    Nausea & Vomiting    Cough    Fatigue     Interval History Status: improved. Patient is feeling much better today, was able to tolerate her liquids this morning without feeling nauseous. No vomiting. She wants to try a few bites of food today to see how she does. Advised to try diet today and if she still feels nauseous will change her to liquid diet again. TPN was started last night. Tolerating it well. No chest pain, no allergies. Patient working with physical therapy    Brief History:     26-year-old female admitted to the hospital for treatment of dehydration.   Patient was sent to ER by her gastroenterologist for intractable nausea and vomiting getting worse. Patient has history of carcinoid tumor and underwent gastrectomy in November 2020 at [facility]. Patient has lost around 140 pound. She has poor appetite. Review of Systems:     Constitutional: Appetite is slightly improved, fatigue is improving  Respiratory:  negative for cough, dyspnea on exertion, shortness of breath, wheezing  Cardiovascular:  negative for chest pain, chest pressure/discomfort, lower extremity edema, palpitations  Gastrointestinal: Negative for nausea, vomiting, abdominal pain  Neurological:  negative for dizziness, headache    Medications: Allergies:     Allergies   Allergen Reactions    Erythromycin Diarrhea       Current Meds:   Scheduled Meds:    potassium chloride  40 mEq Oral Q2H    [START ON 9/3/2021] potassium chloride  20 mEq Oral BID    magnesium sulfate  2,000 mg IntraVENous Once    fat emulsion  100 mL IntraVENous Daily    insulin lispro  0-6 Units SubCUTAneous 4 times per day    scopolamine  1 patch TransDERmal Q72H    levothyroxine  88 mcg Oral Once per day on Sun Wed Thu Fri Sat    soledad-nelly  1 tablet Oral Daily    magnesium oxide  400 mg Oral BID    metoclopramide  10 mg Oral TID AC    megestrol  400 mg Oral Daily    pantoprazole  40 mg Oral BID AC    vitamin B-12  500 mcg Oral Daily    Vitamin D  1,000 Units Oral Daily    sucralfate  1 g Oral BID    sodium chloride flush  5-40 mL IntraVENous 2 times per day    enoxaparin  40 mg SubCUTAneous Daily     Continuous Infusions:    PN-Adult 2-in-1 Central Line (Standard) 41.7 mL/hr at 09/01/21 1853    sodium chloride       PRN Meds: diatrizoate meglumine-sodium, potassium chloride, sodium chloride flush, sodium chloride, magnesium sulfate, ondansetron **OR** ondansetron, polyethylene glycol, acetaminophen **OR** acetaminophen    Data:     Past Medical History:   has a past medical history of Anxiety, Arthritis, Asthma, Colon polyp, Colon polyps, Cyst of kidney, acquired, Fatigue, Hypertension, Hypothyroidism, Neuroendocrine tumor, Obesity, Pharyngoesophageal dysphagia, Vocal cord polyps, and Wears glasses. Social History:   reports that she quit smoking about 9 years ago. She has a 25.00 pack-year smoking history. She has never used smokeless tobacco. She reports previous alcohol use. She reports that she does not use drugs. Family History:   Family History   Problem Relation Age of Onset    High Blood Pressure Mother     High Blood Pressure Sister     Stomach Cancer Sister     Other Sister         epilepsy    No Known Problems Father        Vitals:  /79   Pulse 74   Temp 98.6 °F (37 °C) (Oral)   Resp 16   Ht 5' 5\" (1.651 m)   Wt 147 lb (66.7 kg)   LMP 1994   SpO2 99%   BMI 24.46 kg/m²   Temp (24hrs), Av.4 °F (36.9 °C), Min:97.9 °F (36.6 °C), Max:98.8 °F (37.1 °C)    Recent Labs     21  1833 21  0053 21  0640 21  1119   POCGLU 102 121* 118* 129*       I/O (24Hr):     Intake/Output Summary (Last 24 hours) at 2021 1233  Last data filed at 2021 0731  Gross per 24 hour   Intake 1643 ml   Output 1900 ml   Net -257 ml       Labs:  Hematology:  Recent Labs     21  0558 21  0621   WBC 5.3 5.2   RBC 3.74* 3.65*   HGB 11.2* 10.9*   HCT 35.3* 33.4*   MCV 94.4 91.5   MCH 29.9 29.9   MCHC 31.7 32.6   RDW 14.1 13.7    224   MPV 9.3 9.3     Chemistry:  Recent Labs     21  0558 21  0832 21  1742 21  0621    137  --  138   K 3.6* 3.8  --  3.0*    102  --  103   CO2 22 22  --  26   GLUCOSE 75 102*  --  105*   BUN 3* 3*  --  3*   CREATININE 0.41* 0.43*  --  0.47*   MG  --  1.2*  --  1.8   ANIONGAP 11 13  --  9   LABGLOM >60 >60  --  >60   GFRAA >60 >60  --  >60   CALCIUM 8.2* 8.3*  --  8.3*   PHOS  --   --  2.2* 2.4*     Recent Labs     21  0558 21  1742 21  1833 21  0053 21  0621 21  0640 21  1119   PROT 5.1*  --   -- redness, tenderness in the calves  Skin:  no gross lesions, rashes, induration    Assessment:        Hospital Problems         Last Modified POA    * (Principal) Dehydration 8/27/2021 Yes    General weakness 8/28/2021 Yes    Neuroendocrine neoplasm of stomach 8/27/2021 Yes    Hypomagnesemia 9/1/2021 Yes    Intractable nausea and vomiting 8/27/2021 Yes    S/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 8/27/2021 Yes    Severe malnutrition (HCC) (Chronic) 8/30/2021 Yes    Sinus tachycardia 8/27/2021 Yes    Weight loss 8/28/2021 Yes    Projectile vomiting with nausea 8/28/2021 Yes    On total parenteral nutrition 9/1/2021 Yes          Plan:        Intractable nausea and vomiting-improved, patient wants to try solid diet today, change Reglan and Protonix to oral, continue reglan 10mg tid, protonix 40 bid. Started on TPN 9/1 for nutrition, s/p small bowel follow through. Continue diet as tolerated. Patient will have follow-up appointment at [facility] for J-tube placement, will be discharged on TPN. Continue Carafate, scopolamine patch    Hypokalemia-will talk with dietitian to adjust potassium in the TPN. Patient already has a running back, will give oral supplementation 40 mg 2 doses. Hypomagnesemia-on oral supplementation, will replace 2 g of magnesium    Severe malnutrition-on TPN, on Megace    Hypothyroidism on Synthyroid. H/o carcinoid s/p total gastrectomy and Qamar-en-Y esophageal jejunal anastomosis - Needs J tube placement. Will have follow-up with Dr. Thirza Bence outpatient for carcinoid tumor. Lovenox for DVT prophylaxis    Patent is stable for discharge but  will be discharged on 9/3 after 48 hrs of TPN.     Jhon Hernandez MD  9/2/2021  12:33 PM

## 2021-09-02 NOTE — ADT AUTH CERT
Dehydration - Care Day 5 (8/31/2021) by Yael Juárez RN       Review Status Review Entered   Completed 9/2/2021 13:08      Criteria Review      Care Day: 5 Care Date: 8/31/2021 Level of Care: Inpatient Floor    Guideline Day 2    Clinical Status    (X) * Hemodynamic stability    (X) * Mental status at baseline    (X) * Vomiting absent or controlled    (X) * Diarrhea absent or controlled    (X) * Electrolyte abnormalities absent or acceptable for next level of care    ( ) * Cause of dehydration requiring inpatient treatment absent    (X) * Renal function at baseline or acceptable for next level of care    ( ) * Discharge plans and education understood    Activity    (X) * Ambulatory or acceptable for next level of care    Routes    ( ) * Oral hydration    ( ) * Oral medications or regimen acceptable for next level of care    ( ) * Oral diet or acceptable for next level of care    * Milestone   Additional Notes   8/31         The pt was seen and examined. She is s/p egd that is discussed below. Still c/o nausea and vomiting after eating and drinking. No abdominal pain. Has been evaluated by surgery who ordered esophagram with SBFT. .               OBJECTIVE   Scheduled Meds:   Scheduled Medications   · pantoprazole 40 mg IntraVENous BID   · metoclopramide 10 mg IntraVENous TID   · scopolamine 1 patch Transdermal Q72H   · levothyroxine 88 mcg Oral Once per day on Sun Wed Thu Fri Sat   · soledad-nelly 1 tablet Oral Daily   · magnesium oxide 400 mg Oral BID   · [Held by provider] metoclopramide 10 mg Oral TID AC   · megestrol 400 mg Oral Daily   · [Held by provider] pantoprazole 40 mg Oral BID AC   · potassium chloride 20 mEq Oral BID   · vitamin B-12 500 mcg Oral Daily   · Vitamin D 1,000 Units Oral Daily   · sucralfate 1 g Oral BID   · sodium chloride flush 5-40 mL IntraVENous 2 times per day   · enoxaparin 40 mg SubCUTAneous Daily              Vital Signs:   BP (!) 137/96   Pulse 90   Temp 98.1 °F (36.7 °C) (Oral)   Resp 16              Gastro:   ASSESSMENT/plan   1. Dehydration with nausea and vomiting; hx gastrectomy for neuroendocrine tumors s/p egd yesterday showing that the esophagus is anastomosed with small bowel   -SBFT with esophagram today   -recommend starting TPN   -will likely need jejunostomy tube for feeding-will discuss having pt see surgeons at [address] [place] [address] with md due to her previous surgery   -antiemetics prn             Medicine:   ASSESSMENT/plan   1. Dehydration with nausea and vomiting; hx gastrectomy for neuroendocrine tumors s/p egd yesterday showing that the esophagus is anastomosed with small bowel   -SBFT with esophagram today   -recommend starting TPN   -will likely need jejunostomy tube for feeding-will discuss having pt see surgeons at [address] [place] [address] with md due to her previous surgery   -antiemetics prn                Case management:   PICC line placed and TPN will start 9-1.    Waiting for insurance verification from [place] and [address] [place] [address].      Dehydration - Care Day 4 (8/30/2021) by Julietta Osler, RN       Review Status Review Entered   Completed 9/2/2021 13:05      Criteria Review      Care Day: 4 Care Date: 8/30/2021 Level of Care: Inpatient Floor    Guideline Day 2    Clinical Status    (X) * Hemodynamic stability    9/2/2021 1:05 PM EDT by Kennedi Cage      08/30/21  98.4 (36.9)  16  75  136/82  --    (X) * Mental status at baseline    (X) * Vomiting absent or controlled    (X) * Diarrhea absent or controlled    (X) * Electrolyte abnormalities absent or acceptable for next level of care    ( ) * Cause of dehydration requiring inpatient treatment absent    9/2/2021 1:05 PM EDT by Kennedi Cage      needing j tube placement    (X) * Renal function at baseline or acceptable for next level of care    ( ) * Discharge plans and education understood    Activity    (X) * Ambulatory or acceptable for next level of care    Routes    ( ) * Oral hydration    ( ) * Oral medications or regimen acceptable for next level of care    ( ) * Oral diet or acceptable for next level of care    * Milestone   Additional Notes   8/30         Patient is still having low appetite and nausea . She underwent EGD today, no scarring , she is breathing on room air. Patient is comfortable. Vitals - Stable afebrile      Patient underwent EGD and did not show negative for any stricture. Gen surgery was consulted for Jejunostomy placement.           Medications :   pantoprazole, 40 mg, IntraVENous, BID   metoclopramide, 10 mg, IntraVENous, TID   scopolamine, 1 patch, Transdermal, Q72H   levothyroxine, 88 mcg, Oral, Once per day on Sun Wed Thu Fri Sat   soledad-nelly, 1 tablet, Oral, Daily   magnesium oxide, 400 mg, Oral, BID   [Held by provider] metoclopramide, 10 mg, Oral, TID AC   megestrol, 400 mg, Oral, Daily   [Held by provider] pantoprazole, 40 mg, Oral, BID AC   potassium chloride, 20 mEq, Oral, BID   vitamin B-12, 500 mcg, Oral, Daily   Vitamin D, 1,000 Units, Oral, Daily   sucralfate, 1 g, Oral, BID   sodium chloride flush, 5-40 mL, IntraVENous, 2 times per day   [Held by provider] enoxaparin, 40 mg, Subcutaneous, Daily          Physical exam mostly unchanged:   Dorsalis pedis pulses are 2+ on the right side and 2+ on the left side. Procedure: EGD ESOPHAGOGASTRODUODENOSCOPY Case Time: 8/30/2021 12:20 PM Surgeon: Adrian Waite MD           Signed                 Operative Note   PROCEDURE NOTE       DATE OF PROCEDURE: 8/30/2021        SURGEON: Adrian Waite MD   Facility: [address] [place] [address]   ASSISTANT: None   Anesthesia: MAC   PREOPERATIVE DIAGNOSIS:    Nausea vomiting and dehydration status post gastrectomy for neuroendocrine tumors       Diagnosis:   Status post total gastrectomy   No stomach is left   The esophagus is anastomosed with small bowel       There is 2 lm of small bowel 1 is short and blind measuring around 2 to 3 cm   The other 1 is patent .  With no abnormality seen for 20 cm        No anastomosed with small bowel       There is 2 lm of small bowel 1 is short and blind measuring around 2 to 3 cm   The other 1 is patent        No abnormality could be seen in the esophagus or the small   The scope was removed and the patient tolerated the procedure well.        Recommendations/Plan:    1. Surgical consult for jejunostomy-tube placement   2. Small frequent meals with upright position and liquid          General Surgery:   61 yo F s/p lap gastrectomy with RnY reconstruction for Hx of gastric carcinoid tumor, issues with persistent nausea, emesis and intolerance of food         Plan:   1. Continue medical mgmt and supportive care per primary   2. Recommend for esophogram/small bowel follow through   3. Check pre-albumin levels, obtain calorie count and nutritional deficiencies assessment   4. Ultimately pt would be best served and worked up by operating team in [facility] given complexity of previous surgery and long term follow up needed by the [facility] team. No immediate surgery planned at this time from gen surg stance.  Consider transfer to .

## 2021-09-03 VITALS
WEIGHT: 151.4 LBS | RESPIRATION RATE: 18 BRPM | BODY MASS INDEX: 25.22 KG/M2 | HEIGHT: 65 IN | DIASTOLIC BLOOD PRESSURE: 74 MMHG | HEART RATE: 80 BPM | OXYGEN SATURATION: 99 % | TEMPERATURE: 99 F | SYSTOLIC BLOOD PRESSURE: 114 MMHG

## 2021-09-03 LAB
ANION GAP SERPL CALCULATED.3IONS-SCNC: 10 MMOL/L (ref 9–17)
ANION GAP SERPL CALCULATED.3IONS-SCNC: 7 MMOL/L (ref 9–17)
BUN BLDV-MCNC: 7 MG/DL (ref 6–20)
BUN BLDV-MCNC: 9 MG/DL (ref 6–20)
BUN/CREAT BLD: 18 (ref 9–20)
BUN/CREAT BLD: 21 (ref 9–20)
CALCIUM SERPL-MCNC: 8.4 MG/DL (ref 8.6–10.4)
CALCIUM SERPL-MCNC: 8.8 MG/DL (ref 8.6–10.4)
CHLORIDE BLD-SCNC: 103 MMOL/L (ref 98–107)
CHLORIDE BLD-SCNC: 105 MMOL/L (ref 98–107)
CO2: 22 MMOL/L (ref 20–31)
CO2: 26 MMOL/L (ref 20–31)
CREAT SERPL-MCNC: 0.4 MG/DL (ref 0.5–0.9)
CREAT SERPL-MCNC: 0.42 MG/DL (ref 0.5–0.9)
GFR AFRICAN AMERICAN: >60 ML/MIN
GFR AFRICAN AMERICAN: >60 ML/MIN
GFR NON-AFRICAN AMERICAN: >60 ML/MIN
GFR NON-AFRICAN AMERICAN: >60 ML/MIN
GFR SERPL CREATININE-BSD FRML MDRD: ABNORMAL ML/MIN/{1.73_M2}
GLUCOSE BLD-MCNC: 102 MG/DL (ref 70–99)
GLUCOSE BLD-MCNC: 105 MG/DL (ref 70–99)
GLUCOSE BLD-MCNC: 109 MG/DL (ref 65–105)
GLUCOSE BLD-MCNC: 109 MG/DL (ref 65–105)
GLUCOSE BLD-MCNC: 120 MG/DL (ref 65–105)
MAGNESIUM: 1.9 MG/DL (ref 1.6–2.6)
PHOSPHORUS: 1.8 MG/DL (ref 2.6–4.5)
PHOSPHORUS: 2.4 MG/DL (ref 2.6–4.5)
POTASSIUM SERPL-SCNC: 4 MMOL/L (ref 3.7–5.3)
POTASSIUM SERPL-SCNC: 4.4 MMOL/L (ref 3.7–5.3)
SODIUM BLD-SCNC: 135 MMOL/L (ref 135–144)
SODIUM BLD-SCNC: 138 MMOL/L (ref 135–144)

## 2021-09-03 PROCEDURE — 80048 BASIC METABOLIC PNL TOTAL CA: CPT

## 2021-09-03 PROCEDURE — 84100 ASSAY OF PHOSPHORUS: CPT

## 2021-09-03 PROCEDURE — 2580000003 HC RX 258: Performed by: INTERNAL MEDICINE

## 2021-09-03 PROCEDURE — 6370000000 HC RX 637 (ALT 250 FOR IP): Performed by: STUDENT IN AN ORGANIZED HEALTH CARE EDUCATION/TRAINING PROGRAM

## 2021-09-03 PROCEDURE — 2580000003 HC RX 258: Performed by: STUDENT IN AN ORGANIZED HEALTH CARE EDUCATION/TRAINING PROGRAM

## 2021-09-03 PROCEDURE — 99239 HOSP IP/OBS DSCHRG MGMT >30: CPT | Performed by: STUDENT IN AN ORGANIZED HEALTH CARE EDUCATION/TRAINING PROGRAM

## 2021-09-03 PROCEDURE — 2500000003 HC RX 250 WO HCPCS: Performed by: STUDENT IN AN ORGANIZED HEALTH CARE EDUCATION/TRAINING PROGRAM

## 2021-09-03 PROCEDURE — 6370000000 HC RX 637 (ALT 250 FOR IP): Performed by: FAMILY MEDICINE

## 2021-09-03 PROCEDURE — 6370000000 HC RX 637 (ALT 250 FOR IP): Performed by: INTERNAL MEDICINE

## 2021-09-03 PROCEDURE — 6360000002 HC RX W HCPCS: Performed by: STUDENT IN AN ORGANIZED HEALTH CARE EDUCATION/TRAINING PROGRAM

## 2021-09-03 PROCEDURE — 82947 ASSAY GLUCOSE BLOOD QUANT: CPT

## 2021-09-03 PROCEDURE — 36415 COLL VENOUS BLD VENIPUNCTURE: CPT

## 2021-09-03 PROCEDURE — 83735 ASSAY OF MAGNESIUM: CPT

## 2021-09-03 RX ORDER — SCOLOPAMINE TRANSDERMAL SYSTEM 1 MG/1
1 PATCH, EXTENDED RELEASE TRANSDERMAL
Qty: 20 PATCH | Refills: 1 | Status: SHIPPED | OUTPATIENT
Start: 2021-09-03 | End: 2021-10-12 | Stop reason: SDUPTHER

## 2021-09-03 RX ORDER — CHOLECALCIFEROL (VITAMIN D3) 25 MCG
1000 TABLET ORAL DAILY
Qty: 60 TABLET | Refills: 2 | Status: SHIPPED | OUTPATIENT
Start: 2021-09-03 | End: 2022-01-28 | Stop reason: ALTCHOICE

## 2021-09-03 RX ORDER — POTASSIUM CHLORIDE 20 MEQ/1
20 TABLET, EXTENDED RELEASE ORAL 2 TIMES DAILY
Qty: 60 TABLET | Refills: 3 | Status: SHIPPED | OUTPATIENT
Start: 2021-09-03 | End: 2022-01-28 | Stop reason: ALTCHOICE

## 2021-09-03 RX ADMIN — MAGNESIUM GLUCONATE 500 MG ORAL TABLET 400 MG: 500 TABLET ORAL at 11:59

## 2021-09-03 RX ADMIN — SODIUM CHLORIDE, PRESERVATIVE FREE 10 ML: 5 INJECTION INTRAVENOUS at 09:59

## 2021-09-03 RX ADMIN — LEVOTHYROXINE SODIUM 88 MCG: 0.09 TABLET ORAL at 05:55

## 2021-09-03 RX ADMIN — METOCLOPRAMIDE 10 MG: 10 TABLET ORAL at 05:55

## 2021-09-03 RX ADMIN — VITAM B12 500 MCG: 100 TAB at 09:25

## 2021-09-03 RX ADMIN — METOCLOPRAMIDE 10 MG: 10 TABLET ORAL at 09:25

## 2021-09-03 RX ADMIN — METOCLOPRAMIDE 10 MG: 10 TABLET ORAL at 16:23

## 2021-09-03 RX ADMIN — Medication 1 TABLET: at 09:25

## 2021-09-03 RX ADMIN — SODIUM PHOSPHATE, MONOBASIC, MONOHYDRATE AND SODIUM PHOSPHATE, DIBASIC, ANHYDROUS 20 MMOL: 276; 142 INJECTION, SOLUTION INTRAVENOUS at 09:58

## 2021-09-03 RX ADMIN — SUCRALFATE 1 G: 1 TABLET ORAL at 09:26

## 2021-09-03 RX ADMIN — PANTOPRAZOLE SODIUM 40 MG: 40 TABLET, DELAYED RELEASE ORAL at 16:23

## 2021-09-03 RX ADMIN — APIXABAN 5 MG: 5 TABLET, FILM COATED ORAL at 09:26

## 2021-09-03 RX ADMIN — CALCIUM GLUCONATE 2000 MG: 98 INJECTION, SOLUTION INTRAVENOUS at 11:01

## 2021-09-03 RX ADMIN — POTASSIUM CHLORIDE 20 MEQ: 20 TABLET, EXTENDED RELEASE ORAL at 09:26

## 2021-09-03 RX ADMIN — MEGESTROL ACETATE 400 MG: 40 SUSPENSION ORAL at 09:26

## 2021-09-03 RX ADMIN — Medication 1000 UNITS: at 09:26

## 2021-09-03 RX ADMIN — PANTOPRAZOLE SODIUM 40 MG: 40 TABLET, DELAYED RELEASE ORAL at 05:55

## 2021-09-03 ASSESSMENT — PAIN SCALES - GENERAL: PAINLEVEL_OUTOF10: 0

## 2021-09-03 NOTE — PROGRESS NOTES
CLINICAL PHARMACY NOTE: MEDS TO BEDS    Total # of Prescriptions Filled: 2   The following medications were delivered to the patient:  · SCOPOLAMINE 1MG/3 DAY PATCHES  · POTASSIUM CHL CARMINE ER 20    Additional Documentation:  PT BOUGHT OTC BOTTLES OF VITAMIN D-3 25MCG AND MAGNESIUM 400MG

## 2021-09-03 NOTE — PLAN OF CARE
Problem: Nausea/Vomiting:  Goal: Able to drink  Description: Able to drink  Outcome: Ongoing  Note: Patient able to drink/eat without nausea  Receiving TPN and Lipids  Oral diet tolerating well without difficulties  Dietitian consult

## 2021-09-03 NOTE — PLAN OF CARE
Problem: Falls - Risk of:  Goal: Will remain free from falls  Description: Will remain free from falls  Outcome: Ongoing     Problem: Nausea/Vomiting:  Goal: Absence of nausea/vomiting  Description: Absence of nausea/vomiting  9/2/2021 2302 by Holly Kelly RN  Outcome: Ongoing     Problem: Fluid Volume:  Goal: Signs and symptoms of dehydration will decrease  Description: Signs and symptoms of dehydration will decrease  9/2/2021 2302 by Holly Kelly RN  Outcome: Ongoing     Problem: Pain:  Goal: Pain level will decrease  Description: Pain level will decrease  Outcome: Ongoing     Problem: Skin Integrity:  Goal: Will show no infection signs and symptoms  Description: Will show no infection signs and symptoms  Outcome: Ongoing

## 2021-09-03 NOTE — CARE COORDINATION
Dr. Merlinda Penna rounded and pt can be D/C home today on TPN. Informed Janet with [facility] and she will call back with delivery time. Once pt meets max out of pocket TPN will be covered 100%. Will be evening start of care and [facility] with [facility] informed. REESE signed per Dr. Merlinda Penna.

## 2021-09-03 NOTE — CARE COORDINATION
Call from [place] with Stephane and phos level is 1.8 today which is a concern for safe D/C. Informed Dr. Ladonna Hart and she will contact [facility] to discuss options.

## 2021-09-03 NOTE — DISCHARGE SUMMARY
[facility]  Office: [address], DO, Rancho Vazquezning, DO, Pattisonmadelyn Nunez, DO, Sly Thompson Blood, DO, Suhail Valdez MD, Srikanth Plaza MD, Kiko Moran MD, Ulices Millard MD, Barbara Magallon MD, James Dobbins MD, Damián Gunter MD, Juan Briones DO, Nyoka Hodgkins, MD, Estefania Royal DO, Jadene Boast, MD,  Janice Kiser DO, Keith Holman MD, Ashley Davis MD, Murphy Perez MD, Estela Gutiérrez MD, Eda Mccabe MD, Juan M Meek MD, Micaela Mason MD, Francheska Jaime [facility], [facility], CNP, Yadira Fernandez, CNP, Boy Moulton, CNS, Preeti Rear, CNP, Radha Griffin, CNP, Michelle Foot, CNP, Del Gamma, CNP, Beba Loser, CNP, Jinny Mccloud PA-C, Morro Stern [facility], Lucas aCstano, CNP, Alexandra Grant, CNP, Brinda Hess, CNP, Althea Tinoco, CNP, Lj Baum, CNP, Helena Osorio, CNP, Laura Serna, CNP, Vinnie James, [facility]    Discharge Summary     Patient ID: Jonny Galan  :  1960   MRN: [id number]     ACCOUNT:  [de-identified]   Patient's PCP: Paulett Siemens, MD  Admit Date: 2021   Discharge Date: 9/3/2021  Length of Stay: 7  Code Status:  Full Code  Admitting Physician: No admitting provider for patient encounter. Discharge Physician: Murphy Perez MD     Active Discharge Diagnoses:     Hospital Problem Lists:  Principal Problem:    Dehydration  Active Problems:    General weakness    Neuroendocrine neoplasm of stomach    Hypomagnesemia    Intractable nausea and vomiting    S/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    Severe malnutrition (HCC)    Sinus tachycardia    Weight loss    Projectile vomiting with nausea    On total parenteral nutrition    H/O blood clots  Resolved Problems:    * No resolved hospital problems.  *      Admission Condition:  poor     Discharged Condition: fair    Hospital Stay:     Hospital Course:  Jonny Galan is a 62 y.o. female who was admitted for the management of  Dehydration , presented to ER with Dehydration (sent by PCP), Other (no appetite), Nausea & Vomiting, Cough, and Fatigue      61-year-old female was admitted in the hospital with dehydration. Patient has significant history of carcinoid tumor, underwent gastrectomy in November 2020 at [facility]. Patient has had issues since then. Patient has severe nausea, vomiting for the last 1 month with significant weight loss. Patient was not able to tolerate any diet. GI and general surgery was consulted. General surgery did small bowel follow-through which was normal.  Patient was started on Reglan thrice a day, Protonix, scopolamine patch, Zofran. Patient nausea did improve. Given significant issues with the appetite GI recommended TPN. Patient was started on TPN. Gradually her electrolytes improved. Patient has chronic hypokalemia hypophosphatemia. Patient improved significantly on TPN even her appetite improved. Patient was able to tolerate small diet without feeling nauseous. We will continue the same regimen of antiemetics. Patient called [facility] clinic, recommendation is to get a J-tube placed by [facility] given her complicated surgical history. She will follow up with them outpatient. Until then patient will have TPN. PCP to follow-up on the TPN orders. She will have blood work done by infusion company periodically.     Patient is stable for discharge  [address] signed  On exam patient is not in any distress  S1-S2 normal  No abdominal pain, tenderness  No pedal edema    Significant therapeutic interventions: small bowel follow through    Significant Diagnostic Studies:   Labs / Micro:  CBC:   Lab Results   Component Value Date    WBC 5.2 09/02/2021    RBC 3.65 09/02/2021    HGB 10.9 09/02/2021    HCT 33.4 09/02/2021    MCV 91.5 09/02/2021    MCH 29.9 09/02/2021    MCHC 32.6 09/02/2021    RDW 13.7 09/02/2021     09/02/2021     BMP:    Lab Results Component Value Date    GLUCOSE 105 09/03/2021     09/03/2021    K 4.4 09/03/2021     09/03/2021    CO2 22 09/03/2021    ANIONGAP 10 09/03/2021    BUN 9 09/03/2021    CREATININE 0.42 09/03/2021    BUNCRER 21 09/03/2021    CALCIUM 8.8 09/03/2021    LABGLOM >60 09/03/2021    GFRAA >60 09/03/2021    GFR      09/03/2021    GFR NOT REPORTED 09/03/2021     HFP:    Lab Results   Component Value Date    PROT 5.1 09/02/2021     CMP:    Lab Results   Component Value Date    GLUCOSE 105 09/03/2021     09/03/2021    K 4.4 09/03/2021     09/03/2021    CO2 22 09/03/2021    BUN 9 09/03/2021    CREATININE 0.42 09/03/2021    ANIONGAP 10 09/03/2021    ALKPHOS 88 09/02/2021    ALT 26 09/02/2021    AST 51 09/02/2021    BILITOT 0.47 09/02/2021    LABALBU 2.3 09/02/2021    ALBUMIN NOT REPORTED 09/02/2021    LABGLOM >60 09/03/2021    GFRAA >60 09/03/2021    GFR      09/03/2021    GFR NOT REPORTED 09/03/2021    PROT 5.1 09/02/2021    CALCIUM 8.8 09/03/2021     PT/INR:    Lab Results   Component Value Date    PROTIME 14.4 08/28/2021    INR 1.1 08/28/2021     PTT:   Lab Results   Component Value Date    APTT 47.4 04/07/2021        Radiology:  FL UGI W SMALL BOWEL    Result Date: 8/31/2021  Postsurgical changes as described with normal progression of contrast through the bowel without evidence of dilatation or obstruction. Consultations:    Consults:     Final Specialist Recommendations/Findings:   IP CONSULT TO PRIMARY CARE PROVIDER  IP CONSULT TO GI  IP CONSULT TO GENERAL SURGERY  IP CONSULT TO DIETITIAN  IP CONSULT TO HOME CARE NEEDS      The patient was seen and examined on day of discharge and this discharge summary is in conjunction with any daily progress note from day of discharge.     Discharge plan:     Disposition: Home    Physician Follow Up:     [facility] specialty infusion  [id number] [address] sara RODRÍGUEZ/ Dioni Alcaraz 41  40 Karyn Chan 401 [facility] Lito Lazar MD  [address] [facility]  [address]    In 1 week      Darlin Brown, [address] [facility] [address]  Juan Pablo Vinny Castañeda [facility]  [phone]    Schedule an appointment as soon as possible for a visit  Neuroendocrine tumor    [facility]  [address] [place] [address]  [phone]    Schedule an appointment as soon as possible for a visit  For Jejunostomy tube       Requiring Further Evaluation/Follow Up POST HOSPITALIZATION/Incidental Findings: Follow-up with GI  Will need [facility] follow-up for G-tube placement. Diet: Take small meals, dental soft. If nauseous changed to full liquid diet. Activity: As tolerated    Instructions to Patient: Continue TPN  Have small meals  Follow-up with GI and surgery at [facility] clinic    Discharge Medications:      Medication List      START taking these medications    magnesium oxide 400 (241.3 Mg) MG Tabs tablet  Commonly known as: MAG-OX  Take 1 tablet by mouth 2 times daily  Replaces: Magnesium 400 MG Tabs     potassium chloride 20 MEQ extended release tablet  Commonly known as: KLOR-CON M  Take 1 tablet by mouth 2 times daily  Replaces: potassium chloride 20 MEQ packet     scopolamine transdermal patch  Commonly known as: TRANSDERM-SCOP  Place 1 patch onto the skin every 72 hours     Vitamin D3 25 MCG Tabs  Take 1 tablet by mouth daily  Replaces: vitamin D 25 MCG (1000 UT) Caps        CHANGE how you take these medications    desonide 0.05 % cream  Commonly known as: DesOwen  Apply topically 2 times daily. What changed:   how to take this  when to take this  reasons to take this  additional instructions     levothyroxine 88 MCG tablet  Commonly known as: SYNTHROID  What changed: Another medication with the same name was removed. Continue taking this medication, and follow the directions you see here.      sucralfate 1 GM tablet  Commonly known as: CARAFATE  Take 1 tablet by mouth 4 times daily  What changed: when to take this        CONTINUE taking these medications    apixaban 5 MG Tabs tablet  Commonly known as: Eliquis  Take 1 tablet by mouth 2 times daily     Folic Acid-Vit X4-XWO I03 2.2-25-0.5 MG Tabs  Take 1 tablet by mouth daily     magnesium oxide 400 MG tablet  Commonly known as: MAG-OX     megestrol 40 MG/ML suspension  Commonly known as: MEGACE  Take 10 mLs by mouth daily     metoclopramide 10 MG tablet  Commonly known as: REGLAN  TAKE 1 TABLET BY MOUTH 3 TIMES DAILY (BEFORE MEALS)     MULTI-DAY VITAMINS PO     pantoprazole 40 MG tablet  Commonly known as: PROTONIX  Take 1 tablet by mouth 2 times daily     vitamin B-12 500 MCG tablet  Commonly known as: CYANOCOBALAMIN        STOP taking these medications    hydroCHLOROthiazide 25 MG tablet  Commonly known as: HYDRODIURIL     Magnesium 400 MG Tabs  Replaced by: magnesium oxide 400 (241.3 Mg) MG Tabs tablet     potassium chloride 20 MEQ packet  Commonly known as: KLOR-CON  Replaced by: potassium chloride 20 MEQ extended release tablet     vitamin D 25 MCG (1000 UT) Caps  Replaced by: Vitamin D3 25 MCG Tabs           Where to Get Your Medications      These medications were sent to Jennifer Weiss [address], [address] [facility] [phone] - f [phone]  [address] [place] [id number]    Phone: [phone]   magnesium oxide 400 (241.3 Mg) MG Tabs tablet  potassium chloride 20 MEQ extended release tablet  scopolamine transdermal patch  Vitamin D3 25 MCG Tabs         Discharge Procedure Orders   Basic Metabolic Panel   Standing Status: Future Standing Exp. Date: 09/03/22       Time Spent on discharge is  40 mins in patient examination, evaluation, counseling as well as medication reconciliation, prescriptions for required medications, discharge plan and follow up.     Electronically signed by   Roland Damico MD  9/3/2021  5:04 PM      Thank you Dr. Archana Ba Bob Stewart MD for the opportunity to be involved in this patient's care.

## 2021-09-03 NOTE — CARE COORDINATION
TPN will be delivered to patients home tonight by 9pm from [address] with [facility] informed of delivery time. Office will call pt to set up time for start of care.

## 2021-09-03 NOTE — CARE COORDINATION
Dr. Cecily Lopez ordered phosphorus supplement and will recheck level. Stephane needs corporate approval to D/C today with services due to safety concerns regarding abnormal lab. Salo Hernandez will follow up with writer later in the day with updates.

## 2021-09-03 NOTE — CARE COORDINATION
Labs redrawn at 2pm and Vera Franklin from [place] informed results are available. She is checking on delivery time for TPN and will inform writer. Will notify [facility] with time for start of care tonight.

## 2021-09-04 ENCOUNTER — CARE COORDINATION (OUTPATIENT)
Dept: CASE MANAGEMENT | Age: 61
End: 2021-09-04

## 2021-09-04 DIAGNOSIS — E86.0 DEHYDRATION: Primary | ICD-10-CM

## 2021-09-04 NOTE — CARE COORDINATION
[facility] Transitions Initial Follow Up Call    Call within 2 business days of discharge: Yes    Patient: Becky Ling Patient : 1960   MRN: [id number]    Reason for Admission: dehydration, nausea vomiting - hx carcinoid tumor - complete gastrectomy  Discharge Date: 9/3/21   RARS: Readmission Risk Score: 33      Last Discharge [facility]       Complaint Diagnosis Description Type Department Provider    21 Dehydration; Other; Nausea & Vomiting; Cough; Fatigue General weakness . .. ED to Hosp-Admission (Discharged) (ADMITTED) Nellie Valdovinos MD; Becca . .. Spoke with: patient who reports doing OK - denies any further nausea or vomiting, but is still very fatigued. She lives with her twin sister,    [facility] nurse has already made a skilled visit to patient's home this AM & is returning later this evening. PICC line in place - RUE & is receiving continuous TPN. Medical plan is to go to [facility] for a J-Tube. Discussed follow up appts - plans to move up PCP hospital f/u to a date sooner than already scheduled appt which is on  - informed patient that she could do a VV as well. She plans to contact office after long holiday week-end or care transition support can schedule - routed.     Informed of care transition & contact #      Facility: [facility]    Non-face-to-face services provided:  Scheduled appointment with PCP-moving up sooner or adding VV within week after long holiday weekend  Scheduled appointment with Specialist-GI - Dr Amanda Cintron and reviewed discharge summary and/or continuity of care documents    Care Transitions 24 Hour Call    Do you have any ongoing symptoms?: Yes  Patient-reported symptoms: Fatigue  Interventions for patient-reported symptoms: Other (Comment: TPN continuous, new meds, [facility])  Do you have a copy of your discharge instructions?: Yes  Do you have all of your prescriptions and are they filled?: Yes (Comment: picking up new meds this am)  Have you been contacted by a [address]?: No  Have you scheduled your follow up appointment?: Yes (Comment: needs to move up sooner this month)  How are you going to get to your appointment?: Car - family or friend to transport  Were you discharged with any Home Care or Post Acute Services: Yes  Post Acute Services: Home Health (Comment: [address] for TPN)  Do you feel like you have everything you need to keep you well at home?: Yes (Comment: lives with twin sister)  Care Transitions Interventions           Was this an external facility discharge? No    Challenges to be reviewed by the provider   Additional needs identified to be addressed with provider: No  none             Method of communication with provider : none      Advance Care Planning:   Does patient have an Advance Directive: reviewed and current. Was this a readmission? No  Patient stated reason for admission: dehydration, nausea & vomiting  Patients top risk factors for readmission: medical condition-N/V/dehydration and multiple health system providers    Care Transition Nurse (CTN) contacted the patient by telephone to perform post hospital discharge assessment. Verified name and  with patient as identifiers. Provided introduction to self, and explanation of the CTN role. CTN reviewed discharge instructions, medical action plan and red flags with patient who verbalized understanding. Patient given an opportunity to ask questions and does not have any further questions or concerns at this time. Were discharge instructions available to patient? Yes. Reviewed appropriate site of care based on symptoms and resources available to patient including: PCP, Specialist, Home health and CTN. The patient agrees to contact the PCP office for questions related to their healthcare.      Medication reconciliation was performed with patient, who verbalizes understanding of administration of

## 2021-09-07 ENCOUNTER — CARE COORDINATION (OUTPATIENT)
Dept: CASE MANAGEMENT | Age: 61
End: 2021-09-07

## 2021-09-07 NOTE — CARE COORDINATION
Request from [address], RN.,  Please schedule patient for hospital f/u visit. Patient scheduled for tele visit Friday 9/10/21 @ noon    Left patient VM with time and date.     Nayeil Hightower, [address] [facility] Coordination Transition

## 2021-09-08 ENCOUNTER — HOSPITAL ENCOUNTER (EMERGENCY)
Age: 61
Discharge: HOME | End: 2021-09-08
Attending: EMERGENCY MEDICINE
Payer: COMMERCIAL

## 2021-09-08 VITALS
OXYGEN SATURATION: 98 % | HEIGHT: 65 IN | SYSTOLIC BLOOD PRESSURE: 115 MMHG | BODY MASS INDEX: 24.99 KG/M2 | RESPIRATION RATE: 19 BRPM | WEIGHT: 150 LBS | DIASTOLIC BLOOD PRESSURE: 80 MMHG | TEMPERATURE: 97.7 F | HEART RATE: 102 BPM

## 2021-09-08 DIAGNOSIS — Z13.9 ENCOUNTER FOR MEDICAL SCREENING EXAMINATION: Primary | ICD-10-CM

## 2021-09-08 LAB
ABSOLUTE EOS #: 0.23 K/UL (ref 0–0.44)
ABSOLUTE IMMATURE GRANULOCYTE: 0.02 K/UL (ref 0–0.3)
ABSOLUTE LYMPH #: 2.53 K/UL (ref 1.1–3.7)
ABSOLUTE MONO #: 0.61 K/UL (ref 0.1–1.2)
ALBUMIN SERPL-MCNC: 2.8 G/DL (ref 3.5–5.2)
ALBUMIN/GLOBULIN RATIO: ABNORMAL (ref 1–2.5)
ALLEN TEST: ABNORMAL
ALP BLD-CCNC: 86 U/L (ref 35–104)
ALT SERPL-CCNC: 28 U/L (ref 5–33)
ANION GAP SERPL CALCULATED.3IONS-SCNC: 11 MMOL/L (ref 9–17)
AST SERPL-CCNC: 46 U/L
BASOPHILS # BLD: 1 % (ref 0–2)
BASOPHILS ABSOLUTE: 0.05 K/UL (ref 0–0.2)
BETA-HYDROXYBUTYRATE: 0.19 MMOL/L (ref 0.02–0.27)
BILIRUB SERPL-MCNC: 0.25 MG/DL (ref 0.3–1.2)
BUN BLDV-MCNC: 23 MG/DL (ref 6–20)
BUN/CREAT BLD: 38 (ref 9–20)
CALCIUM SERPL-MCNC: 8.7 MG/DL (ref 8.6–10.4)
CHLORIDE BLD-SCNC: 109 MMOL/L (ref 98–107)
CO2: 17 MMOL/L (ref 20–31)
CREAT SERPL-MCNC: 0.61 MG/DL (ref 0.5–0.9)
DIFFERENTIAL TYPE: ABNORMAL
EOSINOPHILS RELATIVE PERCENT: 3 % (ref 1–4)
FIO2: ABNORMAL
GFR AFRICAN AMERICAN: >60 ML/MIN
GFR NON-AFRICAN AMERICAN: >60 ML/MIN
GFR SERPL CREATININE-BSD FRML MDRD: ABNORMAL ML/MIN/{1.73_M2}
GFR SERPL CREATININE-BSD FRML MDRD: ABNORMAL ML/MIN/{1.73_M2}
GLUCOSE BLD-MCNC: 82 MG/DL (ref 70–99)
GLUCOSE BLD-MCNC: 83 MG/DL (ref 65–105)
HCO3 VENOUS: 14.1 MMOL/L (ref 22–29)
HCT VFR BLD CALC: 35 % (ref 36.3–47.1)
HEMOGLOBIN: 10.7 G/DL (ref 11.9–15.1)
IMMATURE GRANULOCYTES: 0 %
LYMPHOCYTES # BLD: 36 % (ref 24–43)
MCH RBC QN AUTO: 30.2 PG (ref 25.2–33.5)
MCHC RBC AUTO-ENTMCNC: 30.6 G/DL (ref 28.4–34.8)
MCV RBC AUTO: 98.9 FL (ref 82.6–102.9)
MODE: ABNORMAL
MONOCYTES # BLD: 9 % (ref 3–12)
NEGATIVE BASE EXCESS, VEN: 12 (ref 0–2)
NRBC AUTOMATED: 0 PER 100 WBC
O2 DEVICE/FLOW/%: ABNORMAL
O2 SAT, VEN: 90 % (ref 60–85)
PATIENT TEMP: ABNORMAL
PCO2, VEN: 31.4 MM HG (ref 41–51)
PDW BLD-RTO: 15 % (ref 11.8–14.4)
PH VENOUS: 7.26 (ref 7.32–7.43)
PLATELET # BLD: 271 K/UL (ref 138–453)
PLATELET ESTIMATE: ABNORMAL
PMV BLD AUTO: 9.6 FL (ref 8.1–13.5)
PO2, VEN: 65.4 MM HG (ref 30–50)
POC PCO2 TEMP: ABNORMAL MM HG
POC PH TEMP: ABNORMAL
POC PO2 TEMP: ABNORMAL MM HG
POSITIVE BASE EXCESS, VEN: ABNORMAL (ref 0–3)
POTASSIUM SERPL-SCNC: 4.2 MMOL/L (ref 3.7–5.3)
RBC # BLD: 3.54 M/UL (ref 3.95–5.11)
RBC # BLD: ABNORMAL 10*6/UL
SAMPLE SITE: ABNORMAL
SEG NEUTROPHILS: 51 % (ref 36–65)
SEGMENTED NEUTROPHILS ABSOLUTE COUNT: 3.57 K/UL (ref 1.5–8.1)
SODIUM BLD-SCNC: 137 MMOL/L (ref 135–144)
TOTAL CO2, VENOUS: ABNORMAL MMOL/L (ref 23–30)
TOTAL PROTEIN: 6.3 G/DL (ref 6.4–8.3)
WBC # BLD: 7 K/UL (ref 3.5–11.3)
WBC # BLD: ABNORMAL 10*3/UL

## 2021-09-08 PROCEDURE — 93005 ELECTROCARDIOGRAM TRACING: CPT | Performed by: EMERGENCY MEDICINE

## 2021-09-08 PROCEDURE — 85025 COMPLETE CBC W/AUTO DIFF WBC: CPT

## 2021-09-08 PROCEDURE — 82947 ASSAY GLUCOSE BLOOD QUANT: CPT

## 2021-09-08 PROCEDURE — 82803 BLOOD GASES ANY COMBINATION: CPT

## 2021-09-08 PROCEDURE — 82010 KETONE BODYS QUAN: CPT

## 2021-09-08 PROCEDURE — 99283 EMERGENCY DEPT VISIT LOW MDM: CPT

## 2021-09-08 PROCEDURE — 80053 COMPREHEN METABOLIC PANEL: CPT

## 2021-09-08 NOTE — ADT AUTH CERT
Dehydration - Care Day 5 (8/31/2021) by Cayla Pickens RN       Review Status Review Entered   Completed 9/2/2021 13:08      Criteria Review      Care Day: 5 Care Date: 8/31/2021 Level of Care: Inpatient Floor    Guideline Day 2    Clinical Status    (X) * Hemodynamic stability    (X) * Mental status at baseline    (X) * Vomiting absent or controlled    (X) * Diarrhea absent or controlled    (X) * Electrolyte abnormalities absent or acceptable for next level of care    ( ) * Cause of dehydration requiring inpatient treatment absent    (X) * Renal function at baseline or acceptable for next level of care    ( ) * Discharge plans and education understood    Activity    (X) * Ambulatory or acceptable for next level of care    Routes    ( ) * Oral hydration    ( ) * Oral medications or regimen acceptable for next level of care    ( ) * Oral diet or acceptable for next level of care    * Milestone   Additional Notes   8/31         The pt was seen and examined. She is s/p egd that is discussed below. Still c/o nausea and vomiting after eating and drinking. No abdominal pain. Has been evaluated by surgery who ordered esophagram with SBFT. .               OBJECTIVE   Scheduled Meds:   Scheduled Medications   · pantoprazole 40 mg IntraVENous BID   · metoclopramide 10 mg IntraVENous TID   · scopolamine 1 patch Transdermal Q72H   · levothyroxine 88 mcg Oral Once per day on Sun Wed Thu Fri Sat   · soledad-nelly 1 tablet Oral Daily   · magnesium oxide 400 mg Oral BID   · [Held by provider] metoclopramide 10 mg Oral TID AC   · megestrol 400 mg Oral Daily   · [Held by provider] pantoprazole 40 mg Oral BID AC   · potassium chloride 20 mEq Oral BID   · vitamin B-12 500 mcg Oral Daily   · Vitamin D 1,000 Units Oral Daily   · sucralfate 1 g Oral BID   · sodium chloride flush 5-40 mL IntraVENous 2 times per day   · enoxaparin 40 mg SubCUTAneous Daily              Vital Signs:   BP (!) 137/96   Pulse 90   Temp 98.1 °F (36.7 °C) (Oral)   Resp 16              Gastro:   ASSESSMENT/plan   1. Dehydration with nausea and vomiting; hx gastrectomy for neuroendocrine tumors s/p egd yesterday showing that the esophagus is anastomosed with small bowel   -SBFT with esophagram today   -recommend starting TPN   -will likely need jejunostomy tube for feeding-will discuss having pt see surgeons at [address] [place] [address] with md due to her previous surgery   -antiemetics prn             Medicine:   ASSESSMENT/plan   1. Dehydration with nausea and vomiting; hx gastrectomy for neuroendocrine tumors s/p egd yesterday showing that the esophagus is anastomosed with small bowel   -SBFT with esophagram today   -recommend starting TPN   -will likely need jejunostomy tube for feeding-will discuss having pt see surgeons at [address] [place] [address] with md due to her previous surgery   -antiemetics prn                Case management:   PICC line placed and TPN will start 9-1.    Waiting for insurance verification from [place] and [address] [place] [address].      Dehydration - Care Day 4 (8/30/2021) by Melia Loyd RN       Review Status Review Entered   Completed 9/2/2021 13:05      Criteria Review      Care Day: 4 Care Date: 8/30/2021 Level of Care: Inpatient Floor    Guideline Day 2    Clinical Status    (X) * Hemodynamic stability    9/2/2021 1:05 PM EDT by Miguel Angel Cottrell      08/30/21  98.4 (36.9)  16  75  136/82  --    (X) * Mental status at baseline    (X) * Vomiting absent or controlled    (X) * Diarrhea absent or controlled    (X) * Electrolyte abnormalities absent or acceptable for next level of care    ( ) * Cause of dehydration requiring inpatient treatment absent    9/2/2021 1:05 PM EDT by Miguel Angel Cottrell      needing j tube placement    (X) * Renal function at baseline or acceptable for next level of care    ( ) * Discharge plans and education understood    Activity    (X) * Ambulatory or acceptable for next level of care    Routes    ( ) * Oral hydration    ( ) * Oral medications or regimen acceptable for next level of care    ( ) * Oral diet or acceptable for next level of care    * Milestone   Additional Notes   8/30         Patient is still having low appetite and nausea . She underwent EGD today, no scarring , she is breathing on room air. Patient is comfortable. Vitals - Stable afebrile      Patient underwent EGD and did not show negative for any stricture. Gen surgery was consulted for Jejunostomy placement.           Medications :   pantoprazole, 40 mg, IntraVENous, BID   metoclopramide, 10 mg, IntraVENous, TID   scopolamine, 1 patch, Transdermal, Q72H   levothyroxine, 88 mcg, Oral, Once per day on Sun Wed Thu Fri Sat   soledad-nelly, 1 tablet, Oral, Daily   magnesium oxide, 400 mg, Oral, BID   [Held by provider] metoclopramide, 10 mg, Oral, TID AC   megestrol, 400 mg, Oral, Daily   [Held by provider] pantoprazole, 40 mg, Oral, BID AC   potassium chloride, 20 mEq, Oral, BID   vitamin B-12, 500 mcg, Oral, Daily   Vitamin D, 1,000 Units, Oral, Daily   sucralfate, 1 g, Oral, BID   sodium chloride flush, 5-40 mL, IntraVENous, 2 times per day   [Held by provider] enoxaparin, 40 mg, Subcutaneous, Daily          Physical exam mostly unchanged:   Dorsalis pedis pulses are 2+ on the right side and 2+ on the left side. Procedure: EGD ESOPHAGOGASTRODUODENOSCOPY Case Time: 8/30/2021 12:20 PM Surgeon: Rashad Wilde MD           Signed                 Operative Note   PROCEDURE NOTE       DATE OF PROCEDURE: 8/30/2021        SURGEON: Rashad Wilde MD   Facility: [facility]   ASSISTANT: None   Anesthesia: MAC   PREOPERATIVE DIAGNOSIS:    Nausea vomiting and dehydration status post gastrectomy for neuroendocrine tumors       Diagnosis:   Status post total gastrectomy   No stomach is left   The esophagus is anastomosed with small bowel       There is 2 lm of small bowel 1 is short and blind measuring around 2 to 3 cm   The other 1 is patent .  With no abnormality seen for 20 cm        No abnormality could be seen in the esophagus or the small               POSTOPERATIVE DIAGNOSIS: As described below       OPERATION: Upper GI endoscopy with Biopsy       ANESTHESIA: Moderate Sedation        ESTIMATED BLOOD LOSS: Less than 50 ml       COMPLICATIONS: None.        SPECIMENS:  Was Not Obtained       HISTORY: The patient is a 62y.o. year old female with history of above preop diagnosis.  I recommended esophagogastroduodenoscopy with possible biopsy and I explained the risk, benefits, expected outcome, and alternatives to the procedure.  Risks included but are not limited to bleeding, infection, respiratory distress, hypotension, and perforation of the esophagus, stomach, or duodenum.  Patient understands and is in agreement.           The patient was counseled at length about the risks of godwin Covid-19 during their perioperative period and any recovery window from their procedure.  The patient was made aware that godwin Covid-19  may worsen their prognosis for recovering from their procedure  and lend to a higher morbidity and/or mortality risk.  All material risks, benefits, and reasonable alternatives including postponing the procedure were discussed. The patient does wish to proceed with the procedure at this time.               PROCEDURE: The patient was given IV conscious sedation.  The patient's SPO2 remained above 90% throughout the procedure. The gastroscope was inserted orally and advanced under direct vision through the esophagus, through the stomach, through the pylorus, and into the descending duodenum.         Post sedation note : The patient's SPO2 remained above 90% throughout the procedure. the vital signs remained stable , and no immediate complication form the procedure noted, patient will be ready for d/c when criteria is met .           Findings:       Retropharyngeal area was grossly normal appearing       Status post total gastrectomy   No stomach is left   The esophagus is anastomosed with small bowel       There is 2 lm of small bowel 1 is short and blind measuring around 2 to 3 cm   The other 1 is patent        No abnormality could be seen in the esophagus or the small   The scope was removed and the patient tolerated the procedure well.        Recommendations/Plan:    1. Surgical consult for jejunostomy-tube placement   2. Small frequent meals with upright position and liquid          General Surgery:   61 yo F s/p lap gastrectomy with RnY reconstruction for Hx of gastric carcinoid tumor, issues with persistent nausea, emesis and intolerance of food         Plan:   1. Continue medical mgmt and supportive care per primary   2. Recommend for esophogram/small bowel follow through   3. Check pre-albumin levels, obtain calorie count and nutritional deficiencies assessment   4. Ultimately pt would be best served and worked up by operating team in [facility] given complexity of previous surgery and long term follow up needed by the [facility] clinic team. No immediate surgery planned at this time from gen surg stance.  Consider transfer to .

## 2021-09-09 ENCOUNTER — CARE COORDINATION (OUTPATIENT)
Dept: CASE MANAGEMENT | Age: 61
End: 2021-09-09

## 2021-09-09 LAB
EKG ATRIAL RATE: 96 BPM
EKG P AXIS: 53 DEGREES
EKG P-R INTERVAL: 132 MS
EKG Q-T INTERVAL: 368 MS
EKG QRS DURATION: 68 MS
EKG QTC CALCULATION (BAZETT): 464 MS
EKG R AXIS: 23 DEGREES
EKG T AXIS: 26 DEGREES
EKG VENTRICULAR RATE: 96 BPM

## 2021-09-09 PROCEDURE — 93010 ELECTROCARDIOGRAM REPORT: CPT | Performed by: INTERNAL MEDICINE

## 2021-09-09 NOTE — CARE COORDINATION
Trace 45 Transitions Follow Up Call    2021    Patient: Chad Ryan    Patient : 1960   MRN: [id number]    Reason for Admission:  dehydration, nausea vomiting - hx carcinoid tumor - complete gastrectomy  Discharge Date: 21   RARS: Readmission Risk Score: 35       Spoke with: patient & [place]    Reviewed ED report & called patient - she said she was called by PCP office & instructed to go to ED b/o BS elevation of 600. She felt fine & still feels fine - her K level & BS when checked in ED were within normal limits. K - 4.2, BS - 82. Patient wondering if initial labs that were drawn at [facility] lab were in error. She has no way to check her BS at this time - she has scheduled VV for hospital f/u that had already been scheduled, so plans to follow up with any further plan. I contacted [facility] home care & requested a visit prior to the visit scheduled for  since she was in ED. Continuous TPN per PICC which is new since hospital DC on 9/3. Care Transitions Subsequent and Final Call    Schedule Follow Up Appointment with PCP: Completed  Subsequent and Final Calls  Do you have any ongoing symptoms?: No  Have your medications changed?: No  Do you have any questions related to your medications?: No  Do you currently have any active services?: Yes  Are you currently active with any services?: Home Health  Do you have any needs or concerns that I can assist you with?: No  Care Transitions Interventions  Other Interventions:             Needs to be reviewed by the provider   Additional needs identified to be addressed with provider: Yes  ed follow up & lab f/u - has VV scheduled for tomorrow 9/10 to address             Method of communication with provider : appt scheduled for 9/10 - hospital, ED, lab review      Care Transition Nurse (CTN) contacted the patient by telephone to follow up after admission on 9/3, then ED . Verified name and  with patient as identifiers.     Addressed changes since last contact: home health care-notified home care of ED visit 9/8  Discussed follow-up appointments. If no appointment was previously scheduled, appointment scheduling offered: Yes. Is follow up appointment scheduled within 7 days of discharge? Yes. Advance Care Planning:   Does patient have an Advance Directive: reviewed and current. CTN reviewed discharge instructions, medical action plan and red flags with patient and discussed any barriers to care and/or understanding of plan of care after discharge. Discussed appropriate site of care based on symptoms and resources available to patient including: PCP, Specialist, Home health and CTN. The patient agrees to contact the PCP office for questions related to their healthcare. Patients top risk factors for readmission: medical condition-gastrectomy - needs J-Tube at [facility] - continuous TPN now per PICC, medication management and multiple health system providers  Interventions to address risk factors: Scheduled appointment with PCP-9/10, Scheduled appointment with Specialist-GI/[facility], Obtained and reviewed discharge summary and/or continuity of care documents, Reviewed and followed up on pending diagnostic tests and treatments-labs and Communication with home health agencies or other community services the patient is currently using-[facility].      CTN provided contact information for future needs. Plan for follow-up call in 7-10 days based on severity of symptoms and risk factors.   Plan for next call: symptom management-reassess, follow up appointment-VV 9/10 and medication management-review after VV        Follow Up  Future Appointments   Date Time Provider Lito Vazquez   9/10/2021 12:30 PM MD Jessy Carmen [facility]   9/21/2021  9:15 AM MD Jessy Carmen [facility]   10/7/2021 10:00 AM Tommy Wahl MD [facility] Andre Landeros RN

## 2021-09-09 NOTE — ED PROVIDER NOTES
EMERGENCY DEPARTMENT ENCOUNTER    Pt Name: Gloria Levy  MRN: [id number]  [place] 1960  Date of evaluation: 9/8/21  CHIEF COMPLAINT       Chief Complaint   Patient presents with    Abnormal Lab    Hyperglycemia     HISTORY OF PRESENT ILLNESS   Patient is a 55-year-old female with PMH of recent gastrectomy s/p carcinoid tumors, receiving supplemental nutrition through PICC line, who was sent to the emergency room for evaluation of critical high glucose and high potassium. Today, visiting nurse kimani routine blood samples. She was notified later in the evening that glucose and potassium were elevated and she should report to the ED for further evaluation. Patient otherwise is asymptomatic. No other issues at this time. No fevers, cough, shortness of breath, chest pain, abdominal pain, nausea, vomiting, changes in urine or stool. REVIEW OF SYSTEMS     Review of Systems   All other systems reviewed and are negative. PASTMEDICAL HISTORY     Past Medical History:   Diagnosis Date    Anxiety     Arthritis     knee    Asthma     Colon polyp 02/21/2019    tubular adenoma; hyperplastic polyp    Colon polyps     Cyst of kidney, acquired     bilat.     Fatigue     Hypertension     Hypothyroidism     Neuroendocrine tumor 02/21/2019    of stomach    Obesity     Pharyngoesophageal dysphagia     Vocal cord polyps     Wears glasses      SURGICAL HISTORY       Past Surgical History:   Procedure Laterality Date    CHOLECYSTECTOMY  10/09/2014    COLONOSCOPY  02/21/2019    tubular adenoma; hyperplastic polyp    COLONOSCOPY      over 5 yrs ago per pt with polyps (2-)    CYSTOURETHROSCOPY/STENT REMOVAL  05/03/2011    ENDOSCOPIC ULTRASOUND (LOWER) N/A 01/22/2020    ENDOSCOPIC ULTRASOUND WITH POSSIBLE EMR performed by Heloise Peabody, MD at [facility]    ERCP      GASTRECTOMY N/A 11/30/2020    HIP SURGERY Left     soft tissue tumor removal    THROAT SURGERY      vocal cord polyps removed    UPPER GASTROINTESTINAL ENDOSCOPY  02/21/2019    Neuroendocrine tumor    UPPER GASTROINTESTINAL ENDOSCOPY  09/23/2019    UPPER GASTROINTESTINAL ENDOSCOPY N/A 09/23/2019    EGD BIOPSY performed by Amanda Hernandez MD at [facility] 10/23/2019    EGD W/ EMR performed by Gregorio North MD at [address] [place] [address] N/A 10/29/2019    EGD CONTROL HEMORRHAGE performed by James Cheung MD at [address] [place] [address] N/A 04/26/2021    EGD BIOPSY performed by Ajit Parekh MD at [address] [place] [address] N/A 8/30/2021    EGD ESOPHAGOGASTRODUODENOSCOPY performed by Keyana Méndez MD at [address] [place] [address]       Discharge Medication List as of 9/8/2021  9:13 PM      CONTINUE these medications which have NOT CHANGED    Details   scopolamine (TRANSDERM-SCOP) transdermal patch Place 1 patch onto the skin every 72 hours, Disp-20 patch, R-1Normal      magnesium oxide (MAG-OX) 400 (241.3 Mg) MG TABS tablet Take 1 tablet by mouth 2 times daily, Disp-60 tablet, R-1Normal      potassium chloride (KLOR-CON M) 20 MEQ extended release tablet Take 1 tablet by mouth 2 times daily, Disp-60 tablet, R-3Normal      Cholecalciferol (VITAMIN D) 25 MCG TABS Take 1 tablet by mouth daily, Disp-60 tablet, R-2Labeling may look different. 25 mcg=1000 Units. Please double check dosages. Normal      magnesium oxide (MAG-OX) 400 MG tablet Take 400 mg by mouth 2 times dailyHistorical Med      levothyroxine (SYNTHROID) 88 MCG tablet Take 88 mcg by mouth Five times weekly Indications: Takes only Wed-Sun (skips Mon/Tues)Historical Med      megestrol (MEGACE) 40 MG/ML suspension Take 10 mLs by mouth daily, Disp-300 mL, R-0Normal      apixaban (ELIQUIS) 5 MG TABS tablet Take 1 tablet by mouth 2 times daily, Disp-28 tablet, [id number] 3/23Sample      metoclopramide (REGLAN) 10 MG tablet TAKE 1 TABLET BY MOUTH 3 TIMES DAILY (BEFORE MEALS), Disp-90 tablet, R-5Normal      pantoprazole (PROTONIX) 40 MG tablet Take 1 tablet by mouth 2 times daily, Disp-90 tablet, R-0Print      sucralfate (CARAFATE) 1 GM tablet Take 1 tablet by mouth 4 times daily, Disp-120 tablet, [id number]      Folic Acid-Vit S9-UNT R69 2.2-25-0.5 MG TABS Take 1 tablet by mouth daily, Disp-90 tablet, R-1Normal      desonide (DESOWEN) 0.05 % cream Apply topically 2 times daily. , Disp-90 g,R-0, Normal      Multiple Vitamin (MULTI-DAY VITAMINS PO) Take 1 tablet by mouth daily Historical Med      vitamin B-12 (CYANOCOBALAMIN) 500 MCG tablet Take 500 mcg by mouth dailyHistorical Med           ALLERGIES     is allergic to erythromycin. FAMILY HISTORY     She indicated that the status of her mother is unknown. She indicated that the status of her father is unknown. She indicated that the status of her sister is unknown. SOCIAL HISTORY       Social History     Tobacco Use    Smoking status: Former Smoker     Packs/day: 1.00     Years: 25.00     Pack years: 25.00     Quit date: 2012     Years since quittin.2    Smokeless tobacco: Never Used    Tobacco comment: quit 2 years   Vaping Use    Vaping Use: Never used   Substance Use Topics    Alcohol use: Not Currently     Comment: 3 times a month    Drug use: No     PHYSICAL EXAM     INITIAL VITALS: /80   Pulse 102   Temp 97.7 °F (36.5 °C) (Oral)   Resp 19   Ht 5' 5\" (1.651 m)   Wt 150 lb (68 kg)   LMP 1994   SpO2 98%   BMI 24.96 kg/m²    Physical Exam  HENT:      Head: Normocephalic. Right Ear: External ear normal.      Left Ear: External ear normal.      Nose: Nose normal.   Eyes:      Conjunctiva/sclera: Conjunctivae normal.   Cardiovascular:      Rate and Rhythm: Normal rate. Pulmonary:      Effort: Pulmonary effort is normal.   Abdominal:      General: Abdomen is flat. Skin:     General: Skin is dry. Comments: PICC line right arm, clean dry intact.    Neurological:      Mental Status: She is alert. Mental status is at baseline. Psychiatric:         Mood and Affect: Mood normal.         Behavior: Behavior normal.         MEDICAL DECISION MAKING:   The patient is hemodynamically stable, afebrile, nontoxic-appearing. Physical exam unremarkable. Based on history and exam unclear etiology of abnormal labs, will recheck. DIAGNOSTIC RESULTS   EKG:All EKG's are interpreted by the Emergency Department Physician who either signs or Co-signs this chart in the absence of a cardiologist.        RADIOLOGY:All plain film, CT, MRI, and formal ultrasound images (except ED bedside ultrasound) are read by the radiologist, see reports below, unless otherwisenoted in MDM or here. No orders to display     LABS: All lab results were reviewed by myself, and all abnormals are listed below. Labs Reviewed   CBC WITH AUTO DIFFERENTIAL - Abnormal; Notable for the following components:       Result Value    RBC 3.54 (*)     Hemoglobin 10.7 (*)     Hematocrit 35.0 (*)     RDW 15.0 (*)     All other components within normal limits   COMPREHENSIVE METABOLIC PANEL W/ REFLEX TO MG FOR LOW K - Abnormal; Notable for the following components:    BUN 23 (*)     Bun/Cre Ratio 38 (*)     Chloride 109 (*)     CO2 17 (*)     AST 46 (*)     Total Bilirubin 0.25 (*)     Total Protein 6.3 (*)     Albumin 2.8 (*)     All other components within normal limits   VENOUS BLOOD GAS, POINT OF CARE - Abnormal; Notable for the following components:    pH, [place] 7.260 (*)     pCO2, [place] 31.4 (*)     pO2, [place] 65.4 (*)     HCO3, Venous 14.1 (*)     Negative Base Excess, [place] 12 (*)     O2 Sat, [place] 90 (*)     All other components within normal limits   BETA-HYDROXYBUTYRATE   POC GLUCOSE FINGERSTICK       EMERGENCY DEPARTMENTCOURSE:   Patient did well in the ED. Labs:  Glucose 82  Potassium 4.2  Creatinine 0.61  WBC 7.0  Beta hydroxybutyrate 0.19  VBG: pH 7.26, PCO2 31.4, bicarb 14.4  Overall labs are in good shape. Slightly acidotic on VBG.  No concern for infectious process, DKA. Patient is not diabetic. No further work-up indicated at this time. Nursing notes reviewed. At this time this is what I find, the patient appears well and does not appear sick or toxic. I gave my usual and customary discussion of the risks and benefits of discharge versus admission. I answered the patient's questions. I gave the patient strict return precautions. Patient expressed understanding of the discharge instructions. Dictated but not reviewed. Vitals:    Vitals:    09/08/21 1926   BP: 115/80   Pulse: 102   Resp: 19   Temp: 97.7 °F (36.5 °C)   TempSrc: Oral   SpO2: 98%   Weight: 150 lb (68 kg)   Height: 5' 5\" (1.651 m)       The patient was given the following medications while in the emergency department:  No orders of the defined types were placed in this encounter.     CONSULTS:  None    FINAL IMPRESSION    Patient did well in the ED            1. Encounter for medical screening examination          DISPOSITION/PLAN   DISPOSITION Decision To Discharge 09/08/2021 09:13:17 PM      PATIENT REFERRED TO:  Bette Lema MD  [facility] [address]  [address] [id number]    In 2 days      DISCHARGE MEDICATIONS:  Discharge Medication List as of 9/8/2021  9:13 PM        Nica Jara MD  Attending Emergency Physician                    Yamilet Deluna MD  09/09/21 9217

## 2021-09-09 NOTE — ED NOTES
Patient sent in by pcp for bs of over 600 and potassium of 6.5. Patient is alert and oriented and denies any other complaints at this time.      Itz Dates, RN  09/08/21 [id number]

## 2021-09-10 ENCOUNTER — TELEMEDICINE (OUTPATIENT)
Dept: INTERNAL MEDICINE CLINIC | Age: 61
End: 2021-09-10
Payer: COMMERCIAL

## 2021-09-10 DIAGNOSIS — E86.0 DEHYDRATION: Primary | ICD-10-CM

## 2021-09-10 DIAGNOSIS — R53.1 GENERALIZED WEAKNESS: ICD-10-CM

## 2021-09-10 DIAGNOSIS — I10 ESSENTIAL HYPERTENSION: ICD-10-CM

## 2021-09-10 PROCEDURE — 1111F DSCHRG MED/CURRENT MED MERGE: CPT | Performed by: INTERNAL MEDICINE

## 2021-09-10 PROCEDURE — 99215 OFFICE O/P EST HI 40 MIN: CPT | Performed by: INTERNAL MEDICINE

## 2021-09-10 ASSESSMENT — PATIENT HEALTH QUESTIONNAIRE - PHQ9
SUM OF ALL RESPONSES TO PHQ9 QUESTIONS 1 & 2: 0
SUM OF ALL RESPONSES TO PHQ QUESTIONS 1-9: 0
1. LITTLE INTEREST OR PLEASURE IN DOING THINGS: 0
2. FEELING DOWN, DEPRESSED OR HOPELESS: 0
SUM OF ALL RESPONSES TO PHQ QUESTIONS 1-9: 0
SUM OF ALL RESPONSES TO PHQ QUESTIONS 1-9: 0

## 2021-09-13 ENCOUNTER — TELEPHONE (OUTPATIENT)
Dept: ONCOLOGY | Age: 61
End: 2021-09-13

## 2021-09-14 ENCOUNTER — CARE COORDINATION (OUTPATIENT)
Dept: CASE MANAGEMENT | Age: 61
End: 2021-09-14

## 2021-09-14 NOTE — CARE COORDINATION
Trace 45 Transitions Follow Up Call    2021    Patient: Horacio White  Patient : 1960   MRN: [id number]  Reason for Admission:   Discharge Date: 21 RARS: Readmission Risk Score: 33         # 1 attempt-unable to reach patient, left vm message with name and call back information, requested call back//JU  Care Transitions Follow Up Call    Needs to be reviewed by the provider           CTN provided contact information for future needs. Plan for follow-up call in 3-5 days based on severity of symptoms and risk factors. Plan for next call: routine          Care Transitions Subsequent and Final Call    Subsequent and Final Calls  Are you currently active with any services?: Home Health  Care Transitions Interventions  Other Interventions:            Follow Up  Future Appointments   Date Time Provider Lito Vazquez   2021  9:15 [id number]   10/7/2021 10:00 AM Yoly Deluna MD [facility] Baron Minor RN

## 2021-09-14 NOTE — PROGRESS NOTES
MCG TABS  Take 1 tablet by mouth daily             desonide (DESOWEN) 0.05 % cream  Apply topically 2 times daily.              Folic Acid-Vit Y7-CDB P25 2.2-25-0.5 MG TABS  Take 1 tablet by mouth daily             levothyroxine (SYNTHROID) 88 MCG tablet  Take 88 mcg by mouth Five times weekly Indications: Takes only Wed-Sun (skips Mon/Tues)             magnesium oxide (MAG-OX) 400 (241.3 Mg) MG TABS tablet  Take 1 tablet by mouth 2 times daily             magnesium oxide (MAG-OX) 400 MG tablet  Take 400 mg by mouth 2 times daily             megestrol (MEGACE) 40 MG/ML suspension  Take 10 mLs by mouth daily             metoclopramide (REGLAN) 10 MG tablet  TAKE 1 TABLET BY MOUTH 3 TIMES DAILY (BEFORE MEALS)             Multiple Vitamin (MULTI-DAY VITAMINS PO)  Take 1 tablet by mouth daily              pantoprazole (PROTONIX) 40 MG tablet  Take 1 tablet by mouth 2 times daily             potassium chloride (KLOR-CON M) 20 MEQ extended release tablet  Take 1 tablet by mouth 2 times daily             scopolamine (TRANSDERM-SCOP) transdermal patch  Place 1 patch onto the skin every 72 hours             sucralfate (CARAFATE) 1 GM tablet  Take 1 tablet by mouth 4 times daily             vitamin B-12 (CYANOCOBALAMIN) 500 MCG tablet  Take 500 mcg by mouth daily                   Medications marked \"taking\" at this time  Outpatient Medications Marked as Taking for the 9/10/21 encounter (Telemedicine) with Robert Linda MD   Medication Sig Dispense Refill    scopolamine (TRANSDERM-SCOP) transdermal patch Place 1 patch onto the skin every 72 hours 20 patch 1    magnesium oxide (MAG-OX) 400 (241.3 Mg) MG TABS tablet Take 1 tablet by mouth 2 times daily 60 tablet 1    potassium chloride (KLOR-CON M) 20 MEQ extended release tablet Take 1 tablet by mouth 2 times daily 60 tablet 3    Cholecalciferol (VITAMIN D) 25 MCG TABS Take 1 tablet by mouth daily 60 tablet 2    levothyroxine (SYNTHROID) 88 MCG tablet Take 88 mcg by mouth Five times weekly Indications: Takes only Wed-Sun (skips Mon/Tues)      apixaban (ELIQUIS) 5 MG TABS tablet Take 1 tablet by mouth 2 times daily 28 tablet 0    metoclopramide (REGLAN) 10 MG tablet TAKE 1 TABLET BY MOUTH 3 TIMES DAILY (BEFORE MEALS) 90 tablet 5    pantoprazole (PROTONIX) 40 MG tablet Take 1 tablet by mouth 2 times daily 90 tablet 0    sucralfate (CARAFATE) 1 GM tablet Take 1 tablet by mouth 4 times daily 226 tablet 3    Folic Acid-Vit I1-TGK L64 2.2-25-0.5 MG TABS Take 1 tablet by mouth daily 90 tablet 1    desonide (DESOWEN) 0.05 % cream Apply topically 2 times daily. (Patient taking differently: Apply topically 2 times daily as needed ) 90 g 0    Multiple Vitamin (MULTI-DAY VITAMINS PO) Take 1 tablet by mouth daily       vitamin B-12 (CYANOCOBALAMIN) 500 MCG tablet Take 500 mcg by mouth daily          Medications patient taking as of now reconciled against medications ordered at time of hospital discharge: Yes    Chief Complaint   Patient presents with    Follow-Up from [address] [place] [address]     patient states she is feeling better    [facility] [facility]     hep c, pneumo,       History of Present illness - Follow up of Hospital diagnosis(es): Here for follow-up from the recent hospital visit for weakness dehydration and patient stated that she is improved and started on TPN and planning to go to [facility] clinic for J-tube placement and in the meantime her blood sugar was elevated and potassium elevated on the lab work and patient was sent to the emergency room and found to be a false alarm and patient repeat levels were within normal limits and patient sent home and doing well and patient wants to know about the Megace that was ordered but not covered by the insurance and also about the Eliquis that she is taking anticoagulation    Inpatient course: Discharge summary reviewed- see chart.     Interval history/Current status: Reviewed all the reports from the hospital admission and also recent ER

## 2021-09-17 ENCOUNTER — TELEPHONE (OUTPATIENT)
Dept: INTERNAL MEDICINE CLINIC | Age: 61
End: 2021-09-17

## 2021-09-17 ENCOUNTER — CARE COORDINATION (OUTPATIENT)
Dept: CASE MANAGEMENT | Age: 61
End: 2021-09-17

## 2021-09-17 NOTE — CARE COORDINATION
[facility] Transitions Follow Up Call    2021    Patient: Suyapa Serrato  Patient : 1960   MRN: [id number]    Reason for Admission: dehydration, nausea vomiting - hx carcinoid tumor - complete gastrectomy  Discharge Date: 21   RARS: Readmission Risk Score: 35      Spoke with: patient who reports doing OK - Continuous TPN per PICC - [place].  Has PCP f/u     Needs to be reviewed by the provider    Additional needs identified to be addressed with provider: no            Method of communication with provider : none   Care Transition Nurse (CTN) contacted the patient by telephone  Verified name and  with patient as identifiers.     Addressed changes since last contact:    BS/Labs no longer drawn from PICC with TPN infusion per home care - that is why BS was elevated previously. Discussed follow-up appointments. If no appointment was previously scheduled, appointment scheduling offered: Yes. Is follow up appointment scheduled within 7 days of discharge? Yes.     Advance Care Planning:   Does patient have an Advance Directive: reviewed and current.      CTN reviewed discharge instructions, medical action plan and red flags with patient and discussed any barriers to care and/or understanding of plan of care after discharge. Discussed appropriate site of care based on symptoms and resources available to patient including: PCP, Specialist, Home health and CTN.  The patient agrees to contact the PCP office for questions related to their healthcare.      Patients top risk factors for readmission: medical condition-gastrectomy - needs J-Tube at [facility] [place] - patient getting new referral since her surgeon is no longer at clinic - continuous TPN now per PICC, medication management and multiple health system providers  Interventions to address risk factors: Scheduled appointment with PCP-9/10, Scheduled appointment with Specialist-[facility], Obtained and reviewed discharge summary and/or continuity of care documents, Reviewed and followed up on pending diagnostic tests and treatments-labs and Communication with home health agencies or other community services the patient is currently using-[facility], [facility].        CTN provided contact information for future needs. Plan for follow-up call in 7-10 days based on severity of symptoms and risk factors.   Plan for next call: symptom management-reassess, follow up appointment-9/21 PCP &  supposed to f/u with heme/onc re:  eliquis and medication management-review after appt          Follow Up  Future Appointments   Date Time Provider Lito Vazquez   9/21/2021  9:15 AM Benitez Minor MD [facility]   10/7/2021 10:00 AM [facility]   10/27/2021  3:00 PM Steff Carballo MD [facility]       Hortencia Calderon, RN

## 2021-09-21 ENCOUNTER — HOSPITAL ENCOUNTER (OUTPATIENT)
Dept: VASCULAR LAB | Age: 61
Discharge: HOME | End: 2021-09-21
Payer: COMMERCIAL

## 2021-09-21 ENCOUNTER — OFFICE VISIT (OUTPATIENT)
Dept: INTERNAL MEDICINE CLINIC | Age: 61
End: 2021-09-21
Payer: COMMERCIAL

## 2021-09-21 VITALS
OXYGEN SATURATION: 100 % | DIASTOLIC BLOOD PRESSURE: 84 MMHG | BODY MASS INDEX: 26.82 KG/M2 | WEIGHT: 161 LBS | RESPIRATION RATE: 24 BRPM | TEMPERATURE: 97.4 F | HEIGHT: 65 IN | HEART RATE: 89 BPM | SYSTOLIC BLOOD PRESSURE: 112 MMHG

## 2021-09-21 DIAGNOSIS — M79.661 TENDERNESS OF RIGHT CALF: ICD-10-CM

## 2021-09-21 DIAGNOSIS — M79.89 RIGHT LEG SWELLING: ICD-10-CM

## 2021-09-21 DIAGNOSIS — M79.89 RIGHT LEG SWELLING: Primary | ICD-10-CM

## 2021-09-21 DIAGNOSIS — Z78.9 ON TOTAL PARENTERAL NUTRITION: ICD-10-CM

## 2021-09-21 DIAGNOSIS — I10 ESSENTIAL HYPERTENSION: ICD-10-CM

## 2021-09-21 PROCEDURE — 93971 EXTREMITY STUDY: CPT

## 2021-09-21 PROCEDURE — G0444 DEPRESSION SCREEN ANNUAL: HCPCS | Performed by: INTERNAL MEDICINE

## 2021-09-21 PROCEDURE — 99214 OFFICE O/P EST MOD 30 MIN: CPT | Performed by: INTERNAL MEDICINE

## 2021-09-21 ASSESSMENT — PATIENT HEALTH QUESTIONNAIRE - PHQ9
SUM OF ALL RESPONSES TO PHQ QUESTIONS 1-9: 0
SUM OF ALL RESPONSES TO PHQ9 QUESTIONS 1 & 2: 0
SUM OF ALL RESPONSES TO PHQ QUESTIONS 1-9: 0
1. LITTLE INTEREST OR PLEASURE IN DOING THINGS: 0
2. FEELING DOWN, DEPRESSED OR HOPELESS: 0
SUM OF ALL RESPONSES TO PHQ QUESTIONS 1-9: 0

## 2021-09-21 NOTE — PROGRESS NOTES
Kateri Landau is a 62 y.o. female who presents for   Chief Complaint   Patient presents with    Follow-up     having some swelling in right leg and foot; going on since she got out the hosp.  Health Maintenance     hep c, pneumo, hiv, tdap, cervical, shingles, breast, flu    and follow up of chronic medical problems.   Patient Active Problem List   Diagnosis    Asthma    Anxiety    Obesity    Hyperlipidemia    Hypothyroidism    Colon polyps    Vertigo    Mass of left hip region    General weakness    Pharyngoesophageal dysphagia    Colon polyp    Neuroendocrine tumor    Chest pain    Shortness of breath    Anemia    GI bleed    Essential hypertension    Neuroendocrine neoplasm of stomach    Polyp, stomach    Melena    Gastric ulcer with hemorrhage    Constipation    Hypokalemia    Hypomagnesemia    Bilateral pulmonary embolism (HCC)    Abdominal pain, epigastric    Elevated d-dimer    Malignant carcinoid tumor of stomach (HCC)    Intractable vomiting    Intractable nausea and vomiting    S/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    Food intolerance    Severe malnutrition (HCC)    Dehydration    Sinus tachycardia    Weight loss    Projectile vomiting with nausea    On total parenteral nutrition    H/O blood clots     HPI  Here for follow-up and patient is on TPN and doing well with her appetite and gaining weight and nausea resolved    Current Outpatient Medications   Medication Sig Dispense Refill    Compression Stockings MISC by Does not apply route 20-30 mmHg calf length 1 each 0    apixaban (ELIQUIS) 5 MG TABS tablet Take 5 mg by mouth 2 times daily      scopolamine (TRANSDERM-SCOP) transdermal patch Place 1 patch onto the skin every 72 hours 20 patch 1    magnesium oxide (MAG-OX) 400 (241.3 Mg) MG TABS tablet Take 1 tablet by mouth 2 times daily 60 tablet 1    potassium chloride (KLOR-CON M) 20 MEQ extended release tablet Take 1 tablet by mouth 2 times daily 60 tablet 3    Cholecalciferol (VITAMIN D) 25 MCG TABS Take 1 tablet by mouth daily 60 tablet 2    levothyroxine (SYNTHROID) 88 MCG tablet Take 88 mcg by mouth Five times weekly Indications: Takes only Wed-Sun (skips Mon/Tues)      metoclopramide (REGLAN) 10 MG tablet TAKE 1 TABLET BY MOUTH 3 TIMES DAILY (BEFORE MEALS) 90 tablet 5    pantoprazole (PROTONIX) 40 MG tablet Take 1 tablet by mouth 2 times daily 90 tablet 0    sucralfate (CARAFATE) 1 GM tablet Take 1 tablet by mouth 4 times daily 730 tablet 3    Folic Acid-Vit D5-RXA P35 2.2-25-0.5 MG TABS Take 1 tablet by mouth daily 90 tablet 1    Multiple Vitamin (MULTI-DAY VITAMINS PO) Take 1 tablet by mouth daily       vitamin B-12 (CYANOCOBALAMIN) 500 MCG tablet Take 500 mcg by mouth daily       No current facility-administered medications for this visit. Allergies   Allergen Reactions    Erythromycin Diarrhea       Past Medical History:   Diagnosis Date    Anxiety     Arthritis     knee    Asthma     Colon polyp 02/21/2019    tubular adenoma; hyperplastic polyp    Colon polyps     Cyst of kidney, acquired     bilat.     Fatigue     Hypertension     Hypothyroidism     Neuroendocrine tumor 02/21/2019    of stomach    Obesity     Pharyngoesophageal dysphagia     Vocal cord polyps     Wears glasses        Past Surgical History:   Procedure Laterality Date    CHOLECYSTECTOMY  10/09/2014    COLONOSCOPY  02/21/2019    tubular adenoma; hyperplastic polyp    COLONOSCOPY      over 5 yrs ago per pt with polyps (2-)    CYSTOURETHROSCOPY/STENT REMOVAL  05/03/2011    ENDOSCOPIC ULTRASOUND (LOWER) N/A 01/22/2020    ENDOSCOPIC ULTRASOUND WITH POSSIBLE EMR performed by Yariel Koch MD at [facility]    ERCP      GASTRECTOMY N/A 11/30/2020    HIP SURGERY Left     soft tissue tumor removal    THROAT SURGERY      vocal cord polyps removed    UPPER GASTROINTESTINAL ENDOSCOPY  02/21/2019    Neuroendocrine tumor    UPPER GASTROINTESTINAL ENDOSCOPY  09/23/2019    UPPER GASTROINTESTINAL ENDOSCOPY N/A 09/23/2019    EGD BIOPSY performed by Kiarra Coley MD at [place] [address] N/A 10/23/2019    EGD W/ EMR performed by Nely Soliman MD at [address] [place] [address] N/A 10/29/2019    EGD CONTROL HEMORRHAGE performed by Amirah Vega MD at [address] [place] [address] N/A 04/26/2021    EGD BIOPSY performed by Kedar Clements MD at [address] [place] [address] N/A 8/30/2021    EGD ESOPHAGOGASTRODUODENOSCOPY performed by Flora Evans MD at [facility] History   Problem Relation Age of Onset    High Blood Pressure Mother     High Blood Pressure Sister     Stomach Cancer Sister     Other Sister         epilepsy    No Known Problems Father      ROS   Constitutional:  Negative for fatigue, loss of appetite and unexpected weight change   HEENT            : Negative for neck stiffness and pain, no congestion or sinus pressure   Eyes                : No visual disturbance or pain   Cardiovascular: No chest pain or palpitations or leg swelling   Respiratory      : Negative for cough, shortness of breath or wheezing   Gastrointestinal: Negative for abdominal pain, constipation or diarrhea and bloating No nausea or vomiting   Genitourinary:     No urgency or frequency, no burning or hematuria   Musculoskeletal: No arthralgias, back pain or myalgias   Skin                  : Negative for rash or erythema   Neurological    : Negative for dizziness, weakness, tremors ,light headedness or syncope   Psychiatric       : Negative for dysphoric mood, sleep disturbances, nervous or anxious, or decreased concentration   All other review of systems was negative    Objective  Physical Examination:    Nursing note reviewed    /84 (Site: Left Upper Arm, Position: Sitting, Cuff Size: Medium Adult)   Pulse 89   Temp 97.4 °F (36.3 °C) (Temporal)   Resp 24   Ht 5' 5\" (1.651 m)   Wt 161 lb (73 kg)   LMP 01/01/1994   SpO2 100%   Breastfeeding No   BMI 26.79 kg/m²   BP Readings from Last 3 Encounters:   09/21/21 112/84   09/08/21 115/80   09/03/21 114/74         Constitutional:  Mary Pascual is oriented to place, person and time ,appears well-developed and well-nourished  HEENT:  Atraumatic and normocephalic, external ears normal bilaterally, nose normal no oropharyngeal exudate and is clear and moist  Eyes:  EOCM normal; conjunctivae normal; PERRLA bilaterally  Neck:  Normal range of motion, neck supple, no JVD and no thyromegaly  Cardiovascular:  RRR, normal heart sounds and intact distal pulses  Pulmonary:  effort normal and breath sounds normal bilaterally,no wheezes or rales, no respiratory distress  Abdominal:  Soft, non-tender; normal bowel sounds, no masses  Musculoskeletal:  Normal range of motion and no edema or tenderness bilaterally  No lymphadenopathy  Neurological:  alert, oriented, and normal reflexes bilaterally  Skin: warm and dry  Psychiatric:  normal mood and effect; behavior normal.    Labs:   No results found for: LABA1C  Lab Results   Component Value Date    CHOL 182 08/27/2020     Lab Results   Component Value Date    HDL 45 08/27/2020     No results found for: [facility]  Lab Results   Component Value Date    TRIG 93 09/02/2021     No components found for: [place], [place]  Lab Results   Component Value Date    WBC 7.0 09/08/2021    HGB 10.7 (L) 09/08/2021    HCT 35.0 (L) 09/08/2021    MCV 98.9 09/08/2021     09/08/2021     Lab Results   Component Value Date    INR 1.1 08/28/2021    PROTIME 14.4 (H) 08/28/2021     Lab Results   Component Value Date    GLUCOSE 82 09/08/2021    CREATININE 0.61 09/08/2021    BUN 23 (H) 09/08/2021     09/08/2021    K 4.2 09/08/2021     (H) 09/08/2021    CO2 17 (L) 09/08/2021     Lab Results   Component Value Date    ALT 28 09/08/2021    AST 46 (H) 09/08/2021    ALKPHOS 86 Does not apply route 20-30 mmHg calf length     Dispense:  1 each     Refill:  0          Completed Refills               Requested Prescriptions     Signed Prescriptions Disp Refills    Compression Stockings MISC 1 each 0     Sig: by Does not apply route 20-30 mmHg calf length     4. Patient given educational materials - see patient instructions    5. Was a self-tracking handout given in paper form or via [employer]? NO    Orders Placed This Encounter   Procedures    US DUP LOWER EXTREMITY RIGHT H[place]ILTON     Standing Status:   Future     Standing Expiration Date:   9/21/2022     Return in about 3 months (around 12/21/2021). Patient voiced understanding and agreed to treatment plan. Electronically signed by Johnell Peabody, MD on 9/21/2021 at 9:50 AM    This note is created with a voice recognition program and while intend to generate a document that accurately reflects the content of the visit, no guarantee can be provided that every mistake has been identified and corrected by editing.

## 2021-09-24 ENCOUNTER — CARE COORDINATION (OUTPATIENT)
Dept: CASE MANAGEMENT | Age: 61
End: 2021-09-24

## 2021-09-24 NOTE — CARE COORDINATION
[facility] Transitions Follow Up Call    2021    Patient: Chad Ryan  Patient : 1960   MRN: [id number]  Reason for Admission: Dehydration, N/V, s/p gastrectomy  Discharge Date: 21 RARS: Readmission Risk Score: 35         Spoke with: Comfort Cook saw her Gastroenterologist today. They have decided to put the J-tube procedure on hold and re-evaluate in a month. Gallo Romero says she has gained 30# and her appetite is significantly improved. Denies any further N/V. Reports regular BM's.       Follow Up  Future Appointments   Date Time Provider Lito Vazquez   10/7/2021 10:00 AM Nubia Carey MD [facility]   10/27/2021  3:00 PM Sylvia Bond [facility]   2021  9:45 AM Jorge Workman MD [facility] Brittani Herzog, RN

## 2021-09-26 PROBLEM — E86.0 DEHYDRATION: Status: RESOLVED | Noted: 2021-08-27 | Resolved: 2021-09-26

## 2021-10-04 ENCOUNTER — CARE COORDINATION (OUTPATIENT)
Dept: CASE MANAGEMENT | Age: 61
End: 2021-10-04

## 2021-10-04 NOTE — CARE COORDINATION
understanding of plan of care after discharge. Discussed appropriate site of care based on symptoms and resources available to patient including: PCP, Specialist, Home health and CTN.  The patient agrees to contact the PCP office for questions related to their healthcare.      Patients top risk factors for readmission: medical condition-gastrectomy   Interventions to address risk factors: Scheduled appointment with PCP-9/10,       CTN provided contact information for future needs.   f/u with heme/onc re:  eliquis and medication - appt with heme onc - 10/7.            Follow Up  Future Appointments   Date Time Provider Lito Vazquez   10/7/2021 10:00 AM Camelia Cazares MD [facility]   10/27/2021  3:00 PM Richard Hernandez MD [facility]   12/21/2021  9:45 AM MD Jessy Stokes RN

## 2021-10-07 ENCOUNTER — HOSPITAL ENCOUNTER (OUTPATIENT)
Facility: MEDICAL CENTER | Age: 61
Discharge: HOME | End: 2021-10-07
Payer: COMMERCIAL

## 2021-10-07 ENCOUNTER — HOSPITAL ENCOUNTER (OUTPATIENT)
Age: 61
Discharge: HOME | End: 2021-10-07
Payer: COMMERCIAL

## 2021-10-07 ENCOUNTER — OFFICE VISIT (OUTPATIENT)
Dept: ONCOLOGY | Age: 61
End: 2021-10-07
Payer: COMMERCIAL

## 2021-10-07 ENCOUNTER — TELEPHONE (OUTPATIENT)
Dept: ONCOLOGY | Age: 61
End: 2021-10-07

## 2021-10-07 VITALS
SYSTOLIC BLOOD PRESSURE: 119 MMHG | WEIGHT: 167.2 LBS | HEART RATE: 79 BPM | TEMPERATURE: 96.9 F | DIASTOLIC BLOOD PRESSURE: 81 MMHG | BODY MASS INDEX: 27.82 KG/M2

## 2021-10-07 DIAGNOSIS — C7A.092 MALIGNANT CARCINOID TUMOR OF STOMACH (HCC): ICD-10-CM

## 2021-10-07 DIAGNOSIS — C7A.092 MALIGNANT CARCINOID TUMOR OF STOMACH (HCC): Primary | ICD-10-CM

## 2021-10-07 LAB
ABSOLUTE EOS #: 0.08 K/UL (ref 0–0.44)
ABSOLUTE IMMATURE GRANULOCYTE: 0.01 K/UL (ref 0–0.3)
ABSOLUTE LYMPH #: 1.65 K/UL (ref 1.1–3.7)
ABSOLUTE MONO #: 0.48 K/UL (ref 0.1–1.2)
ALBUMIN SERPL-MCNC: 3.1 G/DL (ref 3.5–5.2)
ALBUMIN/GLOBULIN RATIO: ABNORMAL (ref 1–2.5)
ALP BLD-CCNC: 99 U/L (ref 35–104)
ALT SERPL-CCNC: 23 U/L (ref 5–33)
ANION GAP SERPL CALCULATED.3IONS-SCNC: 12 MMOL/L (ref 9–17)
AST SERPL-CCNC: 37 U/L
BASOPHILS # BLD: 1 % (ref 0–2)
BASOPHILS ABSOLUTE: 0.03 K/UL (ref 0–0.2)
BILIRUB SERPL-MCNC: 0.18 MG/DL (ref 0.3–1.2)
BUN BLDV-MCNC: 22 MG/DL (ref 8–23)
BUN/CREAT BLD: 39 (ref 9–20)
CALCIUM SERPL-MCNC: 8.9 MG/DL (ref 8.6–10.4)
CHLORIDE BLD-SCNC: 106 MMOL/L (ref 98–107)
CO2: 21 MMOL/L (ref 20–31)
CREAT SERPL-MCNC: 0.57 MG/DL (ref 0.5–0.9)
DIFFERENTIAL TYPE: ABNORMAL
EOSINOPHILS RELATIVE PERCENT: 1 % (ref 1–4)
FERRITIN: 87 UG/L (ref 13–150)
FOLATE: 18.1 NG/ML
GFR AFRICAN AMERICAN: >60 ML/MIN
GFR NON-AFRICAN AMERICAN: >60 ML/MIN
GFR SERPL CREATININE-BSD FRML MDRD: ABNORMAL ML/MIN/{1.73_M2}
GFR SERPL CREATININE-BSD FRML MDRD: ABNORMAL ML/MIN/{1.73_M2}
GLUCOSE BLD-MCNC: 92 MG/DL (ref 70–99)
HCT VFR BLD CALC: 38.2 % (ref 36.3–47.1)
HEMOGLOBIN: 11.7 G/DL (ref 11.9–15.1)
IMMATURE GRANULOCYTES: 0 %
IRON SATURATION: 22 % (ref 20–55)
IRON: 60 UG/DL (ref 37–145)
LYMPHOCYTES # BLD: 29 % (ref 24–43)
MCH RBC QN AUTO: 30.5 PG (ref 25.2–33.5)
MCHC RBC AUTO-ENTMCNC: 30.6 G/DL (ref 28.4–34.8)
MCV RBC AUTO: 99.5 FL (ref 82.6–102.9)
MONOCYTES # BLD: 9 % (ref 3–12)
NRBC AUTOMATED: 0 PER 100 WBC
PDW BLD-RTO: 14.2 % (ref 11.8–14.4)
PLATELET # BLD: 313 K/UL (ref 138–453)
PLATELET ESTIMATE: ABNORMAL
PMV BLD AUTO: 9.8 FL (ref 8.1–13.5)
POTASSIUM SERPL-SCNC: 4.1 MMOL/L (ref 3.7–5.3)
RBC # BLD: 3.84 M/UL (ref 3.95–5.11)
RBC # BLD: ABNORMAL 10*6/UL
SEG NEUTROPHILS: 60 % (ref 36–65)
SEGMENTED NEUTROPHILS ABSOLUTE COUNT: 3.38 K/UL (ref 1.5–8.1)
SODIUM BLD-SCNC: 139 MMOL/L (ref 135–144)
TOTAL IRON BINDING CAPACITY: 269 UG/DL (ref 250–450)
TOTAL PROTEIN: 6.5 G/DL (ref 6.4–8.3)
UNSATURATED IRON BINDING CAPACITY: 209 UG/DL (ref 112–347)
VITAMIN B-12: 687 PG/ML (ref 232–1245)
WBC # BLD: 5.6 K/UL (ref 3.5–11.3)
WBC # BLD: ABNORMAL 10*3/UL

## 2021-10-07 PROCEDURE — 36415 COLL VENOUS BLD VENIPUNCTURE: CPT

## 2021-10-07 PROCEDURE — 82728 ASSAY OF FERRITIN: CPT

## 2021-10-07 PROCEDURE — 85025 COMPLETE CBC W/AUTO DIFF WBC: CPT

## 2021-10-07 PROCEDURE — 80053 COMPREHEN METABOLIC PANEL: CPT

## 2021-10-07 PROCEDURE — 84260 ASSAY OF SEROTONIN: CPT

## 2021-10-07 PROCEDURE — 83550 IRON BINDING TEST: CPT

## 2021-10-07 PROCEDURE — 82607 VITAMIN B-12: CPT

## 2021-10-07 PROCEDURE — 82746 ASSAY OF FOLIC ACID SERUM: CPT

## 2021-10-07 PROCEDURE — 86316 IMMUNOASSAY TUMOR OTHER: CPT

## 2021-10-07 PROCEDURE — 99211 OFF/OP EST MAY X REQ PHY/QHP: CPT | Performed by: INTERNAL MEDICINE

## 2021-10-07 PROCEDURE — 83540 ASSAY OF IRON: CPT

## 2021-10-07 PROCEDURE — 99214 OFFICE O/P EST MOD 30 MIN: CPT | Performed by: INTERNAL MEDICINE

## 2021-10-07 NOTE — TELEPHONE ENCOUNTER
Jose Grossman FOR MD VISIT  DR Yesica Tcuker IN TO SEE PATIENT  ORDERS RECEIVED  RV 3-4 MONTHS  CBC CMP SEROTIN & CHROMOGRANIN A TODAY & BEFORE RV  LABS CDP CMP SEROTONIN CHROMOGRANIN A ON EXIT & 1/20/22 , ORDERS GIVEN TO PATIENT  MD VISIT 1/27/22 @11AM  AVS PRINTED AND GIVEN TO PATIENT WITH INSTRUCTIONS  PATIENT DISCHARGED AMBULATORY

## 2021-10-10 LAB — CHROMOGRANIN A: 60 NG/ML (ref 0–103)

## 2021-10-10 NOTE — PROGRESS NOTES
_           Chief Complaint   Patient presents with    Follow-up    Discuss Labs     DIAGNOSIS:       Malignant carcinoid tumor of the stomach  Recent GI bleeding after endoscopic mucosal resection of stomach carcinoid on October 23, 2019. Evidence of recurrent disease on repeated EGD January 2020 and continued elevation of tumor marker chromogranin A  Iron deficiency secondary to above  History of hypothyroidism  Multiple comorbidities as listed      CURRENT THERAPY:         Status post endoscopic mucosal resection of stomach carcinoid October 23, 2019  S/p gastric resection at [facility] [place] November 20, 2020. Sandostatin started March 2021. BRIEF CASE HISTORY:      Ms. Kevon Alfaro is a very pleasant 62 y.o. female with history of multiple comorbidities as listed. The patient seen because of recent diagnosis of carcinoid tumor. Patient had problem with dysphagia for about 4 to 5 months. That problem resolved spontaneously. She was evaluated by gastroenterology. She had EGD in September 2019. She was noted to have gastric tumor which was biopsied and was positive for malignant neuroendocrine tumor. Subsequently patient was evaluated by abdominal MRI. Patient was having no evidence of gastric disease or gastric wall deep penetration. She underwent endoscopic mucosal resection October 23, 2019. Patient had recent admission to [facility] because of GI bleeding. She had EGD again and cauterization of bleeding. She is having weakness and fatigue. No other symptoms. No abdominal pain or cramps. No hot flashes or night sweats. No weight loss or decreased appetite. No wheezing. The patient had lab testing in July 2019 with chromogranin A level 1500. Patient was not aware of that test.  Patient's twin sister had carcinoid tumor more than 20 years ago.   She had gastric resection at that time and there is no recurrence. Patient smokes half pack per day for the last 40 years. Social alcohol drinking. INTERIM HISTORY:   The patient seen for follow-up gastric carcinoid. It was resected October 23, 2019. She has repeated EGD in January 2020 and she was found to have local recurrence with multiple lesions. She was referred to [facility]. She had complete gastric resection 11/30/2020. Following surgery, she had multiple hospitalizations due to dehydration and persistent N/V and dehydration. Patient feels slightly better. Continues to have N/V on treatment. No fever. No CP or Resp distress. Started on Sandostatin. Tolerated well. No side effects. No abdominal pain. No weight loss. PAST MEDICAL HISTORY: has a past medical history of Anxiety, Arthritis, Asthma, Colon polyp, Colon polyps, Cyst of kidney, acquired, Fatigue, Hypertension, Hypothyroidism, Neuroendocrine tumor, Obesity, Pharyngoesophageal dysphagia, Vocal cord polyps, and Wears glasses. PAST SURGICAL HISTORY: has a past surgical history that includes cystourethroscopy/stent removal (05/03/2011); Colonoscopy (02/21/2019); ERCP; Cholecystectomy (10/09/2014); hip surgery (Left); Throat surgery; Colonoscopy; Upper gastrointestinal endoscopy (02/21/2019); Upper gastrointestinal endoscopy (09/23/2019); Upper gastrointestinal endoscopy (N/A, 09/23/2019); Upper gastrointestinal endoscopy (N/A, 10/23/2019); Upper gastrointestinal endoscopy (N/A, 10/29/2019); Endoscopic ultrasonography, GI (N/A, 01/22/2020); Upper gastrointestinal endoscopy (N/A, 04/26/2021); gastrectomy (N/A, 11/30/2020); and Upper gastrointestinal endoscopy (N/A, 8/30/2021).      CURRENT MEDICATIONS:  has a current medication list which includes the following prescription(s): sterile water SOLN with amino acids 10 % SOLN 50 g/L, dextrose 70 % SOLN 100 g/L, Nutrilyte CONC 20 mL/L, sodium phosphate 3 MMOLE/ML SOLN 5 mL/L, Multi-Vitamin, adult no.2 without vitamin k INJ 10 m*, compression stockings, apixaban, scopolamine, magnesium oxide, potassium chloride, vitamin d3, levothyroxine, metoclopramide, pantoprazole, sucralfate, folic acid-vit N1-MJN Z65, multiple vitamin, and vitamin b-12. ALLERGIES:  is allergic to erythromycin. FAMILY HISTORY: Patient's twin sister had gastric carcinoid more than 20 years ago status post partial gastric resection with no recurrence. Otherwise negative for any hematological or oncological conditions. SOCIAL HISTORY:  reports that she quit smoking about 9 years ago. She has a 25.00 pack-year smoking history. She has never used smokeless tobacco. She reports previous alcohol use. She reports that she does not use drugs. REVIEW OF SYSTEMS:     General: Positive for weakness and fatigue. + unanticipated weight loss. No fever or chills. Eyes: No blurred vision, eye pain or double vision. Ears: No hearing problems or drainage. No tinnitus. Throat: No sore throat, problems with swallowing or dysphagia. Respiratory: No cough, sputum or hemoptysis. No shortness of breath. No pleuritic chest pain. Cardiovascular: No chest pain, orthopnea or PND. No lower extremity edema. No palpitation. Gastrointestinal: As above. Genitourinary: No dysuria, hematuria, frequency or urgency. Musculoskeletal: No muscle aches or pains. No limitation of movement. No back pain. No gait disturbance, No joint complaints. Dermatologic: No skin rashes or pruritus. No skin lesions or discolorations. Psychiatric: No depression, anxiety, or stress or signs of schizophrenia. No change in mood or affect. Hematologic: No history of bleeding tendency. No bruises or ecchymosis. No history of clotting problems. Infectious disease: No fever, chills or frequent infections. Endocrine: No problems with obesity. No polydipsia or polyuria. No temperature intolerance. Neurologic: No headaches or dizziness. No weakness or numbness of the extremities.  No changes in 10/07/2021 1007    BILITOT 0.18 (L) 10/07/2021 1007        Abdominal MRI 9/18/2019:  Impression   Subcentimeter gastric mucosal enhancing masses in the fundus and along the   lesser curvature are demonstrated, corresponding to the patient's known GI   stromal tumors.  No evidence for extension through the gastric wall or   metastatic disease.       Status post cholecystectomy.  MRCP within normal limits.       Pelvic right kidney, incompletely visualized.  Bilateral renal cysts again   demonstrated. CT chest October 26, 2019:     FINDINGS:   Pulmonary Arteries: Suboptimal contrast timing.  Limited evaluation of the   segmental branches.  No evidence for central pulmonary embolism.  The main   pulmonary artery is normal in size.       Mediastinum: No evidence of mediastinal lymphadenopathy.  The heart and   pericardium demonstrate no acute abnormality.  There is no acute abnormality   of the thoracic aorta.       Lungs/pleura: The lungs are without acute process.  No focal consolidation or   pulmonary edema.  No evidence of pleural effusion or pneumothorax.       Upper Abdomen: Partially visualized left renal cysts.  Dense area of   calcification adjacent to the splenic artery is noted, which may represent a   thrombosed aneurysm.  Small lymph node in the gastrohepatic ligament.  Status   post cholecystectomy.       Soft Tissues/Bones: No acute bone or soft tissue abnormality.           Impression   Suboptimal contrast timing.  No evidence for central pulmonary embolism.       No acute airspace disease identified. Pathology results from September 23, 2019:  Collected: 9/23/2019   Received: 9/23/2019   Reported: 9/25/2019 10:17     -- Diagnosis --   1.  STOMACH, ANTRUM, BIOPSY:   - UNREMARKABLE GASTRIC MUCOSA. - NEGATIVE FOR HELICOBACTER PYLORI INFECTION. 2.  STOMACH, LESION, BIOPSIES:   - WELL-DIFFERENTIATED GASTRIC NEUROENDOCRINE TUMOR (CARCINOID TUMOR).    - FOCAL ACTIVE CHRONIC GASTRITIS AND INTESTINAL METAPLASIA ARE PRESENT   IN    THE NONNEOPLASTIC GASTRIC MUCOSA.  SEE COMMENT. Pathology results from October 23, 2019:  Collected: 10/23/2019   Received: 10/24/2019   Reported: 10/28/2019 13:13     -- Diagnosis --   GASTRIC TISSUES, EXCISION:        - WELL DIFFERENTIATED NEUROENDOCRINE TUMOR, GRADE 2.     7/23/2019 10:10 PM - Julia Garzon Incoming Lab Results From [facility]     Component Value Ref Range & Units Status Collected Lab   Chromogranin A 1553High   0 - 95 ng/mL Final 07/22/2019  7:55 AM [facility]   (NOTE)      Lab Results   Component Value Date    WBC 5.6 10/07/2021    HGB 11.7 (L) 10/07/2021    HCT 38.2 10/07/2021    MCV 99.5 10/07/2021     10/07/2021       Chemistry        Component Value Date/Time     10/07/2021 1007    K 4.1 10/07/2021 1007     10/07/2021 1007    CO2 21 10/07/2021 1007    BUN 22 10/07/2021 1007    CREATININE 0.57 10/07/2021 1007        Component Value Date/Time    CALCIUM 8.9 10/07/2021 1007    ALKPHOS 99 10/07/2021 1007    AST 37 (H) 10/07/2021 1007    ALT 23 10/07/2021 1007    BILITOT 0.18 (L) 10/07/2021 1007        Lab Results   Component Value Date    IRON 60 10/07/2021    TIBC 269 10/07/2021    FERRITIN 87 10/07/2021           IMPRESSION:   Malignant carcinoid tumor of the stomach  Recent GI bleeding after endoscopic mucosal resection of stomach carcinoid on October 23, 2019. Evidence of recurrent disease on repeated EGD January 2020 and continued elevation of tumor marker chromogranin A  Iron deficiency secondary to above  History of hypothyroidism  Multiple comorbidities as listed    PLAN: Records and labs and images were reviewed and discussed with the patient. Patient continues to have post op symptoms and had recurrent hospitalizations due to N/V and dehydration. She is improving. Discussed further care for carcinoid. We will monitor with scans and tumors markers. Started on Sandostatin. Tolerated well. It will be continued.    We will do labs soon and again in 3 months. She will continue follow up in [facility]  Patient's questions were answered to the best of her satisfaction and she verbalized full understanding and agreement.

## 2021-10-11 LAB — SEROTONIN BLOOD: 279 NG/ML (ref 50–220)

## 2021-10-12 RX ORDER — LEVOTHYROXINE SODIUM 88 UG/1
88 TABLET ORAL
Qty: 30 TABLET | Refills: 3 | Status: SHIPPED | OUTPATIENT
Start: 2021-10-12 | End: 2022-02-07

## 2021-10-12 RX ORDER — SCOLOPAMINE TRANSDERMAL SYSTEM 1 MG/1
1 PATCH, EXTENDED RELEASE TRANSDERMAL
Qty: 20 PATCH | Refills: 3 | Status: SHIPPED | OUTPATIENT
Start: 2021-10-12 | End: 2022-02-22 | Stop reason: SDUPTHER

## 2021-10-12 NOTE — TELEPHONE ENCOUNTER
Misty Russell is calling to request a refill on the following medication(s):    Medication Request:    Last filled 9/3/2021 #20 with 1 RF    Requested Prescriptions     Pending Prescriptions Disp Refills    levothyroxine (SYNTHROID) 88 MCG tablet 30 tablet      Sig: Take 1 tablet by mouth Five times weekly Indications: Takes only Wed-Sun (skips Mon/Tues)    scopolamine (TRANSDERM-SCOP) transdermal patch 20 patch 1     Sig: Place 1 patch onto the skin every 72 hours       Last Visit Date (If Applicable):  9/17/2278    Next Visit Date:    12/21/2021
15-Oct-2018 19:22

## 2021-10-20 ENCOUNTER — TELEPHONE (OUTPATIENT)
Dept: INTERNAL MEDICINE CLINIC | Age: 61
End: 2021-10-20

## 2021-10-20 NOTE — TELEPHONE ENCOUNTER
Patient calling to ask for a referral to an eye doctor states she has been experiencing some blurred vision and trouble with seeing close up

## 2021-10-20 NOTE — TELEPHONE ENCOUNTER
Patient states her symptoms has been on going for years but lately it seems like it is getting worst     [facility]   will try Dr Skyler Noguera office in the AM

## 2021-10-25 ENCOUNTER — HOSPITAL ENCOUNTER (OUTPATIENT)
Age: 61
Setting detail: SPECIMEN
Discharge: HOME | End: 2021-10-25
Payer: COMMERCIAL

## 2021-10-25 LAB
ALBUMIN SERPL-MCNC: 3.2 G/DL (ref 3.5–5.2)
ALBUMIN/GLOBULIN RATIO: 0.8 (ref 1–2.5)
ALP BLD-CCNC: 112 U/L (ref 35–104)
ALT SERPL-CCNC: 13 U/L (ref 5–33)
ANION GAP SERPL CALCULATED.3IONS-SCNC: 15 MMOL/L (ref 9–17)
AST SERPL-CCNC: 22 U/L
BILIRUB SERPL-MCNC: 0.22 MG/DL (ref 0.3–1.2)
BUN BLDV-MCNC: 20 MG/DL (ref 8–23)
BUN/CREAT BLD: ABNORMAL (ref 9–20)
CALCIUM SERPL-MCNC: 8.6 MG/DL (ref 8.6–10.4)
CHLORIDE BLD-SCNC: 103 MMOL/L (ref 98–107)
CO2: 19 MMOL/L (ref 20–31)
CREAT SERPL-MCNC: 0.65 MG/DL (ref 0.5–0.9)
GFR AFRICAN AMERICAN: >60 ML/MIN
GFR NON-AFRICAN AMERICAN: >60 ML/MIN
GFR SERPL CREATININE-BSD FRML MDRD: ABNORMAL ML/MIN/{1.73_M2}
GFR SERPL CREATININE-BSD FRML MDRD: ABNORMAL ML/MIN/{1.73_M2}
GLUCOSE BLD-MCNC: 80 MG/DL (ref 70–99)
HCT VFR BLD CALC: 38 % (ref 36.3–47.1)
HEMOGLOBIN: 12.2 G/DL (ref 11.9–15.1)
MAGNESIUM: 2.1 MG/DL (ref 1.6–2.6)
MCH RBC QN AUTO: 30.1 PG (ref 25.2–33.5)
MCHC RBC AUTO-ENTMCNC: 32.1 G/DL (ref 28.4–34.8)
MCV RBC AUTO: 93.8 FL (ref 82.6–102.9)
NRBC AUTOMATED: 0 PER 100 WBC
PDW BLD-RTO: 12.4 % (ref 11.8–14.4)
PHOSPHORUS: 4.1 MG/DL (ref 2.6–4.5)
PLATELET # BLD: 286 K/UL (ref 138–453)
PMV BLD AUTO: 10.5 FL (ref 8.1–13.5)
POTASSIUM SERPL-SCNC: 4.5 MMOL/L (ref 3.7–5.3)
PREALBUMIN: 10.6 MG/DL (ref 20–40)
RBC # BLD: 4.05 M/UL (ref 3.95–5.11)
SODIUM BLD-SCNC: 137 MMOL/L (ref 135–144)
TOTAL PROTEIN: 7.2 G/DL (ref 6.4–8.3)
TRIGL SERPL-MCNC: 68 MG/DL
WBC # BLD: 11.7 K/UL (ref 3.5–11.3)

## 2021-10-26 LAB
ABSOLUTE EOS #: 0.07 K/UL (ref 0–0.44)
ABSOLUTE IMMATURE GRANULOCYTE: 0.07 K/UL (ref 0–0.3)
ABSOLUTE LYMPH #: 1.77 K/UL (ref 1.1–3.7)
ABSOLUTE MONO #: 0.77 K/UL (ref 0.1–1.2)
BASOPHILS # BLD: 0 % (ref 0–2)
BASOPHILS ABSOLUTE: 0.04 K/UL (ref 0–0.2)
DIFFERENTIAL TYPE: ABNORMAL
EOSINOPHILS RELATIVE PERCENT: 1 % (ref 1–4)
IMMATURE GRANULOCYTES: 1 %
LYMPHOCYTES # BLD: 15 % (ref 24–43)
MONOCYTES # BLD: 7 % (ref 3–12)
PLATELET ESTIMATE: ABNORMAL
RBC # BLD: ABNORMAL 10*6/UL
SEG NEUTROPHILS: 76 % (ref 36–65)
SEGMENTED NEUTROPHILS ABSOLUTE COUNT: 8.83 K/UL (ref 1.5–8.1)
WBC # BLD: ABNORMAL 10*3/UL

## 2021-10-27 ENCOUNTER — OFFICE VISIT (OUTPATIENT)
Dept: GASTROENTEROLOGY | Age: 61
End: 2021-10-27
Payer: COMMERCIAL

## 2021-10-27 VITALS
OXYGEN SATURATION: 98 % | HEART RATE: 89 BPM | WEIGHT: 169.3 LBS | HEIGHT: 65 IN | SYSTOLIC BLOOD PRESSURE: 107 MMHG | BODY MASS INDEX: 28.21 KG/M2 | DIASTOLIC BLOOD PRESSURE: 80 MMHG

## 2021-10-27 DIAGNOSIS — R63.4 WEIGHT LOSS: Primary | ICD-10-CM

## 2021-10-27 PROCEDURE — 99213 OFFICE O/P EST LOW 20 MIN: CPT | Performed by: INTERNAL MEDICINE

## 2021-10-27 PROCEDURE — 1111F DSCHRG MED/CURRENT MED MERGE: CPT | Performed by: INTERNAL MEDICINE

## 2021-10-27 ASSESSMENT — ENCOUNTER SYMPTOMS: ABDOMINAL PAIN: 1

## 2021-10-29 ENCOUNTER — HOSPITAL ENCOUNTER (OUTPATIENT)
Age: 61
Setting detail: SPECIMEN
Discharge: HOME | End: 2021-10-29
Payer: COMMERCIAL

## 2021-10-29 ENCOUNTER — HOSPITAL ENCOUNTER (OUTPATIENT)
Dept: GENERAL RADIOLOGY | Age: 61
Discharge: HOME | End: 2021-10-31
Payer: COMMERCIAL

## 2021-10-29 ENCOUNTER — HOSPITAL ENCOUNTER (OUTPATIENT)
Age: 61
Discharge: HOME | End: 2021-10-31
Payer: COMMERCIAL

## 2021-10-29 ENCOUNTER — OFFICE VISIT (OUTPATIENT)
Dept: PRIMARY CARE CLINIC | Age: 61
End: 2021-10-29
Payer: COMMERCIAL

## 2021-10-29 VITALS
TEMPERATURE: 97.7 F | OXYGEN SATURATION: 99 % | HEART RATE: 94 BPM | SYSTOLIC BLOOD PRESSURE: 108 MMHG | DIASTOLIC BLOOD PRESSURE: 80 MMHG

## 2021-10-29 DIAGNOSIS — R93.89 ABNORMAL FINDING ON CHEST XRAY: Primary | ICD-10-CM

## 2021-10-29 DIAGNOSIS — J06.9 VIRAL URI WITH COUGH: ICD-10-CM

## 2021-10-29 DIAGNOSIS — R07.89 RIGHT-SIDED CHEST WALL PAIN: ICD-10-CM

## 2021-10-29 DIAGNOSIS — J06.9 VIRAL URI WITH COUGH: Primary | ICD-10-CM

## 2021-10-29 DIAGNOSIS — M94.0 ACUTE COSTOCHONDRITIS: ICD-10-CM

## 2021-10-29 LAB
INFLUENZA A ANTIBODY: NEGATIVE
INFLUENZA B ANTIBODY: NEGATIVE

## 2021-10-29 PROCEDURE — 87804 INFLUENZA ASSAY W/OPTIC: CPT

## 2021-10-29 PROCEDURE — 99203 OFFICE O/P NEW LOW 30 MIN: CPT

## 2021-10-29 PROCEDURE — 71046 X-RAY EXAM CHEST 2 VIEWS: CPT

## 2021-10-29 RX ORDER — GUAIFENESIN AND CODEINE PHOSPHATE 100; 10 MG/5ML; MG/5ML
5 SOLUTION ORAL EVERY 4 HOURS PRN
Qty: 160 ML | Refills: 0 | Status: SHIPPED | OUTPATIENT
Start: 2021-10-29 | End: 2021-11-03

## 2021-10-29 ASSESSMENT — ENCOUNTER SYMPTOMS
VOMITING: 0
EYE PAIN: 0
CHEST TIGHTNESS: 0
SHORTNESS OF BREATH: 0
SORE THROAT: 0
COUGH: 1
NAUSEA: 0
RHINORRHEA: 1

## 2021-10-29 NOTE — PATIENT INSTRUCTIONS
Please have imaging completed. Continue with symptomatic treatment. Increase fluid intake and rest.  May use cough medication as needed. DO NOT drive or operate any heavy machinery after taking and change positions slowly. Use Tylenol as needed for fevers or discomfort. Follow-up with PCP. Patient Education        Learning About COVID-19 and Flu Symptoms  How can you tell COVID-19 from the flu? COVID-19 and the flu have similar symptoms. The two can be hard to tell apart. The only way to know for sure which illness you have is to be tested. Since the symptoms are so alike, it makes sense to act as if you have COVID-19 until your test results come back. This means staying home and limiting contact with people in your home. You'll need to wash your hands often and disinfect surfaces that you touch. And be sure to wear a mask when you're around other people. This is also good advice if you think you have the flu. COVID-19 and the flu have these symptoms in common:  · Fever or chills  · Cough  · Shortness of breath  · Fatigue (tiredness)  · Sore throat  · Runny or stuffy nose  · Muscle and body aches  · Headache  · Vomiting and diarrhea (more common in children than adults)  COVID-19 has another symptom that also may occur:  · New loss of taste or smell  COVID-19 symptoms may appear from 2 to 14 days after infection. Flu symptoms usually appear 1 to 4 days after infection. Why should you get a flu shot during the COVID-19 pandemic? It's important to get your yearly flu vaccine. Both the flu and COVID-19 can be active at the same time. You can get sick with both infections at once. And having both may make you more sick than getting just one. The flu vaccine won't protect you from COVID-19. But it can help prevent the flu or reduce its symptoms. If fewer people get very ill with the flu, this will help free up medical resources that are needed for COVID-19 patients. Where can you learn more?   Go to [email or web address] and sign in to your [employer] account. Enter C123 in the [facility] box to learn more about \"Learning About COVID-19 and Flu Symptoms. \"     If you do not have an account, please click on the \"Sign Up Now\" link. Current as of: March 26, 2021               Content Version: 13.0  © 1267-3394 [employer]wise, Incorporated. Care instructions adapted under license by [facility] ([facility] [place]). If you have questions about a medical condition or this instruction, always ask your healthcare professional. [place] [address] any warranty or liability for your use of this information.

## 2021-10-29 NOTE — PROGRESS NOTES
[facility]  [address] [facility] [address]    [address] [place] [address] WALK IN CARE  [address] [address]  Dept: [phone]    Hamlet Fitch is a 62 y.o. female Established patient, who presents to the walk-in clinic today with conditions/complaints as noted below:    Chief Complaint   Patient presents with    Fever     cough loss of appetite a week, right side pain between breast and waist         HPI:     URI   This is a new problem. The current episode started 1 to 4 weeks ago (last Thursday). The problem has been unchanged. Associated symptoms include chest pain (right wall), congestion, coughing and rhinorrhea. Pertinent negatives include no dysuria, ear pain, headaches, nausea, sore throat or vomiting. She has tried sleep and acetaminophen (NyQuil, Robitussin) for the symptoms. The treatment provided mild relief. Past Medical History:   Diagnosis Date    Anxiety     Arthritis     knee    Asthma     Colon polyp 02/21/2019    tubular adenoma; hyperplastic polyp    Colon polyps     Cyst of kidney, acquired     bilat.  Fatigue     Hypertension     Hypothyroidism     Neuroendocrine tumor 02/21/2019    of stomach    Obesity     Pharyngoesophageal dysphagia     Vocal cord polyps     Wears glasses        Current Outpatient Medications   Medication Sig Dispense Refill    guaiFENesin-codeine (CHERATUSSIN AC) 100-10 MG/5ML syrup Take 5 mLs by mouth every 4 hours as needed for Cough for up to 5 days.  160 mL 0    levothyroxine (SYNTHROID) 88 MCG tablet Take 1 tablet by mouth Five times weekly Indications: Takes only Wed-Sun (skips Mon/Tues) 30 tablet 3    scopolamine (TRANSDERM-SCOP) transdermal patch Place 1 patch onto the skin every 72 hours 20 patch 3    sterile water SOLN with amino acids 10 % SOLN 50 g/L, dextrose 70 % SOLN 100 g/L, Nutrilyte CONC 20 mL/L, sodium phosphate 3 MMOLE/ML SOLN 5 mL/L, Multi-Vitamin, adult no.2 without vitamin k INJ 10 mL, trace minerals Cu-Mn-Se-Zn 694-20- MCG/ML SOLN 1 mL Infuse intravenously continuous      Compression Stockings MISC by Does not apply route 20-30 mmHg calf length 1 each 0    magnesium oxide (MAG-OX) 400 (241.3 Mg) MG TABS tablet Take 1 tablet by mouth 2 times daily 60 tablet 1    potassium chloride (KLOR-CON M) 20 MEQ extended release tablet Take 1 tablet by mouth 2 times daily 60 tablet 3    Cholecalciferol (VITAMIN D) 25 MCG TABS Take 1 tablet by mouth daily (Patient taking differently: Take 1,000 Units by mouth daily Take one every Thursday.) 60 tablet 2    metoclopramide (REGLAN) 10 MG tablet TAKE 1 TABLET BY MOUTH 3 TIMES DAILY (BEFORE MEALS) 90 tablet 5    pantoprazole (PROTONIX) 40 MG tablet Take 1 tablet by mouth 2 times daily 90 tablet 0    sucralfate (CARAFATE) 1 GM tablet Take 1 tablet by mouth 4 times daily 875 tablet 3    Folic Acid-Vit T7-REG O30 2.2-25-0.5 MG TABS Take 1 tablet by mouth daily 90 tablet 1    Multiple Vitamin (MULTI-DAY VITAMINS PO) Take 1 tablet by mouth daily       vitamin B-12 (CYANOCOBALAMIN) 500 MCG tablet Take 500 mcg by mouth daily       No current facility-administered medications for this visit. Allergies   Allergen Reactions    Erythromycin Diarrhea       :     Review of Systems   Constitutional: Positive for appetite change, chills, diaphoresis (\"night sweats\"), fatigue and fever. HENT: Positive for congestion and rhinorrhea. Negative for ear pain and sore throat. Eyes: Negative for pain. Respiratory: Positive for cough. Negative for chest tightness and shortness of breath. Cardiovascular: Positive for chest pain (right wall). Gastrointestinal: Negative for nausea and vomiting. Genitourinary: Negative for difficulty urinating, dysuria and hematuria. Musculoskeletal: Positive for myalgias.    Neurological: Negative for headaches.       :     /80   Pulse 94   Temp 97.7 °F (36.5 °C)   [facility] 01/01/1994   SpO2 99%     Physical Exam  Vitals reviewed. Constitutional:       General: She is not in acute distress. Appearance: Normal appearance. She is ill-appearing. HENT:      Head: Normocephalic and atraumatic. Right Ear: External ear normal. There is impacted cerumen. Left Ear: External ear normal. There is impacted cerumen. Nose: Nose normal. No congestion or rhinorrhea. Mouth/Throat:      Mouth: Mucous membranes are moist.      Pharynx: Oropharynx is clear. No posterior oropharyngeal erythema. Eyes:      Conjunctiva/sclera: Conjunctivae normal.   Cardiovascular:      Rate and Rhythm: Normal rate and regular rhythm. Heart sounds: Normal heart sounds. Pulmonary:      Effort: Pulmonary effort is normal. No tachypnea. Breath sounds: Decreased breath sounds (throughout) present. No wheezing, rhonchi or rales. Chest:      Chest wall: No mass, swelling, tenderness or crepitus. Musculoskeletal:         General: Normal range of motion. Cervical back: Neck supple. Lymphadenopathy:      Cervical: No cervical adenopathy. Skin:     General: Skin is warm and dry. Neurological:      Mental Status: She is alert and oriented to person, place, and time. Psychiatric:         Attention and Perception: Attention normal.         Mood and Affect: Mood normal.         Speech: Speech normal.         Behavior: Behavior normal. Behavior is cooperative.           :          1. Viral URI with cough  -     COVID-19  -     POCT Influenza A/B  -     XR CHEST (2 VW); Future  -     guaiFENesin-codeine (CHERATUSSIN AC) 100-10 MG/5ML syrup; Take 5 mLs by mouth every 4 hours as needed for Cough for up to 5 days. , Disp-160 mL, R-0Normal  2. Acute costochondritis  -     guaiFENesin-codeine (CHERATUSSIN AC) 100-10 MG/5ML syrup; Take 5 mLs by mouth every 4 hours as needed for Cough for up to 5 days. , Disp-160 mL, R-0Normal  3.  Right-sided chest wall pain       : Return if symptoms worsen or fail to improve. Orders Placed This Encounter   Medications    guaiFENesin-codeine (CHERATUSSIN AC) 100-10 MG/5ML syrup     Sig: Take 5 mLs by mouth every 4 hours as needed for Cough for up to 5 days. Dispense:  160 mL     Refill:  0     Reduce doses taken as pain becomes manageable   No results found for this visit on 10/29/21. Rapid Influenza A/B performed in office and results reviewed with the patient. Patient has received both doses of COVID-19 vaccine. Due to coinciding symptoms, advised testing. Patient agrees, instructed to quarantine until test results are back. Imaging ordered. Advised to continue with symptomatic treatment. OARRS reviewed, Cheratussin sent to pharmacy. Patient instructed not to drive or operate any heavy machinery after taking. Change positions slowly. Increase fluid intake and rest.  Use Tylenol as needed for fevers or discomfort. Follow-up with PCP. Patient and/or parent given educational materials - see patient instructions. Discussed use, benefit, and side effects of prescribed medications. All patient questions answered. Patient and/or parent voiced understanding.       Electronically signed by RYAN Mesa 10/29/2021 at 12:41 PM

## 2021-10-30 LAB
SARS-COV-2: NORMAL
SARS-COV-2: NOT DETECTED
SOURCE: NORMAL

## 2021-11-08 ENCOUNTER — HOSPITAL ENCOUNTER (OUTPATIENT)
Dept: CT IMAGING | Age: 61
Discharge: HOME | End: 2021-11-10
Payer: COMMERCIAL

## 2021-11-08 DIAGNOSIS — R93.89 ABNORMAL FINDING ON CHEST XRAY: ICD-10-CM

## 2021-11-08 LAB
ALBUMIN SERPL-MCNC: 3.3 G/DL
ALP BLD-CCNC: 113 U/L
ALT SERPL-CCNC: 15 U/L
ANION GAP SERPL CALCULATED.3IONS-SCNC: ABNORMAL MMOL/L
AST SERPL-CCNC: 23 U/L
BASOPHILS ABSOLUTE: NORMAL
BASOPHILS RELATIVE PERCENT: NORMAL
BILIRUB SERPL-MCNC: 0.3 MG/DL (ref 0.1–1.4)
BUN BLDV-MCNC: 24 MG/DL
CALCIUM SERPL-MCNC: 8.9 MG/DL
CHLORIDE BLD-SCNC: 104 MMOL/L
CO2: 22 MMOL/L
CREAT SERPL-MCNC: 0.68 MG/DL
EOSINOPHILS ABSOLUTE: NORMAL
EOSINOPHILS RELATIVE PERCENT: NORMAL
GFR CALCULATED: >60
GLUCOSE BLD-MCNC: 81 MG/DL
HCT VFR BLD CALC: NORMAL %
HEMOGLOBIN: NORMAL
LYMPHOCYTES ABSOLUTE: NORMAL
LYMPHOCYTES RELATIVE PERCENT: NORMAL
MCH RBC QN AUTO: NORMAL PG
MCHC RBC AUTO-ENTMCNC: NORMAL G/DL
MCV RBC AUTO: NORMAL FL
MONOCYTES ABSOLUTE: NORMAL
MONOCYTES RELATIVE PERCENT: NORMAL
NEUTROPHILS ABSOLUTE: NORMAL
NEUTROPHILS RELATIVE PERCENT: NORMAL
PLATELET # BLD: NORMAL 10*3/UL
PMV BLD AUTO: NORMAL FL
POTASSIUM SERPL-SCNC: 4.1 MMOL/L
RBC # BLD: NORMAL 10*6/UL
SODIUM BLD-SCNC: 138 MMOL/L
TOTAL PROTEIN: 7.4
WBC # BLD: NORMAL 10*3/UL

## 2021-11-08 PROCEDURE — 71250 CT THORAX DX C-: CPT

## 2021-11-11 ENCOUNTER — OFFICE VISIT (OUTPATIENT)
Dept: PULMONOLOGY | Age: 61
End: 2021-11-11
Payer: COMMERCIAL

## 2021-11-11 VITALS
TEMPERATURE: 97.2 F | RESPIRATION RATE: 16 BRPM | WEIGHT: 162.4 LBS | DIASTOLIC BLOOD PRESSURE: 84 MMHG | SYSTOLIC BLOOD PRESSURE: 126 MMHG | BODY MASS INDEX: 27.06 KG/M2 | HEIGHT: 65 IN | OXYGEN SATURATION: 98 % | HEART RATE: 86 BPM

## 2021-11-11 DIAGNOSIS — E03.9 HYPOTHYROIDISM, UNSPECIFIED TYPE: ICD-10-CM

## 2021-11-11 DIAGNOSIS — Z98.0 S/P TOTAL GASTRECTOMY AND ROUX-EN-Y ESOPHAGOJEJUNAL ANASTOMOSIS: ICD-10-CM

## 2021-11-11 DIAGNOSIS — Z90.3 S/P TOTAL GASTRECTOMY AND ROUX-EN-Y ESOPHAGOJEJUNAL ANASTOMOSIS: ICD-10-CM

## 2021-11-11 DIAGNOSIS — C7A.092 MALIGNANT CARCINOID TUMOR OF STOMACH (HCC): ICD-10-CM

## 2021-11-11 DIAGNOSIS — J45.20 MILD INTERMITTENT ASTHMA WITHOUT COMPLICATION: ICD-10-CM

## 2021-11-11 DIAGNOSIS — I10 ESSENTIAL HYPERTENSION: ICD-10-CM

## 2021-11-11 DIAGNOSIS — R91.8 MULTIPLE NODULES OF LUNG: Primary | ICD-10-CM

## 2021-11-11 PROCEDURE — 99203 OFFICE O/P NEW LOW 30 MIN: CPT | Performed by: INTERNAL MEDICINE

## 2021-11-15 ENCOUNTER — TELEPHONE (OUTPATIENT)
Dept: INTERNAL MEDICINE CLINIC | Age: 61
End: 2021-11-15

## 2021-11-17 ENCOUNTER — TELEPHONE (OUTPATIENT)
Dept: INTERNAL MEDICINE CLINIC | Age: 61
End: 2021-11-17

## 2021-11-17 NOTE — TELEPHONE ENCOUNTER
Stephane is calling stating that her labs have been running normal.    They are asking if the can change from every other week to once monthly on her lab draws?     Please advise

## 2021-11-17 NOTE — TELEPHONE ENCOUNTER
I am not sure who ordered the labs every other week is it the dietitian who is managing the TPN or someone else

## 2021-11-17 NOTE — TELEPHONE ENCOUNTER
CANDELARIA saw her on 10/27/2021 and advised at that time to continue TPN and revisit in 4 weeks if stable will discontinue TPN at that time as per their note and please check

## 2021-11-17 NOTE — TELEPHONE ENCOUNTER
Per Patrick Levels at [address] [place] [address] orders were signed by Dr Kaycee Fletcher  Patient has seen GI since her 9/21/21 ov notes in Epic    Please advise

## 2021-11-18 ENCOUNTER — TELEPHONE (OUTPATIENT)
Dept: INTERNAL MEDICINE CLINIC | Age: 61
End: 2021-11-18

## 2021-11-18 DIAGNOSIS — R91.8 MULTIPLE LUNG NODULES ON CT: Primary | ICD-10-CM

## 2021-11-18 DIAGNOSIS — K92.0 HEMATEMESIS: ICD-10-CM

## 2021-11-18 NOTE — PROGRESS NOTES
[facility]  [facility], [facility].  [address] [facility] [address]  Dept: [phone]  Dept Fax: [phone]      11/17/21    Patient: Carolyn Tinoco  YOB: 1960    Dear Elvia Rivero MD,    I had the pleasure of seeing one of your patients, Aleksander Mccollum today in the office today. Please find attached my note with the assessment and plan of care. Thank you for allowing me to participate in the care of this patient. I will keep you updated on this patient's follow up and I look forward to serving you and your patients again in the future.     Rick Puentes MD  11/17/2021 8:47 PM

## 2021-11-18 NOTE — TELEPHONE ENCOUNTER
Tonie (denver) was advised GI will need to sign further orders. Pt was seen 10/2021. Advised coram to contact GI for orders. LM informing 's GAMAL Davis of above.

## 2021-11-18 NOTE — PROGRESS NOTES
PULMONARY  CONSULTATION        REFERRED BY: China Rick MD    REASON FOR CONSULTATION: Multiple lung nodules and bronchial asthma    HISTORY OF PRESENT ILLNESS:    Lorena Rosario is a 62y.o. 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     Fatigue     Hypertension     Hypothyroidism     Neuroendocrine tumor 02/21/2019    of stomach    Obesity     Pharyngoesophageal dysphagia     Vocal cord polyps     Wears glasses        SURGICAL HISTORY:    Past Surgical History:   Procedure Laterality Date    CHOLECYSTECTOMY  10/09/2014    COLONOSCOPY  02/21/2019    tubular adenoma; hyperplastic polyp    COLONOSCOPY      over 5 yrs ago per pt with polyps (2-)    CYSTOURETHROSCOPY/STENT REMOVAL  05/03/2011    ENDOSCOPIC ULTRASOUND (LOWER) N/A 01/22/2020    ENDOSCOPIC ULTRASOUND WITH POSSIBLE EMR performed by Omar P[facility]or MD at [facility]    ERCP      GASTRECTOMY N/A 11/30/2020    HIP SURGERY Left     soft tissue tumor removal    THROAT SURGERY      vocal cord polyps removed    UPPER TABLET BY MOUTH 3 TIMES DAILY (BEFORE MEALS) 90 tablet 5    pantoprazole (PROTONIX) 40 MG tablet Take 1 tablet by mouth 2 times daily 90 tablet 0    sucralfate (CARAFATE) 1 GM tablet Take 1 tablet by mouth 4 times daily 478 tablet 3    Folic Acid-Vit F5-VZM E50 2.2-25-0.5 MG TABS Take 1 tablet by mouth daily 90 tablet 1    Multiple Vitamin (MULTI-DAY VITAMINS PO) Take 1 tablet by mouth daily       vitamin B-12 (CYANOCOBALAMIN) 500 MCG tablet Take 500 mcg by mouth daily       No current facility-administered medications for this visit. FAMILY HISTORY: family history includes High Blood Pressure in her mother and sister; No Known Problems in her father; Other in her sister; Stomach Cancer in her sister. SOCIAL AND OCCUPATIONAL HEALTH:  The patient is a Past smoker of 25 pack years and quit smoking in 2012. There  is not history of TB or TB exposure. There is not asbestos or silica dust exposure. The patient reports does not have coal, foundry, quarry or [employer] exposure. Travel history reveals no significant history of risk factors for pulm disease. There is not  history of recreational or IV drug use. The patient does not have pets, dogs, cats turtles or exotic birds.         Review of Systems:  Review of Systems -   General ROS: Completed and except as mentioned above were negative   Psychological ROS:  Completed and except as mentioned above were negative  Ophthalmic ROS:  Completed and except as mentioned above were negative  ENT ROS:  Completed and except as mentioned above were negative  Allergy and Immunology ROS:  Completed and except as mentioned above were negative  Hematological and Lymphatic ROS:  Completed and except as mentioned above were negative  Endocrine ROS: Completed and except as mentioned above were negative  Breast ROS:  Completed and except as mentioned above were negative  Respiratory ROS:  Completed and except as mentioned above were negative  Cardiovascular ROS: Completed and except as mentioned above were negative  Gastrointestinal ROS: Completed and except as mentioned above were negative  Genito-Urinary ROS:  Completed and except as mentioned above were negative  Musculoskeletal ROS:  Completed and except as mentioned above were negative  Neurological ROS:  Completed and except as mentioned above were negative  Dermatological ROS:  Completed and except as mentioned above were negative    SLEEP  No epistaxis sor sore throat. does not have fatigue, sleepiness ortiredness in am.   No MVA. No edema. PHYSICAL EXAMINATION:  Vitals:    11/11/21 1509   BP: 126/84   Site: Left Upper Arm   Pulse: 86   Resp: 16   Temp: 97.2 °F (36.2 °C)   SpO2: 98%   Weight: 162 lb 6.4 oz (73.7 kg)   Height: 5' 5\" (1.651 m)     PHYSICAL EXAMINATION:  Vitals:    11/11/21 1509   BP: 126/84   Site: Left Upper Arm   Pulse: 86   Resp: 16   Temp: 97.2 °F (36.2 °C)   SpO2: 98%   Weight: 162 lb 6.4 oz (73.7 kg)   Height: 5' 5\" (1.651 m)     Constitutional: This is a well developed, well nourished, 25-29.9 - Overweight 62y.o. year old female who is alert, oriented, cooperative and in no apparent distress. Head:normocephalic and atraumatic. EENT:  GUILLERMO. No conjunctival injections. Septum was midline, mucosa was without erythema, exudates or cobblestoning. No thrush was noted. MallampatiII (soft palate, uvula, fauces visible)  Neck: Supple without thyromegaly. No elevated JVP. Trachea was midline. Respiratory: Chest was symmetrical without dullness to percussion. Breath sounds bilaterally were clear to auscultation. There were no wheezes, rhonchi or rales. There is no intercostal retraction or use of accessory muscles. No egophony noted. Cardiovascular: Regular without murmur, clicks, gallops or rubs. Abdomen: Slightly rounded and soft without organomegaly. No rebound, rigidity or guarding was appreciated. Lymphatic: No lymphadenopathy.   Musculoskeletal: Normal curvature of the spine.  No gross muscle weakness. Extremities:  does not have lower extremity edema,  no ulcerations, tenderness, varicosities or erythema. Muscle size, tone and strength are normal.  No involuntary movements are noted. Skin:  Warm and dry. Good color, turgor and pigmentation. No lesions or scars. No cyanosis or clubbing  Neurological/Psychiatric: The patient's general behavior, level of consciousness, thought content and emotional status is normal.          DATA:     Pulmonary function tests: none        XR CHEST (2 VW)    Result Date: 10/29/2021  New rounded opacity in the right lateral chest.  Cannot exclude a new mass lesion versus infection. Attention on CT chest recommended. Right-sided PICC line with the tip at the mid to distal SVC. No pneumothorax. CT CHEST WO CONTRAST    Result Date: 11/8/2021  *Interval development of numerous enlarged nodules as detailed above concerning for metastasis given history of known neoplasm. Acute infectious/inflammatory process is an additional diagnostic consideration though felt less likely. Recommend further evaluation with PET-CT and/or percutaneous biopsy. Alternatively post treatment chest CT in 6-8 weeks to reassess may be considered. *Multiple incidental/chronic findings as above including mild anterior pericardial fluid. The findings were sent to the Radiology Results [address] at 10:18 am on 11/8/2021to be communicated to a licensed caregiver. IMPRESSION:   1. Multiple nodules of lung    2. Mild intermittent asthma without complication    3. S/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    4.  Malignant carcinoid tumor of stomach (HCC)    The lung nodules are concerning for metastatic disease               PLAN:       Patient had a PET scan ordered and will need to be followed up  Reviewed the chest x-ray and pulmonary function test with the patient  Refills were provided -none  Patient was recommended to have prednisone and an antibiotic available for use during an exacerbation  Educated and clarified the medication use. Discussed use, benefit, and side effects of prescribed medications. Barriers to medication compliance addressed. Rivka Armijo received counseling on the following healthy behaviors: nutrition, exercise and medication adherence  Recommend flu vaccination in the fall annually. Recommendations given regarding pneumococcal vaccinations. Patient had the COVID-19 vaccination  Patient is uptodate with vaccinations from pulmonary perspective. Maintain an active lifestyle. continue smoking cessation. All the questions that the patient has had were answered to   her satisfaction. Home O2 evaluation was done. Supplemental oxygen was not indicated  Chest x-ray was reviewed and discussed. CT scan of the chest was reviewed and discussed  Pt met the criteria for lung cancer screening and was explained the possibility of having findings that would need monitoring such as lung nodules and the need for more than yearly screening ct chest. Pt acknowledges understanding and questions answered to their satisfaction. We'll see the patient back in 4 weeks or earlier if needed. Patient will call us if she is sick, so she can be seen sooner. Thank you for having us involved in the care of your patient. Please call us if you have any questions or concerns.               Salima Oneil MD MD  11/17/2021 8:47 PM

## 2021-11-18 NOTE — TELEPHONE ENCOUNTER
If poss metastatic from previous - whole body with mention of  [address] detectnet- in notes    Is this looking for new caner Pet Skull ?

## 2021-11-19 ENCOUNTER — TELEPHONE (OUTPATIENT)
Dept: GASTROENTEROLOGY | Age: 61
End: 2021-11-19

## 2021-11-19 RX ORDER — PANTOPRAZOLE SODIUM 40 MG/1
TABLET, DELAYED RELEASE ORAL
Qty: 90 TABLET | Refills: 0 | Status: SHIPPED | OUTPATIENT
Start: 2021-11-19 | End: 2021-12-09

## 2021-11-19 NOTE — TELEPHONE ENCOUNTER
Writer spoke with Alpesh Joya. A verbal order is given for monthly lab draws. She will send over formal orders tat need signed.

## 2021-11-19 NOTE — TELEPHONE ENCOUNTER
Received call from [facility] inquiring about decreasing the patients labs to monthly.

## 2021-11-19 NOTE — TELEPHONE ENCOUNTER
Received VM on nurse line from [facility] in regards to frequency of labs. Please ask  if he is okay with patient having labs drawn once a month. If so, they are requesting new orders. Also- there was a med refill for Protonix for this patient. It was sent to our office and I wanted to speak with provider first before approving due to patients total gastrectomy. However, her internal med doctor filled the medication. Please ask  if patient should still be taking this.

## 2021-11-19 NOTE — TELEPHONE ENCOUNTER
Writer spoke with Dr Randell Galeas. He does not have any standing orders in for pt (dr Suhail Gordon does). He is ok with lab draws once monthly though. He also states pt should d/c Protonix. Writer advised pt of this via . Vm was also left for Tonie to return call to office.

## 2021-11-21 ENCOUNTER — HOSPITAL ENCOUNTER (INPATIENT)
Age: 61
LOS: 3 days | Discharge: HOME | DRG: 392 | End: 2021-11-24
Attending: EMERGENCY MEDICINE | Admitting: INTERNAL MEDICINE
Payer: COMMERCIAL

## 2021-11-21 ENCOUNTER — APPOINTMENT (OUTPATIENT)
Dept: CT IMAGING | Age: 61
DRG: 392 | End: 2021-11-21
Payer: COMMERCIAL

## 2021-11-21 DIAGNOSIS — J18.9 PNEUMONIA DUE TO INFECTIOUS ORGANISM, UNSPECIFIED LATERALITY, UNSPECIFIED PART OF LUNG: Primary | ICD-10-CM

## 2021-11-21 DIAGNOSIS — R11.14 BILIOUS VOMITING WITH NAUSEA: ICD-10-CM

## 2021-11-21 PROBLEM — A41.9 SEPSIS (HCC): Status: ACTIVE | Noted: 2021-11-21

## 2021-11-21 LAB
-: ABNORMAL
ABSOLUTE EOS #: <0.03 K/UL (ref 0–0.44)
ABSOLUTE IMMATURE GRANULOCYTE: 0.12 K/UL (ref 0–0.3)
ABSOLUTE LYMPH #: 1.2 K/UL (ref 1.1–3.7)
ABSOLUTE MONO #: 0.69 K/UL (ref 0.1–1.2)
ALBUMIN SERPL-MCNC: 3.3 G/DL (ref 3.5–5.2)
ALBUMIN/GLOBULIN RATIO: 0.8 (ref 1–2.5)
ALP BLD-CCNC: 119 U/L (ref 35–104)
ALT SERPL-CCNC: 17 U/L (ref 5–33)
AMORPHOUS: ABNORMAL
ANION GAP SERPL CALCULATED.3IONS-SCNC: 14 MMOL/L (ref 9–17)
AST SERPL-CCNC: 22 U/L
BACTERIA: ABNORMAL
BASOPHILS # BLD: 0 % (ref 0–2)
BASOPHILS ABSOLUTE: 0.05 K/UL (ref 0–0.2)
BILIRUB SERPL-MCNC: 0.23 MG/DL (ref 0.3–1.2)
BILIRUBIN URINE: NEGATIVE
BUN BLDV-MCNC: 24 MG/DL (ref 8–23)
BUN/CREAT BLD: ABNORMAL (ref 9–20)
C-REACTIVE PROTEIN: 110 MG/L (ref 0–5)
CALCIUM SERPL-MCNC: 8.8 MG/DL (ref 8.6–10.4)
CASTS UA: ABNORMAL /LPF (ref 0–8)
CHLORIDE BLD-SCNC: 101 MMOL/L (ref 98–107)
CO2: 19 MMOL/L (ref 20–31)
COLOR: YELLOW
CREAT SERPL-MCNC: 0.59 MG/DL (ref 0.5–0.9)
CRYSTALS, UA: ABNORMAL /HPF
DIFFERENTIAL TYPE: ABNORMAL
EOSINOPHILS RELATIVE PERCENT: 0 % (ref 1–4)
EPITHELIAL CELLS UA: ABNORMAL /HPF (ref 0–5)
GFR AFRICAN AMERICAN: >60 ML/MIN
GFR NON-AFRICAN AMERICAN: >60 ML/MIN
GFR SERPL CREATININE-BSD FRML MDRD: ABNORMAL ML/MIN/{1.73_M2}
GFR SERPL CREATININE-BSD FRML MDRD: ABNORMAL ML/MIN/{1.73_M2}
GLUCOSE BLD-MCNC: 198 MG/DL (ref 70–99)
GLUCOSE URINE: NEGATIVE
HCT VFR BLD CALC: 38.2 % (ref 36.3–47.1)
HEMOGLOBIN: 12.2 G/DL (ref 11.9–15.1)
IMMATURE GRANULOCYTES: 1 %
KETONES, URINE: NEGATIVE
LACTIC ACID, SEPSIS WHOLE BLOOD: 1.4 MMOL/L (ref 0.5–1.9)
LACTIC ACID, SEPSIS WHOLE BLOOD: 2 MMOL/L (ref 0.5–1.9)
LACTIC ACID, SEPSIS: ABNORMAL MMOL/L (ref 0.5–1.9)
LACTIC ACID, SEPSIS: NORMAL MMOL/L (ref 0.5–1.9)
LEUKOCYTE ESTERASE, URINE: ABNORMAL
LYMPHOCYTES # BLD: 5 % (ref 24–43)
MCH RBC QN AUTO: 28.6 PG (ref 25.2–33.5)
MCHC RBC AUTO-ENTMCNC: 31.9 G/DL (ref 28.4–34.8)
MCV RBC AUTO: 89.7 FL (ref 82.6–102.9)
MONOCYTES # BLD: 3 % (ref 3–12)
MUCUS: ABNORMAL
NITRITE, URINE: NEGATIVE
NRBC AUTOMATED: 0 PER 100 WBC
OTHER OBSERVATIONS UA: ABNORMAL
PDW BLD-RTO: 13.2 % (ref 11.8–14.4)
PH UA: 6 (ref 5–8)
PLATELET # BLD: 410 K/UL (ref 138–453)
PLATELET ESTIMATE: ABNORMAL
PMV BLD AUTO: 9.6 FL (ref 8.1–13.5)
POTASSIUM SERPL-SCNC: 3.7 MMOL/L (ref 3.7–5.3)
PROCALCITONIN: 2.73 NG/ML
PROTEIN UA: NEGATIVE
RBC # BLD: 4.26 M/UL (ref 3.95–5.11)
RBC # BLD: ABNORMAL 10*6/UL
RBC UA: ABNORMAL /HPF (ref 0–4)
RENAL EPITHELIAL, UA: ABNORMAL /HPF
SARS-COV-2, RAPID: NOT DETECTED
SEG NEUTROPHILS: 91 % (ref 36–65)
SEGMENTED NEUTROPHILS ABSOLUTE COUNT: 20.44 K/UL (ref 1.5–8.1)
SODIUM BLD-SCNC: 134 MMOL/L (ref 135–144)
SPECIFIC GRAVITY UA: 1.02 (ref 1–1.03)
SPECIMEN DESCRIPTION: NORMAL
TOTAL PROTEIN: 7.4 G/DL (ref 6.4–8.3)
TRICHOMONAS: ABNORMAL
TROPONIN INTERP: ABNORMAL
TROPONIN T: ABNORMAL NG/ML
TROPONIN, HIGH SENSITIVITY: 60 NG/L (ref 0–14)
TURBIDITY: CLEAR
URINE HGB: NEGATIVE
UROBILINOGEN, URINE: NORMAL
WBC # BLD: 22.5 K/UL (ref 3.5–11.3)
WBC # BLD: ABNORMAL 10*3/UL
WBC UA: ABNORMAL /HPF (ref 0–5)
YEAST: ABNORMAL

## 2021-11-21 PROCEDURE — 87635 SARS-COV-2 COVID-19 AMP PRB: CPT

## 2021-11-21 PROCEDURE — 87040 BLOOD CULTURE FOR BACTERIA: CPT

## 2021-11-21 PROCEDURE — 83605 ASSAY OF LACTIC ACID: CPT

## 2021-11-21 PROCEDURE — 93005 ELECTROCARDIOGRAM TRACING: CPT | Performed by: STUDENT IN AN ORGANIZED HEALTH CARE EDUCATION/TRAINING PROGRAM

## 2021-11-21 PROCEDURE — 86403 PARTICLE AGGLUT ANTBDY SCRN: CPT

## 2021-11-21 PROCEDURE — 1200000000 HC SEMI PRIVATE

## 2021-11-21 PROCEDURE — 99283 EMERGENCY DEPT VISIT LOW MDM: CPT

## 2021-11-21 PROCEDURE — 6360000004 HC RX CONTRAST MEDICATION

## 2021-11-21 PROCEDURE — 85025 COMPLETE CBC W/AUTO DIFF WBC: CPT

## 2021-11-21 PROCEDURE — 96374 THER/PROPH/DIAG INJ IV PUSH: CPT

## 2021-11-21 PROCEDURE — 2580000003 HC RX 258: Performed by: STUDENT IN AN ORGANIZED HEALTH CARE EDUCATION/TRAINING PROGRAM

## 2021-11-21 PROCEDURE — 36415 COLL VENOUS BLD VENIPUNCTURE: CPT

## 2021-11-21 PROCEDURE — 6360000002 HC RX W HCPCS

## 2021-11-21 PROCEDURE — 87150 DNA/RNA AMPLIFIED PROBE: CPT

## 2021-11-21 PROCEDURE — 87086 URINE CULTURE/COLONY COUNT: CPT

## 2021-11-21 PROCEDURE — 81001 URINALYSIS AUTO W/SCOPE: CPT

## 2021-11-21 PROCEDURE — 84484 ASSAY OF TROPONIN QUANT: CPT

## 2021-11-21 PROCEDURE — 2580000003 HC RX 258

## 2021-11-21 PROCEDURE — 87205 SMEAR GRAM STAIN: CPT

## 2021-11-21 PROCEDURE — 86140 C-REACTIVE PROTEIN: CPT

## 2021-11-21 PROCEDURE — 99223 1ST HOSP IP/OBS HIGH 75: CPT | Performed by: INTERNAL MEDICINE

## 2021-11-21 PROCEDURE — 80053 COMPREHEN METABOLIC PANEL: CPT

## 2021-11-21 PROCEDURE — 2500000003 HC RX 250 WO HCPCS

## 2021-11-21 PROCEDURE — 84145 PROCALCITONIN (PCT): CPT

## 2021-11-21 PROCEDURE — 6370000000 HC RX 637 (ALT 250 FOR IP): Performed by: STUDENT IN AN ORGANIZED HEALTH CARE EDUCATION/TRAINING PROGRAM

## 2021-11-21 PROCEDURE — 87186 SC STD MICRODIL/AGAR DIL: CPT

## 2021-11-21 PROCEDURE — 93005 ELECTROCARDIOGRAM TRACING: CPT

## 2021-11-21 PROCEDURE — 74150 CT ABDOMEN W/O CONTRAST: CPT

## 2021-11-21 PROCEDURE — 87077 CULTURE AEROBIC IDENTIFY: CPT

## 2021-11-21 PROCEDURE — 96375 TX/PRO/DX INJ NEW DRUG ADDON: CPT

## 2021-11-21 PROCEDURE — 71260 CT THORAX DX C+: CPT

## 2021-11-21 PROCEDURE — 74176 CT ABD & PELVIS W/O CONTRAST: CPT

## 2021-11-21 RX ORDER — ONDANSETRON 4 MG/1
4 TABLET, ORALLY DISINTEGRATING ORAL EVERY 8 HOURS PRN
Status: DISCONTINUED | OUTPATIENT
Start: 2021-11-21 | End: 2021-11-22

## 2021-11-21 RX ORDER — 0.9 % SODIUM CHLORIDE 0.9 %
30 INTRAVENOUS SOLUTION INTRAVENOUS ONCE
Status: COMPLETED | OUTPATIENT
Start: 2021-11-21 | End: 2021-11-21

## 2021-11-21 RX ORDER — POLYETHYLENE GLYCOL 3350 17 G/17G
17 POWDER, FOR SOLUTION ORAL DAILY PRN
Status: DISCONTINUED | OUTPATIENT
Start: 2021-11-21 | End: 2021-11-24 | Stop reason: HOSPADM

## 2021-11-21 RX ORDER — ACETAMINOPHEN 650 MG/1
650 SUPPOSITORY RECTAL EVERY 6 HOURS PRN
Status: DISCONTINUED | OUTPATIENT
Start: 2021-11-21 | End: 2021-11-24 | Stop reason: HOSPADM

## 2021-11-21 RX ORDER — VANCOMYCIN HYDROCHLORIDE 1 G/200ML
15 INJECTION, SOLUTION INTRAVENOUS ONCE
Status: DISCONTINUED | OUTPATIENT
Start: 2021-11-21 | End: 2021-11-21

## 2021-11-21 RX ORDER — ONDANSETRON 4 MG/1
4 TABLET, FILM COATED ORAL ONCE
Status: DISCONTINUED | OUTPATIENT
Start: 2021-11-21 | End: 2021-11-21

## 2021-11-21 RX ORDER — ACETAMINOPHEN 325 MG/1
650 TABLET ORAL EVERY 6 HOURS PRN
Status: DISCONTINUED | OUTPATIENT
Start: 2021-11-21 | End: 2021-11-24 | Stop reason: HOSPADM

## 2021-11-21 RX ORDER — ONDANSETRON 2 MG/ML
4 INJECTION INTRAMUSCULAR; INTRAVENOUS ONCE
Status: COMPLETED | OUTPATIENT
Start: 2021-11-21 | End: 2021-11-21

## 2021-11-21 RX ORDER — FENTANYL CITRATE 50 UG/ML
50 INJECTION, SOLUTION INTRAMUSCULAR; INTRAVENOUS ONCE
Status: COMPLETED | OUTPATIENT
Start: 2021-11-21 | End: 2021-11-21

## 2021-11-21 RX ORDER — FENTANYL CITRATE 50 UG/ML
100 INJECTION, SOLUTION INTRAMUSCULAR; INTRAVENOUS ONCE
Status: DISCONTINUED | OUTPATIENT
Start: 2021-11-21 | End: 2021-11-21

## 2021-11-21 RX ORDER — SODIUM CHLORIDE 9 MG/ML
INJECTION, SOLUTION INTRAVENOUS CONTINUOUS
Status: DISCONTINUED | OUTPATIENT
Start: 2021-11-21 | End: 2021-11-24 | Stop reason: HOSPADM

## 2021-11-21 RX ORDER — VANCOMYCIN HYDROCHLORIDE 1 G/200ML
15 INJECTION, SOLUTION INTRAVENOUS EVERY 12 HOURS
Status: DISCONTINUED | OUTPATIENT
Start: 2021-11-21 | End: 2021-11-22

## 2021-11-21 RX ORDER — AMLODIPINE BESYLATE 5 MG/1
5 TABLET ORAL DAILY
Status: DISCONTINUED | OUTPATIENT
Start: 2021-11-21 | End: 2021-11-23

## 2021-11-21 RX ORDER — ONDANSETRON 2 MG/ML
INJECTION INTRAMUSCULAR; INTRAVENOUS
Status: COMPLETED
Start: 2021-11-21 | End: 2021-11-21

## 2021-11-21 RX ORDER — SODIUM CHLORIDE 0.9 % (FLUSH) 0.9 %
5-40 SYRINGE (ML) INJECTION PRN
Status: DISCONTINUED | OUTPATIENT
Start: 2021-11-21 | End: 2021-11-24 | Stop reason: HOSPADM

## 2021-11-21 RX ORDER — METOPROLOL SUCCINATE 25 MG/1
25 TABLET, EXTENDED RELEASE ORAL DAILY
Status: ON HOLD | COMMUNITY
End: 2021-11-24 | Stop reason: HOSPADM

## 2021-11-21 RX ORDER — SODIUM CHLORIDE 9 MG/ML
25 INJECTION, SOLUTION INTRAVENOUS PRN
Status: DISCONTINUED | OUTPATIENT
Start: 2021-11-21 | End: 2021-11-24 | Stop reason: HOSPADM

## 2021-11-21 RX ORDER — SODIUM CHLORIDE 0.9 % (FLUSH) 0.9 %
5-40 SYRINGE (ML) INJECTION EVERY 12 HOURS SCHEDULED
Status: DISCONTINUED | OUTPATIENT
Start: 2021-11-21 | End: 2021-11-24 | Stop reason: HOSPADM

## 2021-11-21 RX ORDER — ONDANSETRON 2 MG/ML
4 INJECTION INTRAMUSCULAR; INTRAVENOUS EVERY 6 HOURS PRN
Status: DISCONTINUED | OUTPATIENT
Start: 2021-11-21 | End: 2021-11-22

## 2021-11-21 RX ADMIN — AMLODIPINE BESYLATE 5 MG: 5 TABLET ORAL at 20:39

## 2021-11-21 RX ADMIN — FENTANYL CITRATE 50 MCG: 50 INJECTION, SOLUTION INTRAMUSCULAR; INTRAVENOUS at 12:38

## 2021-11-21 RX ADMIN — FENTANYL CITRATE 50 MCG: 50 INJECTION, SOLUTION INTRAMUSCULAR; INTRAVENOUS at 11:03

## 2021-11-21 RX ADMIN — ONDANSETRON 4 MG: 2 INJECTION, SOLUTION INTRAMUSCULAR; INTRAVENOUS at 11:03

## 2021-11-21 RX ADMIN — ONDANSETRON 4 MG: 4 TABLET, ORALLY DISINTEGRATING ORAL at 20:40

## 2021-11-21 RX ADMIN — IOPAMIDOL 75 ML: 755 INJECTION, SOLUTION INTRAVENOUS at 11:59

## 2021-11-21 RX ADMIN — SODIUM CHLORIDE 2205 ML: 9 INJECTION, SOLUTION INTRAVENOUS at 11:02

## 2021-11-21 RX ADMIN — VANCOMYCIN HYDROCHLORIDE 1000 MG: 1 INJECTION, SOLUTION INTRAVENOUS at 14:39

## 2021-11-21 RX ADMIN — SODIUM CHLORIDE: 9 INJECTION, SOLUTION INTRAVENOUS at 16:25

## 2021-11-21 RX ADMIN — LEUCINE, PHENYLALANINE, LYSINE, METHIONINE, ISOLEUCINE, VALINE, HISTIDINE, THREONINE, TRYPTOPHAN, ALANINE, GLYCINE, ARGININE, PROLINE, SERINE, TYROSINE, SODIUM ACETATE, DIBASIC POTASSIUM PHOSPHATE, MAGNESIUM CHLORIDE, SODIUM CHLORIDE, CALCIUM CHLORIDE, DEXTROSE
311; 238; 247; 170; 255; 247; 204; 179; 77; 880; 438; 489; 289; 213; 17; 297; 261; 51; 77; 33; 10 INJECTION INTRAVENOUS at 20:41

## 2021-11-21 RX ADMIN — PIPERACILLIN AND TAZOBACTAM 3375 MG: 3; .375 INJECTION, POWDER, LYOPHILIZED, FOR SOLUTION INTRAVENOUS at 13:47

## 2021-11-21 RX ADMIN — ONDANSETRON 4 MG: 2 INJECTION INTRAMUSCULAR; INTRAVENOUS at 11:03

## 2021-11-21 RX ADMIN — I.V. FAT EMULSION 250 ML: 20 EMULSION INTRAVENOUS at 20:41

## 2021-11-21 ASSESSMENT — ENCOUNTER SYMPTOMS
NAUSEA: 1
BACK PAIN: 0
VOMITING: 1
SORE THROAT: 0
WHEEZING: 0
COUGH: 1
PHOTOPHOBIA: 0
ABDOMINAL PAIN: 0
RHINORRHEA: 0

## 2021-11-21 ASSESSMENT — PAIN SCALES - GENERAL
PAINLEVEL_OUTOF10: 6
PAINLEVEL_OUTOF10: 5
PAINLEVEL_OUTOF10: 8
PAINLEVEL_OUTOF10: 6

## 2021-11-21 NOTE — PROGRESS NOTES
Pharmacy Note  Vancomycin Consult    Eddie Carter is a 62 y.o. female started on Vancomycin for Sepsis; consult received from Dr. Marie Gomez to manage therapy. Also receiving the following antibiotics: Piperacillin-tazobactam.    Patient Active Problem List   Diagnosis    Asthma    Anxiety    Obesity    Hyperlipidemia    Hypothyroidism    Colon polyps    Vertigo    Mass of left hip region    General weakness    Pharyngoesophageal dysphagia    Colon polyp    Neuroendocrine tumor    Chest pain    Shortness of breath    Anemia    GI bleed    Essential hypertension    Neuroendocrine neoplasm of stomach    Polyp, stomach    Melena    Gastric ulcer with hemorrhage    Constipation    Hypokalemia    Hypomagnesemia    Bilateral pulmonary embolism (HCC)    Abdominal pain, epigastric    Elevated d-dimer    Malignant carcinoid tumor of stomach (HCC)    Intractable vomiting    Intractable nausea and vomiting    S/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    Food intolerance    Severe malnutrition (HCC)    Sinus tachycardia    Weight loss    Projectile vomiting with nausea    On total parenteral nutrition    H/O blood clots    Pneumonia     Allergies:  Erythromycin     Temp max: 99.7    Recent Labs     11/21/21  1057   BUN 24*   CREATININE 0.59   WBC 22.5*     No intake or output data in the 24 hours ending 11/21/21 1356  Culture Date      Source                       Results  11/21/21          Blood                     NG x 2 hours    Ht Readings from Last 1 Encounters:   11/21/21 5' 5\" (1.651 m)        Wt Readings from Last 1 Encounters:   11/21/21 162 lb (73.5 kg)       Body mass index is 26.96 kg/m². Estimated Creatinine Clearance: 102 mL/min (based on SCr of 0.59 mg/dL). Goal Trough Level: 10-15 mcg/mL    Assessment/Plan:  Will initiate Vancomycin 1000 mg IV every 12 hours. Per PK modeling, an AUC of 443mg/L.hr and a predicted trough of 13.2 mg/L was calculated.   Timing of trough level will be determined based on culture results, renal function, and clinical response. Thank you for the consult. Will continue to follow.     Shirin Alves, PharmD   11/21/2021 1:59 PM

## 2021-11-21 NOTE — VCC REMOTE MONITORING
Attempted to contact primary RN regarding this patient. Left message with Antonino Pimentel in ED.           Thank you,    TANNER MárquezN, RN, [facility]-[address]: [phone]

## 2021-11-21 NOTE — ED PROVIDER NOTES
[facility]     Emergency Department     Faculty Attestation    I performed a history and physical examination of the patient and discussed management with the resident. I reviewed the resident´s note and agree with the documented findings and plan of care. Any areas of disagreement are noted on the chart. I was personally present for the key portions of any procedures. I have documented in the chart those procedures where I was not present during the key portions. I have reviewed the emergency nurses triage note. I agree with the chief complaint, past medical history, past surgical history, allergies, medications, social and family history as documented unless otherwise noted below. For Physician Assistant/ Nurse Practitioner cases/documentation I have personally evaluated this patient and have completed at least one if not all key elements of the E/M (history, physical exam, and MDM). Additional findings are as noted. History of stomach cancer and PE, not on blood thinners, patient comes in because of chest pain, patient appears uncomfortable, heart is tachycardic without Hamman's crunch, equal breath sounds, no lower extremity pain or swelling on examination.   Patient has had multiple episodes of vomiting before arrival.       EKG Interpretation    Interpreted by emergency department physician    Rhythm: normal sinus   Rate: 117  Axis: normal 8  Ectopy: none  Conduction: normal  ST Segments: no acute change  T Waves: no acute change  Q Waves: none    Clinical Impression: Normal EKG    Laurence Gómez, PALAK Gómez MD  11/21/21 1102

## 2021-11-21 NOTE — VCC REMOTE MONITORING
Attempted to contact primary RN regarding the sepsis bundle.             Thank you,    Velvet Landrum, TANNERN, RN, [facility]-[address]: [phone]

## 2021-11-21 NOTE — H&P
Attending Supervising Physicians Attestation Statement  I have seen and examined Cherrie Burnett and the matthews elements of all parts of the encounter have been performed by me. I agree with the assessment, plan and orders as documented by the Advanced Practice Provider. I discussed the findings and plans with the resident physician and agree as documented in their note . See resident note for more details    In addition to the pertinent positives and negatives as stated within HPI and the review of systems as documented in the notes, all other systems were reviewed when able to and are reported negative. Additional Comments:   62 F presented with n/v  Associated with sob   Hx gastric ca s/p resection  Currently on TPN at home  Recently completed course of abx per PCP  In ER WBC elevated  CT negative for PE, concern for possible PNA    Principal Problem:    Sepsis ([address]  Active Problems:    Hyperlipidemia    Neuroendocrine tumor    Essential hypertension    Malignant carcinoid tumor of stomach (HCC)    S/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    Pneumonia  Resolved Problems:    * No resolved hospital problems.  *    - broad spectrum abx  - follow up blood cultures  - check crp, pro calcitonin  - check CT abd  - resume home meds  - continue home TPN      Electronically signed by Guicho Bermudez MD on 11/21/2021 at 4:07 PM

## 2021-11-21 NOTE — ED TRIAGE NOTES
Pt has stomach CA and is on treatment and TPN at home from this. Pt has had a fever and n/v and cp for 24 hours.

## 2021-11-21 NOTE — ED PROVIDER NOTES
[facility]  ED  Emergency Department Encounter  EmergencyMedicine Resident     Pt Chris Gill  MRN: [id number]  Jakubgfkavin 1960  Date of evaluation: 11/21/21  PCP:  Chayo Weston MD    This patient was evaluated in the Emergency Department for symptoms described in the history of present illness. The patient was evaluated in the context of the global COVID-19 pandemic, which necessitated consideration that the patient might be at risk for infection with the SARS-CoV-2 virus that causes COVID-19. Institutional protocols and algorithms that pertain to the evaluation of patients at risk for COVID-19 are in a state of rapid change based on information released by regulatory bodies including the CDC and federal and state organizations. These policies and algorithms were followed during the patient's care in the ED. CHIEF COMPLAINT       Chief Complaint   Patient presents with    Fever    Fatigue    Emesis    Nausea    Chest Pain       HISTORY OF PRESENT ILLNESS  (Location/Symptom, Timing/Onset, Context/Setting, Quality, Duration, Modifying Factors, Severity.)      Anton Daniel is a 62 y.o. female who presents with complaints of nausea, vomiting that started yesterday, nonbloody. 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 Patient did not receive chemo or radiation. Patient also complains of persistent cough x2 weeks that she received outpatient antibiotics for but cough has returned. Patient has TPN nightly. Denies any abdominal pain. Patient also complains of midsternal chest pain, nonradiating or attributed to vomiting per patient. Patient denies any history of MIs in the past.  Denies blood thinners but notes history of PE in the past.  Patient notes she was just taken off of Eliquis in October. Denies diarrhea. Notes cough is nonproductive. Denies leg pain or swelling.   Last episode of emesis was 30 minutes prior to arrival. patient denies any dysuria or frequency. PAST MEDICAL / SURGICAL / SOCIAL / FAMILY HISTORY      has a past medical history of Anxiety, Arthritis, Asthma, Colon polyp, Colon polyps, Cyst of kidney, acquired, Fatigue, Hypertension, Hypothyroidism, Neuroendocrine tumor, Obesity, Pharyngoesophageal dysphagia, Vocal cord polyps, and Wears glasses. has a past surgical history that includes cystourethroscopy/stent removal (2011); Colonoscopy (2019); ERCP; Cholecystectomy (10/09/2014); hip surgery (Left); Throat surgery; Colonoscopy; Upper gastrointestinal endoscopy (2019); Upper gastrointestinal endoscopy (2019); Upper gastrointestinal endoscopy (N/A, 2019); Upper gastrointestinal endoscopy (N/A, 10/23/2019); Upper gastrointestinal endoscopy (N/A, 10/29/2019); Endoscopic ultrasonography, GI (N/A, 2020); Upper gastrointestinal endoscopy (N/A, 2021); gastrectomy (N/A, 2020); and Upper gastrointestinal endoscopy (N/A, 2021).       Social History     Socioeconomic History    Marital status: Single     Spouse name: Not on file    Number of children: Not on file    Years of education: Not on file    Highest education level: Not on file   Occupational History    Not on file   Tobacco Use    Smoking status: Former Smoker     Packs/day: 1.00     Years: 25.00     Pack years: 25.00     Quit date: 2012     Years since quittin.4    Smokeless tobacco: Never Used    Tobacco comment: quit 2 years   Vaping Use    Vaping Use: Never used   Substance and Sexual Activity    Alcohol use: Not Currently     Comment: 3 times a month    Drug use: No    Sexual activity: Not on file   Other Topics Concern    Not on file   Social History Narrative    Not on file     Social Determinants of Health     Financial Resource Strain: Low Risk     Difficulty of Paying Living Expenses: Not hard at all   Food Insecurity: No Food Insecurity    Worried About Running Out of Food in the Last Year: Never true    [address] in the Last Year: Never true   Transportation Needs:     Lack of Transportation (Medical): Not on file    Lack of Transportation (Non-Medical): Not on file   Physical Activity:     Days of Exercise per Week: Not on file    Minutes of Exercise per Session: Not on file   Stress:     Feeling of Stress : Not on file   Social Connections:     Frequency of Communication with Friends and Family: Not on file    Frequency of Social Gatherings with Friends and Family: Not on file    Attends [employer] Services: Not on file    Active Member of Clubs or Organizations: Not on file    Attends Club or Organization Meetings: Not on file    Marital Status: Not on file   Intimate Partner Violence:     Fear of Current or Ex-Partner: Not on file    Emotionally Abused: Not on file    Physically Abused: Not on file    Sexually Abused: Not on file   Housing Stability:     Unable to Pay for Housing in the Last Year: Not on file    Number of [place] in the Last Year: Not on file    Unstable Housing in the Last Year: Not on file       Family History   Problem Relation Age of Onset    High Blood Pressure Mother     High Blood Pressure Sister     Stomach Cancer Sister     Other Sister         epilepsy    No Known Problems Father        Allergies:  Erythromycin    Home Medications:  Prior to Admission medications    Medication Sig Start Date End Date Taking?  Authorizing Provider   pantoprazole (PROTONIX) 40 MG tablet TAKE 1 TABLET BY MOUTH EVERY DAY 11/19/21   Vale Thomas MD   levothyroxine (SYNTHROID) 88 MCG tablet Take 1 tablet by mouth Five times weekly Indications: Takes only Wed-Sun (skips Mon/Tues) 10/12/21   Vale Thomas MD   scopolamine (TRANSDERM-SCOP) transdermal patch Place 1 patch onto the skin every 72 hours 10/12/21   Vale Thomas MD   sterile water SOLN with amino acids 10 % SOLN 50 g/L, dextrose 70 % SOLN 100 g/L, Nutrilyte CONC 20 mL/L, sodium phosphate 3 MMOLE/ML SOLN 5 mL/L, Multi-Vitamin, adult no.2 without vitamin k INJ 10 mL, trace minerals Cu-Mn-Se-Zn 147-73- MCG/ML SOLN 1 mL Infuse intravenously continuous    Historical Provider, MD   Compression Stockings MISC by Does not apply route 20-30 mmHg calf length 9/21/21   Aide Foy MD   magnesium oxide (MAG-OX) 400 (241.3 Mg) MG TABS tablet Take 1 tablet by mouth 2 times daily 9/3/21   Ihsan Marie MD   potassium chloride (KLOR-CON M) 20 MEQ extended release tablet Take 1 tablet by mouth 2 times daily 9/3/21   Ihsan Marie MD   Cholecalciferol (VITAMIN D) 25 MCG TABS Take 1 tablet by mouth daily  Patient taking differently: Take 1,000 Units by mouth daily Take one every Thursday. 9/3/21   Ihsan Marie MD   metoclopramide (REGLAN) 10 MG tablet TAKE 1 TABLET BY MOUTH 3 TIMES DAILY (BEFORE MEALS) 6/16/21   Darius Montana MD   sucralfate (CARAFATE) 1 GM tablet Take 1 tablet by mouth 4 times daily 4/29/21   Scherry Primrose, MD   Folic Acid-Vit E1-EIF S10 2.2-25-0.5 MG TABS Take 1 tablet by mouth daily 3/25/21   Aide Foy MD   Multiple Vitamin (MULTI-DAY VITAMINS PO) Take 1 tablet by mouth daily     Historical Provider, MD   vitamin B-12 (CYANOCOBALAMIN) 500 MCG tablet Take 500 mcg by mouth daily    Historical Provider, MD       REVIEW OF SYSTEMS    (2-9 systems for level 4, 10 or more for level 5)      Review of Systems   Constitutional: Negative for chills and fever. HENT: Negative for rhinorrhea and sore throat. Eyes: Negative for photophobia and visual disturbance. Respiratory: Positive for cough. Negative for wheezing. Cardiovascular: Positive for chest pain. Negative for palpitations. Gastrointestinal: Positive for nausea and vomiting. Negative for abdominal pain. Genitourinary: Negative for dysuria and frequency. Musculoskeletal: Negative for back pain and neck pain. Skin: Negative for rash and wound.    Neurological: Negative for dizziness and headaches. PHYSICAL EXAM   (up to 7 for level 4, 8 or more for level 5)      INITIAL VITALS:   BP (!) 172/109   Pulse 112   Temp (S) 99.7 °F (37.6 °C) (Oral)   Resp 22   Ht 5' 5\" (1.651 m)   Wt 162 lb (73.5 kg)   LMP 01/01/1994   SpO2 95%   BMI 26.96 kg/m²     Physical Exam  Vitals and nursing note reviewed. HENT:      Head: Normocephalic and atraumatic. Right Ear: External ear normal.      Left Ear: External ear normal.      Nose: Nose normal.      Mouth/Throat:      Mouth: Mucous membranes are moist.      Pharynx: Oropharynx is clear. Eyes:      Conjunctiva/sclera: Conjunctivae normal.   Cardiovascular:      Rate and Rhythm: Regular rhythm. Tachycardia present. Pulses: Normal pulses. Pulmonary:      Effort: Pulmonary effort is normal.      Breath sounds: Normal breath sounds. Abdominal:      Palpations: Abdomen is soft. Tenderness: There is no abdominal tenderness. There is no guarding or rebound. Musculoskeletal:         General: No swelling or deformity. Normal range of motion. Cervical back: Normal range of motion. Skin:     General: Skin is warm and dry. Neurological:      General: No focal deficit present. Mental Status: She is alert and oriented to person, place, and time.          DIFFERENTIAL  DIAGNOSIS     PLAN (LABS / IMAGING / EKG):  Orders Placed This Encounter   Procedures    Culture, Blood 1    Culture, Blood 1    COVID-19, Rapid    Culture, Urine    CT CHEST PULMONARY EMBOLISM W CONTRAST    Lactate, Sepsis    CBC Auto Differential    Urinalysis with Microscopic    Comprehensive Metabolic Panel    Platelet function test    Pharmacy to Dose: Vancomycin    Inpatient consult to Internal Medicine    EKG 12 Lead    PATIENT STATUS (FROM ED OR OR/PROCEDURAL) Inpatient       MEDICATIONS ORDERED:  Orders Placed This Encounter   Medications    DISCONTD: ondansetron (ZOFRAN) tablet 4 mg    DISCONTD: fentaNYL (SUBLIMAZE) injection 100 mcg    ondansetron (ZOFRAN) injection 4 mg    fentaNYL (SUBLIMAZE) injection 50 mcg    ondansetron (ZOFRAN) 4 MG/2ML injection     Masha Barfield: cabinet override    0.9 % sodium chloride bolus    iopamidol (ISOVUE-370) 76 % injection 75 mL    DISCONTD: vancomycin (VANCOCIN) 1000 mg in dextrose 5% 200 mL IVPB     Order Specific Question:   Antimicrobial Indications     Answer:   Sepsis of Unknown Etiology    piperacillin-tazobactam (ZOSYN) 3,375 mg in dextrose 5 % 50 mL IVPB (mini-bag)     Order Specific Question:   Antimicrobial Indications     Answer:   Sepsis of Unknown Etiology    fentaNYL (SUBLIMAZE) injection 50 mcg    vancomycin (VANCOCIN) 1000 mg in dextrose 5% 200 mL IVPB     Order Specific Question:   Antimicrobial Indications     Answer:   Sepsis of Unknown Etiology    vancomycin (VANCOCIN) intermittent dosing (placeholder)     Order Specific Question:   Antimicrobial Indications     Answer:   Sepsis of Unknown Etiology     Order Specific Question:   Suspected Organism(s)     Answer:   S       DDX: UTI, PNA, MI, arrhythmia, PE    DIAGNOSTIC RESULTS / EMERGENCY DEPARTMENT COURSE / MDM   LAB RESULTS:  Results for orders placed or performed during the hospital encounter of 11/21/21   Culture, Blood 1    Specimen: Blood   Result Value Ref Range    Specimen Description . BLOOD     Special Requests PICC 2 ML     Culture NO GROWTH 2 HOURS    Culture, Blood 1    Specimen: Blood   Result Value Ref Range    Specimen Description . BLOOD     Special Requests RT AC 10 ML     Culture NO GROWTH 2 HOURS    COVID-19, Rapid    Specimen: Nasopharyngeal Swab   Result Value Ref Range    Specimen Description . NASOPHARYNGEAL SWAB     SARS-CoV-2, Rapid Not Detected Not Detected   Lactate, Sepsis   Result Value Ref Range    Lactic Acid, Sepsis NOT REPORTED 0.5 - 1.9 mmol/L    Lactic Acid, Sepsis, Whole Blood 2.0 (H) 0.5 - 1.9 mmol/L   Lactate, Sepsis   Result Value Ref Range    Lactic Acid, Sepsis NOT REPORTED 0.5 - 1.9 mmol/L    Lactic Acid, Sepsis, Whole Blood 1.4 0.5 - 1.9 mmol/L   CBC Auto Differential   Result Value Ref Range    WBC 22.5 (H) 3.5 - 11.3 k/uL    RBC 4.26 3.95 - 5.11 m/uL    Hemoglobin 12.2 11.9 - 15.1 g/dL    Hematocrit 38.2 36.3 - 47.1 %    MCV 89.7 82.6 - 102.9 fL    MCH 28.6 25.2 - 33.5 pg    MCHC 31.9 28.4 - 34.8 g/dL    RDW 13.2 11.8 - 14.4 %    Platelets 609 352 - 208 k/uL    MPV 9.6 8.1 - 13.5 fL    NRBC Automated 0.0 0.0 per 100 WBC    Differential Type NOT REPORTED     WBC Morphology NOT REPORTED     RBC Morphology NOT REPORTED     Platelet Estimate NOT REPORTED     Seg Neutrophils 91 (H) 36 - 65 %    Lymphocytes 5 (L) 24 - 43 %    Monocytes 3 3 - 12 %    Eosinophils % 0 (L) 1 - 4 %    Basophils 0 0 - 2 %    Immature Granulocytes 1 (H) 0 %    Segs Absolute 20.44 (H) 1.50 - 8.10 k/uL    Absolute Lymph # 1.20 1.10 - 3.70 k/uL    Absolute Mono # 0.69 0.10 - 1.20 k/uL    Absolute Eos # <0.03 0.00 - 0.44 k/uL    Basophils Absolute 0.05 0.00 - 0.20 k/uL    Absolute Immature Granulocyte 0.12 0.00 - 0.30 k/uL   Urinalysis with Microscopic   Result Value Ref Range    Color, UA Yellow Yellow    Turbidity UA Clear Clear    Glucose, Ur NEGATIVE NEGATIVE    Bilirubin Urine NEGATIVE NEGATIVE    Ketones, Urine NEGATIVE NEGATIVE    Specific Gravity, UA 1.018 1.005 - 1.030    Urine Hgb NEGATIVE NEGATIVE    pH, UA 6.0 5.0 - 8.0    Protein, UA NEGATIVE NEGATIVE    Urobilinogen, Urine Normal Normal    Nitrite, Urine NEGATIVE NEGATIVE    Leukocyte Esterase, Urine MODERATE (A) NEGATIVE    -          WBC, UA 5 TO 10 0 - 5 /HPF    RBC, UA None 0 - 4 /HPF    Casts UA  0 - 8 /LPF     0 TO 2 HYALINE Reference range defined for non-centrifuged specimen.     Crystals, UA NOT REPORTED None /HPF    Epithelial Cells UA 0 TO 2 0 - 5 /HPF    Renal Epithelial, UA NOT REPORTED 0 /HPF    Bacteria, UA NOT REPORTED None    Mucus, UA NOT REPORTED None    Trichomonas, UA NOT REPORTED None    Amorphous, UA NOT REPORTED None    Other Observations UA NOT REPORTED NOT REQ. Yeast, UA NOT REPORTED None   Comprehensive Metabolic Panel   Result Value Ref Range    Glucose 198 (H) 70 - 99 mg/dL    BUN 24 (H) 8 - 23 mg/dL    CREATININE 0.59 0.50 - 0.90 mg/dL    Bun/Cre Ratio NOT REPORTED 9 - 20    Calcium 8.8 8.6 - 10.4 mg/dL    Sodium 134 (L) 135 - 144 mmol/L    Potassium 3.7 3.7 - 5.3 mmol/L    Chloride 101 98 - 107 mmol/L    CO2 19 (L) 20 - 31 mmol/L    Anion Gap 14 9 - 17 mmol/L    Alkaline Phosphatase 119 (H) 35 - 104 U/L    ALT 17 5 - 33 U/L    AST 22 <32 U/L    Total Bilirubin 0.23 (L) 0.3 - 1.2 mg/dL    Total Protein 7.4 6.4 - 8.3 g/dL    Albumin 3.3 (L) 3.5 - 5.2 g/dL    Albumin/Globulin Ratio 0.8 (L) 1.0 - 2.5    GFR Non-African American >60 >60 mL/min    GFR African American >60 >60 mL/min    GFR Comment          GFR Staging NOT REPORTED        IMPRESSION: Pneumonia    RADIOLOGY:  CT CHEST PULMONARY EMBOLISM W CONTRAST   Final Result   No evidence of pulmonary embolism. Multiple scattered areas of nodularity are seen throughout both lungs. One   of the areas has decreased in size but multiple others are new or   significantly increased compared to the study from November 8. Given the   short interval time frame of development findings may be infectious. Short-term follow-up recommended to exclude metastatic disease given   patient's history of malignancy. There appears to be diffuse esophageal wall thickening, poorly evaluated on   CT imaging. Clinical correlation for esophagitis recommended.              EKG  EKG Interpretation    Interpreted by emergency department physician    Rhythm: normal sinus   Rate: tachycardia  Axis: normal  Ectopy: none  Conduction: normal  ST Segments: no acute change  T Waves: no acute change  Q Waves: none    Clinical Impression: non-specific EKG    Terrie Santoyo MD    All EKG's are interpreted by the Emergency Department Physician who either signs or Co-signs this chart in the absence of a cardiologist.    EMERGENCY DEPARTMENT COURSE:  71-year-old female presents to ED with complaints of nausea, vomiting since yesterday described as greater than 10 episodes associated with persistent cough x2 weeks for which patient has received outpatient antibiotics but cough has returned. Patient denies any fever chills, diarrhea, abdominal pain. Patient has history of malignant carcinoid tumor of stomach for which she has received surgery in 2019. Patient also saw pulmonologist approximately 10 days ago for lung nodules and had CAT scan scheduled in [place] tomorrow. On exam in ED, patient hypertensive, tachycardic, temperature is 99.7. Patient EKG consistent with sinus tachycardia. Patient given Zofran for nausea. White blood cell count elevated greater than 22. Sepsis protocol initiated and patient given 30 mL/kg IV fluids, started IV antibiotics Vanco and Zosyn, lactate was 2.0. CT PE obtained due to patient history of cancer and tachycardia to rule out PE.  CT PE negative for PE but did show possible pneumonia. Urine showed leukocyte esterase moderate, but patient denies any dysuria or frequency. Patient admitted for IV antibiotics and urine culture pending. PROCEDURES:  N/A    CONSULTS:  PHARMACY TO DOSE VANCOMYCIN  IP CONSULT TO INTERNAL MEDICINE    CRITICAL CARE:  None    Sepsis Times and Checklist  Vital Signs: BP: (!) 172/109  Pulse: 112  Resp: 22  Temp: (S) 99.7 °F (37.6 °C) SpO2: 95 %  SIRS (>2)   Temp > 38.0C or < 36C   HR > 90   RR > 20   WBC > 12 or < 4 or >10% bands  SIRS (>2) and confirmed or suspected source of infection = Sepsis  Is Sepsis due to likely bacterial infection?: Yes  If not, then sepsis is due to likely viral etiology. Severe Sepsis Identified:  Date: 11/21/2021   Time: 1049  Sepsis Orders:  ·  CBC(required): Yes  ·  CMP: Yes  ·  PT/PTT: Yes  ·  Blood Cultures x2(required): Yes  ·  Urinalysis and Urine Culture: Yes  ·  Lactate(required):  Yes  ·  Antibiotics Given (within 3 hours of sepsis identification, after blood cultures):  Vanc and Zosyn    (If unable to obtain IV access for IV antibiotics within 3 hours of identification of sepsis, IM antibiotics is acceptable.)  ·             If lactate >2.0 MUST repeat within 6 hours    If elevated, is elevated lactate from a likely infectious source?: Yes. IV Fluid Bolus:  Is lactate > 4.0:  No  If lactate >  4.0 OR hypotension (MAP<65 mmHg) (with 2 BP readings) 30ml/kg crystalloid MUST be ordered. Must have documented in the medical record:   that administration of 30 mL/kg of crystalloid fluids would be detrimental or harmful for the patient despite having hypotension, a lactate >= 4 mmol/L, or documentation of septic shock;    AND that the patient has one of the following conditions, OR that a portion of the crystalloid fluid volume was administered as colloids (if a portion consisted of colloids, there must be an order and documentation that colloids were started or noted as given);  o 30mL/kg bolus IVF given. o the volume of crystalloid fluids in place of 30 mL/kg the patient was to receive is 2,205mL, (Order for this amount of fluid must be placed)     Fluids must be initiated within 3 hours of sepsis identification. These fluids must have a rate > 125 ml/hr. · Is the patient Morbidly Obese (BMI > 30): No  · IV Fluids given prior to arrival can be used in this calculation but the following information must be documented:  Start time: 1115, Type of fluid normal saline, Volume of fluid 2,205mL, and Rate (Duration or End time) 1215. · Does the patient or patient advocate refuse the entire 30 ml/kg IV fluid bolus? No        FINAL IMPRESSION      1. Pneumonia due to infectious organism, unspecified laterality, unspecified part of lung    2. Bilious vomiting with nausea          DISPOSITION / PLAN     DISPOSITION admitted      PATIENT REFERRED TO:  No follow-up provider specified.     DISCHARGE MEDICATIONS:  New Prescriptions    No medications on file       Lora Salamanca MD  Emergency Medicine Resident    (Please note that portions of thisnote were completed with a voice recognition program.  Efforts were made to edit the dictations but occasionally words are mis-transcribed.)     Saroj Crooks MD  Resident  11/21/21 [id number]

## 2021-11-21 NOTE — ED NOTES
Pt attached to ECG and continuous pulse ox.    Pt on cart with respirations rapid  Will continue to monitor       Jose Lin RN  11/21/21 [id number]

## 2021-11-22 LAB
ABSOLUTE EOS #: <0.03 K/UL (ref 0–0.44)
ABSOLUTE IMMATURE GRANULOCYTE: 0.11 K/UL (ref 0–0.3)
ABSOLUTE LYMPH #: 1.39 K/UL (ref 1.1–3.7)
ABSOLUTE MONO #: 1.11 K/UL (ref 0.1–1.2)
ANION GAP SERPL CALCULATED.3IONS-SCNC: 12 MMOL/L (ref 9–17)
BASOPHILS # BLD: 0 % (ref 0–2)
BASOPHILS ABSOLUTE: 0.04 K/UL (ref 0–0.2)
BUN BLDV-MCNC: 11 MG/DL (ref 8–23)
BUN/CREAT BLD: ABNORMAL (ref 9–20)
CALCIUM SERPL-MCNC: 8.6 MG/DL (ref 8.6–10.4)
CHLORIDE BLD-SCNC: 101 MMOL/L (ref 98–107)
CO2: 18 MMOL/L (ref 20–31)
CREAT SERPL-MCNC: 0.52 MG/DL (ref 0.5–0.9)
CULTURE: NORMAL
DIFFERENTIAL TYPE: ABNORMAL
EKG ATRIAL RATE: 117 BPM
EKG ATRIAL RATE: 96 BPM
EKG P AXIS: 39 DEGREES
EKG P AXIS: 55 DEGREES
EKG P-R INTERVAL: 124 MS
EKG P-R INTERVAL: 132 MS
EKG Q-T INTERVAL: 312 MS
EKG Q-T INTERVAL: 356 MS
EKG QRS DURATION: 70 MS
EKG QRS DURATION: 80 MS
EKG QTC CALCULATION (BAZETT): 435 MS
EKG QTC CALCULATION (BAZETT): 449 MS
EKG R AXIS: 1 DEGREES
EKG R AXIS: 8 DEGREES
EKG T AXIS: 29 DEGREES
EKG T AXIS: 31 DEGREES
EKG VENTRICULAR RATE: 117 BPM
EKG VENTRICULAR RATE: 96 BPM
EOSINOPHILS RELATIVE PERCENT: 0 % (ref 1–4)
GFR AFRICAN AMERICAN: >60 ML/MIN
GFR NON-AFRICAN AMERICAN: >60 ML/MIN
GFR SERPL CREATININE-BSD FRML MDRD: ABNORMAL ML/MIN/{1.73_M2}
GFR SERPL CREATININE-BSD FRML MDRD: ABNORMAL ML/MIN/{1.73_M2}
GLUCOSE BLD-MCNC: 137 MG/DL (ref 70–99)
HCT VFR BLD CALC: 35.9 % (ref 36.3–47.1)
HEMOGLOBIN: 11.8 G/DL (ref 11.9–15.1)
IMMATURE GRANULOCYTES: 1 %
LV EF: 55 %
LVEF MODALITY: NORMAL
LYMPHOCYTES # BLD: 7 % (ref 24–43)
Lab: NORMAL
MCH RBC QN AUTO: 29.4 PG (ref 25.2–33.5)
MCHC RBC AUTO-ENTMCNC: 32.9 G/DL (ref 28.4–34.8)
MCV RBC AUTO: 89.3 FL (ref 82.6–102.9)
MONOCYTES # BLD: 5 % (ref 3–12)
NRBC AUTOMATED: 0 PER 100 WBC
PDW BLD-RTO: 13.2 % (ref 11.8–14.4)
PLATELET # BLD: 449 K/UL (ref 138–453)
PLATELET ESTIMATE: ABNORMAL
PMV BLD AUTO: 10.4 FL (ref 8.1–13.5)
POTASSIUM SERPL-SCNC: 3.8 MMOL/L (ref 3.7–5.3)
RBC # BLD: 4.02 M/UL (ref 3.95–5.11)
RBC # BLD: ABNORMAL 10*6/UL
SEG NEUTROPHILS: 87 % (ref 36–65)
SEGMENTED NEUTROPHILS ABSOLUTE COUNT: 18.54 K/UL (ref 1.5–8.1)
SODIUM BLD-SCNC: 131 MMOL/L (ref 135–144)
SPECIMEN DESCRIPTION: NORMAL
TROPONIN INTERP: ABNORMAL
TROPONIN INTERP: ABNORMAL
TROPONIN T: ABNORMAL NG/ML
TROPONIN T: ABNORMAL NG/ML
TROPONIN, HIGH SENSITIVITY: 63 NG/L (ref 0–14)
TROPONIN, HIGH SENSITIVITY: 74 NG/L (ref 0–14)
VANCOMYCIN RANDOM DATE LAST DOSE: NORMAL
VANCOMYCIN RANDOM DOSE AMOUNT: NORMAL
VANCOMYCIN RANDOM TIME LAST DOSE: NORMAL
VANCOMYCIN RANDOM: 9.3 UG/ML
WBC # BLD: 21.2 K/UL (ref 3.5–11.3)
WBC # BLD: ABNORMAL 10*3/UL

## 2021-11-22 PROCEDURE — 6360000002 HC RX W HCPCS: Performed by: STUDENT IN AN ORGANIZED HEALTH CARE EDUCATION/TRAINING PROGRAM

## 2021-11-22 PROCEDURE — 93356 MYOCRD STRAIN IMG SPCKL TRCK: CPT

## 2021-11-22 PROCEDURE — 2500000003 HC RX 250 WO HCPCS

## 2021-11-22 PROCEDURE — 87385 HISTOPLASMA CAPSUL AG IA: CPT

## 2021-11-22 PROCEDURE — 80202 ASSAY OF VANCOMYCIN: CPT

## 2021-11-22 PROCEDURE — 6370000000 HC RX 637 (ALT 250 FOR IP)

## 2021-11-22 PROCEDURE — 93306 TTE W/DOPPLER COMPLETE: CPT

## 2021-11-22 PROCEDURE — 86698 HISTOPLASMA ANTIBODY: CPT

## 2021-11-22 PROCEDURE — 2580000003 HC RX 258: Performed by: STUDENT IN AN ORGANIZED HEALTH CARE EDUCATION/TRAINING PROGRAM

## 2021-11-22 PROCEDURE — 87040 BLOOD CULTURE FOR BACTERIA: CPT

## 2021-11-22 PROCEDURE — 93010 ELECTROCARDIOGRAM REPORT: CPT | Performed by: INTERNAL MEDICINE

## 2021-11-22 PROCEDURE — 87327 CRYPTOCOCCUS NEOFORM AG IA: CPT

## 2021-11-22 PROCEDURE — 85025 COMPLETE CBC W/AUTO DIFF WBC: CPT

## 2021-11-22 PROCEDURE — 1200000000 HC SEMI PRIVATE

## 2021-11-22 PROCEDURE — 99254 IP/OBS CNSLTJ NEW/EST MOD 60: CPT | Performed by: INTERNAL MEDICINE

## 2021-11-22 PROCEDURE — 6360000002 HC RX W HCPCS

## 2021-11-22 PROCEDURE — 84484 ASSAY OF TROPONIN QUANT: CPT

## 2021-11-22 PROCEDURE — 99232 SBSQ HOSP IP/OBS MODERATE 35: CPT | Performed by: INTERNAL MEDICINE

## 2021-11-22 PROCEDURE — 80048 BASIC METABOLIC PNL TOTAL CA: CPT

## 2021-11-22 PROCEDURE — 36415 COLL VENOUS BLD VENIPUNCTURE: CPT

## 2021-11-22 PROCEDURE — 6370000000 HC RX 637 (ALT 250 FOR IP): Performed by: STUDENT IN AN ORGANIZED HEALTH CARE EDUCATION/TRAINING PROGRAM

## 2021-11-22 RX ORDER — SUCRALFATE 1 G/1
1 TABLET ORAL 4 TIMES DAILY
Status: DISCONTINUED | OUTPATIENT
Start: 2021-11-22 | End: 2021-11-24 | Stop reason: HOSPADM

## 2021-11-22 RX ORDER — MULTIVITAMIN WITH IRON
1 TABLET ORAL DAILY
Status: DISCONTINUED | OUTPATIENT
Start: 2021-11-22 | End: 2021-11-24 | Stop reason: HOSPADM

## 2021-11-22 RX ORDER — PROMETHAZINE HYDROCHLORIDE 25 MG/ML
6.25 INJECTION, SOLUTION INTRAMUSCULAR; INTRAVENOUS EVERY 6 HOURS PRN
Status: DISCONTINUED | OUTPATIENT
Start: 2021-11-22 | End: 2021-11-22

## 2021-11-22 RX ORDER — PROMETHAZINE HYDROCHLORIDE 25 MG/ML
6.25 INJECTION, SOLUTION INTRAMUSCULAR; INTRAVENOUS EVERY 6 HOURS PRN
Status: DISCONTINUED | OUTPATIENT
Start: 2021-11-22 | End: 2021-11-24 | Stop reason: HOSPADM

## 2021-11-22 RX ORDER — LEVOTHYROXINE SODIUM 88 UG/1
88 TABLET ORAL DAILY
Status: DISCONTINUED | OUTPATIENT
Start: 2021-11-22 | End: 2021-11-24 | Stop reason: HOSPADM

## 2021-11-22 RX ORDER — METOCLOPRAMIDE 10 MG/1
10 TABLET ORAL
Status: DISCONTINUED | OUTPATIENT
Start: 2021-11-22 | End: 2021-11-24 | Stop reason: HOSPADM

## 2021-11-22 RX ORDER — PANTOPRAZOLE SODIUM 40 MG/1
40 TABLET, DELAYED RELEASE ORAL DAILY
Status: DISCONTINUED | OUTPATIENT
Start: 2021-11-22 | End: 2021-11-24 | Stop reason: HOSPADM

## 2021-11-22 RX ORDER — VITAMIN C
1 TAB ORAL DAILY
Status: DISCONTINUED | OUTPATIENT
Start: 2021-11-22 | End: 2021-11-24 | Stop reason: HOSPADM

## 2021-11-22 RX ADMIN — SODIUM CHLORIDE, PRESERVATIVE FREE 10 ML: 5 INJECTION INTRAVENOUS at 20:12

## 2021-11-22 RX ADMIN — ENOXAPARIN SODIUM 40 MG: 100 INJECTION SUBCUTANEOUS at 10:29

## 2021-11-22 RX ADMIN — SUCRALFATE 1 G: 1 TABLET ORAL at 10:29

## 2021-11-22 RX ADMIN — METOCLOPRAMIDE 10 MG: 10 TABLET ORAL at 10:29

## 2021-11-22 RX ADMIN — SODIUM CHLORIDE: 9 INJECTION, SOLUTION INTRAVENOUS at 09:20

## 2021-11-22 RX ADMIN — ASCORBIC ACID, VITAMIN A PALMITATE, CHOLECALCIFEROL, THIAMINE HYDROCHLORIDE, RIBOFLAVIN-5 PHOSPHATE SODIUM, PYRIDOXINE HYDROCHLORIDE, NIACINAMIDE, DEXPANTHENOL, ALPHA-TOCOPHEROL ACETATE, VITAMIN K1, FOLIC ACID, BIOTIN, CYANOCOBALAMIN: 200; 3300; 200; 6; 3.6; 6; 40; 15; 10; 150; 600; 60; 5 INJECTION, SOLUTION INTRAVENOUS at 20:12

## 2021-11-22 RX ADMIN — PANTOPRAZOLE SODIUM 40 MG: 40 TABLET, DELAYED RELEASE ORAL at 10:30

## 2021-11-22 RX ADMIN — MAGNESIUM GLUCONATE 500 MG ORAL TABLET 400 MG: 500 TABLET ORAL at 10:30

## 2021-11-22 RX ADMIN — AMLODIPINE BESYLATE 5 MG: 5 TABLET ORAL at 10:29

## 2021-11-22 RX ADMIN — I.V. FAT EMULSION 100 ML: 20 EMULSION INTRAVENOUS at 20:11

## 2021-11-22 RX ADMIN — ONDANSETRON 4 MG: 2 INJECTION INTRAMUSCULAR; INTRAVENOUS at 04:24

## 2021-11-22 RX ADMIN — SODIUM CHLORIDE, PRESERVATIVE FREE 20 ML: 5 INJECTION INTRAVENOUS at 09:29

## 2021-11-22 RX ADMIN — VITAMIN C 1 TABLET: TAB at 10:30

## 2021-11-22 RX ADMIN — PROMETHAZINE HYDROCHLORIDE 6.25 MG: 25 INJECTION INTRAMUSCULAR; INTRAVENOUS at 13:36

## 2021-11-22 RX ADMIN — ALCOHOL 1 TABLET: 70.47 GEL TOPICAL at 10:29

## 2021-11-22 RX ADMIN — VANCOMYCIN HYDROCHLORIDE 1000 MG: 1 INJECTION, SOLUTION INTRAVENOUS at 01:35

## 2021-11-22 RX ADMIN — ONDANSETRON 4 MG: 2 INJECTION INTRAMUSCULAR; INTRAVENOUS at 10:29

## 2021-11-22 RX ADMIN — PIPERACILLIN AND TAZOBACTAM 3375 MG: 3; .375 INJECTION, POWDER, FOR SOLUTION INTRAVENOUS at 09:16

## 2021-11-22 ASSESSMENT — PAIN DESCRIPTION - FREQUENCY: FREQUENCY: INTERMITTENT

## 2021-11-22 ASSESSMENT — PAIN SCALES - GENERAL
PAINLEVEL_OUTOF10: 0
PAINLEVEL_OUTOF10: 5

## 2021-11-22 ASSESSMENT — PAIN DESCRIPTION - DESCRIPTORS: DESCRIPTORS: ACHING

## 2021-11-22 ASSESSMENT — PAIN DESCRIPTION - PAIN TYPE: TYPE: ACUTE PAIN

## 2021-11-22 ASSESSMENT — PAIN DESCRIPTION - ORIENTATION: ORIENTATION: ANTERIOR;UPPER

## 2021-11-22 ASSESSMENT — PAIN DESCRIPTION - LOCATION: LOCATION: ABDOMEN

## 2021-11-22 NOTE — PLAN OF CARE
Nutrition Problem #1: Inadequate oral intake  Intervention: Food and/or Nutrient Delivery: Continue Current Diet, Start Parenteral Nutrition (Provide cyclic TPN at run nocturnally from 8pm to 8am.)  Nutritional Goals: Meet % of estimated nutrition needs with oral diet and TPN.

## 2021-11-22 NOTE — CONSULTS
[facility] - Initial Consult Note  Today's Date and Time: 11/22/2021, 1:16 PM    Impression :   Bacteremia, Staphylococcus epidermidis x 2 obtained at same time, 11-21-21  Pulmonary infiltrates and nodular lesions  with several affected areas, seen oc T 11-8-21 and 11-18-21  Past medical history of malignant carcinoid tumor s/p total gastrecotomy and Qamar-en-Y esophagojejunal anastomosis in 2020,   Essential hypertension,   Hypothyroidism  Allergy to erythromycin    Recommendations:   D/C Zosyn  D/C Vancomycin  PET scan would be helpful to determine nature of pulmonary nodules    Medical Decision Making/Summary/Discussion:11/22/2021     Afebrile, saturating well on room air  Nausea and vomiting, on zofran   No acute events overnight  Patient denies fever, chills, shortness of breath  Infection Control Recommendations   [place] Precautions      Antimicrobial Stewardship Recommendations     Discontinuation of therapy  Coordination of Outpatient Care:   Estimated Length of IV antimicrobials:None  Patient will need Midline Catheter Insertion: No  Patient will need PICC line Insertion: No  Patient will need: Home IV , [place],  SNF,  LTAC: TBD  Patient will need outpatient wound care:No    Chief complaint/reason for consultation:   Blood cultures positive for MRSE x 2      History of Present Illness:   Marlene Da Silva is a 62y.o.-year-old female who was initially admitted on 11/21/2021. Patient seen at the request of Dr. Kacy Cortes. Patient is a 62 y.o. female who presented on 11/21/21 with nausea and nonbloody emesis that started 11/20. She has a past medical history of malignant carcinoid tumor s/p surgery. She did not receive chemoradiation. Patient has nightly TPN. Patient also had a persistent cough for the past two weeks, for which she received antibiotics outpatient.  She reports that 3 days after she finished the course of antibiotics her cough returned. CURRENT EVALUATION: 11/22/2021     Today patient is resting in her room and appears to be in no acute distress, she reports that she is very tired. She is on room air. She denies fever and chills, but says she has been having nausea and watery, nonbloody vomiting. She reports that she has nonradiating chest pain from vomiting so much. She denies shortness of breath. She denies abdominal pain or headache. She had an echo done today that showed normal LV size with mildly increased wall thickness and normal LV systolic function with LVEF >55%. A small pericardial effusion was noted. She is currently on vancomycin and zosyn. Will plan to d/c antibiotics and try to reach a diagnosis  Coagulase negative Staphylococci in blood likely contaminant  Pulmonary nodules possibly from prior carcinoid. Labs, X rays reviewed: 11/22/2021    BUN: 11  Cr: 0.52    WBC: 21.2  Hb: 11.8  Plat: 449     Cultures:  Urine:  11/22/21: no growth  Blood:  11/22/21: positive for MRSE x 2  Sputum :    Wound:      Discussed with patient, RN, family. I have personally reviewed the past medical history, past surgical history, medications, social history, and family history, and I have updated the database accordingly. Past Medical History:     Past Medical History:   Diagnosis Date    Anxiety     Arthritis     knee    Asthma     Colon polyp 02/21/2019    tubular adenoma; hyperplastic polyp    Colon polyps     Cyst of kidney, acquired     bilat.     Fatigue     Hypertension     Hypothyroidism     Neuroendocrine tumor 02/21/2019    of stomach    Obesity     Pharyngoesophageal dysphagia     Vocal cord polyps     Wears glasses        Past Surgical  History:     Past Surgical History:   Procedure Laterality Date    CHOLECYSTECTOMY  10/09/2014    COLONOSCOPY  02/21/2019    tubular adenoma; hyperplastic polyp    COLONOSCOPY      over 5 yrs ago per pt with polyps (2-)    CYSTOURETHROSCOPY/STENT REMOVAL  05/03/2011  ENDOSCOPIC ULTRASOUND (LOWER) N/A 01/22/2020    ENDOSCOPIC ULTRASOUND WITH POSSIBLE EMR performed by Marcos Swan [facility] at [facility]    ERCP      GASTRECTOMY N/A 11/30/2020    HIP SURGERY Left     soft tissue tumor removal    THROAT SURGERY      vocal cord polyps removed    UPPER GASTROINTESTINAL ENDOSCOPY  02/21/2019    Neuroendocrine tumor    UPPER GASTROINTESTINAL ENDOSCOPY  09/23/2019    UPPER GASTROINTESTINAL ENDOSCOPY N/A 09/23/2019    EGD BIOPSY performed by Gilbert Polanco MD at [address] [place] [address] N/A 10/23/2019    EGD W/ EMR performed by Marcos Swan MD at [address] [place] [address],[facility] N/A 10/29/2019    EGD CONTROL HEMORRHAGE performed by Keyon Renee MD at [address] [place] [address],[place] N/A 04/26/2021    EGD BIOPSY performed by Shirley Nance MD at [address] [place] [address],[place] N/A 8/30/2021    EGD ESOPHAGOGASTRODUODENOSCOPY performed by Shirlene Justice MD at [address] [place] [address]       Medications:      vitamin B and C  1 tablet Oral Daily    levothyroxine  88 mcg Oral Daily    magnesium oxide  400 mg Oral BID    metoclopramide  10 mg Oral TID AC    sucralfate  1 g Oral 4x Daily    pantoprazole  40 mg Oral Daily    multivitamin  1 tablet Oral Daily    vancomycin  15 mg/kg IntraVENous Q12H    vancomycin (VANCOCIN) intermittent dosing (placeholder)   Other RX Placeholder    sodium chloride flush  5-40 mL IntraVENous 2 times per day    enoxaparin  40 mg SubCUTAneous Daily    piperacillin-tazobactam  3,375 mg IntraVENous Q8H    amLODIPine  5 mg Oral Daily       Social History:     Social History     Socioeconomic History    Marital status: Single     Spouse name: Not on file    Number of children: Not on file    Years of education: Not on file    Highest education level: Not on file   Occupational History    Not on file   Tobacco Use    Smoking status: Former Smoker     Packs/day: 1.00     Years: 25.00     Pack years: 25.00     Quit date: 2012     Years since quittin.4    Smokeless tobacco: Never Used    Tobacco comment: quit 2 years   Vaping Use    Vaping Use: Never used   Substance and Sexual Activity    Alcohol use: Not Currently     Comment: 3 times a month    Drug use: No    Sexual activity: Not on file   Other Topics Concern    Not on file   Social History Narrative    Not on file     Social Determinants of Health     Financial Resource Strain: Low Risk     Difficulty of Paying Living Expenses: Not hard at all   Food Insecurity: No Food Insecurity    Worried About Running Out of Food in the Last Year: Never true    Ana of Food in the Last Year: Never true   Transportation Needs:     Lack of Transportation (Medical): Not on file    Lack of Transportation (Non-Medical):  Not on file   Physical Activity:     Days of Exercise per Week: Not on file    Minutes of Exercise per Session: Not on file   Stress:     Feeling of Stress : Not on file   Social Connections:     Frequency of Communication with Friends and Family: Not on file    Frequency of Social Gatherings with Friends and Family: Not on file    Attends [employer] Services: Not on file    Active Member of Clubs or Organizations: Not on file    Attends Club or Organization Meetings: Not on file    Marital Status: Not on file   Intimate Partner Violence:     Fear of Current or Ex-Partner: Not on file    Emotionally Abused: Not on file    Physically Abused: Not on file    Sexually Abused: Not on file   Housing Stability:     Unable to Pay for Housing in the Last Year: Not on file    Number of [place] in the Last Year: Not on file    Unstable Housing in the Last Year: Not on file       Family History:     Family History   Problem Relation Age of Onset    High Blood Pressure Mother     High Blood Pressure Sister     Stomach Cancer Sister     Other Sister         epilepsy    No Known Problems Father Allergies:   Erythromycin     Review of Systems:   Constitutional: No fevers or chills. No systemic complaints  Head: No headaches  Eyes: No double vision or blurry vision. No conjunctival inflammation. ENT: No sore throat or runny nose. . No hearing loss, tinnitus or vertigo. Cardiovascular: No chest pain or palpitations. No shortness of breath. No PINEDO  Lung: No shortness of breath or cough. No sputum production  Abdomen: No nausea, vomiting, diarrhea, or abdominal pain. Juanell Leyland No cramps. Genitourinary: No increased urinary frequency, or dysuria. No hematuria. No suprapubic or CVA pain  Musculoskeletal: No muscle aches or pains. No joint effusions, swelling or deformities  Hematologic: No bleeding or bruising. Neurologic: No headache, weakness, numbness, or tingling. Integument: No rash, no ulcers. Psychiatric: No depression. Endocrine: No polyuria, no polydipsia, no polyphagia. Physical Examination :     Patient Vitals for the past 8 hrs:   BP Temp Temp src Pulse Resp SpO2   11/22/21 0815 (!) 137/96 98.9 °F (37.2 °C) Oral 107 18 95 %     General Appearance: Awake, alert, and in no apparent distress  Head:  Normocephalic, no trauma  Eyes: Pupils equal, round, reactive to light and accommodation; extraocular movements intact; sclera anicteric; conjunctivae pink. No embolic phenomena. ENT: Oropharynx clear, without erythema, exudate, or thrush. No tenderness of sinuses. Mouth/throat: mucosa pink and moist. No lesions. Dentition in good repair. Neck:Supple, without lymphadenopathy. Thyroid normal, No bruits. Pulmonary/Chest: Clear to auscultation, without wheezes, rales, or rhonchi. No dullness to percussion. Cardiovascular: Regular rate and rhythm without murmurs, rubs, or gallops. Abdomen: Soft, non tender. Bowel sounds normal. No organomegaly  All four Extremities: No cyanosis, clubbing, edema, or effusions. Neurologic: No gross sensory or motor deficits. Skin: Warm and dry with good turgor. No signs of peripheral arterial or venous insufficiency. No ulcerations. No open wounds. Medical Decision Making -Laboratory:   I have independently reviewed/ordered the following labs:    CBC with Differential:   Recent Labs     11/21/21  1057 11/22/21  0539   WBC 22.5* 21.2*   HGB 12.2 11.8*   HCT 38.2 35.9*    449   LYMPHOPCT 5* 7*   MONOPCT 3 5     BMP:   Recent Labs     11/21/21  1057 11/22/21  0539   * 131*   K 3.7 3.8    101   CO2 19* 18*   BUN 24* 11   CREATININE 0.59 0.52     Hepatic Function Panel:   Recent Labs     11/21/21  1057   PROT 7.4   LABALBU 3.3*   BILITOT 0.23*   ALKPHOS 119*   ALT 17   AST 22     No results for input(s): RPR in the last 72 hours. No results for input(s): HIV in the last 72 hours. No results for input(s): BC in the last 72 hours. Lab Results   Component Value Date    MUCUS NOT REPORTED 11/21/2021    RBC 4.02 11/22/2021    TRICHOMONAS NOT REPORTED 11/21/2021    WBC 21.2 11/22/2021    YEAST NOT REPORTED 11/21/2021    TURBIDITY Clear 11/21/2021     Lab Results   Component Value Date    CREATININE 0.52 11/22/2021    GLUCOSE 137 11/22/2021       Medical Decision Making-Imaging:     Narrative   EXAMINATION:   CT OF THE ABDOMEN AND PELVIS WITHOUT CONTRAST 11/21/2021 4:50 pm       TECHNIQUE:   CT of the abdomen and pelvis was performed without the administration of   intravenous contrast. Multiplanar reformatted images are provided for review. Dose modulation, iterative reconstruction, and/or weight based adjustment of   the mA/kV was utilized to reduce the radiation dose to as low as reasonably   achievable.       COMPARISON:   Chest CT study from earlier the same day.  CT study of the abdomen pelvis   from August 27, 2021.       HISTORY:   ORDERING SYSTEM PROVIDED HISTORY: nausea,vomiting,malignancy histiory   TECHNOLOGIST PROVIDED HISTORY:   nausea,vomiting,malignancy histiory           FINDINGS:   Organs:  The gallbladder is surgically absent.  A moderate degree of biliary   ductal dilation is unchanged.  No appreciable struck ting stone or mass. Excreted contrast material is present throughout the renal collecting systems   and within the urinary bladder.  There are again seen numerous benign   bilateral renal cysts.  The liver, spleen, pancreas, kidneys, and adrenal   glands otherwise have an unremarkable unenhanced CT appearance.  No   hydronephrosis or hydroureter.       GI/Bowel: Post-surgical changes of the small bowel and of gastrectomy are   again noted.  No acute intestinal abnormality.  The small and large bowel   loops are otherwise normal in caliber, contour, and morphology.  No dilated   loops or areas of bowel wall thickening.  No appreciable colonic or rectal   mass.       Peritoneum/Retroperitoneum: There is no free fluid or extraluminal gas.  No   enlarged or suspicious mesenteric or retroperitoneal lymphadenopathy. The   abdominal aorta and iliac arteries are normal in caliber.       Pelvis: No pelvic free fluid or enlarged or suspicious pelvic or inguinal   lymphadenopathy. No appreciable uterine or adnexal abnormality.  The urinary   bladder and the pelvic bowel loops are unremarkable.       Bones/Soft Tissues: Degenerative changes are present throughout the lumbar   spine. No acute fracture.           Impression   There is no acute finding on this unenhanced CT study of the abdomen and   pelvis to account for the patient's symptoms.       Post-surgical changes of gastrectomy and of the small bowel are again noted. Bilateral renal cysts.  Gallbladder surgically absent. Narrative   EXAMINATION:   CTA OF THE CHEST 11/21/2021 11:56 am       TECHNIQUE:   CTA of the chest was performed after the administration of intravenous   contrast.  Multiplanar reformatted images are provided for review.  MIP   images are provided for review.  Dose modulation, iterative reconstruction,   and/or weight based adjustment of the mA/kV was utilized to reduce the radiation dose to as low as reasonably achievable.       COMPARISON:   CT chest November 8, 2021 and priors.       HISTORY:   ORDERING SYSTEM PROVIDED HISTORY: tachycardia, hx cancer   TECHNOLOGIST PROVIDED HISTORY:   tachycardia, hx cancer   Decision Support Exception - unselect if not a suspected or confirmed   emergency medical condition->Emergency Medical Condition (MA)   Reason for Exam: tachycardia, hx cancer   Acuity: Unknown   Type of Exam: Unknown       FINDINGS:   Pulmonary Arteries: Pulmonary arteries are adequately opacified for   evaluation.  No evidence of intraluminal filling defect to suggest pulmonary   embolism.  Main pulmonary artery is normal in caliber.       Mediastinum: Soft a JULIA wall appears thickened, poorly evaluated on CT   imaging.  There is mild bilateral hilar lymphadenopathy.  The heart and   pericardium demonstrate no acute abnormality.  There is no acute abnormality   of the thoracic aorta.       Lungs/pleura: The central airways are patent.  Scattered areas of nodularity   are again seen throughout both lungs.  Air in the posterior right upper lobe   measures 1.3 cm on image 47, previously measuring 6 mm wall area of   nodularity anteriorly in the right lower lobe has decreased in size now   measuring 0.8 cm on image 66, previously measuring 1.1 cm.  Linear bands of   consolidation are again seen in the right middle lobe and right lower lobe.    There are few new subpleural nodules in the left upper lobe measuring up to 7   mm.  Linear band of atelectasis is seen within the lingula with new area of   nodularity along the anterior aspect.  Multiple new areas of nodularity are   seen within the left lung base measuring up to 1 cm.  No pneumothorax or   pleural effusion identified.       Upper Abdomen: Limited visualization of the upper abdomen demonstrates   patient's known left renal cysts.  There postsurgical changes at the GE   junction.  No acute process.       Soft Tissues/Bones: No acute bone or soft tissue abnormality.           Impression   No evidence of pulmonary embolism.       Multiple scattered areas of nodularity are seen throughout both lungs.  One   of the areas has decreased in size but multiple others are new or   significantly increased compared to the study from November 8.  Given the   short interval time frame of development findings may be infectious. Short-term follow-up recommended to exclude metastatic disease given   patient's history of malignancy.       There appears to be diffuse esophageal wall thickening, poorly evaluated on   CT imaging.  Clinical correlation for esophagitis recommended. Medical Decision Duypcg-Cvmgmggn-Mxyfd:     Updated   Order   11/22/21 1111  Culture, Blood 1   Collected: 11/22/21 0850  Preliminary result  Specimen: Blood    Specimen Description . BLOOD P Culture NO GROWTH 2 HOURS P   Special Requests R WRIST 10ML P            11/22/21 1111  Culture, Blood 1   Collected: 11/22/21 0855  Preliminary result  Specimen: Blood    Specimen Description . BLOOD P Culture NO GROWTH 1 HOUR P   Special Requests L HAND 10ML P            11/22/21 0702  Culture, Urine   Collected: 11/21/21 1100  Final result  Specimen: Urine, clean catch    Specimen Description . CLEAN CATCH URINE Culture NO SIGNIFICANT GROWTH   Special Requests NOT REPORTED            11/22/21 0507  Culture, Blood 1   Collected: 11/21/21 1057  Preliminary result  Specimen: Blood    Specimen Description . BLOOD P Culture DIRECT GRAM STAIN FROM BOTTLE: GRAM POSITIVE COCCI IN CLUSTERS P   Special Requests PICC 2 ML P Culture Staphylococcus epidermidis Detected: mecA/C Gene Detected- Methicillin Resistant Organism Methodo. .. Abnormal  P   Culture POSITIVE Blood Culture Results called to and read back by: MEGGAN Garrison ON 11/22/21 Abnormal  P            11/22/21 0148  Culture, Blood 1   Collected: 11/21/21 1057  Preliminary result  Specimen: Blood    Specimen Description . BLOOD P Culture DIRECT GRAM STAIN FROM BOTTLE: GRAM POSITIVE COCCI IN CLUSTERS P   Special Requests RT AC 10 ML P Culture Staphylococcus epidermidis Detected: mecA/C Gene Detected- Methicillin Resistant Organism Methodo. .. Abnormal  P   Culture POSITIVE Blood Culture Results called to and read back by: MEGGAN Salinas 11/22/21 Abnormal  P          Medical Decision Making-Other:     Note:  Labs, medications, radiologic studies were reviewed with personal review of films  Large amounts of data were reviewed  Discussed with nursing Staff, Discharge planner  Infection Control and Prevention measures reviewed  All prior entries were reviewed  Administer medications as ordered  Prognosis: Good  Discharge planning reviewed  Follow up as outpatient. Thank you for allowing us to participate in the care of this patient. Please call with questions. Mathew Armendariz participated in the evaluation of this patient. ATTESTATION:    I have discussed the case, including pertinent history and exam findings with the residents and students. I have seen and examined the patient and the key elements of the encounter have been performed by me. I was present when the student obtained his information or examined the patient. I have reviewed the laboratory data, other diagnostic studies and discussed them with the residents. I have updated the medical record where necessary. I agree with the assessment, plan and orders as documented by the resident/ student.     Aneesh Cruz MD.    Pager: [phone] - Office: [phone]

## 2021-11-22 NOTE — PROGRESS NOTES
Notified Dr. Neymar Gao that patient arrived to the ED. Per report, patient had chest pain in the ED. Patient continued to report chest pain 6/10 and this was reported to Dr. Neymar Gao. Dr. Neymar Gao ordered troponin for patient. Also notified Dr. Neymar Gao of patient's BP of 159/104. Amlodipine to be ordered by Dr. Neymar Gao.

## 2021-11-22 NOTE — CARE COORDINATION
Case Management Initial Discharge Plan  Chip Aiken,             Met with:patient to discuss discharge plans. Information verified: address, contacts, phone number, , insurance Yes  Insurance Provider: [address] [place] [address]    Emergency Contact/Next of Kin name & number: sister Inna Wolfe [phone]  Who are involved in patient's support system? sister    PCP: Alfie Martinez MD  Date of last visit: Sept      Discharge Planning    Living Arrangements:  Family Members     Home has 2 stories  4 stairs to climb to get into front door, 0 stairs to climb to reach second floor  Location of bedroom/bathroom in home 2nd floor    Patient able to perform ADL's:Independent    Current Services (outpatient & in home) [facility]  DME equipment:   DME provider:     Is patient receiving oral anticoagulation therapy? No    If indicated:   Physician managing anticoagulation treatment:   Where does patient obtain lab work for ATC treatment? Potential Assistance Needed:  N/A    Patient agreeable to home care: Yes  Freedom of choice provided:  current with [facility] for TPN       Evaluation: n/a    Expected Discharge date:  21    Patient expects to be discharged to: If home: is the family and/or caregiver wiling & able to provide support at home? yes  Who will be providing this support? sister    Follow Up Appointment: Best Day/ Time: Monday AM    Transportation provider:   Transportation arrangements needed for discharge: No    Readmission Risk              Risk of Unplanned Readmission:  22             Does patient have a readmission risk score greater than 14?: Yes  If yes, follow-up appointment must be made within 7 days of discharge. Goals of Care: safety      Educated patient on transitional options, provided freedom of choice and are agreeable with plan      Discharge Plan: Home. Pt is current with [facility] with TPN. CM called [facility] and spoke with Mechelle.   They will follow in Epic and can accept back. Pt receives TPN from [facility].  Referral sent.             Electronically signed by Raul De Leon RN on 11/22/21 at 5:33 PM EST

## 2021-11-22 NOTE — PROGRESS NOTES
Pharmacy Vancomycin Consult     Vancomycin Day: 2  Current Dosin mg every 12 hours   Current indication: sepsis r/o    Temp max:      Recent Labs     21  1057 21  0539   BUN 24* 11   CREATININE 0.59 0.52   WBC 22.5* 21.2*       Intake/Output Summary (Last 24 hours) at 2021 1320  Last data filed at 2021 8112  Gross per 24 hour   Intake 20 ml   Output 1950 ml   Net -1930 ml     Culture Date      Source                       Results  See micro    Ht Readings from Last 1 Encounters:   21 5' 5\" (1.651 m)        Wt Readings from Last 1 Encounters:   21 162 lb (73.5 kg)       Body mass index is 26.96 kg/m². Estimated Creatinine Clearance: 116 mL/min (based on SCr of 0.52 mg/dL). Trough: 9.3    Assessment/Plan:    Current dose predict .  Increase dose to 1250 mg every 12 hours, predict  and trough 16.7    Thank you  Vani Hernandez, PharmD, [facility]  Inpatient Clinical Pharmacist  [phone]

## 2021-11-22 NOTE — PROGRESS NOTES
Comprehensive Nutrition Assessment    Type and Reason for Visit:  Initial, Positive Nutrition Screen, Consult (Home TPN; Poor Intakes/Appetite; Weight Loss; PN Ordering and Management)    Nutrition Recommendations/Plan:   - Continue current diet. Encourage/monitor PO intakes. Continue to offer oral supplements at meals. Monitor nausea/vomiting.  - Continue TPN - suggest switching to a Premixed 5/20 TPN bag to run cyclically from 8pm to 8am with 400 gm Dextrose, 100 gm AA, and 100 mL 20% IV lipids. Will provide 1960 kcals, 100 gm protein. Monitor labs and modify TPN as/if needed. - Will monitor plan of care. Nutrition Assessment:  Consulted for Parenteral Nutrition. Pt admitted with nausea and vomiting. 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 Pt reports she receives TPN nightly at home and it runs from 8pm to [address] with [place] [facility] team and received copy of most recent TPN order. Pt reports usually she is able to eat 2 meals each day and reports sometimes being able to eat 3 meals. Pt does not drink any Ensure or protein shakes at home - declines receiving with meal trays. Pt continues to have nausea and vomiting - observed meal tray which pt only had some liquids. Pt reports UBW of 260 lb before surgery and that she has been gradually losing weight since. Reports most recent weight of 169 lb. Per chart review, weight loss of 17% x 7 months noted. Pt states she has noticed her clavicles sticking out more (at visit pt partially under blankets and unable to fully assess). Spoke with Pharmacist about TPN - plans to switch to cyclic TPN - pt had ordered and received Premixed 4.25/10 Peripheral PN overnight/today. Last BM 11/22 noted. Labs reviewed: Na 131 mmol/L. Meds reviewed: Synthroid, Reglan, MVI, Antibiotics, Phenergan. Malnutrition Assessment:  Malnutrition Status:   Moderate malnutrition    Context:  Chronic Illness Findings of the 6 clinical characteristics of malnutrition:  Energy Intake:  Mild decrease in energy intake - Previously meeting estimated needs with TPN. Weight Loss:  7 - Greater than 10% over 6 months - 17% x 7 months     Body Fat Loss:  1 - Mild body fat loss Orbital   Muscle Mass Loss:  1 - Mild muscle mass loss Temples (temporalis), Clavicles (pectoralis & deltoids)  Fluid Accumulation:  No significant fluid accumulation   Strength:  Not Performed    Estimated Daily Nutrient Needs:  Energy (kcal):  25-28 kcal/kg = 5766-4084 kcals/day; Weight Used for Energy Requirements:  Current     Protein (g):  1.2-1.5 gm/kg =  gm pro/day; Weight Used for Protein Requirements:  Ideal        Fluid (ml/day):  25-30 kcal/kg = 9549-8002 mL/day or per MD; Method Used for Fluid Requirements:  ml/Kg      Nutrition Related Findings:  Labs/Meds reviewed. Last BM 11/22. Nausea/Vomiting. Wounds:  None       Current Nutrition Therapies:    ADULT DIET; Regular; Low Sodium (2 gm)  PN-Adult Premix  4.25/10 - Standard Electrolytes - Peripheral Line  Current Parenteral Nutrition Orders:  · Type and Formula: Premix Peripheral (100 gm Dextrose, 42.5 gm AA)   · Lipids: 250ml, Daily  · Duration: Continuous  · Rate/Volume: 42 mL/hr (1000 mL/day)  · Current PN Order Provides: 1010 kcals, 42.5 gm protein per day  · Goal PN Orders Provides: Premixed 5/20 at 167 mL/hr x 12 hrs nocturnally (400 gm Dextrose, 100 gm AA, and 100 mL 20% IV lipids) = 1960 kcals, 100 gm protein per day    Anthropometric Measures:  · Height: 5' 5\" (165.1 cm)  · Current Body Weight: 162 lb (73.5 kg)   · Admission Body Weight: 162 lb (73.5 kg)    · Usual Body Weight: 195 lb 3.2 oz (88.5 kg) (4/1/21 per chart review - reports  lb before surgery)     · Ideal Body Weight: 125 lbs; % Ideal Body Weight 129.6 %   · BMI: 27  · BMI Categories: Overweight (BMI 25.0-29. 9)       Nutrition Diagnosis:   · Inadequate oral intake related to altered GI function as evidenced by poor intake prior to admission, nausea, vomiting (need for parenteral nutrition support)    · Moderate malnutrition, In context of chronic illness related to altered GI function, inadequate protein-energy intake as evidenced by poor intake prior to admission, weight loss greater than or equal to 10% in 6 months, mild muscle loss, mild loss of subcutaneous fat, nutrition support - parenteral nutrition    Nutrition Interventions:   Food and/or Nutrient Delivery:  Continue Current Diet, Start Parenteral Nutrition (Provide cyclic TPN at run nocturnally from 8pm to 8am.)  Nutrition Education/Counseling:  No recommendation at this time   Coordination of Nutrition Care:  Continue to monitor while inpatient    Goals:  Meet % of estimated nutrition needs with oral diet and TPN.        Nutrition Monitoring and Evaluation:   Behavioral-Environmental Outcomes:  None Identified   Food/Nutrient Intake Outcomes:  Food and Nutrient Intake, Parenteral Nutrition Intake/Tolerance  Physical Signs/Symptoms Outcomes:  Biochemical Data, GI Status, Nausea or Vomiting, Fluid Status or Edema, Hemodynamic Status, Nutrition Focused Physical Findings, Skin, Weight     Discharge Planning:    Parenteral Nutrition, Continue current diet     Electronically signed by Vasile Pérez RD, LD on 11/22/21 at 2:24 PM EST    Contact: [phone]

## 2021-11-22 NOTE — PROGRESS NOTES
[facility]  Internal Medicine Teaching Residency Program  Inpatient Daily Progress Note  ______________________________________________________________________________    Patient: Esteban Morel  YOB: 1960   [id number]    Acct: [de-identified]     Room: [facility]5738-71  Admit date: 11/21/2021  Today's date: 11/22/21  Number of days in the hospital: 1    SUBJECTIVE   CC: Fever, Fatigue, Emesis, Nausea, and Chest Pain    Pt examined at bedside. Chart & results reviewed. - vitals stable, pt is saturating well on room air  - labs reviewed   - patient complains of nausea  - no acute events overnight.   - Patient denies headache, vision problems, chest pain, cough, abdominal pain, changes in bowel or urinary habits, and swelling. ROS:  General ROS: Completed and except as mentioned above were negative   HEENT ROS: Completed and except as mentioned above were negative   Allergy and Immunology ROS:  Completed and except as mentioned above were negative  Hematological and Lymphatic ROS:  Completed and except as mentioned above were negative  Respiratory ROS:  Completed and except as mentioned above were negative  Cardiovascular ROS:  Completed and except as mentioned above were negative  Gastrointestinal ROS: Completed and except as mentioned above were negative  Genito-Urinary ROS:  Completed and except as mentioned above were negative  Musculoskeletal ROS:  Completed and except as mentioned above were negative  Neurological ROS:  Completed and except as mentioned above were negative  Skin & Dermatological ROS:  Completed and except as mentioned above were negative  Psychological ROS:  Completed and except as mentioned above were negative  BRIEF HISTORY   The patient is a pleasant 62 y.o. female presents with a chief complaint of nausea, vomiting that started yesterday. Patient states that the vomit was non bloody.   It is not associated with any abdominal pain.  Patient states that she had loose stools. Patient denies fever.      Patient also had persistent cough for the past 2 weeks that she received outpatient antibiotics experience and the cough has returned.     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 Patient is did not receive chemoradiation. Patient has TPN nightly. Patient denies taking any blood thinners but has history of pulmonary embolism in the past. Patient states she was taken off Eliquis in October    OBJECTIVE     Vital Signs:  /88   Pulse 100   Temp 99 °F (37.2 °C) (Oral)   Resp 16   Ht 5' 5\" (1.651 m)   Wt 162 lb (73.5 kg)   LMP 1994   SpO2 100%   BMI 26.96 kg/m²     Temp (24hrs), Av.2 °F (37.3 °C), Min:98.8 °F (37.1 °C), Max:99.7 °F (37.6 °C)    In: -   Out: 1950 [Urine:1650]    Physical Exam -  Constitutional:  Alert, cooperative and no distress. Mental Status:  Oriented to person, place and time and normal affect. Lungs:  Bilateral air entry present, lung fields clear. Normal effort. Heart:  Regular rate and rhythm, no murmur. Abdomen:  Soft, nontender, nondistended, normal bowel sounds. Extremities:  No edema, redness, tenderness in the calves. Skin:  Warm, dry, no gross lesions or rashes.     Medications:  Scheduled Medications:    vitamin B and C  1 tablet Oral Daily    levothyroxine  88 mcg Oral Daily    magnesium oxide  400 mg Oral BID    metoclopramide  10 mg Oral TID AC    sucralfate  1 g Oral 4x Daily    pantoprazole  40 mg Oral Daily    multivitamin  1 tablet Oral Daily    vancomycin  15 mg/kg IntraVENous Q12H    vancomycin (VANCOCIN) intermittent dosing (placeholder)   Other RX Placeholder    sodium chloride flush  5-40 mL IntraVENous 2 times per day    enoxaparin  40 mg SubCUTAneous Daily    piperacillin-tazobactam  3,375 mg IntraVENous Q8H    amLODIPine  5 mg Oral Daily     Continuous Infusions:    sodium chloride      sodium chloride 100 mL/hr at 11/21/21 1625    PN-Adult Premix  4.25/10 - Standard Electrolytes - Peripheral Line 42 mL/hr at 11/21/21 2041    fat emulsion 250 mL (11/21/21 2041)     PRN Medicationssodium chloride flush, 5-40 mL, PRN  sodium chloride, 25 mL, PRN  ondansetron, 4 mg, Q8H PRN   Or  ondansetron, 4 mg, Q6H PRN  polyethylene glycol, 17 g, Daily PRN  acetaminophen, 650 mg, Q6H PRN   Or  acetaminophen, 650 mg, Q6H PRN        Diagnostic Labs:  CBC:   Recent Labs     11/21/21  1057 11/22/21  0539   WBC 22.5* 21.2*   RBC 4.26 4.02   HGB 12.2 11.8*   HCT 38.2 35.9*   MCV 89.7 89.3   RDW 13.2 13.2    449     BMP:   Recent Labs     11/21/21  1057 11/22/21  0539   * 131*   K 3.7 3.8    101   CO2 19* 18*   BUN 24* 11   CREATININE 0.59 0.52     BNP: No results for input(s): BNP in the last 72 hours. PT/INR: No results for input(s): PROTIME, INR in the last 72 hours. APTT: No results for input(s): APTT in the last 72 hours. CARDIAC ENZYMES: No results for input(s): CKMB, CKMBINDEX, TROPONINI in the last 72 hours.     Invalid input(s): CKTOTAL;3  FASTING LIPID PANEL:  Lab Results   Component Value Date    CHOL 182 08/27/2020    HDL 45 08/27/2020    TRIG 68 10/25/2021     LIVER PROFILE:   Recent Labs     11/21/21  1057   AST 22   ALT 17   BILITOT 0.23*   ALKPHOS 119*      MICROBIOLOGY:   Lab Results   Component Value Date/Time    CULTURE (A) 11/21/2021 10:57 AM     POSITIVE Blood Culture Results called to and read back by: MEGGAN Estes ON 11/22/21    CULTURE  11/21/2021 10:57 AM     DIRECT GRAM STAIN FROM BOTTLE: GRAM POSITIVE COCCI IN CLUSTERS    CULTURE (A) 11/21/2021 10:57 AM     Staphylococcus epidermidis Detected: mecA/C Gene Detected- Methicillin Resistant Organism Methodology- Polymerase Chain Reaction (PCR)    CULTURE (A) 11/21/2021 10:57 AM     POSITIVE Blood Culture Results called to and read back by: MEGGAN Stein ON 11/22/21    CULTURE  11/21/2021 10:57 AM     DIRECT Bijan Garrison STAIN FROM BOTTLE: GRAM POSITIVE COCCI IN CLUSTERS    CULTURE (A) 11/21/2021 10:57 AM     Staphylococcus epidermidis Detected: mecA/C Gene Detected- Methicillin Resistant Organism Methodology- Polymerase Chain Reaction (PCR)       Imaging:    CT ABDOMEN PELVIS WO CONTRAST Additional Contrast? None    Result Date: 11/21/2021  There is no acute finding on this unenhanced CT study of the abdomen and pelvis to account for the patient's symptoms. Post-surgical changes of gastrectomy and of the small bowel are again noted. Bilateral renal cysts. Gallbladder surgically absent. CT CHEST PULMONARY EMBOLISM W CONTRAST    Result Date: 11/21/2021  No evidence of pulmonary embolism. Multiple scattered areas of nodularity are seen throughout both lungs. One of the areas has decreased in size but multiple others are new or significantly increased compared to the study from November 8. Given the short interval time frame of development findings may be infectious. Short-term follow-up recommended to exclude metastatic disease given patient's history of malignancy. There appears to be diffuse esophageal wall thickening, poorly evaluated on CT imaging. Clinical correlation for esophagitis recommended. ASSESSMENT & PLAN      Pneumonia  Plan: On Zosyn 3375 mg IV every 8 hours. Vancomycin 1000 mg x 12 hours. .Blood cultures- Gram positive cocci in clusters, Staphylococcus epidermidis detected- MecA/ Mec C detected  procalcitonin 2.73,      Nausea:  Zofran inj 4 mg given in the ER. Zofran 4 mg every 4 hours PRN. Scoplamine patch     Hypothyroidism:  Levothyroxine 88 mcg oral daily.          Essential hypertension  Plan: Norvasc 5 mg daily.       Malignant carcinoid tumor of stomach (HCC) with   S/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  Plan: Multivitamins and folic acid. On TPN nightly. Sucralfate tablet 1 gm daily.     CT Abdomen- negative           DVT ppx: Lovenox 40 mg subcutaneous daily  GI ppx: Protonix 40 mg daily     PT/OT/SW: consulted  Discharge Planning:consulted    Monse Sutton MD  PGY-1, Internal Medicine Resident  [facility], [facility]         11/22/2021, 6:37 AM

## 2021-11-22 NOTE — H&P
[facility]     Department of Internal Medicine - Staff Internal Medicine Teaching Service          ADMISSION NOTE/HISTORY AND PHYSICAL EXAMINATION   Date: 11/21/2021  Patient Name: Carolyn Talavera  Date of admission: 11/21/2021 10:21 AM  YOB: 1960  PCP: Vivian Kearney MD  History Obtained From:  patient, electronic medical record    [address] [facility]     Chief complaint: Fever, emesis, nausea, chest pain. HISTORY OF PRESENTING ILLNESS     The patient is a pleasant 62 y.o. female presents with a chief complaint of nausea, vomiting that started yesterday. Patient states that the vomit was non bloody. It is not associated with any abdominal pain. Patient states that she had loose stools. Patient denies fever. Patient also had persistent cough for the past 2 weeks that she received outpatient antibiotics experience and the cough has returned. 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 Patient is did not receive chemoradiation. Patient has TPN nightly. Patient denies taking any blood thinners but has history of pulmonary embolism in the past. Patient states she was taken off Eliquis in October. CT abdomen pelvis: There is no acute finding on this unenhanced CT study of the abdomen and   pelvis to account for the patient's symptoms.       Post-surgical changes of gastrectomy and of the small bowel are again noted. Bilateral renal cysts.  Gallbladder surgically absent.         CT chest:    No evidence of pulmonary embolism.       Multiple scattered areas of nodularity are seen throughout both lungs.  One   of the areas has decreased in size but multiple others are new or   significantly increased compared to the study from November 8.  Given the   short interval time frame of development findings may be infectious.    Short-term follow-up recommended to exclude metastatic disease given patient's history of malignancy. Significant labs include glucose 198, procalcitonin 2.73, , with mildly elevated alkaline phosphatase 119, WBC of 22.5, segmented neutrophils 91, moderate leukocyte esterase in urine. Blood cultures- Gram positive cocci in clusters, Staphylococcus epidermidis detected- MecA/ Mec C detected. The  PICC line she has was inserted on Aug 30th, 2021. Review of Systems:  General ROS: Completed and except as mentioned above were negative   HEENT ROS: Completed and except as mentioned above were negative   Allergy and Immunology ROS:  Completed and except as mentioned above were negative  Hematological and Lymphatic ROS:  Completed and except as mentioned above were negative  Respiratory ROS:  Completed and except as mentioned above were negative  Cardiovascular ROS:  Completed and except as mentioned above were negative  Gastrointestinal ROS: Completed and except as mentioned above were negative  Genito-Urinary ROS:  Completed and except as mentioned above were negative  Musculoskeletal ROS:  Completed and except as mentioned above were negative  Neurological ROS:  Completed and except as mentioned above were negative  Skin & Dermatological ROS:  Completed and except as mentioned above were negative  Psychological ROS:  Completed and except as mentioned above were negative    PAST MEDICAL HISTORY     Past Medical History:   Diagnosis Date    Anxiety     Arthritis     knee    Asthma     Colon polyp 02/21/2019    tubular adenoma; hyperplastic polyp    Colon polyps     Cyst of kidney, acquired     bilat.     Fatigue     Hypertension     Hypothyroidism     Neuroendocrine tumor 02/21/2019    of stomach    Obesity     Pharyngoesophageal dysphagia     Vocal cord polyps     Wears glasses        PAST SURGICAL HISTORY     Past Surgical History:   Procedure Laterality Date    CHOLECYSTECTOMY  10/09/2014    COLONOSCOPY  02/21/2019    tubular adenoma; hyperplastic polyp  COLONOSCOPY      over 5 yrs ago per pt with polyps (2-)    CYSTOURETHROSCOPY/STENT REMOVAL  05/03/2011    ENDOSCOPIC ULTRASOUND (LOWER) N/A 01/22/2020    ENDOSCOPIC ULTRASOUND WITH POSSIBLE EMR performed by Theodore Severino MD at [facility]    ERCP      GASTRECTOMY N/A 11/30/2020    HIP SURGERY Left     soft tissue tumor removal    THROAT SURGERY      vocal cord polyps removed    UPPER GASTROINTESTINAL ENDOSCOPY  02/21/2019    Neuroendocrine tumor    UPPER GASTROINTESTINAL ENDOSCOPY  09/23/2019    UPPER GASTROINTESTINAL ENDOSCOPY N/A 09/23/2019    EGD BIOPSY performed by Debra Taylor MD at [facility] 10/23/2019    EGD W/ EMR performed by Theodore Severino MD at [address] [place] [address] Rd N/A 10/29/2019    EGD CONTROL HEMORRHAGE performed by Hilario Klein MD at [address] [place] [address] Rd N/A 04/26/2021    EGD BIOPSY performed by Tram George MD at [address] [place] [address] N/A 8/30/2021    EGD ESOPHAGOGASTRODUODENOSCOPY performed by Odalis Estrada MD at [address] [facility]    [address]     Prior to Admission medications    Medication Sig Start Date End Date Taking?  Authorizing Provider   pantoprazole (PROTONIX) 40 MG tablet TAKE 1 TABLET BY MOUTH EVERY DAY 11/19/21   Malvin Burton MD   levothyroxine (SYNTHROID) 88 MCG tablet Take 1 tablet by mouth Five times weekly Indications: Takes only Wed-Sun (skips Mon/Tues) 10/12/21   Malvin Burton MD   scopolamine (TRANSDERM-SCOP) transdermal patch Place 1 patch onto the skin every 72 hours 10/12/21   Malvin Burton MD   sterile water SOLN with amino acids 10 % SOLN 50 g/L, dextrose 70 % SOLN 100 g/L, Nutrilyte CONC 20 mL/L, sodium phosphate 3 MMOLE/ML SOLN 5 mL/L, Multi-Vitamin, adult no.2 without vitamin k INJ 10 mL, trace minerals Cu-Mn-Se-Zn 463-51- MCG/ML SOLN 1 mL Infuse intravenously continuous    Historical Provider, MD   Compression Stockings MISC by Does not apply route 20-30 mmHg calf length 21   Que Gordillo MD   magnesium oxide (MAG-OX) 400 (241.3 Mg) MG TABS tablet Take 1 tablet by mouth 2 times daily 9/3/21   Oza Hatchet, MD   potassium chloride (KLOR-CON M) 20 MEQ extended release tablet Take 1 tablet by mouth 2 times daily 9/3/21   Oza Hatchet, MD   Cholecalciferol (VITAMIN D) 25 MCG TABS Take 1 tablet by mouth daily  Patient taking differently: Take 1,000 Units by mouth daily Take one every Thursday. 9/3/21   Oza Hatchet, MD   metoclopramide (REGLAN) 10 MG tablet TAKE 1 TABLET BY MOUTH 3 TIMES DAILY (BEFORE MEALS) 21   Eduard Ro MD   sucralfate (CARAFATE) 1 GM tablet Take 1 tablet by mouth 4 times daily 21   Bobby Azar MD   Folic Acid-Vit Y7-GCT O76 2.2-25-0.5 MG TABS Take 1 tablet by mouth daily 3/25/21   Que Gordillo MD   Multiple Vitamin (MULTI-DAY VITAMINS PO) Take 1 tablet by mouth daily     Historical Provider, MD   vitamin B-12 (CYANOCOBALAMIN) 500 MCG tablet Take 500 mcg by mouth daily    Historical Provider, MD       SOCIAL HISTORY     Tobacco: Patient is a former smoker. Smokes 1 pack/day for the past 25 years. Quit smoking on 2012. Alcohol: Patient does not drink alcohol  Illicits: Patient does not use drugs.   FAMILY HISTORY     Family History   Problem Relation Age of Onset    High Blood Pressure Mother     High Blood Pressure Sister     Stomach Cancer Sister     Other Sister         epilepsy    No Known Problems Father        PHYSICAL EXAM     Vitals: BP (!) 159/104   Pulse 114   Temp 98.8 °F (37.1 °C) (Oral)   Resp 18   Ht 5' 5\" (1.651 m)   Wt 162 lb (73.5 kg)   LMP 1994   SpO2 97%   BMI 26.96 kg/m²   Tmax: Temp (24hrs), Av.3 °F (37.4 °C), Min:98.8 °F (37.1 °C), Max:99.7 °F (37.6 °C)    Last Body weight:   Wt Readings from Last 3 Encounters:   21 162 lb (73.5 kg)   21 162 lb 6.4 oz (73.7 kg)   10/27/21 169 lb 4.8 oz (76.8 kg)     Body Mass Index : Body mass index is 26.96 kg/m². PHYSICAL EXAMINATION:  Constitutional: This is a well developed, well nourished, 25-29.9 - Overweight 62y.o. year old female who is alert, oriented, cooperative and in no apparent distress. Head:normocephalic and atraumatic. Constitutional:  Alert, cooperative and no distress. Mental Status:  Oriented to person, place and time and normal affect. Lungs:  Bilateral air entry present, lung fields clear. Normal effort. Heart:  Regular rate and rhythm, no murmur. Abdomen:  Soft, nontender, nondistended, normal bowel sounds. Extremities:  No edema, redness, tenderness in the calves. Skin:  Warm, dry, no gross lesions or rashes. INVESTIGATIONS     Laboratory Testing:     Recent Results (from the past 24 hour(s))   Lactate, Sepsis    Collection Time: 11/21/21 10:53 AM   Result Value Ref Range    Lactic Acid, Sepsis NOT REPORTED 0.5 - 1.9 mmol/L    Lactic Acid, Sepsis, Whole Blood 2.0 (H) 0.5 - 1.9 mmol/L   COVID-19, Rapid    Collection Time: 11/21/21 10:55 AM    Specimen: Nasopharyngeal Swab   Result Value Ref Range    Specimen Description . NASOPHARYNGEAL SWAB     SARS-CoV-2, Rapid Not Detected Not Detected   Culture, Blood 1    Collection Time: 11/21/21 10:57 AM    Specimen: Blood   Result Value Ref Range    Specimen Description . BLOOD     Special Requests PICC 2 ML     Culture NO GROWTH 5 HOURS    Culture, Blood 1    Collection Time: 11/21/21 10:57 AM    Specimen: Blood   Result Value Ref Range    Specimen Description . BLOOD     Special Requests RT AC 10 ML     Culture NO GROWTH 5 HOURS    CBC Auto Differential    Collection Time: 11/21/21 10:57 AM   Result Value Ref Range    WBC 22.5 (H) 3.5 - 11.3 k/uL    RBC 4.26 3.95 - 5.11 m/uL    Hemoglobin 12.2 11.9 - 15.1 g/dL    Hematocrit 38.2 36.3 - 47.1 %    MCV 89.7 82.6 - 102.9 fL    MCH 28.6 25.2 - 33.5 pg    MCHC 31.9 28.4 - 34.8 g/dL    RDW 13.2 11.8 - 14.4 %    Platelets 702 792 - 547 k/uL    MPV 9.6 8.1 - 13.5 fL    NRBC Automated 0.0 0.0 per 100 WBC    Differential Type NOT REPORTED     WBC Morphology NOT REPORTED     RBC Morphology NOT REPORTED     Platelet Estimate NOT REPORTED     Seg Neutrophils 91 (H) 36 - 65 %    Lymphocytes 5 (L) 24 - 43 %    Monocytes 3 3 - 12 %    Eosinophils % 0 (L) 1 - 4 %    Basophils 0 0 - 2 %    Immature Granulocytes 1 (H) 0 %    Segs Absolute 20.44 (H) 1.50 - 8.10 k/uL    Absolute Lymph # 1.20 1.10 - 3.70 k/uL    Absolute Mono # 0.69 0.10 - 1.20 k/uL    Absolute Eos # <0.03 0.00 - 0.44 k/uL    Basophils Absolute 0.05 0.00 - 0.20 k/uL    Absolute Immature Granulocyte 0.12 0.00 - 0.30 k/uL   Comprehensive Metabolic Panel    Collection Time: 11/21/21 10:57 AM   Result Value Ref Range    Glucose 198 (H) 70 - 99 mg/dL    BUN 24 (H) 8 - 23 mg/dL    CREATININE 0.59 0.50 - 0.90 mg/dL    Bun/Cre Ratio NOT REPORTED 9 - 20    Calcium 8.8 8.6 - 10.4 mg/dL    Sodium 134 (L) 135 - 144 mmol/L    Potassium 3.7 3.7 - 5.3 mmol/L    Chloride 101 98 - 107 mmol/L    CO2 19 (L) 20 - 31 mmol/L    Anion Gap 14 9 - 17 mmol/L    Alkaline Phosphatase 119 (H) 35 - 104 U/L    ALT 17 5 - 33 U/L    AST 22 <32 U/L    Total Bilirubin 0.23 (L) 0.3 - 1.2 mg/dL    Total Protein 7.4 6.4 - 8.3 g/dL    Albumin 3.3 (L) 3.5 - 5.2 g/dL    Albumin/Globulin Ratio 0.8 (L) 1.0 - 2.5    GFR Non-African American >60 >60 mL/min    GFR African American >60 >60 mL/min    GFR Comment          GFR Staging NOT REPORTED    C-Reactive Protein    Collection Time: 11/21/21 10:57 AM   Result Value Ref Range    .0 (H) 0.0 - 5.0 mg/L   Procalcitonin    Collection Time: 11/21/21 10:57 AM   Result Value Ref Range    Procalcitonin 2.73 (H) <0.09 ng/mL   Urinalysis with Microscopic    Collection Time: 11/21/21 11:00 AM   Result Value Ref Range    Color, UA Yellow Yellow    Turbidity UA Clear Clear    Glucose, Ur NEGATIVE NEGATIVE    Bilirubin Urine NEGATIVE NEGATIVE    Ketones, Urine NEGATIVE NEGATIVE    Specific Gravity, UA 1.018 1.005 - 1.030    Urine Hgb NEGATIVE NEGATIVE    pH, UA 6.0 5.0 - 8.0    Protein, UA NEGATIVE NEGATIVE    Urobilinogen, Urine Normal Normal    Nitrite, Urine NEGATIVE NEGATIVE    Leukocyte Esterase, Urine MODERATE (A) NEGATIVE    -          WBC, UA 5 TO 10 0 - 5 /HPF    RBC, UA None 0 - 4 /HPF    Casts UA  0 - 8 /LPF     0 TO 2 HYALINE Reference range defined for non-centrifuged specimen. Crystals, UA NOT REPORTED None /HPF    Epithelial Cells UA 0 TO 2 0 - 5 /HPF    Renal Epithelial, UA NOT REPORTED 0 /HPF    Bacteria, UA NOT REPORTED None    Mucus, UA NOT REPORTED None    Trichomonas, UA NOT REPORTED None    Amorphous, UA NOT REPORTED None    Other Observations UA NOT REPORTED NOT REQ. Yeast, UA NOT REPORTED None   Lactate, Sepsis    Collection Time: 11/21/21 12:47 PM   Result Value Ref Range    Lactic Acid, Sepsis NOT REPORTED 0.5 - 1.9 mmol/L    Lactic Acid, Sepsis, Whole Blood 1.4 0.5 - 1.9 mmol/L       Imaging:   CT ABDOMEN PELVIS WO CONTRAST Additional Contrast? None    Result Date: 11/21/2021  There is no acute finding on this unenhanced CT study of the abdomen and pelvis to account for the patient's symptoms. Post-surgical changes of gastrectomy and of the small bowel are again noted. Bilateral renal cysts. Gallbladder surgically absent. CT CHEST PULMONARY EMBOLISM W CONTRAST    Result Date: 11/21/2021  No evidence of pulmonary embolism. Multiple scattered areas of nodularity are seen throughout both lungs. One of the areas has decreased in size but multiple others are new or significantly increased compared to the study from November 8. Given the short interval time frame of development findings may be infectious. Short-term follow-up recommended to exclude metastatic disease given patient's history of malignancy. There appears to be diffuse esophageal wall thickening, poorly evaluated on CT imaging.   Clinical correlation for esophagitis recommended. ASSESSMENT & PLAN     ASSESSMENT / PLAN:     IMPRESSION    Principal Problem:    Pneumonia  Plan: On Zosyn 3375 mg IV every 8 hours. Vancomycin 1000 mg x 12 hours. .Blood cultures- Gram positive cocci in clusters, Staphylococcus epidermidis detected- MecA/ Mec C detected. procalcitonin 2.73,     Nausea:  Zofran inj 4 mg given in the ER. Zofran 4 mg every 4 hours PRN. Hypothyroidism:  Levothyroxine 88 mcg oral daily. Essential hypertension  Plan: Norvasc 5 mg daily. Malignant carcinoid tumor of stomach (HCC) with   S/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  Plan: Multivitamins and folic acid. On TPN nightly. Sucralfate tablet 1 gm daily. CT Abdomen- negative        DVT ppx: Lovenox 40 mg subcutaneous daily  GI ppx: Protonix 40 mg daily    PT/OT/SW: consulted  Discharge Planning:consulted    Monse Sutton MD  Internal Medicine Resident, PGY-1  [facility];  [facility]  11/21/2021, 8:11 PM

## 2021-11-23 ENCOUNTER — APPOINTMENT (OUTPATIENT)
Dept: GENERAL RADIOLOGY | Age: 61
DRG: 392 | End: 2021-11-23
Payer: COMMERCIAL

## 2021-11-23 LAB
ABSOLUTE EOS #: 0.04 K/UL (ref 0–0.44)
ABSOLUTE IMMATURE GRANULOCYTE: 0.06 K/UL (ref 0–0.3)
ABSOLUTE LYMPH #: 1.56 K/UL (ref 1.1–3.7)
ABSOLUTE MONO #: 1.05 K/UL (ref 0.1–1.2)
ADENOVIRUS PCR: NOT DETECTED
ANION GAP SERPL CALCULATED.3IONS-SCNC: 16 MMOL/L (ref 9–17)
BASOPHILS # BLD: 0 % (ref 0–2)
BASOPHILS ABSOLUTE: 0.03 K/UL (ref 0–0.2)
BORDETELLA PARAPERTUSSIS: NOT DETECTED
BORDETELLA PERTUSSIS PCR: NOT DETECTED
BUN BLDV-MCNC: 14 MG/DL (ref 8–23)
BUN/CREAT BLD: ABNORMAL (ref 9–20)
C-REACTIVE PROTEIN: 63.9 MG/L (ref 0–5)
CALCIUM SERPL-MCNC: 8.6 MG/DL (ref 8.6–10.4)
CHLAMYDIA PNEUMONIAE BY PCR: NOT DETECTED
CHLORIDE BLD-SCNC: 105 MMOL/L (ref 98–107)
CO2: 16 MMOL/L (ref 20–31)
CORONAVIRUS 229E PCR: NOT DETECTED
CORONAVIRUS HKU1 PCR: NOT DETECTED
CORONAVIRUS NL63 PCR: NOT DETECTED
CORONAVIRUS OC43 PCR: NOT DETECTED
CREAT SERPL-MCNC: 0.47 MG/DL (ref 0.5–0.9)
CRYPTOCOCCAL ANTIGEN: NEGATIVE
DIFFERENTIAL TYPE: ABNORMAL
EOSINOPHILS RELATIVE PERCENT: 0 % (ref 1–4)
GFR AFRICAN AMERICAN: >60 ML/MIN
GFR NON-AFRICAN AMERICAN: >60 ML/MIN
GFR SERPL CREATININE-BSD FRML MDRD: ABNORMAL ML/MIN/{1.73_M2}
GFR SERPL CREATININE-BSD FRML MDRD: ABNORMAL ML/MIN/{1.73_M2}
GLUCOSE BLD-MCNC: 141 MG/DL (ref 65–105)
GLUCOSE BLD-MCNC: 181 MG/DL (ref 70–99)
HCT VFR BLD CALC: 37.7 % (ref 36.3–47.1)
HEMOGLOBIN: 11.8 G/DL (ref 11.9–15.1)
HUMAN METAPNEUMOVIRUS PCR: NOT DETECTED
IMMATURE GRANULOCYTES: 0 %
INFLUENZA A BY PCR: NOT DETECTED
INFLUENZA A H1 (2009) PCR: NORMAL
INFLUENZA A H1 PCR: NORMAL
INFLUENZA A H3 PCR: NORMAL
INFLUENZA B BY PCR: NOT DETECTED
LYMPHOCYTES # BLD: 11 % (ref 24–43)
MAGNESIUM: 1.6 MG/DL (ref 1.6–2.6)
MCH RBC QN AUTO: 28.3 PG (ref 25.2–33.5)
MCHC RBC AUTO-ENTMCNC: 31.3 G/DL (ref 28.4–34.8)
MCV RBC AUTO: 90.4 FL (ref 82.6–102.9)
MONOCYTES # BLD: 7 % (ref 3–12)
MYCOPLASMA PNEUMONIAE PCR: NOT DETECTED
NRBC AUTOMATED: 0 PER 100 WBC
PARAINFLUENZA 1 PCR: NOT DETECTED
PARAINFLUENZA 2 PCR: NOT DETECTED
PARAINFLUENZA 3 PCR: NOT DETECTED
PARAINFLUENZA 4 PCR: NOT DETECTED
PDW BLD-RTO: 12.8 % (ref 11.8–14.4)
PHOSPHORUS: 2.3 MG/DL (ref 2.6–4.5)
PLATELET # BLD: 424 K/UL (ref 138–453)
PLATELET ESTIMATE: ABNORMAL
PMV BLD AUTO: 9.2 FL (ref 8.1–13.5)
POTASSIUM SERPL-SCNC: 4.1 MMOL/L (ref 3.7–5.3)
RBC # BLD: 4.17 M/UL (ref 3.95–5.11)
RBC # BLD: ABNORMAL 10*6/UL
RESP SYNCYTIAL VIRUS PCR: NOT DETECTED
RHINO/ENTEROVIRUS PCR: NOT DETECTED
SARS-COV-2, PCR: NOT DETECTED
SEG NEUTROPHILS: 82 % (ref 36–65)
SEGMENTED NEUTROPHILS ABSOLUTE COUNT: 11.92 K/UL (ref 1.5–8.1)
SODIUM BLD-SCNC: 137 MMOL/L (ref 135–144)
SPECIMEN DESCRIPTION: NORMAL
TRIGL SERPL-MCNC: 64 MG/DL
WBC # BLD: 14.7 K/UL (ref 3.5–11.3)
WBC # BLD: ABNORMAL 10*3/UL

## 2021-11-23 PROCEDURE — 6370000000 HC RX 637 (ALT 250 FOR IP): Performed by: STUDENT IN AN ORGANIZED HEALTH CARE EDUCATION/TRAINING PROGRAM

## 2021-11-23 PROCEDURE — 85025 COMPLETE CBC W/AUTO DIFF WBC: CPT

## 2021-11-23 PROCEDURE — 6370000000 HC RX 637 (ALT 250 FOR IP)

## 2021-11-23 PROCEDURE — 6360000002 HC RX W HCPCS: Performed by: STUDENT IN AN ORGANIZED HEALTH CARE EDUCATION/TRAINING PROGRAM

## 2021-11-23 PROCEDURE — 80048 BASIC METABOLIC PNL TOTAL CA: CPT

## 2021-11-23 PROCEDURE — 84478 ASSAY OF TRIGLYCERIDES: CPT

## 2021-11-23 PROCEDURE — 0202U NFCT DS 22 TRGT SARS-COV-2: CPT

## 2021-11-23 PROCEDURE — 36415 COLL VENOUS BLD VENIPUNCTURE: CPT

## 2021-11-23 PROCEDURE — 99232 SBSQ HOSP IP/OBS MODERATE 35: CPT | Performed by: INTERNAL MEDICINE

## 2021-11-23 PROCEDURE — 99254 IP/OBS CNSLTJ NEW/EST MOD 60: CPT | Performed by: INTERNAL MEDICINE

## 2021-11-23 PROCEDURE — 1200000000 HC SEMI PRIVATE

## 2021-11-23 PROCEDURE — 2500000003 HC RX 250 WO HCPCS

## 2021-11-23 PROCEDURE — 83735 ASSAY OF MAGNESIUM: CPT

## 2021-11-23 PROCEDURE — 84100 ASSAY OF PHOSPHORUS: CPT

## 2021-11-23 PROCEDURE — 86316 IMMUNOASSAY TUMOR OTHER: CPT

## 2021-11-23 PROCEDURE — 2580000003 HC RX 258: Performed by: STUDENT IN AN ORGANIZED HEALTH CARE EDUCATION/TRAINING PROGRAM

## 2021-11-23 PROCEDURE — 71045 X-RAY EXAM CHEST 1 VIEW: CPT

## 2021-11-23 PROCEDURE — 82947 ASSAY GLUCOSE BLOOD QUANT: CPT

## 2021-11-23 PROCEDURE — 84260 ASSAY OF SEROTONIN: CPT

## 2021-11-23 PROCEDURE — 86140 C-REACTIVE PROTEIN: CPT

## 2021-11-23 RX ORDER — LABETALOL HYDROCHLORIDE 5 MG/ML
10 INJECTION, SOLUTION INTRAVENOUS EVERY 8 HOURS PRN
Status: DISCONTINUED | OUTPATIENT
Start: 2021-11-23 | End: 2021-11-24 | Stop reason: HOSPADM

## 2021-11-23 RX ORDER — MAGNESIUM SULFATE IN WATER 40 MG/ML
2000 INJECTION, SOLUTION INTRAVENOUS ONCE
Status: COMPLETED | OUTPATIENT
Start: 2021-11-23 | End: 2021-11-23

## 2021-11-23 RX ADMIN — SODIUM CHLORIDE, PRESERVATIVE FREE 10 ML: 5 INJECTION INTRAVENOUS at 08:38

## 2021-11-23 RX ADMIN — SODIUM CHLORIDE: 9 INJECTION, SOLUTION INTRAVENOUS at 19:10

## 2021-11-23 RX ADMIN — PANTOPRAZOLE SODIUM 40 MG: 40 TABLET, DELAYED RELEASE ORAL at 08:36

## 2021-11-23 RX ADMIN — ACETAMINOPHEN 650 MG: 325 TABLET ORAL at 06:31

## 2021-11-23 RX ADMIN — METOCLOPRAMIDE 10 MG: 10 TABLET ORAL at 16:09

## 2021-11-23 RX ADMIN — METOCLOPRAMIDE 10 MG: 10 TABLET ORAL at 11:01

## 2021-11-23 RX ADMIN — ENOXAPARIN SODIUM 40 MG: 100 INJECTION SUBCUTANEOUS at 08:37

## 2021-11-23 RX ADMIN — SODIUM CHLORIDE, PRESERVATIVE FREE 10 ML: 5 INJECTION INTRAVENOUS at 21:01

## 2021-11-23 RX ADMIN — PROMETHAZINE HYDROCHLORIDE 6.25 MG: 25 INJECTION INTRAMUSCULAR; INTRAVENOUS at 17:32

## 2021-11-23 RX ADMIN — SUCRALFATE 1 G: 1 TABLET ORAL at 16:09

## 2021-11-23 RX ADMIN — SUCRALFATE 1 G: 1 TABLET ORAL at 08:37

## 2021-11-23 RX ADMIN — AMLODIPINE BESYLATE 5 MG: 5 TABLET ORAL at 08:36

## 2021-11-23 RX ADMIN — MAGNESIUM SULFATE HEPTAHYDRATE 2000 MG: 2 INJECTION, SOLUTION INTRAVENOUS at 11:48

## 2021-11-23 RX ADMIN — MAGNESIUM GLUCONATE 500 MG ORAL TABLET 400 MG: 500 TABLET ORAL at 16:09

## 2021-11-23 RX ADMIN — MAGNESIUM GLUCONATE 500 MG ORAL TABLET 400 MG: 500 TABLET ORAL at 08:36

## 2021-11-23 RX ADMIN — I.V. FAT EMULSION 100 ML: 20 EMULSION INTRAVENOUS at 19:10

## 2021-11-23 RX ADMIN — ALCOHOL 1 TABLET: 70.47 GEL TOPICAL at 08:37

## 2021-11-23 RX ADMIN — PROMETHAZINE HYDROCHLORIDE 6.25 MG: 25 INJECTION INTRAMUSCULAR; INTRAVENOUS at 08:45

## 2021-11-23 RX ADMIN — VITAMIN C 1 TABLET: TAB at 08:37

## 2021-11-23 RX ADMIN — SODIUM CHLORIDE: 9 INJECTION, SOLUTION INTRAVENOUS at 08:37

## 2021-11-23 RX ADMIN — LEVOTHYROXINE SODIUM 88 MCG: 88 TABLET ORAL at 06:31

## 2021-11-23 RX ADMIN — SUCRALFATE 1 G: 1 TABLET ORAL at 21:01

## 2021-11-23 RX ADMIN — METOCLOPRAMIDE 10 MG: 10 TABLET ORAL at 06:31

## 2021-11-23 RX ADMIN — LEUCINE, PHENYLALANINE, LYSINE, METHIONINE, ISOLEUCINE, VALINE, HISTIDINE, THREONINE, TRYPTOPHAN, ALANINE, GLYCINE, ARGININE, PROLINE, SERINE, TYROSINE, SODIUM ACETATE, DIBASIC POTASSIUM PHOSPHATE, MAGNESIUM CHLORIDE, SODIUM CHLORIDE, CALCIUM CHLORIDE, DEXTROSE
365; 280; 290; 200; 300; 290; 240; 210; 90; 1035; 515; 575; 340; 250; 20; 340; 261; 51; 59; 33; 20 INJECTION INTRAVENOUS at 19:10

## 2021-11-23 ASSESSMENT — PAIN SCALES - GENERAL
PAINLEVEL_OUTOF10: 2
PAINLEVEL_OUTOF10: 4
PAINLEVEL_OUTOF10: 3
PAINLEVEL_OUTOF10: 4
PAINLEVEL_OUTOF10: 0
PAINLEVEL_OUTOF10: 4
PAINLEVEL_OUTOF10: 0
PAINLEVEL_OUTOF10: 2

## 2021-11-23 ASSESSMENT — PAIN DESCRIPTION - LOCATION: LOCATION: CHEST

## 2021-11-23 ASSESSMENT — PAIN DESCRIPTION - PAIN TYPE: TYPE: ACUTE PAIN

## 2021-11-23 ASSESSMENT — PAIN DESCRIPTION - FREQUENCY: FREQUENCY: CONTINUOUS

## 2021-11-23 NOTE — CONSULTS
Today's Date: 11/23/2021  Patient Name: Any Torres  Date of admission: 11/21/2021 10:21 AM  Patient's age: 62 y.o., 1960  Admission Dx: Pneumonia [J18.9]  Bilious vomiting with nausea [R11.14]  Pneumonia due to infectious organism, unspecified laterality, unspecified part of lung [J18.9]    Reason for Consult: pt known to Dr Sam Garcia, h/o gastric ca s/p resection, lungs nodules on CT increased from prior studies  Requesting Physician: Enrico Downs MD    CHIEF COMPLAINT:    Chief Complaint   Patient presents with    Fever    Fatigue    Emesis    Nausea    Chest Pain       History Obtained From:  patient and chart    HISTORY OF PRESENT ILLNESS:      Any Torres is a 62 y.o. female who is admitted to the hospital on 11/21/2021  for chest pain. Patient has been following with Dr. Karla Borrero as outpatient and receiving Sandostatin. She had a CT scan on 11/8/2021 which showed interval development of numerous enlarged lung nodules concerning for metastasis/infectious or inflammatory process. Her repeat CT of the chest here on 11/21/2021 showed multiple scattered areas of nodularity throughout the bilateral lungs and multiple new or significantly increased nodules were seen compared to the scan on November 8. Oncology consulted for further recommendations. Her blood culture has been positive for staph epidermidis and ID suspecting possible contamination. Her COVID-19 test has been negative. Patient follows with Dr. Karla Borrero as outpatient and her brief oncologic history as follows. DIAGNOSIS:       Malignant carcinoid tumor of the stomach  Recent GI bleeding after endoscopic mucosal resection of stomach carcinoid on October 23, 2019.   Evidence of recurrent disease on repeated EGD January 2020 and continued elevation of tumor marker chromogranin A  Iron deficiency secondary to above  History of hypothyroidism  Multiple comorbidities as listed      CURRENT THERAPY:         Status post endoscopic mucosal resection of stomach carcinoid October 23, 2019  S/p gastric resection at [facility] November 20, 2020. Sandostatin started March 2021. Past Medical History:   has a past medical history of Anxiety, Arthritis, Asthma, Colon polyp, Colon polyps, Cyst of kidney, acquired, Fatigue, Hypertension, Hypothyroidism, Neuroendocrine tumor, Obesity, Pharyngoesophageal dysphagia, Vocal cord polyps, and Wears glasses. Past Surgical History:   has a past surgical history that includes cystourethroscopy/stent removal (05/03/2011); Colonoscopy (02/21/2019); ERCP; Cholecystectomy (10/09/2014); hip surgery (Left); Throat surgery; Colonoscopy; Upper gastrointestinal endoscopy (02/21/2019); Upper gastrointestinal endoscopy (09/23/2019); Upper gastrointestinal endoscopy (N/A, 09/23/2019); Upper gastrointestinal endoscopy (N/A, 10/23/2019); Upper gastrointestinal endoscopy (N/A, 10/29/2019); Endoscopic ultrasonography, GI (N/A, 01/22/2020); Upper gastrointestinal endoscopy (N/A, 04/26/2021); gastrectomy (N/A, 11/30/2020); and Upper gastrointestinal endoscopy (N/A, 8/30/2021). Medications:    Prior to Admission medications    Medication Sig Start Date End Date Taking? Authorizing Provider   metoprolol succinate (TOPROL XL) 25 MG extended release tablet Take 25 mg by mouth daily Patient states this is a new prescription , she has not taken medication yet. Dr. Fernandez Kinds prescribed this medication last week.    Yes Historical Provider, MD   pantoprazole (PROTONIX) 40 MG tablet TAKE 1 TABLET BY MOUTH EVERY DAY 11/19/21  Yes Chiquita Patel MD   levothyroxine (SYNTHROID) 88 MCG tablet Take 1 tablet by mouth Five times weekly Indications: Takes only Wed-Sun (skips Mon/Tues) 10/12/21  Yes Chiquita Patel MD   scopolamine (TRANSDERM-SCOP) transdermal patch Place 1 patch onto the skin every 72 hours 10/12/21  Yes Chiquita Patel MD   sterile water SOLN with amino acids 10 % SOLN 50 g/L, dextrose 70 % SOLN 100 g/L, Nutrilyte CONC 20 mL/L, sodium phosphate 3 MMOLE/ML SOLN 5 mL/L, Multi-Vitamin, adult no.2 without vitamin k INJ 10 mL, trace minerals Cu-Mn-Se-Zn 119-03- MCG/ML SOLN 1 mL Infuse intravenously continuous   Yes Historical Provider, MD   Compression Stockings MISC by Does not apply route 20-30 mmHg calf length 9/21/21  Yes Amirah Pires MD   magnesium oxide (MAG-OX) 400 (241.3 Mg) MG TABS tablet Take 1 tablet by mouth 2 times daily 9/3/21  Yes Denton Sawyer MD   potassium chloride (KLOR-CON M) 20 MEQ extended release tablet Take 1 tablet by mouth 2 times daily 9/3/21  Yes Denton Sawyer MD   Cholecalciferol (VITAMIN D) 25 MCG TABS Take 1 tablet by mouth daily  Patient taking differently: Take 1,000 Units by mouth daily Take one every Thursday.  9/3/21  Yes Denton Sawyer MD   metoclopramide (REGLAN) 10 MG tablet TAKE 1 TABLET BY MOUTH 3 TIMES DAILY (BEFORE MEALS) 6/16/21  Yes Nirali Barboza MD   sucralfate (CARAFATE) 1 GM tablet Take 1 tablet by mouth 4 times daily 4/29/21  Yes Yana Mg MD   Folic Acid-Vit P5-FCE M99 2.2-25-0.5 MG TABS Take 1 tablet by mouth daily 3/25/21  Yes Amirah Pires MD   Multiple Vitamin (MULTI-DAY VITAMINS PO) Take 1 tablet by mouth daily    Yes Historical Provider, MD   vitamin B-12 (CYANOCOBALAMIN) 500 MCG tablet Take 500 mcg by mouth daily   Yes Historical Provider, MD     Current Facility-Administered Medications   Medication Dose Route Frequency Provider Last Rate Last Admin    PN-Adult Premix 5/20 - Standard Electrolytes - Central Line   IntraVENous Continuous TPN Daylin Barroso MD        labetalol (NORMODYNE;TRANDATE) injection 10 mg  10 mg IntraVENous Q8H PRN Chasidy Biswas MD        vitamin B and C (TOTAL B-C) 1 tablet  1 tablet Oral Daily Daylin Montilla MD   1 tablet at 11/23/21 0837    levothyroxine (SYNTHROID) tablet 88 mcg  88 mcg Oral Daily Daylin Barroso MD   88 mcg at 11/23/21 0631    magnesium oxide (MAG-OX) tablet 400 mg  400 mg Oral BID Ramana Harris MD   400 mg at 11/23/21 0836    metoclopramide (REGLAN) tablet 10 mg  10 mg Oral TID AC Daylin Barroso MD   10 mg at 11/23/21 1101    sucralfate (CARAFATE) tablet 1 g  1 g Oral 4x Daily Daylin Barroso MD   1 g at 11/23/21 0837    pantoprazole (PROTONIX) tablet 40 mg  40 mg Oral Daily Daylin Barroso MD   40 mg at 11/23/21 0836    multivitamin 1 tablet  1 tablet Oral Daily Daylin Barroso MD   1 tablet at 11/23/21 0837    fat emulsion 20 % infusion 100 mL  100 mL IntraVENous Daily Daylin Barroso MD   Stopped at 11/23/21 0828    promethazine (PHENERGAN) injection 6.25 mg  6.25 mg IntraVENous Q6H PRN Florencio Pineda MD   6.25 mg at 11/23/21 0845    sodium chloride flush 0.9 % injection 5-40 mL  5-40 mL IntraVENous 2 times per day Lucille Clark MD   10 mL at 11/23/21 0838    sodium chloride flush 0.9 % injection 5-40 mL  5-40 mL IntraVENous PRN Cyril Braun MD        0.9 % sodium chloride infusion  25 mL IntraVENous PRN Cyril Braun MD        enoxaparin (LOVENOX) injection 40 mg  40 mg SubCUTAneous Daily Cyril Braun MD   40 mg at 11/23/21 0837    polyethylene glycol (GLYCOLAX) packet 17 g  17 g Oral Daily PRN Cyril Braun MD        acetaminophen (TYLENOL) tablet 650 mg  650 mg Oral Q6H PRN Cyril Braun MD   650 mg at 11/23/21 0631    Or    acetaminophen (TYLENOL) suppository 650 mg  650 mg Rectal Q6H PRN Cyril Braun MD        0.9 % sodium chloride infusion   IntraVENous Continuous Cyril Braun  mL/hr at 11/23/21 0837 New Bag at 11/23/21 0837       Allergies:  Erythromycin    Social History:   reports that she quit smoking about 9 years ago. She has a 25.00 pack-year smoking history. She has never used smokeless tobacco. She reports previous alcohol use. She reports that she does not use drugs. Family History: family history includes High Blood Pressure in her mother and sister;  No Known Problems in her father; Other in her sister; Stomach Cancer in her sister. REVIEW OF SYSTEMS:    Constitutional: No fever or chills. No night sweats, no weight loss   Eyes: No eye discharge, double vision, or eye pain   HEENT: negative for sore mouth, sore throat, hoarseness and voice change   Respiratory: negative for cough , sputum, dyspnea, wheezing, hemoptysis, chest pain   Cardiovascular: negative for chest pain, dyspnea, palpitations, orthopnea, PND   Gastrointestinal: negative for nausea, vomiting, diarrhea, constipation, abdominal pain, Dysphagia, hematemesis and hematochezia   Genitourinary: negative for frequency, dysuria, nocturia, urinary incontinence, and hematuria   Integument: negative for rash, skin lesions, bruises.    Hematologic/Lymphatic: negative for easy bruising, bleeding, lymphadenopathy, or petechiae   Endocrine: negative for heat or cold intolerance,weight changes, change in bowel habits and hair loss   Musculoskeletal: negative for myalgias, arthralgias, pain, joint swelling,and bone pain   Neurological: negative for headaches, dizziness, seizures, weakness, numbness    PHYSICAL EXAM:      BP (!) 143/97   Pulse 98   Temp 98.4 °F (36.9 °C) (Oral)   Resp 18   Ht 5' 5\" (1.651 m)   Wt 162 lb (73.5 kg)   LMP 1994   SpO2 98%   BMI 26.96 kg/m²    Temp (24hrs), Av.6 °F (37 °C), Min:97.9 °F (36.6 °C), Max:99.6 °F (37.6 °C)    General appearance - well appearing, no in pain or distress   Mental status - alert and cooperative   Eyes - pupils equal and reactive, extraocular eye movements intact   Ears - bilateral TM's and external ear canals normal   Mouth - mucous membranes moist, pharynx normal without lesions   Neck - supple, no significant adenopathy   Lymphatics - no palpable lymphadenopathy, no hepatosplenomegaly   Chest - clear to auscultation, no wheezes, rales or rhonchi, symmetric air entry   Heart - normal rate, regular rhythm, normal S1, S2, no murmurs  Abdomen - soft, nontender, nondistended, no masses or organomegaly   Neurological - alert, oriented, normal speech, no focal findings or movement disorder noted   Musculoskeletal - no joint tenderness, deformity or swelling   Extremities - peripheral pulses normal, no pedal edema, no clubbing or cyanosis   Skin - normal coloration and turgor, no rashes, no suspicious skin lesions noted ,    DATA:    Labs:   CBC:   Recent Labs     11/22/21  0539 11/23/21  0617   WBC 21.2* 14.7*   HGB 11.8* 11.8*   HCT 35.9* 37.7    424     BMP:   Recent Labs     11/22/21  0539 11/23/21  0617   * 137   K 3.8 4.1   CO2 18* 16*   BUN 11 14   CREATININE 0.52 0.47*   LABGLOM >60 >60   GLUCOSE 137* 181*     PT/INR: No results for input(s): PROTIME, INR in the last 72 hours. IMAGING DATA:  CT ABDOMEN PELVIS WO CONTRAST Additional Contrast? None   Final Result   There is no acute finding on this unenhanced CT study of the abdomen and   pelvis to account for the patient's symptoms. Post-surgical changes of gastrectomy and of the small bowel are again noted. Bilateral renal cysts. Gallbladder surgically absent. CT CHEST PULMONARY EMBOLISM W CONTRAST   Final Result   No evidence of pulmonary embolism. Multiple scattered areas of nodularity are seen throughout both lungs. One   of the areas has decreased in size but multiple others are new or   significantly increased compared to the study from November 8. Given the   short interval time frame of development findings may be infectious. Short-term follow-up recommended to exclude metastatic disease given   patient's history of malignancy. There appears to be diffuse esophageal wall thickening, poorly evaluated on   CT imaging. Clinical correlation for esophagitis recommended.          XR CHEST PORTABLE    (Results Pending)       Primary Problem  Sepsis [facility])    Active Hospital Problems    Diagnosis Date Noted    Pneumonia [J18.9] 11/21/2021    Sepsis ([address] [A41.9] 11/21/2021    S/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 [Z90.3, Z98.0]     Malignant carcinoid tumor of stomach ([address] [C7A.092]     Essential hypertension [I10] 10/28/2019    Neuroendocrine tumor [D3A.8] 02/21/2019    Hyperlipidemia [E78.5]      IMPRESSION:   1. Malignant carcinoid tumor of the stomach, with recurrent disease on repeated EGD January 2020 and continued elevation of tumor marker chromogranin A. Currently on Sandostatin  2. Multiple bilateral lung nodules  3. Bacteremia       RECOMMENDATIONS:  1. I reviewed the laboratory data, imaging studies, diagnosis, prior records, other treatment recommendations with patient  2. Given rapid increase in size within 2 weeks in the lung nodules, progression of carcinoid less likely however cannot be ruled out completely  3. I will check serotonin and chromogranin A level now  4. She will need a gallium PET as o/p or octreotide scan  5. She will need follow-up with Dr. Ta Barron as outpatient in 1 to 2 weeks  6. Continue other management as per primary team  7. Discharge as per primary  8. We will follow    Discussed with patient and Nurse. Thank you for asking us to see this patient. Buster Morris MD  Hematologist/Medical Oncologist    Cell: [phone]    This note is created with the assistance of a speech recognition program.  While intending to generate a document that actually reflects the content of the visit, the document can still have some errors including those of syntax and sound a like substitutions which may escape proof reading. It such instances, actual meaning can be extrapolated by contextual diversion.

## 2021-11-23 NOTE — PROGRESS NOTES
Comprehensive Nutrition Assessment    Type and Reason for Visit:  Reassess    Nutrition Recommendations/Plan:   - Continue current diet - encourage/monitor PO intakes as tolerated. Monitor nausea/vomiting.  - Continue cyclic TPN of a Premixed 5/20 bag to run from 8pm to 8am at 167 mL/hr. Continue 100 mL 20% IV lipids. Will provide 1960 kcals, 100 gm protein. Discussed with RN about replacing electrolytes outside of TPN bag as/if needed. Monitor labs and modify TPN as/if needed. - Will monitor plan of care. Nutrition Assessment:  Cyclic TPN to continue. Pt continues to have nausea and episodes of vomiting. RN reports pt took pills this morning and then vomited them back up. Last BM 11/21. Discussed labs and current TPN shortages with RN. Labs reviewed: Phos 2.3 mg/dL. Meds reviewed: Synthroid, Reglan, Mag Sulfate, MVI, Phenergan PRN. Malnutrition Assessment:  Malnutrition Status: Moderate malnutrition    Context:  Chronic Illness     Findings of the 6 clinical characteristics of malnutrition:  Energy Intake:  Mild decrease in energy intake - Previously meeting estimated needs with TPN. Weight Loss:  7 - Greater than 10% over 6 months - 17% x 7 months     Body Fat Loss:  1 - Mild body fat loss Orbital   Muscle Mass Loss:  1 - Mild muscle mass loss Temples (temporalis), Clavicles (pectoralis & deltoids)  Fluid Accumulation:  No significant fluid accumulation   Strength:  Not Performed    Estimated Daily Nutrient Needs:  Energy (kcal):  25-28 kcal/kg = 1155-6654 kcals/day; Weight Used for Energy Requirements:  Current     Protein (g):  1.2-1.5 gm/kg =  gm pro/day; Weight Used for Protein Requirements:  Ideal        Fluid (ml/day):  25-30 kcal/kg = 2624-1785 mL/day or per MD; Method Used for Fluid Requirements:  ml/Kg      Nutrition Related Findings:  Labs/Meds reviewed. Last BM 11/21. Nausea/Vomiting continues.       Wounds:  None       Current Nutrition Therapies:    ADULT DIET; Regular; Low Sodium (2 gm)  Current Parenteral Nutrition Orders:  · Type and Formula: 2-in-1 Standard (400 gm Dextrose, 100 gm AA)   · Lipids: 100ml, Daily  · Duration: Cyclic (12 hours nocturnally from 8pm to 8am)  · Rate/Volume: 167 mL/hr x 12 hrs  · Current PN Order Provides: 1960 kcals, 100 gm protein per day  · Goal PN Orders Provides: Premixed 5/20 at 167 mL/hr x 12 hrs nocturnally (400 gm Dextrose, 100 gm AA, and 100 mL 20% IV lipids) = 1960 kcals, 100 gm protein per day    Anthropometric Measures:  · Height: 5' 5\" (165.1 cm)  · Current Body Weight: 162 lb (73.5 kg)   · Admission Body Weight: 162 lb (73.5 kg)    · Usual Body Weight: 195 lb 3.2 oz (88.5 kg) (4/1/21 per chart review - reports  lb before surgery)     · Ideal Body Weight: 125 lbs; % Ideal Body Weight 129.6 %   · BMI: 27  · BMI Categories: Overweight (BMI 25.0-29. 9)       Nutrition Diagnosis:   · Inadequate oral intake related to altered GI function as evidenced by poor intake prior to admission, nausea, vomiting (need for parenteral nutrition support)    · Moderate malnutrition, In context of chronic illness related to altered GI function, inadequate protein-energy intake as evidenced by poor intake prior to admission, weight loss greater than or equal to 10% in 6 months, mild muscle loss, mild loss of subcutaneous fat, nutrition support - parenteral nutrition    Nutrition Interventions:   Food and/or Nutrient Delivery:  Continue Current Diet, Continue Current Parenteral Nutrition  Nutrition Education/Counseling:  No recommendation at this time   Coordination of Nutrition Care:  Continue to monitor while inpatient    Goals:  Meet % of estimated nutrition needs with oral diet and TPN.        Nutrition Monitoring and Evaluation:   Behavioral-Environmental Outcomes:  None Identified   Food/Nutrient Intake Outcomes:  Food and Nutrient Intake, Parenteral Nutrition Intake/Tolerance  Physical Signs/Symptoms Outcomes:  Biochemical Data, GI Status, Nausea or Vomiting, Hemodynamic Status, Nutrition Focused Physical Findings, Skin, Weight     Discharge Planning:    Parenteral Nutrition, Continue current diet     Electronically signed by Viktor Giles RD, LD on 11/23/21 at 12:07 PM EST    Contact: [phone]

## 2021-11-23 NOTE — PROGRESS NOTES
[facility]  Internal Medicine Teaching Residency Program  Inpatient Daily Progress Note  ______________________________________________________________________________    Patient: Marlene Da Silva  YOB: 1960   [id number]    Acct: [de-identified]     Room: 300/3838-24  Admit date: 11/21/2021  Today's date: 11/23/21  Number of days in the hospital: 2    SUBJECTIVE   CC: Fever, Fatigue, Emesis, Nausea, and Chest Pain    Pt examined at bedside. Chart & results reviewed. No issues overnight. Patient denied any symptoms. ID following, recommended monitor off antibiotic. Blood culture has been repeated and negative so far. Will consult oncology for worsening of nodular lesion on the lung CT.    ROS:  General ROS: Completed and except as mentioned above were negative   HEENT ROS: Completed and except as mentioned above were negative   Allergy and Immunology ROS: Completed and except as mentioned above were negative  Hematological and Lymphatic ROS: Completed and except as mentioned above were negative  Respiratory ROS: Completed and except as mentioned above were negative  Cardiovascular ROS: Completed and except as mentioned above were negative  Gastrointestinal ROS: Completed and except as mentioned above were negative  Genito-Urinary ROS: Completed and except as mentioned above were negative  Musculoskeletal ROS: Completed and except as mentioned above were negative  Neurological ROS: Completed and except as mentioned above were negative  Skin & Dermatological ROS: Completed and except as mentioned above were negative  Psychological ROS: Completed and except as mentioned above were negative  BRIEF HISTORY   The patient is a pleasant 62 y.o. female presents with a chief complaint of nausea, vomiting that started yesterday. Patient states that the vomit was non bloody. It is not associated with any abdominal pain.   Patient states that she had loose stools. Patient denies fever.      Patient also had persistent cough for the past 2 weeks that she received outpatient antibiotics experience and the cough has returned.     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 Patient is did not receive chemoradiation. Patient has TPN nightly. Patient denies taking any blood thinners but has history of pulmonary embolism in the past. Patient states she was taken off Eliquis in October    OBJECTIVE     Vital Signs:  BP (!) 143/97   Pulse 98   Temp 98.4 °F (36.9 °C) (Oral)   Resp 18   Ht 5' 5\" (1.651 m)   Wt 162 lb (73.5 kg)   LMP 1994   SpO2 98%   BMI 26.96 kg/m²     Temp (24hrs), Av.6 °F (37 °C), Min:97.9 °F (36.6 °C), Max:99.6 °F (37.6 °C)    In: 2930   Out: 1525 [Urine:1525]    Physical Exam:  Constitutional:  Alert, cooperative and no distress. Mental Status:  Oriented to person, place and time and normal affect. Lungs:  Bilateral air entry present, lung fields clear. Normal effort. Heart:  Regular rate and rhythm, no murmur. Abdomen:  Soft, nontender, nondistended, normal bowel sounds. Extremities:  No edema, redness, tenderness in the calves. Skin:  Warm, dry, no gross lesions or rashes.     Medications:  Scheduled Medications:    vitamin B and C  1 tablet Oral Daily    levothyroxine  88 mcg Oral Daily    magnesium oxide  400 mg Oral BID    metoclopramide  10 mg Oral TID AC    sucralfate  1 g Oral 4x Daily    pantoprazole  40 mg Oral Daily    multivitamin  1 tablet Oral Daily    fat emulsion  100 mL IntraVENous Daily    sodium chloride flush  5-40 mL IntraVENous 2 times per day    enoxaparin  40 mg SubCUTAneous Daily     Continuous Infusions:    PN-Adult Premix  - Standard Electrolytes - Central Line      sodium chloride      sodium chloride 100 mL/hr at 21 0837     PRN Medicationslabetalol, 10 mg, Q8H PRN  promethazine, 6.25 mg, Q6H PRN  sodium chloride flush, 5-40 mL, PRN  sodium chloride, 25 mL, PRN  polyethylene glycol, 17 g, Daily PRN  acetaminophen, 650 mg, Q6H PRN   Or  acetaminophen, 650 mg, Q6H PRN        Diagnostic Labs:  CBC:   Recent Labs     11/21/21  1057 11/22/21  0539 11/23/21  0617   WBC 22.5* 21.2* 14.7*   RBC 4.26 4.02 4.17   HGB 12.2 11.8* 11.8*   HCT 38.2 35.9* 37.7   MCV 89.7 89.3 90.4   RDW 13.2 13.2 12.8    449 424     BMP:   Recent Labs     11/21/21  1057 11/22/21  0539 11/23/21  0617   * 131* 137   K 3.7 3.8 4.1    101 105   CO2 19* 18* 16*   PHOS  --   --  2.3*   BUN 24* 11 14   CREATININE 0.59 0.52 0.47*     FASTING LIPID PANEL:  Lab Results   Component Value Date    CHOL 182 08/27/2020    HDL 45 08/27/2020    TRIG 64 11/23/2021     LIVER PROFILE:   Recent Labs     11/21/21  1057   AST 22   ALT 17   BILITOT 0.23*   ALKPHOS 119*      MICROBIOLOGY:   Lab Results   Component Value Date/Time    CULTURE NO GROWTH 1 DAY 11/22/2021 08:55 AM     Imaging:    CT ABDOMEN PELVIS WO CONTRAST Additional Contrast? None    Result Date: 11/21/2021  There is no acute finding on this unenhanced CT study of the abdomen and pelvis to account for the patient's symptoms. Post-surgical changes of gastrectomy and of the small bowel are again noted. Bilateral renal cysts. Gallbladder surgically absent. CT CHEST PULMONARY EMBOLISM W CONTRAST    Result Date: 11/21/2021  No evidence of pulmonary embolism. Multiple scattered areas of nodularity are seen throughout both lungs. One of the areas has decreased in size but multiple others are new or significantly increased compared to the study from November 8. Given the short interval time frame of development findings may be infectious. Short-term follow-up recommended to exclude metastatic disease given patient's history of malignancy. There appears to be diffuse esophageal wall thickening, poorly evaluated on CT imaging. Clinical correlation for esophagitis recommended.        ASSESSMENT & PLAN   1. Bilateral pulmonary infiltrate with multiple nodular lesions. Low concern for pneumonia as per ID. Antibiotic stopped. Follow-up on viral PCR panel. Recently seen by pulmonology on 11/11.  2. Bacteremia, BCx positive for staph epi x2 likely contaminant. ID following. Currently off of antibiotic. 3. Malignant carcinoid tumor of stomach s/p total gastrectomy and Qamar-en-Y esophagojejunal anastomosis. On TPN is needed. Follow with Dr. Jose E Davis. Oncology has been consulted. 4. Essential hypertension. Currently on the higher side. Continue with as needed labetalol. 5. Hypothyroidism.  continue home dose Synthroid        DVT ppx: Lovenox 40 mg subcutaneous daily  GI ppx: Protonix 40 mg daily  PT/OT/SW: consulted  Discharge Planning:consulted        Dior Riojas MD.  Internal Medicine Resident PGY [facility]   11/23/2021, 2:41 PM

## 2021-11-23 NOTE — PROGRESS NOTES
[facility] - Progress Note  Today's Date and Time: 11/23/2021, 9:51 AM    Impression :   Bacteremia, Staphylococcus epidermidis x 2 obtained at same time, 11-21-21  Pulmonary infiltrates and nodular lesions  with several affected areas, seen oc T 11-8-21 and 11-18-21  Past medical history of malignant carcinoid tumor s/p total gastrecotomy and Qamar-en-Y esophagojejunal anastomosis in 2020,   Essential hypertension,   Hypothyroidism  Allergy to erythromycin    Recommendations:   Monitor off antibiotics. This does not appear to be a conventional bacterial pneumonia  D/C Zosyn  D/C Vancomycin  Respiratory PCR panel  PET scan would be helpful to determine nature of pulmonary nodules    Medical Decision Making/Summary/Discussion:11/23/2021     Afebrile, saturating well on room air  Nausea and vomiting, on zofran   No acute events overnight  Patient denies fever, chills, shortness of breath  Infection Control Recommendations   [place] Precautions      Antimicrobial Stewardship Recommendations     Discontinuation of therapy  Coordination of Outpatient Care:   Estimated Length of IV antimicrobials:None  Patient will need Midline Catheter Insertion: No  Patient will need PICC line Insertion: No  Patient will need: Home IV , [place],  SNF,  LTAC: TBD  Patient will need outpatient wound care:No    Chief complaint/reason for consultation:   Blood cultures positive for MRSE x 2      History of Present Illness:   Suzette Villalta is a 62y.o.-year-old female who was initially admitted on 11/21/2021. Patient seen at the request of Dr. Cira Jameson. INITIAL HISTORY:    Patient is a 62 y.o. female who presented on 11/21/21 with nausea and nonbloody emesis that started 11/20. She has a past medical history of malignant carcinoid tumor s/p surgery. She did not receive chemoradiation. Patient has nightly TPN.     Patient also had a persistent cough for the past two weeks, for which she received antibiotics outpatient. She reports that 3 days after she finished the course of antibiotics her cough returned. Echo done 11/22/21 showed normal LV size with mildly increased wall thickness and normal LV systolic function with LVEF >55%. A small pericardial effusion was noted. CURRENT EVALUATION: 11/23/2021     Afebrile  VS stable    Today patient is resting in her room and appears to be in no acute distress, she reports that she is very tired. She is on room air. She denies fever and chills, but says she has been having nausea and watery, nonbloody vomiting. She reports that she has nonradiating chest pain from vomiting so much. She denies shortness of breath. She denies abdominal pain or headache. At home she reports taking steroids for 5 days and an oral antibiotic BID x 7 days. Felt better but return of symptoms 3 days after stopping antibiotics. Difficult to ascertain what helped- the steroids or the antibiotics or both. Coagulase negative Staphylococci in blood likely contaminant  Pulmonary nodules possibly from prior carcinoid. Labs, X rays reviewed: 11/23/2021    BUN: 11-->14  Cr: 0.52-->0.47    WBC: 21.2-->14.7  Hb: 11.8  Plat: 449-->424     Cultures:  Urine:  11/22/21: no growth  Blood:  11/21/21: positive for MRSE x 2  11/22/21: no growth  Sputum :    Wound:      Discussed with patient, RN. I have personally reviewed the past medical history, past surgical history, medications, social history, and family history, and I have updated the database accordingly. Past Medical History:     Past Medical History:   Diagnosis Date    Anxiety     Arthritis     knee    Asthma     Colon polyp 02/21/2019    tubular adenoma; hyperplastic polyp    Colon polyps     Cyst of kidney, acquired     bilat.     Fatigue     Hypertension     Hypothyroidism     Neuroendocrine tumor 02/21/2019    of stomach    Obesity     Pharyngoesophageal dysphagia     Vocal cord polyps     Wears glasses Past Surgical  History:     Past Surgical History:   Procedure Laterality Date    CHOLECYSTECTOMY  10/09/2014    COLONOSCOPY  02/21/2019    tubular adenoma; hyperplastic polyp    COLONOSCOPY      over 5 yrs ago per pt with polyps (2-)    CYSTOURETHROSCOPY/STENT REMOVAL  05/03/2011    ENDOSCOPIC ULTRASOUND (LOWER) N/A 01/22/2020    ENDOSCOPIC ULTRASOUND WITH POSSIBLE EMR performed by Sanjay Rodriguez MD at \[facility]"" Endoscopy    ERCP      GASTRECTOMY N/A 11/30/2020    HIP SURGERY Left     soft tissue tumor removal    THROAT SURGERY      vocal cord polyps removed    UPPER GASTROINTESTINAL ENDOSCOPY  02/21/2019    Neuroendocrine tumor    UPPER GASTROINTESTINAL ENDOSCOPY  09/23/2019    UPPER GASTROINTESTINAL ENDOSCOPY N/A 09/23/2019    EGD BIOPSY performed by Marianna Arvizu MD at [address] N/A 10/23/2019    EGD W/ EMR performed by Sanjay Rodriguez MD at [address] [place] [address] N/A 10/29/2019    EGD CONTROL HEMORRHAGE performed by Héctor Dorado MD at [address] [place] [address] N/A 04/26/2021    EGD BIOPSY performed by Reggie Wilburn MD at [address] [place] [address] N/A 8/30/2021    EGD ESOPHAGOGASTRODUODENOSCOPY performed by Lobito Sauceda MD at [address] [place] [address]       Medications:      vitamin B and C  1 tablet Oral Daily    levothyroxine  88 mcg Oral Daily    magnesium oxide  400 mg Oral BID    metoclopramide  10 mg Oral TID AC    sucralfate  1 g Oral 4x Daily    pantoprazole  40 mg Oral Daily    multivitamin  1 tablet Oral Daily    fat emulsion  100 mL IntraVENous Daily    sodium chloride flush  5-40 mL IntraVENous 2 times per day    enoxaparin  40 mg SubCUTAneous Daily    amLODIPine  5 mg Oral Daily       Social History:     Social History     Socioeconomic History    Marital status: Single     Spouse name: Not on file    Number of children: Not on file    Years of education: Not on file    Highest education level: Not on file   Occupational History    Not on file   Tobacco Use    Smoking status: Former Smoker     Packs/day: 1.00     Years: 25.00     Pack years: 25.00     Quit date: 2012     Years since quittin.4    Smokeless tobacco: Never Used    Tobacco comment: quit 2 years   Vaping Use    Vaping Use: Never used   Substance and Sexual Activity    Alcohol use: Not Currently     Comment: 3 times a month    Drug use: No    Sexual activity: Not on file   Other Topics Concern    Not on file   Social History Narrative    Not on file     Social Determinants of Health     Financial Resource Strain: Low Risk     Difficulty of Paying Living Expenses: Not hard at all   Food Insecurity: No Food Insecurity    Worried About Running Out of Food in the Last Year: Never true    Ana of Food in the Last Year: Never true   Transportation Needs:     Lack of Transportation (Medical): Not on file    Lack of Transportation (Non-Medical):  Not on file   Physical Activity:     Days of Exercise per Week: Not on file    Minutes of Exercise per Session: Not on file   Stress:     Feeling of Stress : Not on file   Social Connections:     Frequency of Communication with Friends and Family: Not on file    Frequency of Social Gatherings with Friends and Family: Not on file    Attends Islam Services: Not on file    Active Member of [address] [place] [address] [employer] or Organizations: Not on file    Attends Club or Organization Meetings: Not on file    Marital Status: Not on file   Intimate Partner Violence:     Fear of Current or Ex-Partner: Not on file    Emotionally Abused: Not on file    Physically Abused: Not on file    Sexually Abused: Not on file   Housing Stability:     Unable to Pay for Housing in the Last Year: Not on file    Number of [place] in the Last Year: Not on file    Unstable Housing in the Last Year: Not on file       Family History:     Family History   Problem Relation Age of Onset    High Blood Pressure Mother     High Blood Pressure Sister     Stomach Cancer Sister     Other Sister         epilepsy    No Known Problems Father         Allergies:   Erythromycin     Review of Systems:   Constitutional: No fevers or chills. No systemic complaints  Head: No headaches  Eyes: No double vision or blurry vision. No conjunctival inflammation. ENT: No sore throat or runny nose. . No hearing loss, tinnitus or vertigo. Cardiovascular: No chest pain or palpitations. No shortness of breath. No PINEDO  Lung: No shortness of breath or cough. No sputum production  Abdomen: No nausea, vomiting, diarrhea, or abdominal pain. Thurl Mutton No cramps. Genitourinary: No increased urinary frequency, or dysuria. No hematuria. No suprapubic or CVA pain  Musculoskeletal: No muscle aches or pains. No joint effusions, swelling or deformities  Hematologic: No bleeding or bruising. Neurologic: No headache, weakness, numbness, or tingling. Integument: No rash, no ulcers. Psychiatric: No depression. Endocrine: No polyuria, no polydipsia, no polyphagia. Physical Examination :     Patient Vitals for the past 8 hrs:   BP Temp Temp src Pulse Resp SpO2   11/23/21 0900 (!) 131/93 99.6 °F (37.6 °C) Oral 111 18 98 %   11/23/21 0745 120/73 97.9 °F (36.6 °C) Oral 116 16 99 %   11/23/21 0507 131/85 98.2 °F (36.8 °C) Oral 81 16 --     General Appearance: Awake, alert, and in no apparent distress  Head:  Normocephalic, no trauma  Eyes: Pupils equal, round, reactive to light and accommodation; extraocular movements intact; sclera anicteric; conjunctivae pink. No embolic phenomena. ENT: Oropharynx clear, without erythema, exudate, or thrush. No tenderness of sinuses. Mouth/throat: mucosa pink and moist. No lesions. Dentition in good repair. Neck:Supple, without lymphadenopathy. Thyroid normal, No bruits. Pulmonary/Chest: Clear to auscultation, without wheezes, rales, or rhonchi. No dullness to percussion.    Cardiovascular: Regular rate and rhythm without murmurs, rubs, or gallops. Abdomen: Soft, non tender. Bowel sounds normal. No organomegaly  All four Extremities: No cyanosis, clubbing, edema, or effusions. Neurologic: No gross sensory or motor deficits. Skin: Warm and dry with good turgor. No signs of peripheral arterial or venous insufficiency. No ulcerations. No open wounds. Medical Decision Making -Laboratory:   I have independently reviewed/ordered the following labs:    CBC with Differential:   Recent Labs     11/22/21  0539 11/23/21  0617   WBC 21.2* 14.7*   HGB 11.8* 11.8*   HCT 35.9* 37.7    424   LYMPHOPCT 7* 11*   MONOPCT 5 7     BMP:   Recent Labs     11/22/21  0539 11/23/21  0617   * 137   K 3.8 4.1    105   CO2 18* 16*   BUN 11 14   CREATININE 0.52 0.47*   MG  --  1.6     Hepatic Function Panel:   Recent Labs     11/21/21  1057   PROT 7.4   LABALBU 3.3*   BILITOT 0.23*   ALKPHOS 119*   ALT 17   AST 22     No results for input(s): RPR in the last 72 hours. No results for input(s): HIV in the last 72 hours. No results for input(s): BC in the last 72 hours. Lab Results   Component Value Date    MUCUS NOT REPORTED 11/21/2021    RBC 4.17 11/23/2021    TRICHOMONAS NOT REPORTED 11/21/2021    WBC 14.7 11/23/2021    YEAST NOT REPORTED 11/21/2021    TURBIDITY Clear 11/21/2021     Lab Results   Component Value Date    CREATININE 0.47 11/23/2021    GLUCOSE 181 11/23/2021       Medical Decision Making-Imaging:     Narrative   EXAMINATION:   CT OF THE ABDOMEN AND PELVIS WITHOUT CONTRAST 11/21/2021 4:50 pm       TECHNIQUE:   CT of the abdomen and pelvis was performed without the administration of   intravenous contrast. Multiplanar reformatted images are provided for review.    Dose modulation, iterative reconstruction, and/or weight based adjustment of   the mA/kV was utilized to reduce the radiation dose to as low as reasonably   achievable.       COMPARISON:   Chest CT study from earlier the same day.  CT study of the abdomen pelvis   from August 27, 2021.       HISTORY:   ORDERING SYSTEM PROVIDED HISTORY: nausea,vomiting,malignancy histiory   TECHNOLOGIST PROVIDED HISTORY:   nausea,vomiting,malignancy histiory           FINDINGS:   Organs: The gallbladder is surgically absent.  A moderate degree of biliary   ductal dilation is unchanged.  No appreciable struck ting stone or mass. Excreted contrast material is present throughout the renal collecting systems   and within the urinary bladder.  There are again seen numerous benign   bilateral renal cysts.  The liver, spleen, pancreas, kidneys, and adrenal   glands otherwise have an unremarkable unenhanced CT appearance.  No   hydronephrosis or hydroureter.       GI/Bowel: Post-surgical changes of the small bowel and of gastrectomy are   again noted.  No acute intestinal abnormality.  The small and large bowel   loops are otherwise normal in caliber, contour, and morphology.  No dilated   loops or areas of bowel wall thickening.  No appreciable colonic or rectal   mass.       Peritoneum/Retroperitoneum: There is no free fluid or extraluminal gas.  No   enlarged or suspicious mesenteric or retroperitoneal lymphadenopathy. The   abdominal aorta and iliac arteries are normal in caliber.       Pelvis: No pelvic free fluid or enlarged or suspicious pelvic or inguinal   lymphadenopathy. No appreciable uterine or adnexal abnormality.  The urinary   bladder and the pelvic bowel loops are unremarkable.       Bones/Soft Tissues: Degenerative changes are present throughout the lumbar   spine. No acute fracture.           Impression   There is no acute finding on this unenhanced CT study of the abdomen and   pelvis to account for the patient's symptoms.       Post-surgical changes of gastrectomy and of the small bowel are again noted. Bilateral renal cysts.  Gallbladder surgically absent.      Narrative   EXAMINATION:   CTA OF THE CHEST 11/21/2021 11:56 am       TECHNIQUE:   CTA of the chest was performed after the administration of intravenous   contrast.  Multiplanar reformatted images are provided for review.  MIP   images are provided for review. Dose modulation, iterative reconstruction,   and/or weight based adjustment of the mA/kV was utilized to reduce the   radiation dose to as low as reasonably achievable.       COMPARISON:   CT chest November 8, 2021 and priors.       HISTORY:   ORDERING SYSTEM PROVIDED HISTORY: tachycardia, hx cancer   TECHNOLOGIST PROVIDED HISTORY:   tachycardia, hx cancer   Decision Support Exception - unselect if not a suspected or confirmed   emergency medical condition->Emergency Medical Condition (MA)   Reason for Exam: tachycardia, hx cancer   Acuity: Unknown   Type of Exam: Unknown       FINDINGS:   Pulmonary Arteries: Pulmonary arteries are adequately opacified for   evaluation.  No evidence of intraluminal filling defect to suggest pulmonary   embolism.  Main pulmonary artery is normal in caliber.       Mediastinum: Soft a JULIA wall appears thickened, poorly evaluated on CT   imaging.  There is mild bilateral hilar lymphadenopathy.  The heart and   pericardium demonstrate no acute abnormality.  There is no acute abnormality   of the thoracic aorta.       Lungs/pleura: The central airways are patent.  Scattered areas of nodularity   are again seen throughout both lungs.  Air in the posterior right upper lobe   measures 1.3 cm on image 47, previously measuring 6 mm wall area of   nodularity anteriorly in the right lower lobe has decreased in size now   measuring 0.8 cm on image 66, previously measuring 1.1 cm.  Linear bands of   consolidation are again seen in the right middle lobe and right lower lobe.    There are few new subpleural nodules in the left upper lobe measuring up to 7   mm.  Linear band of atelectasis is seen within the lingula with new area of   nodularity along the anterior aspect.  Multiple new areas of nodularity are   seen within the left lung base measuring up to 1 cm.  No pneumothorax or   pleural effusion identified.       Upper Abdomen: Limited visualization of the upper abdomen demonstrates   patient's known left renal cysts.  There postsurgical changes at the GE   junction.  No acute process.       Soft Tissues/Bones: No acute bone or soft tissue abnormality.           Impression   No evidence of pulmonary embolism.       Multiple scattered areas of nodularity are seen throughout both lungs.  One   of the areas has decreased in size but multiple others are new or   significantly increased compared to the study from November 8.  Given the   short interval time frame of development findings may be infectious. Short-term follow-up recommended to exclude metastatic disease given   patient's history of malignancy.       There appears to be diffuse esophageal wall thickening, poorly evaluated on   CT imaging.  Clinical correlation for esophagitis recommended. Medical Decision [id number]:     Updated   Order   11/22/21 1111  Culture, Blood 1   Collected: 11/22/21 0850  Preliminary result  Specimen: Blood    Specimen Description . BLOOD P Culture NO GROWTH 2 HOURS P   Special Requests R WRIST 10ML P            11/22/21 1111  Culture, Blood 1   Collected: 11/22/21 0855  Preliminary result  Specimen: Blood    Specimen Description . BLOOD P Culture NO GROWTH 1 HOUR P   Special Requests L HAND 10ML P            11/22/21 0702  Culture, Urine   Collected: 11/21/21 1100  Final result  Specimen: Urine, clean catch    Specimen Description . CLEAN CATCH URINE Culture NO SIGNIFICANT GROWTH   Special Requests NOT REPORTED            11/22/21 0507  Culture, Blood 1   Collected: 11/21/21 1057  Preliminary result  Specimen: Blood    Specimen Description . BLOOD P Culture DIRECT GRAM STAIN FROM BOTTLE: GRAM POSITIVE COCCI IN CLUSTERS P   Special Requests PICC 2 ML P Culture Staphylococcus epidermidis Detected: mecA/C Gene Detected- Methicillin Resistant

## 2021-11-24 VITALS
HEART RATE: 98 BPM | BODY MASS INDEX: 28.39 KG/M2 | OXYGEN SATURATION: 95 % | SYSTOLIC BLOOD PRESSURE: 123 MMHG | WEIGHT: 170.42 LBS | RESPIRATION RATE: 18 BRPM | HEIGHT: 65 IN | DIASTOLIC BLOOD PRESSURE: 82 MMHG | TEMPERATURE: 98.9 F

## 2021-11-24 LAB
ABSOLUTE EOS #: 0.14 K/UL (ref 0–0.44)
ABSOLUTE IMMATURE GRANULOCYTE: 0.04 K/UL (ref 0–0.3)
ABSOLUTE LYMPH #: 1.76 K/UL (ref 1.1–3.7)
ABSOLUTE MONO #: 0.8 K/UL (ref 0.1–1.2)
ANION GAP SERPL CALCULATED.3IONS-SCNC: 14 MMOL/L (ref 9–17)
BASOPHILS # BLD: 0 % (ref 0–2)
BASOPHILS ABSOLUTE: 0.04 K/UL (ref 0–0.2)
BUN BLDV-MCNC: 16 MG/DL (ref 8–23)
BUN/CREAT BLD: ABNORMAL (ref 9–20)
CALCIUM SERPL-MCNC: 8.3 MG/DL (ref 8.6–10.4)
CHLORIDE BLD-SCNC: 106 MMOL/L (ref 98–107)
CO2: 16 MMOL/L (ref 20–31)
CREAT SERPL-MCNC: 0.47 MG/DL (ref 0.5–0.9)
CULTURE: ABNORMAL
DIFFERENTIAL TYPE: ABNORMAL
EOSINOPHILS RELATIVE PERCENT: 2 % (ref 1–4)
GFR AFRICAN AMERICAN: >60 ML/MIN
GFR NON-AFRICAN AMERICAN: >60 ML/MIN
GFR SERPL CREATININE-BSD FRML MDRD: ABNORMAL ML/MIN/{1.73_M2}
GFR SERPL CREATININE-BSD FRML MDRD: ABNORMAL ML/MIN/{1.73_M2}
GLUCOSE BLD-MCNC: 151 MG/DL (ref 65–105)
GLUCOSE BLD-MCNC: 157 MG/DL (ref 70–99)
HCT VFR BLD CALC: 34.8 % (ref 36.3–47.1)
HEMOGLOBIN: 11 G/DL (ref 11.9–15.1)
IMMATURE GRANULOCYTES: 0 %
LYMPHOCYTES # BLD: 19 % (ref 24–43)
Lab: ABNORMAL
Lab: ABNORMAL
MAGNESIUM: 1.8 MG/DL (ref 1.6–2.6)
MCH RBC QN AUTO: 29 PG (ref 25.2–33.5)
MCHC RBC AUTO-ENTMCNC: 31.6 G/DL (ref 28.4–34.8)
MCV RBC AUTO: 91.8 FL (ref 82.6–102.9)
MONOCYTES # BLD: 9 % (ref 3–12)
NRBC AUTOMATED: 0 PER 100 WBC
PDW BLD-RTO: 12.9 % (ref 11.8–14.4)
PHOSPHORUS: 2.7 MG/DL (ref 2.6–4.5)
PLATELET # BLD: 373 K/UL (ref 138–453)
PLATELET ESTIMATE: ABNORMAL
PMV BLD AUTO: 9.2 FL (ref 8.1–13.5)
POTASSIUM SERPL-SCNC: 3.8 MMOL/L (ref 3.7–5.3)
RBC # BLD: 3.79 M/UL (ref 3.95–5.11)
RBC # BLD: ABNORMAL 10*6/UL
SEG NEUTROPHILS: 71 % (ref 36–65)
SEGMENTED NEUTROPHILS ABSOLUTE COUNT: 6.66 K/UL (ref 1.5–8.1)
SODIUM BLD-SCNC: 136 MMOL/L (ref 135–144)
SPECIMEN DESCRIPTION: ABNORMAL
SPECIMEN DESCRIPTION: ABNORMAL
WBC # BLD: 9.4 K/UL (ref 3.5–11.3)
WBC # BLD: ABNORMAL 10*3/UL

## 2021-11-24 PROCEDURE — 36415 COLL VENOUS BLD VENIPUNCTURE: CPT

## 2021-11-24 PROCEDURE — 85025 COMPLETE CBC W/AUTO DIFF WBC: CPT

## 2021-11-24 PROCEDURE — 82947 ASSAY GLUCOSE BLOOD QUANT: CPT

## 2021-11-24 PROCEDURE — 6360000002 HC RX W HCPCS: Performed by: STUDENT IN AN ORGANIZED HEALTH CARE EDUCATION/TRAINING PROGRAM

## 2021-11-24 PROCEDURE — 2580000003 HC RX 258: Performed by: STUDENT IN AN ORGANIZED HEALTH CARE EDUCATION/TRAINING PROGRAM

## 2021-11-24 PROCEDURE — 83735 ASSAY OF MAGNESIUM: CPT

## 2021-11-24 PROCEDURE — 6370000000 HC RX 637 (ALT 250 FOR IP): Performed by: STUDENT IN AN ORGANIZED HEALTH CARE EDUCATION/TRAINING PROGRAM

## 2021-11-24 PROCEDURE — 6370000000 HC RX 637 (ALT 250 FOR IP)

## 2021-11-24 PROCEDURE — 99232 SBSQ HOSP IP/OBS MODERATE 35: CPT | Performed by: INTERNAL MEDICINE

## 2021-11-24 PROCEDURE — 84100 ASSAY OF PHOSPHORUS: CPT

## 2021-11-24 PROCEDURE — 94761 N-INVAS EAR/PLS OXIMETRY MLT: CPT

## 2021-11-24 PROCEDURE — 80048 BASIC METABOLIC PNL TOTAL CA: CPT

## 2021-11-24 RX ORDER — METOPROLOL SUCCINATE 25 MG/1
25 TABLET, EXTENDED RELEASE ORAL DAILY
Status: DISCONTINUED | OUTPATIENT
Start: 2021-11-24 | End: 2021-11-24 | Stop reason: HOSPADM

## 2021-11-24 RX ORDER — METOPROLOL TARTRATE 50 MG/1
25 TABLET, FILM COATED ORAL ONCE
Status: COMPLETED | OUTPATIENT
Start: 2021-11-24 | End: 2021-11-24

## 2021-11-24 RX ADMIN — METOPROLOL SUCCINATE 25 MG: 25 TABLET, FILM COATED, EXTENDED RELEASE ORAL at 09:04

## 2021-11-24 RX ADMIN — ALCOHOL 1 TABLET: 70.47 GEL TOPICAL at 09:03

## 2021-11-24 RX ADMIN — LEVOTHYROXINE SODIUM 88 MCG: 88 TABLET ORAL at 06:33

## 2021-11-24 RX ADMIN — PANTOPRAZOLE SODIUM 40 MG: 40 TABLET, DELAYED RELEASE ORAL at 09:03

## 2021-11-24 RX ADMIN — METOCLOPRAMIDE 10 MG: 10 TABLET ORAL at 06:34

## 2021-11-24 RX ADMIN — MAGNESIUM GLUCONATE 500 MG ORAL TABLET 400 MG: 500 TABLET ORAL at 09:03

## 2021-11-24 RX ADMIN — METOCLOPRAMIDE 10 MG: 10 TABLET ORAL at 11:14

## 2021-11-24 RX ADMIN — METOPROLOL TARTRATE 25 MG: 50 TABLET, FILM COATED ORAL at 13:38

## 2021-11-24 RX ADMIN — SUCRALFATE 1 G: 1 TABLET ORAL at 09:03

## 2021-11-24 RX ADMIN — ENOXAPARIN SODIUM 40 MG: 100 INJECTION SUBCUTANEOUS at 09:04

## 2021-11-24 RX ADMIN — SODIUM CHLORIDE, PRESERVATIVE FREE 10 ML: 5 INJECTION INTRAVENOUS at 09:04

## 2021-11-24 RX ADMIN — PROMETHAZINE HYDROCHLORIDE 6.25 MG: 25 INJECTION INTRAMUSCULAR; INTRAVENOUS at 11:17

## 2021-11-24 RX ADMIN — VITAMIN C 1 TABLET: TAB at 09:03

## 2021-11-24 RX ADMIN — SODIUM CHLORIDE: 9 INJECTION, SOLUTION INTRAVENOUS at 04:20

## 2021-11-24 RX ADMIN — SUCRALFATE 1 G: 1 TABLET ORAL at 13:02

## 2021-11-24 ASSESSMENT — PAIN SCALES - GENERAL
PAINLEVEL_OUTOF10: 0

## 2021-11-24 NOTE — PROGRESS NOTES
Today's Date: 11/24/2021  Patient Name: Robert Garcia  Date of admission: 11/21/2021 10:21 AM  Patient's age: 62 y.o., 1960  Admission Dx: Pneumonia [J18.9]  Bilious vomiting with nausea [R11.14]  Pneumonia due to infectious organism, unspecified laterality, unspecified part of lung [J18.9]    Reason for Consult: pt known to Dr June Bhardwaj, h/o gastric ca s/p resection, lungs nodules on CT increased from prior studies  Requesting Physician: Mahin Waggoner MD    CHIEF COMPLAINT:    Chief Complaint   Patient presents with    Fever    Fatigue    Emesis    Nausea    Chest Pain       SUBJECTIVE:    Patient seen and examined  She denied any nausea vomiting  She has epigastric pain  Likely discharge today  No fever chills    HISTORY OF PRESENT ILLNESS:    Robert Garcia is a 62 y.o. female who is admitted to the hospital on 11/21/2021  for chest pain. Patient has been following with Dr. Fredi Hein as outpatient and receiving Sandostatin. She had a CT scan on 11/8/2021 which showed interval development of numerous enlarged lung nodules concerning for metastasis/infectious or inflammatory process. Her repeat CT of the chest here on 11/21/2021 showed multiple scattered areas of nodularity throughout the bilateral lungs and multiple new or significantly increased nodules were seen compared to the scan on November 8. Oncology consulted for further recommendations. Her blood culture has been positive for staph epidermidis and ID suspecting possible contamination. Her COVID-19 test has been negative. Patient follows with Dr. Fredi Hein as outpatient and her brief oncologic history as follows. DIAGNOSIS:       Malignant carcinoid tumor of the stomach  Recent GI bleeding after endoscopic mucosal resection of stomach carcinoid on October 23, 2019.   Evidence of recurrent disease on repeated EGD January 2020 and continued elevation of tumor marker chromogranin A  Iron deficiency secondary to above  History of hypothyroidism  Multiple comorbidities as listed      CURRENT THERAPY:         Status post endoscopic mucosal resection of stomach carcinoid October 23, 2019  S/p gastric resection at [facility] November 20, 2020. Sandostatin started March 2021. Past Medical History:   has a past medical history of Anxiety, Arthritis, Asthma, Colon polyp, Colon polyps, Cyst of kidney, acquired, Fatigue, Hypertension, Hypothyroidism, Neuroendocrine tumor, Obesity, Pharyngoesophageal dysphagia, Vocal cord polyps, and Wears glasses. Past Surgical History:   has a past surgical history that includes cystourethroscopy/stent removal (05/03/2011); Colonoscopy (02/21/2019); ERCP; Cholecystectomy (10/09/2014); hip surgery (Left); Throat surgery; Colonoscopy; Upper gastrointestinal endoscopy (02/21/2019); Upper gastrointestinal endoscopy (09/23/2019); Upper gastrointestinal endoscopy (N/A, 09/23/2019); Upper gastrointestinal endoscopy (N/A, 10/23/2019); Upper gastrointestinal endoscopy (N/A, 10/29/2019); Endoscopic ultrasonography, GI (N/A, 01/22/2020); Upper gastrointestinal endoscopy (N/A, 04/26/2021); gastrectomy (N/A, 11/30/2020); and Upper gastrointestinal endoscopy (N/A, 8/30/2021). Medications:    Prior to Admission medications    Medication Sig Start Date End Date Taking?  Authorizing Provider   metoprolol tartrate (LOPRESSOR) 25 MG tablet Take 1 tablet by mouth 2 times daily 11/24/21  Yes Jason Walker MD   pantoprazole (PROTONIX) 40 MG tablet TAKE 1 TABLET BY MOUTH EVERY DAY 11/19/21  Yes Chris Boone MD   levothyroxine (SYNTHROID) 88 MCG tablet Take 1 tablet by mouth Five times weekly Indications: Takes only Wed-Sun (skips Mon/Tues) 10/12/21  Yes Chris Boone MD   scopolamine (TRANSDERM-SCOP) transdermal patch Place 1 patch onto the skin every 72 hours 10/12/21  Yes Chris Boone MD   sterile water SOLN with amino acids 10 % SOLN 50 g/L, dextrose 70 % SOLN 100 g/L, Nutrilyte CONC 20 mL/L, sodium phosphate 3 MMOLE/ML SOLN 5 mL/L, Multi-Vitamin, adult no.2 without vitamin k INJ 10 mL, trace minerals Cu-Mn-Se-Zn 201-54- MCG/ML SOLN 1 mL Infuse intravenously continuous   Yes Historical Provider, MD   Compression Stockings MISC by Does not apply route 20-30 mmHg calf length 9/21/21  Yes Vale Thomas MD   magnesium oxide (MAG-OX) 400 (241.3 Mg) MG TABS tablet Take 1 tablet by mouth 2 times daily 9/3/21  Yes Ashley León MD   potassium chloride (KLOR-CON M) 20 MEQ extended release tablet Take 1 tablet by mouth 2 times daily 9/3/21  Yes Ashley León MD   Cholecalciferol (VITAMIN D) 25 MCG TABS Take 1 tablet by mouth daily  Patient taking differently: Take 1,000 Units by mouth daily Take one every Thursday.  9/3/21  Yes Ashley León MD   metoclopramide (REGLAN) 10 MG tablet TAKE 1 TABLET BY MOUTH 3 TIMES DAILY (BEFORE MEALS) 6/16/21  Yes Francisca Kumar MD   sucralfate (CARAFATE) 1 GM tablet Take 1 tablet by mouth 4 times daily 4/29/21  Yes Karyn Davis MD   Folic Acid-Vit B4-UPZ Z01 2.2-25-0.5 MG TABS Take 1 tablet by mouth daily 3/25/21  Yes Vale Thomas MD   Multiple Vitamin (MULTI-DAY VITAMINS PO) Take 1 tablet by mouth daily    Yes Historical Provider, MD   vitamin B-12 (CYANOCOBALAMIN) 500 MCG tablet Take 500 mcg by mouth daily   Yes Historical Provider, MD     Current Facility-Administered Medications   Medication Dose Route Frequency Provider Last Rate Last Admin    metoprolol succinate (TOPROL XL) extended release tablet 25 mg  25 mg Oral Daily Daylin Barroso MD   25 mg at 11/24/21 8198    PN-Adult Premix 5/20 - Standard Electrolytes - Central Line   IntraVENous Continuous TPN Mariela Rosales MD        labetalol (NORMODYNE;TRANDATE) injection 10 mg  10 mg IntraVENous Q8H PRN Sobeida Eubanks MD        vitamin B and C (TOTAL B-C) 1 tablet  1 tablet Oral Daily Daylin Barroso MD   1 tablet at 11/24/21 0903    levothyroxine (SYNTHROID) tablet 88 mcg  88 mcg Oral Daily Ewa Rincno MD   88 mcg at 11/24/21 0633    magnesium oxide (MAG-OX) tablet 400 mg  400 mg Oral BID Daylin Barroso MD   400 mg at 11/24/21 0903    metoclopramide (REGLAN) tablet 10 mg  10 mg Oral TID AC Daylin Barroso MD   10 mg at 11/24/21 1114    sucralfate (CARAFATE) tablet 1 g  1 g Oral 4x Daily Daylin Barroso MD   1 g at 11/24/21 1302    pantoprazole (PROTONIX) tablet 40 mg  40 mg Oral Daily Daylin Barroso MD   40 mg at 11/24/21 0903    multivitamin 1 tablet  1 tablet Oral Daily Daylin Barroso MD   1 tablet at 11/24/21 0903    fat emulsion 20 % infusion 100 mL  100 mL IntraVENous Daily Daylin Barroso MD   Stopped at 11/24/21 0622    promethazine (PHENERGAN) injection 6.25 mg  6.25 mg IntraVENous Q6H PRN Emmanuel Milton MD   6.25 mg at 11/24/21 1117    sodium chloride flush 0.9 % injection 5-40 mL  5-40 mL IntraVENous 2 times per day 125 Simi Benitez MD   10 mL at 11/24/21 0904    sodium chloride flush 0.9 % injection 5-40 mL  5-40 mL IntraVENous PRN Cyril Hawthorne MD        0.9 % sodium chloride infusion  25 mL IntraVENous PRN Cyril Hawthorne MD        enoxaparin (LOVENOX) injection 40 mg  40 mg SubCUTAneous Daily Cyril Hawthorne MD   40 mg at 11/24/21 0904    polyethylene glycol (GLYCOLAX) packet 17 g  17 g Oral Daily PRN Cyril Hawthorne MD        acetaminophen (TYLENOL) tablet 650 mg  650 mg Oral Q6H PRN Cyril Hawthorne MD   650 mg at 11/23/21 0631    Or    acetaminophen (TYLENOL) suppository 650 mg  650 mg Rectal Q6H PRN Cyril Hawthorne MD        0.9 % sodium chloride infusion   IntraVENous Continuous Cyril Hawthorne MD   Stopped at 11/24/21 1400       Allergies:  Erythromycin    Social History:   reports that she quit smoking about 9 years ago. She has a 25.00 pack-year smoking history. She has never used smokeless tobacco. She reports previous alcohol use. She reports that she does not use drugs.      Family History: family history includes High Blood Pressure in her mother and sister; No Known Problems in her father; Other in her sister; Stomach Cancer in her sister. REVIEW OF SYSTEMS:    Constitutional: No fever or chills. No night sweats, no weight loss   Eyes: No eye discharge, double vision, or eye pain   HEENT: negative for sore mouth, sore throat, hoarseness and voice change   Respiratory: negative for cough , sputum, dyspnea, wheezing, hemoptysis, chest pain   Cardiovascular: negative for chest pain, dyspnea, palpitations, orthopnea, PND   Gastrointestinal: negative for nausea, vomiting, diarrhea, constipation, abdominal pain, Dysphagia, hematemesis and hematochezia   Genitourinary: negative for frequency, dysuria, nocturia, urinary incontinence, and hematuria   Integument: negative for rash, skin lesions, bruises.    Hematologic/Lymphatic: negative for easy bruising, bleeding, lymphadenopathy, or petechiae   Endocrine: negative for heat or cold intolerance,weight changes, change in bowel habits and hair loss   Musculoskeletal: negative for myalgias, arthralgias, pain, joint swelling,and bone pain   Neurological: negative for headaches, dizziness, seizures, weakness, numbness    PHYSICAL EXAM:      /82   Pulse 98   Temp 98.9 °F (37.2 °C) (Oral)   Resp 18   Ht 5' 5\" (1.651 m)   Wt 170 lb 6.7 oz (77.3 kg)   LMP 1994   SpO2 95%   BMI 28.36 kg/m²    Temp (24hrs), Av.7 °F (37.1 °C), Min:98.5 °F (36.9 °C), Max:98.9 °F (37.2 °C)    General appearance - well appearing, no in pain or distress   Mental status - alert and cooperative   Eyes - pupils equal and reactive, extraocular eye movements intact   Ears - bilateral TM's and external ear canals normal   Mouth - mucous membranes moist, pharynx normal without lesions   Neck - supple, no significant adenopathy   Lymphatics - no palpable lymphadenopathy, no hepatosplenomegaly   Chest - clear to auscultation, no wheezes, rales or rhonchi, symmetric air entry   Heart - normal rate, regular rhythm, normal S1, S2, no murmurs  Abdomen - soft, nontender, nondistended, no masses or organomegaly   Neurological - alert, oriented, normal speech, no focal findings or movement disorder noted   Musculoskeletal - no joint tenderness, deformity or swelling   Extremities - peripheral pulses normal, no pedal edema, no clubbing or cyanosis   Skin - normal coloration and turgor, no rashes, no suspicious skin lesions noted ,    DATA:    Labs:   CBC:   Recent Labs     11/23/21 0617 11/24/21 0614   WBC 14.7* 9.4   HGB 11.8* 11.0*   HCT 37.7 34.8*    373     BMP:   Recent Labs     11/23/21 0617 11/24/21 0614    136   K 4.1 3.8   CO2 16* 16*   BUN 14 16   CREATININE 0.47* 0.47*   LABGLOM >60 >60   GLUCOSE 181* 157*     PT/INR: No results for input(s): PROTIME, INR in the last 72 hours. IMAGING DATA:  XR CHEST PORTABLE   Final Result   Right basilar infiltrate concerning for pneumonia. Right-sided PICC line with the tip in the mid SVC. CT ABDOMEN PELVIS WO CONTRAST Additional Contrast? None   Final Result   There is no acute finding on this unenhanced CT study of the abdomen and   pelvis to account for the patient's symptoms. Post-surgical changes of gastrectomy and of the small bowel are again noted. Bilateral renal cysts. Gallbladder surgically absent. CT CHEST PULMONARY EMBOLISM W CONTRAST   Final Result   No evidence of pulmonary embolism. Multiple scattered areas of nodularity are seen throughout both lungs. One   of the areas has decreased in size but multiple others are new or   significantly increased compared to the study from November 8. Given the   short interval time frame of development findings may be infectious. Short-term follow-up recommended to exclude metastatic disease given   patient's history of malignancy. There appears to be diffuse esophageal wall thickening, poorly evaluated on   CT imaging. Clinical correlation for esophagitis recommended. Primary Problem  Sepsis [facility])    Active Hospital Problems    Diagnosis Date Noted    Bilious vomiting with nausea [R11.14]     Pneumonia [J18.9] 11/21/2021    Sepsis ([facility] [address] [A41.9] 11/21/2021    S/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 [Z90.3, Z98.0]     Malignant carcinoid tumor of stomach ([facility] [address] [C7A.092]     Essential hypertension [I10] 10/28/2019    Neuroendocrine tumor [D3A.8] 02/21/2019    Hyperlipidemia [E78.5]      IMPRESSION:   1. Malignant carcinoid tumor of the stomach, with recurrent disease on repeated EGD January 2020 and continued elevation of tumor marker chromogranin A. Currently on Sandostatin  2. Multiple bilateral lung nodules  3. Bacteremia       RECOMMENDATIONS:  1. I reviewed the laboratory data, imaging studies, diagnosis, prior records, other treatment recommendations with patient  2. Given rapid increase in size within 2 weeks in the lung nodules, progression of carcinoid less likely however cannot be ruled out completely  3. I will check serotonin and chromogranin A level now  4. She will need a gallium PET as o/p or octreotide scan  5. She will need follow-up with Dr. Lola Moctezuma as outpatient in 1 to 2 weeks  6. Continue other management as per primary team  7. Discharge as per primary  8. We will follow    Discussed with patient and Nurse. Thank you for asking us to see this patient. Buster Power MD  Hematologist/Medical Oncologist    Cell: [phone]    This note is created with the assistance of a speech recognition program.  While intending to generate a document that actually reflects the content of the visit, the document can still have some errors including those of syntax and sound a like substitutions which may escape proof reading. It such instances, actual meaning can be extrapolated by contextual diversion.

## 2021-11-24 NOTE — PROGRESS NOTES
Comprehensive Nutrition Assessment    Type and Reason for Visit:  Reassess    Nutrition Recommendations/Plan:   - Continue current diet - encourage/monitor PO intakes as tolerated. Monitor nausea/vomiting.  - Continue cyclic TPN of a Premixed 5/20 bag to run from 8pm to 8am at 167 mL/hr. Continue 100 mL 20% IV lipids.  Will provide 1960 kcals, 100 gm protein.  Discussed with RN about replacing electrolytes outside of TPN bag as/if needed. Monitor labs and modify TPN as/if needed. - Will monitor plan of care. Nutrition Assessment:  Cyclic TPN to continue. This morning pt ate 1/2 a cereal container. RN reports pt continues to have nausea and vomiting. Last recorded BM 11/21. Labs reviewed: Glucose 151-157 mg/dL. Meds reviewed: Synthroid, Mag-Ox, Reglan, MVI, Phenergan PRN. Malnutrition Assessment:  Malnutrition Status: Moderate malnutrition    Context:  Chronic Illness     Findings of the 6 clinical characteristics of malnutrition:  Energy Intake:  Mild decrease in energy intake - Previously meeting estimated needs with TPN. Weight Loss:  7 - Greater than 10% over 6 months (17% x 7 months)     Body Fat Loss:  1 - Mild body fat loss Orbital   Muscle Mass Loss:  1 - Mild muscle mass loss Temples (temporalis), Clavicles (pectoralis & deltoids)  Fluid Accumulation:  No significant fluid accumulation   Strength:  Not Performed    Estimated Daily Nutrient Needs:  Energy (kcal):  25-28 kcal/kg = 3616-4403 kcals/day; Weight Used for Energy Requirements:  Current     Protein (g):  1.2-1.5 gm/kg =  gm pro/day; Weight Used for Protein Requirements:  Ideal        Fluid (ml/day):  25-30 kcal/kg = 6167-2168 mL/day or per MD; Method Used for Fluid Requirements:  ml/Kg      Nutrition Related Findings:  Labs/Meds reviewed. Last BM 11/21. Nausea/Vomiting continues. Wounds:  None       Current Nutrition Therapies:    ADULT DIET; Regular;  Low Sodium (2 gm)  Current Parenteral Nutrition Orders:  · Type

## 2021-11-24 NOTE — DISCHARGE INSTR - COC
Continuity of Care Form    Patient Name: Yarelis Armstrong   :  1960  MRN:  [id number]    Admit date:  2021  Discharge date:  2021     Code Status Order: Full Code   Advance Directives:      Admitting Physician:  Joyce Peralta MD  PCP: Jennifer Roberts MD    Discharging Nurse: Sheryl East 23 Unit/Room#: 2994/7791-67  Discharging Unit Phone Number: [phone]    Emergency Contact:   Extended Emergency Contact Information  Primary Emergency Contact: Krystal Vega *Janeth marquis  Address: [address] Phone: [phone]  Work Phone: [phone]  Mobile Phone: [phone]  Relation: Brother/Sister  Secondary Emergency Contact: monika *chayo marquis  Address: N/A  Home Phone: [phone]  Mobile Phone: [phone]  Relation: Brother/Sister   needed?  No    Past Surgical History:  Past Surgical History:   Procedure Laterality Date    CHOLECYSTECTOMY  10/09/2014    COLONOSCOPY  2019    tubular adenoma; hyperplastic polyp    COLONOSCOPY      over 5 yrs ago per pt with polyps (2-)    CYSTOURETHROSCOPY/STENT REMOVAL  2011    ENDOSCOPIC ULTRASOUND (LOWER) N/A 2020    ENDOSCOPIC ULTRASOUND WITH POSSIBLE EMR performed by Ximena Camilo MD at [facility]    ERCP      GASTRECTOMY N/A 2020    HIP SURGERY Left     soft tissue tumor removal    THROAT SURGERY      vocal cord polyps removed    UPPER GASTROINTESTINAL ENDOSCOPY  2019    Neuroendocrine tumor    UPPER GASTROINTESTINAL ENDOSCOPY  2019    UPPER GASTROINTESTINAL ENDOSCOPY N/A 2019    EGD BIOPSY performed by Christopher Sommers MD at [facility] 10/23/2019    EGD W/ EMR performed by Ximena Camilo MD at [address] [place] [address] 10/29/2019    EGD CONTROL HEMORRHAGE performed by Derrick Bone MD at [address] [place] [address] 2021    EGD BIOPSY performed by Jazmin Slaughter MD K[facility] at [facility]    UPPER GASTROINTESTINAL ENDOSCOPY N/A 8/30/2021    EGD ESOPHAGOGASTRODUODENOSCOPY performed by Rebecca Nicole MD at [facility] History:   Immunization History   Administered Date(s) Administered    COVID-19, Pfizer, PF, 30mcg/0.3mL 03/26/2021, 04/16/2021    Influenza A (Q0Q3-54) Vaccine PF IM 11/24/2009    Influenza, Quadv, IM, PF (6 mo and older Fluzone, Flulaval, Fluarix, and 3 yrs and older Afluria) 10/31/2019       Active Problems:  Patient Active Problem List   Diagnosis Code    Asthma J45.909    Anxiety F41.9    Obesity E66.9    Hyperlipidemia E78.5    Hypothyroidism E03.9    Colon polyps K63.5    Vertigo R42    Mass of left hip region R22.42    General weakness R53.1    Pharyngoesophageal dysphagia R13.14    Colon polyp K63.5    Neuroendocrine tumor D3A.8    Chest pain R07.9    Shortness of breath R06.02    Anemia D64.9    GI bleed K92.2    Essential hypertension I10    Neuroendocrine neoplasm of stomach D3A.8    Polyp, stomach K31.7    Melena K92.1    Gastric ulcer with hemorrhage K25.4    Constipation K59.00    Hypokalemia E87.6    Hypomagnesemia E83.42    Bilateral pulmonary embolism (HCC) I26.99    Abdominal pain, epigastric R10.13    Elevated d-dimer R79.89    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    Intractable vomiting R11.10    Intractable nausea and vomiting R11.2    S/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 Z90.3, Z98.0    Food intolerance K90.49    Severe malnutrition (HCC) E43    Sinus tachycardia R00.0    Weight loss R63.4    Projectile vomiting with nausea R11.12    On total parenteral nutrition Z78.9    H/O blood clots Z86.718    Pneumonia J18.9    Sepsis (HCC) A41.9    Bilious vomiting with nausea R11.14       Isolation/Infection:   Isolation            No Isolation          Patient Infection Status       Infection Onset Added Last Indicated Last Indicated By Review Planned Expiration Resolved Resolved By    None active    Resolved    COVID-19 (Rule Out) 21 Respiratory Panel, Molecular, with COVID-19 (Restricted: peds pts or suitable admitted adults) (Ordered)   21 Rule-Out Test Resulted    COVID-19 (Rule Out) 21 COVID-19, Rapid (Ordered)   21 Rule-Out Test Resulted    COVID-19 (Rule Out) 10/29/21 10/29/21 10/29/21 COVID-19 (Ordered)   10/30/21 Rule-Out Test Resulted    COVID-19 (Rule Out) 21 COVID-19, Rapid (Ordered)   21 Rafael Franco RN    COVID-19 (Rule Out) 21 COVID-19, Rapid (Ordered)   21 Rule-Out Test Resulted    COVID-19 (Rule Out) 21 COVID-19, Rapid (Ordered)   21 Rule-Out Test Resulted            Nurse Assessment:  Last Vital Signs: /82   Pulse 98   Temp 98.9 °F (37.2 °C) (Oral)   Resp 18   Ht 5' 5\" (1.651 m)   Wt 170 lb 6.7 oz (77.3 kg)   LMP 1994   SpO2 95%   BMI 28.36 kg/m²     Last documented pain score (0-10 scale): Pain Level: 0  Last Weight:   Wt Readings from Last 1 Encounters:   21 170 lb 6.7 oz (77.3 kg)     Mental Status:  oriented and alert    IV Access:  - PICC - site  L Basilic, insertion date: 2021    Nursing Mobility/ADLs:  Walking   Independent  Transfer  Independent  Bathing  Independent  Dressing  {CHP DME GEI}  Toileting  Independent  Feeding  Independent  Med Admin  Assisted  Med Delivery   crushed and prefers mixed with pudding or applesauce (large pills only)    Wound Care Documentation and Therapy:        Elimination:  Continence: Bowel: Yes  Bladder: Yes  Urinary Catheter: None   Colostomy/Ileostomy/Ileal Conduit: No       Date of Last BM: 2021    Intake/Output Summary (Last 24 hours) at 2021 1402  Last data filed at 2021 0622  Gross per 24 hour   Intake 4522 ml   Output 1025 ml   Net 3497 ml     I/O last 3 completed shifts: In: 7145 [P.O.:240;  I.V.:2503]  Out: 1900 [[id number]]    Safety Concerns:     None    Impairments/Disabilities:      None    Nutrition Therapy:  Current Nutrition Therapy:   - Oral Diet:  General and Low Sodium (2gm)  - Parenteral Nutrition:  2000 ml over 24 hrs per day (see PN orders for content) Lipids 12 HR    Routes of Feeding: Oral and Parenteral Nutrition (PN)  Liquids: No Restrictions  Daily Fluid Restriction: no  Last Modified Barium Swallow with Video (Video Swallowing Test): not done    Treatments at the Time of Hospital Discharge:   Respiratory Treatments: NA  Oxygen Therapy:  is not on home oxygen therapy. Ventilator:    - No ventilator support    Rehab Therapies: Physical Therapy and Occupational Therapy  Weight Bearing Status/Restrictions: No weight bearing restirctions  Other Medical Equipment (for information only, NOT a DME order):  bath bench  Other Treatments: NA    Patient's personal belongings (please select all that are sent with patient):  None    RN SIGNATURE:  Electronically signed by Lemmie Gitelman, RN on 11/24/21 at 2:20 PM EST    CASE MANAGEMENT/SOCIAL WORK SECTION    Inpatient Status Date: 11/21/12    Readmission Risk Assessment Score:  Readmission Risk              Risk of Unplanned Readmission:  27           Discharging to Facility/ Agency   Name: [facility]  Address:  [place] [address]         Phone: [phone]       Fax: [phone]        [facility] [place] [address]         Phone: [phone]       Fax: [phone]        Fax:    Dialysis Facility (if applicable)   Name:  Address:  Dialysis Schedule:  Phone:  Fax:    / signature: Electronically signed by Bandar Walker RN on 11/24/21 at 3:01 PM EST    PHYSICIAN SECTION    Prognosis: Guarded    Condition at Discharge: Stable    Rehab Potential (if transferring to Rehab): Fair    Recommended Labs or Other Treatments After Discharge: pet scan on 29th/November .  Follow with oncology , cardiology  Monitor

## 2021-11-24 NOTE — CARE COORDINATION
Discharge [facility] Case Management Department  Written by: Derek Vincent RN    Patient Name: Rajesh Scott  Attending Provider: Jalen Posadas MD  Admit Date: 2021 10:21 AM  MRN: [id number]  Account: [de-identified]                     : 1960  Discharge Date:       Disposition: home resume [facility] spoke to Ref[facility] Lines will retrieve dc orders from [facility] & tpn services with [facility] called [facility] & confirmed with patient she has several bags of tpn @ home.     Transportation arrangements made  16:50 trip # [id number]    Derek Vincent RN

## 2021-11-24 NOTE — PROGRESS NOTES
[facility] - Progress Note  Today's Date and Time: 11/24/2021, 9:36 AM    Impression :   Bacteremia, Staphylococcus epidermidis x 2 obtained at same time, 11-21-21  Pulmonary infiltrates and nodular lesions  with several affected areas, seen oc T 11-8-21 and 11-18-21  Past medical history of malignant carcinoid tumor s/p total gastrecotomy and Qamar-en-Y esophagojejunal anastomosis in 2020,   Essential hypertension,   Hypothyroidism  Allergy to erythromycin    Recommendations:   Monitor off antibiotics. This does not appear to be a conventional bacterial pneumonia  D/C Zosyn  D/C Vancomycin  Respiratory PCR panel- All results are negative   PET scan would be helpful to determine nature of pulmonary nodules. Lung lesions may all represent carcinoid    Medical Decision Making/Summary/Discussion:11/24/2021     Afebrile, saturating well on room air  Nausea and vomiting, on zofran   No acute events overnight  Patient denies fever, chills, shortness of breath  Infection Control Recommendations   [place] Precautions      Antimicrobial Stewardship Recommendations     Discontinuation of therapy  Coordination of Outpatient Care:   Estimated Length of IV antimicrobials:None  Patient will need Midline Catheter Insertion: No  Patient will need PICC line Insertion: No  Patient will need: Home IV , [place],  SNF,  LTAC: TBD  Patient will need outpatient wound care:No    Chief complaint/reason for consultation:   Blood cultures positive for MRSE x 2      History of Present Illness:   Melinda Orourke is a 62y.o.-year-old female who was initially admitted on 11/21/2021. Patient seen at the request of Dr. Christopher Bustos. INITIAL HISTORY:    Patient is a 62 y.o. female who presented on 11/21/21 with nausea and nonbloody emesis that started 11/20. She has a past medical history of malignant carcinoid tumor s/p surgery. She did not receive chemoradiation. Patient has nightly TPN.     Patient also had a persistent cough for the past two weeks, for which she received antibiotics outpatient. She reports that 3 days after she finished the course of antibiotics her cough returned. Echo done 11/22/21 showed normal LV size with mildly increased wall thickness and normal LV systolic function with LVEF >55%. A small pericardial effusion was noted. CURRENT EVALUATION: 11/24/2021     Afebrile  VS stable    Today patient is resting in her room and appears to be in no acute distress, she reports that she is very tired. She is on room air. She denies fever and chills, but says she has been having nausea and watery, nonbloody vomiting. She reports that she has nonradiating chest pain from vomiting so much. She denies shortness of breath. She denies abdominal pain or headache. At home she reports taking steroids for 5 days and an oral antibiotic BID x 7 days. Felt better but experienced return of symptoms 3 days after stopping antibiotics. Difficult to ascertain what helped- the steroids or the antibiotics or both. Coagulase negative Staphylococci in blood likely contaminant, as cultures taken at same time and repeat cultures: No growth     Pulmonary nodules possibly from prior carcinoid. No improvement or worsening between the two CT scans which would be unusual for a bacterial pneumonia. PET scan may help determine if lesions represent carcinoid invasion of the lungs. Labs, X rays reviewed: 11/24/2021    BUN: 11-->14-->16  Cr: 0.52-->0.47    WBC: 21.2-->14.7-->9.4  Hb: 11.8-->11.0  Plat: 449-->424-->373     Cultures:  Urine:  11/22/21: no growth  Blood:  11/21/21: positive for MRSE x 2  11/22/21: no growth  Sputum :    Wound:      Discussed with patient, RN. IM    I have personally reviewed the past medical history, past surgical history, medications, social history, and family history, and I have updated the database accordingly.   Past Medical History:     Past Medical History:   Diagnosis Date    Anxiety     Arthritis     knee    Asthma     Colon polyp 02/21/2019    tubular adenoma; hyperplastic polyp    Colon polyps     Cyst of kidney, acquired     bilat.     Fatigue     Hypertension     Hypothyroidism     Neuroendocrine tumor 02/21/2019    of stomach    Obesity     Pharyngoesophageal dysphagia     Vocal cord polyps     Wears glasses        Past Surgical  History:     Past Surgical History:   Procedure Laterality Date    CHOLECYSTECTOMY  10/09/2014    COLONOSCOPY  02/21/2019    tubular adenoma; hyperplastic polyp    COLONOSCOPY      over 5 yrs ago per pt with polyps (2-)    CYSTOURETHROSCOPY/STENT REMOVAL  05/03/2011    ENDOSCOPIC ULTRASOUND (LOWER) N/A 01/22/2020    ENDOSCOPIC ULTRASOUND WITH POSSIBLE EMR performed by [facility] at [facility]    ERCP      GASTRECTOMY N/A 11/30/2020    HIP SURGERY Left     soft tissue tumor removal    THROAT SURGERY      vocal cord polyps removed    UPPER GASTROINTESTINAL ENDOSCOPY  02/21/2019    Neuroendocrine tumor    UPPER GASTROINTESTINAL ENDOSCOPY  09/23/2019    UPPER GASTROINTESTINAL ENDOSCOPY N/A 09/23/2019    EGD BIOPSY performed by Sarahi Angel MD at [address] [place] [address] 10/23/2019    EGD W/ EMR performed by Maya Thomas MD at [address] [place] [address] N/A 10/29/2019    EGD CONTROL HEMORRHAGE performed by Yajaira Perry MD at [address] [place] [address] N/A 04/26/2021    EGD BIOPSY performed by Shaila Cormier MD at [address] [place] [address] N/A 8/30/2021    EGD ESOPHAGOGASTRODUODENOSCOPY performed by Jana Ward MD at [address] [place] [address]       Medications:      metoprolol succinate  25 mg Oral Daily    vitamin B and C  1 tablet Oral Daily    levothyroxine  88 mcg Oral Daily    magnesium oxide  400 mg Oral BID    metoclopramide  10 mg Oral TID AC    sucralfate  1 g Oral 4x Daily    pantoprazole  40 mg Oral Daily    multivitamin  1 tablet Oral Daily    fat emulsion  100 mL IntraVENous Daily    sodium chloride flush  5-40 mL IntraVENous 2 times per day    enoxaparin  40 mg SubCUTAneous Daily       Social History:     Social History     Socioeconomic History    Marital status: Single     Spouse name: Not on file    Number of children: Not on file    Years of education: Not on file    Highest education level: Not on file   Occupational History    Not on file   Tobacco Use    Smoking status: Former Smoker     Packs/day: 1.00     Years: 25.00     Pack years: 25.00     Quit date: 2012     Years since quittin.4    Smokeless tobacco: Never Used    Tobacco comment: quit 2 years   Vaping Use    Vaping Use: Never used   Substance and Sexual Activity    Alcohol use: Not Currently     Comment: 3 times a month    Drug use: No    Sexual activity: Not on file   Other Topics Concern    Not on file   Social History Narrative    Not on file     Social Determinants of Health     Financial Resource Strain: Low Risk     Difficulty of Paying Living Expenses: Not hard at all   Food Insecurity: No Food Insecurity    Worried About Running Out of Food in the Last Year: Never true    Ana of Food in the Last Year: Never true   Transportation Needs:     Lack of Transportation (Medical): Not on file    Lack of Transportation (Non-Medical):  Not on file   Physical Activity:     Days of Exercise per Week: Not on file    Minutes of Exercise per Session: Not on file   Stress:     Feeling of Stress : Not on file   Social Connections:     Frequency of Communication with Friends and Family: Not on file    Frequency of Social Gatherings with Friends and Family: Not on file    Attends [employer] Services: Not on file    Active Member of Clubs or Organizations: Not on file    Attends Club or Organization Meetings: Not on file    Marital Status: Not on file   Intimate Partner Violence:     Fear of Current or Ex-Partner: Not on file    Emotionally Abused: Not on file    Physically Abused: Not on file    Sexually Abused: Not on file   Housing Stability:     Unable to Pay for Housing in the Last Year: Not on file    Number of Places Lived in the Last Year: Not on file    Unstable Housing in the Last Year: Not on file       Family History:     Family History   Problem Relation Age of Onset    High Blood Pressure Mother     High Blood Pressure Sister     Stomach Cancer Sister     Other Sister         epilepsy    No Known Problems Father         Allergies:   Erythromycin     Review of Systems:   Constitutional: No fevers or chills. No systemic complaints  Head: No headaches  Eyes: No double vision or blurry vision. No conjunctival inflammation. ENT: No sore throat or runny nose. . No hearing loss, tinnitus or vertigo. Cardiovascular: No chest pain or palpitations. No shortness of breath. No PINEDO  Lung: No shortness of breath or cough. No sputum production  Abdomen: No nausea, vomiting, diarrhea, or abdominal pain. Dang Cohen No cramps. Genitourinary: No increased urinary frequency, or dysuria. No hematuria. No suprapubic or CVA pain  Musculoskeletal: No muscle aches or pains. No joint effusions, swelling or deformities  Hematologic: No bleeding or bruising. Neurologic: No headache, weakness, numbness, or tingling. Integument: No rash, no ulcers. Psychiatric: No depression. Endocrine: No polyuria, no polydipsia, no polyphagia. Physical Examination :     Patient Vitals for the past 8 hrs:   BP Temp Temp src Pulse Resp SpO2 Weight   11/24/21 0933 126/89 98.5 °F (36.9 °C) Oral 120 18 96 % --   11/24/21 0400 -- -- -- 96 -- -- 170 lb 6.7 oz (77.3 kg)     General Appearance: Awake, alert, and in no apparent distress  Head:  Normocephalic, no trauma  Eyes: Pupils equal, round, reactive to light and accommodation; extraocular movements intact; sclera anicteric; conjunctivae pink. No embolic phenomena.   ENT: Oropharynx clear, without erythema, exudate, or thrush. No tenderness of sinuses. Mouth/throat: mucosa pink and moist. No lesions. Dentition in good repair. Neck:Supple, without lymphadenopathy. Thyroid normal, No bruits. Pulmonary/Chest: Clear to auscultation, without wheezes, rales, or rhonchi. No dullness to percussion. Cardiovascular: Regular rate and rhythm without murmurs, rubs, or gallops. Abdomen: Soft, non tender. Bowel sounds normal. No organomegaly  All four Extremities: No cyanosis, clubbing, edema, or effusions. Neurologic: No gross sensory or motor deficits. Skin: Warm and dry with good turgor. No signs of peripheral arterial or venous insufficiency. No ulcerations. No open wounds. Medical Decision Making -Laboratory:   I have independently reviewed/ordered the following labs:    CBC with Differential:   Recent Labs     11/23/21  0617 11/24/21  0614   WBC 14.7* 9.4   HGB 11.8* 11.0*   HCT 37.7 34.8*    373   LYMPHOPCT 11* 19*   MONOPCT 7 9     BMP:   Recent Labs     11/23/21  0617 11/24/21  0614    136   K 4.1 3.8    106   CO2 16* 16*   BUN 14 16   CREATININE 0.47* 0.47*   MG 1.6 1.8     Hepatic Function Panel:   Recent Labs     11/21/21  1057   PROT 7.4   LABALBU 3.3*   BILITOT 0.23*   ALKPHOS 119*   ALT 17   AST 22     No results for input(s): RPR in the last 72 hours. No results for input(s): HIV in the last 72 hours. No results for input(s): BC in the last 72 hours.   Lab Results   Component Value Date    MUCUS NOT REPORTED 11/21/2021    RBC 3.79 11/24/2021    TRICHOMONAS NOT REPORTED 11/21/2021    WBC 9.4 11/24/2021    YEAST NOT REPORTED 11/21/2021    TURBIDITY Clear 11/21/2021     Lab Results   Component Value Date    CREATININE 0.47 11/24/2021    GLUCOSE 157 11/24/2021       Medical Decision Making-Imaging:     Narrative   EXAMINATION:   ONE XRAY VIEW OF THE CHEST       11/23/2021 6:04 pm       COMPARISON:   Chest radiograph performed 10/29/2021.       HISTORY:   ORDERING SYSTEM PROVIDED HISTORY: verify picc placement   TECHNOLOGIST PROVIDED HISTORY:   verify picc placement       FINDINGS:   There are right basilar infiltrates.  There is no effusion. Enterprise Glimpse is no   pneumothorax.  The heart is enlarged.  The upper abdomen is unremarkable. The extrathoracic soft tissues are unremarkable. Becky Glimpse is a right-sided PICC   line with the tip in the mid SVC.           Impression   Right basilar infiltrate concerning for pneumonia.       Right-sided PICC line with the tip in the mid SVC. Narrative   EXAMINATION:   CT OF THE ABDOMEN AND PELVIS WITHOUT CONTRAST 11/21/2021 4:50 pm       TECHNIQUE:   CT of the abdomen and pelvis was performed without the administration of   intravenous contrast. Multiplanar reformatted images are provided for review. Dose modulation, iterative reconstruction, and/or weight based adjustment of   the mA/kV was utilized to reduce the radiation dose to as low as reasonably   achievable.       COMPARISON:   Chest CT study from earlier the same day.  CT study of the abdomen pelvis   from August 27, 2021.       HISTORY:   ORDERING SYSTEM PROVIDED HISTORY: nausea,vomiting,malignancy histiory   TECHNOLOGIST PROVIDED HISTORY:   nausea,vomiting,malignancy histiory           FINDINGS:   Organs: The gallbladder is surgically absent.  A moderate degree of biliary   ductal dilation is unchanged.  No appreciable struck ting stone or mass.    Excreted contrast material is present throughout the renal collecting systems   and within the urinary bladder.  There are again seen numerous benign   bilateral renal cysts.  The liver, spleen, pancreas, kidneys, and adrenal   glands otherwise have an unremarkable unenhanced CT appearance.  No   hydronephrosis or hydroureter.       GI/Bowel: Post-surgical changes of the small bowel and of gastrectomy are   again noted.  No acute intestinal abnormality.  The small and large bowel   loops are otherwise normal in caliber, contour, and morphology.  No dilated loops or areas of bowel wall thickening.  No appreciable colonic or rectal   mass.       Peritoneum/Retroperitoneum: There is no free fluid or extraluminal gas.  No   enlarged or suspicious mesenteric or retroperitoneal lymphadenopathy. The   abdominal aorta and iliac arteries are normal in caliber.       Pelvis: No pelvic free fluid or enlarged or suspicious pelvic or inguinal   lymphadenopathy. No appreciable uterine or adnexal abnormality.  The urinary   bladder and the pelvic bowel loops are unremarkable.       Bones/Soft Tissues: Degenerative changes are present throughout the lumbar   spine. No acute fracture.           Impression   There is no acute finding on this unenhanced CT study of the abdomen and   pelvis to account for the patient's symptoms.       Post-surgical changes of gastrectomy and of the small bowel are again noted. Bilateral renal cysts.  Gallbladder surgically absent. Narrative   EXAMINATION:   CTA OF THE CHEST 11/21/2021 11:56 am       TECHNIQUE:   CTA of the chest was performed after the administration of intravenous   contrast.  Multiplanar reformatted images are provided for review.  MIP   images are provided for review.  Dose modulation, iterative reconstruction,   and/or weight based adjustment of the mA/kV was utilized to reduce the   radiation dose to as low as reasonably achievable.       COMPARISON:   CT chest November 8, 2021 and priors.       HISTORY:   ORDERING SYSTEM PROVIDED HISTORY: tachycardia, hx cancer   TECHNOLOGIST PROVIDED HISTORY:   tachycardia, hx cancer   Decision Support Exception - unselect if not a suspected or confirmed   emergency medical condition->Emergency Medical Condition (MA)   Reason for Exam: tachycardia, hx cancer   Acuity: Unknown   Type of Exam: Unknown       FINDINGS:   Pulmonary Arteries: Pulmonary arteries are adequately opacified for   evaluation.  No evidence of intraluminal filling defect to suggest pulmonary   embolism.  Main pulmonary artery is normal in caliber.       Mediastinum: Soft a JULIA wall appears thickened, poorly evaluated on CT   imaging.  There is mild bilateral hilar lymphadenopathy.  The heart and   pericardium demonstrate no acute abnormality.  There is no acute abnormality   of the thoracic aorta.       Lungs/pleura: The central airways are patent.  Scattered areas of nodularity   are again seen throughout both lungs.  Air in the posterior right upper lobe   measures 1.3 cm on image 47, previously measuring 6 mm wall area of   nodularity anteriorly in the right lower lobe has decreased in size now   measuring 0.8 cm on image 66, previously measuring 1.1 cm.  Linear bands of   consolidation are again seen in the right middle lobe and right lower lobe. There are few new subpleural nodules in the left upper lobe measuring up to 7   mm.  Linear band of atelectasis is seen within the lingula with new area of   nodularity along the anterior aspect.  Multiple new areas of nodularity are   seen within the left lung base measuring up to 1 cm.  No pneumothorax or   pleural effusion identified.       Upper Abdomen: Limited visualization of the upper abdomen demonstrates   patient's known left renal cysts.  There postsurgical changes at the GE   junction.  No acute process.       Soft Tissues/Bones: No acute bone or soft tissue abnormality.           Impression   No evidence of pulmonary embolism.       Multiple scattered areas of nodularity are seen throughout both lungs.  One   of the areas has decreased in size but multiple others are new or   significantly increased compared to the study from November 8.  Given the   short interval time frame of development findings may be infectious. Short-term follow-up recommended to exclude metastatic disease given   patient's history of malignancy.       There appears to be diffuse esophageal wall thickening, poorly evaluated on   CT imaging.  Clinical correlation for esophagitis recommended. Medical Decision [id number]:     11/24/21 0824  Culture, Blood 1    Collected: 11/21/21 1057  Preliminary result  Specimen: Blood    Specimen Description . BLOOD P Culture DIRECT GRAM STAIN FROM BOTTLE: GRAM POSITIVE COCCI IN [place] P   Special Requests RT AC 10 ML P Culture Staphylococcus epidermidis Detected: mecA/C Gene Detected- Methicillin Resistant Organism Methodo. .. Abnormal  P   Culture POSITIVE Blood Culture Results called to and read back by: MEGGAN Lewis 11/22/21 Abnormal  P Culture STAPHYLOCOCCUS EPIDERMIDIS Abnormal  P          11/24/21 0818  Culture, Blood 1    Collected: 11/21/21 1057  Final result  Specimen: Blood    Specimen Description . BLOOD Culture Staphylococcus epidermidis Detected: mecA/C Gene Detected- Methicillin Resistant Organism Methodo. .. Abnormal    Special Requests PICC 2 ML Culture STAPHYLOCOCCUS EPIDERMIDIS Abnormal    Culture POSITIVE Blood Culture Results called to and read back by: MEGGAN Orta ON 11/22/21 Abnormal  Culture STAPHYLOCOCCUS LUGDUNENSIS Abnormal    Culture DIRECT GRAM STAIN FROM BOTTLE: [address] [place]            11/24/21 0036  Culture, Blood 1   Collected: 11/22/21 0850  Preliminary result  Specimen: Blood    Specimen Description . BLOOD P Culture NO GROWTH 2 DAYS P   Special Requests R WRIST 10ML P            11/24/21 0036  Culture, Blood 1   Collected: 11/22/21 0855  Preliminary result  Specimen: Blood    Specimen Description . BLOOD P Culture NO GROWTH 2 DAYS P   Special Requests L HAND 10ML P              11/23/21 1445  Respiratory Panel, Molecular, with COVID-19 (Restricted: peds pts or suitable admitted adults)   Collected: 11/23/21 1236  Final result  Specimen: Nasopharyngeal Swab    Specimen Description . NASOPHARYNGEAL SWAB Influenza A H3 PCR NOT REPORTED   Adenovirus PCR Not Detected Influenza B by PCR Not Detected   Coronavirus 229E PCR Not Detected Parainfluenza 1 PCR Not Detected   Coronavirus HKU1 PCR Not Detected Parainfluenza 2 PCR Not Detected   Coronavirus NL63 PCR Not Detected Parainfluenza 3 PCR Not Detected   Coronavirus OC43 PCR Not Detected Parainfluenza 4 PCR Not Detected   SARS-CoV-2, PCR Not Detected Resp Syncytial Virus PCR Not Detected   Human Metapneumovirus PCR Not Detected Bordetella Parapertussis Not Detected   Rhino/Enterovirus PCR Not Detected B Pertussis by PCR Not Detected   Influenza A by PCR Not Detected Chlamydia pneumoniae By PCR Not Detected   Influenza A H1 PCR NOT REPORTED Mycoplasma pneumo by PCR Not Detected    Influenza A H1 (2009) PCR NOT REPORTED              Updated   Order   11/22/21 1111  Culture, Blood 1   Collected: 11/22/21 0850  Preliminary result  Specimen: Blood    Specimen Description . BLOOD P Culture NO GROWTH 2 HOURS P   Special Requests R WRIST 10ML P            11/22/21 1111  Culture, Blood 1   Collected: 11/22/21 0855  Preliminary result  Specimen: Blood    Specimen Description . BLOOD P Culture NO GROWTH 1 HOUR P   Special Requests L HAND 10ML P            11/22/21 0702  Culture, Urine   Collected: 11/21/21 1100  Final result  Specimen: Urine, clean catch    Specimen Description . CLEAN CATCH URINE Culture NO SIGNIFICANT GROWTH   Special Requests NOT REPORTED            11/22/21 0507  Culture, Blood 1   Collected: 11/21/21 1057  Preliminary result  Specimen: Blood    Specimen Description . BLOOD P Culture DIRECT GRAM STAIN FROM BOTTLE: GRAM POSITIVE COCCI IN [place] P   Special Requests PICC 2 ML P Culture Staphylococcus epidermidis Detected: mecA/C Gene Detected- Methicillin Resistant Organism Methodo. .. Abnormal  P   Culture POSITIVE Blood Culture Results called to and read back by: MEGGAN Moore ON 11/22/21 Abnormal  P            11/22/21 0148  Culture, Blood 1   Collected: 11/21/21 1057  Preliminary result  Specimen: Blood    Specimen Description . BLOOD P Culture DIRECT GRAM STAIN FROM BOTTLE: GRAM POSITIVE COCCI IN [place] P   Special Requests RT AC 10 ML P Culture Staphylococcus epidermidis Detected: mecA/C Gene Detected- Methicillin Resistant Organism Methodo. .. Abnormal  P   Culture POSITIVE Blood Culture Results called to and read back by: MEGGAN Lucas 11/22/21 Abnormal  P          Medical Decision Making-Other:     Note:  Labs, medications, radiologic studies were reviewed with personal review of films  Large amounts of data were reviewed  Discussed with nursing Staff, Discharge planner  Infection Control and Prevention measures reviewed  All prior entries were reviewed  Administer medications as ordered  Prognosis: [address]  Discharge planning reviewed  Follow up as outpatient. Thank you for allowing us to participate in the care of this patient. Please call with questions. Velma Soria participated in the evaluation of this patient. ATTESTATION:    I have discussed the case, including pertinent history and exam findings with the residents and students. I have seen and examined the patient and the key elements of the encounter have been performed by me. I was present when the student obtained his information or examined the patient. I have reviewed the laboratory data, other diagnostic studies and discussed them with the residents. I have updated the medical record where necessary. I agree with the assessment, plan and orders as documented by the resident/ student.     Nico Greene MD.

## 2021-11-24 NOTE — PROGRESS NOTES
Harshad Renita  Internal Medicine Teaching Residency Program  Inpatient Daily Progress Note  ______________________________________________________________________________    Patient: Anton Daniel  YOB: 1960   ABDIRASHID:[id number]    Acct: [de-identified]     Room: [address] [place] [address]  Admit date: 11/21/2021  Today's date: 11/24/21  Number of days in the hospital: 3    SUBJECTIVE   CC: Fever, Fatigue, Emesis, Nausea, and Chest Pain    Pt examined at bedside. Chart & results reviewed. - vitals stable, pt is saturating well on room air  - labs reviewed   - patient states her nausea has improved and that she is feeling better. - no acute events overnight.   - Patient denies headache, vision problems, chest pain, cough, abdominal pain, changes in bowel or urinary habits, and swelling.  - had tachycardia. Was started on metoprolol 25 mg twice daily.  - PICC checked yesterday and it was not infected. Patient advised to f/up with her PCP to remove it.  - viral panel came negative.       ROS:  General ROS: Completed and except as mentioned above were negative   HEENT ROS: Completed and except as mentioned above were negative   Allergy and Immunology ROS:  Completed and except as mentioned above were negative  Hematological and Lymphatic ROS:  Completed and except as mentioned above were negative  Respiratory ROS:  Completed and except as mentioned above were negative  Cardiovascular ROS:  Completed and except as mentioned above were negative  Gastrointestinal ROS: Completed and except as mentioned above were negative  Genito-Urinary ROS:  Completed and except as mentioned above were negative  Musculoskeletal ROS:  Completed and except as mentioned above were negative  Neurological ROS:  Completed and except as mentioned above were negative  Skin & Dermatological ROS:  Completed and except as mentioned above were negative  Psychological ROS:  Completed and except as mentioned above were negative  BRIEF HISTORY   The patient is a pleasant 62 y.o. female presents with a chief complaint of nausea, vomiting that started yesterday. Patient states that the vomit was non bloody. It is not associated with any abdominal pain. Patient states that she had loose stools. Patient denies fever.      Patient also had persistent cough for the past 2 weeks that she received outpatient antibiotics experience and the cough has returned.     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 Patient is did not receive chemoradiation. Patient has TPN nightly. Patient denies taking any blood thinners but has history of pulmonary embolism in the past. Patient states she was taken off Eliquis in October    OBJECTIVE     Vital Signs:  /89   Pulse 120   Temp 98.5 °F (36.9 °C) (Oral)   Resp 18   Ht 5' 5\" (1.651 m)   Wt 170 lb 6.7 oz (77.3 kg)   LMP 1994   SpO2 96%   BMI 28.36 kg/m²     Temp (24hrs), Av.6 °F (37 °C), Min:98.4 °F (36.9 °C), Max:98.8 °F (37.1 °C)    In: 4522   Out: 1025 [Urine:1025]    Physical Exam -  Constitutional:  Alert, cooperative and no distress. Mental Status:  Oriented to person, place and time and normal affect. Lungs:  Bilateral air entry present, lung fields clear. Normal effort. Heart:  Regular rate and rhythm, no murmur. Abdomen:  Soft, nontender, nondistended, normal bowel sounds. Extremities:  No edema, redness, tenderness in the calves. Skin:  Warm, dry, no gross lesions or rashes.     Medications:  Scheduled Medications:    metoprolol succinate  25 mg Oral Daily    vitamin B and C  1 tablet Oral Daily    levothyroxine  88 mcg Oral Daily    magnesium oxide  400 mg Oral BID    metoclopramide  10 mg Oral TID AC    sucralfate  1 g Oral 4x Daily    pantoprazole  40 mg Oral Daily    multivitamin  1 tablet Oral Daily    fat emulsion  100 mL IntraVENous Daily    sodium chloride flush  5-40 mL IntraVENous 2 times per day    enoxaparin  40 mg SubCUTAneous Daily     Continuous Infusions:    sodium chloride      sodium chloride 100 mL/hr at 11/24/21 0420     PRN Medicationslabetalol, 10 mg, Q8H PRN  promethazine, 6.25 mg, Q6H PRN  sodium chloride flush, 5-40 mL, PRN  sodium chloride, 25 mL, PRN  polyethylene glycol, 17 g, Daily PRN  acetaminophen, 650 mg, Q6H PRN   Or  acetaminophen, 650 mg, Q6H PRN        Diagnostic Labs:  CBC:   Recent Labs     11/22/21  0539 11/23/21  0617 11/24/21 0614   WBC 21.2* 14.7* 9.4   RBC 4.02 4.17 3.79*   HGB 11.8* 11.8* 11.0*   HCT 35.9* 37.7 34.8*   MCV 89.3 90.4 91.8   RDW 13.2 12.8 12.9    424 373     BMP:   Recent Labs     11/22/21  0539 11/23/21 0617 11/24/21 0614   * 137 136   K 3.8 4.1 3.8    105 106   CO2 18* 16* 16*   PHOS  --  2.3* 2.7   BUN 11 14 16   CREATININE 0.52 0.47* 0.47*     BNP: No results for input(s): BNP in the last 72 hours. PT/INR: No results for input(s): PROTIME, INR in the last 72 hours. APTT: No results for input(s): APTT in the last 72 hours. CARDIAC ENZYMES: No results for input(s): CKMB, CKMBINDEX, TROPONINI in the last 72 hours. Invalid input(s): CKTOTAL;3  FASTING LIPID PANEL:  Lab Results   Component Value Date    CHOL 182 08/27/2020    HDL 45 08/27/2020    TRIG 64 11/23/2021     LIVER PROFILE:   Recent Labs     11/21/21  1057   AST 22   ALT 17   BILITOT 0.23*   ALKPHOS 119*      MICROBIOLOGY:   Lab Results   Component Value Date/Time    CULTURE NO GROWTH 2 DAYS 11/22/2021 08:55 AM       Imaging:    CT ABDOMEN PELVIS WO CONTRAST Additional Contrast? None    Result Date: 11/21/2021  There is no acute finding on this unenhanced CT study of the abdomen and pelvis to account for the patient's symptoms. Post-surgical changes of gastrectomy and of the small bowel are again noted. Bilateral renal cysts. Gallbladder surgically absent.      CT CHEST PULMONARY EMBOLISM W CONTRAST    Result Date: 11/21/2021  No evidence of pulmonary embolism. Multiple scattered areas of nodularity are seen throughout both lungs. One of the areas has decreased in size but multiple others are new or significantly increased compared to the study from November 8. Given the short interval time frame of development findings may be infectious. Short-term follow-up recommended to exclude metastatic disease given patient's history of malignancy. There appears to be diffuse esophageal wall thickening, poorly evaluated on CT imaging. Clinical correlation for esophagitis recommended. ASSESSMENT & PLAN      Pneumonia  Plan: Antibiotic stopped. .Blood cultures- Gram positive cocci in clusters, Staphylococcus epidermidis detected- MecA/ Mec C detected- Likely contaminant per ID.        Nausea:  Zofran inj 4 mg given in the ER. Zofran 4 mg every 4 hours PRN. Scoplamine patch     Hypothyroidism:  Levothyroxine 88 mcg oral daily.          Essential hypertension  Plan: Norvasc 5 mg daily. Metoprolol 25 mg twice daily.       Malignant carcinoid tumor of stomach (HCC) with   S/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  Plan: Multivitamins and folic acid. On TPN nightly. Sucralfate tablet 1 gm daily. CT Abdomen- negative  Oncology on board.  Follow their recommendations.           DVT ppx: Lovenox 40 mg subcutaneous daily  GI ppx: Protonix 40 mg daily     PT/OT/SW: consulted  Discharge Planning:consulted    Rut Carlos MD  PGY-1, Internal Medicine Resident  Gurpreet S Bethany Lopez         11/24/2021, 9:59 AM

## 2021-11-24 NOTE — PROGRESS NOTES
CLINICAL PHARMACY NOTE: MEDS TO BEDS    Total # of Prescriptions Filled: 1   The following medications were delivered to the patient:  · Metoprolol Tartrate 25mg    Additional Documentation: delivered to patient in room 447 11/24 at 3:38pm. No co-pay.

## 2021-11-24 NOTE — PLAN OF CARE
Problem: Pain:  Goal: Pain level will decrease  Description: Pain level will decrease  11/24/2021 1155 by Otis Charlton RN  Outcome: Completed  11/23/2021 2231 by Alexandra Kumar RN  Outcome: Ongoing  Goal: Control of acute pain  Description: Control of acute pain  11/24/2021 1155 by Otis Charlton RN  Outcome: Completed  11/23/2021 2231 by Alexandra Kumar RN  Outcome: Ongoing  Goal: Control of chronic pain  Description: Control of chronic pain  11/24/2021 1155 by Otis Charlton RN  Outcome: Completed  11/23/2021 2231 by Alexandra Kumar RN  Outcome: Ongoing     Problem: Falls - Risk of:  Goal: Will remain free from falls  Description: Will remain free from falls  11/24/2021 1155 by Otis Charlton RN  Outcome: Completed  11/23/2021 2231 by Alexandra Kumar RN  Outcome: Ongoing  Goal: Absence of physical injury  Description: Absence of physical injury  11/24/2021 1155 by Otis Charlton RN  Outcome: Completed  11/23/2021 2231 by Alexandra Kumar RN  Outcome: Ongoing     Problem: Nutrition  Goal: Optimal nutrition therapy  11/24/2021 1155 by Otis Charlton RN  Outcome: Completed  11/23/2021 2231 by Alexandra Kumar RN  Outcome: Ongoing

## 2021-11-26 LAB
CHROMOGRANIN A: 32 NG/ML (ref 0–103)
HISTOPLASMA AG, S: NOT DETECTED
HISTOPLASMA ANTIGEN, SERUM: NOT DETECTED

## 2021-11-27 LAB — SEROTONIN BLOOD: 120 NG/ML (ref 50–220)

## 2021-11-28 LAB
CULTURE: NORMAL
CULTURE: NORMAL
HISTOPLASMA ABS, ID: NORMAL
HISTOPLASMA ANTIBODY MYCELIAL CF: NORMAL
HISTOPLASMA ANTIBODY YEAST CF: NORMAL
Lab: NORMAL
Lab: NORMAL
SPECIMEN DESCRIPTION: NORMAL
SPECIMEN DESCRIPTION: NORMAL

## 2021-11-29 ENCOUNTER — TELEPHONE (OUTPATIENT)
Dept: INTERNAL MEDICINE CLINIC | Age: 61
End: 2021-11-29

## 2021-11-29 RX ORDER — AMOXICILLIN 500 MG/1
500 CAPSULE ORAL 3 TIMES DAILY
Qty: 21 CAPSULE | Refills: 0 | Status: SHIPPED | OUTPATIENT
Start: 2021-11-29 | End: 2021-12-06

## 2021-11-29 NOTE — PROGRESS NOTES
Physician Progress Note      Landon To  [facility] #:                  [id number]  :                       1960  ADMIT DATE:       2021 10:21 AM  DISCH DATE:        2021 4:53 PM  RESPONDING  PROVIDER #:        Marcos Mejia          QUERY TEXT:    Patient admitted with Pneumonia. Noted documentation of pneumonia with a   possible Sepsis in H&P on 2021. Or please document if the diagnosis of   Pneumonia has been ruled out after further study. The medical record reflects the following:  Risk Factors: Malignant carcinoid tumor of stomach with Qamar-en-Y, HTN,  Clinical Indicators: CXR- right basilar infiltrates,  WBC - 22.5 > 21.5 >   14.7, blood cultures - positive coag. staph with a possible contamination per   I&D. Treatment: IV - Vancomycin, Zosyn,  ID- Recommendation to monitor off   antibiotics for infectious process. Thank Logan Ahumada RN, CDS-  email - [email or web address]  office- [phone]  Options provided:  -- Pneumonia present as evidenced by  -- Pneumonia was ruled out  -- Other - I will add my own diagnosis  -- Disagree - Not applicable / Not valid  -- Disagree - Clinically unable to determine / Unknown  -- Refer to Clinical Documentation Reviewer    PROVIDER RESPONSE TEXT:    Pneumonia was ruled out after study. Query created by: Faviola Lao on 2021 7:02 AM      QUERY TEXT:    Pt admitted with possible Sepsis. Pt noted to have possible pneumonia. If   possible, please document in the progress notes and discharge summary if you   are evaluating and /or treating any of the following: The medical record reflects the following:  Risk Factors: Pneumonia, recent completion of ATB at home for infection, TPN   at home with PICC line  Clinical Indicators: CXR - right infiltrate concerning for pneumonia, CT chest   - negative for PE with a concern for pneumonia. WBC- 22.5-14. 7.   HR- 121-99,   RR- 20-26, Temp -99.7  Procalcitonin- 2.73, CRP- 110. Blood Cultures -   positive for coag. staph with a concern for contamination per ID  Treatment: IV- Zosyn Vancomycin,  Monitor off of antibiotics. Iv-     Thank You, Rivka Fonseca CDS-  email - [email or web address]  office- [phone]  Options provided:  -- Sepsis due to Pneumonia, present on admission  -- Sepsis due to##present on admission. Please specify source of infection. ,   present on admission. Please specify source of infection. -- Sepsis was ruled out and only SIRS  -- Other - I will add my own diagnosis  -- Disagree - Not applicable / Not valid  -- Disagree - Clinically unable to determine / Unknown  -- Refer to Clinical Documentation Reviewer    PROVIDER RESPONSE TEXT:    After further study, sepsis was ruled out for this patient and only SIRS.       Query created by: Caryl Bowman on 11/26/2021 7:20 AM      Electronically signed by:  Gerardo Crocker 11/29/2021 9:14 AM

## 2021-11-30 ENCOUNTER — TELEPHONE (OUTPATIENT)
Dept: INTERNAL MEDICINE CLINIC | Age: 61
End: 2021-11-30

## 2021-12-01 ENCOUNTER — TELEPHONE (OUTPATIENT)
Dept: ONCOLOGY | Age: 61
End: 2021-12-01

## 2021-12-01 NOTE — TELEPHONE ENCOUNTER
Writer notes that pt is currently admitted. Will track d/c and obtain a f/u with Dr. Beatriz Renteria per Dr. Terrazas Player note 1-2 weeks post discharge.

## 2021-12-01 NOTE — DISCHARGE SUMMARY
[facility]     Department of Internal Medicine - Staff Internal Medicine Teaching Service    INPATIENT DISCHARGE SUMMARY      Patient Identification:  Jacinta Andrade is a 62 y.o. female. :  1960  MRN: [id number]     Acct: [de-identified]   PCP: Cecily Mahmood MD  Admit Date:  2021  Discharge date and time: 2021  4:53 PM   Attending Provider: No att. providers found                                     [address] Problem Lists:  Principal Problem:    Sepsis ([facility] [address]  Active Problems:    Hyperlipidemia    Neuroendocrine tumor    Essential hypertension    Malignant carcinoid tumor of stomach ([facility] [address]    S/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    Pneumonia    Bilious vomiting with nausea  Resolved Problems:    * No resolved hospital problems. *      HOSPITAL STAY     Brief Inpatient course:   Jacinta Andrade is a 62 y.o. female who was admitted for the management of Sepsis [facility]), presented to the emergency department with Fever, Fatigue, Emesis, Nausea, and Chest Pain  . Nabil Martinez Blood cultures- Gram positive cocci in clusters, Staphylococcus epidermidis detected- MecA/ Mec C detected-On Zosyn 3375 mg IV every 8 hours. Vancomycin 1000 mg x 12 hours. ID consulted and said it was a contaminant and to monitor off antibiotics. PICC checked om 2021 and it was not infected. Patient advised to f/up with her PCP to remove it.         Procedures/ Significant Interventions:    None    Consults:     Consults:     Final Specialist Recommendations/Findings:   IP CONSULT TO INTERNAL MEDICINE  IP CONSULT TO DIETITIAN  IP CONSULT TO INFECTIOUS DISEASES  IP CONSULT TO DIETITIAN  IP CONSULT TO ONCOLOGY  IP CONSULT TO HOME CARE NEEDS      Any Hospital Acquired Infections: none    Discharge Functional Status:  stable    DISCHARGE PLAN     Disposition: home    Patient Instructions:   Discharge Medication List as of 2021  4:01 PM      START taking these medications    Details   metoprolol tartrate (LOPRESSOR) 25 MG tablet Take 1 tablet by mouth 2 times daily, Disp-180 tablet, R-1Normal         CONTINUE these medications which have NOT CHANGED    Details   pantoprazole (PROTONIX) 40 MG tablet TAKE 1 TABLET BY MOUTH EVERY DAY, Disp-90 tablet, R-0Normal      levothyroxine (SYNTHROID) 88 MCG tablet Take 1 tablet by mouth Five times weekly Indications: Takes only Wed-Sun (skips Mon/Tues), Disp-30 tablet, R-3Normal      scopolamine (TRANSDERM-SCOP) transdermal patch Place 1 patch onto the skin every 72 hours, Disp-20 patch, R-3Normal      sterile water SOLN with amino acids 10 % SOLN 50 g/L, dextrose 70 % SOLN 100 g/L, Nutrilyte CONC 20 mL/L, sodium phosphate 3 MMOLE/ML SOLN 5 mL/L, Multi-Vitamin, adult no.2 without vitamin k INJ 10 mL, trace minerals Cu-Mn-Se-Zn 981-01- MCG/ML SOLN 1 mL Infuse intravenously continuousHistorical Med      Compression Stockings MISC Starting Tue 9/21/2021, Disp-1 each, R-0, [id number] mmHg calf length      magnesium oxide (MAG-OX) 400 (241.3 Mg) MG TABS tablet Take 1 tablet by mouth 2 times daily, Disp-60 tablet, R-1Normal      potassium chloride (KLOR-CON M) 20 MEQ extended release tablet Take 1 tablet by mouth 2 times daily, Disp-60 tablet, R-3Normal      Cholecalciferol (VITAMIN D) 25 MCG TABS Take 1 tablet by mouth daily, Disp-60 tablet, R-2Labeling may look different. 25 mcg=1000 Units. Please double check dosages. Normal      metoclopramide (REGLAN) 10 MG tablet TAKE 1 TABLET BY MOUTH 3 TIMES DAILY (BEFORE MEALS), Disp-90 tablet, R-5Normal      sucralfate (CARAFATE) 1 GM tablet Take 1 tablet by mouth 4 times daily, Disp-120 tablet, [id number]      Folic Acid-Vit S2-NQZ S83 2.2-25-0.5 MG TABS Take 1 tablet by mouth daily, Disp-90 tablet, R-1Normal      Multiple Vitamin (MULTI-DAY VITAMINS PO) Take 1 tablet by mouth daily Historical Med      vitamin B-12 (CYANOCOBALAMIN) 500 MCG tablet Take 500 mcg by mouth dailyHistorical Med         STOP taking these medications       metoprolol succinate (TOPROL XL) 25 MG extended release tablet Comments:   Reason for Stopping:               Activity: activity as tolerated    Diet: regular diet    Follow-up:    Chiquita Patel MD  [facility]  [address]    In 1 week      Denver Prophet, [address] [facility]  [phone]    In 10 days      Harshad Zaragoza. [address]    In 15 days      [address] [place]  [address] [place] [address] [place]  [address]  [phone]          Patient Instructions: Follow up for pet scan   Follow up with heme onc. Routine Follow with pcp  And for picc line as well . Follow up with cardiology     Follow up labs: none  Follow up imaging: none    Note that over 30 minutes was spent in preparing discharge papers, discussing discharge with patient, medication review, etc.      Isi Daley MD  Internal Medicine Resident, PGY-1  [address] [facility], [address] [place] [address] Drive  12/1/2021, 4:04 PM

## 2021-12-02 ENCOUNTER — HOSPITAL ENCOUNTER (OUTPATIENT)
Dept: NUCLEAR MEDICINE | Age: 61
Discharge: HOME | End: 2021-12-04
Payer: COMMERCIAL

## 2021-12-02 ENCOUNTER — VIRTUAL VISIT (OUTPATIENT)
Dept: PULMONOLOGY | Age: 61
End: 2021-12-02
Payer: COMMERCIAL

## 2021-12-02 DIAGNOSIS — Z98.0 S/P TOTAL GASTRECTOMY AND ROUX-EN-Y ESOPHAGOJEJUNAL ANASTOMOSIS: ICD-10-CM

## 2021-12-02 DIAGNOSIS — J45.20 MILD INTERMITTENT ASTHMA WITHOUT COMPLICATION: ICD-10-CM

## 2021-12-02 DIAGNOSIS — Z90.3 S/P TOTAL GASTRECTOMY AND ROUX-EN-Y ESOPHAGOJEJUNAL ANASTOMOSIS: ICD-10-CM

## 2021-12-02 DIAGNOSIS — R91.8 MULTIPLE LUNG NODULES ON CT: ICD-10-CM

## 2021-12-02 DIAGNOSIS — C7A.092 MALIGNANT CARCINOID TUMOR OF STOMACH (HCC): ICD-10-CM

## 2021-12-02 DIAGNOSIS — R91.8 MULTIPLE NODULES OF LUNG: Primary | ICD-10-CM

## 2021-12-02 DIAGNOSIS — R09.82 POST-NASAL DRIP: ICD-10-CM

## 2021-12-02 PROCEDURE — A9592 HC RX DIAGNOSTIC RADIOPHARMACEUTICAL: HCPCS | Performed by: INTERNAL MEDICINE

## 2021-12-02 PROCEDURE — 3430000000 HC RX DIAGNOSTIC RADIOPHARMACEUTICAL: Performed by: INTERNAL MEDICINE

## 2021-12-02 PROCEDURE — 2580000003 HC RX 258: Performed by: INTERNAL MEDICINE

## 2021-12-02 PROCEDURE — 99443 PR PHYS/QHP TELEPHONE EVALUATION 21-30 MIN: CPT | Performed by: INTERNAL MEDICINE

## 2021-12-02 PROCEDURE — 78816 PET IMAGE W/CT FULL BODY: CPT

## 2021-12-02 RX ORDER — FLUTICASONE PROPIONATE 50 MCG
2 SPRAY, SUSPENSION (ML) NASAL DAILY
Qty: 16 G | Refills: 5 | Status: SHIPPED | OUTPATIENT
Start: 2021-12-02

## 2021-12-02 RX ORDER — SODIUM CHLORIDE 0.9 % (FLUSH) 0.9 %
10 SYRINGE (ML) INJECTION ONCE
Status: COMPLETED | OUTPATIENT
Start: 2021-12-02 | End: 2021-12-02

## 2021-12-02 RX ADMIN — COPPER CU 64 DOTATATE 4.26 MILLICURIE: 1 INJECTION, SOLUTION INTRAVENOUS at 10:45

## 2021-12-02 RX ADMIN — SODIUM CHLORIDE, PRESERVATIVE FREE 10 ML: 5 INJECTION INTRAVENOUS at 10:45

## 2021-12-03 ENCOUNTER — HOSPITAL ENCOUNTER (OUTPATIENT)
Facility: MEDICAL CENTER | Age: 61
End: 2021-12-03

## 2021-12-09 ENCOUNTER — TELEPHONE (OUTPATIENT)
Dept: ONCOLOGY | Age: 61
End: 2021-12-09

## 2021-12-09 ENCOUNTER — OFFICE VISIT (OUTPATIENT)
Dept: ONCOLOGY | Age: 61
End: 2021-12-09
Payer: COMMERCIAL

## 2021-12-09 VITALS
RESPIRATION RATE: 16 BRPM | WEIGHT: 167.3 LBS | BODY MASS INDEX: 27.84 KG/M2 | TEMPERATURE: 98.7 F | SYSTOLIC BLOOD PRESSURE: 98 MMHG | DIASTOLIC BLOOD PRESSURE: 71 MMHG | HEART RATE: 87 BPM

## 2021-12-09 DIAGNOSIS — C7A.092 MALIGNANT CARCINOID TUMOR OF STOMACH (HCC): Primary | ICD-10-CM

## 2021-12-09 DIAGNOSIS — J18.9 PNEUMONIA DUE TO INFECTIOUS ORGANISM, UNSPECIFIED LATERALITY, UNSPECIFIED PART OF LUNG: ICD-10-CM

## 2021-12-09 PROCEDURE — 99211 OFF/OP EST MAY X REQ PHY/QHP: CPT | Performed by: INTERNAL MEDICINE

## 2021-12-09 PROCEDURE — 99214 OFFICE O/P EST MOD 30 MIN: CPT | Performed by: INTERNAL MEDICINE

## 2021-12-09 RX ORDER — METHYLPREDNISOLONE 4 MG/1
TABLET ORAL
Qty: 1 KIT | Refills: 0 | Status: SHIPPED | OUTPATIENT
Start: 2021-12-09 | End: 2021-12-15

## 2021-12-09 RX ORDER — PROMETHAZINE HYDROCHLORIDE AND CODEINE PHOSPHATE 6.25; 1 MG/5ML; MG/5ML
5 SYRUP ORAL EVERY 4 HOURS PRN
Qty: 180 ML | Refills: 1 | Status: SHIPPED | OUTPATIENT
Start: 2021-12-09 | End: 2021-12-16

## 2021-12-09 RX ORDER — ALBUTEROL SULFATE 90 UG/1
2 AEROSOL, METERED RESPIRATORY (INHALATION) 4 TIMES DAILY PRN
Qty: 18 G | Refills: 2 | Status: SHIPPED | OUTPATIENT
Start: 2021-12-09 | End: 2022-01-28 | Stop reason: ALTCHOICE

## 2021-12-09 NOTE — PROGRESS NOTES
_           Chief Complaint   Patient presents with    Follow-up     DIAGNOSIS:       Malignant carcinoid tumor of the stomach  Recent GI bleeding after endoscopic mucosal resection of stomach carcinoid on October 23, 2019. Evidence of recurrent disease on repeated EGD January 2020 and continued elevation of tumor marker chromogranin A  Iron deficiency secondary to above  History of hypothyroidism  Multiple comorbidities as listed      CURRENT THERAPY:         Status post endoscopic mucosal resection of stomach carcinoid October 23, 2019  S/p gastric resection at [facility] [place] November 20, 2020. BRIEF CASE HISTORY:      Ms. Darlyn Guevara is a very pleasant 62 y.o. female with history of multiple comorbidities as listed. The patient seen because of recent diagnosis of carcinoid tumor. Patient had problem with dysphagia for about 4 to 5 months. That problem resolved spontaneously. She was evaluated by gastroenterology. She had EGD in September 2019. She was noted to have gastric tumor which was biopsied and was positive for malignant neuroendocrine tumor. Subsequently patient was evaluated by abdominal MRI. Patient was having no evidence of gastric disease or gastric wall deep penetration. She underwent endoscopic mucosal resection October 23, 2019. Patient had recent admission to [facility] because of GI bleeding. She had EGD again and cauterization of bleeding. She is having weakness and fatigue. No other symptoms. No abdominal pain or cramps. No hot flashes or night sweats. No weight loss or decreased appetite. No wheezing. The patient had lab testing in July 2019 with chromogranin A level 1500. Patient was not aware of that test.  Patient's twin sister had carcinoid tumor more than 20 years ago. She had gastric resection at that time and there is no recurrence.   Patient smokes half pack per day for the last 40 years. Social alcohol drinking. INTERIM HISTORY:   The patient seen for follow-up gastric carcinoid. It was resected October 23, 2019. She has repeated EGD in January 2020 and she was found to have local recurrence with multiple lesions. She was referred to [facility] clinic. She had complete gastric resection 11/30/2020. Following surgery, she had multiple hospitalizations due to dehydration and persistent N/V and dehydration. Patient was recently hospitalized because of shortness of breath and cough and respiratory distress. She had work-up again with scans which showed multiple bilateral pulmonary inflammatory nodules. PET CT scan was done as well. Tumor markers serotonin and chromogranin A were normal.  Clinically patient is having some improvement but she continues to have cough with occasional sputum. No fever or chills. No hemoptysis. PAST MEDICAL HISTORY: has a past medical history of Anxiety, Arthritis, Asthma, Colon polyp, Colon polyps, Cyst of kidney, acquired, Fatigue, Hypertension, Hypothyroidism, Neuroendocrine tumor, Obesity, Pharyngoesophageal dysphagia, Vocal cord polyps, and Wears glasses. PAST SURGICAL HISTORY: has a past surgical history that includes cystourethroscopy/stent removal (05/03/2011); Colonoscopy (02/21/2019); ERCP; Cholecystectomy (10/09/2014); hip surgery (Left); Throat surgery; Colonoscopy; Upper gastrointestinal endoscopy (02/21/2019); Upper gastrointestinal endoscopy (09/23/2019); Upper gastrointestinal endoscopy (N/A, 09/23/2019); Upper gastrointestinal endoscopy (N/A, 10/23/2019); Upper gastrointestinal endoscopy (N/A, 10/29/2019); Endoscopic ultrasonography, GI (N/A, 01/22/2020); Upper gastrointestinal endoscopy (N/A, 04/26/2021); gastrectomy (N/A, 11/30/2020); and Upper gastrointestinal endoscopy (N/A, 8/30/2021).      CURRENT MEDICATIONS:  has a current medication list which includes the following prescription(s): promethazine-codeine, methylprednisolone, albuterol sulfate hfa, fluticasone, metoprolol tartrate, levothyroxine, scopolamine, sterile water SOLN with amino acids 10 % SOLN 50 g/L, dextrose 70 % SOLN 100 g/L, Nutrilyte CONC 20 mL/L, sodium phosphate 3 MMOLE/ML SOLN 5 mL/L, Multi-Vitamin, adult no.2 without vitamin k INJ 10 m*, compression stockings, magnesium oxide, potassium chloride, vitamin d3, metoclopramide, sucralfate, multiple vitamin, vitamin Z-76, and folic acid-vit E3-IWV C42. ALLERGIES:  is allergic to erythromycin. FAMILY HISTORY: Patient's twin sister had gastric carcinoid more than 20 years ago status post partial gastric resection with no recurrence. Otherwise negative for any hematological or oncological conditions. SOCIAL HISTORY:  reports that she quit smoking about 14 years ago. She has a 25.00 pack-year smoking history. She has never used smokeless tobacco. She reports previous alcohol use. She reports that she does not use drugs. REVIEW OF SYSTEMS:     General: Positive for weakness and fatigue. + unanticipated weight loss. No fever or chills. Eyes: No blurred vision, eye pain or double vision. Ears: No hearing problems or drainage. No tinnitus. Throat: No sore throat, problems with swallowing or dysphagia. Respiratory: As above. Cardiovascular: No chest pain, orthopnea or PND. No lower extremity edema. No palpitation. Gastrointestinal: As above. Genitourinary: No dysuria, hematuria, frequency or urgency. Musculoskeletal: No muscle aches or pains. No limitation of movement. No back pain. No gait disturbance, No joint complaints. Dermatologic: No skin rashes or pruritus. No skin lesions or discolorations. Psychiatric: No depression, anxiety, or stress or signs of schizophrenia. No change in mood or affect. Hematologic: No history of bleeding tendency. No bruises or ecchymosis. No history of clotting problems. Infectious disease: No fever, chills or frequent infections.    Endocrine: No problems with obesity. No polydipsia or polyuria. No temperature intolerance. Neurologic: No headaches or dizziness. No weakness or numbness of the extremities. No changes in balance, coordination,  memory, mentation, behavior. Allergic/Immunologic: No nasal congestion or hives. No repeated infections. PHYSICAL EXAM:  The patient is not in acute distress. Vital signs: Blood pressure 98/71, pulse 87, temperature 98.7 °F (37.1 °C), temperature source Temporal, resp. rate 16, weight 167 lb 4.8 oz (75.9 kg), last menstrual period 01/01/1994, not currently breastfeeding. General appearance - well appearing, not in pain or distress  Mental status - good mood, alert and oriented  Eyes - pupils equal and reactive, extraocular eye movements intact  Ears - bilateral TM's and external ear canals normal  Nose - normal and patent, no erythema, discharge or polyps  Mouth - mucous membranes moist, pharynx normal without lesions  Neck - supple, no significant adenopathy  Lymphatics - no palpable lymphadenopathy, no hepatosplenomegaly  Chest -bilateral rhonchi.   Heart - normal rate, regular rhythm, normal S1, S2, no murmurs, rubs, clicks or gallops  Abdomen - soft, nontender, nondistended, no masses or organomegaly  Neurological - alert, oriented, normal speech, no focal findings or movement disorder noted  Musculoskeletal - no joint tenderness, deformity or swelling  Extremities - peripheral pulses normal, no pedal edema, no clubbing or cyanosis  Skin - normal coloration and turgor, no rashes, no suspicious skin lesions noted     Review of Diagnostic data:   Lab Results   Component Value Date    WBC 9.4 11/24/2021    HGB 11.0 (L) 11/24/2021    HCT 34.8 (L) 11/24/2021    MCV 91.8 11/24/2021     11/24/2021       Chemistry        Component Value Date/Time     11/24/2021 0614    K 3.8 11/24/2021 0614     11/24/2021 0614    CO2 16 (L) 11/24/2021 0614    BUN 16 11/24/2021 0614    CREATININE 0.47 (L) 11/24/2021 0614        Component Value Date/Time    CALCIUM 8.3 (L) 11/24/2021 0614    ALKPHOS 119 (H) 11/21/2021 1057    AST 22 11/21/2021 1057    ALT 17 11/21/2021 1057    BILITOT 0.23 (L) 11/21/2021 1057        Abdominal MRI 9/18/2019:  Impression   Subcentimeter gastric mucosal enhancing masses in the fundus and along the   lesser curvature are demonstrated, corresponding to the patient's known GI   stromal tumors.  No evidence for extension through the gastric wall or   metastatic disease.       Status post cholecystectomy.  MRCP within normal limits.       Pelvic right kidney, incompletely visualized.  Bilateral renal cysts again   demonstrated. CT chest October 26, 2019:     FINDINGS:   Pulmonary Arteries: Suboptimal contrast timing.  Limited evaluation of the   segmental branches.  No evidence for central pulmonary embolism.  The main   pulmonary artery is normal in size.       Mediastinum: No evidence of mediastinal lymphadenopathy.  The heart and   pericardium demonstrate no acute abnormality.  There is no acute abnormality   of the thoracic aorta.       Lungs/pleura: The lungs are without acute process.  No focal consolidation or   pulmonary edema.  No evidence of pleural effusion or pneumothorax.       Upper Abdomen: Partially visualized left renal cysts.  Dense area of   calcification adjacent to the splenic artery is noted, which may represent a   thrombosed aneurysm.  Small lymph node in the gastrohepatic ligament.  Status   post cholecystectomy.       Soft Tissues/Bones: No acute bone or soft tissue abnormality.           Impression   Suboptimal contrast timing.  No evidence for central pulmonary embolism.       No acute airspace disease identified. Pathology results from September 23, 2019:  Collected: 9/23/2019   Received: 9/23/2019   Reported: 9/25/2019 10:17     -- Diagnosis --   1.  STOMACH, ANTRUM, BIOPSY:   - UNREMARKABLE GASTRIC MUCOSA. - NEGATIVE FOR HELICOBACTER PYLORI INFECTION. 2.  STOMACH, LESION, BIOPSIES:   - WELL-DIFFERENTIATED GASTRIC NEUROENDOCRINE TUMOR (CARCINOID TUMOR). - FOCAL ACTIVE CHRONIC GASTRITIS AND INTESTINAL METAPLASIA ARE PRESENT   IN    THE NONNEOPLASTIC GASTRIC MUCOSA.  SEE COMMENT. Pathology results from October 23, 2019:  Collected: 10/23/2019   Received: 10/24/2019   Reported: 10/28/2019 13:13     -- Diagnosis --   GASTRIC TISSUES, EXCISION:        - WELL DIFFERENTIATED NEUROENDOCRINE TUMOR, GRADE 2.     7/23/2019 10:10 PM - Duran, Julia Incoming Lab Results From [facility]     Component Value Ref Range & Units Status Collected Lab   Chromogranin A 1553High   0 - 95 ng/mL Final 07/22/2019  7:55 AM [facility]   (NOTE)      Lab Results   Component Value Date    WBC 9.4 11/24/2021    HGB 11.0 (L) 11/24/2021    HCT 34.8 (L) 11/24/2021    MCV 91.8 11/24/2021     11/24/2021       Chemistry        Component Value Date/Time     11/24/2021 0614    K 3.8 11/24/2021 0614     11/24/2021 0614    CO2 16 (L) 11/24/2021 0614    BUN 16 11/24/2021 0614    CREATININE 0.47 (L) 11/24/2021 0614        Component Value Date/Time    CALCIUM 8.3 (L) 11/24/2021 0614    ALKPHOS 119 (H) 11/21/2021 1057    AST 22 11/21/2021 1057    ALT 17 11/21/2021 1057    BILITOT 0.23 (L) 11/21/2021 1057        Lab Results   Component Value Date    IRON 60 10/07/2021    TIBC 269 10/07/2021    FERRITIN 87 10/07/2021           IMPRESSION:   Malignant carcinoid tumor of the stomach  Recent GI bleeding after endoscopic mucosal resection of stomach carcinoid on October 23, 2019. Evidence of recurrent disease on repeated EGD January 2020 and continued elevation of tumor marker chromogranin A  Iron deficiency secondary to above  History of hypothyroidism  Multiple comorbidities as listed    PLAN: Records and labs and images were reviewed and discussed with the patient. Patient continues to have post op symptoms and had recurrent hospitalizations due to N/V and dehydration.   She was also hospitalized because of respiratory distress and lung infections. She finished antibiotics. Continues to have significant cough and shortness of breath and no fever. PET CT scan and other images were reviewed and discussed. She has bilateral pulmonary nodules which are improving. Tumor markers serotonin and chromogranin A are normal.  These findings and the imaging are unlikely to be related to carcinoid. For her symptoms I will give her Medrol Dosepak along with albuterol inhaler. I will give her Phenergan with codeine to be used at bedtime. Discussed further care for carcinoid. We will monitor with scans and tumors markers. We will do labs again in 3 months. She will continue follow up in [facility]  Patient's questions were answered to the best of her satisfaction and she verbalized full understanding and agreement.

## 2021-12-09 NOTE — TELEPHONE ENCOUNTER
Ashwin Mckeon MD VISIT  DR Bhavana Bardales IN TO SEE PATIENT  ORDERS RECEIVED  ALBUTEROL INHALER  RV 3-4 MONTHS WITH LABS BEFORE  MEDROL DOSE CARISSA  PHENERGAN WITH CODEINE   LABS CDP CMP SEROTONIN CHROMOGRANIN A FE TIBC FERRITIN VITAMIN B12 & FOLATE 03/24/22, Ewelina Dasilva MD VISIT 03/31/22 @9AM  SCRIPT SENT TO PATIENT'S PHARMACY  AVS PRINTED AND GIVEN TO PATIENT WITH INSTRUCTIONS  PATIENT DISCHARGED AMBULATORY

## 2021-12-10 ENCOUNTER — TELEPHONE (OUTPATIENT)
Dept: INTERNAL MEDICINE CLINIC | Age: 61
End: 2021-12-10

## 2021-12-10 NOTE — TELEPHONE ENCOUNTER
Stephane is asking for a verbal to continue to TPN for every other day instead of daily.     States they do not want to just stop it    Please advise

## 2021-12-13 NOTE — PROGRESS NOTES
PULMONARY outpatient progress note    FOR TELEPHONE VISITS PLEASE COMPLETE THE FOLLOWING    Consent:  She and/or health care decision maker is aware that that she may receive a bill for this telephone service, depending on her insurance coverage, and has provided verbal consent to proceed: Yes      I affirm this is a Patient Initiated Episode with an Established Patient who has not had a related appointment within my department in the past 7 days or scheduled within the next 24 hours. Total Time: minutes: 21-30 minutes    Note: not billable if this call serves to triage the patient into an appointment for the relevant concern    REFERRED BY: Fredy Alonso MD    REASON FOR CONSULTATION: Multiple lung nodules and bronchial asthma    Patient is being seen in follow-up for-  1. Multiple nodules of lung    2. Mild intermittent asthma without complication    3. S/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    4. Malignant carcinoid tumor of stomach ([facility] [address]    5. Post-nasal drip          HISTORY OF PRESENT ILLNESS:    Luh Calzada is a 62y.o. year old female here for evaluation of above problems  Denied any hospitalization or ER visits for breathing problems since last evaluated in office  No increasing shortness of breath or wheezing  No significant cough or sputum production  Has some weight loss and loss of appetite  Patient is on TPN for nutrition  No fever chills or night sweats  No hemoptysis  No nighttime symptoms of bronchial asthma  No postnasal discharge  Patient has been using Flonase  No bone pain, headaches or abdominal pain      PAST MEDICAL HISTORY:       Diagnosis Date    Anxiety     Arthritis     knee    Asthma     Colon polyp 02/21/2019    tubular adenoma; hyperplastic polyp    Colon polyps     Cyst of kidney, acquired     bilat.     Fatigue     Hypertension     Hypothyroidism     Neuroendocrine tumor 02/21/2019    of stomach    Obesity     Pharyngoesophageal dysphagia     Vocal cord polyps     Wears glasses        SURGICAL HISTORY:    Past Surgical History:   Procedure Laterality Date    CHOLECYSTECTOMY  10/09/2014    COLONOSCOPY  02/21/2019    tubular adenoma; hyperplastic polyp    COLONOSCOPY      over 5 yrs ago per pt with polyps (2-)    CYSTOURETHROSCOPY/STENT REMOVAL  05/03/2011    ENDOSCOPIC ULTRASOUND (LOWER) N/A 01/22/2020    ENDOSCOPIC ULTRASOUND WITH POSSIBLE EMR performed by Lauro Evans MD at [facility]    ERCP      GASTRECTOMY N/A 11/30/2020    HIP SURGERY Left     soft tissue tumor removal    THROAT SURGERY      vocal cord polyps removed    UPPER GASTROINTESTINAL ENDOSCOPY  02/21/2019    Neuroendocrine tumor    UPPER GASTROINTESTINAL ENDOSCOPY  09/23/2019    UPPER GASTROINTESTINAL ENDOSCOPY N/A 09/23/2019    EGD BIOPSY performed by Marquita Bermudez MD at [address] [place] [address] 10/23/2019    EGD W/ EMR performed by Lauro Evans MD at [address] [place] [address] N/A 10/29/2019    EGD CONTROL HEMORRHAGE performed by Jason Cannon MD at [address] [place] [address] N/A 04/26/2021    EGD BIOPSY performed by Erika Augustin MD at [address] [place] [address] N/A 8/30/2021    EGD ESOPHAGOGASTRODUODENOSCOPY performed [facility] at [facility]:    Allergies   Allergen Reactions    Erythromycin Diarrhea       MEDICATIONS:   Current Outpatient Medications   Medication Sig Dispense Refill    fluticasone (FLONASE) 50 MCG/ACT nasal spray 2 sprays by Each Nostril route daily 16 g 5    metoprolol tartrate (LOPRESSOR) 25 MG tablet Take 1 tablet by mouth 2 times daily 180 tablet 1    levothyroxine (SYNTHROID) 88 MCG tablet Take 1 tablet by mouth Five times weekly Indications: Takes only Wed-Sun (skips Mon/Tues) (Patient taking differently: Take 88 mcg by mouth Five times weekly Indications: takes Monday _ Friday no weekends ) 30 tablet 3    scopolamine (TRANSDERM-SCOP) transdermal patch Place 1 patch onto the skin every 72 hours 20 patch 3    sterile water SOLN with amino acids 10 % SOLN 50 g/L, dextrose 70 % SOLN 100 g/L, Nutrilyte CONC 20 mL/L, sodium phosphate 3 MMOLE/ML SOLN 5 mL/L, Multi-Vitamin, adult no.2 without vitamin k INJ 10 mL, trace minerals Cu-Mn-Se-Zn 282-10- MCG/ML SOLN 1 mL Infuse intravenously continuous      Compression Stockings MISC by Does not apply route 20-30 mmHg calf length 1 each 0    magnesium oxide (MAG-OX) 400 (241.3 Mg) MG TABS tablet Take 1 tablet by mouth 2 times daily 60 tablet 1    potassium chloride (KLOR-CON M) 20 MEQ extended release tablet Take 1 tablet by mouth 2 times daily 60 tablet 3    Cholecalciferol (VITAMIN D) 25 MCG TABS Take 1 tablet by mouth daily (Patient taking differently: Take 50,000 Units by mouth daily Take one every Thursday.) 60 tablet 2    metoclopramide (REGLAN) 10 MG tablet TAKE 1 TABLET BY MOUTH 3 TIMES DAILY (BEFORE MEALS) 90 tablet 5    sucralfate (CARAFATE) 1 GM tablet Take 1 tablet by mouth 4 times daily 867 tablet 3    Folic Acid-Vit R1-RVO H26 2.2-25-0.5 MG TABS Take 1 tablet by mouth daily (Patient not taking: Reported on 12/9/2021) 90 tablet 1    Multiple Vitamin (MULTI-DAY VITAMINS PO) Take 1 tablet by mouth daily       vitamin B-12 (CYANOCOBALAMIN) 500 MCG tablet Take 500 mcg by mouth daily      promethazine-codeine (PHENERGAN WITH CODEINE) 6.25-10 MG/5ML syrup Take 5 mLs by mouth every 4 hours as needed for Cough for up to 7 days. 180 mL 1    methylPREDNISolone (MEDROL DOSEPACK) 4 MG tablet Take by mouth. 1 kit 0    albuterol sulfate HFA (VENTOLIN HFA) 108 (90 Base) MCG/ACT inhaler Inhale 2 puffs into the lungs 4 times daily as needed for Wheezing 18 g 2     No current facility-administered medications for this visit. FAMILY HISTORY: family history includes High Blood Pressure in her mother and sister;  No Known Problems in her father; Other in her sister; Stomach Cancer in her sister. SOCIAL AND OCCUPATIONAL HEALTH:  The patient is a Past smoker of 25 pack years and quit smoking in 2012. There  is not history of TB or TB exposure. There is not asbestos or silica dust exposure. The patient reports does not have coal, foundry, quarry or [employer] exposure. Travel history reveals no significant history of risk factors for pulm disease. There is not  history of recreational or IV drug use. The patient does not have pets, dogs, cats turtles or exotic birds. Review of Systems:  Review of Systems -   General ROS: Completed and except as mentioned above were negative   Psychological ROS:  Completed and except as mentioned above were negative  Ophthalmic ROS:  Completed and except as mentioned above were negative  ENT ROS:  Completed and except as mentioned above were negative  Allergy and Immunology ROS:  Completed and except as mentioned above were negative  Hematological and Lymphatic ROS:  Completed and except as mentioned above were negative  Endocrine ROS: Completed and except as mentioned above were negative  Breast ROS:  Completed and except as mentioned above were negative  Respiratory ROS:  Completed and except as mentioned above were negative  Cardiovascular ROS:  Completed and except as mentioned above were negative  Gastrointestinal ROS: Completed and except as mentioned above were negative  Genito-Urinary ROS:  Completed and except as mentioned above were negative  Musculoskeletal ROS:  Completed and except as mentioned above were negative  Neurological ROS:  Completed and except as mentioned above were negative  Dermatological ROS:  Completed and except as mentioned above were negative    SLEEP  No epistaxis sor sore throat. does not have fatigue, sleepiness ortiredness in am.   No MVA. No edema. PHYSICAL EXAMINATION:  There were no vitals filed for this visit.   PHYSICAL EXAMINATION:  There were no vitals filed for this visit. As this was a telephone visit, physical examination could not be performed. Patient appeared comfortable on the telephone and was able to speak in complete sentences and answering questions appropriately. DATA:     Pulmonary function tests: none        XR CHEST (2 VW)    Result Date: 10/29/2021  New rounded opacity in the right lateral chest.  Cannot exclude a new mass lesion versus infection. Attention on CT chest recommended. Right-sided PICC line with the tip at the mid to distal SVC. No pneumothorax. CT CHEST WO CONTRAST    Result Date: 11/8/2021  *Interval development of numerous enlarged nodules as detailed above concerning for metastasis given history of known neoplasm. Acute infectious/inflammatory process is an additional diagnostic consideration though felt less likely. Recommend further evaluation with PET-CT and/or percutaneous biopsy. Alternatively post treatment chest CT in 6-8 weeks to reassess may be considered. *Multiple incidental/chronic findings as above including mild anterior pericardial fluid. The findings were sent to the Radiology Results [address] at 10:18 am on 11/8/2021to be communicated to a licensed caregiver. Patient had a PET scan done on 12/2/2021 which showed-  1.  No abnormal uptake associated with the bilateral pulmonary nodules.  Many   of the nodules show interval improvement and there is development of new   subpleural nodules in the bilateral lower lobes.  The overall appearance   favors an ongoing infectious or inflammatory process with neoplasm less   likely.  Continued follow-up CT scan of the chest is recommended to ensure   resolution. IMPRESSION:   1. Multiple nodules of lung    2. Mild intermittent asthma without complication    3. S/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    4. Malignant carcinoid tumor of stomach ([address]    5.  Post-nasal drip    The lung nodules are concerning for metastatic disease               PLAN:       Reviewed the PET scan with the patient  Ordered a CT scan of the chest to follow-up lung nodules to be done in April 2022  Refills were provided -Flonase  Showed the patient how to use Flonase  Patient was recommended to have prednisone and an antibiotic available for use during an exacerbation  Educated and clarified the medication use. Discussed use, benefit, and side effects of prescribed medications. Barriers to medication compliance addressed. Lorena Rosario received counseling on the following healthy behaviors: nutrition, exercise and medication adherence  Recommend flu vaccination in the fall annually. Recommendations given regarding pneumococcal vaccinations. Patient had the COVID-19 vaccination  Patient is not uptodate with vaccinations from pulmonary perspective. Maintain an active lifestyle. continue smoking cessation. All the questions that the patient has had were answered to   her satisfaction. Home O2 evaluation was done. Supplemental oxygen was not indicated   Pt met the criteria for lung cancer screening and was explained the possibility of having findings that would need monitoring such as lung nodules and the need for more than yearly screening ct chest. Pt acknowledges understanding and questions answered to their satisfaction. We'll see the patient back in 4 months or earlier if needed. Patient will call us if she is sick, so she can be seen sooner. Thank you for having us involved in the care of your patient. Please call us if you have any questions or concerns.               Brian Adame MD MD  12/13/2021 2:30 PM

## 2021-12-17 ENCOUNTER — OFFICE VISIT (OUTPATIENT)
Dept: GASTROENTEROLOGY | Age: 61
End: 2021-12-17
Payer: COMMERCIAL

## 2021-12-17 ENCOUNTER — TELEPHONE (OUTPATIENT)
Dept: GASTROENTEROLOGY | Age: 61
End: 2021-12-17

## 2021-12-17 VITALS
DIASTOLIC BLOOD PRESSURE: 86 MMHG | BODY MASS INDEX: 28.62 KG/M2 | HEART RATE: 96 BPM | WEIGHT: 172 LBS | SYSTOLIC BLOOD PRESSURE: 121 MMHG

## 2021-12-17 DIAGNOSIS — R63.4 WEIGHT LOSS: Primary | ICD-10-CM

## 2021-12-17 PROCEDURE — 99213 OFFICE O/P EST LOW 20 MIN: CPT | Performed by: INTERNAL MEDICINE

## 2021-12-17 RX ORDER — DOCUSATE SODIUM 100 MG/1
100 CAPSULE, LIQUID FILLED ORAL 2 TIMES DAILY
Qty: 60 CAPSULE | Refills: 0 | Status: SHIPPED | OUTPATIENT
Start: 2021-12-17 | End: 2022-02-07

## 2021-12-17 ASSESSMENT — ENCOUNTER SYMPTOMS
RESPIRATORY NEGATIVE: 1
ABDOMINAL PAIN: 1
ALLERGIC/IMMUNOLOGIC NEGATIVE: 1
CONSTIPATION: 1

## 2021-12-17 NOTE — TELEPHONE ENCOUNTER
Received call from [facility] and states pt has a nutrition bag and she stopped the TPN. Caller states if the TPN needs to be stopped the TPN will need to be removed and if she is to be treated through a catheter they will need an order for the catheter. Caller states that the medication 6 hours for her to get for her in home treatment and needs to know as soon as possible if they are to proceed with the treatment. Requests call back @ [phone], caller states she left a couple messages last week and never heard back.

## 2021-12-17 NOTE — PROGRESS NOTES
GI CLINIC FOLLOW UP    INTERVAL HISTORY:   No referring provider defined for this encounter. No chief complaint on file. HISTORY OF PRESENT ILLNESS: Janina Garland is a 62 y.o. 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 She had a colonoscopy recently due to sepsis related to TPN  . She is doing much better. Mild constipation. Past Medical,Family, and Social History reviewed and does not contribute to the patient presentingcondition. Patient's PMH/PSH,SH,PSYCH Hx, MEDs, ALLERGIES, and ROS were all reviewed and updated in the appropriate sections. PAST MEDICAL HISTORY:  Past Medical History:   Diagnosis Date    Anxiety     Arthritis     knee    Asthma     Colon polyp 02/21/2019    tubular adenoma; hyperplastic polyp    Colon polyps     Cyst of kidney, acquired     bilat.     Fatigue     Hypertension     Hypothyroidism     Neuroendocrine tumor 02/21/2019    of stomach    Obesity     Pharyngoesophageal dysphagia     Vocal cord polyps     Wears glasses        Past Surgical History:   Procedure Laterality Date    CHOLECYSTECTOMY  10/09/2014    COLONOSCOPY  02/21/2019    tubular adenoma; hyperplastic polyp    COLONOSCOPY      over 5 yrs ago per pt with polyps (2-)    CYSTOURETHROSCOPY/STENT REMOVAL  05/03/2011    ENDOSCOPIC ULTRASOUND (LOWER) N/A 01/22/2020    ENDOSCOPIC ULTRASOUND WITH POSSIBLE EMR performed by Omar Pryor MD at [facility]    ERCP      GASTRECTOMY N/A 11/30/2020    HIP SURGERY Left     soft tissue tumor removal    THROAT SURGERY      vocal cord polyps removed    UPPER GASTROINTESTINAL ENDOSCOPY  02/21/2019    Neuroendocrine tumor    UPPER GASTROINTESTINAL ENDOSCOPY  09/23/2019    UPPER GASTROINTESTINAL ENDOSCOPY N/A 09/23/2019    EGD BIOPSY performed by Rasheeda Plasencia MD at [facility] 10/23/2019    EGD W/ EMR performed by Omar Pryor MD at [facility] GASTROINTESTINAL ENDOSCOPY N/A 10/29/2019    EGD CONTROL HEMORRHAGE performed by Smitha Wilburn MD at [address] [place] [address] N/A 04/26/2021    EGD BIOPSY performed by Kary Paz MD at [address] [place] [address] N/A 8/30/2021    EGD ESOPHAGOGASTRODUODENOSCOPY performed by Diana Styles MD at [facility]:    Current Outpatient Medications:     albuterol sulfate HFA (VENTOLIN HFA) 108 (90 Base) MCG/ACT inhaler, Inhale 2 puffs into the lungs 4 times daily as needed for Wheezing, Disp: 18 g, Rfl: 2    fluticasone (FLONASE) 50 MCG/ACT nasal spray, 2 sprays by Each Nostril route daily, Disp: 16 g, Rfl: 5    metoprolol tartrate (LOPRESSOR) 25 MG tablet, Take 1 tablet by mouth 2 times daily, Disp: 180 tablet, Rfl: 1    levothyroxine (SYNTHROID) 88 MCG tablet, Take 1 tablet by mouth Five times weekly Indications: Takes only Wed-Sun (skips Mon/Tues) (Patient taking differently: Take 88 mcg by mouth Five times weekly Indications: takes Monday _ Friday no weekends ), Disp: 30 tablet, Rfl: 3    scopolamine (TRANSDERM-SCOP) transdermal patch, Place 1 patch onto the skin every 72 hours, Disp: 20 patch, Rfl: 3    sterile water SOLN with amino acids 10 % SOLN 50 g/L, dextrose 70 % SOLN 100 g/L, Nutrilyte CONC 20 mL/L, sodium phosphate 3 MMOLE/ML SOLN 5 mL/L, Multi-Vitamin, adult no.2 without vitamin k INJ 10 mL, trace minerals Cu-Mn-Se-Zn 985-11- MCG/ML SOLN 1 mL, Infuse intravenously continuous, Disp: , Rfl:     Compression Stockings MISC, by Does not apply route 20-30 mmHg calf length, Disp: 1 each, Rfl: 0    magnesium oxide (MAG-OX) 400 (241.3 Mg) MG TABS tablet, Take 1 tablet by mouth 2 times daily, Disp: 60 tablet, Rfl: 1    potassium chloride (KLOR-CON M) 20 MEQ extended release tablet, Take 1 tablet by mouth 2 times daily, Disp: 60 tablet, Rfl: 3    Cholecalciferol (VITAMIN D) 25 MCG TABS, Take 1 tablet by mouth daily (Patient taking differently: Take 50,000 Units by mouth daily Take one every Thursday.), Disp: 60 tablet, Rfl: 2    metoclopramide (REGLAN) 10 MG tablet, TAKE 1 TABLET BY MOUTH 3 TIMES DAILY (BEFORE MEALS), Disp: 90 tablet, Rfl: 5    sucralfate (CARAFATE) 1 GM tablet, Take 1 tablet by mouth 4 times daily, Disp: 120 tablet, Rfl: 3    Multiple Vitamin (MULTI-DAY VITAMINS PO), Take 1 tablet by mouth daily , Disp: , Rfl:     vitamin B-12 (CYANOCOBALAMIN) 500 MCG tablet, Take 500 mcg by mouth daily, Disp: , Rfl:     Folic Acid-Vit C1-[facility] B83 2.2-25-0.5 MG TABS, Take 1 tablet by mouth daily (Patient not taking: Reported on 2021), Disp: 90 tablet, Rfl: 1    ALLERGIES:   Allergies   Allergen Reactions    Erythromycin Diarrhea       FAMILY HISTORY:       Problem Relation Age of Onset    High Blood Pressure Mother     High Blood Pressure Sister     Stomach Cancer Sister     Other Sister         epilepsy    No Known Problems Father          SOCIAL HISTORY:   Social History     Socioeconomic History    Marital status: Single     Spouse name: Not on file    Number of children: Not on file    Years of education: Not on file    Highest education level: Not on file   Occupational History    Not on file   Tobacco Use    Smoking status: Former Smoker     Packs/day: 1.00     Years: 25.00     Pack years: 25.00     Quit date: 2007     Years since quittin.0    Smokeless tobacco: Never Used   Vaping Use    Vaping Use: Never used   Substance and Sexual Activity    Alcohol use: Not Currently     Comment: 3 times a month    Drug use: No    Sexual activity: Not on file   Other Topics Concern    Not on file   Social History Narrative    Not on file     Social Determinants of Health     Financial Resource Strain: Low Risk     Difficulty of Paying Living Expenses: Not hard at all   Food Insecurity: No Food Insecurity    Worried About Running Out of Food in the Last Year: Never true    Ana of Food in the Last Year: Never true   Transportation Needs:     Lack of Transportation (Medical): Not on file    Lack of Transportation (Non-Medical): Not on file   Physical Activity:     Days of Exercise per Week: Not on file    Minutes of Exercise per Session: Not on file   Stress:     Feeling of Stress : Not on file   Social Connections:     Frequency of Communication with Friends and Family: Not on file    Frequency of Social Gatherings with Friends and Family: Not on file    Attends Bahai Services: Not on file    Active Member of [address] [place] [address] [employer] or Organizations: Not on file    Attends Club or Organization Meetings: Not on file    Marital Status: Not on file   Intimate Partner Violence:     Fear of Current or Ex-Partner: Not on file    Emotionally Abused: Not on file    Physically Abused: Not on file    Sexually Abused: Not on file   Housing Stability:     Unable to Pay for Housing in the Last Year: Not on file    Number of [place] in the Last Year: Not on file    Unstable Housing in the Last Year: Not on file       REVIEW OF SYSTEMS: A 12-point review of systemswas obtained and pertinent positives and negatives were enumerated above in the history of present illness. All other reviewed systems / symptoms were negative. Review of Systems   Constitutional: Negative. HENT: Negative. Eyes: Positive for visual disturbance (glasses ). Respiratory: Negative. Cardiovascular: Negative. Gastrointestinal: Positive for abdominal pain and constipation. Endocrine: Negative. Genitourinary: Negative. Musculoskeletal: Negative. Skin: Negative. Allergic/Immunologic: Negative. Neurological: Negative. Hematological: Negative. Psychiatric/Behavioral: Negative.             LABORATORY DATA: Reviewed  Lab Results   Component Value Date    WBC 9.4 11/24/2021    HGB 11.0 (L) 11/24/2021    HCT 34.8 (L) 11/24/2021    MCV 91.8 11/24/2021     11/24/2021     11/24/2021    K 3.8 11/24/2021  11/24/2021    CO2 16 (L) 11/24/2021    BUN 16 11/24/2021    CREATININE 0.47 (L) 11/24/2021    LABALBU 3.3 (L) 11/21/2021    BILITOT 0.23 (L) 11/21/2021    ALKPHOS 119 (H) 11/21/2021    AST 22 11/21/2021    ALT 17 11/21/2021    INR 1.1 08/28/2021         Lab Results   Component Value Date    RBC 3.79 (L) 11/24/2021    HGB 11.0 (L) 11/24/2021    MCV 91.8 11/24/2021    MCH 29.0 11/24/2021    MCHC 31.6 11/24/2021    RDW 12.9 11/24/2021    MPV 9.2 11/24/2021    BASOPCT 0 11/24/2021    LYMPHSABS 1.76 11/24/2021    MONOSABS 0.80 11/24/2021    NEUTROABS 6.66 11/24/2021    EOSABS 0.14 11/24/2021    BASOSABS 0.04 11/24/2021         DIAGNOSTIC TESTING:     ECHO Complete 2D W Doppler W Color    Result Date: 11/22/2021  Transthoracic Echocardiography Report (TTE)  Patient Name [facility]       Date of Study               11/22/2021               Elvia Gramajo   Date of      1960  Gender                      Female  Birth   Age          62 year(s)  Race                        Black   Room Number  447         Height:                     65 inch, 165.1 cm   Corporate ID [id number]    Weight:                     162 pounds, 73.5 kg  #   Patient Acct [de-identified]   BSA:          1.81 m^2      BMI:      26.96  #                                                              kg/m^2   MR #         [id number]     Sonographer                 Lissa Varghese   Accession #  [id number]  Interpreting Physician      Lorena Medel 62   Fellow                   Referring Nurse                           Practitioner   Interpreting             Referring Physician         [facility]  Fellow  Type of Study   TTE procedure:2D Echocardiogram, M-Mode, Doppler, Color Doppler,  Myocardial Strain. Procedure Date Date: 11/22/2021 Start: 10:59 AM Study [facility] Indications:Rule out vegetation. History / Tech.  Comments: Pneumonia , Malignant Carcinoid Tumor of Stomach Patient Status: Inpatient Height: 65 inches Weight: 162 pounds BSA: 1.81 m^2 BMI: 26.96 kg/m^2 HR: 86 bpm CONCLUSIONS Summary Normal LV size , mildly increased wall thickness. No obvious wall motion abnormality seen. Normal LV systolic function with LVEF >55%. Normal RV size and function. RV systolic pressure 29 mmHg LA and RA appears normal in size. No obvious significant structural valvular abnormality noted. No significant valvular stenosis or regurgitation noted. Normal aortic root dimension. Small pericardial effusion noted. No obvious intra-cardiac mass or shunt noted. IVC normal diameter and inspiratory variation indicating normal RA filling pressure. Signature ----------------------------------------------------------------------------  Electronically signed by Shaunna Manjarrez(Sonographer) on 11/22/2021 12:18  PM ---------------------------------------------------------------------------- ----------------------------------------------------------------------------  Electronically signed by Bautista Rao(Interpreting physician) on  11/22/2021 02:03 PM ---------------------------------------------------------------------------- FINDINGS Left Atrium Left atrium is normal in size. Left Ventricle Left ventricle is normal in size with normal systolic function globally. Mild left ventricular hypertrophy. Calculated ejection fraction is >55% Abnormal global longitudinal strain average of -10.5% Right Atrium Right atrium is normal in size. Right Ventricle Normal right ventricular size and function. TAPSE measures 2.4 cm Mitral Valve Mild thickening of the mitral leaflets without stenosis. Mild mitral regurgitation. Aortic Valve Aortic valve is trileaflet. Aortic sclerosis without stenosis. No aortic insufficiency. Tricuspid Valve Tricuspid valve structure is normal. Mild tricuspid regurgitation. Estimated right ventricular systolic pressure is 29 mmHg. Pulmonic Valve The pulmonic valve is normal in structure. Mild pulmonic regurgitation.  Pericardial Effusion Small pericardial effusion. Miscellaneous Normal aortic root dimension. E/E' average = 9. IVC diameter and inspiratory collapse is normal. M-mode / 2D Measurements & Calculations:   LVIDd:5 cm(3.7 - 5.6 cm)         Diastolic MLPQW ml  ZFEMK:8.79 cm(2.2 - 4.0 cm)      Systolic QAKKWI:02 ml  IVSd:1 cm(0.6 - 1.1 cm)          Aortic Root:3 cm(2.0 - 3.7 cm)  LVPWd:1.2 cm(0.6 - 1.1 cm)       LA Dimension: 3.5 cm(1.9 - 4.0 cm)  Fractional Shortenin.8 %     LA volume/Index: 49.27 ml /27m^2  Calculated LVEF (%): 55.66 %     LVOT:2 cm                                   RVDd:2.3 cm   Mitral:                                 Aortic   Valve Area (P1/2-Time): 3.93 cm^2       Peak Velocity: 1.62 m/s  Peak E-Wave: 0.81 m/s                   Mean Velocity: 1.04 m/s  Peak A-Wave: 0.85 m/s                   Peak Gradient: 10.5 mmHg  E/A Ratio: 0.96                         Mean Gradient: 5 mmHg  Peak Gradient: 2.65 mmHg  Mean Gradient: 2 mmHg  Deceleration Time: 199 msec             Area (continuity): 2.54 cm^2  P1/2t: 56 msec                          AV VTI: 31.5 cm   Area (continuity): 3.35 cm^2  Mean Velocity: 0.70 m/s   Tricuspid:                              Pulmonic:   Estimated RVSP: 29 mmHg                 Peak Velocity: 1.09 m/s  Peak TR Velocity: 2.23 m/s              Peak Gradient: 4.75 mmHg  Peak TR Gradient: 19.8916 mmHg  Estimated RA Pressure: 10 mmHg          [place] ED Velocity: 1.41 m/s                                           Estimated PASP: 29.89 mmHg  Diastology / Tissue Doppler Septal Wall E' velocity:0.08 m/s Septal Wall E/E':10 Lateral Wall E' velocity:0.11 m/s Lateral Wall E/E':7    CT ABDOMEN PELVIS WO CONTRAST Additional Contrast? None    Result Date: 2021  EXAMINATION: CT OF THE ABDOMEN AND PELVIS WITHOUT CONTRAST 2021 4:50 pm TECHNIQUE: CT of the abdomen and pelvis was performed without the administration of intravenous contrast. Multiplanar reformatted images are provided for review.  Dose modulation, iterative reconstruction, and/or weight based adjustment of the mA/kV was utilized to reduce the radiation dose to as low as reasonably achievable. COMPARISON: Chest CT study from earlier the same day. CT study of the abdomen pelvis from August 27, 2021. HISTORY: ORDERING SYSTEM PROVIDED HISTORY: nausea,vomiting,malignancy histiory TECHNOLOGIST PROVIDED HISTORY: nausea,vomiting,malignancy histiory FINDINGS: Organs: The gallbladder is surgically absent. A moderate degree of biliary ductal dilation is unchanged. No appreciable struck ting stone or mass. Excreted contrast material is present throughout the renal collecting systems and within the urinary bladder. There are again seen numerous benign bilateral renal cysts. The liver, spleen, pancreas, kidneys, and adrenal glands otherwise have an unremarkable unenhanced CT appearance. No hydronephrosis or hydroureter. GI/Bowel: Post-surgical changes of the small bowel and of gastrectomy are again noted. No acute intestinal abnormality. The small and large bowel loops are otherwise normal in caliber, contour, and morphology. No dilated loops or areas of bowel wall thickening. No appreciable colonic or rectal mass. Peritoneum/Retroperitoneum: There is no free fluid or extraluminal gas. No enlarged or suspicious mesenteric or retroperitoneal lymphadenopathy. The abdominal aorta and iliac arteries are normal in caliber. Pelvis: No pelvic free fluid or enlarged or suspicious pelvic or inguinal lymphadenopathy. No appreciable uterine or adnexal abnormality. The urinary bladder and the pelvic bowel loops are unremarkable. Bones/Soft Tissues: Degenerative changes are present throughout the lumbar spine. No acute fracture. There is no acute finding on this unenhanced CT study of the abdomen and pelvis to account for the patient's symptoms. Post-surgical changes of gastrectomy and of the small bowel are again noted. Bilateral renal cysts. Gallbladder surgically absent. XR CHEST PORTABLE    Result Date: 11/23/2021  EXAMINATION: ONE XRAY VIEW OF THE CHEST 11/23/2021 6:04 pm COMPARISON: Chest radiograph performed 10/29/2021. HISTORY: ORDERING SYSTEM PROVIDED HISTORY: verify picc placement TECHNOLOGIST PROVIDED HISTORY: verify picc placement FINDINGS: There are right basilar infiltrates. There is no effusion. There is no pneumothorax. The heart is enlarged. The upper abdomen is unremarkable. The extrathoracic soft tissues are unremarkable. There is a right-sided PICC line with the tip in the mid SVC. Right basilar infiltrate concerning for pneumonia. Right-sided PICC line with the tip in the mid SVC. CT CHEST PULMONARY EMBOLISM W CONTRAST    Result Date: 11/21/2021  EXAMINATION: CTA OF THE CHEST 11/21/2021 11:56 am TECHNIQUE: CTA of the chest was performed after the administration of intravenous contrast.  Multiplanar reformatted images are provided for review. MIP images are provided for review. Dose modulation, iterative reconstruction, and/or weight based adjustment of the mA/kV was utilized to reduce the radiation dose to as low as reasonably achievable. COMPARISON: CT chest November 8, 2021 and priors. HISTORY: ORDERING SYSTEM PROVIDED HISTORY: tachycardia, hx cancer TECHNOLOGIST PROVIDED HISTORY: tachycardia, hx cancer Decision Support Exception - unselect if not a suspected or confirmed emergency medical condition->Emergency Medical Condition ([place]) Reason for Exam: tachycardia, hx cancer Acuity: Unknown Type of Exam: Unknown FINDINGS: Pulmonary Arteries: Pulmonary arteries are adequately opacified for evaluation. No evidence of intraluminal filling defect to suggest pulmonary embolism. Main pulmonary artery is normal in caliber. Mediastinum: Soft a JULIA wall appears thickened, poorly evaluated on CT imaging. There is mild bilateral hilar lymphadenopathy. The heart and pericardium demonstrate no acute abnormality.   There is no acute abnormality of the thoracic aorta. Lungs/pleura: The central airways are patent. Scattered areas of nodularity are again seen throughout both lungs. Air in the posterior right upper lobe measures 1.3 cm on image 47, previously measuring 6 mm wall area of nodularity anteriorly in the right lower lobe has decreased in size now measuring 0.8 cm on image 66, previously measuring 1.1 cm. Linear bands of consolidation are again seen in the right middle lobe and right lower lobe. There are few new subpleural nodules in the left upper lobe measuring up to 7 mm. Linear band of atelectasis is seen within the lingula with new area of nodularity along the anterior aspect. Multiple new areas of nodularity are seen within the left lung base measuring up to 1 cm. No pneumothorax or pleural effusion identified. Upper Abdomen: Limited visualization of the upper abdomen demonstrates patient's known left renal cysts. There postsurgical changes at the GE junction. No acute process. Soft Tissues/Bones: No acute bone or soft tissue abnormality. No evidence of pulmonary embolism. Multiple scattered areas of nodularity are seen throughout both lungs. One of the areas has decreased in size but multiple others are new or significantly increased compared to the study from November 8. Given the short interval time frame of development findings may be infectious. Short-term follow-up recommended to exclude metastatic disease given patient's history of malignancy. There appears to be diffuse esophageal wall thickening, poorly evaluated on CT imaging. Clinical correlation for esophagitis recommended. PET CT WHOLE BODY    Result Date: 12/2/2021  EXAMINATION: WHOLE BODY PET/CT WITH LOWER EXTREMITIES. 12/2/2021 TECHNIQUE: Following IV injection of 4.3 mCi of Cu-64 DOTATATE, PET  tumor imaging was acquired from the top of the skull to the feet. Computed tomography was used for purposes of attenuation correction and anatomic localization.   Fusion imaging was utilized for interpretation. Body uptake time 58 mins. COMPARISON: CT scan of the chest 11/21/2021, 11/08/2021, and 04/07/2021 and CT scan of the abdomen and pelvis 11/21/2021 and 08/27/2021 HISTORY: ORDERING SYSTEM PROVIDED HISTORY: Multiple lung nodules on CT TECHNOLOGIST PROVIDED HISTORY: Reason for Exam: multiple lung nodules Type of Exam: Subsequent/Follow-up FINDINGS: HEAD/NECK: Nonspecific mild uptake in nonenlarged left cervical lymph nodes. CHEST: No focally increased activity associated with the bilateral pulmonary nodules, which have improved since the prior exam.  A right upper lobe nodule near the major fissure measures 0.9 cm compared to 1.3 cm previously. Subpleural left lower lobe nodule measures 0.3 cm compared to 0.6 cm previously. Upper lobe nodule measures 1.6 x 1.5 cm compared to 2 x 1.4 cm. There are also new subpleural nodules in the posterior right lower lobe and left lower lobe. ABDOMEN/PELVIS: Physiologic activity within liver, spleen, adrenal glands, and kidneys. The right kidney is ectopically located in the pelvis and contains large cysts, similar to prior exams. There are also multiple cysts in the left kidney. No area of abnormal uptake is demonstrated. No lymphadenopathy. BONES/SOFT TISSUE: No focal abnormal uptake within the bones. INCIDENTAL CT FINDINGS: Right-sided PICC tip at the cavoatrial junction. Small pericardial effusion. Small hiatal hernia. The gallbladder has been removed. 1.  No abnormal uptake associated with the bilateral pulmonary nodules. Many of the nodules show interval improvement and there is development of new subpleural nodules in the bilateral lower lobes. The overall appearance favors an ongoing infectious or inflammatory process with neoplasm less likely. Continued follow-up CT scan of the chest is recommended to ensure resolution. PHYSICAL EXAMINATION: Vital signs reviewed per the nursing documentation.      LMP 01/01/1994 There is no height or weight on file to calculate BMI. Physical Exam      I personally reviewed the nurse's notes and documentation and I agree with her notes. General: alert, appears stated age and cooperative Psych: Normal. and Alert and oriented, appropriate affect. . Normal affect. Mentation normal  HEENT: PERRLA. Clear conjunctivae and sclerae. Moist oral mucosae, no lesions or ulcers. The neck is supple, without lymphadenopathy or jugular venous distension. No masses. Normal thyroid. Cardiovascular: S1 S2 RRR no rubs or murmurs. Pulmonary: clear BL. No accessory muscle usage. Abdominal Exam: Soft, NT ND, no hepato or spleno megaly, +BS, no ascites. IMPRESSION: Ms. Hermenia Osgood is a 62 y.o. 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 Recent sepsis due to sepsis due to TPN line. We will leaving her off TPN slowly. Colace twice a day for constipation. Follow-up in 6 weeks. Thank you for allowing me to participate in the care of Ms. Hermenia Osgood. For any further questions please do not hesitate to contact me. I have reviewed and agree with the ROS entered by the MA/LPN. Note is dictated utilizing voice recognition software. Unfortunately this leads to occasional typographical errors. Please contact our office if you have any questions.     Handy Capone MD  [facility]  O: #[phone]

## 2021-12-17 NOTE — TELEPHONE ENCOUNTER
Writer spoke with Mary Encinas at [facility]- per  orders to start tapering patient off of TPN slowly a verbal order was given for every other day TPN infusions.

## 2021-12-17 NOTE — TELEPHONE ENCOUNTER
Pharmacy called regarding having TPN infusion every other day they need a verbal approval . Call [phone] ask to speak with Lety Citizen option # 4

## 2021-12-21 ENCOUNTER — HOSPITAL ENCOUNTER (OUTPATIENT)
Age: 61
Setting detail: SPECIMEN
Discharge: HOME | End: 2021-12-21

## 2021-12-21 ENCOUNTER — OFFICE VISIT (OUTPATIENT)
Dept: INTERNAL MEDICINE CLINIC | Age: 61
End: 2021-12-21
Payer: COMMERCIAL

## 2021-12-21 VITALS
WEIGHT: 171.4 LBS | DIASTOLIC BLOOD PRESSURE: 80 MMHG | BODY MASS INDEX: 28.56 KG/M2 | HEIGHT: 65 IN | OXYGEN SATURATION: 94 % | SYSTOLIC BLOOD PRESSURE: 108 MMHG | TEMPERATURE: 97.7 F | RESPIRATION RATE: 20 BRPM | HEART RATE: 82 BPM

## 2021-12-21 DIAGNOSIS — R05.9 COUGH: ICD-10-CM

## 2021-12-21 DIAGNOSIS — E55.9 VITAMIN D DEFICIENCY: ICD-10-CM

## 2021-12-21 DIAGNOSIS — I10 ESSENTIAL HYPERTENSION: Primary | ICD-10-CM

## 2021-12-21 DIAGNOSIS — I10 ESSENTIAL HYPERTENSION: ICD-10-CM

## 2021-12-21 DIAGNOSIS — E53.8 B12 DEFICIENCY: ICD-10-CM

## 2021-12-21 LAB
FOLATE: 19.4 NG/ML
VITAMIN B-12: 796 PG/ML (ref 232–1245)
VITAMIN D 25-HYDROXY: 37.5 NG/ML (ref 30–100)

## 2021-12-21 PROCEDURE — 90674 CCIIV4 VAC NO PRSV 0.5 ML IM: CPT | Performed by: INTERNAL MEDICINE

## 2021-12-21 PROCEDURE — 90471 IMMUNIZATION ADMIN: CPT | Performed by: INTERNAL MEDICINE

## 2021-12-21 PROCEDURE — 99214 OFFICE O/P EST MOD 30 MIN: CPT | Performed by: INTERNAL MEDICINE

## 2021-12-21 ASSESSMENT — PATIENT HEALTH QUESTIONNAIRE - PHQ9
SUM OF ALL RESPONSES TO PHQ9 QUESTIONS 1 & 2: 0
2. FEELING DOWN, DEPRESSED OR HOPELESS: 0
SUM OF ALL RESPONSES TO PHQ QUESTIONS 1-9: 0
1. LITTLE INTEREST OR PLEASURE IN DOING THINGS: 0

## 2021-12-21 NOTE — PROGRESS NOTES
Darlyn Guevara is a 62 y.o. female who presents for   Chief Complaint   Patient presents with    3 Month Follow-Up     still has a cough    Health Maintenance     hep c, pneumo, hiv, tdap, cervical, shingles, breast, flu    Flu Vaccine     would like flu vaccine    and follow up of chronic medical problems.   Patient Active Problem List   Diagnosis    Asthma    Anxiety    Obesity    Hyperlipidemia    Hypothyroidism    Colon polyps    Vertigo    Mass of left hip region    General weakness    Pharyngoesophageal dysphagia    Colon polyp    Neuroendocrine tumor    Chest pain    Shortness of breath    Anemia    GI bleed    Essential hypertension    Neuroendocrine neoplasm of stomach    Polyp, stomach    Melena    Gastric ulcer with hemorrhage    Constipation    Hypokalemia    Hypomagnesemia    Bilateral pulmonary embolism (HCC)    Abdominal pain, epigastric    Elevated d-dimer    Malignant carcinoid tumor of stomach (HCC)    Intractable vomiting    Intractable nausea and vomiting    S/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    Food intolerance    Severe malnutrition (HCC)    Sinus tachycardia    Weight loss    Projectile vomiting with nausea    On total parenteral nutrition    H/O blood clots    Pneumonia    Sepsis (HCC)    Bilious vomiting with nausea     HPI  Here for follow-up on cough and feeling better eating better    Current Outpatient Medications   Medication Sig Dispense Refill    docusate sodium (COLACE) 100 MG capsule Take 1 capsule by mouth 2 times daily 60 capsule 0    albuterol sulfate HFA (VENTOLIN HFA) 108 (90 Base) MCG/ACT inhaler Inhale 2 puffs into the lungs 4 times daily as needed for Wheezing 18 g 2    fluticasone (FLONASE) 50 MCG/ACT nasal spray 2 sprays by Each Nostril route daily 16 g 5    metoprolol tartrate (LOPRESSOR) 25 MG tablet Take 1 tablet by mouth 2 times daily 180 tablet 1    levothyroxine (SYNTHROID) 88 MCG tablet Take 1 tablet by mouth Five times weekly Indications: Takes only Wed-Sun (skips Mon/Tues) (Patient taking differently: Take 88 mcg by mouth Five times weekly Indications: takes Monday _ Friday no weekends ) 30 tablet 3    scopolamine (TRANSDERM-SCOP) transdermal patch Place 1 patch onto the skin every 72 hours 20 patch 3    sterile water SOLN with amino acids 10 % SOLN 50 g/L, dextrose 70 % SOLN 100 g/L, Nutrilyte CONC 20 mL/L, sodium phosphate 3 MMOLE/ML SOLN 5 mL/L, Multi-Vitamin, adult no.2 without vitamin k INJ 10 mL, trace minerals Cu-Mn-Se-Zn 908-28- MCG/ML SOLN 1 mL Infuse intravenously continuous      Compression Stockings MISC by Does not apply route 20-30 mmHg calf length 1 each 0    magnesium oxide (MAG-OX) 400 (241.3 Mg) MG TABS tablet Take 1 tablet by mouth 2 times daily 60 tablet 1    potassium chloride (KLOR-CON M) 20 MEQ extended release tablet Take 1 tablet by mouth 2 times daily 60 tablet 3    Cholecalciferol (VITAMIN D) 25 MCG TABS Take 1 tablet by mouth daily (Patient taking differently: Take 50,000 Units by mouth daily Take one every Thursday.) 60 tablet 2    metoclopramide (REGLAN) 10 MG tablet TAKE 1 TABLET BY MOUTH 3 TIMES DAILY (BEFORE MEALS) 90 tablet 5    sucralfate (CARAFATE) 1 GM tablet Take 1 tablet by mouth 4 times daily 120 tablet 3    Multiple Vitamin (MULTI-DAY VITAMINS PO) Take 1 tablet by mouth daily       vitamin B-12 (CYANOCOBALAMIN) 500 MCG tablet Take 500 mcg by mouth daily      Folic Acid-Vit X5-CWW P50 2.2-25-0.5 MG TABS Take 1 tablet by mouth daily (Patient not taking: Reported on 12/9/2021) 90 tablet 1     No current facility-administered medications for this visit. Allergies   Allergen Reactions    Erythromycin Diarrhea       Past Medical History:   Diagnosis Date    Anxiety     Arthritis     knee    Asthma     Colon polyp 02/21/2019    tubular adenoma; hyperplastic polyp    Colon polyps     Cyst of kidney, acquired     bilat.     Fatigue  Hypertension     Hypothyroidism     Neuroendocrine tumor 02/21/2019    of stomach    Obesity     Pharyngoesophageal dysphagia     Vocal cord polyps     Wears glasses        Past Surgical History:   Procedure Laterality Date    CHOLECYSTECTOMY  10/09/2014    COLONOSCOPY  02/21/2019    tubular adenoma; hyperplastic polyp    COLONOSCOPY      over 5 yrs ago per pt with polyps (2-)    CYSTOURETHROSCOPY/STENT REMOVAL  05/03/2011    ENDOSCOPIC ULTRASOUND (LOWER) N/A 01/22/2020    ENDOSCOPIC ULTRASOUND WITH POSSIBLE EMR performed by Melia Cook [facility] at [facility]    ERCP      GASTRECTOMY N/A 11/30/2020    HIP SURGERY Left     soft tissue tumor removal    THROAT SURGERY      vocal cord polyps removed    UPPER GASTROINTESTINAL ENDOSCOPY  02/21/2019    Neuroendocrine tumor    UPPER GASTROINTESTINAL ENDOSCOPY  09/23/2019    UPPER GASTROINTESTINAL ENDOSCOPY N/A 09/23/2019    EGD BIOPSY performed by Vishnu Jordan MD at [address] [place] [address] 10/23/2019    EGD W/ EMR performed by Melia Cook MD at [address] [place] [address] N/A 10/29/2019    EGD CONTROL HEMORRHAGE performed by Eric Soto MD at [address] [place] [address] N/A 04/26/2021    EGD BIOPSY performed by Priyanka Locke MD at [address] [place] [address] N/A 8/30/2021    EGD ESOPHAGOGASTRODUODENOSCOPY performed by Allyson Louise MD at [facility] History   Problem Relation Age of Onset    High Blood Pressure Mother     High Blood Pressure Sister     Stomach Cancer Sister     Other Sister         epilepsy    No Known Problems Father      ROS   Constitutional:  Negative for fatigue, loss of appetite and unexpected weight change   HEENT            : Negative for neck stiffness and pain, no congestion or sinus pressure   Eyes                : No visual disturbance or pain   Cardiovascular: No chest pain or palpitations or leg swelling   Respiratory      : Negative for cough, shortness of breath or wheezing   Gastrointestinal: Negative for abdominal pain, constipation or diarrhea and bloating No nausea or vomiting   Genitourinary:     No urgency or frequency, no burning or hematuria   Musculoskeletal: No arthralgias, back pain or myalgias   Skin                  : Negative for rash or erythema   Neurological    : Negative for dizziness, weakness, tremors ,light headedness or syncope   Psychiatric       : Negative for dysphoric mood, sleep disturbances, nervous or anxious, or decreased concentration   All other review of systems was negative    Objective  Physical Examination:    Nursing note reviewed    /80 (Site: Left Upper Arm, Position: Sitting, Cuff Size: Medium Adult)   Pulse 82   Temp 97.7 °F (36.5 °C) (Temporal)   Resp 20   Ht 5' 5\" (1.651 m)   Wt 171 lb 6.4 oz (77.7 kg)   LMP 01/01/1994   SpO2 94%   Breastfeeding No   BMI 28.52 kg/m²   BP Readings from Last 3 Encounters:   12/21/21 108/80   12/17/21 121/86   12/09/21 98/71         Constitutional:  Errol Garcia is oriented to place, person and time ,appears well-developed and well-nourished  HEENT:  Atraumatic and normocephalic, external ears normal bilaterally, nose normal no oropharyngeal exudate and is clear and moist  Eyes:  EOCM normal; conjunctivae normal; PERRLA bilaterally  Neck:  Normal range of motion, neck supple, no JVD and no thyromegaly  Cardiovascular:  RRR, normal heart sounds and intact distal pulses  Pulmonary:  effort normal and breath sounds normal bilaterally,no wheezes or rales, no respiratory distress  Abdominal:  Soft, non-tender; normal bowel sounds, no masses  Musculoskeletal:  Normal range of motion and no edema or tenderness bilaterally  No lymphadenopathy  Neurological:  alert, oriented, and normal reflexes bilaterally  Skin: warm and dry  Psychiatric:  normal mood and effect; behavior normal.    Labs:   No results found for: LABA1C  Lab Results   Component Value Date    CHOL 182 08/27/2020     Lab Results   Component Value Date    HDL 45 08/27/2020     No results found for: [facility]  Lab Results   Component Value Date    TRIG 64 11/23/2021     No components found for: [place], [place]  Lab Results   Component Value Date    WBC 9.4 11/24/2021    HGB 11.0 (L) 11/24/2021    HCT 34.8 (L) 11/24/2021    MCV 91.8 11/24/2021     11/24/2021     Lab Results   Component Value Date    INR 1.1 08/28/2021    PROTIME 14.4 (H) 08/28/2021     Lab Results   Component Value Date    GLUCOSE 157 (H) 11/24/2021    CREATININE 0.47 (L) 11/24/2021    BUN 16 11/24/2021     11/24/2021    K 3.8 11/24/2021     11/24/2021    CO2 16 (L) 11/24/2021     Lab Results   Component Value Date    ALT 17 11/21/2021    AST 22 11/21/2021    ALKPHOS 119 (H) 11/21/2021    BILITOT 0.23 (L) 11/21/2021     Lab Results   Component Value Date    LABALBU 3.3 (L) 11/21/2021     Lab Results   Component Value Date    TSH 0.03 (L) 08/27/2021     Assessment:  1. Essential hypertension    2. Cough    3. B12 deficiency    4. Vitamin D deficiency        Plan:  Patient blood pressure is stable on current medications and continue same  Patient is off Protonix and was discontinued back gastroenterology as patient has gastrectomy and currently on Reglan and Carafate  Patient is on vitamin B12 and vitamin D supplements and had no recent lab work to check her levels and also add folic acid levels as patient is not taking any folic acid supplements  Patient's TPN is slowly weaning off and will be discontinued at the end of this year  Patient gained 10 pounds since the last visit in September  Patient's cough is still happening only like a tickle in the throat and has seen the pulmonologist and reports reviewed  Review in 4 months           1. Rob Lee received counseling on the following healthy behaviors: nutrition and exercise    2. Prior labs and health maintenance reviewed.      3. Discussed use, benefit, and side effects of prescribed medications. Barriers to medication compliance addressed. All her questions were answered. Pt voiced understanding. Su Davis will continue current medications, diet and exercise. No orders of the defined types were placed in this encounter. Completed Refills               Requested Prescriptions      No prescriptions requested or ordered in this encounter     4. Patient given educational materials - see patient instructions    5. Was a self-tracking handout given in paper form or via [employer]? NO    Orders Placed This Encounter   Procedures    Folate     Standing Status:   Future     Standing Expiration Date:   12/21/2022    Vitamin B12     Standing Status:   Future     Standing Expiration Date:   12/21/2022    Vitamin D 25 Hydroxy     Standing Status:   Future     Standing Expiration Date:   12/21/2022     Return in about 4 months (around 4/21/2022). Patient voiced understanding and agreed to treatment plan. Electronically signed by Praful Hansen MD on 12/21/2021 at 10:10 AM    This note is created with a voice recognition program and while intend to generate a document that accurately reflects the content of the visit, no guarantee can be provided that every mistake has been identified and corrected by editing.

## 2021-12-30 ENCOUNTER — TELEPHONE (OUTPATIENT)
Dept: GASTROENTEROLOGY | Age: 61
End: 2021-12-30

## 2021-12-30 ENCOUNTER — TELEPHONE (OUTPATIENT)
Dept: INTERNAL MEDICINE CLINIC | Age: 61
End: 2021-12-30

## 2021-12-30 DIAGNOSIS — R05.9 COUGH: Primary | ICD-10-CM

## 2021-12-30 DIAGNOSIS — R06.2 WHEEZING: ICD-10-CM

## 2021-12-30 RX ORDER — METHYLPREDNISOLONE 4 MG/1
TABLET ORAL
Qty: 1 KIT | Refills: 0 | Status: SHIPPED | OUTPATIENT
Start: 2021-12-30 | End: 2022-01-05

## 2021-12-30 NOTE — TELEPHONE ENCOUNTER
[facility]ie from [facility] stating pt told them she is going to be stopping tpn and they need  line removal orders.     Please call tonie at [phone]  Or fax orders to [phone]

## 2021-12-30 NOTE — TELEPHONE ENCOUNTER
Pt called in c/o cough with clear expectoration since 2-3 days, wheezing+ sinuses runny at times. denied SOB/fevers. Asking to have medrol dose pack called in. States this has helped in the past.  Last filled 12/9/21. Does not want an antibiotic.

## 2022-01-04 NOTE — TELEPHONE ENCOUNTER
Tonie from [facility] again wanting to confirm if Dr. Arabella Summers would like the IV line to be removed, please advise, call back number is [phone].

## 2022-01-04 NOTE — TELEPHONE ENCOUNTER
[facility] called again requesting to speak to someone regarding pt's TPN. Please call her back ASAP.

## 2022-01-04 NOTE — TELEPHONE ENCOUNTER
Dr Sharon Merritt replied back via [facility] and stated ok to place order for removal. Please place order so we can fax it to [facility].

## 2022-01-27 ENCOUNTER — OFFICE VISIT (OUTPATIENT)
Dept: GASTROENTEROLOGY | Age: 62
End: 2022-01-27
Payer: MEDICAID

## 2022-01-27 VITALS — SYSTOLIC BLOOD PRESSURE: 142 MMHG | DIASTOLIC BLOOD PRESSURE: 89 MMHG | BODY MASS INDEX: 27.96 KG/M2 | WEIGHT: 168 LBS

## 2022-01-27 DIAGNOSIS — R13.10 DYSPHAGIA, UNSPECIFIED TYPE: Primary | ICD-10-CM

## 2022-01-27 PROCEDURE — 3017F COLORECTAL CA SCREEN DOC REV: CPT | Performed by: INTERNAL MEDICINE

## 2022-01-27 PROCEDURE — G8482 FLU IMMUNIZE ORDER/ADMIN: HCPCS | Performed by: INTERNAL MEDICINE

## 2022-01-27 PROCEDURE — 1036F TOBACCO NON-USER: CPT | Performed by: INTERNAL MEDICINE

## 2022-01-27 PROCEDURE — 99213 OFFICE O/P EST LOW 20 MIN: CPT | Performed by: INTERNAL MEDICINE

## 2022-01-27 PROCEDURE — G8427 DOCREV CUR MEDS BY ELIG CLIN: HCPCS | Performed by: INTERNAL MEDICINE

## 2022-01-27 PROCEDURE — G8417 CALC BMI ABV UP PARAM F/U: HCPCS | Performed by: INTERNAL MEDICINE

## 2022-01-27 ASSESSMENT — ENCOUNTER SYMPTOMS
NAUSEA: 1
VOMITING: 1
RESPIRATORY NEGATIVE: 1
ABDOMINAL PAIN: 1
ALLERGIC/IMMUNOLOGIC NEGATIVE: 1
TROUBLE SWALLOWING: 1

## 2022-01-27 NOTE — PROGRESS NOTES
GI CLINIC FOLLOW UP    INTERVAL HISTORY:   No referring provider defined for this encounter. Chief Complaint   Patient presents with    Dysphagia     pt is having trouble swallowing food            HISTORY OF PRESENT ILLNESS: Kathryn Woodard is a 62 y.o. female with dysphagia. History of neuroendocrine tumors of the stomach. Status post total gastrectomy. She was able to gain weight. We were able to stop TPN. Over the past couple of weeks she has been having sensation of food getting stuck in the lower chest.  Mostly meat and pasta. Bowels moving okay. Past Medical,Family, and Social History reviewed and does not contribute to the patient presenting condition. Patient's PMH/PSH,SH,PSYCH Hx, MEDs, ALLERGIES, and ROS were all reviewed and updated in the appropriate sections. PAST MEDICAL HISTORY:  Past Medical History:   Diagnosis Date    Anxiety     Arthritis     knee    Asthma     Colon polyp 02/21/2019    tubular adenoma; hyperplastic polyp    Colon polyps     Cyst of kidney, acquired     bilat.     Fatigue     Hypertension     Hypothyroidism     Neuroendocrine tumor 02/21/2019    of stomach    Obesity     Pharyngoesophageal dysphagia     Vocal cord polyps     Wears glasses        Past Surgical History:   Procedure Laterality Date    CHOLECYSTECTOMY  10/09/2014    COLONOSCOPY  02/21/2019    tubular adenoma; hyperplastic polyp    COLONOSCOPY      over 5 yrs ago per pt with polyps (2-)    CYSTOURETHROSCOPY/STENT REMOVAL  05/03/2011    ENDOSCOPIC ULTRASOUND (LOWER) N/A 01/22/2020    ENDOSCOPIC ULTRASOUND WITH POSSIBLE EMR performed by Theodore Severino MD at [facility]    ERCP      GASTRECTOMY N/A 11/30/2020    HIP SURGERY Left     soft tissue tumor removal    THROAT SURGERY      vocal cord polyps removed    UPPER GASTROINTESTINAL ENDOSCOPY  02/21/2019    Neuroendocrine tumor    UPPER GASTROINTESTINAL ENDOSCOPY  09/23/2019    UPPER GASTROINTESTINAL ENDOSCOPY N/A 09/23/2019    EGD BIOPSY performed by Alma Chavez MD at [facility] 10/23/2019    EGD W/ EMR performed by Dewayne Rodriguez MD at [address] [place] [address] N/A 10/29/2019    EGD CONTROL HEMORRHAGE performed by Elie Chung MD at [address] [place] [address] N/A 04/26/2021    EGD BIOPSY performed by Sahara Roper MD at [address] [place] [address] N/A 8/30/2021    EGD ESOPHAGOGASTRODUODENOSCOPY performed by Anupama Francois MD at [facility]:    Current Outpatient Medications:     albuterol sulfate HFA (VENTOLIN HFA) 108 (90 Base) MCG/ACT inhaler, Inhale 2 puffs into the lungs 4 times daily as needed for Wheezing, Disp: 18 g, Rfl: 2    fluticasone (FLONASE) 50 MCG/ACT nasal spray, 2 sprays by Each Nostril route daily, Disp: 16 g, Rfl: 5    metoprolol tartrate (LOPRESSOR) 25 MG tablet, Take 1 tablet by mouth 2 times daily, Disp: 180 tablet, Rfl: 1    levothyroxine (SYNTHROID) 88 MCG tablet, Take 1 tablet by mouth Five times weekly Indications: Takes only Wed-Sun (skips Mon/Tues) (Patient taking differently: Take 88 mcg by mouth Five times weekly Indications: takes Monday _ Friday no weekends ), Disp: 30 tablet, Rfl: 3    scopolamine (TRANSDERM-SCOP) transdermal patch, Place 1 patch onto the skin every 72 hours, Disp: 20 patch, Rfl: 3    sterile water SOLN with amino acids 10 % SOLN 50 g/L, dextrose 70 % SOLN 100 g/L, Nutrilyte CONC 20 mL/L, sodium phosphate 3 MMOLE/ML SOLN 5 mL/L, Multi-Vitamin, adult no.2 without vitamin k INJ 10 mL, trace minerals Cu-Mn-Se-Zn 642-55- MCG/ML SOLN 1 mL, Infuse intravenously continuous, Disp: , Rfl:     Compression Stockings MISC, by Does not apply route 20-30 mmHg calf length, Disp: 1 each, Rfl: 0    magnesium oxide (MAG-OX) 400 (241.3 Mg) MG TABS tablet, Take 1 tablet by mouth 2 times daily, Disp: 60 tablet, Rfl: 1    potassium chloride (KLOR-CON M) 20 MEQ extended release tablet, Take 1 tablet by mouth 2 times daily, Disp: 60 tablet, Rfl: 3    Cholecalciferol (VITAMIN D) 25 MCG TABS, Take 1 tablet by mouth daily (Patient taking differently: Take 50,000 Units by mouth daily Take one every Thursday.), Disp: 60 tablet, Rfl: 2    metoclopramide (REGLAN) 10 MG tablet, TAKE 1 TABLET BY MOUTH 3 TIMES DAILY (BEFORE MEALS), Disp: 90 tablet, Rfl: 5    sucralfate (CARAFATE) 1 GM tablet, Take 1 tablet by mouth 4 times daily, Disp: 120 tablet, Rfl: 3    Multiple Vitamin (MULTI-DAY VITAMINS PO), Take 1 tablet by mouth daily , Disp: , Rfl:     vitamin B-12 (CYANOCOBALAMIN) 500 MCG tablet, Take 500 mcg by mouth daily, Disp: , Rfl:     Folic Acid-Vit O9-EPC J22 2.2-25-0.5 MG TABS, Take 1 tablet by mouth daily (Patient not taking: Reported on 2021), Disp: 90 tablet, Rfl: 1    ALLERGIES:   Allergies   Allergen Reactions    Erythromycin Diarrhea       FAMILY HISTORY:       Problem Relation Age of Onset    High Blood Pressure Mother     High Blood Pressure Sister     Stomach Cancer Sister     Other Sister         epilepsy    No Known Problems Father          SOCIAL HISTORY:   Social History     Socioeconomic History    Marital status: Single     Spouse name: Not on file    Number of children: Not on file    Years of education: Not on file    Highest education level: Not on file   Occupational History    Not on file   Tobacco Use    Smoking status: Former Smoker     Packs/day: 1.00     Years: 25.00     Pack years: 25.00     Quit date: 2007     Years since quittin.1    Smokeless tobacco: Never Used   Vaping Use    Vaping Use: Never used   Substance and Sexual Activity    Alcohol use: Not Currently     Comment: 3 times a month    Drug use: No    Sexual activity: Not on file   Other Topics Concern    Not on file   Social History Narrative    Not on file     Social Determinants of Health     Financial Resource Strain: Low Risk     Difficulty of Paying Living Expenses: Not hard at all   Food Insecurity: No Food Insecurity    Worried About Running Out of Food in the Last Year: Never true    Ran Out of Food in the Last Year: Never true   Transportation Needs:     Lack of Transportation (Medical): Not on file    Lack of Transportation (Non-Medical): Not on file   Physical Activity:     Days of Exercise per Week: Not on file    Minutes of Exercise per Session: Not on file   Stress:     Feeling of Stress : Not on file   Social Connections:     Frequency of Communication with Friends and Family: Not on file    Frequency of Social Gatherings with Friends and Family: Not on file    Attends Quaker Services: Not on file    Active Member of [address] [place] [address] [employer] or Organizations: Not on file    Attends Club or Organization Meetings: Not on file    Marital Status: Not on file   Intimate Partner Violence:     Fear of Current or Ex-Partner: Not on file    Emotionally Abused: Not on file    Physically Abused: Not on file    Sexually Abused: Not on file   Housing Stability:     Unable to Pay for Housing in the Last Year: Not on file    Number of [place] in the Last Year: Not on file    Unstable Housing in the Last Year: Not on file       REVIEW OF SYSTEMS: A 12-point review of systemswas obtained and pertinent positives and negatives were enumerated above in the history of present illness. All other reviewed systems / symptoms were negative. Review of Systems   Constitutional: Negative. HENT: Positive for trouble swallowing. Eyes: Positive for visual disturbance (glasses ). Respiratory: Negative. Cardiovascular: Negative. Gastrointestinal: Positive for abdominal pain, nausea and vomiting. Endocrine: Negative. Genitourinary: Negative. Musculoskeletal: Negative. Skin: Negative. Allergic/Immunologic: Negative. Neurological: Negative. Hematological: Negative. Psychiatric/Behavioral: Negative. LABORATORY DATA: Reviewed  Lab Results   Component Value Date    WBC 9.4 11/24/2021    HGB 11.0 (L) 11/24/2021    HCT 34.8 (L) 11/24/2021    MCV 91.8 11/24/2021     11/24/2021     11/24/2021    K 3.8 11/24/2021     11/24/2021    CO2 16 (L) 11/24/2021    BUN 16 11/24/2021    CREATININE 0.47 (L) 11/24/2021    LABALBU 3.3 (L) 11/21/2021    BILITOT 0.23 (L) 11/21/2021    ALKPHOS 119 (H) 11/21/2021    AST 22 11/21/2021    ALT 17 11/21/2021    INR 1.1 08/28/2021         Lab Results   Component Value Date    RBC 3.79 (L) 11/24/2021    HGB 11.0 (L) 11/24/2021    MCV 91.8 11/24/2021    MCH 29.0 11/24/2021    MCHC 31.6 11/24/2021    RDW 12.9 11/24/2021    MPV 9.2 11/24/2021    BASOPCT 0 11/24/2021    LYMPHSABS 1.76 11/24/2021    MONOSABS 0.80 11/24/2021    NEUTROABS 6.66 11/24/2021    EOSABS 0.14 11/24/2021    BASOSABS 0.04 11/24/2021         DIAGNOSTIC TESTING:     No results found. PHYSICAL EXAMINATION: Vital signs reviewed per the nursing documentation. LMP 01/01/1994   There is no height or weight on file to calculate BMI. Physical Exam      I personally reviewed the nurse's notes and documentation and I agree with her notes. General: alert, appears stated age and cooperative Psych: Normal. and Alert and oriented, appropriate affect. . Normal affect. Mentation normal  HEENT: PERRLA. Clear conjunctivae and sclerae. Moist oral mucosae, no lesions or ulcers. The neck is supple, without lymphadenopathy or jugular venous distension. No masses. Normal thyroid. Cardiovascular: S1 S2 RRR no rubs or murmurs. Pulmonary: clear BL. No accessory muscle usage. Abdominal Exam: Soft, NT ND, no hepato or spleno megaly, +BS, no ascites. IMPRESSION: Ms. Corie Francois is a 62 y.o. female with dysphagia. History of neuroendocrine tumors of the stomach status post total gastrectomy. We will arrange for EGD and possible dilation.       Thank you for allowing me to participate in the care of Ms. Lana Perez. For any further questions please do not hesitate to contact me. I have reviewed and agree with the ROS entered by the MA/LPN. Note is dictated utilizing voice recognition software. Unfortunately this leads to occasional typographical errors. Please contact our office if you have any questions.     Nirali Barboza MD  [facility]  O: #[phone]

## 2022-01-28 ENCOUNTER — OFFICE VISIT (OUTPATIENT)
Dept: INTERNAL MEDICINE CLINIC | Age: 62
End: 2022-01-28
Payer: MEDICAID

## 2022-01-28 ENCOUNTER — TELEPHONE (OUTPATIENT)
Dept: GASTROENTEROLOGY | Age: 62
End: 2022-01-28

## 2022-01-28 VITALS
TEMPERATURE: 97.2 F | OXYGEN SATURATION: 98 % | SYSTOLIC BLOOD PRESSURE: 122 MMHG | WEIGHT: 168.2 LBS | BODY MASS INDEX: 28.02 KG/M2 | RESPIRATION RATE: 15 BRPM | HEIGHT: 65 IN | HEART RATE: 61 BPM | DIASTOLIC BLOOD PRESSURE: 82 MMHG

## 2022-01-28 DIAGNOSIS — E53.8 B12 DEFICIENCY: ICD-10-CM

## 2022-01-28 DIAGNOSIS — I88.9 AXILLARY LYMPHADENITIS: Primary | ICD-10-CM

## 2022-01-28 DIAGNOSIS — E55.9 VITAMIN D DEFICIENCY: ICD-10-CM

## 2022-01-28 DIAGNOSIS — I10 ESSENTIAL HYPERTENSION: ICD-10-CM

## 2022-01-28 PROCEDURE — G8427 DOCREV CUR MEDS BY ELIG CLIN: HCPCS | Performed by: INTERNAL MEDICINE

## 2022-01-28 PROCEDURE — 3017F COLORECTAL CA SCREEN DOC REV: CPT | Performed by: INTERNAL MEDICINE

## 2022-01-28 PROCEDURE — 99214 OFFICE O/P EST MOD 30 MIN: CPT | Performed by: INTERNAL MEDICINE

## 2022-01-28 PROCEDURE — 1036F TOBACCO NON-USER: CPT | Performed by: INTERNAL MEDICINE

## 2022-01-28 PROCEDURE — G8482 FLU IMMUNIZE ORDER/ADMIN: HCPCS | Performed by: INTERNAL MEDICINE

## 2022-01-28 PROCEDURE — G8417 CALC BMI ABV UP PARAM F/U: HCPCS | Performed by: INTERNAL MEDICINE

## 2022-01-28 RX ORDER — CEPHALEXIN 500 MG/1
500 CAPSULE ORAL 3 TIMES DAILY
Qty: 21 CAPSULE | Refills: 0 | Status: SHIPPED | OUTPATIENT
Start: 2022-01-28 | End: 2022-02-04

## 2022-01-28 ASSESSMENT — PATIENT HEALTH QUESTIONNAIRE - PHQ9
SUM OF ALL RESPONSES TO PHQ QUESTIONS 1-9: 0
SUM OF ALL RESPONSES TO PHQ QUESTIONS 1-9: 0
1. LITTLE INTEREST OR PLEASURE IN DOING THINGS: 0
2. FEELING DOWN, DEPRESSED OR HOPELESS: 0
SUM OF ALL RESPONSES TO PHQ9 QUESTIONS 1 & 2: 0
SUM OF ALL RESPONSES TO PHQ QUESTIONS 1-9: 0
SUM OF ALL RESPONSES TO PHQ QUESTIONS 1-9: 0

## 2022-01-28 NOTE — PROGRESS NOTES
Please advise pt xr si joint is negative, xr hands show erosive osteoarthritis, xr feet show expected wear and tear arthritis. I will order mri pelvis and she should schedule f/u with me 1 week after this to review.  thanks Rip Singleton is a 62 y.o. female who presents for   Chief Complaint   Patient presents with    Mass     has lump under right arm having some pain; going on for 4 days    Health Maintenance     cervical, shingles, breast, hep c, pneumo, hiv, tdap    Medication Refill     magnesium; should patient continue to take    and follow up of chronic medical problems.   Patient Active Problem List   Diagnosis    Asthma    Anxiety    Obesity    Hyperlipidemia    Hypothyroidism    Colon polyps    Vertigo    Mass of left hip region    General weakness    Pharyngoesophageal dysphagia    Colon polyp    Neuroendocrine tumor    Chest pain    Shortness of breath    Anemia    GI bleed    Essential hypertension    Neuroendocrine neoplasm of stomach    Polyp, stomach    Melena    Gastric ulcer with hemorrhage    Constipation    Hypokalemia    Hypomagnesemia    Bilateral pulmonary embolism (HCC)    Abdominal pain, epigastric    Elevated d-dimer    Malignant carcinoid tumor of stomach (HCC)    Intractable vomiting    Intractable nausea and vomiting    S/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    Food intolerance    Severe malnutrition (HCC)    Sinus tachycardia    Weight loss    Projectile vomiting with nausea    On total parenteral nutrition    H/O blood clots    Pneumonia    Sepsis (HCC)    Bilious vomiting with nausea     HPI  Here for follow-up and patient has a swallowing which is tender in the right axillary area and wants to get it checked    Current Outpatient Medications   Medication Sig Dispense Refill    cephALEXin (KEFLEX) 500 MG capsule Take 1 capsule by mouth 3 times daily for 7 days 21 capsule 0    fluticasone (FLONASE) 50 MCG/ACT nasal spray 2 sprays by Each Nostril route daily 16 g 5    metoprolol tartrate (LOPRESSOR) 25 MG tablet Take 1 tablet by mouth 2 times daily 180 tablet 1    levothyroxine (SYNTHROID) 88 MCG tablet Take 1 tablet by mouth Five times weekly Indications: Takes only Wed-Sun (skips Mon/Tues) (Patient taking differently: Take 88 mcg by mouth Five times weekly Indications: takes Monday _ Friday no weekends ) 30 tablet 3    scopolamine (TRANSDERM-SCOP) transdermal patch Place 1 patch onto the skin every 72 hours 20 patch 3    Compression Stockings MISC by Does not apply route 20-30 mmHg calf length 1 each 0    metoclopramide (REGLAN) 10 MG tablet TAKE 1 TABLET BY MOUTH 3 TIMES DAILY (BEFORE MEALS) 90 tablet 5    sucralfate (CARAFATE) 1 GM tablet Take 1 tablet by mouth 4 times daily 120 tablet 3    Multiple Vitamin (MULTI-DAY VITAMINS PO) Take 1 tablet by mouth daily        No current facility-administered medications for this visit. Allergies   Allergen Reactions    Erythromycin Diarrhea       Past Medical History:   Diagnosis Date    Anxiety     Arthritis     knee    Asthma     Colon polyp 02/21/2019    tubular adenoma; hyperplastic polyp    Colon polyps     Cyst of kidney, acquired     bilat.     Fatigue     Hypertension     Hypothyroidism     Neuroendocrine tumor 02/21/2019    of stomach    Obesity     Pharyngoesophageal dysphagia     Vocal cord polyps     Wears glasses        Past Surgical History:   Procedure Laterality Date    CHOLECYSTECTOMY  10/09/2014    COLONOSCOPY  02/21/2019    tubular adenoma; hyperplastic polyp    COLONOSCOPY      over 5 yrs ago per pt with polyps (2-)    CYSTOURETHROSCOPY/STENT REMOVAL  05/03/2011    ENDOSCOPIC ULTRASOUND (LOWER) N/A 01/22/2020    ENDOSCOPIC ULTRASOUND WITH POSSIBLE EMR performed by Cullen Stovall MD at [facility]    ERCP      GASTRECTOMY N/A 11/30/2020    HIP SURGERY Left     soft tissue tumor removal    THROAT SURGERY      vocal cord polyps removed    UPPER GASTROINTESTINAL ENDOSCOPY  02/21/2019    Neuroendocrine tumor    UPPER GASTROINTESTINAL ENDOSCOPY  09/23/2019    UPPER GASTROINTESTINAL ENDOSCOPY N/A 09/23/2019    EGD BIOPSY performed by Debi Tineo Ronna Springer MD at [address] N/A 10/23/2019    EGD W/ EMR performed by Lila Farr MD at [address] [facility] N/A 10/29/2019    EGD CONTROL HEMORRHAGE performed by Faye Taylor MD at [address] [facility] N/A 04/26/2021    EGD BIOPSY performed by Jay Naranjo MD at [address] [facility] N/A 8/30/2021    EGD ESOPHAGOGASTRODUODENOSCOPY performed by Sofía Page MD at [address] [facility] History   Problem Relation Age of Onset    High Blood Pressure Mother     High Blood Pressure Sister     Stomach Cancer Sister     Other Sister         epilepsy    No Known Problems Father      ROS   Constitutional:  Negative for fatigue, loss of appetite and unexpected weight change   HEENT            : Negative for neck stiffness and pain, no congestion or sinus pressure   Eyes                : No visual disturbance or pain   Cardiovascular: No chest pain or palpitations or leg swelling   Respiratory      : Negative for cough, shortness of breath or wheezing   Gastrointestinal: Negative for abdominal pain, constipation or diarrhea and bloating No nausea or vomiting   Genitourinary:     No urgency or frequency, no burning or hematuria   Musculoskeletal: No arthralgias, back pain or myalgias   Skin                  : Negative for rash or erythema   Neurological    : Negative for dizziness, weakness, tremors ,light headedness or syncope   Psychiatric       : Negative for dysphoric mood, sleep disturbances, nervous or anxious, or decreased concentration   All other review of systems was negative    Objective  Physical Examination:    Nursing note reviewed    /82 (Site: Left Upper Arm, Position: Sitting, Cuff Size: Medium Adult)   Pulse 61   Temp 97.2 °F (36.2 °C) (Temporal)   Resp 15   Ht 5' 5\" (1.651 m)   Wt 168 lb 3.2 oz (76.3 kg)   LMP 01/01/1994   SpO2 98%   Breastfeeding No BMI 27.99 kg/m²   BP Readings from Last 3 Encounters:   01/28/22 122/82   01/27/22 (!) 142/89   12/21/21 108/80         Constitutional:  Alireza Stoner is oriented to place, person and time ,appears well-developed and well-nourished  HEENT:  Atraumatic and normocephalic, external ears normal bilaterally, nose normal no oropharyngeal exudate and is clear and moist  Eyes:  EOCM normal; conjunctivae normal; PERRLA bilaterally  Neck:  Normal range of motion, neck supple, no JVD and no thyromegaly  Cardiovascular:  RRR, normal heart sounds and intact distal pulses  Pulmonary:  effort normal and breath sounds normal bilaterally,no wheezes or rales, no respiratory distress  Abdominal:  Soft, non-tender; normal bowel sounds, no masses  Musculoskeletal:  Normal range of motion and no edema or tenderness bilaterally  No lymphadenopathy except right axilla lymph node is swollen tender on palpation  Neurological:  alert, oriented, and normal reflexes bilaterally  Skin: warm and dry  Psychiatric:  normal mood and effect; behavior normal.    Labs:   No results found for: LABA1C  Lab Results   Component Value Date    CHOL 182 08/27/2020     Lab Results   Component Value Date    HDL 45 08/27/2020     No results found for: [address]  Lab Results   Component Value Date    TRIG 64 11/23/2021     No components found for: [place], [place]  Lab Results   Component Value Date    WBC 9.4 11/24/2021    HGB 11.0 (L) 11/24/2021    HCT 34.8 (L) 11/24/2021    MCV 91.8 11/24/2021     11/24/2021     Lab Results   Component Value Date    INR 1.1 08/28/2021    PROTIME 14.4 (H) 08/28/2021     Lab Results   Component Value Date    GLUCOSE 157 (H) 11/24/2021    CREATININE 0.47 (L) 11/24/2021    BUN 16 11/24/2021     11/24/2021    K 3.8 11/24/2021     11/24/2021    CO2 16 (L) 11/24/2021     Lab Results   Component Value Date    ALT 17 11/21/2021    AST 22 11/21/2021    ALKPHOS 119 (H) 11/21/2021    BILITOT 0.23 (L) 11/21/2021     Lab Results Component Value Date    LABALBU 3.3 (L) 11/21/2021     Lab Results   Component Value Date    TSH 0.03 (L) 08/27/2021     Assessment:  1. Axillary lymphadenitis    2. B12 deficiency    3. Vitamin D deficiency    4. Essential hypertension        Plan:  Advised patient to start on Keflex 500 mg 3 times daily for 7 days and call me back if no improvement  Blood pressure is stable on current medications  Patient still continuing her potassium and magnesium supplements and stop the B12 and vitamin D and her last potassium and magnesium were within normal limits and so advised patient to stop both also and check all the 4 levels in the next 2 weeks and if any deficiencies will start back again  Blood pressure is stable on current medications  Patient's visit to the gastroenterologist and reports reviewed and planning for EGD next month  Review as scheduled           1. Kyle Subramanian received counseling on the following healthy behaviors: nutrition and exercise    2. Prior labs and health maintenance reviewed. 3.  Discussed use, benefit, and side effects of prescribed medications. Barriers to medication compliance addressed. All her questions were answered. Pt voiced understanding. Kyle Subramanian will continue current medications, diet and exercise. Orders Placed This Encounter   Medications    cephALEXin (KEFLEX) 500 MG capsule     Sig: Take 1 capsule by mouth 3 times daily for 7 days     Dispense:  21 capsule     Refill:  0          Completed Refills               Requested Prescriptions     Signed Prescriptions Disp Refills    cephALEXin (KEFLEX) 500 MG capsule 21 capsule 0     Sig: Take 1 capsule by mouth 3 times daily for 7 days     4. Patient given educational materials - see patient instructions    5. Was a self-tracking handout given in paper form or via [employer]?   NO    Orders Placed This Encounter   Procedures    Basic Metabolic Panel     Standing Status:   Future     Standing Expiration Date: 1/28/2023    Magnesium     Standing Status:   Future     Standing Expiration Date:   1/28/2023    Vitamin B12     Standing Status:   Future     Standing Expiration Date:   1/28/2023    Vitamin D 25 Hydroxy     Standing Status:   Future     Standing Expiration Date:   1/28/2023     No follow-ups on file. Patient voiced understanding and agreed to treatment plan. Electronically signed by Chayo Weston MD on 1/28/2022 at 11:59 AM    This note is created with a voice recognition program and while intend to generate a document that accurately reflects the content of the visit, no guarantee can be provided that every mistake has been identified and corrected by editing.

## 2022-01-28 NOTE — TELEPHONE ENCOUNTER
Pt called in stating that Dr. Carmencita Ansari was referring her to the [facility]  and when she called them they said they have no idea why they are seeing her, they did not received any labs, test results, etc. Angie Ansari went to transfer pt back to clinical and lost call.

## 2022-01-28 NOTE — TELEPHONE ENCOUNTER
Writer consulted with Dr. Munir Chew. Doctor states the patient's sister is the one that needs to be seen at [facility] . Writer spoke with the patient's sister and clarified the issue.

## 2022-02-03 ENCOUNTER — TELEPHONE (OUTPATIENT)
Dept: GASTROENTEROLOGY | Age: 62
End: 2022-02-03

## 2022-02-03 NOTE — TELEPHONE ENCOUNTER
Called and spoke with pt in regards to her procedure, as discussed during check out at her last visit if there was a cancellation prior to what she was scheduled for at [facility], I would call to get her in sooner for her procedure. Pt is now to be scheduled at [email or web address] Info faxed to hospital as they are closed today.

## 2022-02-07 ENCOUNTER — ANESTHESIA EVENT (OUTPATIENT)
Dept: OPERATING ROOM | Age: 62
End: 2022-02-07
Payer: MEDICAID

## 2022-02-07 ENCOUNTER — ANESTHESIA (OUTPATIENT)
Dept: OPERATING ROOM | Age: 62
End: 2022-02-07
Payer: MEDICAID

## 2022-02-07 ENCOUNTER — HOSPITAL ENCOUNTER (OUTPATIENT)
Age: 62
Setting detail: OUTPATIENT SURGERY
Discharge: HOME | End: 2022-02-07
Attending: INTERNAL MEDICINE | Admitting: INTERNAL MEDICINE
Payer: MEDICAID

## 2022-02-07 VITALS
HEART RATE: 64 BPM | WEIGHT: 165 LBS | DIASTOLIC BLOOD PRESSURE: 86 MMHG | HEIGHT: 65 IN | TEMPERATURE: 97 F | RESPIRATION RATE: 16 BRPM | SYSTOLIC BLOOD PRESSURE: 159 MMHG | BODY MASS INDEX: 27.49 KG/M2 | OXYGEN SATURATION: 97 %

## 2022-02-07 VITALS
SYSTOLIC BLOOD PRESSURE: 174 MMHG | RESPIRATION RATE: 24 BRPM | DIASTOLIC BLOOD PRESSURE: 106 MMHG | OXYGEN SATURATION: 100 %

## 2022-02-07 PROCEDURE — 2709999900 HC NON-CHARGEABLE SUPPLY: Performed by: INTERNAL MEDICINE

## 2022-02-07 PROCEDURE — 3700000000 HC ANESTHESIA ATTENDED CARE: Performed by: INTERNAL MEDICINE

## 2022-02-07 PROCEDURE — 3609017700 HC EGD DILATION GASTRIC/DUODENAL STRICTURE: Performed by: INTERNAL MEDICINE

## 2022-02-07 PROCEDURE — 2580000003 HC RX 258: Performed by: ANESTHESIOLOGY

## 2022-02-07 PROCEDURE — 2500000003 HC RX 250 WO HCPCS: Performed by: NURSE ANESTHETIST, CERTIFIED REGISTERED

## 2022-02-07 PROCEDURE — 7100000011 HC PHASE II RECOVERY - ADDTL 15 MIN: Performed by: INTERNAL MEDICINE

## 2022-02-07 PROCEDURE — 7100000010 HC PHASE II RECOVERY - FIRST 15 MIN: Performed by: INTERNAL MEDICINE

## 2022-02-07 PROCEDURE — 43249 ESOPH EGD DILATION <30 MM: CPT | Performed by: INTERNAL MEDICINE

## 2022-02-07 PROCEDURE — 3700000001 HC ADD 15 MINUTES (ANESTHESIA): Performed by: INTERNAL MEDICINE

## 2022-02-07 PROCEDURE — 6360000002 HC RX W HCPCS: Performed by: NURSE ANESTHETIST, CERTIFIED REGISTERED

## 2022-02-07 PROCEDURE — C1726 CATH, BAL DIL, NON-VASCULAR: HCPCS | Performed by: INTERNAL MEDICINE

## 2022-02-07 RX ORDER — ONDANSETRON 2 MG/ML
4 INJECTION INTRAMUSCULAR; INTRAVENOUS
Status: DISCONTINUED | OUTPATIENT
Start: 2022-02-07 | End: 2022-02-07 | Stop reason: HOSPADM

## 2022-02-07 RX ORDER — FENTANYL CITRATE 50 UG/ML
25 INJECTION, SOLUTION INTRAMUSCULAR; INTRAVENOUS EVERY 5 MIN PRN
Status: DISCONTINUED | OUTPATIENT
Start: 2022-02-07 | End: 2022-02-07 | Stop reason: HOSPADM

## 2022-02-07 RX ORDER — SODIUM CHLORIDE, SODIUM LACTATE, POTASSIUM CHLORIDE, CALCIUM CHLORIDE 600; 310; 30; 20 MG/100ML; MG/100ML; MG/100ML; MG/100ML
INJECTION, SOLUTION INTRAVENOUS CONTINUOUS
Status: DISCONTINUED | OUTPATIENT
Start: 2022-02-07 | End: 2022-02-07 | Stop reason: HOSPADM

## 2022-02-07 RX ORDER — DOCUSATE SODIUM 100 MG/1
CAPSULE, LIQUID FILLED ORAL
Qty: 60 CAPSULE | Refills: 0 | Status: SHIPPED | OUTPATIENT
Start: 2022-02-07 | End: 2022-06-10

## 2022-02-07 RX ORDER — LEVOTHYROXINE SODIUM 88 UG/1
TABLET ORAL
Qty: 30 TABLET | Refills: 3 | Status: SHIPPED | OUTPATIENT
Start: 2022-02-07 | End: 2022-05-02

## 2022-02-07 RX ORDER — HYDROMORPHONE HYDROCHLORIDE 1 MG/ML
0.25 INJECTION, SOLUTION INTRAMUSCULAR; INTRAVENOUS; SUBCUTANEOUS EVERY 5 MIN PRN
Status: DISCONTINUED | OUTPATIENT
Start: 2022-02-07 | End: 2022-02-07 | Stop reason: HOSPADM

## 2022-02-07 RX ORDER — LIDOCAINE HYDROCHLORIDE 20 MG/ML
INJECTION, SOLUTION EPIDURAL; INFILTRATION; INTRACAUDAL; PERINEURAL PRN
Status: DISCONTINUED | OUTPATIENT
Start: 2022-02-07 | End: 2022-02-07 | Stop reason: SDUPTHER

## 2022-02-07 RX ORDER — PROPOFOL 10 MG/ML
INJECTION, EMULSION INTRAVENOUS PRN
Status: DISCONTINUED | OUTPATIENT
Start: 2022-02-07 | End: 2022-02-07 | Stop reason: SDUPTHER

## 2022-02-07 RX ORDER — LABETALOL HYDROCHLORIDE 5 MG/ML
INJECTION, SOLUTION INTRAVENOUS PRN
Status: DISCONTINUED | OUTPATIENT
Start: 2022-02-07 | End: 2022-02-07 | Stop reason: SDUPTHER

## 2022-02-07 RX ADMIN — LIDOCAINE HYDROCHLORIDE 80 MG: 20 INJECTION, SOLUTION EPIDURAL; INFILTRATION; INTRACAUDAL; PERINEURAL at 09:04

## 2022-02-07 RX ADMIN — PROPOFOL 30 MG: 10 INJECTION, EMULSION INTRAVENOUS at 09:14

## 2022-02-07 RX ADMIN — LABETALOL HYDROCHLORIDE 10 MG: 5 INJECTION INTRAVENOUS at 09:17

## 2022-02-07 RX ADMIN — PROPOFOL 30 MG: 10 INJECTION, EMULSION INTRAVENOUS at 09:11

## 2022-02-07 RX ADMIN — SODIUM CHLORIDE, POTASSIUM CHLORIDE, SODIUM LACTATE AND CALCIUM CHLORIDE: 600; 310; 30; 20 INJECTION, SOLUTION INTRAVENOUS at 08:28

## 2022-02-07 RX ADMIN — PROPOFOL 50 MG: 10 INJECTION, EMULSION INTRAVENOUS at 09:06

## 2022-02-07 RX ADMIN — PROPOFOL 40 MG: 10 INJECTION, EMULSION INTRAVENOUS at 09:08

## 2022-02-07 ASSESSMENT — PULMONARY FUNCTION TESTS
PIF_VALUE: 1
PIF_VALUE: 0
PIF_VALUE: 1

## 2022-02-07 ASSESSMENT — ENCOUNTER SYMPTOMS: SHORTNESS OF BREATH: 0

## 2022-02-07 ASSESSMENT — PAIN SCALES - GENERAL
PAINLEVEL_OUTOF10: 0
PAINLEVEL_OUTOF10: 0

## 2022-02-07 ASSESSMENT — PAIN - FUNCTIONAL ASSESSMENT: PAIN_FUNCTIONAL_ASSESSMENT: 0-10

## 2022-02-07 NOTE — TELEPHONE ENCOUNTER
Rosmery Alcala is calling to request a refill on the following medication(s):    Medication Request:    Last filled 10/12/21 #30 with 3 RF    Requested Prescriptions     Pending Prescriptions Disp Refills    levothyroxine (SYNTHROID) 88 MCG tablet [Pharmacy Med Name: LEVOTHYROXINE 88 MCG TABLET] 30 tablet 3     Sig: TAKE 1 TABLET BY MOUTH FIVE TIMES WEEKLY ONLY WED-SUN (SKIP MON AND TUES)       Last Visit Date (If Applicable):  2/58/9273    Next Visit Date:    4/19/2022

## 2022-02-07 NOTE — OP NOTE
[facility]     PROCEDURE DATE: 02/07/22    REFERRING PHYSICIAN: No ref. provider found     PRIMARY CARE PROVIDER: Aleksandr Sousa MD    ATTENDING PHYSICIAN: Jyoti Jaramillo MD     HISTORY: Ms. Yesenia Bennett is a 62 y.o. female who presents to the [place] [address] Endoscopy unit for upper endoscopy. The patient's clinical history is remarkable for dysphagia. She is currently medically stable and appropriate for the planned procedure. PREOPERATIVE DIAGNOSIS: dysphagia. PROCEDURES:   1) Transoral Upper Endoscopy, Balloon dilation. POSTOPERATIVE DIAGNOSIS:   Mildly distorted esophagojejunal anastomosis    SPECIMENS: None    MEDICATIONS:   MAC per anesthesia    EBL: minimal    INSTRUMENT: Olympus GIF-H190 flexible Gastroscope. PREPARATION: The nature and character of the procedure as well as risks, benefits, and alternatives were discussed with the patient and informed consent was obtained. Complications were said to include, but were not limited to: medication allergy, medication reaction, cardiovascular and respiratory problems, bleeding, perforation, infection, and/or missed diagnosis. Following arrival in the endoscopy room, the patient was placed in the left lateral decubitus position and final time-out accomplished in the presence of the nursing staff. Baseline vital signs were obtained and reviewed, and IV sedation was subsequently initiated. FINDINGS:   Esophagus: The esophagus was inspected to the Z-line. The endoscopic exam showed mildly distorted gastrojejunal anastomosis. Balloon dilation was performed sequentially to 15 mm without any complications. Stomach: The stomach was surgically absent. Jejunum: The proximal small bowel was inspected. The endoscopic exam showed no pathology. RECOMMENDATIONS:   1) Follow up with referring provider, as previously scheduled. 2) Follow-up with me in the office in 1 month.       Jyoti Newsome Darshan Chiang

## 2022-02-07 NOTE — H&P
Interval H&P Note    Pt Name: Tahd Casey  MRN: [id number]  YOB: 1960  Date of evaluation: 2/7/2022      [x] I have reviewed in [facility] the Gastroenterology Progress Note by Dr Saji Garvin dated 1/27/22 attached below for an Interval History and Physical note. [x] I have examined  Thad Casey  There are no changes to the patient who is scheduled for a diagnostic EGD by Dr Saji Garvin for dysphagia. The patient denies new health changes, fever, chills, wheezing, cough, increased SOB, chest pain, open sores or wounds. PMH, Surgical History, Social History, Psych, and Family History reviewed and updated in EPIC in appropriate section. Vital signs: BP (!) 151/88   Pulse 57   Temp 97.7 °F (36.5 °C) (Temporal)   Resp 18   Ht 5' 5\" (1.651 m)   Wt 165 lb (74.8 kg)   LMP 01/01/1994   SpO2 100%   BMI 27.46 kg/m²     Allergies:  Erythromycin    Medications:    Prior to Admission medications    Medication Sig Start Date End Date Taking?  Authorizing Provider   fluticasone (FLONASE) 50 MCG/ACT nasal spray 2 sprays by Each Nostril route daily 12/2/21  Yes Jocelyn Davila MD   metoprolol tartrate (LOPRESSOR) 25 MG tablet Take 1 tablet by mouth 2 times daily 11/24/21  Yes Jesika Benitez MD   levothyroxine (SYNTHROID) 88 MCG tablet Take 1 tablet by mouth Five times weekly Indications: Takes only Wed-Sun (skips Mon/Tues)  Patient taking differently: Take 88 mcg by mouth Five times weekly Indications: takes Monday _ Friday no weekends  10/12/21  Yes Vale Thomas MD   scopolamine (TRANSDERM-SCOP) transdermal patch Place 1 patch onto the skin every 72 hours 10/12/21  Yes Vale Thomas MD   metoclopramide (REGLAN) 10 MG tablet TAKE 1 TABLET BY MOUTH 3 TIMES DAILY (BEFORE MEALS) 6/16/21  Yes Francisca Kumar MD   sucralfate (CARAFATE) 1 GM tablet Take 1 tablet by mouth 4 times daily 4/29/21  Yes Karyn Davis MD   Multiple Vitamin (MULTI-DAY VITAMINS PO) Take 1 tablet by mouth daily    Yes Historical Provider, MD   Compression Stockings MISC by Does not apply route 20-30 mmHg calf length 9/21/21   Mylo Baumgarten, MD         This is a 62 y.o. female who is pleasant, cooperative, alert and oriented x3, in no acute distress    Physical Exam:  Lungs: Bilateral equal air entry, unlabored, clear to ausculation, no wheezing, rales or rhonchi, normal effort  Cardiovascular: HR 57 asymptomatic bradycardic rate and regular rhythm, no murmur, gallop, rub. Abdomen: Soft, nontender, nondistended, normal bowel sounds. Labs:  No results for input(s): HGB, HCT, WBC, MCV, PLT, NA, K, CL, CO2, BUN, CREATININE, GLUCOSE, INR, PROTIME, APTT, AST, ALT, LABALBU, HCG in the last 720 hours. No results for input(s): COVID19 in the last 720 hours. RYAN Avalos CNP   Electronically signed 2/7/2022 at 8:29 Joyce Hall MD   Physician   Specialty:  Gastroenterology   Progress Notes      Signed   Encounter Date:  1/27/2022         Related encounter: Office Visit from 1/27/2022 in [address] [place] [address] St:   No referring provider defined for this encounter. Chief Complaint   Patient presents with    Dysphagia       pt is having trouble swallowing food                HISTORY OF PRESENT ILLNESS: Alva Ruvalcaba is a 62 y.o. female with dysphagia. History of neuroendocrine tumors of the stomach. Status post total gastrectomy. She was able to gain weight. We were able to stop TPN. Over the past couple of weeks she has been having sensation of food getting stuck in the lower chest.  Mostly meat and pasta. Bowels moving okay. Past Medical,Family, and Social History reviewed and does not contribute to the patient presenting condition. Patient's PMH/PSH,SH,PSYCH Hx, MEDs, ALLERGIES, and ROS were all reviewed and updated in the appropriate sections.      PAST MEDICAL HISTORY:  Past Medical History        Past Medical History:   Diagnosis Date    Anxiety      Arthritis       knee    Asthma      Colon polyp 02/21/2019     tubular adenoma; hyperplastic polyp    Colon polyps      Cyst of kidney, acquired       bilat.     Fatigue      Hypertension      Hypothyroidism      Neuroendocrine tumor 02/21/2019     of stomach    Obesity      Pharyngoesophageal dysphagia      Vocal cord polyps      Wears glasses              Past Surgical History         Past Surgical History:   Procedure Laterality Date    CHOLECYSTECTOMY   10/09/2014    COLONOSCOPY   02/21/2019     tubular adenoma; hyperplastic polyp    COLONOSCOPY         over 5 yrs ago per pt with polyps (2-)    CYSTOURETHROSCOPY/STENT REMOVAL   05/03/2011    ENDOSCOPIC ULTRASOUND (LOWER) N/A 01/22/2020     ENDOSCOPIC ULTRASOUND WITH POSSIBLE EMR performed by [facility]qas Reyn[facility] MD at [facility]    ERCP        GASTRECTOMY N/A 11/30/2020    HIP SURGERY Left       soft tissue tumor removal    THROAT SURGERY         vocal cord polyps removed    UPPER GASTROINTESTINAL ENDOSCOPY   02/21/2019     Neuroendocrine tumor    UPPER GASTROINTESTINAL ENDOSCOPY   09/23/2019    UPPER GASTROINTESTINAL ENDOSCOPY N/A 09/23/2019     EGD BIOPSY performed by Noe Dennis MD at [facility] 10/23/2019     EGD W/ EMR performed by Waqas Reyna MD at [address] [place] [address] N/A 10/29/2019     EGD CONTROL HEMORRHAGE performed by Radha Ramírez MD at [address] [place] [address] N/A 04/26/2021     EGD BIOPSY performed by Christiano Valdez MD at [address] [place] [address] N/A 8/30/2021     EGD ESOPHAGOGASTRODUODENOSCOPY performed by Nury Davidson MD at [address] [place] [address] [place]:    Current Medication      Current Outpatient Medications:     albuterol sulfate HFA (VENTOLIN HFA) 108 (90 Base) MCG/ACT inhaler, Inhale 2 puffs into the lungs 4 times daily as needed for Wheezing, Disp: 18 g, Rfl: 2    fluticasone (FLONASE) 50 MCG/ACT nasal spray, 2 sprays by Each Nostril route daily, Disp: 16 g, Rfl: 5    metoprolol tartrate (LOPRESSOR) 25 MG tablet, Take 1 tablet by mouth 2 times daily, Disp: 180 tablet, Rfl: 1    levothyroxine (SYNTHROID) 88 MCG tablet, Take 1 tablet by mouth Five times weekly Indications: Takes only Wed-Sun (skips Mon/Tues) (Patient taking differently: Take 88 mcg by mouth Five times weekly Indications: takes Monday _ Friday no weekends ), Disp: 30 tablet, Rfl: 3    scopolamine (TRANSDERM-SCOP) transdermal patch, Place 1 patch onto the skin every 72 hours, Disp: 20 patch, Rfl: 3    sterile water SOLN with amino acids 10 % SOLN 50 g/L, dextrose 70 % SOLN 100 g/L, Nutrilyte CONC 20 mL/L, sodium phosphate 3 MMOLE/ML SOLN 5 mL/L, Multi-Vitamin, adult no.2 without vitamin k INJ 10 mL, trace minerals Cu-Mn-Se-Zn 130-32- MCG/ML SOLN 1 mL, Infuse intravenously continuous, Disp: , Rfl:     Compression Stockings MISC, by Does not apply route 20-30 mmHg calf length, Disp: 1 each, Rfl: 0    magnesium oxide (MAG-OX) 400 (241.3 Mg) MG TABS tablet, Take 1 tablet by mouth 2 times daily, Disp: 60 tablet, Rfl: 1    potassium chloride (KLOR-CON M) 20 MEQ extended release tablet, Take 1 tablet by mouth 2 times daily, Disp: 60 tablet, Rfl: 3    Cholecalciferol (VITAMIN D) 25 MCG TABS, Take 1 tablet by mouth daily (Patient taking differently: Take 50,000 Units by mouth daily Take one every Thursday.), Disp: 60 tablet, Rfl: 2    metoclopramide (REGLAN) 10 MG tablet, TAKE 1 TABLET BY MOUTH 3 TIMES DAILY (BEFORE MEALS), Disp: 90 tablet, Rfl: 5    sucralfate (CARAFATE) 1 GM tablet, Take 1 tablet by mouth 4 times daily, Disp: 120 tablet, Rfl: 3    Multiple Vitamin (MULTI-DAY VITAMINS PO), Take 1 tablet by mouth daily , Disp: , Rfl:     vitamin B-12 (CYANOCOBALAMIN) 500 MCG tablet, Take 500 mcg by mouth daily, Disp: , Rfl:     Folic Acid-Vit E9-HUK T97 2.2-25-0.5 MG TABS, Take 1 tablet by mouth daily (Patient not taking: Reported on 2021), Disp: 90 tablet, Rfl: 1        ALLERGIES:        Allergies   Allergen Reactions    Erythromycin Diarrhea         FAMILY HISTORY:   Family History             Problem Relation Age of Onset    High Blood Pressure Mother      High Blood Pressure Sister      Stomach Cancer Sister      Other Sister           epilepsy    No Known Problems Father                 SOCIAL HISTORY:   Social History               Socioeconomic History    Marital status: Single       Spouse name: Not on file    Number of children: Not on file    Years of education: Not on file    Highest education level: Not on file   Occupational History    Not on file   Tobacco Use    Smoking status: Former Smoker       Packs/day: 1.00       Years: 25.00       Pack years: 25.00       Quit date: 2007       Years since quittin.1    Smokeless tobacco: Never Used   Vaping Use    Vaping Use: Never used   Substance and Sexual Activity    Alcohol use: Not Currently       Comment: 3 times a month    Drug use: No    Sexual activity: Not on file   Other Topics Concern    Not on file   Social History Narrative    Not on file      Social Determinants of Health          Financial Resource Strain: Low Risk     Difficulty of Paying Living Expenses: Not hard at all   Food Insecurity: No Food Insecurity    Worried About Running Out of Food in the Last Year: Never true    Ana of Food in the Last Year: Never true   Transportation Needs:     Lack of Transportation (Medical): Not on file    Lack of Transportation (Non-Medical):  Not on file   Physical Activity:     Days of Exercise per Week: Not on file    Minutes of Exercise per Session: Not on file   Stress:     Feeling of Stress : Not on file   Social Connections:     Frequency of Communication with Friends and Family: Not on file    Frequency of Social Gatherings with Friends and Family: Not on file    Attends Church Services: Not on file    Active Member of Clubs or Organizations: Not on file    Attends Club or Organization Meetings: Not on file    Marital Status: Not on file   Intimate Partner Violence:     Fear of Current or Ex-Partner: Not on file    Emotionally Abused: Not on file    Physically Abused: Not on file    Sexually Abused: Not on file   Housing Stability:     Unable to Pay for Housing in the Last Year: Not on file    Number of [place] in the Last Year: Not on file    Unstable Housing in the Last Year: Not on file            REVIEW OF SYSTEMS: A 12-point review of systemswas obtained and pertinent positives and negatives were enumerated above in the history of present illness. All other reviewed systems / symptoms were negative. Review of Systems   Constitutional: Negative. HENT: Positive for trouble swallowing. Eyes: Positive for visual disturbance (glasses ). Respiratory: Negative. Cardiovascular: Negative. Gastrointestinal: Positive for abdominal pain, nausea and vomiting. Endocrine: Negative. Genitourinary: Negative. Musculoskeletal: Negative. Skin: Negative. Allergic/Immunologic: Negative. Neurological: Negative. Hematological: Negative. Psychiatric/Behavioral: Negative.                 LABORATORY DATA: Reviewed        Lab Results   Component Value Date     WBC 9.4 11/24/2021     HGB 11.0 (L) 11/24/2021     HCT 34.8 (L) 11/24/2021     MCV 91.8 11/24/2021      11/24/2021      11/24/2021     K 3.8 11/24/2021      11/24/2021     CO2 16 (L) 11/24/2021     BUN 16 11/24/2021     CREATININE 0.47 (L) 11/24/2021     LABALBU 3.3 (L) 11/21/2021     BILITOT 0.23 (L) 11/21/2021     ALKPHOS 119 (H) 11/21/2021     AST 22 11/21/2021     ALT 17 11/21/2021     INR 1.1 08/28/2021                  Lab Results   Component Value Date     RBC 3.79 (L) 11/24/2021     HGB 11.0 (L) 11/24/2021 MCV 91.8 11/24/2021     MCH 29.0 11/24/2021     MCHC 31.6 11/24/2021     RDW 12.9 11/24/2021     MPV 9.2 11/24/2021     BASOPCT 0 11/24/2021     LYMPHSABS 1.76 11/24/2021     MONOSABS 0.80 11/24/2021     NEUTROABS 6.66 11/24/2021     EOSABS 0.14 11/24/2021     BASOSABS 0.04 11/24/2021            DIAGNOSTIC TESTING:      No results found. PHYSICAL EXAMINATION: Vital signs reviewed per the nursing documentation. LMP 01/01/1994   There is no height or weight on file to calculate BMI. Physical Exam        I personally reviewed the nurse's notes and documentation and I agree with her notes. General: alert, appears stated age and cooperative Psych: Normal. and Alert and oriented, appropriate affect. . Normal affect. Mentation normal  HEENT: PERRLA. Clear conjunctivae and sclerae. Moist oral mucosae, no lesions or ulcers. The neck is supple, without lymphadenopathy or jugular venous distension. No masses. Normal thyroid. Cardiovascular: S1 S2 RRR no rubs or murmurs. Pulmonary: clear BL. No accessory muscle usage. Abdominal Exam: Soft, NT ND, no hepato or spleno megaly, +BS, no ascites. IMPRESSION: Ms. Kevin Spain is a 62 y.o. female with dysphagia. History of neuroendocrine tumors of the stomach status post total gastrectomy. We will arrange for EGD and possible dilation. Thank you for allowing me to participate in the care of Ms. Kevin Spain. For any further questions please do not hesitate to contact me. I have reviewed and agree with the ROS entered by the MA/LPN. Note is dictated utilizing voice recognition software. Unfortunately this leads to occasional typographical errors. Please contact our office if you have any questions.      Pepe Malloy MD  [facility]  O: #[phone]                      Revision History

## 2022-02-07 NOTE — ANESTHESIA PRE PROCEDURE
Department of Anesthesiology  Preprocedure Note       Name:  Roxane Saldana   Age:  62 y.o.  :  1960                                          MRN:  [id number]         Date:  2022      Surgeon: Clifton Dejesus):  Mandi Chan MD    Procedure: Procedure(s):  EGD DIAGNOSTIC ONLY    Medications prior to admission:   Prior to Admission medications    Medication Sig Start Date End Date Taking? Authorizing Provider   fluticasone (FLONASE) 50 MCG/ACT nasal spray 2 sprays by Each Nostril route daily 21   Frank Molina MD   metoprolol tartrate (LOPRESSOR) 25 MG tablet Take 1 tablet by mouth 2 times daily 21   Jesika Benitez MD   levothyroxine (SYNTHROID) 88 MCG tablet Take 1 tablet by mouth Five times weekly Indications: Takes only Wed-Sun (skips Mon/Tues)  Patient taking differently: Take 88 mcg by mouth Five times weekly Indications: takes Monday _ Friday no weekends  10/12/21   Love Lr MD   scopolamine (TRANSDERM-SCOP) transdermal patch Place 1 patch onto the skin every 72 hours 10/12/21   Love Lr MD   Compression Stockings MISC by Does not apply route 20-30 mmHg calf length 21   Love Lr MD   metoclopramide (REGLAN) 10 MG tablet TAKE 1 TABLET BY MOUTH 3 TIMES DAILY (BEFORE MEALS) 21   Mandi Chan MD   sucralfate (CARAFATE) 1 GM tablet Take 1 tablet by mouth 4 times daily 21   Dre Perales MD   Multiple Vitamin (MULTI-DAY VITAMINS PO) Take 1 tablet by mouth daily     Historical Provider, MD       Current medications:    No current facility-administered medications for this visit. No current outpatient medications on file.      Facility-Administered Medications Ordered in Other Visits   Medication Dose Route Frequency Provider Last Rate Last Admin    lactated ringers infusion   IntraVENous Continuous Ndal Boo Perez  mL/hr at 22 0828 New Bag at 22 08    lidocaine 1% (buffered) injection 0.5 mL  0.5 mL Infiltration Once Giovanny Mcclain MD           Allergies: Allergies   Allergen Reactions    Erythromycin Diarrhea       Problem List:    Patient Active Problem List   Diagnosis Code    Asthma J45.909    Anxiety F41.9    Obesity E66.9    Hyperlipidemia E78.5    Hypothyroidism E03.9    Colon polyps K63.5    Vertigo R42    Mass of left hip region R22.42    General weakness R53.1    Pharyngoesophageal dysphagia R13.14    Colon polyp K63.5    Neuroendocrine tumor D3A.8    Chest pain R07.9    Shortness of breath R06.02    Anemia D64.9    GI bleed K92.2    Essential hypertension I10    Neuroendocrine neoplasm of stomach D3A.8    Polyp, stomach K31.7    Melena K92.1    Gastric ulcer with hemorrhage K25.4    Constipation K59.00    Hypokalemia E87.6    Hypomagnesemia E83.42    Bilateral pulmonary embolism (HCC) I26.99    Abdominal pain, epigastric R10.13    Elevated d-dimer R79.89    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    Intractable vomiting R11.10    Intractable nausea and vomiting R11.2    S/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 Z90.3, Z98.0    Food intolerance K90.49    Severe malnutrition (HCC) E43    Sinus tachycardia R00.0    Weight loss R63.4    Projectile vomiting with nausea R11.12    On total parenteral nutrition Z78.9    H/O blood clots Z86.718    Pneumonia J18.9    Sepsis (HCC) A41.9    Bilious vomiting with nausea R11.14       Past Medical History:        Diagnosis Date    Anxiety     Arthritis     knee    Asthma     Colon polyp 02/21/2019    tubular adenoma; hyperplastic polyp    Colon polyps     Cyst of kidney, acquired     bilat.     Fatigue     Hypertension     Hypothyroidism     Neuroendocrine tumor 02/21/2019    of stomach    Obesity     Pharyngoesophageal dysphagia     Vocal cord polyps     Wears glasses        Past Surgical History:        Procedure Laterality Date    CHOLECYSTECTOMY  10/09/2014    COLONOSCOPY  02/21/2019 tubular adenoma; hyperplastic polyp    COLONOSCOPY      over 5 yrs ago per pt with polyps (2-)    CYSTOURETHROSCOPY/STENT REMOVAL  2011    ENDOSCOPIC ULTRASOUND (LOWER) N/A 2020    ENDOSCOPIC ULTRASOUND WITH POSSIBLE EMR performed by Steven Bear MD at [facility]    ERCP      GASTRECTOMY N/A 2020    HIP SURGERY Left     soft tissue tumor removal    THROAT SURGERY      vocal cord polyps removed    UPPER GASTROINTESTINAL ENDOSCOPY  2019    Neuroendocrine tumor    UPPER GASTROINTESTINAL ENDOSCOPY  2019    UPPER GASTROINTESTINAL ENDOSCOPY N/A 2019    EGD BIOPSY performed by Don Richardson MD at [address] [place] [address] 10/23/2019    EGD W/ EMR performed by Steven Bear MD at [address] [place] [address] N/A 10/29/2019    EGD CONTROL HEMORRHAGE performed by Darius Villegas MD at [address] [place] [address] N/A 2021    EGD BIOPSY performed by Marii Aranda MD at [address] [place] [address] N/A 2021    EGD ESOPHAGOGASTRODUODENOSCOPY performed by Lo Bone MD at [address] [place] [address] History:    Social History     Tobacco Use    Smoking status: Former Smoker     Packs/day: 1.00     Years: 25.00     Pack years: 25.00     Quit date: 2007     Years since quittin.1    Smokeless tobacco: Never Used   Substance Use Topics    Alcohol use: Not Currently     Comment: 3 times a month                                Counseling given: Not Answered      Vital Signs (Current): There were no vitals filed for this visit.                                            BP Readings from Last 3 Encounters:   22 (!) 151/88   22 122/82   22 (!) 142/89       NPO Status:                                                                                 BMI:   Wt Readings from Last 3 Encounters:   22 165 lb (74.8 kg)   22 168 lb 3.2 oz (76.3 kg) 01/27/22 168 lb (76.2 kg)     There is no height or weight on file to calculate BMI.    CBC:   Lab Results   Component Value Date    WBC 9.4 11/24/2021    RBC 3.79 11/24/2021    HGB 11.0 11/24/2021    HCT 34.8 11/24/2021    MCV 91.8 11/24/2021    RDW 12.9 11/24/2021     11/24/2021       CMP:   Lab Results   Component Value Date     11/24/2021    K 3.8 11/24/2021     11/24/2021    CO2 16 11/24/2021    BUN 16 11/24/2021    CREATININE 0.47 11/24/2021    GFRAA >60 11/24/2021    LABGLOM >60 11/24/2021    GLUCOSE 157 11/24/2021    PROT 7.4 11/21/2021    CALCIUM 8.3 11/24/2021    BILITOT 0.23 11/21/2021    ALKPHOS 119 11/21/2021    AST 22 11/21/2021    ALT 17 11/21/2021       POC Tests: No results for input(s): POCGLU, POCNA, POCK, POCCL, POCBUN, POCHEMO, POCHCT in the last 72 hours.     Coags:   Lab Results   Component Value Date    PROTIME 14.4 08/28/2021    INR 1.1 08/28/2021    APTT 47.4 04/07/2021       HCG (If Applicable): No results found for: PREGTESTUR, PREGSERUM, HCG, HCGQUANT     ABGs: No results found for: PHART, PO2ART, TMH8NBU, FBS1KYX, BEART, R3KZKAOP     Type & Screen (If Applicable):  No results found for: LABABO, LABRH    Drug/Infectious Status (If Applicable):  No results found for: HIV, HEPCAB    COVID-19 Screening (If Applicable):   Lab Results   Component Value Date    COVID19 Not Detected 11/23/2021           Anesthesia Evaluation  Patient summary reviewed and Nursing notes reviewed no history of anesthetic complications:   Airway: Mallampati: I  TM distance: >3 FB   Neck ROM: full  Mouth opening: > = 3 FB Dental: normal exam         Pulmonary:normal exam  breath sounds clear to auscultation  (+) asthma:     (-) COPD, shortness of breath, recent URI and sleep apnea                           Cardiovascular:    (+) hypertension:, hyperlipidemia    (-) past MI, CAD, CABG/stent,  angina,  CHF, orthopnea and  PINEDO      Rhythm: regular  Rate: normal                    Neuro/Psych:   (+) depression/anxiety    (-) seizures, TIA and CVA           GI/Hepatic/Renal:   (+) PUD,      (-) GERD      ROS comment: Nausea/vomiting  Carcinoid tumor of stomach. Endo/Other:    (+) hypothyroidism::., malignancy/cancer. (-) diabetes mellitus               Abdominal:             Vascular:   + PE. Other Findings:               Anesthesia Plan      general and TIVA     ASA 3       Induction: intravenous. MIPS: Prophylactic antiemetics administered. Anesthetic plan and risks discussed with patient.       Plan discussed with CRNA and surgical team.    Attending anesthesiologist reviewed and agrees with Preprocedure content              Baldev Viera MD   2/7/2022

## 2022-02-07 NOTE — ANESTHESIA POSTPROCEDURE EVALUATION
Department of Anesthesiology  Postprocedure Note    Patient: Carolyn Talavera  MRN: [id number]  YOB: 1960  Date of evaluation: 2/7/2022  Time:  11:00 AM     Procedure Summary     Date: 02/07/22 Room / Location: [place] [address] / [facility] - INPATIENT    Anesthesia Start: 0901 Anesthesia Stop: 0926    Procedure: EGD DILATION BALLOON (N/A ) Diagnosis: (dysphagia)    Surgeons: Vaishali Giles MD Responsible Provider: Luis Fernando Edmondson MD    Anesthesia Type: general, TIVA ASA Status: 3          Anesthesia Type: general, TIVA    Reji Phase I: Reji Score: 10    Reji Phase II: Reji Score: 8    Last vitals: Reviewed and per EMR flowsheets.        Anesthesia Post Evaluation    Patient location during evaluation: PACU  Patient participation: complete - patient participated  Level of consciousness: awake  Airway patency: patent  Nausea & Vomiting: no nausea  Complications: no  Cardiovascular status: blood pressure returned to baseline  Respiratory status: acceptable  Hydration status: euvolemic  Comments: Multimodal analgesia pain management as indicated by procedure  Multimodal analgesia pain management approach

## 2022-02-10 RX ORDER — ALBUTEROL SULFATE 90 UG/1
2 AEROSOL, METERED RESPIRATORY (INHALATION) 4 TIMES DAILY PRN
Qty: 18 EACH | Refills: 2 | Status: SHIPPED | OUTPATIENT
Start: 2022-02-10 | End: 2022-05-02

## 2022-02-11 ENCOUNTER — HOSPITAL ENCOUNTER (OUTPATIENT)
Age: 62
Setting detail: SPECIMEN
Discharge: HOME | End: 2022-02-11

## 2022-02-11 DIAGNOSIS — I10 ESSENTIAL HYPERTENSION: ICD-10-CM

## 2022-02-11 DIAGNOSIS — E53.8 B12 DEFICIENCY: ICD-10-CM

## 2022-02-11 DIAGNOSIS — I88.9 AXILLARY LYMPHADENITIS: ICD-10-CM

## 2022-02-11 DIAGNOSIS — E55.9 VITAMIN D DEFICIENCY: ICD-10-CM

## 2022-02-11 LAB
ANION GAP SERPL CALCULATED.3IONS-SCNC: 14 MMOL/L (ref 9–17)
BUN BLDV-MCNC: 8 MG/DL (ref 8–23)
BUN/CREAT BLD: ABNORMAL (ref 9–20)
CALCIUM SERPL-MCNC: 9.2 MG/DL (ref 8.6–10.4)
CHLORIDE BLD-SCNC: 111 MMOL/L (ref 98–107)
CO2: 23 MMOL/L (ref 20–31)
CREAT SERPL-MCNC: 0.65 MG/DL (ref 0.5–0.9)
GFR AFRICAN AMERICAN: >60 ML/MIN
GFR NON-AFRICAN AMERICAN: >60 ML/MIN
GFR SERPL CREATININE-BSD FRML MDRD: ABNORMAL ML/MIN/{1.73_M2}
GFR SERPL CREATININE-BSD FRML MDRD: ABNORMAL ML/MIN/{1.73_M2}
GLUCOSE BLD-MCNC: 99 MG/DL (ref 70–99)
MAGNESIUM: 1.7 MG/DL (ref 1.6–2.6)
POTASSIUM SERPL-SCNC: 3.6 MMOL/L (ref 3.7–5.3)
SODIUM BLD-SCNC: 148 MMOL/L (ref 135–144)
VITAMIN B-12: 595 PG/ML (ref 232–1245)
VITAMIN D 25-HYDROXY: 38.5 NG/ML (ref 30–100)

## 2022-02-21 DIAGNOSIS — K92.0 HEMATEMESIS: ICD-10-CM

## 2022-02-21 RX ORDER — DOCUSATE SODIUM 100 MG/1
CAPSULE, LIQUID FILLED ORAL
Qty: 60 CAPSULE | Refills: 0 | OUTPATIENT
Start: 2022-02-21

## 2022-02-21 RX ORDER — PANTOPRAZOLE SODIUM 40 MG/1
TABLET, DELAYED RELEASE ORAL
Qty: 90 TABLET | Refills: 0 | OUTPATIENT
Start: 2022-02-21

## 2022-02-22 ENCOUNTER — TELEPHONE (OUTPATIENT)
Dept: GASTROENTEROLOGY | Age: 62
End: 2022-02-22

## 2022-02-22 RX ORDER — SCOLOPAMINE TRANSDERMAL SYSTEM 1 MG/1
1 PATCH, EXTENDED RELEASE TRANSDERMAL
Qty: 20 PATCH | Refills: 1 | Status: SHIPPED | OUTPATIENT
Start: 2022-02-22 | End: 2022-05-02

## 2022-02-22 NOTE — TELEPHONE ENCOUNTER
Pt called stating she had an EGD, and is complaining of it taking longer than normal to swallow food. Pt has a F/U appt on 03/17/22. I let the pt know that our doctor's are dago until April or May. I made the pt aware of her upcoming appt, and recommended she follow up with her PCP until her appt with Bleibel.

## 2022-02-22 NOTE — TELEPHONE ENCOUNTER
Cherrie Burnett is calling to request a refill on the following medication(s):    Medication Request:    Last filled 10/12/21 #20 with 3 RF    Requested Prescriptions     Pending Prescriptions Disp Refills    scopolamine (TRANSDERM-SCOP) transdermal patch 20 patch 3     Sig: Place 1 patch onto the skin every 72 hours       Last Visit Date (If Applicable):  0/15/0825    Next Visit Date:    4/19/2022

## 2022-02-23 ENCOUNTER — TELEPHONE (OUTPATIENT)
Dept: ONCOLOGY | Age: 62
End: 2022-02-23

## 2022-02-23 NOTE — TELEPHONE ENCOUNTER
Writer called patient to inform her I would be ending navigation as she currently isn't on any treatment. No answer, detailed VM left regarding above. Writer also encouraged her to keep my contact information for any future needs or assistance.

## 2022-03-17 ENCOUNTER — OFFICE VISIT (OUTPATIENT)
Dept: GASTROENTEROLOGY | Age: 62
End: 2022-03-17
Payer: MEDICAID

## 2022-03-17 VITALS — DIASTOLIC BLOOD PRESSURE: 103 MMHG | BODY MASS INDEX: 28.29 KG/M2 | SYSTOLIC BLOOD PRESSURE: 153 MMHG | WEIGHT: 170 LBS

## 2022-03-17 DIAGNOSIS — R13.10 DYSPHAGIA, UNSPECIFIED TYPE: Primary | ICD-10-CM

## 2022-03-17 PROCEDURE — 99213 OFFICE O/P EST LOW 20 MIN: CPT | Performed by: INTERNAL MEDICINE

## 2022-03-17 PROCEDURE — 3017F COLORECTAL CA SCREEN DOC REV: CPT | Performed by: INTERNAL MEDICINE

## 2022-03-17 PROCEDURE — 1036F TOBACCO NON-USER: CPT | Performed by: INTERNAL MEDICINE

## 2022-03-17 PROCEDURE — G8482 FLU IMMUNIZE ORDER/ADMIN: HCPCS | Performed by: INTERNAL MEDICINE

## 2022-03-17 PROCEDURE — G8427 DOCREV CUR MEDS BY ELIG CLIN: HCPCS | Performed by: INTERNAL MEDICINE

## 2022-03-17 PROCEDURE — G8417 CALC BMI ABV UP PARAM F/U: HCPCS | Performed by: INTERNAL MEDICINE

## 2022-03-17 ASSESSMENT — ENCOUNTER SYMPTOMS
ALLERGIC/IMMUNOLOGIC NEGATIVE: 1
RESPIRATORY NEGATIVE: 1
ABDOMINAL PAIN: 1

## 2022-03-17 NOTE — PROGRESS NOTES
GI CLINIC FOLLOW UP    INTERVAL HISTORY:   No referring provider defined for this encounter. Chief Complaint   Patient presents with    Follow-up     egd f/u           HISTORY OF PRESENT ILLNESS: Kurt Angeles is a 62 y.o. female prior neuroendocrine tumor status post total gastrectomy. She reports no further dysphagia. She underwent EGD with dilation recently. She had mild narrowing at the jejunal anastomosis. Her weight is stable. Past Medical,Family, and Social History reviewed and does not contribute to the patient presenting condition. Patient's PMH/PSH,SH,PSYCH Hx, MEDs, ALLERGIES, and ROS were all reviewed and updated in the appropriate sections. PAST MEDICAL HISTORY:  Past Medical History:   Diagnosis Date    Anxiety     Arthritis     knee    Asthma     Colon polyp 02/21/2019    tubular adenoma; hyperplastic polyp    Colon polyps     Cyst of kidney, acquired     bilat.     Fatigue     Hypertension     Hypothyroidism     Neuroendocrine tumor 02/21/2019    of stomach    Obesity     Pharyngoesophageal dysphagia     Vocal cord polyps     Wears glasses        Past Surgical History:   Procedure Laterality Date    CHOLECYSTECTOMY  10/09/2014    COLONOSCOPY  02/21/2019    tubular adenoma; hyperplastic polyp    COLONOSCOPY      over 5 yrs ago per pt with polyps (2-)    CYSTOURETHROSCOPY/STENT REMOVAL  05/03/2011    ENDOSCOPIC ULTRASOUND (LOWER) N/A 01/22/2020    ENDOSCOPIC ULTRASOUND WITH POSSIBLE EMR performed by Mandi Chan MD at [facility]    ERCP      GASTRECTOMY N/A 11/30/2020    HIP SURGERY Left     soft tissue tumor removal    THROAT SURGERY      vocal cord polyps removed    UPPER GASTROINTESTINAL ENDOSCOPY  02/21/2019    Neuroendocrine tumor    UPPER GASTROINTESTINAL ENDOSCOPY  09/23/2019    UPPER GASTROINTESTINAL ENDOSCOPY N/A 09/23/2019    EGD BIOPSY performed by Fran Andujar MD at [facility] N/A 10/23/2019 EGD W/ EMR performed by Ewelina Conrad MD at [address] [place] [address] N/A 10/29/2019    EGD CONTROL HEMORRHAGE performed by Susan Waite MD at [address] [place] [address] N/A 04/26/2021    EGD BIOPSY performed by Yesi Wetzel MD at [address] [place] [address] N/A 8/30/2021    EGD ESOPHAGOGASTRODUODENOSCOPY performed by Phani Bravo MD at [facility] N/A 2/7/2022    EGD DILATION BALLOON performed by Ewelina Conrad MD at [address] [facility]:    Current Outpatient Medications:     scopolamine (TRANSDERM-SCOP) transdermal patch, Place 1 patch onto the skin every 72 hours, Disp: 20 patch, Rfl: 1    albuterol sulfate  (90 Base) MCG/ACT inhaler, INHALE 2 PUFFS INTO THE LUNGS 4 TIMES DAILY AS NEEDED FOR WHEEZING., Disp: 18 each, Rfl: 2    levothyroxine (SYNTHROID) 88 MCG tablet, TAKE 1 TABLET BY MOUTH FIVE TIMES WEEKLY ONLY WED-SUN (SKIP MON AND TUES), Disp: 30 tablet, Rfl: 3    docusate sodium (COLACE) 100 MG capsule, TAKE 1 CAPSULE BY MOUTH TWICE A DAY, Disp: 60 capsule, Rfl: 0    fluticasone (FLONASE) 50 MCG/ACT nasal spray, 2 sprays by Each Nostril route daily, Disp: 16 g, Rfl: 5    metoprolol tartrate (LOPRESSOR) 25 MG tablet, Take 1 tablet by mouth 2 times daily, Disp: 180 tablet, Rfl: 1    Compression Stockings MISC, by Does not apply route 20-30 mmHg calf length, Disp: 1 each, Rfl: 0    metoclopramide (REGLAN) 10 MG tablet, TAKE 1 TABLET BY MOUTH 3 TIMES DAILY (BEFORE MEALS), Disp: 90 tablet, Rfl: 5    sucralfate (CARAFATE) 1 GM tablet, Take 1 tablet by mouth 4 times daily, Disp: 120 tablet, Rfl: 3    Multiple Vitamin (MULTI-DAY VITAMINS PO), Take 1 tablet by mouth daily , Disp: , Rfl:     ALLERGIES:   Allergies   Allergen Reactions    Erythromycin Diarrhea       FAMILY HISTORY:       Problem Relation Age of Onset    High Blood Pressure Mother     High Blood Pressure Sister     Stomach Cancer Sister     Other Sister         epilepsy    No Known Problems Father          SOCIAL HISTORY:   Social History     Socioeconomic History    Marital status: Single     Spouse name: Not on file    Number of children: Not on file    Years of education: Not on file    Highest education level: Not on file   Occupational History    Not on file   Tobacco Use    Smoking status: Former Smoker     Packs/day: 1.00     Years: 25.00     Pack years: 25.00     Quit date: 2007     Years since quittin.2    Smokeless tobacco: Never Used   Vaping Use    Vaping Use: Never used   Substance and Sexual Activity    Alcohol use: Not Currently     Comment: 3 times a month    Drug use: No    Sexual activity: Not on file   Other Topics Concern    Not on file   Social History Narrative    Not on file     Social Determinants of Health     Financial Resource Strain: Low Risk     Difficulty of Paying Living Expenses: Not hard at all   Food Insecurity: No Food Insecurity    Worried About Running Out of Food in the Last Year: Never true    Ana of Food in the Last Year: Never true   Transportation Needs:     Lack of Transportation (Medical): Not on file    Lack of Transportation (Non-Medical):  Not on file   Physical Activity:     Days of Exercise per Week: Not on file    Minutes of Exercise per Session: Not on file   Stress:     Feeling of Stress : Not on file   Social Connections:     Frequency of Communication with Friends and Family: Not on file    Frequency of Social Gatherings with Friends and Family: Not on file    Attends [employer] Services: Not on file    Active Member of Clubs or Organizations: Not on file    Attends Club or Organization Meetings: Not on file    Marital Status: Not on file   Intimate Partner Violence:     Fear of Current or Ex-Partner: Not on file    Emotionally Abused: Not on file    Physically Abused: Not on file    Sexually Abused: Not on file Housing Stability:     Unable to Pay for Housing in the Last Year: Not on file    Number of Places Lived in the Last Year: Not on file    Unstable Housing in the Last Year: Not on file       REVIEW OF SYSTEMS: A 12-point review of systemswas obtained and pertinent positives and negatives were enumerated above in the history of present illness. All other reviewed systems / symptoms were negative. Review of Systems   Constitutional: Positive for fatigue. HENT: Negative. Eyes: Positive for visual disturbance (glasses ). Respiratory: Negative. Cardiovascular: Negative. Gastrointestinal: Positive for abdominal pain. Endocrine: Negative. Genitourinary: Negative. Musculoskeletal: Negative. Skin: Negative. Allergic/Immunologic: Negative. Neurological: Negative. Hematological: Negative. Psychiatric/Behavioral: Negative. LABORATORY DATA: Reviewed  Lab Results   Component Value Date    WBC 9.4 11/24/2021    HGB 11.0 (L) 11/24/2021    HCT 34.8 (L) 11/24/2021    MCV 91.8 11/24/2021     11/24/2021     (H) 02/11/2022    K 3.6 (L) 02/11/2022     (H) 02/11/2022    CO2 23 02/11/2022    BUN 8 02/11/2022    CREATININE 0.65 02/11/2022    LABALBU 3.3 (L) 11/21/2021    BILITOT 0.23 (L) 11/21/2021    ALKPHOS 119 (H) 11/21/2021    AST 22 11/21/2021    ALT 17 11/21/2021    INR 1.1 08/28/2021         Lab Results   Component Value Date    RBC 3.79 (L) 11/24/2021    HGB 11.0 (L) 11/24/2021    MCV 91.8 11/24/2021    MCH 29.0 11/24/2021    MCHC 31.6 11/24/2021    RDW 12.9 11/24/2021    MPV 9.2 11/24/2021    BASOPCT 0 11/24/2021    LYMPHSABS 1.76 11/24/2021    MONOSABS 0.80 11/24/2021    NEUTROABS 6.66 11/24/2021    EOSABS 0.14 11/24/2021    BASOSABS 0.04 11/24/2021         DIAGNOSTIC TESTING:     No results found. PHYSICAL EXAMINATION: Vital signs reviewed per the nursing documentation. LMP 01/01/1994   There is no height or weight on file to calculate BMI. Physical Exam      I personally reviewed the nurse's notes and documentation and I agree with her notes. General: alert, appears stated age and cooperative Psych: Normal. and Alert and oriented, appropriate affect. . Normal affect. Mentation normal  HEENT: PERRLA. Clear conjunctivae and sclerae. Moist oral mucosae, no lesions or ulcers. The neck is supple, without lymphadenopathy or jugular venous distension. No masses. Normal thyroid. Cardiovascular: S1 S2 RRR no rubs or murmurs. Pulmonary: clear BL. No accessory muscle usage. Abdominal Exam: Soft, NT ND, no hepato or spleno megaly, +BS, no ascites. IMPRESSION: Ms. Cal Luna is a 62 y.o. female with prior neurogenic tumors of the stomach. Status post total gastrectomy. She is doing well. Follow-up as needed. Thank you for allowing me to participate in the care of Ms. Cal Luna. For any further questions please do not hesitate to contact me. I have reviewed and agree with the ROS entered by the MA/LPN. Note is dictated utilizing voice recognition software. Unfortunately this leads to occasional typographical errors. Please contact our office if you have any questions.     Jair Burleson MD  [facility]  O: #[phone]

## 2022-03-24 ENCOUNTER — TELEPHONE (OUTPATIENT)
Dept: INTERNAL MEDICINE CLINIC | Age: 62
End: 2022-03-24

## 2022-03-24 RX ORDER — PREDNISONE 10 MG/1
10 TABLET ORAL 2 TIMES DAILY
Qty: 10 TABLET | Refills: 0 | Status: SHIPPED | OUTPATIENT
Start: 2022-03-24 | End: 2022-04-07

## 2022-03-24 RX ORDER — CEFUROXIME AXETIL 500 MG/1
500 TABLET ORAL 2 TIMES DAILY
Qty: 14 TABLET | Refills: 0 | Status: SHIPPED | OUTPATIENT
Start: 2022-03-24 | End: 2022-04-07 | Stop reason: ALTCHOICE

## 2022-03-24 NOTE — TELEPHONE ENCOUNTER
Pt called in not well since 2 days, getting worse. C/o bodyaches, chest congestion, cough with beige color exp. Fever+ yesterday(unsure of temp) took tylenol, helped little. no wheezing/SOB. Denied being exposed to covid. Has been vaccinated. Asking to have something called in.

## 2022-03-31 ENCOUNTER — HOSPITAL ENCOUNTER (OUTPATIENT)
Age: 62
Setting detail: SPECIMEN
Discharge: HOME | End: 2022-03-31
Payer: COMMERCIAL

## 2022-03-31 DIAGNOSIS — C7A.092 MALIGNANT CARCINOID TUMOR OF STOMACH (HCC): Primary | ICD-10-CM

## 2022-03-31 DIAGNOSIS — C7A.092 MALIGNANT CARCINOID TUMOR OF STOMACH (HCC): ICD-10-CM

## 2022-03-31 LAB
ABSOLUTE EOS #: 0.1 K/UL (ref 0–0.44)
ABSOLUTE IMMATURE GRANULOCYTE: 0.03 K/UL (ref 0–0.3)
ABSOLUTE LYMPH #: 2.82 K/UL (ref 1.1–3.7)
ABSOLUTE MONO #: 0.5 K/UL (ref 0.1–1.2)
BASOPHILS # BLD: 1 % (ref 0–2)
BASOPHILS ABSOLUTE: 0.04 K/UL (ref 0–0.2)
EOSINOPHILS RELATIVE PERCENT: 1 % (ref 1–4)
FERRITIN: 45 NG/ML (ref 13–150)
HCT VFR BLD CALC: 40.7 % (ref 36.3–47.1)
HEMOGLOBIN: 13.1 G/DL (ref 11.9–15.1)
IMMATURE GRANULOCYTES: 0 %
IRON SATURATION: 12 % (ref 20–55)
IRON: 35 UG/DL (ref 37–145)
LYMPHOCYTES # BLD: 33 % (ref 24–43)
MCH RBC QN AUTO: 26.8 PG (ref 25.2–33.5)
MCHC RBC AUTO-ENTMCNC: 32.2 G/DL (ref 28.4–34.8)
MCV RBC AUTO: 83.4 FL (ref 82.6–102.9)
MONOCYTES # BLD: 6 % (ref 3–12)
NRBC AUTOMATED: 0 PER 100 WBC
PDW BLD-RTO: 15.2 % (ref 11.8–14.4)
PLATELET # BLD: 339 K/UL (ref 138–453)
PMV BLD AUTO: 8.8 FL (ref 8.1–13.5)
RBC # BLD: 4.88 M/UL (ref 3.95–5.11)
RBC # BLD: ABNORMAL 10*6/UL
SEG NEUTROPHILS: 59 % (ref 36–65)
SEGMENTED NEUTROPHILS ABSOLUTE COUNT: 5.05 K/UL (ref 1.5–8.1)
TOTAL IRON BINDING CAPACITY: 282 UG/DL (ref 250–450)
UNSATURATED IRON BINDING CAPACITY: 247 UG/DL (ref 112–347)
WBC # BLD: 8.5 K/UL (ref 3.5–11.3)

## 2022-03-31 PROCEDURE — 83540 ASSAY OF IRON: CPT

## 2022-03-31 PROCEDURE — 85025 COMPLETE CBC W/AUTO DIFF WBC: CPT

## 2022-03-31 PROCEDURE — 86316 IMMUNOASSAY TUMOR OTHER: CPT

## 2022-03-31 PROCEDURE — 84260 ASSAY OF SEROTONIN: CPT

## 2022-03-31 PROCEDURE — 83550 IRON BINDING TEST: CPT

## 2022-03-31 PROCEDURE — 36415 COLL VENOUS BLD VENIPUNCTURE: CPT

## 2022-03-31 PROCEDURE — 82728 ASSAY OF FERRITIN: CPT

## 2022-04-01 ENCOUNTER — HOSPITAL ENCOUNTER (OUTPATIENT)
Facility: MEDICAL CENTER | Age: 62
End: 2022-04-01

## 2022-04-04 ENCOUNTER — HOSPITAL ENCOUNTER (OUTPATIENT)
Dept: CT IMAGING | Age: 62
Discharge: HOME | End: 2022-04-06
Payer: COMMERCIAL

## 2022-04-04 DIAGNOSIS — R91.8 MULTIPLE NODULES OF LUNG: ICD-10-CM

## 2022-04-04 PROCEDURE — 71250 CT THORAX DX C-: CPT

## 2022-04-05 LAB
CHROMOGRANIN A: 56 NG/ML (ref 0–103)
SEROTONIN BLOOD: 264 NG/ML (ref 50–220)

## 2022-04-05 NOTE — PROGRESS NOTES
Subjective:      Patient ID: Wolf Schultz is a 62 y.o. female. HPI  Patient here for follow-up for lung nodules and bronchial asthma. Patient was seen as a virtual visit in December 2021 per Dr. Wilkins Bloch. Patient reports difficulty with sleep, is waking up pretty routinely at 2:30 AM, reports ongoing daytime fatigue. Lengthy discussion with patient regarding sleep hygiene with recommendations for same bedtime every night same wake time every night. Making sure that her sleeping environment is conducive to sleep for her, minimizing disruptions such as noise, temperature, animals, children. Making sure that she has exercise earlier in the day so that is not impacting her sleep later in the evening, also advised limiting her screen time within a couple of hours of bedtime to help reduce light stimulation that may provoke wakefulness. Patient denies any symptoms of sleep apnea, would like to try a sleep aid first.  No objection to trying melatonin, less likely to have a hangover effect on her sleep. She reports that she was recently treated for an upper respiratory infection with a Medrol Dosepak and Levaquin, is completing the medication today. Reports that her breathing has improved, approximately 95% of her baseline. Cough has now changed over to a dry nonproductive cough rather than a productive cough. 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 Reviewed CT imaging with patient due to the waxing and waning of her pulmonary nodules, I will repeat her CT scan in 4 months. Patient was ill at the time of this current infection. Medications:   Albuterol HFA    PRIOR WORKUP:  PFT:    CT Imaging:  CT chest 4/4/2022: No significant adenopathy in the mediastinum.   Interval development of a large irregular patchy consolidation in the anterior segment of the right upper lobe along the minor fissure previously reported to be 13 mm nodular density along the major fissure in the posterior segment of the right upper lobe remains present with interval development of patchy consolidation and air bronchograms along the major fissure and compact this nodule. Multiple nodules in the right and left lower lobe have resolved along with right lower lobe broad bandlike atelectasis. Left upper lobe peripheral nodules on prior have resolved but there is a new broad patchy consolidation along the major fissure in the lingula. Middle lobe 8 mm nodule along the major fissure is reported on prior has resolved but several new small nodules in the lateral segment of the 5 mm are present. No pleural effusion. PET scan 12/2/2021: No focally increased activity within the bilateral pulmonary nodules which have improved since prior exam.  Right upper lobe nodule near the major fissure measuring 0.9 cm compared to 1.3 cm previously. Subpleural left lower lobe nodule measuring 0.3 cm compared to 0.6 cm previously. Upper lobe nodule measuring 1.6 x 1.5 cm compared to 2 x 1.4 cm previously. New subpleural nodules in the posterior right lower lobe and left lower lobe. CT chest 11/21/2021: No evidence of intraluminal filling defect to suggest pulmonary embolism. Scattered areas of nodularity are again seen throughout the lungs with air in the posterior right upper lobe measuring 1.3 cm previously measuring 6 mm area of anterior and located nodularity in the right lower lobe has decreased in size now measuring 0.8 cm previously measuring 1.1 cm. Linear bands of consolidation are again seen in the right middle lobe and right lower lobe with few new subpleural nodules in the left upper lobe measuring up to 7 mm. Linear band of atelectasis is seen in the lingula with new area of nodularity along the anterior aspect. Multiple new areas of nodularity are seen within the left lung base measuring up to 1 cm. No pneumothorax or pleural effusion identified.     CT lung screening 11/8/2021: No evidence of mediastinal adenopathy. Interval development of numerous lung nodules, 4 mm pleural-based nodule in the medial suprahilar right upper lobe, 8 mm posterior medial right upper lobe area of fissural nodule. 1 cm nodule posterior medial segment right lower lobe. Adjacent 1.1 cm nodule. 1 cm right infrahilar nodule. 6 mm posterior right lower lobe nodule near the fissure. 1.7 cm perifissural nodule in the right middle lobe. 2.2 cm nodular opacity in the perifissural region lateral right middle lobe. 8 mm subpleural posterior right lower lobe nodule. 1.1 cm nodule posterior lateral right lower lobe. 1.1 cm anterior right lateral basilar lower lobe. 1.1 cm posterior lateral right basilar nodule. 1.5 cm basilar right middle lobe nodule. 1.5 cm posterior medial right basilar nodule. 5 mm subpleural anterior left upper lobe nodule. 6 mm anterior left upper lobe nodule. 9 mm lateral left lower lobe nodule, 1 cm nodule in the lateral left lower lobe and in the posterior left lower lobe. Negative for pleural effusion, pneumothorax or pulmonary edema. CT chest 4/7/2021: Negative for pulmonary embolism. No evidence of mediastinal adenopathy. No parenchymal infiltration, pleural effusion, peribronchial thickening are noted. CT chest 3/26/2021: Minimal more distal nonobstructive pulmonary embolism involving the branches of the right lower lobe inferiorly and laterally as well as medial right upper lobe. On the left there is a single nonobstructing more distal pulmonary embolism involving the inferior lateral left lower lobe. No evidence of mediastinal adenopathy. No focal consolidation, pulmonary edema, pleural effusion or pneumothorax. CT chest 1/2/2021: No intraluminal filling defect to suggest pulmonary embolism. No mediastinal adenopathy. Mild lingular scarring. No consolidation or pleural effusion. CT chest 10/26/2019: Negative for pulmonary embolism. No mediastinal adenopathy. No acute process within the lungs, no focal consolidation, pleural effusion, pneumothorax or pulmonary edema. Sleep Study:    Laboratory Evaluation:    Immunizations:   Immunization History   Administered Date(s) Administered    COVID-19, Pfizer Purple top, DILUTE for use, 12+ yrs, 30mcg/0.3mL dose 03/26/2021, 04/16/2021, 12/13/2021    Influenza A (N3T6-19) Vaccine PF IM 11/24/2009    Influenza, MDCK Quadv, IM, PF (Flucelvax 2 yrs and older) 12/21/2021    Influenza, Quadv, IM, PF (6 mo and older Fluzone, Flulaval, Fluarix, and 3 yrs and older Afluria) 10/31/2019        No flowsheet data found. /82 (Site: Left Upper Arm, Position: Sitting, Cuff Size: Large Adult)   Pulse 74   Temp 98.2 °F (36.8 °C)   Ht 5' 5\" (1.651 m)   Wt 164 lb 12.8 oz (74.8 kg)   LMP 01/01/1994   SpO2 98%   BMI 27.42 kg/m²     Past Medical History:   Diagnosis Date    Anxiety     Arthritis     knee    Asthma     Colon polyp 02/21/2019    tubular adenoma; hyperplastic polyp    Colon polyps     Cyst of kidney, acquired     bilat.     Fatigue     Hypertension     Hypothyroidism     Neuroendocrine tumor 02/21/2019    of stomach    Obesity     Pharyngoesophageal dysphagia     Vocal cord polyps     Wears glasses      Past Surgical History:   Procedure Laterality Date    CHOLECYSTECTOMY  10/09/2014    COLONOSCOPY  02/21/2019    tubular adenoma; hyperplastic polyp    COLONOSCOPY      over 5 yrs ago per pt with polyps (2-)    CYSTOURETHROSCOPY/STENT REMOVAL  05/03/2011    ENDOSCOPIC ULTRASOUND (LOWER) N/A 01/22/2020    ENDOSCOPIC ULTRASOUND WITH POSSIBLE EMR performed by Georgi Glasgow MD at [facility]    ERCP      GASTRECTOMY N/A 11/30/2020    HIP SURGERY Left     soft tissue tumor removal    THROAT SURGERY      vocal cord polyps removed    UPPER GASTROINTESTINAL ENDOSCOPY  02/21/2019    Neuroendocrine tumor    UPPER GASTROINTESTINAL ENDOSCOPY  09/23/2019    UPPER GASTROINTESTINAL ENDOSCOPY N/A 2019    EGD BIOPSY performed by Shann Spurling, MD at [address] [place] [address] N/A 10/23/2019    EGD W/ EMR performed by Carla Gil MD at [address] [place] [address] N/A 10/29/2019    EGD CONTROL HEMORRHAGE performed by Marian Pedraza MD at [address] [place] [address] N/A 2021    EGD BIOPSY performed by Daniella Patel MD at [address] [place] [address] N/A 2021    EGD ESOPHAGOGASTRODUODENOSCOPY performed by Wendy Harris MD at [facility] 2022    EGD DILATION BALLOON performed by Carla Gil MD at [address] [place] [address] History   Problem Relation Age of Onset    High Blood Pressure Mother     High Blood Pressure Sister     Stomach Cancer Sister     Other Sister         epilepsy    No Known Problems Father        Social History     Socioeconomic History    Marital status: Single     Spouse name: Not on file    Number of children: Not on file    Years of education: Not on file    Highest education level: Not on file   Occupational History    Not on file   Tobacco Use    Smoking status: Former Smoker     Packs/day: 1.00     Years: 25.00     Pack years: 25.00     Quit date: 2007     Years since quittin.3    Smokeless tobacco: Never Used   Vaping Use    Vaping Use: Never used   Substance and Sexual Activity    Alcohol use: Not Currently     Comment: 3 times a month    Drug use: No    Sexual activity: Not on file   Other Topics Concern    Not on file   Social History Narrative    Not on file     Social Determinants of Health     Financial Resource Strain: Low Risk     Difficulty of Paying Living Expenses: Not hard at all   Food Insecurity: No Food Insecurity    Worried About Running Out of Food in the Last Year: Never true    Ana of Food in the Last Year: Never true   Transportation Needs:     Lack of Transportation (Medical):  Not on file    Lack of Transportation (Non-Medical): Not on file   Physical Activity:     Days of Exercise per Week: Not on file    Minutes of Exercise per Session: Not on file   Stress:     Feeling of Stress : Not on file   Social Connections:     Frequency of Communication with Friends and Family: Not on file    Frequency of Social Gatherings with Friends and Family: Not on file    Attends Gnosticist Services: Not on file    Active Member of [address] [place] [address] or Organizations: Not on file    Attends Club or Organization Meetings: Not on file    Marital Status: Not on file   Intimate Partner Violence:     Fear of Current or Ex-Partner: Not on file    Emotionally Abused: Not on file    Physically Abused: Not on file    Sexually Abused: Not on file   Housing Stability:     Unable to Pay for Housing in the Last Year: Not on file    Number of [place] in the Last Year: Not on file    Unstable Housing in the Last Year: Not on file       Review of Systems   Constitutional:        Decreased activity tolerance   HENT:        Dry nonproductive cough   Eyes: Negative. Respiratory:        Notes some exertional dyspnea, is able to climb a flight of stairs with no difficulty. Cardiovascular: Negative. Gastrointestinal:        Decreased nutritional support, recently stopped TPN at the end of January. Endocrine: Negative. Genitourinary: Negative. Musculoskeletal: Negative. Skin: Negative. Allergic/Immunologic: Negative. Neurological: Negative. Hematological: Negative. Psychiatric/Behavioral: Negative.         Objective:       Physical Exam  General appearance - alert, well appearing, and in no distress, oriented to person, place, and time and normal appearing weight  Mental status - alert, oriented to person, place, and time, normal mood, behavior, speech, dress, motor activity, and thought processes  Eyes - pupils equal and reactive, extraocular eye movements intact  Ears - bilateral TM's and external ear canals normal  Nose - normal and patent, no erythema, discharge or polyps  Mouth - mucous membranes moist, pharynx normal without lesions  Neck - supple, no significant adenopathy  Chest - clear to auscultation, no wheezes, rales or rhonchi, symmetric air entry  Heart -normal rate, regular rhythm, normal S1, S2, no murmurs, rubs, clicks or gallops  Abdomen - soft, nontender, nondistended, no masses or organomegaly  Neuro- alert, oriented, normal speech, no focal findings or movement disorder noted}  Extremities - peripheral pulses normal, no pedal edema, no clubbing or cyanosis  Skin - normal coloration and turgor, no rashes, no suspicious skin lesions noted     Wt Readings from Last 3 Encounters:   04/11/22 164 lb 12.8 oz (74.8 kg)   04/07/22 164 lb 12.8 oz (74.8 kg)   03/17/22 170 lb (77.1 kg)       Results for orders placed or performed during the hospital encounter of 03/31/22   Iron and TIBC   Result Value Ref Range    Iron 35 (L) 37 - 145 ug/dL    TIBC 282 250 - 450 ug/dL    Iron Saturation 12 (L) 20 - 55 %    UIBC 247 112 - 347 ug/dL   CBC Auto Differential   Result Value Ref Range    WBC 8.5 3.5 - 11.3 k/uL    RBC 4.88 3.95 - 5.11 m/uL    Hemoglobin 13.1 11.9 - 15.1 g/dL    Hematocrit 40.7 36.3 - 47.1 %    MCV 83.4 82.6 - 102.9 fL    MCH 26.8 25.2 - 33.5 pg    MCHC 32.2 28.4 - 34.8 g/dL    RDW 15.2 (H) 11.8 - 14.4 %    Platelets 836 906 - 280 k/uL    MPV 8.8 8.1 - 13.5 fL    NRBC Automated 0.0 0.0 per 100 WBC    Seg Neutrophils 59 36 - 65 %    Lymphocytes 33 24 - 43 %    Monocytes 6 3 - 12 %    Eosinophils % 1 1 - 4 %    Basophils 1 0 - 2 %    Immature Granulocytes 0 0 %    Segs Absolute 5.05 1.50 - 8.10 k/uL    Absolute Lymph # 2.82 1.10 - 3.70 k/uL    Absolute Mono # 0.50 0.10 - 1.20 k/uL    Absolute Eos # 0.10 0.00 - 0.44 k/uL    Basophils Absolute 0.04 0.00 - 0.20 k/uL    Absolute Immature Granulocyte 0.03 0.00 - 0.30 k/uL    RBC Morphology ANISOCYTOSIS PRESENT    Serotonin, Serum   Result Value Ref Range    Serotonin 264 (H) 50 - 220 ng/mL   Chromogranin A   Result Value Ref Range    Chromogranin A 56 0 - 103 ng/mL   Ferritin   Result Value Ref Range    Ferritin 45 13 - 150 ng/mL       CT CHEST LOW DOSE (LDCT)    Result Date: 4/4/2022  EXAMINATION: LOW DOSE OF THE CT  CHEST WITHOUT CONTRAST 4/4/2022 7:48 am TECHNIQUE: Low Dose of the CT Chest without the administration of intravenous contrast (MA=40). Multiplanar reformatted images are provided for review. Dose modulation, iterative reconstruction, and/or weight based adjustment of the mA/kV was utilized to reduce the radiation dose to as low as reasonably achievable. COMPARISON: 11/21/2021 HISTORY: ORDERING SYSTEM PROVIDED HISTORY: Multiple nodules of lung TECHNOLOGIST PROVIDED HISTORY: f/u of lung nodule Reason for Exam: annual lung screen, quit 15 yrs ago, 1/2 pack a day for 20 years FINDINGS: Mediastinum: Cardiac size normal.  Trace pericardial effusion. Sliding hiatal hernia and patulous distal esophagus. No significant lymphadenopathy. Lungs/pleura: Interval development of large irregular patchy consolidation in the anterior segment right upper lobe along the minor fissure previously reported 13 mm nodular density along the major fissure in the posterior segment right upper lobe remains present with interval development of patchy consolidation and air bronchograms along the major fissure and compact sing the nodule. Multiple nodules which are in the right and left lower lobe have resolved along with right lower lobe broad bandlike atelectasis. Left upper lobe peripheral nodules on prior have resolved but there is new broad patchy consolidation along the major fissure in the lingula. Middle lobe 8 mm nodule along the major fissure reported on prior has resolved but there several new small nodules in the lateral segment up to 5 mm. No pleural effusion.  Upper Abdomen: Multiple left renal cysts better depicted on prior study performed with IV contrast.  Cholecystectomy. Postsurgical changes at GE junction and in stomach. Soft Tissues/Bones: No acute abnormality of the bones. The superficial soft tissues show no significant abnormalities. 1. Waxing and waning process. Previous scattered nodules in the right and left lower lobe and in the left upper lobe have resolved but there is new large irregular patchy consolidation in the right upper lobe surrounding pre-existing 13 mm nodule. Additional new nodules up to 5 mm are present in the middle lobe where previous 8 mm nodule has resolved. Differential considerations would include cryptogenic organizing pneumonia or chronic eosinophilic pneumonia. Malignancy considered less likely. Continued follow-up advised. Consider three-month follow-up. Tissue sampling may be indicated. 2. Stable findings in the upper abdomen including renal cysts and postsurgical changes at the GE junction. RECOMMENDATIONS: Unavailable       Assessment:      1. Multiple nodules of lung    2. Mild intermittent asthma without complication    3. Carcinoid tumor of abdomen    4. S/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    5. Malignant carcinoid tumor of stomach ([facility] [address]          Plan:      1. Medications reviewed, patient has an albuterol rescue inhaler that she has not required in the last year. 2. Educated and clarified the medication use. 3. Recommend flu vaccination in the fall annually. 4. Patient is up-to-date with pneumococcal vaccinations from pulmonary perspective. 5. Patient has received Covid vaccine and booster. Discussed strategies to protect against Covid 19.   6. Maintain an active lifestyle. 7. Patient's questions were answered to her satisfaction. 8. CT scan of the chest was reviewed-repeat in 4-month  9.  We'll see the patient back in 4 months with Dr. Ascencio Keyanna          Electronically signed by RYAN Hill CNP on 4/11/2022 at 12:44 PM

## 2022-04-07 ENCOUNTER — TELEPHONE (OUTPATIENT)
Dept: ONCOLOGY | Age: 62
End: 2022-04-07

## 2022-04-07 ENCOUNTER — OFFICE VISIT (OUTPATIENT)
Dept: ONCOLOGY | Age: 62
End: 2022-04-07
Payer: MEDICAID

## 2022-04-07 VITALS
BODY MASS INDEX: 27.42 KG/M2 | TEMPERATURE: 100.9 F | WEIGHT: 164.8 LBS | HEART RATE: 103 BPM | DIASTOLIC BLOOD PRESSURE: 79 MMHG | SYSTOLIC BLOOD PRESSURE: 123 MMHG

## 2022-04-07 DIAGNOSIS — C7A.092 MALIGNANT CARCINOID TUMOR OF STOMACH (HCC): Primary | ICD-10-CM

## 2022-04-07 PROCEDURE — 1036F TOBACCO NON-USER: CPT | Performed by: INTERNAL MEDICINE

## 2022-04-07 PROCEDURE — 3017F COLORECTAL CA SCREEN DOC REV: CPT | Performed by: INTERNAL MEDICINE

## 2022-04-07 PROCEDURE — 99214 OFFICE O/P EST MOD 30 MIN: CPT | Performed by: INTERNAL MEDICINE

## 2022-04-07 PROCEDURE — G8417 CALC BMI ABV UP PARAM F/U: HCPCS | Performed by: INTERNAL MEDICINE

## 2022-04-07 PROCEDURE — 99211 OFF/OP EST MAY X REQ PHY/QHP: CPT | Performed by: INTERNAL MEDICINE

## 2022-04-07 PROCEDURE — G8427 DOCREV CUR MEDS BY ELIG CLIN: HCPCS | Performed by: INTERNAL MEDICINE

## 2022-04-07 RX ORDER — METHYLPREDNISOLONE 4 MG/1
TABLET ORAL
Qty: 1 KIT | Refills: 0 | Status: SHIPPED | OUTPATIENT
Start: 2022-04-07 | End: 2022-04-13

## 2022-04-07 RX ORDER — LEVOFLOXACIN 500 MG/1
500 TABLET, FILM COATED ORAL DAILY
Qty: 10 TABLET | Refills: 0 | Status: ON HOLD | OUTPATIENT
Start: 2022-04-07 | End: 2022-04-19 | Stop reason: HOSPADM

## 2022-04-07 NOTE — TELEPHONE ENCOUNTER
Ridge Villaseñor MD VISIT  MEDROL DOSE CARISSA LEVAQUIN  RV 3-4 MTHS W/ LABS BEFORE RV  LABS CDP CMP SEROTONIN CHROMOGRANIN A FE TIBC FERRITIN 6/30/22 ORDERS GIVEN TO PT ON EXIT  MD VISIT 7/7/22 @ 9AM  SCRIPTS SENT TO PT PHARMACY  AVS PRINTED W/ INSTRUCTIONS AND GIVEN TO PT ON EXIT

## 2022-04-10 NOTE — PROGRESS NOTES
_           Chief Complaint   Patient presents with    Follow-up    Discuss Labs    Other     cold all the time    Headache     body aches     Fatigue    Cough     DIAGNOSIS:       Malignant carcinoid tumor of the stomach  Recent GI bleeding after endoscopic mucosal resection of stomach carcinoid on October 23, 2019. Evidence of recurrent disease on repeated EGD January 2020 and continued elevation of tumor marker chromogranin A  Iron deficiency secondary to above  History of hypothyroidism  Multiple comorbidities as listed      CURRENT THERAPY:         Status post endoscopic mucosal resection of stomach carcinoid October 23, 2019  S/p gastric resection at [facility] November 20, 2020. BRIEF CASE HISTORY:      Ms. Wolf Schultz is a very pleasant 62 y.o. female with history of multiple comorbidities as listed. The patient seen because of recent diagnosis of carcinoid tumor. Patient had problem with dysphagia for about 4 to 5 months. That problem resolved spontaneously. She was evaluated by gastroenterology. She had EGD in September 2019. She was noted to have gastric tumor which was biopsied and was positive for malignant neuroendocrine tumor. Subsequently patient was evaluated by abdominal MRI. Patient was having no evidence of gastric disease or gastric wall deep penetration. She underwent endoscopic mucosal resection October 23, 2019. Patient had recent admission to [facility] because of GI bleeding. She had EGD again and cauterization of bleeding. She is having weakness and fatigue. No other symptoms. No abdominal pain or cramps. No hot flashes or night sweats. No weight loss or decreased appetite. No wheezing. The patient had lab testing in July 2019 with chromogranin A level 1500. Patient was not aware of that test.  Patient's twin sister had carcinoid tumor more than 20 years ago.   She had gastric resection at that time and there is no recurrence. Patient smokes half pack per day for the last 40 years. Social alcohol drinking. INTERIM HISTORY:   The patient seen for follow-up gastric carcinoid. It was resected October 23, 2019. She has repeated EGD in January 2020 and she was found to have local recurrence with multiple lesions. She was referred to [facility]. She had complete gastric resection 11/30/2020. Following surgery, she had multiple hospitalizations due to dehydration and persistent N/V and dehydration. Patient was recently hospitalized because of shortness of breath and cough and respiratory distress. She had work-up again with scans which showed multiple bilateral pulmonary inflammatory nodules. PET CT scan was done as well. Tumor markers serotonin and chromogranin A were normal.  Clinically patient is having some improvement but she continues to have cough with occasional sputum. No fever or chills. No hemoptysis. PAST MEDICAL HISTORY: has a past medical history of Anxiety, Arthritis, Asthma, Colon polyp, Colon polyps, Cyst of kidney, acquired, Fatigue, Hypertension, Hypothyroidism, Neuroendocrine tumor, Obesity, Pharyngoesophageal dysphagia, Vocal cord polyps, and Wears glasses. PAST SURGICAL HISTORY: has a past surgical history that includes cystourethroscopy/stent removal (05/03/2011); Colonoscopy (02/21/2019); ERCP; Cholecystectomy (10/09/2014); hip surgery (Left); Throat surgery; Colonoscopy; Upper gastrointestinal endoscopy (02/21/2019); Upper gastrointestinal endoscopy (09/23/2019); Upper gastrointestinal endoscopy (N/A, 09/23/2019); Upper gastrointestinal endoscopy (N/A, 10/23/2019); Upper gastrointestinal endoscopy (N/A, 10/29/2019); Endoscopic ultrasonography, GI (N/A, 01/22/2020); Upper gastrointestinal endoscopy (N/A, 04/26/2021); gastrectomy (N/A, 11/30/2020);  Upper gastrointestinal endoscopy (N/A, 8/30/2021); and Upper gastrointestinal endoscopy (N/A, 2/7/2022). CURRENT MEDICATIONS:  has a current medication list which includes the following prescription(s): levofloxacin, methylprednisolone, scopolamine, albuterol sulfate hfa, levothyroxine, docusate sodium, fluticasone, metoprolol tartrate, metoclopramide, sucralfate, and multiple vitamin. ALLERGIES:  is allergic to erythromycin. FAMILY HISTORY: Patient's twin sister had gastric carcinoid more than 20 years ago status post partial gastric resection with no recurrence. Otherwise negative for any hematological or oncological conditions. SOCIAL HISTORY:  reports that she quit smoking about 14 years ago. She has a 25.00 pack-year smoking history. She has never used smokeless tobacco. She reports previous alcohol use. She reports that she does not use drugs. REVIEW OF SYSTEMS:     · General: Positive for weakness and fatigue. + unanticipated weight loss. No fever or chills. · Eyes: No blurred vision, eye pain or double vision. · Ears: No hearing problems or drainage. No tinnitus. · Throat: No sore throat, problems with swallowing or dysphagia. · Respiratory: As above. · Cardiovascular: No chest pain, orthopnea or PND. No lower extremity edema. No palpitation. · Gastrointestinal: As above. · Genitourinary: No dysuria, hematuria, frequency or urgency. · Musculoskeletal: No muscle aches or pains. No limitation of movement. No back pain. No gait disturbance, No joint complaints. · Dermatologic: No skin rashes or pruritus. No skin lesions or discolorations. · Psychiatric: No depression, anxiety, or stress or signs of schizophrenia. No change in mood or affect. · Hematologic: No history of bleeding tendency. No bruises or ecchymosis. No history of clotting problems. · Infectious disease: No fever, chills or frequent infections. · Endocrine: No problems with obesity. No polydipsia or polyuria. No temperature intolerance. · Neurologic: No headaches or dizziness.  No weakness or numbness of the extremities. No changes in balance, coordination,  memory, mentation, behavior. · Allergic/Immunologic: No nasal congestion or hives. No repeated infections. PHYSICAL EXAM:  The patient is not in acute distress. Vital signs: Blood pressure 123/79, pulse 103, temperature 100.9 °F (38.3 °C), temperature source Oral, weight 164 lb 12.8 oz (74.8 kg), last menstrual period 01/01/1994, not currently breastfeeding. General appearance - well appearing, not in pain or distress  Mental status - good mood, alert and oriented  Eyes - pupils equal and reactive, extraocular eye movements intact  Ears - bilateral TM's and external ear canals normal  Nose - normal and patent, no erythema, discharge or polyps  Mouth - mucous membranes moist, pharynx normal without lesions  Neck - supple, no significant adenopathy  Lymphatics - no palpable lymphadenopathy, no hepatosplenomegaly  Chest -bilateral rhonchi.   Heart - normal rate, regular rhythm, normal S1, S2, no murmurs, rubs, clicks or gallops  Abdomen - soft, nontender, nondistended, no masses or organomegaly  Neurological - alert, oriented, normal speech, no focal findings or movement disorder noted  Musculoskeletal - no joint tenderness, deformity or swelling  Extremities - peripheral pulses normal, no pedal edema, no clubbing or cyanosis  Skin - normal coloration and turgor, no rashes, no suspicious skin lesions noted     Review of Diagnostic data:   Lab Results   Component Value Date    WBC 8.5 03/31/2022    HGB 13.1 03/31/2022    HCT 40.7 03/31/2022    MCV 83.4 03/31/2022     03/31/2022       Chemistry        Component Value Date/Time     (H) 02/11/2022 1019    K 3.6 (L) 02/11/2022 1019     (H) 02/11/2022 1019    CO2 23 02/11/2022 1019    BUN 8 02/11/2022 1019    CREATININE 0.65 02/11/2022 1019        Component Value Date/Time    CALCIUM 9.2 02/11/2022 1019    ALKPHOS 119 (H) 11/21/2021 1057    AST 22 11/21/2021 1057 ALT 17 11/21/2021 1057    BILITOT 0.23 (L) 11/21/2021 1057        Abdominal MRI 9/18/2019:  Impression   Subcentimeter gastric mucosal enhancing masses in the fundus and along the   lesser curvature are demonstrated, corresponding to the patient's known GI   stromal tumors.  No evidence for extension through the gastric wall or   metastatic disease.       Status post cholecystectomy.  MRCP within normal limits.       Pelvic right kidney, incompletely visualized.  Bilateral renal cysts again   demonstrated. CT chest October 26, 2019:     FINDINGS:   Pulmonary Arteries: Suboptimal contrast timing.  Limited evaluation of the   segmental branches.  No evidence for central pulmonary embolism.  The main   pulmonary artery is normal in size.       Mediastinum: No evidence of mediastinal lymphadenopathy.  The heart and   pericardium demonstrate no acute abnormality.  There is no acute abnormality   of the thoracic aorta.       Lungs/pleura: The lungs are without acute process.  No focal consolidation or   pulmonary edema.  No evidence of pleural effusion or pneumothorax.       Upper Abdomen: Partially visualized left renal cysts.  Dense area of   calcification adjacent to the splenic artery is noted, which may represent a   thrombosed aneurysm.  Small lymph node in the gastrohepatic ligament.  Status   post cholecystectomy.       Soft Tissues/Bones: No acute bone or soft tissue abnormality.           Impression   Suboptimal contrast timing.  No evidence for central pulmonary embolism.       No acute airspace disease identified. Pathology results from September 23, 2019:  Collected: 9/23/2019   Received: 9/23/2019   Reported: 9/25/2019 10:17     -- Diagnosis --   1.  STOMACH, ANTRUM, BIOPSY:   - UNREMARKABLE GASTRIC MUCOSA. - NEGATIVE FOR HELICOBACTER PYLORI INFECTION. 2.  STOMACH, LESION, BIOPSIES:   - WELL-DIFFERENTIATED GASTRIC NEUROENDOCRINE TUMOR (CARCINOID TUMOR).    - FOCAL ACTIVE CHRONIC GASTRITIS AND INTESTINAL METAPLASIA ARE PRESENT   IN    THE NONNEOPLASTIC GASTRIC MUCOSA.  SEE COMMENT. Pathology results from October 23, 2019:  Collected: 10/23/2019   Received: 10/24/2019   Reported: 10/28/2019 13:13     -- Diagnosis --   GASTRIC TISSUES, EXCISION:        - WELL DIFFERENTIATED NEUROENDOCRINE TUMOR, GRADE 2.     7/23/2019 10:10 PM - Duran, Julia Incoming Lab Results From [facility]     Component Value Ref Range & Units Status Collected Lab   Chromogranin A 1553High   0 - 95 ng/mL Final 07/22/2019  7:55 AM [facility]   (NOTE)      Lab Results   Component Value Date    WBC 8.5 03/31/2022    HGB 13.1 03/31/2022    HCT 40.7 03/31/2022    MCV 83.4 03/31/2022     03/31/2022       Chemistry        Component Value Date/Time     (H) 02/11/2022 1019    K 3.6 (L) 02/11/2022 1019     (H) 02/11/2022 1019    CO2 23 02/11/2022 1019    BUN 8 02/11/2022 1019    CREATININE 0.65 02/11/2022 1019        Component Value Date/Time    CALCIUM 9.2 02/11/2022 1019    ALKPHOS 119 (H) 11/21/2021 1057    AST 22 11/21/2021 1057    ALT 17 11/21/2021 1057    BILITOT 0.23 (L) 11/21/2021 1057        Lab Results   Component Value Date    IRON 35 (L) 03/31/2022    TIBC 282 03/31/2022    FERRITIN 45 03/31/2022           IMPRESSION:   Malignant carcinoid tumor of the stomach  Recent GI bleeding after endoscopic mucosal resection of stomach carcinoid on October 23, 2019. Evidence of recurrent disease on repeated EGD January 2020 and continued elevation of tumor marker chromogranin A  Iron deficiency secondary to above  History of hypothyroidism  Multiple comorbidities as listed    PLAN: Records and labs and images were reviewed and discussed with the patient. Patient continues to have post op symptoms and had recurrent hospitalizations due to N/V and dehydration. She was also hospitalized because of respiratory distress and lung infections. She finished antibiotics.   Continues to have significant cough and shortness of breath and no fever.  PET CT scan and other images were reviewed and discussed. She has bilateral pulmonary nodules which are improving. Tumor markers serotonin and chromogranin A are normal.  These findings and the imaging are unlikely to be related to carcinoid. For her symptoms I will give her Medrol Dosepak along with albuterol inhaler. I will give her Phenergan with codeine to be used at bedtime. Discussed further care for carcinoid. We will monitor with scans and tumors markers. We will do labs again in 3 months. She will continue follow up in [facility]  Patient's questions were answered to the best of her satisfaction and she verbalized full understanding and agreement.

## 2022-04-11 ENCOUNTER — OFFICE VISIT (OUTPATIENT)
Dept: PULMONOLOGY | Age: 62
End: 2022-04-11
Payer: MEDICAID

## 2022-04-11 VITALS
TEMPERATURE: 98.2 F | WEIGHT: 164.8 LBS | HEIGHT: 65 IN | HEART RATE: 74 BPM | SYSTOLIC BLOOD PRESSURE: 121 MMHG | DIASTOLIC BLOOD PRESSURE: 82 MMHG | OXYGEN SATURATION: 98 % | BODY MASS INDEX: 27.46 KG/M2

## 2022-04-11 DIAGNOSIS — C7A.092 MALIGNANT CARCINOID TUMOR OF STOMACH (HCC): ICD-10-CM

## 2022-04-11 DIAGNOSIS — Z98.0 S/P TOTAL GASTRECTOMY AND ROUX-EN-Y ESOPHAGOJEJUNAL ANASTOMOSIS: ICD-10-CM

## 2022-04-11 DIAGNOSIS — D3A.098 CARCINOID TUMOR OF ABDOMEN: ICD-10-CM

## 2022-04-11 DIAGNOSIS — R91.8 MULTIPLE NODULES OF LUNG: Primary | ICD-10-CM

## 2022-04-11 DIAGNOSIS — J45.20 MILD INTERMITTENT ASTHMA WITHOUT COMPLICATION: ICD-10-CM

## 2022-04-11 DIAGNOSIS — Z90.3 S/P TOTAL GASTRECTOMY AND ROUX-EN-Y ESOPHAGOJEJUNAL ANASTOMOSIS: ICD-10-CM

## 2022-04-11 PROCEDURE — G8417 CALC BMI ABV UP PARAM F/U: HCPCS | Performed by: NURSE PRACTITIONER

## 2022-04-11 PROCEDURE — G8427 DOCREV CUR MEDS BY ELIG CLIN: HCPCS | Performed by: NURSE PRACTITIONER

## 2022-04-11 PROCEDURE — 3017F COLORECTAL CA SCREEN DOC REV: CPT | Performed by: NURSE PRACTITIONER

## 2022-04-11 PROCEDURE — 1036F TOBACCO NON-USER: CPT | Performed by: NURSE PRACTITIONER

## 2022-04-11 PROCEDURE — 99214 OFFICE O/P EST MOD 30 MIN: CPT | Performed by: NURSE PRACTITIONER

## 2022-04-11 ASSESSMENT — ENCOUNTER SYMPTOMS
ALLERGIC/IMMUNOLOGIC NEGATIVE: 1
EYES NEGATIVE: 1

## 2022-04-16 ENCOUNTER — HOSPITAL ENCOUNTER (INPATIENT)
Age: 62
LOS: 3 days | Discharge: HOME | DRG: 871 | End: 2022-04-19
Attending: EMERGENCY MEDICINE | Admitting: FAMILY MEDICINE
Payer: COMMERCIAL

## 2022-04-16 ENCOUNTER — APPOINTMENT (OUTPATIENT)
Dept: GENERAL RADIOLOGY | Age: 62
DRG: 871 | End: 2022-04-16
Payer: COMMERCIAL

## 2022-04-16 DIAGNOSIS — J18.9 PNEUMONIA OF RIGHT MIDDLE LOBE DUE TO INFECTIOUS ORGANISM: Primary | ICD-10-CM

## 2022-04-16 DIAGNOSIS — N30.00 ACUTE CYSTITIS WITHOUT HEMATURIA: ICD-10-CM

## 2022-04-16 PROBLEM — N30.01 ACUTE CYSTITIS WITH HEMATURIA: Status: ACTIVE | Noted: 2022-04-16

## 2022-04-16 LAB
-: ABNORMAL
ABSOLUTE EOS #: 0.03 K/UL (ref 0–0.44)
ABSOLUTE IMMATURE GRANULOCYTE: 0.06 K/UL (ref 0–0.3)
ABSOLUTE LYMPH #: 2.1 K/UL (ref 1.1–3.7)
ABSOLUTE MONO #: 0.93 K/UL (ref 0.1–1.2)
ALBUMIN SERPL-MCNC: 3 G/DL (ref 3.5–5.2)
ALBUMIN/GLOBULIN RATIO: 0.8 (ref 1–2.5)
ALP BLD-CCNC: 106 U/L (ref 35–104)
ALT SERPL-CCNC: <5 U/L (ref 5–33)
ANION GAP SERPL CALCULATED.3IONS-SCNC: 15 MMOL/L (ref 9–17)
AST SERPL-CCNC: 15 U/L
BASOPHILS # BLD: 0 % (ref 0–2)
BASOPHILS ABSOLUTE: 0.05 K/UL (ref 0–0.2)
BILIRUB SERPL-MCNC: 0.26 MG/DL (ref 0.3–1.2)
BILIRUBIN URINE: NEGATIVE
BUN BLDV-MCNC: 8 MG/DL (ref 8–23)
CALCIUM SERPL-MCNC: 8.7 MG/DL (ref 8.6–10.4)
CASTS UA: ABNORMAL /LPF (ref 0–8)
CHLORIDE BLD-SCNC: 104 MMOL/L (ref 98–107)
CO2: 20 MMOL/L (ref 20–31)
COLOR: YELLOW
CREAT SERPL-MCNC: 0.63 MG/DL (ref 0.5–0.9)
EOSINOPHILS RELATIVE PERCENT: 0 % (ref 1–4)
EPITHELIAL CELLS UA: ABNORMAL /HPF (ref 0–5)
GFR AFRICAN AMERICAN: >60 ML/MIN
GFR NON-AFRICAN AMERICAN: >60 ML/MIN
GFR SERPL CREATININE-BSD FRML MDRD: ABNORMAL ML/MIN/{1.73_M2}
GLUCOSE BLD-MCNC: 103 MG/DL (ref 70–99)
GLUCOSE URINE: NEGATIVE
HCT VFR BLD CALC: 39.3 % (ref 36.3–47.1)
HEMOGLOBIN: 12.6 G/DL (ref 11.9–15.1)
IMMATURE GRANULOCYTES: 0 %
INR BLD: 1
KETONES, URINE: NEGATIVE
LACTIC ACID, SEPSIS WHOLE BLOOD: 1.1 MMOL/L (ref 0.5–1.9)
LEUKOCYTE ESTERASE, URINE: ABNORMAL
LYMPHOCYTES # BLD: 14 % (ref 24–43)
MCH RBC QN AUTO: 26.6 PG (ref 25.2–33.5)
MCHC RBC AUTO-ENTMCNC: 32.1 G/DL (ref 28.4–34.8)
MCV RBC AUTO: 82.9 FL (ref 82.6–102.9)
MONOCYTES # BLD: 6 % (ref 3–12)
NITRITE, URINE: NEGATIVE
NRBC AUTOMATED: 0 PER 100 WBC
PARTIAL THROMBOPLASTIN TIME: 31.4 SEC (ref 20.5–30.5)
PDW BLD-RTO: 14.7 % (ref 11.8–14.4)
PH UA: 5 (ref 5–8)
PLATELET # BLD: 448 K/UL (ref 138–453)
PMV BLD AUTO: 8.4 FL (ref 8.1–13.5)
POTASSIUM SERPL-SCNC: 4 MMOL/L (ref 3.7–5.3)
PROCALCITONIN: 0.08 NG/ML
PROTEIN UA: ABNORMAL
PROTHROMBIN TIME: 10.6 SEC (ref 9.1–12.3)
RBC # BLD: 4.74 M/UL (ref 3.95–5.11)
RBC # BLD: ABNORMAL 10*6/UL
RBC UA: ABNORMAL /HPF (ref 0–4)
SARS-COV-2, RAPID: NOT DETECTED
SEG NEUTROPHILS: 80 % (ref 36–65)
SEGMENTED NEUTROPHILS ABSOLUTE COUNT: 11.63 K/UL (ref 1.5–8.1)
SODIUM BLD-SCNC: 139 MMOL/L (ref 135–144)
SPECIFIC GRAVITY UA: 1.02 (ref 1–1.03)
SPECIMEN DESCRIPTION: NORMAL
TOTAL PROTEIN: 7 G/DL (ref 6.4–8.3)
TURBIDITY: CLEAR
URINE HGB: NEGATIVE
UROBILINOGEN, URINE: NORMAL
WBC # BLD: 14.8 K/UL (ref 3.5–11.3)
WBC UA: ABNORMAL /HPF (ref 0–5)

## 2022-04-16 PROCEDURE — 99283 EMERGENCY DEPT VISIT LOW MDM: CPT

## 2022-04-16 PROCEDURE — 1200000000 HC SEMI PRIVATE

## 2022-04-16 PROCEDURE — 87077 CULTURE AEROBIC IDENTIFY: CPT

## 2022-04-16 PROCEDURE — 6360000002 HC RX W HCPCS: Performed by: INTERNAL MEDICINE

## 2022-04-16 PROCEDURE — 71046 X-RAY EXAM CHEST 2 VIEWS: CPT

## 2022-04-16 PROCEDURE — 85730 THROMBOPLASTIN TIME PARTIAL: CPT

## 2022-04-16 PROCEDURE — 2500000003 HC RX 250 WO HCPCS: Performed by: INTERNAL MEDICINE

## 2022-04-16 PROCEDURE — 6370000000 HC RX 637 (ALT 250 FOR IP): Performed by: EMERGENCY MEDICINE

## 2022-04-16 PROCEDURE — 2580000003 HC RX 258: Performed by: EMERGENCY MEDICINE

## 2022-04-16 PROCEDURE — 84145 PROCALCITONIN (PCT): CPT

## 2022-04-16 PROCEDURE — 2580000003 HC RX 258: Performed by: INTERNAL MEDICINE

## 2022-04-16 PROCEDURE — 6370000000 HC RX 637 (ALT 250 FOR IP): Performed by: INTERNAL MEDICINE

## 2022-04-16 PROCEDURE — 81001 URINALYSIS AUTO W/SCOPE: CPT

## 2022-04-16 PROCEDURE — 87186 SC STD MICRODIL/AGAR DIL: CPT

## 2022-04-16 PROCEDURE — 99223 1ST HOSP IP/OBS HIGH 75: CPT | Performed by: INTERNAL MEDICINE

## 2022-04-16 PROCEDURE — 85025 COMPLETE CBC W/AUTO DIFF WBC: CPT

## 2022-04-16 PROCEDURE — 80053 COMPREHEN METABOLIC PANEL: CPT

## 2022-04-16 PROCEDURE — 87040 BLOOD CULTURE FOR BACTERIA: CPT

## 2022-04-16 PROCEDURE — 96374 THER/PROPH/DIAG INJ IV PUSH: CPT

## 2022-04-16 PROCEDURE — 87086 URINE CULTURE/COLONY COUNT: CPT

## 2022-04-16 PROCEDURE — 83605 ASSAY OF LACTIC ACID: CPT

## 2022-04-16 PROCEDURE — 6360000002 HC RX W HCPCS: Performed by: EMERGENCY MEDICINE

## 2022-04-16 PROCEDURE — 85610 PROTHROMBIN TIME: CPT

## 2022-04-16 PROCEDURE — 87635 SARS-COV-2 COVID-19 AMP PRB: CPT

## 2022-04-16 RX ORDER — SODIUM CHLORIDE 9 MG/ML
INJECTION, SOLUTION INTRAVENOUS PRN
Status: DISCONTINUED | OUTPATIENT
Start: 2022-04-16 | End: 2022-04-19 | Stop reason: HOSPADM

## 2022-04-16 RX ORDER — DOCUSATE SODIUM 100 MG/1
100 CAPSULE, LIQUID FILLED ORAL 2 TIMES DAILY PRN
Status: DISCONTINUED | OUTPATIENT
Start: 2022-04-16 | End: 2022-04-19 | Stop reason: HOSPADM

## 2022-04-16 RX ORDER — ACETAMINOPHEN 650 MG/1
650 SUPPOSITORY RECTAL EVERY 6 HOURS PRN
Status: DISCONTINUED | OUTPATIENT
Start: 2022-04-16 | End: 2022-04-19 | Stop reason: HOSPADM

## 2022-04-16 RX ORDER — ACETAMINOPHEN 325 MG/1
650 TABLET ORAL EVERY 6 HOURS PRN
Status: DISCONTINUED | OUTPATIENT
Start: 2022-04-16 | End: 2022-04-19 | Stop reason: HOSPADM

## 2022-04-16 RX ORDER — SODIUM CHLORIDE, SODIUM LACTATE, POTASSIUM CHLORIDE, AND CALCIUM CHLORIDE .6; .31; .03; .02 G/100ML; G/100ML; G/100ML; G/100ML
1000 INJECTION, SOLUTION INTRAVENOUS ONCE
Status: COMPLETED | OUTPATIENT
Start: 2022-04-16 | End: 2022-04-16

## 2022-04-16 RX ORDER — ACETAMINOPHEN 500 MG
1000 TABLET ORAL ONCE
Status: COMPLETED | OUTPATIENT
Start: 2022-04-16 | End: 2022-04-16

## 2022-04-16 RX ORDER — FLUTICASONE PROPIONATE 50 MCG
2 SPRAY, SUSPENSION (ML) NASAL DAILY
Status: DISCONTINUED | OUTPATIENT
Start: 2022-04-16 | End: 2022-04-19 | Stop reason: HOSPADM

## 2022-04-16 RX ORDER — SODIUM CHLORIDE 0.9 % (FLUSH) 0.9 %
5-40 SYRINGE (ML) INJECTION PRN
Status: DISCONTINUED | OUTPATIENT
Start: 2022-04-16 | End: 2022-04-19 | Stop reason: HOSPADM

## 2022-04-16 RX ORDER — SODIUM CHLORIDE, SODIUM LACTATE, POTASSIUM CHLORIDE, CALCIUM CHLORIDE 600; 310; 30; 20 MG/100ML; MG/100ML; MG/100ML; MG/100ML
INJECTION, SOLUTION INTRAVENOUS CONTINUOUS
Status: DISCONTINUED | OUTPATIENT
Start: 2022-04-16 | End: 2022-04-17

## 2022-04-16 RX ORDER — SUCRALFATE 1 G/1
1 TABLET ORAL EVERY 8 HOURS SCHEDULED
Status: DISCONTINUED | OUTPATIENT
Start: 2022-04-16 | End: 2022-04-19 | Stop reason: HOSPADM

## 2022-04-16 RX ORDER — LEVOTHYROXINE SODIUM 88 UG/1
88 TABLET ORAL DAILY
Status: DISCONTINUED | OUTPATIENT
Start: 2022-04-16 | End: 2022-04-19 | Stop reason: HOSPADM

## 2022-04-16 RX ORDER — MULTIVITAMIN WITH IRON
1 TABLET ORAL DAILY
Status: DISCONTINUED | OUTPATIENT
Start: 2022-04-16 | End: 2022-04-19 | Stop reason: HOSPADM

## 2022-04-16 RX ORDER — SODIUM CHLORIDE 0.9 % (FLUSH) 0.9 %
5-40 SYRINGE (ML) INJECTION EVERY 12 HOURS SCHEDULED
Status: DISCONTINUED | OUTPATIENT
Start: 2022-04-16 | End: 2022-04-19 | Stop reason: HOSPADM

## 2022-04-16 RX ORDER — ALBUTEROL SULFATE 90 UG/1
2 AEROSOL, METERED RESPIRATORY (INHALATION) 4 TIMES DAILY PRN
Status: DISCONTINUED | OUTPATIENT
Start: 2022-04-16 | End: 2022-04-19 | Stop reason: HOSPADM

## 2022-04-16 RX ORDER — SCOLOPAMINE TRANSDERMAL SYSTEM 1 MG/1
1 PATCH, EXTENDED RELEASE TRANSDERMAL
Status: DISCONTINUED | OUTPATIENT
Start: 2022-04-16 | End: 2022-04-18

## 2022-04-16 RX ADMIN — Medication 500 MG: at 21:43

## 2022-04-16 RX ADMIN — SUCRALFATE 1 G: 1 TABLET ORAL at 21:41

## 2022-04-16 RX ADMIN — SODIUM CHLORIDE, POTASSIUM CHLORIDE, SODIUM LACTATE AND CALCIUM CHLORIDE 1000 ML: 600; 310; 30; 20 INJECTION, SOLUTION INTRAVENOUS at 13:15

## 2022-04-16 RX ADMIN — CEFTRIAXONE SODIUM 1000 MG: 1 INJECTION, POWDER, FOR SOLUTION INTRAMUSCULAR; INTRAVENOUS at 18:34

## 2022-04-16 RX ADMIN — SUCRALFATE 1 G: 1 TABLET ORAL at 17:12

## 2022-04-16 RX ADMIN — Medication 1250 MG: at 15:43

## 2022-04-16 RX ADMIN — PIPERACILLIN AND TAZOBACTAM 3375 MG: 3; .375 INJECTION, POWDER, FOR SOLUTION INTRAVENOUS at 14:27

## 2022-04-16 RX ADMIN — ENOXAPARIN SODIUM 40 MG: 40 INJECTION SUBCUTANEOUS at 17:11

## 2022-04-16 RX ADMIN — SODIUM CHLORIDE, POTASSIUM CHLORIDE, SODIUM LACTATE AND CALCIUM CHLORIDE: 600; 310; 30; 20 INJECTION, SOLUTION INTRAVENOUS at 18:45

## 2022-04-16 RX ADMIN — LEVOTHYROXINE SODIUM 88 MCG: 88 TABLET ORAL at 17:10

## 2022-04-16 RX ADMIN — ACETAMINOPHEN 1000 MG: 500 TABLET ORAL at 14:10

## 2022-04-16 ASSESSMENT — ENCOUNTER SYMPTOMS
CONSTIPATION: 0
CHEST TIGHTNESS: 0
COLOR CHANGE: 0
ABDOMINAL PAIN: 0
VOMITING: 0
SHORTNESS OF BREATH: 1
RHINORRHEA: 1
NAUSEA: 0
DIARRHEA: 0
COUGH: 1

## 2022-04-16 ASSESSMENT — PAIN SCALES - GENERAL
PAINLEVEL_OUTOF10: 0
PAINLEVEL_OUTOF10: 6
PAINLEVEL_OUTOF10: 0

## 2022-04-16 NOTE — ED NOTES
Patient reports received from [facility]. Patient has been having coughing spells, fatigue, chills, body aches, the past three weeks. Patient was placed on 2 antibiotics three weeks ago without relief. Patient reports oncologist wants her to have a barium swallow, follows up with oncology at [address] [place] [address]. Patient is aox4, placed in gown, on monitor. Family at bedside, updated on plan of care.       Dipti Wetzel RN  04/16/22 [id number]

## 2022-04-16 NOTE — H&P
[facility]  Office: [address], DO, Dafne Jose, DO, Avi Gates, DO, Herminia Alonzo Austin, DO, Neetu Sethi MD, Emile Gee MD, Tracey Aguilar MD, Navneet Collins MD, Beryle Marseille, MD, Ciro Pallas, MD, Jimmie Gordon MD, Lonny Flight, DO, Oliva Valle, DO, Bharat Novoa MD,  Vaughn Fletcher, DO, Martha Leon MD, Glenda Logan MD, Maisha Hart MD, Charles Cummins, DO, Mila Apple MD, Medina Chiang MD, Marcella Clayton, [facility], [facility] Ksenia, CNP, Justice Ford, CNP, Wanda Hastings, CNP, Shahriar Mauricio, CNP, Edwardo Mckinney, CNP, IMELDA PatrickC, Mortimer Guardian, DNP, Jake Tamez, CNP, Juliet Aguilera, CNP, Deny Duckworth, CNP, Bill Vega, CNS, Lavonne Frankel, [facility], Jennifer Holland, CNP, Edgard Orellana, CNP, Kajal Segal, [facility]    HISTORY AND PHYSICAL EXAMINATION            Date:   4/16/2022  Patient name:  Chela Turk  Date of admission:  4/16/2022 12:16 PM  MRN:   [id number]  Account:  [de-identified]  YOB: 1960  PCP:    Norina Lesch, MD  Room:   [address] [place] [address]  Code Status:    Full Code    Chief Complaint:     Chief Complaint   Patient presents with    Cough    Nasal Congestion     History Obtained From:     patient, electronic medical record    History of Present Illness:     Chela Turk is a 62 y.o.  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 Patient has had multiple evaluations over the last year or so regarding this. It is often diagnosed as a pneumonia and she is treated with steroids and antibiotics with improvement of her symptoms. However, they recur shortly thereafter.  She has been seen by her oncologist and pulmonologist (via a virtual visit this week). Her oncologist prescribed levaquin and prednisone, both of which she has been taking with no resolution of chase symptoms. She presented to the ED for evaluation. She was noted to be tachycardic, febrile, and with significant coughing/shortness of breath. Low dose CT imaging on 4/4/2022 revealed waxing and waning of pulmonary nodules. The differential at that time included cryptogenic organizing pneumonia or chronic eosinophilic pneumonia. Chest radiograph today reveals right upper lobe pneumonia with a small left lower lobe pneumonia with scatter foci of opacity. Her tachycardia improved after IVF. On exam, deep breathing evokes a cough. She has been admitted for further evaluation and treatment. Pulmonology has been consulted. Past Medical History:     Past Medical History:   Diagnosis Date    Anxiety     Arthritis     knee    Asthma     Colon polyp 02/21/2019    tubular adenoma; hyperplastic polyp    Colon polyps     Cyst of kidney, acquired     bilat.     Fatigue     Hypertension     Hypothyroidism     Neuroendocrine tumor 02/21/2019    of stomach    Obesity     Pharyngoesophageal dysphagia     Vocal cord polyps     Wears glasses         Past Surgical History:     Past Surgical History:   Procedure Laterality Date    CHOLECYSTECTOMY  10/09/2014    COLONOSCOPY  02/21/2019    tubular adenoma; hyperplastic polyp    COLONOSCOPY      over 5 yrs ago per pt with polyps (2-)    CYSTOURETHROSCOPY/STENT REMOVAL  05/03/2011    ENDOSCOPIC ULTRASOUND (LOWER) N/A 01/22/2020    ENDOSCOPIC ULTRASOUND WITH POSSIBLE EMR performed by Kika Huber MD at [facility]    ERCP      GASTRECTOMY N/A 11/30/2020    HIP SURGERY Left     soft tissue tumor removal    THROAT SURGERY      vocal cord polyps removed    UPPER GASTROINTESTINAL ENDOSCOPY  02/21/2019    Neuroendocrine tumor    UPPER GASTROINTESTINAL ENDOSCOPY  09/23/2019    UPPER GASTROINTESTINAL ENDOSCOPY N/A 09/23/2019    EGD BIOPSY performed by Arsh Kaba MD at [facility] 10/23/2019    EGD W/ EMR performed by Price Garcia MD at [address] [place] [address] N/A 10/29/2019    EGD CONTROL HEMORRHAGE performed by Kandy Law MD at [address] [place] [address] N/A 04/26/2021    EGD BIOPSY performed by Bang Mcduffie MD at [address] [place] [address] N/A 8/30/2021    EGD ESOPHAGOGASTRODUODENOSCOPY performed by Leora Carroll MD at [facility] 2/7/2022    EGD DILATION BALLOON performed by Price Garcia MD at [address] [place] [address]        Medications Prior to Admission:     Prior to Admission medications    Medication Sig Start Date End Date Taking? Authorizing Provider   levoFLOXacin (LEVAQUIN) 500 MG tablet Take 1 tablet by mouth daily for 10 days 4/7/22 4/17/22  Ruben Singh MD   scopolamine (TRANSDERM-SCOP) transdermal patch Place 1 patch onto the skin every 72 hours 2/22/22   Karely Jose MD   albuterol sulfate  (90 Base) MCG/ACT inhaler INHALE 2 PUFFS INTO THE LUNGS 4 TIMES DAILY AS NEEDED FOR WHEEZING.  2/10/22   Ruben Singh MD   levothyroxine (SYNTHROID) 88 MCG tablet TAKE 1 TABLET BY MOUTH FIVE TIMES WEEKLY ONLY WED-SUN (SKIP MON AND TUES) 2/7/22   Karely Jose MD   docusate sodium (COLACE) 100 MG capsule TAKE 1 CAPSULE BY MOUTH TWICE A DAY 2/7/22   Price Garcia MD   fluticasone (FLONASE) 50 MCG/ACT nasal spray 2 sprays by Each Nostril route daily 12/2/21   Tahira Lincoln MD   metoprolol tartrate (LOPRESSOR) 25 MG tablet Take 1 tablet by mouth 2 times daily 11/24/21   Jesika Benitez MD   metoclopramide (REGLAN) 10 MG tablet TAKE 1 TABLET BY MOUTH 3 TIMES DAILY (BEFORE MEALS) 6/16/21   Price Garcia MD   sucralfate (CARAFATE) 1 GM tablet Take 1 tablet by mouth 4 times daily  Patient taking differently: Take 1 g by mouth in the morning and at bedtime  4/29/21   Symone Mondragon MD   Multiple Vitamin (MULTI-DAY VITAMINS PO) Take 1 tablet by mouth daily     Historical Provider, MD        Allergies:     Erythromycin    Social History:     Tobacco:    reports that she quit smoking about 14 years ago. She has a 25.00 pack-year smoking history. She has never used smokeless tobacco.  Alcohol:      reports previous alcohol use. Drug Use:  reports no history of drug use. Family History:     Family History   Problem Relation Age of Onset    High Blood Pressure Mother     High Blood Pressure Sister     Stomach Cancer Sister     Other Sister         epilepsy    No Known Problems Father        Review of Systems:     Positive and Negative as described in HPI. CONSTITUTIONAL: reports weakness and fatigue.  negative for fevers, chills, sweats, weight loss  HEENT:  negative for vision, hearing changes, runny nose, throat pain  RESPIRATORY: reports cough and shortness of breath; negative for congestion, wheezing  CARDIOVASCULAR:  negative for chest pain, palpitations  GASTROINTESTINAL:  negative for nausea, vomiting, diarrhea, constipation, change in bowel habits, abdominal pain   GENITOURINARY:  negative for difficulty of urination, burning with urination, frequency   INTEGUMENT:  negative for rash, skin lesions, easy bruising   HEMATOLOGIC/LYMPHATIC:  negative for swelling/edema   ALLERGIC/IMMUNOLOGIC:  negative for urticaria , itching  ENDOCRINE:  negative increase in drinking, increase in urination, hot or cold intolerance  MUSCULOSKELETAL:  negative joint pains, muscle aches, swelling of joints  NEUROLOGICAL:  negative for headaches, dizziness, lightheadedness, numbness, pain, tingling extremities  BEHAVIOR/PSYCH:  negative for depression, anxiety    Physical Exam:   /80   Pulse 69   Temp 97.8 °F (36.6 °C) (Oral)   Resp 20   Ht 5' 5\" (1.651 m)   Wt 164 lb (74.4 kg)   LMP 01/01/1994   SpO2 98%   BMI 27.29 kg/m² Temp (24hrs), Av.2 °F (37.3 °C), Min:97.8 °F (36.6 °C), Max:100.6 °F (38.1 °C)    No results for input(s): POCGLU in the last 72 hours.     Intake/Output Summary (Last 24 hours) at 2022 1848  Last data filed at 2022 1411  Gross per 24 hour   Intake 1000 ml   Output --   Net 1000 ml       General Appearance: alert, well appearing, and in no acute distress  Mental status: oriented to person, place, and time  Head: normocephalic, atraumatic  Eye: no icterus, redness, pupils equal and reactive, extraocular eye movements intact, conjunctiva clear  Ear: normal external ear, no discharge, hearing intact  Nose: no drainage noted  Mouth: mucous membranes moist  Neck: supple, no carotid bruits, thyroid not palpable  Lungs: Bilateral equal air entry, deep breathing evokes cough, no wheezing appreciated,  normal effort  Cardiovascular: normal rate, regular rhythm, no murmur, gallop, rub  Abdomen: Soft, nontender, nondistended, normal bowel sounds, no hepatomegaly or splenomegaly  Neurologic: There are no new focal motor or sensory deficits, normal muscle tone and bulk, no abnormal sensation, normal speech, cranial nerves II through XII grossly intact  Skin: No gross lesions, rashes, bruising or bleeding on exposed skin area  Extremities: peripheral pulses palpable, no pedal edema or calf pain with palpation  Psych: normal affect    Investigations:      Laboratory Testing:  Recent Results (from the past 24 hour(s))   Urinalysis with Microscopic    Collection Time: 22  1:01 PM   Result Value Ref Range    Color, UA Yellow Yellow    Turbidity UA Clear Clear    Glucose, Ur NEGATIVE NEGATIVE    Bilirubin Urine NEGATIVE NEGATIVE    Ketones, Urine NEGATIVE NEGATIVE    Specific Gravity, UA 1.018 1.005 - 1.030    Urine Hgb NEGATIVE NEGATIVE    pH, UA 5.0 5.0 - 8.0    Protein, UA 1+ (A) NEGATIVE    Urobilinogen, Urine Normal Normal    Nitrite, Urine NEGATIVE NEGATIVE    Leukocyte Esterase, Urine SMALL (A) NEGATIVE -          WBC, UA 20 TO 50 0 - 5 /HPF    RBC, UA 2 TO 5 0 - 4 /HPF    Casts UA  0 - 8 /LPF     10 TO 20 HYALINE Reference range defined for non-centrifuged specimen. Epithelial Cells UA 2 TO 5 0 - 5 /HPF   Culture, Blood 1    Collection Time: 04/16/22  1:10 PM    Specimen: Blood   Result Value Ref Range    Specimen Description . BLOOD     Special Requests R FA 10ML     Culture NO GROWTH <24 HRS    Culture, Blood 2    Collection Time: 04/16/22  1:10 PM    Specimen: Blood   Result Value Ref Range    Specimen Description . BLOOD     Special Requests L FA 10ML     Culture NO GROWTH <24 HRS    COVID-19, Rapid    Collection Time: 04/16/22  1:14 PM    Specimen: Nasopharyngeal Swab   Result Value Ref Range    Specimen Description . NASOPHARYNGEAL SWAB     SARS-CoV-2, Rapid Not Detected Not Detected   CBC with Auto Differential    Collection Time: 04/16/22  1:17 PM   Result Value Ref Range    WBC 14.8 (H) 3.5 - 11.3 k/uL    RBC 4.74 3.95 - 5.11 m/uL    Hemoglobin 12.6 11.9 - 15.1 g/dL    Hematocrit 39.3 36.3 - 47.1 %    MCV 82.9 82.6 - 102.9 fL    MCH 26.6 25.2 - 33.5 pg    MCHC 32.1 28.4 - 34.8 g/dL    RDW 14.7 (H) 11.8 - 14.4 %    Platelets 890 915 - 764 k/uL    MPV 8.4 8.1 - 13.5 fL    NRBC Automated 0.0 0.0 per 100 WBC    Seg Neutrophils 80 (H) 36 - 65 %    Lymphocytes 14 (L) 24 - 43 %    Monocytes 6 3 - 12 %    Eosinophils % 0 (L) 1 - 4 %    Basophils 0 0 - 2 %    Immature Granulocytes 0 0 %    Segs Absolute 11.63 (H) 1.50 - 8.10 k/uL    Absolute Lymph # 2.10 1.10 - 3.70 k/uL    Absolute Mono # 0.93 0.10 - 1.20 k/uL    Absolute Eos # 0.03 0.00 - 0.44 k/uL    Basophils Absolute 0.05 0.00 - 0.20 k/uL    Absolute Immature Granulocyte 0.06 0.00 - 0.30 k/uL    RBC Morphology ANISOCYTOSIS PRESENT    Comprehensive Metabolic Panel w/ Reflex to MG    Collection Time: 04/16/22  1:17 PM   Result Value Ref Range    Glucose 103 (H) 70 - 99 mg/dL    BUN 8 8 - 23 mg/dL    CREATININE 0.63 0.50 - 0.90 mg/dL    Calcium 8.7 8.6 - 10.4 mg/dL    Sodium 139 135 - 144 mmol/L    Potassium 4.0 3.7 - 5.3 mmol/L    Chloride 104 98 - 107 mmol/L    CO2 20 20 - 31 mmol/L    Anion Gap 15 9 - 17 mmol/L    Alkaline Phosphatase 106 (H) 35 - 104 U/L    ALT <5 (L) 5 - 33 U/L    AST 15 <32 U/L    Total Bilirubin 0.26 (L) 0.3 - 1.2 mg/dL    Total Protein 7.0 6.4 - 8.3 g/dL    Albumin 3.0 (L) 3.5 - 5.2 g/dL    Albumin/Globulin Ratio 0.8 (L) 1.0 - 2.5    GFR Non-African American >60 >60 mL/min    GFR African American >60 >60 mL/min    GFR Comment         APTT    Collection Time: 04/16/22  1:17 PM   Result Value Ref Range    PTT 31.4 (H) 20.5 - 30.5 sec   Protime-INR    Collection Time: 04/16/22  1:17 PM   Result Value Ref Range    Protime 10.6 9.1 - 12.3 sec    INR 1.0    Lactate, Sepsis    Collection Time: 04/16/22  1:17 PM   Result Value Ref Range    Lactic Acid, Sepsis, Whole Blood 1.1 0.5 - 1.9 mmol/L   Procalcitonin    Collection Time: 04/16/22  1:17 PM   Result Value Ref Range    Procalcitonin 0.08 <0.09 ng/mL       Imaging/Diagnostics:  XR CHEST (2 VW)    Result Date: 4/16/2022  No significant change. Assessment :      Hospital Problems           Last Modified POA    * (Principal) Community acquired pneumonia of right middle lobe of lung 4/16/2022 Yes    Acute cystitis with hematuria 4/16/2022 Yes    Neuroendocrine tumor 4/16/2022 Yes    Overview Signed 3/26/2019 10:44 AM by Bar Pfeiffer LPN     of stomach         Essential hypertension 4/16/2022 Yes          Plan:     Patient status inpatient in the Med/Surge    1. Admit to [facility]  2. Possible sepsis - tachycardic, sfebrile, right lower/middle lobe infiltrate. Not requiring supplemental oxygen. Pulmonology evaluation. Procal is normal. Doubt infection. WBC likely elevated due to completing recent prednisone therapy. However, maintain on Rocephin/Azithro until evaluated by pulmonology. Check sputum culture. Check urine strep/legionella. Incentive spirometry, acapella.   3. History of carcinoid - saw her oncologist earlier last week. 4. Continue IVF  5. Acute cystitis with hematuria - patient with suprapubic tenderness. No frequency or dysuria. Continue rocephin. Await urine culture. 6. Continue home meds as written for  7. Hold Reglan  8. May need PT/OT    Consultations:   PHARMACY TO DOSE VANCOMYCIN  IP CONSULT TO HOSPITALIST  IP CONSULT TO PULMONOLOGY    Patient is admitted as inpatient status because of co-morbidities listed above, severity of signs and symptoms as outlined, requirement for current medical therapies and most importantly because of direct risk to patient if care not provided in a hospital setting. Expected length of stay > 48 hours.     Rachel Duke,   4/16/2022  6:48 PM    Copy sent to Dr. Carolin Palmer MD

## 2022-04-16 NOTE — PROGRESS NOTES
[facility] Pharmacokinetic Monitoring Service - Vancomycin     Alexis Mcqueen is a 62 y.o. female starting on vancomycin therapy for sepsis. Pharmacy consulted by Dr Kisha Hernandez for monitoring and adjustment. Target Concentration: Goal AUC/ANNABELLA 400-600 mg*hr/L    Additional Antimicrobials: zosyn    Pertinent Laboratory Values: Wt Readings from Last 1 Encounters:   04/16/22 164 lb (74.4 kg)     Temp Readings from Last 1 Encounters:   04/16/22 100.6 °F (38.1 °C) (Oral)     Estimated Creatinine Clearance: 96 mL/min (based on SCr of 0.63 mg/dL). Recent Labs     04/16/22  1317   CREATININE 0.63   WBC 14.8*     Procalcitonin: N/A    Pertinent Cultures:  Culture Date Source Results/   4/16 Blood Pending   MRSA Nasal Swab: N/A. Non-respiratory infection.     Plan:  Dosing recommendations based on Bayesian software  Start vancomycin 1250 mg IVPB q18h  Anticipated AUC of 500 and trough concentration of 14 at steady state  Renal labs as indicated   Vancomycin concentration not ordered yet  Pharmacy will continue to monitor patient and adjust therapy as indicated    Thank you for the consult,  Sancho Powell, [facility]  4/16/2022 2:12 PM

## 2022-04-16 NOTE — ED TRIAGE NOTES
Patient presented to the ED today with complaints of cough, fever and congestion. Patient states that symptoms presented 3 weeks ago and is not getting any better after being on antibiotics.

## 2022-04-16 NOTE — ED PROVIDER NOTES
[facility] ONC/MED SURG  Emergency Department Encounter  EmergencyMedicine Resident     Pt Bhupinder Renteria  MRN: [id number]  Jakubgfkavin 1960  Date of evaluation: 4/16/22  PCP:  Parul Wang MD    [address] [place] [address]       Chief Complaint   Patient presents with    Cough    Nasal Congestion       HISTORY OF PRESENT ILLNESS  (Location/Symptom, Timing/Onset, Context/Setting, Quality, Duration, Modifying Factors, Severity.)      Alexis Dykes is a 62 y.o. female who presents with worsening shortness of breath, fever, congestion has been going on for about 3 weeks. Patient was recently started on antibiotics, Levaquin, and prednisone for pneumonia. Patient has a history of gastric cancer. Patient reports no pain but feelings of myalgias, worsening shortness of breath, cough but has not improved. Patient states that she has had fevers of up to 102 at home. Patient's been following with getting CT scans for concerns of pulmonary nodules. PAST MEDICAL / SURGICAL / SOCIAL / FAMILY HISTORY      has a past medical history of Anxiety, Arthritis, Asthma, Colon polyp, Colon polyps, Cyst of kidney, acquired, Fatigue, Hypertension, Hypothyroidism, Neuroendocrine tumor, Obesity, Pharyngoesophageal dysphagia, Vocal cord polyps, and Wears glasses. No additional pertinent     has a past surgical history that includes cystourethroscopy/stent removal (05/03/2011); Colonoscopy (02/21/2019); ERCP; Cholecystectomy (10/09/2014); hip surgery (Left); Throat surgery; Colonoscopy; Upper gastrointestinal endoscopy (02/21/2019); Upper gastrointestinal endoscopy (09/23/2019); Upper gastrointestinal endoscopy (N/A, 09/23/2019); Upper gastrointestinal endoscopy (N/A, 10/23/2019); Upper gastrointestinal endoscopy (N/A, 10/29/2019); Endoscopic ultrasonography, GI (N/A, 01/22/2020); Upper gastrointestinal endoscopy (N/A, 04/26/2021); gastrectomy (N/A, 11/30/2020);  Upper gastrointestinal endoscopy (N/A, 8/30/2021); and Upper gastrointestinal endoscopy (N/A, 2022). No additional pertinent    Social History     Socioeconomic History    Marital status: Single     Spouse name: Not on file    Number of children: Not on file    Years of education: Not on file    Highest education level: Not on file   Occupational History    Not on file   Tobacco Use    Smoking status: Former Smoker     Packs/day: 1.00     Years: 25.00     Pack years: 25.00     Quit date: 2007     Years since quittin.3    Smokeless tobacco: Never Used   Vaping Use    Vaping Use: Never used   Substance and Sexual Activity    Alcohol use: Not Currently     Comment: 3 times a month    Drug use: No    Sexual activity: Not on file   Other Topics Concern    Not on file   Social History Narrative    Not on file     Social Determinants of Health     Financial Resource Strain: Low Risk     Difficulty of Paying Living Expenses: Not hard at all   Food Insecurity: No Food Insecurity    Worried About [address] in the Last Year: Never true    [address] in the Last Year: Never true   Transportation Needs:     Lack of Transportation (Medical): Not on file    Lack of Transportation (Non-Medical):  Not on file   Physical Activity:     Days of Exercise per Week: Not on file    Minutes of Exercise per Session: Not on file   Stress:     Feeling of Stress : Not on file   Social Connections:     Frequency of Communication with Friends and Family: Not on file    Frequency of Social Gatherings with Friends and Family: Not on file    Attends Confucianism Services: Not on file    Active Member of Clubs or Organizations: Not on file    Attends Club or Organization Meetings: Not on file    Marital Status: Not on file   Intimate Partner Violence:     Fear of Current or Ex-Partner: Not on file    Emotionally Abused: Not on file    Physically Abused: Not on file    Sexually Abused: Not on file   Housing Stability:     Unable to Pay for Housing in the Last Year: Not on file    Number of Places Lived in the Last Year: Not on file    Unstable Housing in the Last Year: Not on file       Family History   Problem Relation Age of Onset    High Blood Pressure Mother     High Blood Pressure Sister     Stomach Cancer Sister     Other Sister         epilepsy    No Known Problems Father        Allergies:  Erythromycin    Home Medications:  Prior to Admission medications    Medication Sig Start Date End Date Taking? Authorizing Provider   levoFLOXacin (LEVAQUIN) 500 MG tablet Take 1 tablet by mouth daily for 10 days 4/7/22 4/17/22  Candie Schmidt MD   scopolamine (TRANSDERM-SCOP) transdermal patch Place 1 patch onto the skin every 72 hours 2/22/22   Ruel Mcmillan MD   albuterol sulfate  (90 Base) MCG/ACT inhaler INHALE 2 PUFFS INTO THE LUNGS 4 TIMES DAILY AS NEEDED FOR WHEEZING. 2/10/22   Candie Schmidt MD   levothyroxine (SYNTHROID) 88 MCG tablet TAKE 1 TABLET BY MOUTH FIVE TIMES WEEKLY ONLY WED-SUN (SKIP MON AND TUES) 2/7/22   Ruel Mcmillan MD   docusate sodium (COLACE) 100 MG capsule TAKE 1 CAPSULE BY MOUTH TWICE A DAY 2/7/22   Sheri Pride MD   fluticasone (FLONASE) 50 MCG/ACT nasal spray 2 sprays by Each Nostril route daily 12/2/21   Priti Hamm MD   metoprolol tartrate (LOPRESSOR) 25 MG tablet Take 1 tablet by mouth 2 times daily 11/24/21   [address] [place] [address] MD Eli   metoclopramide (REGLAN) 10 MG tablet TAKE 1 TABLET BY MOUTH 3 TIMES DAILY (BEFORE MEALS) 6/16/21   Sheri Pride MD   sucralfate (CARAFATE) 1 GM tablet Take 1 tablet by mouth 4 times daily  Patient taking differently: Take 1 g by mouth in the morning and at bedtime  4/29/21   Emir Byrd MD   Multiple Vitamin (MULTI-DAY VITAMINS PO) Take 1 tablet by mouth daily     Historical Provider, MD       REVIEW OF SYSTEMS    (2-9 systems for level 4, 10 or more for level 5)      Review of Systems   Constitutional: Positive for fatigue and fever.  Negative for chills. HENT: Positive for congestion and rhinorrhea. Respiratory: Positive for cough and shortness of breath. Negative for chest tightness. Cardiovascular: Negative for chest pain and leg swelling. Gastrointestinal: Negative for abdominal pain, constipation, diarrhea, nausea and vomiting. Endocrine: Negative for polyuria. Genitourinary: Negative for difficulty urinating. Musculoskeletal: Positive for myalgias. Skin: Negative for color change. Neurological: Positive for headaches. Negative for dizziness, weakness and light-headedness. Psychiatric/Behavioral: Negative for confusion. PHYSICAL EXAM   (up to 7 for level 4, 8 or more for level 5)      INITIAL VITALS:   BP (!) 93/57   Pulse 69   Temp 97.5 °F (36.4 °C) (Oral)   Resp 16   Ht 5' 5\" (1.651 m)   Wt 164 lb (74.4 kg)   LMP 01/01/1994   SpO2 96%   BMI 27.29 kg/m²     Physical Exam  Constitutional:       Appearance: Normal appearance. HENT:      Head: Normocephalic and atraumatic. Mouth/Throat:      Mouth: Mucous membranes are moist.      Pharynx: Oropharynx is clear. Eyes:      Extraocular Movements: Extraocular movements intact. Conjunctiva/sclera: Conjunctivae normal.   Cardiovascular:      Rate and Rhythm: Normal rate and regular rhythm. Pulses: Normal pulses. Heart sounds: Normal heart sounds. No murmur heard. Pulmonary:      Effort: Pulmonary effort is normal.      Breath sounds: Rhonchi present. Abdominal:      General: Bowel sounds are normal. There is no distension. Tenderness: There is no abdominal tenderness. There is no guarding. Musculoskeletal:         General: Normal range of motion. Comments: Range of motion noted to be normal with patient's natural movements   Skin:     General: Skin is warm and dry. Findings: No rash (On exposed skin). Neurological:      General: No focal deficit present. Mental Status: She is alert and oriented to person, place, and time. Psychiatric:         Mood and Affect: Mood normal.         Behavior: Behavior normal.         DIFFERENTIAL  DIAGNOSIS     PLAN (LABS / IMAGING / EKG):  Orders Placed This Encounter   Procedures    Culture, Urine    Culture, Blood 1    Culture, Blood 2    COVID-19, Rapid    Respiratory Culture    Sputum gram stain    Strep Pneumoniae Antigen    LEGIONELLA ANTIGEN, URINE    XR CHEST (2 VW)    CBC with Auto Differential    Comprehensive Metabolic Panel w/ Reflex to MG    Urinalysis with Microscopic    APTT    Protime-INR    Lactate, Sepsis    Basic Metabolic Panel w/ Reflex to MG    Procalcitonin    CBC with Auto Differential    ADULT DIET;  Regular    Vital signs per unit routine    Notify physician    Up as tolerated    Up with assistance    Daily weights    Intake and output    Place intermittent pneumatic compression device    Telemetry monitoring - continuous duration    Full Code    Inpatient consult to Pulmonology    OT eval and treat    PT evaluation and treat    Initiate Oxygen Therapy Protocol    Incentive spirometry RT    Acapella    Place CT Compatible peripheral IV    ADMIT TO INPATIENT       MEDICATIONS ORDERED:  Orders Placed This Encounter   Medications    lactated ringers bolus    acetaminophen (TYLENOL) tablet 1,000 mg    piperacillin-tazobactam (ZOSYN) 3,375 mg in dextrose 5 % 50 mL IVPB (mini-bag)     Order Specific Question:   Antimicrobial Indications     Answer:   Pneumonia (CAP)     Order Specific Question:   Antimicrobial Indications     Answer:   Urinary Tract Infection    DISCONTD: vancomycin (VANCOCIN) intermittent dosing (placeholder)     Order Specific Question:   Antimicrobial Indications     Answer:   Sepsis of Unknown Etiology    DISCONTD: vancomycin (VANCOCIN) 1250 mg in sodium chloride 0.9% 250 mL IVPB     Order Specific Question:   Antimicrobial Indications     Answer:   Sepsis of Unknown Etiology    albuterol sulfate  (90 Base) MCG/ACT inhaler 2 puff     Order Specific Question:   Initiate RT Bronchodilator Protocol     Answer: Yes    docusate sodium (COLACE) capsule 100 mg    fluticasone (FLONASE) 50 MCG/ACT nasal spray 2 spray    levothyroxine (SYNTHROID) tablet 88 mcg    metoprolol tartrate (LOPRESSOR) tablet 25 mg    multivitamin 1 tablet    scopolamine (TRANSDERM-SCOP) transdermal patch 1 patch    sucralfate (CARAFATE) tablet 1 g    sodium chloride flush 0.9 % injection 5-40 mL    sodium chloride flush 0.9 % injection 5-40 mL    0.9 % sodium chloride infusion    enoxaparin (LOVENOX) injection 40 mg    OR Linked Order Group     acetaminophen (TYLENOL) tablet 650 mg     acetaminophen (TYLENOL) suppository 650 mg    lactated ringers infusion    AND Linked Order Group     cefTRIAXone (ROCEPHIN) 1,000 mg in sterile water 10 mL IV syringe      Order Specific Question:   Antimicrobial Indications      Answer:   Pneumonia (CAP)     azithromycin (ZITHROMAX) 500 mg in dextrose 5% 250 mL IVPB      Order Specific Question:   Antimicrobial Indications      Answer:   Pneumonia (CAP)       DIAGNOSTIC RESULTS / EMERGENCY DEPARTMENT COURSE / MDM   LAB RESULTS:  Results for orders placed or performed during the hospital encounter of 04/16/22   Culture, Blood 1    Specimen: Blood   Result Value Ref Range    Specimen Description . BLOOD     Special Requests R FA 10ML     Culture NO GROWTH <24 HRS    Culture, Blood 2    Specimen: Blood   Result Value Ref Range    Specimen Description . BLOOD     Special Requests L FA 10ML     Culture NO GROWTH <24 HRS    COVID-19, Rapid    Specimen: Nasopharyngeal Swab   Result Value Ref Range    Specimen Description . NASOPHARYNGEAL SWAB     SARS-CoV-2, Rapid Not Detected Not Detected   CBC with Auto Differential   Result Value Ref Range    WBC 14.8 (H) 3.5 - 11.3 k/uL    RBC 4.74 3.95 - 5.11 m/uL    Hemoglobin 12.6 11.9 - 15.1 g/dL    Hematocrit 39.3 36.3 - 47.1 %    MCV 82.9 82.6 - 102.9 fL    MCH 26.6 25.2 - 33.5 pg    MCHC 32.1 28.4 - 34.8 g/dL    RDW 14.7 (H) 11.8 - 14.4 %    Platelets 490 858 - 679 k/uL    MPV 8.4 8.1 - 13.5 fL    NRBC Automated 0.0 0.0 per 100 WBC    Seg Neutrophils 80 (H) 36 - 65 %    Lymphocytes 14 (L) 24 - 43 %    Monocytes 6 3 - 12 %    Eosinophils % 0 (L) 1 - 4 %    Basophils 0 0 - 2 %    Immature Granulocytes 0 0 %    Segs Absolute 11.63 (H) 1.50 - 8.10 k/uL    Absolute Lymph # 2.10 1.10 - 3.70 k/uL    Absolute Mono # 0.93 0.10 - 1.20 k/uL    Absolute Eos # 0.03 0.00 - 0.44 k/uL    Basophils Absolute 0.05 0.00 - 0.20 k/uL    Absolute Immature Granulocyte 0.06 0.00 - 0.30 k/uL    RBC Morphology ANISOCYTOSIS PRESENT    Comprehensive Metabolic Panel w/ Reflex to MG   Result Value Ref Range    Glucose 103 (H) 70 - 99 mg/dL    BUN 8 8 - 23 mg/dL    CREATININE 0.63 0.50 - 0.90 mg/dL    Calcium 8.7 8.6 - 10.4 mg/dL    Sodium 139 135 - 144 mmol/L    Potassium 4.0 3.7 - 5.3 mmol/L    Chloride 104 98 - 107 mmol/L    CO2 20 20 - 31 mmol/L    Anion Gap 15 9 - 17 mmol/L    Alkaline Phosphatase 106 (H) 35 - 104 U/L    ALT <5 (L) 5 - 33 U/L    AST 15 <32 U/L    Total Bilirubin 0.26 (L) 0.3 - 1.2 mg/dL    Total Protein 7.0 6.4 - 8.3 g/dL    Albumin 3.0 (L) 3.5 - 5.2 g/dL    Albumin/Globulin Ratio 0.8 (L) 1.0 - 2.5    GFR Non-African American >60 >60 mL/min    GFR African American >60 >60 mL/min    GFR Comment         Urinalysis with Microscopic   Result Value Ref Range    Color, UA Yellow Yellow    Turbidity UA Clear Clear    Glucose, Ur NEGATIVE NEGATIVE    Bilirubin Urine NEGATIVE NEGATIVE    Ketones, Urine NEGATIVE NEGATIVE    Specific Gravity, UA 1.018 1.005 - 1.030    Urine Hgb NEGATIVE NEGATIVE    pH, UA 5.0 5.0 - 8.0    Protein, UA 1+ (A) NEGATIVE    Urobilinogen, Urine Normal Normal    Nitrite, Urine NEGATIVE NEGATIVE    Leukocyte Esterase, Urine SMALL (A) NEGATIVE    -          WBC, UA 20 TO 50 0 - 5 /HPF    RBC, UA 2 TO 5 0 - 4 /HPF    Casts UA  0 - 8 /LPF     10 TO 20 HYALINE Reference range defined for non-centrifuged specimen. Epithelial Cells UA 2 TO 5 0 - 5 /HPF   APTT   Result Value Ref Range    PTT 31.4 (H) 20.5 - 30.5 sec   Protime-INR   Result Value Ref Range    Protime 10.6 9.1 - 12.3 sec    INR 1.0    Lactate, Sepsis   Result Value Ref Range    Lactic Acid, Sepsis, Whole Blood 1.1 0.5 - 1.9 mmol/L   Procalcitonin   Result Value Ref Range    Procalcitonin 0.08 <0.09 ng/mL       RADIOLOGY:  XR CHEST (2 VW)   Final Result   No significant change. PROCEDURES:  None    CONSULTS:  IP CONSULT TO PULMONOLOGY    MEDICAL DECISION MAKING:  Patient presenting with tachycardia, tachypnea, and fever. Concern for sepsis this patient meeting SIRS criteria. Patient was worked up for sepsis, noted to have lactate less than 2, patient appears clinically well. Patient had chest x-ray demonstrated concerns for pneumonia. Patient had urine concerning for UTI. Patient failed outpatient antibiotics. Patient given a liter IV fluids here. Patient noted to have negative Covid, blood cultures were obtained. Patient with history of cancer, failed outpatient antibiotics and prednisone with worsening shortness of breath and meeting SIRS criteria, patient was admitted for IV antibiotics and further evaluation work-up by hospitalist team.    Sepsis Times and Checklist  Vital Signs: BP: (!) 93/57  Pulse: 69  Resp: 16  Temp: 97.5 °F (36.4 °C) SpO2: 96 %  SIRS (>2)   Temp > 38.3C or < 36C   HR > 90   RR > 20   WBC > 12 or < 4 or >10% bands  SIRS (>2) and confirmed or suspected source of infection = Sepsis  Is Sepsis due to likely bacterial infection?: Yes    Sepsis Identified:  Date: 4/16/2022   Time: On arrival  Sepsis Orders:  ·  CBC(required): Yes  ·  CMP: Yes  ·  PT/PTT: Yes  ·  Blood Cultures x2(required): Yes  ·  Urinalysis and Urine Culture: Yes  ·  Lactate(required):  Yes  ·  Antibiotics Given (within 3 hours of sepsis identification, after blood cultures):  Yes    (If unable to obtain IV access for IV antibiotics within 3 hours of identification of sepsis, IM antibiotics is acceptable.)  ·             If lactate >2.0 MUST repeat within 6 hours    IV Fluid Bolus:  Is lactate > 4.0:  No  If lactate >  4.0 OR hypotension (MAP<65 mmHg) (with 2 BP readings) 30ml/kg crystalloid MUST be ordered. MAP greater than 65 and lactate less than 4    CRITICAL CARE:  Please see attending note    FINAL IMPRESSION      1. Pneumonia of right middle lobe due to infectious organism    2. Acute cystitis without hematuria          DISPOSITION / PLAN     DISPOSITION Admitted 04/16/2022 03:33:14 PM      PATIENT REFERRED TO:  No follow-up provider specified.     DISCHARGE MEDICATIONS:  Current Discharge Medication List          Jakob Barnes DO  Emergency Medicine Resident    (Please note that portions of thisnote were completed with a voice recognition program.  Efforts were made to edit the dictations but occasionally words are mis-transcribed.)       Denise Justin DO  Resident  04/16/22 [id number]

## 2022-04-16 NOTE — ED PROVIDER NOTES
FACULTY SIGN-OUT  ADDENDUM       Patient: Cathy Polanco   MRN: [id number]  PCP:  Bhupinder Duffy MD  Attestation  I was available and discussed any additional care issues that arose and coordinated the management plans with the resident(s) caring for the patient during my duty period. Any areas of disagreement with resident's documentation of care or procedures are noted on the chart. I was personally present for the key portions of any/all procedures during my duty period. I have documented in the chart those procedures where I was not present during the key portions. The patient's initial evaluation and plan have been discussed with the prior provider who initially evaluated the patient. Pertinent Comments: The patient is a 62 y.o. female taken in signout with previous gastric cancer supposedly in remission however recent outpatient CT showed several nodules. Patient having URI symptoms but also sepsis vital signs.     We are awaiting further work-up but also admission and empiric antibiotics already given    ED COURSE      The patient was given the following medications:  Orders Placed This Encounter   Medications    lactated ringers bolus    acetaminophen (TYLENOL) tablet 1,000 mg    piperacillin-tazobactam (ZOSYN) 3,375 mg in dextrose 5 % 50 mL IVPB (mini-bag)     Order Specific Question:   Antimicrobial Indications     Answer:   Pneumonia (CAP)     Order Specific Question:   Antimicrobial Indications     Answer:   Urinary Tract Infection    vancomycin (VANCOCIN) intermittent dosing (placeholder)     Order Specific Question:   Antimicrobial Indications     Answer:   Sepsis of Unknown Etiology    vancomycin (VANCOCIN) 1250 mg in sodium chloride 0.9% 250 mL IVPB     Order Specific Question:   Antimicrobial Indications     Answer:   Sepsis of Unknown Etiology       RECENT VITALS:   BP: (!) 139/94  Pulse: 95  Resp: 20  Temp: 100.6 °F (38.1 °C) SpO2: 94 %    (Please note that portions of this note were completed with a voice recognition program.  Efforts were made to edit the dictations but occasionally words are mis-transcribed.)    Sabine Avila MD [facility]  Attending Emergency Medicine Physician       Sabine Avila MD  04/16/22 [id number]

## 2022-04-16 NOTE — ED NOTES
Pt updated on plan of care, family at bedside, denies needs, NAD noted, non labored RR.       Sebas Ocampo, RN  04/16/22 [id number]

## 2022-04-16 NOTE — ED PROVIDER NOTES
[facility]     Emergency Department     Faculty Attestation    I performed a history and physical examination of the patient and discussed management with the resident. I reviewed the residents note and agree with the documented findings and plan of care. Any areas of disagreement are noted on the chart. I was personally present for the key portions of any procedures. I have documented in the chart those procedures where I was not present during the key portions. I have reviewed the emergency nurses triage note. I agree with the chief complaint, past medical history, past surgical history, allergies, medications, social and family history as documented unless otherwise noted below. For Physician Assistant/ Nurse Practitioner cases/documentation I have personally evaluated this patient and have completed at least one if not all key elements of the E/M (history, physical exam, and MDM). Additional findings are as noted. This patient was evaluated in the Emergency Department for symptoms described in the history of present illness. He/she was evaluated in the context of the global COVID-19 pandemic, which necessitated consideration that the patient might be at risk for infection with the SARS-CoV-2 virus that causes COVID-19. Institutional protocols and algorithms that pertain to the evaluation of patients at risk for COVID-19 are in a state of rapid change based on information released by regulatory bodies including the CDC and federal and state organizations. These policies and algorithms were followed during the patient's care in the ED. Patient here with multiple complaints fever cough congestion. History of gastric cancer no chemo however has been on antibiotics recently and steroids. Is febrile here. Not hypoxic not short of breath on exam speaking in full sentences but lungs diminished bilaterally.   Heart regular but tachycardic abdomen soft no focal tenderness rebound or guarding.   Will proceed with septic work-up, anticipate admission      Critical Care     none    Evens Vegas MD, [facility]  Attending Emergency  Physician             Evens Vegas MD  04/16/22 2771

## 2022-04-16 NOTE — ED NOTES
Pt updated on plan of care, denies needs, NAD noted. AOx4, non labored RR.      Lili Pedraza, RN  04/16/22 [id number]

## 2022-04-16 NOTE — ED NOTES
Patient registered with the social security number given to nurse. Patient verified identity with 2 identifying factors.       Joan Burger RN  04/16/22 [id number]

## 2022-04-17 PROBLEM — R05.9 COUGH: Status: ACTIVE | Noted: 2022-04-17

## 2022-04-17 PROBLEM — R65.10 SIRS (SYSTEMIC INFLAMMATORY RESPONSE SYNDROME) (HCC): Status: ACTIVE | Noted: 2022-04-17

## 2022-04-17 PROBLEM — R82.71 ASYMPTOMATIC BACTERIURIA: Status: ACTIVE | Noted: 2022-04-16

## 2022-04-17 PROBLEM — I47.9 TACHYCARDIA, PAROXYSMAL (HCC): Status: ACTIVE | Noted: 2022-04-17

## 2022-04-17 PROBLEM — R91.8 PULMONARY INFILTRATES ON CXR: Status: ACTIVE | Noted: 2022-04-17

## 2022-04-17 LAB
ABSOLUTE EOS #: 0.12 K/UL (ref 0–0.44)
ABSOLUTE IMMATURE GRANULOCYTE: 0.04 K/UL (ref 0–0.3)
ABSOLUTE LYMPH #: 1.56 K/UL (ref 1.1–3.7)
ABSOLUTE MONO #: 0.92 K/UL (ref 0.1–1.2)
ANION GAP SERPL CALCULATED.3IONS-SCNC: 9 MMOL/L (ref 9–17)
BASOPHILS # BLD: 0 % (ref 0–2)
BASOPHILS ABSOLUTE: 0.04 K/UL (ref 0–0.2)
BUN BLDV-MCNC: 8 MG/DL (ref 8–23)
CALCIUM SERPL-MCNC: 8.9 MG/DL (ref 8.6–10.4)
CHLORIDE BLD-SCNC: 105 MMOL/L (ref 98–107)
CO2: 25 MMOL/L (ref 20–31)
CREAT SERPL-MCNC: 0.67 MG/DL (ref 0.5–0.9)
CULTURE: ABNORMAL
EOSINOPHILS RELATIVE PERCENT: 1 % (ref 1–4)
GFR AFRICAN AMERICAN: >60 ML/MIN
GFR NON-AFRICAN AMERICAN: >60 ML/MIN
GFR SERPL CREATININE-BSD FRML MDRD: NORMAL ML/MIN/{1.73_M2}
GLUCOSE BLD-MCNC: 82 MG/DL (ref 70–99)
HCT VFR BLD CALC: 38.7 % (ref 36.3–47.1)
HEMOGLOBIN: 12.1 G/DL (ref 11.9–15.1)
IMMATURE GRANULOCYTES: 0 %
LEGIONELLA PNEUMOPHILIA AG, URINE: NEGATIVE
LYMPHOCYTES # BLD: 13 % (ref 24–43)
MCH RBC QN AUTO: 26.2 PG (ref 25.2–33.5)
MCHC RBC AUTO-ENTMCNC: 31.3 G/DL (ref 28.4–34.8)
MCV RBC AUTO: 83.9 FL (ref 82.6–102.9)
MONOCYTES # BLD: 8 % (ref 3–12)
MYOGLOBIN: <21 NG/ML (ref 25–58)
NRBC AUTOMATED: 0 PER 100 WBC
PDW BLD-RTO: 14.6 % (ref 11.8–14.4)
PLATELET # BLD: 387 K/UL (ref 138–453)
PMV BLD AUTO: 8.3 FL (ref 8.1–13.5)
POTASSIUM SERPL-SCNC: 3.9 MMOL/L (ref 3.7–5.3)
RBC # BLD: 4.61 M/UL (ref 3.95–5.11)
RBC # BLD: ABNORMAL 10*6/UL
REASON FOR REJECTION: NORMAL
SEG NEUTROPHILS: 78 % (ref 36–65)
SEGMENTED NEUTROPHILS ABSOLUTE COUNT: 9.31 K/UL (ref 1.5–8.1)
SODIUM BLD-SCNC: 139 MMOL/L (ref 135–144)
SOURCE: NORMAL
SPECIMEN DESCRIPTION: ABNORMAL
STREP PNEUMONIAE ANTIGEN: NEGATIVE
TOTAL PROTEIN, URINE: 6 MG/DL
TROPONIN, HIGH SENSITIVITY: <6 NG/L (ref 0–14)
TSH SERPL DL<=0.05 MIU/L-ACNC: 0.99 UIU/ML (ref 0.3–5)
VITAMIN D 25-HYDROXY: 26.3 NG/ML
WBC # BLD: 12 K/UL (ref 3.5–11.3)
ZZ NTE CLEAN UP: ORDERED TEST: NORMAL
ZZ NTE WITH NAME CLEAN UP: SPECIMEN SOURCE: NORMAL

## 2022-04-17 PROCEDURE — 84484 ASSAY OF TROPONIN QUANT: CPT

## 2022-04-17 PROCEDURE — 93005 ELECTROCARDIOGRAM TRACING: CPT | Performed by: INTERNAL MEDICINE

## 2022-04-17 PROCEDURE — 36415 COLL VENOUS BLD VENIPUNCTURE: CPT

## 2022-04-17 PROCEDURE — 99223 1ST HOSP IP/OBS HIGH 75: CPT | Performed by: INTERNAL MEDICINE

## 2022-04-17 PROCEDURE — 86225 DNA ANTIBODY NATIVE: CPT

## 2022-04-17 PROCEDURE — 6370000000 HC RX 637 (ALT 250 FOR IP): Performed by: INTERNAL MEDICINE

## 2022-04-17 PROCEDURE — 2580000003 HC RX 258: Performed by: INTERNAL MEDICINE

## 2022-04-17 PROCEDURE — 86038 ANTINUCLEAR ANTIBODIES: CPT

## 2022-04-17 PROCEDURE — 1200000000 HC SEMI PRIVATE

## 2022-04-17 PROCEDURE — 80048 BASIC METABOLIC PNL TOTAL CA: CPT

## 2022-04-17 PROCEDURE — 87449 NOS EACH ORGANISM AG IA: CPT

## 2022-04-17 PROCEDURE — 86021 WBC ANTIBODY IDENTIFICATION: CPT

## 2022-04-17 PROCEDURE — 83874 ASSAY OF MYOGLOBIN: CPT

## 2022-04-17 PROCEDURE — 86738 MYCOPLASMA ANTIBODY: CPT

## 2022-04-17 PROCEDURE — 87899 AGENT NOS ASSAY W/OPTIC: CPT

## 2022-04-17 PROCEDURE — 82306 VITAMIN D 25 HYDROXY: CPT

## 2022-04-17 PROCEDURE — 99232 SBSQ HOSP IP/OBS MODERATE 35: CPT | Performed by: INTERNAL MEDICINE

## 2022-04-17 PROCEDURE — 85025 COMPLETE CBC W/AUTO DIFF WBC: CPT

## 2022-04-17 PROCEDURE — 84443 ASSAY THYROID STIM HORMONE: CPT

## 2022-04-17 PROCEDURE — 6360000002 HC RX W HCPCS: Performed by: INTERNAL MEDICINE

## 2022-04-17 PROCEDURE — 84156 ASSAY OF PROTEIN URINE: CPT

## 2022-04-17 RX ORDER — PANTOPRAZOLE SODIUM 40 MG/1
40 TABLET, DELAYED RELEASE ORAL
Status: DISCONTINUED | OUTPATIENT
Start: 2022-04-17 | End: 2022-04-19 | Stop reason: HOSPADM

## 2022-04-17 RX ORDER — CETIRIZINE HYDROCHLORIDE 10 MG/1
10 TABLET ORAL DAILY
Status: DISCONTINUED | OUTPATIENT
Start: 2022-04-17 | End: 2022-04-19 | Stop reason: HOSPADM

## 2022-04-17 RX ORDER — AZITHROMYCIN 250 MG/1
250 TABLET, FILM COATED ORAL DAILY
Status: DISCONTINUED | OUTPATIENT
Start: 2022-04-17 | End: 2022-04-19 | Stop reason: HOSPADM

## 2022-04-17 RX ORDER — GUAIFENESIN DEXTROMETHORPHAN HYDROBROMIDE ORAL SOLUTION 10; 100 MG/5ML; MG/5ML
10 SOLUTION ORAL EVERY 4 HOURS PRN
Status: DISCONTINUED | OUTPATIENT
Start: 2022-04-17 | End: 2022-04-19 | Stop reason: HOSPADM

## 2022-04-17 RX ADMIN — SUCRALFATE 1 G: 1 TABLET ORAL at 08:45

## 2022-04-17 RX ADMIN — AZITHROMYCIN 250 MG: 250 TABLET, FILM COATED ORAL at 13:26

## 2022-04-17 RX ADMIN — ENOXAPARIN SODIUM 40 MG: 40 INJECTION SUBCUTANEOUS at 08:45

## 2022-04-17 RX ADMIN — SODIUM CHLORIDE, PRESERVATIVE FREE 10 ML: 5 INJECTION INTRAVENOUS at 08:46

## 2022-04-17 RX ADMIN — FLUTICASONE PROPIONATE 2 SPRAY: 50 SPRAY, METERED NASAL at 08:45

## 2022-04-17 RX ADMIN — CETIRIZINE HYDROCHLORIDE 10 MG: 10 TABLET ORAL at 13:26

## 2022-04-17 RX ADMIN — ACETAMINOPHEN 650 MG: 325 TABLET ORAL at 09:44

## 2022-04-17 RX ADMIN — SODIUM CHLORIDE, PRESERVATIVE FREE 10 ML: 5 INJECTION INTRAVENOUS at 21:19

## 2022-04-17 RX ADMIN — SUCRALFATE 1 G: 1 TABLET ORAL at 21:18

## 2022-04-17 RX ADMIN — SUCRALFATE 1 G: 1 TABLET ORAL at 13:26

## 2022-04-17 RX ADMIN — PANTOPRAZOLE SODIUM 40 MG: 40 TABLET, DELAYED RELEASE ORAL at 13:27

## 2022-04-17 RX ADMIN — GUAIFENESIN DEXTROMETHORPHAN HYDROBROMIDE ORAL SOLUTION 10 ML: 200; 20 SOLUTION ORAL at 08:59

## 2022-04-17 RX ADMIN — ALCOHOL 1 TABLET: 70.47 GEL TOPICAL at 08:45

## 2022-04-17 RX ADMIN — LEVOTHYROXINE SODIUM 88 MCG: 88 TABLET ORAL at 06:41

## 2022-04-17 RX ADMIN — METOPROLOL TARTRATE 25 MG: 25 TABLET ORAL at 09:43

## 2022-04-17 ASSESSMENT — PAIN SCALES - GENERAL
PAINLEVEL_OUTOF10: 0
PAINLEVEL_OUTOF10: 3

## 2022-04-17 NOTE — PROGRESS NOTES
[facility]  Office: [address], DO, Jose J Riff, DO, Kurt Martinez, DO, Kandace Cespedes Blood, DO, Sen Bright MD, Kody Soto MD, Krys Mcclain MD, Timur Boswell MD, Ulysses Galeas MD, Rosmery Barrera MD, Flora Brian MD, Job Pry, DO, Lawmadhuri Harness, DO, Monalisa Florence MD,  Tram Arias, DO, Moriah Szymanski MD, Blanca Moore MD, Sabrina Townsend MD, Jose Juan Pack DO, Sam Morillo MD, Shelly Kurzt MD, Kristan Robles [facility], [facility], CNP, Javier Lainez, CNP, Gerilyn Cockayne, CNP, Ingrid Bolanos, CNP, Camillo Schaumann, CNP, IMELDA GreenC, Josias Bright, [facility], Ksenia Paula, CNP, Nhan Corrales, CNP, Cande Chau, CNP, Jordan Gamez, CNS, Leroy Kaiser, [facility], Buddy Andrews, CNP, Porter Kennedy, CNP, Joselin Mcrae, CNP         [address]    Progress Note    Name:   Dena Davila  MRN:     [id number]     Acct:      [de-identified]   Room:   [address] [place] [address] Day:  1  Admit Date:  4/16/2022 12:16 PM    PCP:   Merry Bell MD  Code Status:  Full Code    Subjective:     C/C:   Chief Complaint   Patient presents with    Cough    Nasal Congestion     Interval History Status: improved. Patient indicates her cough is mildly improved. Denies any fevers or chills  T-max 100, RN indicates patient had an episode this morning of tachycardia where her heart rate went into 140, was given p.o. Lopressor improving, notes to 110s. Patient herself denies any chest pain shortness of breath or palpitations at this time but she does report intermittent episodes of palpitations at home the last few months progressively more frequent. Patient states these episodes are becoming no associated with any chest pain or shortness of breath. EKG obtained after Lopressor dose: Sinus tachycardia heart rate in 90s with some T wave changes in septal leads.    Brief History:   72-year-old -American female with history of gastric carcinoid tumor s/p resection at [address] [place] [address], hypothyroidism, essential hypertension presented to ED with complaints of persistent fatigue cough and shortness of breath intermittently getting worse in the last 1 year. She indicates improved with antibiotics and steroids but seem to recur. ED work-up showed patient was tachycardic,+ leukocytosis, T-max of 100.6, chest x-ray right upper lobe pneumonia with small left lower lobe pneumonia+ scattered diffuse foci opacities. CT chest 1/4/2022 showed pulmonary nodules scattered waxing and waning  Review of Systems:     Constitutional:  negative for chills, fevers, sweats  Respiratory:  + cough, dyspnea on exertion,no  shortness of breath, no wheezing  Cardiovascular:  negative for chest pain, chest pressure/discomfort, lower extremity edema, palpitations  Gastrointestinal:  negative for abdominal pain, constipation, diarrhea, nausea, vomiting  Neurological:  negative for dizziness, headache  Medications: Allergies:     Allergies   Allergen Reactions    Erythromycin Diarrhea       Current Meds:   Scheduled Meds:    pantoprazole  40 mg Oral BID AC    cetirizine  10 mg Oral Daily    azithromycin  250 mg Oral Daily    fluticasone  2 spray Each Nostril Daily    levothyroxine  88 mcg Oral Daily    metoprolol tartrate  25 mg Oral BID    multivitamin  1 tablet Oral Daily    scopolamine  1 patch TransDERmal Q72H    sucralfate  1 g Oral 3 times per day    sodium chloride flush  5-40 mL IntraVENous 2 times per day    enoxaparin  40 mg SubCUTAneous Daily     Continuous Infusions:    sodium chloride       PRN Meds: dextromethorphan-guaiFENesin, albuterol sulfate HFA, docusate sodium, sodium chloride flush, sodium chloride, acetaminophen **OR** acetaminophen    Data:     Past Medical History:   has a past medical history of Anxiety, Arthritis, Asthma, Colon polyp, Colon polyps, Cyst of kidney, acquired, Fatigue, Hypertension, Hypothyroidism, Neuroendocrine tumor, Obesity, Pharyngoesophageal dysphagia, Vocal cord polyps, and Wears glasses. Social History:   reports that she quit smoking about 14 years ago. She has a 25.00 pack-year smoking history. She has never used smokeless tobacco. She reports previous alcohol use. She reports that she does not use drugs. Family History:   Family History   Problem Relation Age of Onset    High Blood Pressure Mother     High Blood Pressure Sister     Stomach Cancer Sister     Other Sister         epilepsy    No Known Problems Father        Vitals:  BP 99/68   Pulse 90   Temp 99.3 °F (37.4 °C) (Oral)   Resp 20   Ht 5' 5\" (1.651 m)   Wt 164 lb (74.4 kg)   LMP 1994   SpO2 94%   BMI 27.29 kg/m²   Temp (24hrs), Av.7 °F (37.1 °C), Min:97.5 °F (36.4 °C), Max:100 °F (37.8 °C)    No results for input(s): POCGLU in the last 72 hours. I/O (24Hr):     Intake/Output Summary (Last 24 hours) at 2022 1341  Last data filed at 2022 1411  Gross per 24 hour   Intake 1000 ml   Output --   Net 1000 ml       Labs:  Hematology:  Recent Labs     22  1317 22  0551   WBC 14.8* 12.0*   RBC 4.74 4.61   HGB 12.6 12.1   HCT 39.3 38.7   MCV 82.9 83.9   MCH 26.6 26.2   MCHC 32.1 31.3   RDW 14.7* 14.6*    387   MPV 8.4 8.3   INR 1.0  --      Chemistry:  Recent Labs     22  1317 22  0551 22  1228    139  --    K 4.0 3.9  --     105  --    CO2 20 25  --    GLUCOSE 103* 82  --    BUN 8 8  --    CREATININE 0.63 0.67  --    ANIONGAP 15 9  --    LABGLOM >60 >60  --    GFRAA >60 >60  --    CALCIUM 8.7 8.9  --    TROPHS  --   --  <6   MYOGLOBIN  --   --  <21*     Recent Labs     22  1317 22  1228   PROT 7.0  --    LABALBU 3.0*  --    TSH  --  0.99   AST 15  --    ALT <5*  --    ALKPHOS 106*  --    BILITOT 0.26*  --      ABG:  Lab Results   Component Value Date    FIO2 NOT REPORTED 2021     Lab Results   Component Value Date/Time    SPECIAL R FA 10ML tachycardia: Telemetry reviewed appears to be sinus, will check troponin, EKG changes likely rate related to low-grade tachycardia. recent echo reviewed unremarkable. Continue to monitor for arrhythmia. Check TSH, continue thyroid replacement at home dose. -Continue p.o. Lopressor. -DVT prophylaxis.     Marcelino De La Garza MD  4/17/2022

## 2022-04-17 NOTE — CONSULTS
Pulmonary, Critical Care and Sleep Medicine   Callie Henson, MPH MD Angella Prabhakar covering physician service    Consultation request   Pneumonia    Active Hospital Problem List      Active Hospital Problems    Diagnosis Date Noted    Acute cystitis with hematuria [N30.01] 2022     Priority: High    Community acquired pneumonia of right middle lobe of lung [J18.9] 2022    Essential hypertension [I10] 10/28/2019    Neuroendocrine tumor [D3A.8] 2019         IMPRESSION and RECOMMENDATIONS   · Cough, chronic, mildly worse  · H/o carcinoid  · H/o GERD, on PPI  · H/O chronic sinus congestion, on nasal steroid  · No obvious clinical evidence of acute pneumonia: no fever, no SIRS, no hypoxia. Patient has chronic cough that is mildly worse. CT results noted; they are not acute, and findings are not diagnostic of pneumonia. · ?UTI on antibiotics. Recommendations  · While I don't believe antibiotics are indicated, I will defer to hospital team that chose to start them. · No history or risk factors for MRSA, unsure of indication of vancomycin in cystitis. · Patient has chronic GERD and sinus symptoms that are likely cause of chronic cough;   · consider sinus therapy, consultation to [facility] to more aggressively stop sinus drainage; patient has history of laryngeal polyps (distant removal history)  · Patient has GERD, consider aggressive GERD therapy, eg: elevation of bed, small meals, no meals 4h before bedtime  · Will defer consideration of bronchoscopy to patient's primary pulmonologist;     Thank you for the consult and allowing us to participate in the care of your patient. SUPPORTING DATA   I saw and examined the patient. I reviewed EPIC, VSS, labs, and notes. I discussed the case with the RN caring for the patient.     Patient Behzad Rose   MRN -  [id number]   [facility] # - [de-identified]   - 1960      Date of Admission -  2022  Date of evaluation -  2022  Room - 1483/8948- Melinda Garnica MD Primary Care Physician - Bossman Pruett MD     The patient is 62 y.o. female admitted on 4/16/2022 for cough. Patient has had two rounds of (same) antibiotics with no improvement. Cough is worse with PO intake. Also c/o GERD symptoms and sinus congestion, both months in duration, on medication  Denies fever, sick contacts, CP, leg swelling, n/v, change in bowel/ bladder habits. Initial management was for pneumonia and consisted of antibiotics. We are consulted for cough. Prior elements   I reviewed PMHx, SHx, FHx, and Allergies from initial H&P by Dr Yara Keita dated yesterday. Additionally, noted history as in HPI. I reviewed the [facility] and medications in the med reconciliation form. Active Hospital Problems    Diagnosis Date Noted    Acute cystitis with hematuria [N30.01] 04/16/2022     Priority: High    Community acquired pneumonia of right middle lobe of lung [J18.9] 04/16/2022    Essential hypertension [I10] 10/28/2019    Neuroendocrine tumor [D3A.8] 02/21/2019       ROS    ROS is as in HPI and is otherwise negative across 10/14 systems. Meds    Current Medications    fluticasone  2 spray Each Nostril Daily    levothyroxine  88 mcg Oral Daily    metoprolol tartrate  25 mg Oral BID    multivitamin  1 tablet Oral Daily    scopolamine  1 patch TransDERmal Q72H    sucralfate  1 g Oral 3 times per day    sodium chloride flush  5-40 mL IntraVENous 2 times per day    enoxaparin  40 mg SubCUTAneous Daily    cefTRIAXone (ROCEPHIN) IV  1,000 mg IntraVENous Q24H    And    azithromycin  500 mg IntraVENous Q24H     dextromethorphan-guaiFENesin, albuterol sulfate HFA, docusate sodium, sodium chloride flush, sodium chloride, acetaminophen **OR** acetaminophen  IV Drips/Infusions   sodium chloride      lactated ringers 100 mL/hr at 04/16/22 1845       Vitals    Vitals    height is 5' 5\" (1.651 m) and weight is 164 lb (74.4 kg).  Her oral temperature

## 2022-04-18 PROBLEM — J15.7 PNEUMONIA OF RIGHT UPPER LOBE DUE TO MYCOPLASMA PNEUMONIAE: Status: ACTIVE | Noted: 2022-04-18

## 2022-04-18 PROBLEM — E55.9 VITAMIN D DEFICIENCY: Status: ACTIVE | Noted: 2022-04-18

## 2022-04-18 LAB
ABSOLUTE EOS #: 0.21 K/UL (ref 0–0.44)
ABSOLUTE IMMATURE GRANULOCYTE: 0.05 K/UL (ref 0–0.3)
ABSOLUTE LYMPH #: 1.82 K/UL (ref 1.1–3.7)
ABSOLUTE MONO #: 0.68 K/UL (ref 0.1–1.2)
ANTI DNA DOUBLE STRANDED: 0.6 IU/ML
ANTI-NUCLEAR ANTIBODY (ANA): NEGATIVE
BASOPHILS # BLD: 1 % (ref 0–2)
BASOPHILS ABSOLUTE: 0.04 K/UL (ref 0–0.2)
EKG ATRIAL RATE: 106 BPM
EKG P AXIS: 48 DEGREES
EKG P-R INTERVAL: 140 MS
EKG Q-T INTERVAL: 308 MS
EKG QRS DURATION: 64 MS
EKG QTC CALCULATION (BAZETT): 409 MS
EKG R AXIS: -3 DEGREES
EKG T AXIS: 19 DEGREES
EKG VENTRICULAR RATE: 106 BPM
ENA ANTIBODIES SCREEN: 0.3 U/ML
EOSINOPHILS RELATIVE PERCENT: 3 % (ref 1–4)
HCT VFR BLD CALC: 34.8 % (ref 36.3–47.1)
HEMOGLOBIN: 11.1 G/DL (ref 11.9–15.1)
IMMATURE GRANULOCYTES: 1 %
LYMPHOCYTES # BLD: 23 % (ref 24–43)
MCH RBC QN AUTO: 26.2 PG (ref 25.2–33.5)
MCHC RBC AUTO-ENTMCNC: 31.9 G/DL (ref 28.4–34.8)
MCV RBC AUTO: 82.1 FL (ref 82.6–102.9)
MONOCYTES # BLD: 9 % (ref 3–12)
MYCOPLASMA PNEUMONIAE IGM: 1.38
NRBC AUTOMATED: 0 PER 100 WBC
PDW BLD-RTO: 14.9 % (ref 11.8–14.4)
PLATELET # BLD: 389 K/UL (ref 138–453)
PMV BLD AUTO: 8.6 FL (ref 8.1–13.5)
RBC # BLD: 4.24 M/UL (ref 3.95–5.11)
RBC # BLD: ABNORMAL 10*6/UL
SEG NEUTROPHILS: 63 % (ref 36–65)
SEGMENTED NEUTROPHILS ABSOLUTE COUNT: 5.17 K/UL (ref 1.5–8.1)
WBC # BLD: 8 K/UL (ref 3.5–11.3)

## 2022-04-18 PROCEDURE — 93010 ELECTROCARDIOGRAM REPORT: CPT | Performed by: INTERNAL MEDICINE

## 2022-04-18 PROCEDURE — 99232 SBSQ HOSP IP/OBS MODERATE 35: CPT | Performed by: INTERNAL MEDICINE

## 2022-04-18 PROCEDURE — 2580000003 HC RX 258: Performed by: INTERNAL MEDICINE

## 2022-04-18 PROCEDURE — 1200000000 HC SEMI PRIVATE

## 2022-04-18 PROCEDURE — 6370000000 HC RX 637 (ALT 250 FOR IP): Performed by: INTERNAL MEDICINE

## 2022-04-18 PROCEDURE — 85025 COMPLETE CBC W/AUTO DIFF WBC: CPT

## 2022-04-18 PROCEDURE — 99233 SBSQ HOSP IP/OBS HIGH 50: CPT | Performed by: INTERNAL MEDICINE

## 2022-04-18 PROCEDURE — 36415 COLL VENOUS BLD VENIPUNCTURE: CPT

## 2022-04-18 PROCEDURE — 86738 MYCOPLASMA ANTIBODY: CPT

## 2022-04-18 RX ORDER — ERGOCALCIFEROL 1.25 MG/1
50000 CAPSULE ORAL WEEKLY
Status: DISCONTINUED | OUTPATIENT
Start: 2022-04-18 | End: 2022-04-19 | Stop reason: HOSPADM

## 2022-04-18 RX ADMIN — ACETAMINOPHEN 650 MG: 325 TABLET ORAL at 09:36

## 2022-04-18 RX ADMIN — SUCRALFATE 1 G: 1 TABLET ORAL at 05:38

## 2022-04-18 RX ADMIN — GUAIFENESIN DEXTROMETHORPHAN HYDROBROMIDE ORAL SOLUTION 10 ML: 200; 20 SOLUTION ORAL at 05:59

## 2022-04-18 RX ADMIN — SUCRALFATE 1 G: 1 TABLET ORAL at 15:11

## 2022-04-18 RX ADMIN — LEVOTHYROXINE SODIUM 88 MCG: 88 TABLET ORAL at 05:58

## 2022-04-18 RX ADMIN — METOPROLOL TARTRATE 25 MG: 25 TABLET ORAL at 20:54

## 2022-04-18 RX ADMIN — PANTOPRAZOLE SODIUM 40 MG: 40 TABLET, DELAYED RELEASE ORAL at 05:38

## 2022-04-18 RX ADMIN — ALCOHOL 1 TABLET: 70.47 GEL TOPICAL at 09:17

## 2022-04-18 RX ADMIN — FLUTICASONE PROPIONATE 2 SPRAY: 50 SPRAY, METERED NASAL at 09:17

## 2022-04-18 RX ADMIN — ERGOCALCIFEROL 50000 UNITS: 1.25 CAPSULE ORAL at 09:59

## 2022-04-18 RX ADMIN — SODIUM CHLORIDE, PRESERVATIVE FREE 10 ML: 5 INJECTION INTRAVENOUS at 20:57

## 2022-04-18 RX ADMIN — CETIRIZINE HYDROCHLORIDE 10 MG: 10 TABLET ORAL at 09:18

## 2022-04-18 RX ADMIN — SODIUM CHLORIDE, PRESERVATIVE FREE 10 ML: 5 INJECTION INTRAVENOUS at 09:18

## 2022-04-18 RX ADMIN — SUCRALFATE 1 G: 1 TABLET ORAL at 20:54

## 2022-04-18 RX ADMIN — PANTOPRAZOLE SODIUM 40 MG: 40 TABLET, DELAYED RELEASE ORAL at 17:01

## 2022-04-18 RX ADMIN — AZITHROMYCIN 250 MG: 250 TABLET, FILM COATED ORAL at 09:18

## 2022-04-18 RX ADMIN — METOPROLOL TARTRATE 25 MG: 25 TABLET ORAL at 09:37

## 2022-04-18 ASSESSMENT — PAIN DESCRIPTION - LOCATION: LOCATION: HEAD

## 2022-04-18 ASSESSMENT — ENCOUNTER SYMPTOMS
DIARRHEA: 0
VOICE CHANGE: 0
VOMITING: 0
ABDOMINAL PAIN: 0
COUGH: 1

## 2022-04-18 ASSESSMENT — PAIN SCALES - GENERAL
PAINLEVEL_OUTOF10: 0
PAINLEVEL_OUTOF10: 7

## 2022-04-18 ASSESSMENT — PAIN DESCRIPTION - FREQUENCY: FREQUENCY: INTERMITTENT

## 2022-04-18 ASSESSMENT — PAIN DESCRIPTION - DESCRIPTORS: DESCRIPTORS: HEADACHE

## 2022-04-18 ASSESSMENT — PAIN - FUNCTIONAL ASSESSMENT: PAIN_FUNCTIONAL_ASSESSMENT: ACTIVITIES ARE NOT PREVENTED

## 2022-04-18 ASSESSMENT — PAIN DESCRIPTION - ORIENTATION: ORIENTATION: LOWER;POSTERIOR

## 2022-04-18 ASSESSMENT — PAIN DESCRIPTION - PAIN TYPE: TYPE: ACUTE PAIN

## 2022-04-18 ASSESSMENT — PAIN DESCRIPTION - ONSET: ONSET: ON-GOING

## 2022-04-18 NOTE — CARE COORDINATION
Case Management Initial Discharge Plan  Jonny Galan,             Met with:patient to discuss discharge plans. Information verified: address, contacts, phone number, , insurance Yes  Insurance Provider: Silva Kyle    Emergency Contact/Next of Kin name & number: sister Tomy Brice [phone]  Who are involved in patient's support system? family    PCP: Paulett Siemens, MD  Date of last visit: 22      Discharge Planning    Living Arrangements:  Family Members     Home has 2 stories  5 stairs to climb to get into front door, 1 flight stairs to climb to reach second floor  Location of bedroom/bathroom in home upper level    Patient able to perform ADL's:Independent    Current Services (outpatient & in home) none  DME equipment: none  DME provider: none    Is patient receiving oral anticoagulation therapy? No    Does patient have any issues/concerns obtaining medications? No  If yes, what are patient's concerns? Is there a preferred Pharmacy after hours or on weekends? No    If yes, which pharmacy? Potential Assistance Needed:  N/A    Patient agreeable to home care: No  Fidelity of choice provided:  n/a    Prior SNF/Rehab Placement and Facility: no  Agreeable to SNF/Rehab: No  Fidelity of choice provided: n/a     Evaluation: n/a    Expected Discharge date:  22    Patient expects to be discharged to:home       If home: is the family and/or caregiver wiling & able to provide support at home? yes  Who will be providing this support? family*    Follow Up Appointment: Best Day/ Time: Monday AM    Transportation provider: drove self  Transportation arrangements needed for discharge: No    Readmission Risk              Risk of Unplanned Readmission:  17             Does patient have a readmission risk score greater than 14?: Yes  If yes, follow-up appointment must be made within 7 days of discharge.      Goals of Care:       Educated patient on transitional options, provided freedom of choice and are agreeable with plan      Discharge Plan: home per self, no needs voiced          Electronically signed by Myriam Bojorquez RN on 4/18/22 at 12:11 PM EDT

## 2022-04-18 NOTE — PROGRESS NOTES
PULMONARY & CRITICAL CARE MEDICINE PROGRESS NOTE     Patient:  Alexis Dykes  MRN: [id number]  [facility] date: 2022  Primary Care Physician: Parul Wang MD  Consulting Physician: Rut Cheung MD  CODE Status: Full Code  LOS: 2     SUBJECTIVE     CHIEF COMPLAINT/REASON FOR INITIAL CONSULT: Bilateral pneumonia    BRIEF HOSPITAL COURSE:  The patient is a 62 y.o. female with past medical history of carcinoid syndrome, s/p resection, pulmonary nodules. She follows with Dr. Silva Briceño in the clinic, recently seen Mariela Koch NP. She reports having cough and sputum for about 2 weeks and has completed 2 rounds of antibiotic without significant improvement. Recent CT showed bilateral consolidation. She is currently on azithromycin    INTERVAL HISTORY:  22  Patient is on room air  T-max is 99.1, white count is down to 8  Denies any overnight issues    Review of Systems   Constitutional: Positive for fatigue. Negative for fever. HENT: Negative for voice change. Eyes: Negative for visual disturbance. Respiratory: Positive for cough. Gastrointestinal: Negative for abdominal pain, diarrhea and vomiting. Genitourinary: Negative for dysuria and urgency. Musculoskeletal: Negative for joint swelling. Allergic/Immunologic: Negative for environmental allergies and immunocompromised state. Neurological: Positive for weakness. Hematological: Negative for adenopathy. Does not bruise/bleed easily. Psychiatric/Behavioral: Negative for behavioral problems. OBJECTIVE     PaO2/FiO2 RATIO:  No results for input(s): POCPO2 in the last 72 hours.          VITAL SIGNS:   LAST:  /71   Pulse 60   Temp 99.1 °F (37.3 °C) (Oral)   Resp 18   Ht 5' 5\" (1.651 m)   Wt 164 lb (74.4 kg)   LMP 1994   SpO2 94%   BMI 27.29 kg/m²   8-24 HR RANGE:  TEMP Temp  Av.2 °F (36.8 °C)  Min: 97.7 °F (36.5 °C)  Max: 99.1 °F (43.3 °C)   BP Systolic (24LIJ), TMV:770 , Min:91 , TGC:871      Diastolic (24hrs), Av, Min:60, Max:82     PULSE Pulse  Av.6  Min: 60  Max: 69   RR Resp  Av  Min: 18  Max: 18   O2 SAT SpO2  Av %  Min: 94 %  Max: 94 %   OXYGEN DELIVERY No data recorded         Physical Exam  Constitutional:       General: She is not in acute distress. Appearance: She is not ill-appearing. HENT:      Head: Normocephalic and atraumatic. Mouth/Throat:      Mouth: Mucous membranes are moist.   Eyes:      Extraocular Movements: Extraocular movements intact. Pupils: Pupils are equal, round, and reactive to light. Cardiovascular:      Rate and Rhythm: Normal rate and regular rhythm. Heart sounds: No murmur heard. Pulmonary:      Effort: No accessory muscle usage or prolonged expiration. Breath sounds: Examination of the right-middle field reveals rales. Examination of the left-middle field reveals rales. Rales present. Abdominal:      General: There is no distension. Palpations: Abdomen is soft. There is no mass. Tenderness: There is no abdominal tenderness. Musculoskeletal:      Cervical back: Neck supple. Right lower leg: No edema. Left lower leg: No edema. Lymphadenopathy:      Cervical: No cervical adenopathy. Skin:     Coloration: Skin is not pale. Findings: No rash. Neurological:      General: No focal deficit present. Mental Status: She is oriented to person, place, and time.          DATA REVIEW     Medications: Current Inpatient  Scheduled Meds:   vitamin D  50,000 Units Oral Weekly    pantoprazole  40 mg Oral BID AC    cetirizine  10 mg Oral Daily    azithromycin  250 mg Oral Daily    fluticasone  2 spray Each Nostril Daily    levothyroxine  88 mcg Oral Daily    metoprolol tartrate  25 mg Oral BID    multivitamin  1 tablet Oral Daily    sucralfate  1 g Oral 3 times per day    sodium chloride flush  5-40 mL IntraVENous 2 times per day    enoxaparin  40 mg SubCUTAneous Daily     Continuous Infusions:   sodium chloride         INPUT/OUTPUT:  In: 2858 [P.O.:1870; I.V.:988]  Out: 2500 [Urine:2500]  Date 04/18/22 0000 - 04/18/22 2359   Shift 7204-0768 1984-2138 3987-9985 24 Hour Total   INTAKE   P.O.(mL/kg/hr) 450(0.8)   450   Shift Total(mL/kg) 450(6)   450(6)   OUTPUT   Urine(mL/kg/hr) 400(0.7) 200  600   Shift Total(mL/kg) 400(5.4) 200(2.7)  600(8.1)   Weight (kg) 74.4 74.4 74.4 74.4        LABS:  ABGs:   No results for input(s): POCPH, POCPCO2, POCPO2, POCHCO3, XSSU4PLZ in the last 72 hours. CBC:   Recent Labs     04/16/22  1317 04/17/22  0551 04/18/22  0348   WBC 14.8* 12.0* 8.0   HGB 12.6 12.1 11.1*   HCT 39.3 38.7 34.8*   MCV 82.9 83.9 82.1*    387 389   LYMPHOPCT 14* 13* 23*   RBC 4.74 4.61 4.24   MCH 26.6 26.2 26.2   MCHC 32.1 31.3 31.9   RDW 14.7* 14.6* 14.9*     CRP:   No results for input(s): CRP in the last 72 hours. LDH:   No results for input(s): LDH in the last 72 hours. BMP:   Recent Labs     04/16/22  1317 04/17/22  0551    139   K 4.0 3.9    105   CO2 20 25   BUN 8 8   CREATININE 0.63 0.67   GLUCOSE 103* 82     Liver Function Test:   Recent Labs     04/16/22 1317   PROT 7.0   LABALBU 3.0*   ALT <5*   AST 15   ALKPHOS 106*   BILITOT 0.26*     Coagulation Profile:   Recent Labs     04/16/22 1317   INR 1.0   PROTIME 10.6   APTT 31.4*     D-Dimer:  No results for input(s): DDIMER in the last 72 hours. Lactic Acid:  No results for input(s): LACTA in the last 72 hours. Cardiac Enzymes:  No results for input(s): CKTOTAL, CKMB, CKMBINDEX, TROPONINI in the last 72 hours. Invalid input(s): TROPONIN, HSTROP  BNP/ProBNP:   No results for input(s): BNP, PROBNP in the last 72 hours. Triglycerides:  No results for input(s): TRIG in the last 72 hours.      Microbiology:  Urine Culture:  No components found for: CURINE  Blood Culture:  No components found for: CBLOOD, CFUNGUSBL  Sputum Culture:  No components found for: CSPUTUM  Recent Labs     04/16/22  1310 04/16/22  1310 04/16/22  1314 04/17/22  0936   SPECDESC . BLOOD  . BLOOD   < > .NASOPHARYNGEAL SWAB  --    SPECIAL L FA 10ML  R FA 10ML  --   --   --    CULTURE NO GROWTH 1 DAY  NO GROWTH 1 DAY  --   --   --    MPNEUM  --   --   --  1.38*    < > = values in this interval not displayed. Recent Labs     04/16/22  1301 04/16/22  1310 04/16/22  1314 04/17/22  0936   SPECDESC . CLEAN CATCH URINE .BLOOD  . BLOOD . NASOPHARYNGEAL SWAB  --    SPECIAL  --  L FA 10ML  R FA 10ML  --   --    CULTURE ESCHERICHIA COLI 10 to 50,000 CFU/ML* NO GROWTH 1 DAY  NO GROWTH 1 DAY  --   --    MPNEUM  --   --   --  1.38*            Radiology Reports:  XR CHEST (2 VW)   Final Result   No significant change. CT chest 4/4/22  1. Waxing and waning process.  Previous scattered nodules in the right and   left lower lobe and in the left upper lobe have resolved but there is new   large irregular patchy consolidation in the right upper lobe surrounding   pre-existing 13 mm nodule.  Additional new nodules up to 5 mm are present in   the middle lobe where previous 8 mm nodule has resolved.  Differential   considerations would include cryptogenic organizing pneumonia or chronic   eosinophilic pneumonia.  Malignancy considered less likely.  Continued   follow-up advised.  Consider three-month follow-up.  Tissue sampling may be   indicated. 2. Stable findings in the upper abdomen including renal cysts and   postsurgical changes at the GE junction. Echocardiogram:   Results for orders placed during the hospital encounter of 11/21/21    ECHO Complete 2D W Doppler W Color    Narrative    Summary  Normal LV size , mildly increased wall thickness. No obvious wall motion abnormality seen. Normal LV systolic function with LVEF >55%. Normal RV size and function. RV systolic pressure 29 mmHg  LA and RA appears normal in size. No obvious significant structural valvular abnormality noted.   No significant valvular stenosis or regurgitation noted.  Normal aortic root dimension. Small pericardial effusion noted. No obvious intra-cardiac mass or shunt noted. IVC normal diameter and inspiratory variation indicating normal RA filling  pressure. ASSESSMENT AND PLAN     Assessment:    // Bilateral pneumonia, positive mycoplasma IgM  // History of GERD  // History of carcinoid, s/p resection  // Chronic sinus condition    Plan:    I personally interviewed/examined the patient; reviewed interval history, interpreted all available radiographic and laboratory data at the time of service.  Patient is hemodynamically stable and is currently saturating well on room air   Encourage incentive spirometry/Acapella   Continue pulmonary toilet, aspiration precautions and bronchodilators   Tolerating oral diet   Stress ulcer prophylaxis   Chemical DVT prophylaxis as per protocol   Antimicrobials reviewed; on azithromycin   Will recommend repeating CT scan in 4 to 6 weeks   If infiltrates worsen or fail to improve, bronchoscopic evaluation can be considered at that point  [facility] Physical/Occupational Therapy    I would like to thank you for allowing me to participate in the care of this patient. Please feel free to call with any further questions or concerns. Kylee Salazar MD  Pulmonary and Critical Care Medicine           4/18/2022, 1:11 PM    Please note that this chart was generated using voice recognition Dragon dictation software. Although every effort was made to ensure the accuracy of this automated transcription, some errors in transcription may have occurred.

## 2022-04-18 NOTE — PLAN OF CARE
Problem: Breathing Pattern - Ineffective:  Goal: Ability to achieve and maintain a regular respiratory rate will improve  Description: Ability to achieve and maintain a regular respiratory rate will improve  4/18/2022 1728 by Emilia Giang RN  Outcome: Ongoing  4/18/2022 0555 by Jeremy Goode RN  Outcome: Ongoing     Problem: Falls - Risk of:  Goal: Will remain free from falls  Description: Will remain free from falls  4/18/2022 1728 by Emilia Giang RN  Outcome: Ongoing  4/18/2022 0555 by Jeremy Goode RN  Outcome: Met This Shift  Goal: Absence of physical injury  Description: Absence of physical injury  4/18/2022 1728 by Emilia Giang RN  Outcome: Ongoing  4/18/2022 0555 by Jeremy Goode RN  Outcome: Met This Shift

## 2022-04-18 NOTE — PROGRESS NOTES
[facility]  Office: [address], DO, Boyd Romero, DO, Steff Hurst, DO, Ilene Chowdhury Blood, DO, Adela Sarabia MD, Manisha Flaherty MD, Barb Kinney MD, Fiona De Santiago MD, Elizabeth Lutz MD, Gurpreet Restrepo MD, Claus Otero MD, Oly Quintana, DO, Stuart Fritz, DO, Tyler Morrison MD,  Lashay Lin, DO, Mert Madden MD, Lyndsay Lewis MD, Viktoriya Aguirre MD, Sally Hodgson, DO, Yolande Knox MD, Frieda Ortiz MD, Tram Atkins, [facility], [facility], CNP, Rufina Steele, CNP, Jc Thapa, CNP, Chad Barboza, CNP, Juma Eugene, CNP, Scotty Escalona PA-C, Rogers Duncan, DNP, Rigoberto Short, CNP, Raúl Guzman, CNP, Oracio Gross, CNP, Christie Turpin, CNS, Baljeet Landa, [facility], Floresita Alanis, CNP, Randal Brennan, CNP, Rebecca Jordan, CNP         [facility]    Progress Note    Name:   Tanya Holcomb  MRN:     [id number]     Acct:      [de-identified]   Room:   Formerly named [facility] Day:  2  Admit Date:  4/16/2022 12:16 PM    PCP:   Victorina Rodas MD  Code Status:  Full Code    Subjective:     C/C:   Chief Complaint   Patient presents with    Cough    Nasal Congestion     Interval History Status: improved. Patient states her cough is much improved continues to complain of fatigue. Had intermittent episodes of sinus tachycardia 110s on monitor, RN reports with minimal activity. T-max 99.3, hemodynamically stable. Vitamin D 26.3, white count improved to 8, mycoplasma IgM 1.38  Brief History:   61-year-old -American female with history of gastric carcinoid tumor s/p resection at [address] [place] [address], hypothyroidism, essential hypertension presented to ED with complaints of persistent fatigue cough and shortness of breath intermittently getting worse in the last 1 year. She indicates improved with antibiotics and steroids but seem to recur.    ED work-up showed patient was tachycardic,+ leukocytosis, T-max of 100.6, chest x-ray right upper lobe pneumonia with small left lower lobe pneumonia+ scattered diffuse foci opacities. CT chest 1/4/2022 showed pulmonary nodules scattered waxing and waning. Patient complaining of intermittent episodes of palpitations with sinus tachycardia noted here. Review of Systems:     Constitutional:  negative for chills, fevers, sweats  Respiratory:  + cough, dyspnea on exertion,no  shortness of breath, no wheezing  Cardiovascular:  negative for chest pain, chest pressure/discomfort, lower extremity edema, palpitations  Gastrointestinal:  negative for abdominal pain, constipation, diarrhea, nausea, vomiting  Neurological:  negative for dizziness, headache  Medications: Allergies: Allergies   Allergen Reactions    Erythromycin Diarrhea       Current Meds:   Scheduled Meds:    vitamin D  50,000 Units Oral Weekly    pantoprazole  40 mg Oral BID AC    cetirizine  10 mg Oral Daily    azithromycin  250 mg Oral Daily    fluticasone  2 spray Each Nostril Daily    levothyroxine  88 mcg Oral Daily    metoprolol tartrate  25 mg Oral BID    multivitamin  1 tablet Oral Daily    sucralfate  1 g Oral 3 times per day    sodium chloride flush  5-40 mL IntraVENous 2 times per day    enoxaparin  40 mg SubCUTAneous Daily     Continuous Infusions:    sodium chloride       PRN Meds: dextromethorphan-guaiFENesin, albuterol sulfate HFA, docusate sodium, sodium chloride flush, sodium chloride, acetaminophen **OR** acetaminophen    Data:     Past Medical History:   has a past medical history of Anxiety, Arthritis, Asthma, Colon polyp, Colon polyps, Cyst of kidney, acquired, Fatigue, Hypertension, Hypothyroidism, Neuroendocrine tumor, Obesity, Pharyngoesophageal dysphagia, Vocal cord polyps, and Wears glasses. Social History:   reports that she quit smoking about 14 years ago. She has a 25.00 pack-year smoking history. She has never used smokeless tobacco. She reports previous alcohol use.  She reports that she does not use drugs. Family History:   Family History   Problem Relation Age of Onset    High Blood Pressure Mother     High Blood Pressure Sister     Stomach Cancer Sister     Other Sister         epilepsy    No Known Problems Father        Vitals:  /71   Pulse 60   Temp 99.1 °F (37.3 °C) (Oral)   Resp 18   Ht 5' 5\" (1.651 m)   Wt 164 lb (74.4 kg)   LMP 1994   SpO2 94%   BMI 27.29 kg/m²   Temp (24hrs), Av.2 °F (36.8 °C), Min:97.7 °F (36.5 °C), Max:99.1 °F (37.3 °C)    No results for input(s): POCGLU in the last 72 hours. I/O (24Hr):     Intake/Output Summary (Last 24 hours) at 2022 1546  Last data filed at 2022 0936  Gross per 24 hour   Intake 2858 ml   Output 2500 ml   Net 358 ml       Labs:  Hematology:  Recent Labs     22  0551 22  0348   WBC 14.8* 12.0* 8.0   RBC 4.74 4.61 4.24   HGB 12.6 12.1 11.1*   HCT 39.3 38.7 34.8*   MCV 82.9 83.9 82.1*   MCH 26.6 26.2 26.2   MCHC 32.1 31.3 31.9   RDW 14.7* 14.6* 14.9*    387 389   MPV 8.4 8.3 8.6   INR 1.0  --   --      Chemistry:  Recent Labs     22  0551 22  1228    139  --    K 4.0 3.9  --     105  --    CO2 20 25  --    GLUCOSE 103* 82  --    BUN 8 8  --    CREATININE 0.63 0.67  --    ANIONGAP 15 9  --    LABGLOM >60 >60  --    GFRAA >60 >60  --    CALCIUM 8.7 8.9  --    TROPHS  --   --  <6   MYOGLOBIN  --   --  <21*     Recent Labs     22  1228   PROT 7.0  --    LABALBU 3.0*  --    TSH  --  0.99   AST 15  --    ALT <5*  --    ALKPHOS 106*  --    BILITOT 0.26*  --      ABG:  Lab Results   Component Value Date    FIO2 NOT REPORTED 2021     Lab Results   Component Value Date/Time    SPECIAL R FA 10ML 2022 01:10 PM    SPECIAL L FA 10ML 2022 01:10 PM     Lab Results   Component Value Date/Time    CULTURE NO GROWTH 2 DAYS 2022 01:10 PM    CULTURE NO GROWTH 2 DAYS 2022 01:10 PM       Radiology:  XR CHEST (2 VW)  Result Date: 4/16/2022  No significant change. Physical Examination:        General appearance:  alert, cooperative and no distress  Mental Status:  oriented to person, place and time and normal affect  Lungs:  clear to auscultation bilaterally, normal effort  Heart:  regular rate and rhythm, no murmur  Abdomen:  soft, nontender, nondistended, normal bowel sounds  Extremities:  no edema, redness, tenderness in the calves  Skin:  no gross lesions, rashes, induration    Assessment:        Hospital Problems           Last Modified POA    * (Principal) Pneumonia of right upper lobe due to Mycoplasma pneumoniae 4/18/2022 Yes    Asymptomatic bacteriuria 4/17/2022 Yes    Neuroendocrine tumor 4/16/2022 Yes    Overview Signed 3/26/2019 10:44 AM by Bar Pfeiffer LPN     of stomach         Essential hypertension 4/16/2022 Yes    Sepsis ([address] 4/18/2022 Yes    SIRS (systemic inflammatory response syndrome) ([address] 4/18/2022 Yes    Pulmonary infiltrates on CXR 4/17/2022 Yes    Cough 4/17/2022 Yes    Tachycardia, paroxysmal ([address] 4/17/2022 Yes    Vitamin D deficiency 4/18/2022 Yes              Plan:      - With mycoplasma IgM+, Sepsis secondary to Mycoplasma Pneumonia present at admission is confirmed. Improved now. Droplet precautions. Fleeting nodular and alveolar infiltrates noted on imaging, no eosinophilia,+chronic sinusitis symptoms: ? ANCA vasculitis   ANCA, WAQAR with reflux, urine protein-pending. continue Flonase and oral decongestant. continue PO azithromycin. Also describes significant gastric reflux symptoms: continue trial of PPI twice daily. +u/a with low grade bacteruria:patient is asymptomatic.monitor off abx coverage. May benefit from bronch-possibly OP-reached out to pulm, a/w eval.  -Recurrent palpitations with episode of tachycardia: Telemetry reviewed appears to be sinus, normal troponin, TSH.  EKG changes likely rate related to tachycardia.  Stress test OP   recent echo reviewed unremarkable. Continue BB: patient apparently was taking lopressor twice a week. Discussed appropriate dosing.   -Start on vitamin D replacement. - continue thyroid replacement at home dose. -Continue p.o. Lopressor. -DVT prophylaxis. Plan to d/c to home on 10d course of Azithromycin when ok with pulm. and no plans for inpatient bronch.      Marc Baez MD  4/18/2022

## 2022-04-18 NOTE — PROGRESS NOTES
Physical Therapy        Physical Therapy Cancel Note      DATE: 2022    NAME: Nathaniel Sheikh  MRN: [id number]   : 1960      Patient not seen this date for Physical Therapy due to:    Patient independent with functional mobility. Will defer PT evaluation at this time. Please reorder PT if future needs arise.        Electronically signed by Connor Malik PT on 2022 at 3:11 PM

## 2022-04-19 VITALS
DIASTOLIC BLOOD PRESSURE: 75 MMHG | BODY MASS INDEX: 27.32 KG/M2 | RESPIRATION RATE: 16 BRPM | OXYGEN SATURATION: 92 % | HEART RATE: 98 BPM | TEMPERATURE: 99.5 F | HEIGHT: 65 IN | SYSTOLIC BLOOD PRESSURE: 117 MMHG | WEIGHT: 164 LBS

## 2022-04-19 LAB
ABSOLUTE EOS #: 0.17 K/UL (ref 0–0.44)
ABSOLUTE IMMATURE GRANULOCYTE: 0.03 K/UL (ref 0–0.3)
ABSOLUTE LYMPH #: 2.03 K/UL (ref 1.1–3.7)
ABSOLUTE MONO #: 0.75 K/UL (ref 0.1–1.2)
BASOPHILS # BLD: 0 % (ref 0–2)
BASOPHILS ABSOLUTE: 0.03 K/UL (ref 0–0.2)
EOSINOPHILS RELATIVE PERCENT: 2 % (ref 1–4)
HCT VFR BLD CALC: 34.3 % (ref 36.3–47.1)
HEMOGLOBIN: 11.3 G/DL (ref 11.9–15.1)
IMMATURE GRANULOCYTES: 0 %
LYMPHOCYTES # BLD: 21 % (ref 24–43)
MAGNESIUM: 1.6 MG/DL (ref 1.6–2.6)
MCH RBC QN AUTO: 27 PG (ref 25.2–33.5)
MCHC RBC AUTO-ENTMCNC: 32.9 G/DL (ref 28.4–34.8)
MCV RBC AUTO: 82.1 FL (ref 82.6–102.9)
MONOCYTES # BLD: 8 % (ref 3–12)
NRBC AUTOMATED: 0 PER 100 WBC
PDW BLD-RTO: 14.8 % (ref 11.8–14.4)
PLATELET # BLD: 381 K/UL (ref 138–453)
PMV BLD AUTO: 8.3 FL (ref 8.1–13.5)
RBC # BLD: 4.18 M/UL (ref 3.95–5.11)
RBC # BLD: ABNORMAL 10*6/UL
SEG NEUTROPHILS: 69 % (ref 36–65)
SEGMENTED NEUTROPHILS ABSOLUTE COUNT: 6.86 K/UL (ref 1.5–8.1)
TROPONIN, HIGH SENSITIVITY: <6 NG/L (ref 0–14)
WBC # BLD: 9.9 K/UL (ref 3.5–11.3)

## 2022-04-19 PROCEDURE — 99232 SBSQ HOSP IP/OBS MODERATE 35: CPT | Performed by: FAMILY MEDICINE

## 2022-04-19 PROCEDURE — 83735 ASSAY OF MAGNESIUM: CPT

## 2022-04-19 PROCEDURE — 85025 COMPLETE CBC W/AUTO DIFF WBC: CPT

## 2022-04-19 PROCEDURE — 84484 ASSAY OF TROPONIN QUANT: CPT

## 2022-04-19 PROCEDURE — 36415 COLL VENOUS BLD VENIPUNCTURE: CPT

## 2022-04-19 PROCEDURE — 6370000000 HC RX 637 (ALT 250 FOR IP): Performed by: INTERNAL MEDICINE

## 2022-04-19 PROCEDURE — 93005 ELECTROCARDIOGRAM TRACING: CPT | Performed by: FAMILY MEDICINE

## 2022-04-19 PROCEDURE — 6360000002 HC RX W HCPCS: Performed by: INTERNAL MEDICINE

## 2022-04-19 PROCEDURE — 2580000003 HC RX 258: Performed by: INTERNAL MEDICINE

## 2022-04-19 RX ORDER — AZITHROMYCIN 250 MG/1
250 TABLET, FILM COATED ORAL DAILY
Qty: 9 TABLET | Refills: 0 | Status: SHIPPED | OUTPATIENT
Start: 2022-04-19 | End: 2022-04-28

## 2022-04-19 RX ORDER — PANTOPRAZOLE SODIUM 40 MG/1
40 TABLET, DELAYED RELEASE ORAL
Qty: 30 TABLET | Refills: 3 | Status: SHIPPED | OUTPATIENT
Start: 2022-04-19 | End: 2022-05-02

## 2022-04-19 RX ADMIN — FLUTICASONE PROPIONATE 2 SPRAY: 50 SPRAY, METERED NASAL at 08:50

## 2022-04-19 RX ADMIN — PANTOPRAZOLE SODIUM 40 MG: 40 TABLET, DELAYED RELEASE ORAL at 16:51

## 2022-04-19 RX ADMIN — METOPROLOL TARTRATE 25 MG: 25 TABLET ORAL at 08:50

## 2022-04-19 RX ADMIN — SUCRALFATE 1 G: 1 TABLET ORAL at 13:13

## 2022-04-19 RX ADMIN — ALCOHOL 1 TABLET: 70.47 GEL TOPICAL at 08:50

## 2022-04-19 RX ADMIN — PANTOPRAZOLE SODIUM 40 MG: 40 TABLET, DELAYED RELEASE ORAL at 06:22

## 2022-04-19 RX ADMIN — ENOXAPARIN SODIUM 40 MG: 40 INJECTION SUBCUTANEOUS at 08:50

## 2022-04-19 RX ADMIN — AZITHROMYCIN 250 MG: 250 TABLET, FILM COATED ORAL at 08:50

## 2022-04-19 RX ADMIN — SODIUM CHLORIDE, PRESERVATIVE FREE 10 ML: 5 INJECTION INTRAVENOUS at 08:50

## 2022-04-19 RX ADMIN — CETIRIZINE HYDROCHLORIDE 10 MG: 10 TABLET ORAL at 08:50

## 2022-04-19 RX ADMIN — SUCRALFATE 1 G: 1 TABLET ORAL at 06:22

## 2022-04-19 RX ADMIN — LEVOTHYROXINE SODIUM 88 MCG: 88 TABLET ORAL at 06:22

## 2022-04-19 ASSESSMENT — ENCOUNTER SYMPTOMS
DIARRHEA: 0
VOICE CHANGE: 0
ABDOMINAL PAIN: 0
VOMITING: 0
COUGH: 1

## 2022-04-19 NOTE — PROGRESS NOTES
CLINICAL PHARMACY NOTE: MEDS TO BEDS    Total # of Prescriptions Filled: 2   The following medications were delivered to the patient:  · Azithromycin 250mg tablets  · Pantoprazole 40mg tablets    Additional Documentation: medications delivered to the pt in room 445 on 04.19.22 at 10:02, there were no visitors in the room at the time of delivery. No co pay.

## 2022-04-19 NOTE — CONSULTS
Attending Physician Statement:    I have discussed the care of  Nathaniel Sheikh , including pertinent history and exam findings, with the Cardiology fellow/resident. I have seen and examined the patient and the key elements of all parts of the encounter have been performed by me. I agree with the assessment, plan and orders as documented by the fellow/resident, after I modified exam findings and plan of treatments, and the final version is my approved version of the assessment. Additional Comments: chest pain-likely non-cardiac. Being treated for PNA. One troponin is negative. Obtain troponin. ECG-no ischemic changed noted. Richard Guerrero MD         [facility]    Consult / H&P               Today's Date: 4/19/2022  Patient Name: Nathaniel Sheikh  Date of admission: 4/16/2022 12:16 PM  Patient's age: 62 y.o., 1960  Admission Dx: Acute cystitis without hematuria [N30.00]  Pneumonia of right middle lobe due to infectious organism [J18.9]  Community acquired pneumonia of right middle lobe of lung [J18.9]    Reason for Consult:  Cardiac evaluation    Requesting Physician: Norris Holter, MD    CHIEF COMPLAINT: Shortness of breath    History Obtained From:  patient, electronic medical record    HISTORY OF PRESENT ILLNESS:      The patient is a 62 y.o.  female who is admitted to the hospital for treatment of pneumonia. Patient on treatment for mycoplasma pneumonia with azithromycin. Past medical history significant for essential hypertension, neuroendocrine tumor. Patient had sharp chest pain, nonexertional, not associated with dizziness, leg pain, nonradiating. EKG was done which is nonspecific T wave changes. Echo done in November 2021 with preserved EF.     Past Medical History:   has a past medical history of Anxiety, Arthritis, Asthma, Colon polyp, Colon polyps, Cyst of kidney, acquired, Fatigue, Hypertension, Hypothyroidism, Neuroendocrine tumor, Obesity, Pharyngoesophageal dysphagia, Vocal cord polyps, and Wears glasses. Past Surgical History:   has a past surgical history that includes cystourethroscopy/stent removal (05/03/2011); Colonoscopy (02/21/2019); ERCP; Cholecystectomy (10/09/2014); hip surgery (Left); Throat surgery; Colonoscopy; Upper gastrointestinal endoscopy (02/21/2019); Upper gastrointestinal endoscopy (09/23/2019); Upper gastrointestinal endoscopy (N/A, 09/23/2019); Upper gastrointestinal endoscopy (N/A, 10/23/2019); Upper gastrointestinal endoscopy (N/A, 10/29/2019); Endoscopic ultrasonography, GI (N/A, 01/22/2020); Upper gastrointestinal endoscopy (N/A, 04/26/2021); gastrectomy (N/A, 11/30/2020); Upper gastrointestinal endoscopy (N/A, 8/30/2021); and Upper gastrointestinal endoscopy (N/A, 2/7/2022). Home Medications:    Prior to Admission medications    Medication Sig Start Date End Date Taking? Authorizing Provider   pantoprazole (PROTONIX) 40 MG tablet Take 1 tablet by mouth 2 times daily (before meals) 4/19/22  Yes Aminta Min MD   azithromycin (ZITHROMAX) 250 MG tablet Take 1 tablet by mouth daily for 9 days 4/19/22 4/28/22 Yes Aminta Min MD   scopolamine (TRANSDERM-SCOP) transdermal patch Place 1 patch onto the skin every 72 hours 2/22/22   Benitez Minor MD   albuterol sulfate  (90 Base) MCG/ACT inhaler INHALE 2 PUFFS INTO THE LUNGS 4 TIMES DAILY AS NEEDED FOR WHEEZING.  2/10/22   Haider Mack MD   levothyroxine (SYNTHROID) 88 MCG tablet TAKE 1 TABLET BY MOUTH FIVE TIMES WEEKLY ONLY WED-SUN (SKIP MON AND TUES) 2/7/22   Benitez Minor MD   docusate sodium (COLACE) 100 MG capsule TAKE 1 CAPSULE BY MOUTH TWICE A DAY 2/7/22   Steff Carballo MD   fluticasone (FLONASE) 50 MCG/ACT nasal spray 2 sprays by Each Nostril route daily 12/2/21   Reva Dubois MD   metoprolol tartrate (LOPRESSOR) 25 MG tablet Take 1 tablet by mouth 2 times daily 11/24/21   [address] [place] [address] MD Eli   metoclopramide (REGLAN) 10 MG tablet TAKE 1 TABLET BY MOUTH 3 TIMES DAILY (BEFORE MEALS) 6/16/21   Sarah Gunter MD   sucralfate (CARAFATE) 1 GM tablet Take 1 tablet by mouth 4 times daily  Patient taking differently: Take 1 g by mouth in the morning and at bedtime  4/29/21   Jenn Orona MD   Multiple Vitamin (MULTI-DAY VITAMINS PO) Take 1 tablet by mouth daily     Historical Provider, MD      Current Facility-Administered Medications: vitamin D (ERGOCALCIFEROL) capsule 50,000 Units, 50,000 Units, Oral, Weekly  dextromethorphan-guaiFENesin (ROBITUSSIN-DM)  MG/5ML liquid 10 mL, 10 mL, Oral, Q4H PRN  pantoprazole (PROTONIX) tablet 40 mg, 40 mg, Oral, BID AC  cetirizine (ZYRTEC) tablet 10 mg, 10 mg, Oral, Daily  azithromycin (ZITHROMAX) tablet 250 mg, 250 mg, Oral, Daily  albuterol sulfate  (90 Base) MCG/ACT inhaler 2 puff, 2 puff, Inhalation, 4x Daily PRN  docusate sodium (COLACE) capsule 100 mg, 100 mg, Oral, BID PRN  fluticasone (FLONASE) 50 MCG/ACT nasal spray 2 spray, 2 spray, Each Nostril, Daily  levothyroxine (SYNTHROID) tablet 88 mcg, 88 mcg, Oral, Daily  metoprolol tartrate (LOPRESSOR) tablet 25 mg, 25 mg, Oral, BID  multivitamin 1 tablet, 1 tablet, Oral, Daily  sucralfate (CARAFATE) tablet 1 g, 1 g, Oral, 3 times per day  sodium chloride flush 0.9 % injection 5-40 mL, 5-40 mL, IntraVENous, 2 times per day  sodium chloride flush 0.9 % injection 5-40 mL, 5-40 mL, IntraVENous, PRN  0.9 % sodium chloride infusion, , IntraVENous, PRN  enoxaparin (LOVENOX) injection 40 mg, 40 mg, SubCUTAneous, Daily  acetaminophen (TYLENOL) tablet 650 mg, 650 mg, Oral, Q6H PRN **OR** acetaminophen (TYLENOL) suppository 650 mg, 650 mg, Rectal, Q6H PRN    Allergies:  Erythromycin    Social History:   reports that she quit smoking about 14 years ago. She has a 25.00 pack-year smoking history. She has never used smokeless tobacco. She reports previous alcohol use. She reports that she does not use drugs.      Family History: family history includes High Blood Pressure in her mother and sister; No Known Problems in her father; Other in her sister; Stomach Cancer in her sister. No h/o sudden cardiac death. No for premature CAD    REVIEW OF SYSTEMS:    · Constitutional: there has been no unanticipated weight loss. There's been No change in energy level, No change in activity level. · Eyes: No visual changes or diplopia. No scleral icterus. · ENT: No Headaches  · Cardiovascular: No cardiac history  · Respiratory: No previous pulmonary problems, No cough  · Gastrointestinal: No abdominal pain. No change in bowel or bladder habits. · Genitourinary: No dysuria, trouble voiding, or hematuria. · Musculoskeletal:  No gait disturbance, No weakness or joint complaints. · Integumentary: No rash or pruritis. · Neurological: No headache, diplopia, change in muscle strength, numbness or tingling. No change in gait, balance, coordination, mood, affect, memory, mentation, behavior. · Psychiatric: No anxiety, or depression. · Endocrine: No temperature intolerance. No excessive thirst, fluid intake, or urination. No tremor. · Hematologic/Lymphatic: No abnormal bruising or bleeding, blood clots or swollen lymph nodes. · Allergic/Immunologic: No nasal congestion or hives. PHYSICAL EXAM:      BP (!) 128/93   Pulse 76   Temp 99.6 °F (37.6 °C) (Oral)   Resp 16   Ht 5' 5\" (1.651 m)   Wt 164 lb (74.4 kg)   LMP 01/01/1994   SpO2 92%   BMI 27.29 kg/m²    Constitutional and General Appearance: alert, cooperative, no distress and appears stated age  [de-identified]: PERRL, no cervical lymphadenopathy. No masses palpable. Normal oral mucosa  Respiratory:  · Normal excursion and expansion without use of accessory muscles  · Resp Auscultation: Good respiratory effort. No for increased work of breathing.  On auscultation: clear to auscultation bilaterally  Cardiovascular:  · The apical impulse is not displaced  · Heart tones are crisp and normal. regular S1 and S2.  · Jugular venous pulsation Normal  · The carotid upstroke is normal in amplitude and contour without delay or bruit  · Peripheral pulses are symmetrical and full   Abdomen:   · No masses or tenderness  · Bowel sounds present  Extremities:  ·  No Cyanosis or Clubbing  ·  Lower extremity edema: No  ·  Skin: Warm and dry  Neurological:  · Alert and oriented. · Moves all extremities well  · No abnormalities of mood, affect, memory, mentation, or behavior are noted    Labs:     CBC:   Recent Labs     04/18/22  0348 04/19/22  0320   WBC 8.0 9.9   HGB 11.1* 11.3*   HCT 34.8* 34.3*    381     BMP:   Recent Labs     04/16/22  1317 04/17/22  0551    139   K 4.0 3.9   CO2 20 25   BUN 8 8   CREATININE 0.63 0.67   LABGLOM >60 >60   GLUCOSE 103* 82     BNP: No results for input(s): BNP in the last 72 hours. PT/INR:   Recent Labs     04/16/22  1317   PROTIME 10.6   INR 1.0     APTT:  Recent Labs     04/16/22  1317   APTT 31.4*     CARDIAC ENZYMES:No results for input(s): CKTOTAL, CKMB, CKMBINDEX, TROPONINI in the last 72 hours. FASTING LIPID PANEL:  Lab Results   Component Value Date    HDL 45 08/27/2020    TRIG 64 11/23/2021     LIVER PROFILE:  Recent Labs     04/16/22  1317   AST 15   ALT <5*   LABALBU 3.0*       IMPRESSION:    1. Mycoplasma pneumonia  2.  Atypical noncardiac chest pain    Patient Active Problem List   Diagnosis    Asthma    Anxiety    Obesity    Hyperlipidemia    Hypothyroidism    Colon polyps    Vertigo    Mass of left hip region    General weakness    Pharyngoesophageal dysphagia    Colon polyp    Neuroendocrine tumor    Chest pain    Shortness of breath    Anemia    GI bleed    Essential hypertension    Neuroendocrine neoplasm of stomach    Polyp, stomach    Melena    Gastric ulcer with hemorrhage    Constipation    Hypokalemia    Hypomagnesemia    Bilateral pulmonary embolism (HCC)    Abdominal pain, epigastric    Elevated d-dimer    Malignant carcinoid tumor of stomach (HCC)    Intractable vomiting    Intractable nausea and vomiting    S/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    Food intolerance    Severe malnutrition (HCC)    Sinus tachycardia    Weight loss    Projectile vomiting with nausea    On total parenteral nutrition    H/O blood clots    Pneumonia    Sepsis (HCC)    Bilious vomiting with nausea    Asymptomatic bacteriuria    Pulmonary infiltrates on CXR    Cough    Tachycardia, paroxysmal (HCC)    Pneumonia of right upper lobe due to Mycoplasma pneumoniae    Vitamin D deficiency       RECOMMENDATIONS:  1. Atypical noncardiac chest pain with nonspecific T wave changes. Will get troponin. If negative then no cardiac work-up. Patient to get outpatient stress test.    Will discuss with rounding attending  for final recommendations.     Karli White MD  Cardiology Service  Internal Medicine Residency Program, PGY-3  [facility]

## 2022-04-19 NOTE — PROGRESS NOTES
PULMONARY & CRITICAL CARE MEDICINE PROGRESS NOTE     Patient:  Suyapa Serrato  MRN: [id number]  6 [facility] date: 2022  Primary Care Physician: Judd Wade MD  Consulting Physician: Fidel Harkins MD  CODE Status: Full Code  LOS: 3     SUBJECTIVE     CHIEF COMPLAINT/REASON FOR INITIAL CONSULT: Bilateral pneumonia    BRIEF HOSPITAL COURSE:  The patient is a 62 y.o. female with past medical history of carcinoid syndrome, s/p resection, pulmonary nodules. She follows with Dr. Anthony Pacheco in the clinic, recently seen Diane Harris NP. She reports having cough and sputum for about 2 weeks and has completed 2 rounds of antibiotic without significant improvement. Recent CT showed bilateral consolidation. She is currently on azithromycin    INTERVAL HISTORY:  22  Patient is on room air  T-max is 99.1, white count is down to 8  Denies any overnight issues    Review of Systems   Constitutional: Positive for fatigue. Negative for fever. HENT: Negative for voice change. Eyes: Negative for visual disturbance. Respiratory: Positive for cough. Gastrointestinal: Negative for abdominal pain, diarrhea and vomiting. Genitourinary: Negative for dysuria and urgency. Musculoskeletal: Negative for joint swelling. Allergic/Immunologic: Negative for environmental allergies and immunocompromised state. Neurological: Positive for weakness. Hematological: Negative for adenopathy. Does not bruise/bleed easily. Psychiatric/Behavioral: Negative for behavioral problems. OBJECTIVE     PaO2/FiO2 RATIO:  No results for input(s): POCPO2 in the last 72 hours.          VITAL SIGNS:   LAST:  /75   Pulse 98   Temp 99.5 °F (37.5 °C) (Oral)   Resp 16   Ht 5' 5\" (1.651 m)   Wt 164 lb (74.4 kg)   LMP 1994   SpO2 92%   BMI 27.29 kg/m²   8-24 HR RANGE:  TEMP Temp  Av.2 °F (37.3 °C)  Min: 98.6 °F (37 °C)  Max: 99.6 °F (98.2 °C)   BP Systolic (56BKA), TCS:929 , Min:102 , FDU:999      Diastolic (36IWY), Av, Min:72, Max:93     PULSE Pulse  Av  Min: 72  Max: 98   RR Resp  Av  Min: 16  Max: 16   O2 SAT SpO2  Av %  Min: 92 %  Max: 92 %   OXYGEN DELIVERY No data recorded         Physical Exam  Constitutional:       General: She is not in acute distress. Appearance: She is not ill-appearing. HENT:      Head: Normocephalic and atraumatic. Mouth/Throat:      Mouth: Mucous membranes are moist.   Eyes:      Extraocular Movements: Extraocular movements intact. Pupils: Pupils are equal, round, and reactive to light. Cardiovascular:      Rate and Rhythm: Normal rate and regular rhythm. Heart sounds: No murmur heard. Pulmonary:      Effort: No accessory muscle usage or prolonged expiration. Breath sounds: Examination of the right-middle field reveals rales. Examination of the left-middle field reveals rales. Rales present. Abdominal:      General: There is no distension. Palpations: Abdomen is soft. There is no mass. Tenderness: There is no abdominal tenderness. Musculoskeletal:      Cervical back: Neck supple. Right lower leg: No edema. Left lower leg: No edema. Lymphadenopathy:      Cervical: No cervical adenopathy. Skin:     Coloration: Skin is not pale. Findings: No rash. Neurological:      General: No focal deficit present. Mental Status: She is oriented to person, place, and time.          DATA REVIEW     Medications: Current Inpatient  Scheduled Meds:   vitamin D  50,000 Units Oral Weekly    pantoprazole  40 mg Oral BID AC    cetirizine  10 mg Oral Daily    azithromycin  250 mg Oral Daily    fluticasone  2 spray Each Nostril Daily    levothyroxine  88 mcg Oral Daily    metoprolol tartrate  25 mg Oral BID    multivitamin  1 tablet Oral Daily    sucralfate  1 g Oral 3 times per day    sodium chloride flush  5-40 mL IntraVENous 2 times per day    enoxaparin  40 mg SubCUTAneous Daily     Continuous Infusions:   sodium chloride         INPUT/OUTPUT:  In: 1080 [P.O.:1080]  Out: 1401 [Urine:1401]  Date 04/19/22 0000 - 04/19/22 2359   Shift 9694-7489 4517-5122 9084-1671 24 Hour Total   INTAKE   Shift Total(mL/kg)       OUTPUT   Urine(mL/kg/hr) 801(1.3) 200  1001   Shift Total(mL/kg) 801(10.8) 200(2.7)  1001(13.5)   Weight (kg) 74.4 74.4 74.4 74.4        LABS:  ABGs:   No results for input(s): POCPH, POCPCO2, POCPO2, POCHCO3, ATTK1MOA in the last 72 hours. CBC:   Recent Labs     04/17/22  0551 04/18/22  0348 04/19/22  0320   WBC 12.0* 8.0 9.9   HGB 12.1 11.1* 11.3*   HCT 38.7 34.8* 34.3*   MCV 83.9 82.1* 82.1*    389 381   LYMPHOPCT 13* 23* 21*   RBC 4.61 4.24 4.18   MCH 26.2 26.2 27.0   MCHC 31.3 31.9 32.9   RDW 14.6* 14.9* 14.8*     CRP:   No results for input(s): CRP in the last 72 hours. LDH:   No results for input(s): LDH in the last 72 hours. BMP:   Recent Labs     04/17/22  0551      K 3.9      CO2 25   BUN 8   CREATININE 0.67   GLUCOSE 82     Liver Function Test:   No results for input(s): PROT, LABALBU, ALT, AST, GGT, ALKPHOS, BILITOT in the last 72 hours. Coagulation Profile:   No results for input(s): INR, PROTIME, APTT in the last 72 hours. D-Dimer:  No results for input(s): DDIMER in the last 72 hours. Lactic Acid:  No results for input(s): LACTA in the last 72 hours. Cardiac Enzymes:  No results for input(s): CKTOTAL, CKMB, CKMBINDEX, TROPONINI in the last 72 hours. Invalid input(s): TROPONIN, HSTROP  BNP/ProBNP:   No results for input(s): BNP, PROBNP in the last 72 hours. Triglycerides:  No results for input(s): TRIG in the last 72 hours.      Microbiology:  Urine Culture:  No components found for: CURINE  Blood Culture:  No components found for: CBLOOD, CFUNGUSBL  Sputum Culture:  No components found for: CSPUTUM  Recent Labs     04/17/22  0936   MPNEUM 1.38*     Recent Labs     04/17/22  0936   MPNEUM 1.38*            Radiology Reports:  XR CHEST (2 VW)   Final Result   No significant change. CT chest 4/4/22  1. Waxing and waning process.  Previous scattered nodules in the right and   left lower lobe and in the left upper lobe have resolved but there is new   large irregular patchy consolidation in the right upper lobe surrounding   pre-existing 13 mm nodule.  Additional new nodules up to 5 mm are present in   the middle lobe where previous 8 mm nodule has resolved.  Differential   considerations would include cryptogenic organizing pneumonia or chronic   eosinophilic pneumonia.  Malignancy considered less likely.  Continued   follow-up advised.  Consider three-month follow-up.  Tissue sampling may be   indicated. 2. Stable findings in the upper abdomen including renal cysts and   postsurgical changes at the GE junction. Echocardiogram:   Results for orders placed during the hospital encounter of 11/21/21    ECHO Complete 2D W Doppler W Color    Narrative    Summary  Normal LV size , mildly increased wall thickness. No obvious wall motion abnormality seen. Normal LV systolic function with LVEF >55%. Normal RV size and function. RV systolic pressure 29 mmHg  LA and RA appears normal in size. No obvious significant structural valvular abnormality noted. No significant valvular stenosis or regurgitation noted. Normal aortic root dimension. Small pericardial effusion noted. No obvious intra-cardiac mass or shunt noted. IVC normal diameter and inspiratory variation indicating normal RA filling  pressure. ASSESSMENT AND PLAN     Assessment:    // Bilateral pneumonia, positive mycoplasma IgM  // History of GERD  // History of carcinoid, s/p resection  // Chronic sinus condition    Plan:    I personally interviewed/examined the patient; reviewed interval history, interpreted all available radiographic and laboratory data at the time of service.      Patient is hemodynamically stable and is currently saturating well on room air  [employer] incentive spirometry/Acapella   Continue pulmonary toilet, aspiration precautions and bronchodilators   Tolerating oral diet   Stress ulcer prophylaxis   Chemical DVT prophylaxis as per protocol   Antimicrobials reviewed; on azithromycin   Will recommend repeating CT scan in 4 to 6 weeks   If infiltrates worsen or fail to improve, bronchoscopic evaluation can be considered at that point  [facility]    I would like to thank you for allowing me to participate in the care of this patient. Please feel free to call with any further questions or concerns. Bam Godoy MD  Pulmonary and Critical Care Medicine           4/19/2022, 2:55 PM    Please note that this chart was generated using voice recognition Dragon dictation software. Although every effort was made to ensure the accuracy of this automated transcription, some errors in transcription may have occurred.

## 2022-04-19 NOTE — PROGRESS NOTES
[facility]  Office: [address], DO, Glenda Mcconnell, DO, Jacoby Lorenzana, DO, Nickisimona Remedalejandrina Blood, DO, Mejia Merlos MD, Wesley Rojas MD, Rufus Bowie MD, Kirsten Tomas MD, Jorge Schmidt MD, Israel Arndt MD, Kavita Kathleen MD, Emi Norton, DO, Tomas Emery, DO, Rosa Torres MD,  Russell Krishnan, DO, Aminta Min MD, Melvern Essex, MD, Grayson Rivas MD, Kemal Sanchez, DO, Blanca Daniels MD, Kyle Stanton MD, Janice Ga [facility], [facility], [facility], Miranda Wood, [facility], Bucky Mazariegos, CNP, Shahla Sanchez, CNP, Elsie Casillas, CNP, Sally Mota, PA-C, Ron Pearson, [facility], Alejandro Matthew, CNP, Isabell Russell, CNP, Karina Mauricio, CNP, Ben Bojorquez, CNS, Ruben Nelson, [facility], Domonique Barron, CNP, Lilian Flaherty, CNP, Amilcar Mendoza, [facility]    Progress Note    4/19/2022    9:57 AM    Name:   Renuka Wolfe  MRN:     [id number]     [place]:      [de-identified]   Room:   [address] [place] [address] Day:  3  Admit Date:  4/16/2022 12:16 PM    PCP:   Kylah White MD  Code Status:  Full Code    Subjective:     C/C:   Chief Complaint   Patient presents with    Cough    Nasal Congestion      Interval History Status: not changed. Pt was seen and examined this morning  She states that she begins to have palpitations while at rest and is not sure why this has started to happen. No prior hx   No other complaints at this time    Review of Systems:     12 point ROS performed and negative for anything other than what was stated in subjective     Medications: Allergies:     Allergies   Allergen Reactions    Erythromycin Diarrhea       Current Meds:   Scheduled Meds:    vitamin D  50,000 Units Oral Weekly    pantoprazole  40 mg Oral BID AC    cetirizine  10 mg Oral Daily    azithromycin  250 mg Oral Daily    fluticasone  2 spray Each Nostril Daily    levothyroxine  88 mcg Oral Daily    metoprolol tartrate  25 mg Oral BID    multivitamin  1 tablet Oral Daily    sucralfate  1 g Oral 3 times per day    sodium chloride flush  5-40 mL IntraVENous 2 times per day    enoxaparin  40 mg SubCUTAneous Daily     Continuous Infusions:    sodium chloride       PRN Meds: dextromethorphan-guaiFENesin, albuterol sulfate HFA, docusate sodium, sodium chloride flush, sodium chloride, acetaminophen **OR** acetaminophen    Data:     Past Medical History:   has a past medical history of Anxiety, Arthritis, Asthma, Colon polyp, Colon polyps, Cyst of kidney, acquired, Fatigue, Hypertension, Hypothyroidism, Neuroendocrine tumor, Obesity, Pharyngoesophageal dysphagia, Vocal cord polyps, and Wears glasses. Social History:   reports that she quit smoking about 14 years ago. She has a 25.00 pack-year smoking history. She has never used smokeless tobacco. She reports previous alcohol use. She reports that she does not use drugs. Family History:   Family History   Problem Relation Age of Onset    High Blood Pressure Mother     High Blood Pressure Sister     Stomach Cancer Sister     Other Sister         epilepsy    No Known Problems Father        Vitals:  BP (!) 128/93   Pulse 76   Temp 99.6 °F (37.6 °C) (Oral)   Resp 16   Ht 5' 5\" (1.651 m)   Wt 164 lb (74.4 kg)   LMP 1994   SpO2 92%   BMI 27.29 kg/m²   Temp (24hrs), Av.1 °F (37.3 °C), Min:98.6 °F (37 °C), Max:99.6 °F (37.6 °C)    No results for input(s): POCGLU in the last 72 hours. I/O (24Hr):     Intake/Output Summary (Last 24 hours) at 2022 0957  Last data filed at 2022 0917  Gross per 24 hour   Intake 1080 ml   Output 1201 ml   Net -121 ml       Labs:  Hematology:  Recent Labs     22  1317 22  1317 22  0551 22  0348 22  0320   WBC 14.8*   < > 12.0* 8.0 9.9   RBC 4.74   < > 4.61 4.24 4.18   HGB 12.6   < > 12.1 11.1* 11.3*   HCT 39.3   < > 38.7 34.8* 34.3*   MCV 82.9   < > 83.9 82.1* 82.1*   MCH 26.6   < > 26.2 26.2 27.0 MCHC 32.1   < > 31.3 31.9 32.9   RDW 14.7*   < > 14.6* 14.9* 14.8*      < > 387 389 381   MPV 8.4   < > 8.3 8.6 8.3   INR 1.0  --   --   --   --     < > = values in this interval not displayed. Chemistry:  Recent Labs     04/16/22  1317 04/17/22  0551 04/17/22  1228    139  --    K 4.0 3.9  --     105  --    CO2 20 25  --    GLUCOSE 103* 82  --    BUN 8 8  --    CREATININE 0.63 0.67  --    ANIONGAP 15 9  --    LABGLOM >60 >60  --    GFRAA >60 >60  --    CALCIUM 8.7 8.9  --    TROPHS  --   --  <6   MYOGLOBIN  --   --  <21*     Recent Labs     04/16/22  1317 04/17/22  1228   PROT 7.0  --    LABALBU 3.0*  --    TSH  --  0.99   AST 15  --    ALT <5*  --    ALKPHOS 106*  --    BILITOT 0.26*  --      ABG:  Lab Results   Component Value Date    FIO2 NOT REPORTED 09/08/2021     Lab Results   Component Value Date/Time    SPECIAL R FA 10ML 04/16/2022 01:10 PM    SPECIAL L FA 10ML 04/16/2022 01:10 PM     Lab Results   Component Value Date/Time    CULTURE NO GROWTH 2 DAYS 04/16/2022 01:10 PM    CULTURE NO GROWTH 2 DAYS 04/16/2022 01:10 PM       Radiology:  XR CHEST (2 VW)    Result Date: 4/16/2022  No significant change.        Physical Examination:        General appearance:  alert, cooperative and no distress  Mental Status:  oriented to person, place and time and normal affect  Lungs:  clear to auscultation bilaterally, normal effort  Heart:  irregular rate and regular rhythm, no murmur  Abdomen:  soft, nontender, nondistended, normal bowel sounds  Extremities:  no edema, redness, tenderness in the calves  Skin:  no gross lesions, rashes, induration    Assessment:        Hospital Problems           Last Modified POA    * (Principal) Pneumonia of right upper lobe due to Mycoplasma pneumoniae 4/18/2022 Yes    Asymptomatic bacteriuria 4/17/2022 Yes    Neuroendocrine tumor 4/16/2022 Yes    Overview Signed 3/26/2019 10:44 AM by Peri Tracy LPN     of stomach         Essential hypertension 4/16/2022 Yes    Sepsis ([facility] [address] 4/18/2022 Yes    Pulmonary infiltrates on CXR 4/17/2022 Yes    Cough 4/17/2022 Yes    Tachycardia, paroxysmal ([address] 4/17/2022 Yes    Vitamin D deficiency 4/18/2022 Yes                      Plan: With mycoplasma IgM+, Sepsis secondary to Mycoplasma Pneumonia present at admission is confirmed. Improved now. Droplet precautions. Fleeting nodular and alveolar infiltrates noted on imaging, no eosinophilia,+chronic sinusitis symptoms: ? ANCA vasculitis   ANCA, WAQAR with reflux, urine protein-pending. continue Flonase and oral decongestant. continue PO azithromycin. Also describes significant gastric reflux symptoms: continue trial of PPI twice daily. +u/a with low grade bacteruria:patient is asymptomatic.monitor off abx coverage. May benefit from bronch-possibly OP-reached out to pulm, a/w eval.  -Recurrent palpitations with episode of tachycardia: Telemetry reviewed appears to be sinus, normal troponin, TSH.  EKG changes likely rate related to tachycardia. Stress test OP   recent echo reviewed unremarkable. Continue BB: patient apparently was taking lopressor twice a week. Discussed appropriate dosing.   -Start on vitamin D replacement. - continue thyroid replacement at home dose. -Continue p.o. Lopressor. -DVT prophylaxis. Plan to d/c to home on 10d course of Azithromycin when ok with pulm. and no plans for inpatient bronch. - patient continued to have tachyarrhythmic episodes. EKG showed changes. Cardiology consulted. Check Mg level. Telemetry.  Hold off on d/c until cleared by cardiology     Keshawn Philip MD  4/19/2022  9:57 AM

## 2022-04-19 NOTE — PROGRESS NOTES
[facility]  Documentation Note                Admission Dx: Acute cystitis without hematuria [N30.00]  Pneumonia of right middle lobe due to infectious organism [J18.9]  Community acquired pneumonia of right middle lobe of lung [J18.9]    Past Medical History:   has a past medical history of Anxiety, Arthritis, Asthma, Colon polyp, Colon polyps, Cyst of kidney, acquired, Fatigue, Hypertension, Hypothyroidism, Neuroendocrine tumor, Obesity, Pharyngoesophageal dysphagia, Vocal cord polyps, and Wears glasses. Previous Testing:     ECHO 11/22/2021: EF 55%, RVSP 29 mmHg, no regurg/stenosis. ECHO 4/27/2021: EF 59%, trivial MR/TR, RVSP is 22 mmHg, trivial PI.      ECHO 10/2/13: EF 55-60%, concentric LVH, no regurg/stenosis. Previous office/hospital visit:   Neto Aquino NP 4/27/2021:   1. Atypical chest pain  2. Hypertension  3. Hypothyroidism  4. 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 Pulmonary embolism on Eliquis    Plan --   6. Chest pain. Atypical chest pain free. Negative troponin. ECHO reviewed. 7. HTN controlled. Continue HCTZ. Will add BB  8. GI following for epigastric burning  9. No plans for ischemic work up. 10. Keep K > 4.0 and Mag > 2.0  K 3.5 and Mag  Replace K and mag.     11. Rest of care managed per Primary Team    Antionette Subramanian, [facility]

## 2022-04-20 LAB
EKG ATRIAL RATE: 94 BPM
EKG P AXIS: 34 DEGREES
EKG P-R INTERVAL: 138 MS
EKG Q-T INTERVAL: 326 MS
EKG QRS DURATION: 80 MS
EKG QTC CALCULATION (BAZETT): 407 MS
EKG R AXIS: -2 DEGREES
EKG T AXIS: 10 DEGREES
EKG VENTRICULAR RATE: 94 BPM
MYCOPLASMA PNEUMONIAE IGG: 1.78

## 2022-04-21 LAB
CULTURE: NORMAL
CULTURE: NORMAL
Lab: NORMAL
Lab: NORMAL
SPECIMEN DESCRIPTION: NORMAL
SPECIMEN DESCRIPTION: NORMAL

## 2022-04-22 LAB — NEUTROPHIL AB, FLOW: ABNORMAL

## 2022-04-23 NOTE — DISCHARGE SUMMARY
[facility]  Office: [address], DO, Ricki Zelaya, DO, Marito Shay, DO, Brandi Avila, DO, Erna George MD, Zachary Husain MD, Kristine Cardoso MD, Jeanine Monique MD, Melody Tomlin MD, Lynnette Rolle MD, Caity Shay MD, Francoise Atkins, DO, Arjun Rg, DO, Paul Mg MD,  Gay Dia, DO, Rosalva Loza MD, Hussein Barbosa MD, Wellington White MD, [facility], DO, Vanita Rubio MD, Flex Lundy MD, Eddie Schultz, [facility], [facility], CNP, Douglas Martinez, CNP, Edwin White, CNP, Isabella Orlando, CNP, Ana DeL eon, CNP, Pawel Newell PA-C, Anthony Reilly, [facility], Renetta Amanda, CNP, Steve Puentes, CNP, Yumiko Hall, CNP, Esmer Solis, CNS, Jorge Cordova, [facility], Sarah Hui, CNP, Aye Up, CNP, Mady Singh, [address]    Discharge Summary     Patient ID: Meena Waite  :  1960   MRN: [id number]     ACCOUNT:  [de-identified]   Patient's PCP: Irma Arizmendi MD  Admit Date: 2022   Discharge Date: 2022      Length of Stay: 3  Code Status:  Prior  Admitting Physician: Rosalva Loza MD  Discharge Physician: Rosalva Loza MD     Active Discharge Diagnoses:     Hospital Problem Lists:  Principal Problem:    Pneumonia of right upper lobe due to Mycoplasma pneumoniae  Active Problems:    Asymptomatic bacteriuria    Neuroendocrine tumor    Essential hypertension    Sepsis ([facility] [address]    Pulmonary infiltrates on CXR    Cough    Tachycardia, paroxysmal (HCC)    Vitamin D deficiency  Resolved Problems:    * No resolved hospital problems.  *      Admission Condition:  stable     Discharged Condition: stable    Hospital Stay:     Hospital Course:  Meena Waite is a 62 y.o. female who was admitted for the management of    Pneumonia of right upper lobe due to Mycoplasma pneumoniae , presented to ER with   Cough and Nasal Congestion     With mycoplasma IgM+, Sepsis secondary to Mycoplasma Pneumonia present at admission is confirmed. Improved now. Droplet precautions.   Fleeting nodular and alveolar infiltrates noted on imaging, no eosinophilia,+chronic sinusitis symptoms: ? ANCA vasculitis   ANCA, WAQAR with reflux, urine protein-pending. continue Flonase and oral decongestant.    continue PO azithromycin. Also describes significant gastric reflux symptoms: continue trial of PPI twice daily. +u/a with low grade bacteruria:patient is asymptomatic.monitor off abx coverage. May benefit from bronch-possibly OP-reached out to pulm, a/w eval.  -Recurrent palpitations with episode of tachycardia: Telemetry reviewed appears to be sinus, normal troponin, TSH.  EKG changes likely rate related to tachycardia. Stress test OP   recent echo reviewed unremarkable.    Continue BB: patient apparently was taking lopressor twice a week. Discussed appropriate dosing.   -Start on vitamin D replacement.   - continue thyroid replacement at home dose. -Continue p.o. Lopressor. -DVT prophylaxis. Plan to d/c to home on 10d course of Azithromycin when ok with pulm. and no plans for inpatient bronch.      - patient continued to have tachyarrhythmic episodes. EKG showed changes. Cardiology consulted. Check Mg level. Telemetry. Hold off on d/c until cleared by cardiology      - per cardio  RECOMMENDATIONS:  1. Atypical noncardiac chest pain with nonspecific T wave changes. Will get troponin. If negative then no cardiac work-up.   Patient to get outpatient stress test.     Will discuss with rounding attending  for final recommendations.     Monserrat Bryan MD    Significant therapeutic interventions:      Significant Diagnostic Studies:   Labs / Micro:  CBC:   Lab Results   Component Value Date    WBC 9.9 04/19/2022    RBC 4.18 04/19/2022    HGB 11.3 04/19/2022    HCT 34.3 04/19/2022    MCV 82.1 04/19/2022    MCH 27.0 04/19/2022    MCHC 32.9 04/19/2022    RDW 14.8 04/19/2022     04/19/2022     BMP:    Lab Results   Component Value Date    GLUCOSE 82 04/17/2022     04/17/2022    K 3.9 04/17/2022     04/17/2022    CO2 25 04/17/2022    ANIONGAP 9 04/17/2022    BUN 8 04/17/2022    CREATININE 0.67 04/17/2022    BUNCRER NOT REPORTED 02/11/2022    CALCIUM 8.9 04/17/2022    LABGLOM >60 04/17/2022    GFRAA >60 04/17/2022    GFR      04/17/2022     HFP:    Lab Results   Component Value Date    PROT 7.0 04/16/2022     CMP:    Lab Results   Component Value Date    GLUCOSE 82 04/17/2022     04/17/2022    K 3.9 04/17/2022     04/17/2022    CO2 25 04/17/2022    BUN 8 04/17/2022    CREATININE 0.67 04/17/2022    ANIONGAP 9 04/17/2022    ALKPHOS 106 04/16/2022    ALT <5 04/16/2022    AST 15 04/16/2022    BILITOT 0.26 04/16/2022    LABALBU 3.0 04/16/2022    ALBUMIN 0.8 04/16/2022    LABGLOM >60 04/17/2022    GFRAA >60 04/17/2022    GFR      04/17/2022    PROT 7.0 04/16/2022    CALCIUM 8.9 04/17/2022     PT/INR:    Lab Results   Component Value Date    PROTIME 10.6 04/16/2022    INR 1.0 04/16/2022     PTT:   Lab Results   Component Value Date    APTT 31.4 04/16/2022     FLP:    Lab Results   Component Value Date    CHOL 182 08/27/2020    TRIG 64 11/23/2021    HDL 45 08/27/2020     U/A:    Lab Results   Component Value Date    COLORU Yellow 04/16/2022    TURBIDITY Clear 04/16/2022    SPECGRAV 1.018 04/16/2022    HGBUR NEGATIVE 04/16/2022    PHUR 5.0 04/16/2022    PROTEINU 1+ 04/16/2022    GLUCOSEU NEGATIVE 04/16/2022    KETUA NEGATIVE 04/16/2022    BILIRUBINUR NEGATIVE 04/16/2022    UROBILINOGEN Normal 04/16/2022    NITRU NEGATIVE 04/16/2022    LEUKOCYTESUR SMALL 04/16/2022     TSH:    Lab Results   Component Value Date    TSH 0.99 04/17/2022         Radiology:  XR CHEST (2 VW)    Result Date: 4/16/2022  No significant change.        Consultations:    Consults:     Final Specialist Recommendations/Findings:   IP CONSULT TO PULMONOLOGY  IP CONSULT TO CARDIOLOGY      The patient was seen and examined on day of discharge and this discharge summary is in conjunction with any daily progress note from day of discharge. Discharge plan:     Disposition: Home    Physician Follow Up:    follow up with pcp in one week  F/u dr Destiny Lipscomb in one week       Requiring Further Evaluation/Follow Up POST HOSPITALIZATION/Incidental Findings:      Diet: cardiac diet    Activity: As tolerated     Instructions to Patient:      Discharge Medications:      Medication List      START taking these medications    azithromycin 250 MG tablet  Commonly known as: ZITHROMAX  Take 1 tablet by mouth daily for 9 days     pantoprazole 40 MG tablet  Commonly known as: PROTONIX  Take 1 tablet by mouth 2 times daily (before meals)        CHANGE how you take these medications    sucralfate 1 GM tablet  Commonly known as: CARAFATE  Take 1 tablet by mouth 4 times daily  What changed: when to take this        CONTINUE taking these medications    albuterol sulfate  (90 Base) MCG/ACT inhaler  INHALE 2 PUFFS INTO THE LUNGS 4 TIMES DAILY AS NEEDED FOR WHEEZING.      docusate sodium 100 MG capsule  Commonly known as: COLACE  TAKE 1 CAPSULE BY MOUTH TWICE A DAY     fluticasone 50 MCG/ACT nasal spray  Commonly known as: FLONASE  2 sprays by Each Nostril route daily     levothyroxine 88 MCG tablet  Commonly known as: SYNTHROID  TAKE 1 TABLET BY MOUTH FIVE TIMES WEEKLY ONLY WED-SUN (SKIP MON AND TUES)     metoclopramide 10 MG tablet  Commonly known as: REGLAN  TAKE 1 TABLET BY MOUTH 3 TIMES DAILY (BEFORE MEALS)     metoprolol tartrate 25 MG tablet  Commonly known as: LOPRESSOR  Take 1 tablet by mouth 2 times daily     MULTI-DAY VITAMINS PO     scopolamine transdermal patch  Commonly known as: TRANSDERM-SCOP  Place 1 patch onto the skin every 72 hours        STOP taking these medications    levoFLOXacin 500 MG tablet  Commonly known as: Levaquin           Where to Get Your Medications      These medications were sent to [address] [facility], [address] [place] [address]    Phone: [phone]   · azithromycin 250 MG tablet  · pantoprazole 40 MG tablet         No discharge procedures on file. Time Spent on discharge is  34 mins in patient examination, evaluation, counseling as well as medication reconciliation, prescriptions for required medications, discharge plan and follow up. Electronically signed by   Mert Madden MD  4/23/2022  9:03 AM      Thank you Dr. Victorina Rodas MD for the opportunity to be involved in this patient's care.

## 2022-05-02 ENCOUNTER — OFFICE VISIT (OUTPATIENT)
Dept: INTERNAL MEDICINE CLINIC | Age: 62
End: 2022-05-02
Payer: COMMERCIAL

## 2022-05-02 VITALS
BODY MASS INDEX: 28.16 KG/M2 | HEART RATE: 61 BPM | DIASTOLIC BLOOD PRESSURE: 74 MMHG | RESPIRATION RATE: 17 BRPM | TEMPERATURE: 97.4 F | OXYGEN SATURATION: 100 % | WEIGHT: 169 LBS | SYSTOLIC BLOOD PRESSURE: 112 MMHG | HEIGHT: 65 IN

## 2022-05-02 DIAGNOSIS — R91.8 MULTIPLE LUNG NODULES ON CT: ICD-10-CM

## 2022-05-02 DIAGNOSIS — J16.0 PNEUMONIA OF RIGHT UPPER LOBE DUE TO CHLAMYDIA SPECIES: Primary | ICD-10-CM

## 2022-05-02 DIAGNOSIS — Z09 HOSPITAL DISCHARGE FOLLOW-UP: ICD-10-CM

## 2022-05-02 DIAGNOSIS — I10 ESSENTIAL HYPERTENSION: ICD-10-CM

## 2022-05-02 DIAGNOSIS — K91.1 DUMPING SYNDROME: ICD-10-CM

## 2022-05-02 PROCEDURE — 1111F DSCHRG MED/CURRENT MED MERGE: CPT | Performed by: INTERNAL MEDICINE

## 2022-05-02 PROCEDURE — 99215 OFFICE O/P EST HI 40 MIN: CPT | Performed by: INTERNAL MEDICINE

## 2022-05-02 RX ORDER — TRAZODONE HYDROCHLORIDE 50 MG/1
50 TABLET ORAL NIGHTLY PRN
Qty: 30 TABLET | Refills: 0 | Status: SHIPPED | OUTPATIENT
Start: 2022-05-02 | End: 2022-06-14

## 2022-05-02 ASSESSMENT — PATIENT HEALTH QUESTIONNAIRE - PHQ9
SUM OF ALL RESPONSES TO PHQ QUESTIONS 1-9: 0
SUM OF ALL RESPONSES TO PHQ QUESTIONS 1-9: 0
2. FEELING DOWN, DEPRESSED OR HOPELESS: 0
SUM OF ALL RESPONSES TO PHQ QUESTIONS 1-9: 0
1. LITTLE INTEREST OR PLEASURE IN DOING THINGS: 0
SUM OF ALL RESPONSES TO PHQ QUESTIONS 1-9: 0
SUM OF ALL RESPONSES TO PHQ9 QUESTIONS 1 & 2: 0

## 2022-05-02 NOTE — PROGRESS NOTES
Post-Discharge Transitional Care Follow Up      Amy Lynn   YOB: 1960    Date of Office Visit:  5/2/2022  Date of Hospital Admission: 4/16/22  Date of Hospital Discharge: 4/19/22  Readmission Risk Score (high >=14%. Medium >=10%):Readmission Risk Score: 13.4 ( )      Care management risk score Rising risk (score 2-5) and Complex Care (Scores >=6): 2     Non face to face  following discharge, date last encounter closed (first attempt may have been earlier): *No documented post hospital discharge outreach found in the last 14 days     Call initiated 2 business days of discharge: *No response recorded in the last 14 days     Pneumonia of right upper lobe due to Chlamydia species  Essential hypertension  Multiple lung nodules on CT  Dumping syndrome  Hospital discharge follow-up  -     NM DISCHARGE MEDS RECONCILED W/ CURRENT OUTPATIENT MED LIST      Medical Decision Making: high complexity  No follow-ups on file. On this date 5/2/2022 I have spent 30 minutes reviewing previous notes, test results and face to face with the patient discussing the diagnosis and importance of compliance with the treatment plan as well as documenting on the day of the visit. Subjective:   HPI   Here for follow-up from the hospital admission for pneumonia and patient now feeling better and also wants to discuss about dumping syndrome as patient having acid reflux and also feels drowsy and lousy when she eats high sugar content and a large meal and patient's weight remained stable and is told by GI not to follow-up unless there is a need and patient will be following with pulmonary in few months    Inpatient course: Discharge summary reviewed- see chart.     Interval history/Current status: Patient is off antibiotics and doing better clinically improved and advised her to continue current treatment and also explained to her about dumping syndrome and advised her to eat small frequent meals and also avoid sugar containing foods and patient verbalized understanding and patient's weight remained stable at 169 compared to 168 in January  Review as scheduled    Patient Active Problem List   Diagnosis    Asthma    Anxiety    Obesity    Hyperlipidemia    Hypothyroidism    Colon polyps    Vertigo    Mass of left hip region    General weakness    Pharyngoesophageal dysphagia    Colon polyp    Neuroendocrine tumor    Chest pain    Shortness of breath    Anemia    GI bleed    Essential hypertension    Neuroendocrine neoplasm of stomach    Polyp, stomach    Melena    Gastric ulcer with hemorrhage    Constipation    Hypokalemia    Hypomagnesemia    Bilateral pulmonary embolism (HCC)    Abdominal pain, epigastric    Elevated d-dimer    Malignant carcinoid tumor of stomach (HCC)    Intractable vomiting    Intractable nausea and vomiting    S/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    Food intolerance    Severe malnutrition (HCC)    Sinus tachycardia    Weight loss    Projectile vomiting with nausea    On total parenteral nutrition    H/O blood clots    Pneumonia    Sepsis (HCC)    Bilious vomiting with nausea    Asymptomatic bacteriuria    Pulmonary infiltrates on CXR    Cough    Tachycardia, paroxysmal (HCC)    Pneumonia of right upper lobe due to Mycoplasma pneumoniae    Vitamin D deficiency       Medications listed as ordered at the time of discharge from hospital     Medication List          Accurate as of May 2, 2022  1:00 PM. If you have any questions, ask your nurse or doctor.             START taking these medications    traZODone 50 MG tablet  Commonly known as: DESYREL  Take 1 tablet by mouth nightly as needed for Sleep  Started by: Ravindra Carlos MD        CONTINUE taking these medications    docusate sodium 100 MG capsule  Commonly known as: COLACE  TAKE 1 CAPSULE BY MOUTH TWICE A DAY     fluticasone 50 MCG/ACT nasal spray  Commonly known as: FLONASE  2 sprays by Each Nostril route daily     metoclopramide 10 MG tablet  Commonly known as: REGLAN  TAKE 1 TABLET BY MOUTH 3 TIMES DAILY (BEFORE MEALS)     metoprolol tartrate 25 MG tablet  Commonly known as: LOPRESSOR  Take 1 tablet by mouth 2 times daily     MULTI-DAY VITAMINS PO        STOP taking these medications    albuterol sulfate  (90 Base) MCG/ACT inhaler  Stopped by: Victorina Rodas MD     levothyroxine 88 MCG tablet  Commonly known as: SYNTHROID  Stopped by: Victorina Rodas MD     pantoprazole 40 MG tablet  Commonly known as: PROTONIX  Stopped by: Victorina Rodas MD     scopolamine transdermal patch  Commonly known as: TRANSDERM-SCOP  Stopped by: Victorina Rodas MD     sucralfate 1 GM tablet  Commonly known as: CARAFATE  Stopped by: Victorina Rodas MD           Where to Get Your Medications      These medications were sent to [address] [place] [address] [address]  [address]    Hours: 24-hours Phone: [phone]   · traZODone 50 MG tablet          Medications marked \"taking\" at this time  Outpatient Medications Marked as Taking for the 5/2/22 encounter (Office Visit) with Starlyn Severs, MD   Medication Sig Dispense Refill    traZODone (DESYREL) 50 MG tablet Take 1 tablet by mouth nightly as needed for Sleep 30 tablet 0    docusate sodium (COLACE) 100 MG capsule TAKE 1 CAPSULE BY MOUTH TWICE A DAY 60 capsule 0    fluticasone (FLONASE) 50 MCG/ACT nasal spray 2 sprays by Each Nostril route daily 16 g 5    metoprolol tartrate (LOPRESSOR) 25 MG tablet Take 1 tablet by mouth 2 times daily 180 tablet 1    metoclopramide (REGLAN) 10 MG tablet TAKE 1 TABLET BY MOUTH 3 TIMES DAILY (BEFORE MEALS) 90 tablet 5    Multiple Vitamin (MULTI-DAY VITAMINS PO) Take 1 tablet by mouth daily           Medications patient taking as of now reconciled against medications ordered at time of hospital discharge: Yes    Review of Systems    Objective:    /74 (Site: Right Upper Arm, Position: Sitting, Cuff Size: Medium Adult)   Pulse 61   Temp 97.4 °F (36.3 °C) (Temporal)   Resp 17   Ht 5' 5\" (1.651 m)   Wt 169 lb (76.7 kg)   LMP 01/01/1994   SpO2 100%   Breastfeeding No   BMI 28.12 kg/m²   Physical Exam   HEENT atraumatic normocephalic throat clear  Neck no bruits no JVD  Eyes clear no drainage  Heart S1-S2 of normal intensity  Lungs clear to auscultation percussion unlabored breathing  Abdomen soft nontender bowel sounds are heard  Extremities no edema no calf tenderness      An electronic signature was used to authenticate this note.   --Paulett Siemens, MD

## 2022-05-04 ENCOUNTER — TELEPHONE (OUTPATIENT)
Dept: INTERNAL MEDICINE CLINIC | Age: 62
End: 2022-05-04

## 2022-05-04 DIAGNOSIS — E03.9 HYPOTHYROIDISM, UNSPECIFIED TYPE: Primary | ICD-10-CM

## 2022-05-04 NOTE — TELEPHONE ENCOUNTER
Patient here on 5/2/22 says that you stopped her synthroid? She is calling asking if this is correct and if not please send a refill to her pharmacy?     Please advise

## 2022-05-05 ENCOUNTER — HOSPITAL ENCOUNTER (OUTPATIENT)
Age: 62
Setting detail: SPECIMEN
Discharge: HOME | End: 2022-05-05

## 2022-05-05 LAB
ALT SERPL-CCNC: 14 U/L (ref 5–33)
AST SERPL-CCNC: 27 U/L
CHOLESTEROL, FASTING: 169 MG/DL
CHOLESTEROL/HDL RATIO: 2.7
HDLC SERPL-MCNC: 63 MG/DL
LDL CHOLESTEROL: 95 MG/DL (ref 0–130)
TRIGLYCERIDE, FASTING: 56 MG/DL

## 2022-05-05 RX ORDER — LEVOTHYROXINE SODIUM 88 UG/1
TABLET ORAL
Qty: 30 TABLET | Refills: 2 | Status: SHIPPED | OUTPATIENT
Start: 2022-05-05 | End: 2022-08-03

## 2022-05-05 NOTE — TELEPHONE ENCOUNTER
Pt was taking Synthroid 5 times a week Wed-Sun before stopping. Did you want pt to continue with 5 times a week?

## 2022-05-17 PROBLEM — R05.9 COUGH: Status: RESOLVED | Noted: 2022-04-17 | Resolved: 2022-05-17

## 2022-05-25 RX ORDER — TRAZODONE HYDROCHLORIDE 50 MG/1
TABLET ORAL
Qty: 30 TABLET | Refills: 0 | OUTPATIENT
Start: 2022-05-25

## 2022-06-10 RX ORDER — DOCUSATE SODIUM 100 MG/1
CAPSULE, LIQUID FILLED ORAL
Qty: 60 CAPSULE | Refills: 5 | Status: SHIPPED | OUTPATIENT
Start: 2022-06-10

## 2022-06-14 RX ORDER — TRAZODONE HYDROCHLORIDE 50 MG/1
TABLET ORAL
Qty: 90 TABLET | Refills: 0 | Status: SHIPPED | OUTPATIENT
Start: 2022-06-14 | End: 2022-09-13

## 2022-06-14 NOTE — TELEPHONE ENCOUNTER
Alexis Dykes is calling to request a refill on the following medication(s):    Medication Request:    Last filled 5/2/22 #30 with 0 RF      Requested Prescriptions     Pending Prescriptions Disp Refills    traZODone (DESYREL) 50 MG tablet [Pharmacy Med Name: TRAZODONE 50 MG TABLET] 30 tablet 0     Sig: TAKE 1 TABLET BY MOUTH EVERY DAY AT BEDTIME AS NEEDED FOR SLEEP       Last Visit Date (If Applicable):  5/1/6140    Next Visit Date:    Visit date not found

## 2022-06-15 NOTE — ED NOTES
Unsuccessful IV attempt by Jorge Zhang. [facility] MEGGAN GRUBBS at bedside with ultrasound for IVC placement.      Keturah Chavez RN  08/13/21 1417 Improved

## 2022-06-17 ENCOUNTER — HOSPITAL ENCOUNTER (OUTPATIENT)
Age: 62
Setting detail: SPECIMEN
Discharge: HOME | End: 2022-06-17

## 2022-06-17 DIAGNOSIS — E03.9 HYPOTHYROIDISM, UNSPECIFIED TYPE: ICD-10-CM

## 2022-06-17 LAB
THYROXINE, FREE: 0.76 NG/DL (ref 0.93–1.7)
TSH SERPL DL<=0.05 MIU/L-ACNC: 2.57 UIU/ML (ref 0.3–5)

## 2022-06-30 ENCOUNTER — HOSPITAL ENCOUNTER (OUTPATIENT)
Age: 62
Discharge: HOME | End: 2022-06-30
Payer: COMMERCIAL

## 2022-06-30 DIAGNOSIS — C7A.092 MALIGNANT CARCINOID TUMOR OF STOMACH (HCC): ICD-10-CM

## 2022-06-30 LAB
ABSOLUTE EOS #: 0.09 K/UL (ref 0–0.44)
ABSOLUTE IMMATURE GRANULOCYTE: 0.01 K/UL (ref 0–0.3)
ABSOLUTE LYMPH #: 2.16 K/UL (ref 1.1–3.7)
ABSOLUTE MONO #: 0.34 K/UL (ref 0.1–1.2)
BASOPHILS # BLD: 0 % (ref 0–2)
BASOPHILS ABSOLUTE: <0.03 K/UL (ref 0–0.2)
EOSINOPHILS RELATIVE PERCENT: 2 % (ref 1–4)
FERRITIN: 20 NG/ML (ref 13–150)
HCT VFR BLD CALC: 40.8 % (ref 36.3–47.1)
HEMOGLOBIN: 12.3 G/DL (ref 11.9–15.1)
IMMATURE GRANULOCYTES: 0 %
IRON SATURATION: 14 % (ref 20–55)
IRON: 53 UG/DL (ref 37–145)
LYMPHOCYTES # BLD: 42 % (ref 24–43)
MCH RBC QN AUTO: 27.6 PG (ref 25.2–33.5)
MCHC RBC AUTO-ENTMCNC: 30.1 G/DL (ref 28.4–34.8)
MCV RBC AUTO: 91.5 FL (ref 82.6–102.9)
MONOCYTES # BLD: 7 % (ref 3–12)
NRBC AUTOMATED: 0 PER 100 WBC
PDW BLD-RTO: 15.9 % (ref 11.8–14.4)
PLATELET # BLD: 271 K/UL (ref 138–453)
PMV BLD AUTO: 9.4 FL (ref 8.1–13.5)
RBC # BLD: 4.46 M/UL (ref 3.95–5.11)
RBC # BLD: ABNORMAL 10*6/UL
SEG NEUTROPHILS: 49 % (ref 36–65)
SEGMENTED NEUTROPHILS ABSOLUTE COUNT: 2.5 K/UL (ref 1.5–8.1)
TOTAL IRON BINDING CAPACITY: 369 UG/DL (ref 250–450)
UNSATURATED IRON BINDING CAPACITY: 316 UG/DL (ref 112–347)
WBC # BLD: 5.1 K/UL (ref 3.5–11.3)

## 2022-06-30 PROCEDURE — 85025 COMPLETE CBC W/AUTO DIFF WBC: CPT

## 2022-06-30 PROCEDURE — 83540 ASSAY OF IRON: CPT

## 2022-06-30 PROCEDURE — 36415 COLL VENOUS BLD VENIPUNCTURE: CPT

## 2022-06-30 PROCEDURE — 86316 IMMUNOASSAY TUMOR OTHER: CPT

## 2022-06-30 PROCEDURE — 82728 ASSAY OF FERRITIN: CPT

## 2022-06-30 PROCEDURE — 84260 ASSAY OF SEROTONIN: CPT

## 2022-06-30 PROCEDURE — 83550 IRON BINDING TEST: CPT

## 2022-07-03 LAB — SEROTONIN BLOOD: 306 NG/ML (ref 50–220)

## 2022-07-04 LAB — CHROMOGRANIN A: 54 NG/ML (ref 0–103)

## 2022-07-06 ENCOUNTER — HOSPITAL ENCOUNTER (OUTPATIENT)
Facility: MEDICAL CENTER | Age: 62
End: 2022-07-06

## 2022-07-07 ENCOUNTER — TELEPHONE (OUTPATIENT)
Dept: ONCOLOGY | Age: 62
End: 2022-07-07

## 2022-07-07 ENCOUNTER — OFFICE VISIT (OUTPATIENT)
Dept: ONCOLOGY | Age: 62
End: 2022-07-07
Payer: MEDICAID

## 2022-07-07 VITALS
WEIGHT: 181.9 LBS | DIASTOLIC BLOOD PRESSURE: 103 MMHG | SYSTOLIC BLOOD PRESSURE: 159 MMHG | TEMPERATURE: 97.4 F | BODY MASS INDEX: 30.27 KG/M2 | HEART RATE: 56 BPM | RESPIRATION RATE: 16 BRPM

## 2022-07-07 DIAGNOSIS — C7A.092 MALIGNANT CARCINOID TUMOR OF STOMACH (HCC): Primary | ICD-10-CM

## 2022-07-07 PROCEDURE — 1036F TOBACCO NON-USER: CPT | Performed by: INTERNAL MEDICINE

## 2022-07-07 PROCEDURE — G8417 CALC BMI ABV UP PARAM F/U: HCPCS | Performed by: INTERNAL MEDICINE

## 2022-07-07 PROCEDURE — G8427 DOCREV CUR MEDS BY ELIG CLIN: HCPCS | Performed by: INTERNAL MEDICINE

## 2022-07-07 PROCEDURE — 99211 OFF/OP EST MAY X REQ PHY/QHP: CPT | Performed by: INTERNAL MEDICINE

## 2022-07-07 PROCEDURE — 99214 OFFICE O/P EST MOD 30 MIN: CPT | Performed by: INTERNAL MEDICINE

## 2022-07-07 PROCEDURE — 3017F COLORECTAL CA SCREEN DOC REV: CPT | Performed by: INTERNAL MEDICINE

## 2022-07-07 NOTE — PROGRESS NOTES
_           Chief Complaint   Patient presents with    Follow-up    Discuss Labs     DIAGNOSIS:       Malignant carcinoid tumor of the stomach  Recent GI bleeding after endoscopic mucosal resection of stomach carcinoid on October 23, 2019. Evidence of recurrent disease on repeated EGD January 2020 and continued elevation of tumor marker chromogranin A  Iron deficiency secondary to above  History of hypothyroidism  Multiple comorbidities as listed      CURRENT THERAPY:         Status post endoscopic mucosal resection of stomach carcinoid October 23, 2019  S/p gastric resection at [facility] November 20, 2020. BRIEF CASE HISTORY:      Ms. Vaishnavi Shearer is a very pleasant 62 y.o. female with history of multiple comorbidities as listed. The patient seen because of recent diagnosis of carcinoid tumor. Patient had problem with dysphagia for about 4 to 5 months. That problem resolved spontaneously. She was evaluated by gastroenterology. She had EGD in September 2019. She was noted to have gastric tumor which was biopsied and was positive for malignant neuroendocrine tumor. Subsequently patient was evaluated by abdominal MRI. Patient was having no evidence of gastric disease or gastric wall deep penetration. She underwent endoscopic mucosal resection October 23, 2019. Patient had recent admission to [facility] because of GI bleeding. She had EGD again and cauterization of bleeding. She is having weakness and fatigue. No other symptoms. No abdominal pain or cramps. No hot flashes or night sweats. No weight loss or decreased appetite. No wheezing. The patient had lab testing in July 2019 with chromogranin A level 1500. Patient was not aware of that test.  Patient's twin sister had carcinoid tumor more than 20 years ago. She had gastric resection at that time and there is no recurrence.   Patient smokes half pack per day for the last 40 years. Social alcohol drinking. Patient carcinoid tumor was resected October 23, 2019. She has repeated EGD in January 2020 and she was found to have local recurrence with multiple lesions. She was referred to [facility] clinic. She had complete gastric resection 11/30/2020. Following surgery, she had multiple hospitalizations due to dehydration and persistent N/V and dehydration. INTERIM HISTORY:   The patient seen for follow-up gastric carcinoid. Clinically she is doing very well. Nocturnal pain. No nausea or vomiting. No GI bleeding. No abdominal cramps. No diarrhea. No chest pain. No shortness of breath. No wheezing. Patient is gaining weight. He feels much much better. PAST MEDICAL HISTORY: has a past medical history of Anxiety, Arthritis, Asthma, Colon polyp, Colon polyps, Cyst of kidney, acquired, Fatigue, Hypertension, Hypothyroidism, Neuroendocrine tumor, Obesity, Pharyngoesophageal dysphagia, Vocal cord polyps, and Wears glasses. PAST SURGICAL HISTORY: has a past surgical history that includes cystourethroscopy/stent removal (05/03/2011); Colonoscopy (02/21/2019); ERCP; Cholecystectomy (10/09/2014); hip surgery (Left); Throat surgery; Colonoscopy; Upper gastrointestinal endoscopy (02/21/2019); Upper gastrointestinal endoscopy (09/23/2019); Upper gastrointestinal endoscopy (N/A, 09/23/2019); Upper gastrointestinal endoscopy (N/A, 10/23/2019); Upper gastrointestinal endoscopy (N/A, 10/29/2019); Endoscopic ultrasonography, GI (N/A, 01/22/2020); Upper gastrointestinal endoscopy (N/A, 04/26/2021); gastrectomy (N/A, 11/30/2020); Upper gastrointestinal endoscopy (N/A, 8/30/2021); and Upper gastrointestinal endoscopy (N/A, 2/7/2022).      CURRENT MEDICATIONS:  has a current medication list which includes the following prescription(s): triamcinolone, trazodone, docusate sodium, levothyroxine, fluticasone, metoprolol tartrate, metoclopramide, and multiple vitamin. ALLERGIES:  is allergic to erythromycin. FAMILY HISTORY: Patient's twin sister had gastric carcinoid more than 20 years ago status post partial gastric resection with no recurrence. Otherwise negative for any hematological or oncological conditions. SOCIAL HISTORY:  reports that she quit smoking about 14 years ago. She has a 25.00 pack-year smoking history. She has never used smokeless tobacco. She reports previous alcohol use. She reports that she does not use drugs. REVIEW OF SYSTEMS:     · General: Positive for weakness and fatigue. + unanticipated weight loss. No fever or chills. · Eyes: No blurred vision, eye pain or double vision. · Ears: No hearing problems or drainage. No tinnitus. · Throat: No sore throat, problems with swallowing or dysphagia. · Respiratory: As above. · Cardiovascular: No chest pain, orthopnea or PND. No lower extremity edema. No palpitation. · Gastrointestinal: As above. · Genitourinary: No dysuria, hematuria, frequency or urgency. · Musculoskeletal: No muscle aches or pains. No limitation of movement. No back pain. No gait disturbance, No joint complaints. · Dermatologic: No skin rashes or pruritus. No skin lesions or discolorations. · Psychiatric: No depression, anxiety, or stress or signs of schizophrenia. No change in mood or affect. · Hematologic: No history of bleeding tendency. No bruises or ecchymosis. No history of clotting problems. · Infectious disease: No fever, chills or frequent infections. · Endocrine: No problems with obesity. No polydipsia or polyuria. No temperature intolerance. · Neurologic: No headaches or dizziness. No weakness or numbness of the extremities. No changes in balance, coordination,  memory, mentation, behavior. · Allergic/Immunologic: No nasal congestion or hives. No repeated infections. PHYSICAL EXAM:  The patient is not in acute distress.   Vital signs: Blood pressure (!) 159/103, pulse 56, temperature 97.4 °F (36.3 °C), temperature source Temporal, resp. rate 16, weight 181 lb 14.4 oz (82.5 kg), last menstrual period 01/01/1994, not currently breastfeeding. General appearance - well appearing, not in pain or distress  Mental status - good mood, alert and oriented  Eyes - pupils equal and reactive, extraocular eye movements intact  Ears - bilateral TM's and external ear canals normal  Nose - normal and patent, no erythema, discharge or polyps  Mouth - mucous membranes moist, pharynx normal without lesions  Neck - supple, no significant adenopathy  Lymphatics - no palpable lymphadenopathy, no hepatosplenomegaly  Chest -bilateral rhonchi.   Heart - normal rate, regular rhythm, normal S1, S2, no murmurs, rubs, clicks or gallops  Abdomen - soft, nontender, nondistended, no masses or organomegaly  Neurological - alert, oriented, normal speech, no focal findings or movement disorder noted  Musculoskeletal - no joint tenderness, deformity or swelling  Extremities - peripheral pulses normal, no pedal edema, no clubbing or cyanosis  Skin - normal coloration and turgor, no rashes, no suspicious skin lesions noted     Review of Diagnostic data:   Lab Results   Component Value Date    WBC 5.1 06/30/2022    HGB 12.3 06/30/2022    HCT 40.8 06/30/2022    MCV 91.5 06/30/2022     06/30/2022       Chemistry        Component Value Date/Time     04/17/2022 0551    K 3.9 04/17/2022 0551     04/17/2022 0551    CO2 25 04/17/2022 0551    BUN 8 04/17/2022 0551    CREATININE 0.67 04/17/2022 0551        Component Value Date/Time    CALCIUM 8.9 04/17/2022 0551    ALKPHOS 106 (H) 04/16/2022 1317    AST 27 05/05/2022 0849    ALT 14 05/05/2022 0849    BILITOT 0.26 (L) 04/16/2022 1317        Abdominal MRI 9/18/2019:  Impression   Subcentimeter gastric mucosal enhancing masses in the fundus and along the   lesser curvature are demonstrated, corresponding to the patient's known GI   stromal tumors.  No evidence for extension through the gastric wall or   metastatic disease.       Status post cholecystectomy.  MRCP within normal limits.       Pelvic right kidney, incompletely visualized.  Bilateral renal cysts again   demonstrated. CT chest October 26, 2019:     FINDINGS:   Pulmonary Arteries: Suboptimal contrast timing.  Limited evaluation of the   segmental branches.  No evidence for central pulmonary embolism.  The main   pulmonary artery is normal in size.       Mediastinum: No evidence of mediastinal lymphadenopathy.  The heart and   pericardium demonstrate no acute abnormality.  There is no acute abnormality   of the thoracic aorta.       Lungs/pleura: The lungs are without acute process.  No focal consolidation or   pulmonary edema.  No evidence of pleural effusion or pneumothorax.       Upper Abdomen: Partially visualized left renal cysts.  Dense area of   calcification adjacent to the splenic artery is noted, which may represent a   thrombosed aneurysm.  Small lymph node in the gastrohepatic ligament.  Status   post cholecystectomy.       Soft Tissues/Bones: No acute bone or soft tissue abnormality.           Impression   Suboptimal contrast timing.  No evidence for central pulmonary embolism.       No acute airspace disease identified. Pathology results from September 23, 2019:  Collected: 9/23/2019   Received: 9/23/2019   Reported: 9/25/2019 10:17     -- Diagnosis --   1.  STOMACH, ANTRUM, BIOPSY:   - UNREMARKABLE GASTRIC MUCOSA. - NEGATIVE FOR HELICOBACTER PYLORI INFECTION. 2.  STOMACH, LESION, BIOPSIES:   - WELL-DIFFERENTIATED GASTRIC NEUROENDOCRINE TUMOR (CARCINOID TUMOR). - FOCAL ACTIVE CHRONIC GASTRITIS AND INTESTINAL METAPLASIA ARE PRESENT   IN    THE NONNEOPLASTIC GASTRIC MUCOSA.  SEE COMMENT.    Pathology results from October 23, 2019:  Collected: 10/23/2019   Received: 10/24/2019   Reported: 10/28/2019 13:13     -- Diagnosis --   GASTRIC TISSUES, EXCISION:        - WELL DIFFERENTIATED NEUROENDOCRINE TUMOR, GRADE 2.     7/23/2019 10:10 PM - [facility] Incoming Lab Results From [employer]     Component Value Ref Range & Units Status Collected Lab   Chromogranin A 1553High   0 - 95 ng/mL Final 07/22/2019  7:55 AM [facility]   (NOTE)      Lab Results   Component Value Date    WBC 5.1 06/30/2022    HGB 12.3 06/30/2022    HCT 40.8 06/30/2022    MCV 91.5 06/30/2022     06/30/2022       Chemistry        Component Value Date/Time     04/17/2022 0551    K 3.9 04/17/2022 0551     04/17/2022 0551    CO2 25 04/17/2022 0551    BUN 8 04/17/2022 0551    CREATININE 0.67 04/17/2022 0551        Component Value Date/Time    CALCIUM 8.9 04/17/2022 0551    ALKPHOS 106 (H) 04/16/2022 1317    AST 27 05/05/2022 0849    ALT 14 05/05/2022 0849    BILITOT 0.26 (L) 04/16/2022 1317        Lab Results   Component Value Date    IRON 53 06/30/2022    TIBC 369 06/30/2022    FERRITIN 20 06/30/2022           IMPRESSION:   Malignant carcinoid tumor of the stomach  Recent GI bleeding after endoscopic mucosal resection of stomach carcinoid on October 23, 2019. Evidence of recurrent disease on repeated EGD January 2020 and continued elevation of tumor marker chromogranin A  Iron deficiency secondary to above  History of hypothyroidism  Multiple comorbidities as listed    PLAN: Records and labs and images were reviewed and discussed with the patient. Clinically she is stable with significant improvement over the last 2 to 3 months. She is asymptomatic and she is gaining weight. No GI symptoms. Tumor marker chromogranin A is normal.  Discussed further care for carcinoid. We will monitor with scans and tumors markers. We will do labs again in 6 months. She will continue follow up in [facility]  Patient's questions were answered to the best of her satisfaction and she verbalized full understanding and agreement.

## 2022-07-07 NOTE — TELEPHONE ENCOUNTER
Mitali Pelayo FOR MD VISIT  DR Reynaldo Tejada IN TO SEE PATIENT  ORDERS RECEIVED  RV 6 MONTHS WITH LABS BEFORE  LABS CDP CMP SEROTONIN CHROMOGRANIN A FE TIBC FERRITIN PRIOR TO MD VISIT DUE JAN 2023, ORDERS MAILED TO PT  MD VISIT DUE JAN 2023, AVS PRINTED AND PLACED INTO CALL FOLDER TO SCHEDULE F/U  AVS PRINTED AND GIVEN TO PATIENT WITH INSTRUCTIONS  PATIENT DISCHARGED AMBULATORY

## 2022-07-08 ENCOUNTER — TELEPHONE (OUTPATIENT)
Dept: INTERNAL MEDICINE CLINIC | Age: 62
End: 2022-07-08

## 2022-07-08 DIAGNOSIS — I10 ESSENTIAL HYPERTENSION: Primary | ICD-10-CM

## 2022-07-08 RX ORDER — HYDROCHLOROTHIAZIDE 25 MG/1
25 TABLET ORAL DAILY
Qty: 30 TABLET | Refills: 0 | Status: SHIPPED | OUTPATIENT
Start: 2022-07-08 | End: 2022-08-03

## 2022-07-08 NOTE — TELEPHONE ENCOUNTER
Patient was at a doctors appt yesterday.  Her Bp was 154/107    He told her to call her PCP    Please advise

## 2022-07-11 ENCOUNTER — HOSPITAL ENCOUNTER (OUTPATIENT)
Age: 62
Setting detail: SPECIMEN
Discharge: HOME | End: 2022-07-11

## 2022-07-11 DIAGNOSIS — I10 ESSENTIAL HYPERTENSION: ICD-10-CM

## 2022-07-11 LAB
ANION GAP SERPL CALCULATED.3IONS-SCNC: 13 MMOL/L (ref 9–17)
BUN BLDV-MCNC: 11 MG/DL (ref 8–23)
CALCIUM SERPL-MCNC: 8.9 MG/DL (ref 8.6–10.4)
CHLORIDE BLD-SCNC: 104 MMOL/L (ref 98–107)
CO2: 24 MMOL/L (ref 20–31)
CREAT SERPL-MCNC: 0.76 MG/DL (ref 0.5–0.9)
GFR AFRICAN AMERICAN: >60 ML/MIN
GFR NON-AFRICAN AMERICAN: >60 ML/MIN
GFR SERPL CREATININE-BSD FRML MDRD: ABNORMAL ML/MIN/{1.73_M2}
GLUCOSE BLD-MCNC: 158 MG/DL (ref 70–99)
POTASSIUM SERPL-SCNC: 3.7 MMOL/L (ref 3.7–5.3)
SODIUM BLD-SCNC: 141 MMOL/L (ref 135–144)

## 2022-08-01 ENCOUNTER — HOSPITAL ENCOUNTER (OUTPATIENT)
Dept: CT IMAGING | Age: 62
Discharge: HOME | End: 2022-08-03
Payer: MEDICAID

## 2022-08-01 DIAGNOSIS — D3A.098 CARCINOID TUMOR OF ABDOMEN: ICD-10-CM

## 2022-08-01 DIAGNOSIS — R91.8 MULTIPLE NODULES OF LUNG: ICD-10-CM

## 2022-08-01 PROCEDURE — 71250 CT THORAX DX C-: CPT

## 2022-08-03 DIAGNOSIS — C7A.092 MALIGNANT CARCINOID TUMOR OF STOMACH (HCC): Primary | ICD-10-CM

## 2022-08-03 RX ORDER — LEVOTHYROXINE SODIUM 88 UG/1
TABLET ORAL
Qty: 30 TABLET | Refills: 2 | Status: SHIPPED | OUTPATIENT
Start: 2022-08-03 | End: 2022-09-13

## 2022-08-03 RX ORDER — HYDROCHLOROTHIAZIDE 25 MG/1
TABLET ORAL
Qty: 30 TABLET | Refills: 0 | Status: SHIPPED | OUTPATIENT
Start: 2022-08-03 | End: 2022-09-02

## 2022-08-03 NOTE — TELEPHONE ENCOUNTER
Ricky Burton is calling to request a refill on the following medication(s):    Medication Request:  Requested Prescriptions     Pending Prescriptions Disp Refills    levothyroxine (SYNTHROID) 88 MCG tablet [Pharmacy Med Name: LEVOTHYROXINE 88 MCG TABLET] 30 tablet 2     Sig: TAKE 1 TABLET BY MOUTH FIVE TIMES WEEKLY ONLY WED-SUN (SKIP MON AND TUES)    hydroCHLOROthiazide (HYDRODIURIL) 25 MG tablet [Pharmacy Med Name: HYDROCHLOROTHIAZIDE 25 MG TAB] 30 tablet 0     Sig: TAKE 1 TABLET BY MOUTH EVERY DAY     Last fill 7/6/22  Last Visit Date (If Applicable):  2/6/4837    Next Visit Date:    Visit date not found

## 2022-08-17 ENCOUNTER — OFFICE VISIT (OUTPATIENT)
Dept: PULMONOLOGY | Age: 62
End: 2022-08-17
Payer: MEDICAID

## 2022-08-17 VITALS
BODY MASS INDEX: 30.49 KG/M2 | SYSTOLIC BLOOD PRESSURE: 148 MMHG | RESPIRATION RATE: 14 BRPM | DIASTOLIC BLOOD PRESSURE: 104 MMHG | HEIGHT: 65 IN | WEIGHT: 183 LBS

## 2022-08-17 DIAGNOSIS — R91.8 MULTIPLE NODULES OF LUNG: Primary | ICD-10-CM

## 2022-08-17 DIAGNOSIS — R09.82 POST-NASAL DRIP: ICD-10-CM

## 2022-08-17 DIAGNOSIS — J45.20 MILD INTERMITTENT ASTHMA WITHOUT COMPLICATION: ICD-10-CM

## 2022-08-17 DIAGNOSIS — C7A.092 MALIGNANT CARCINOID TUMOR OF STOMACH (HCC): ICD-10-CM

## 2022-08-17 DIAGNOSIS — Z90.3 S/P TOTAL GASTRECTOMY AND ROUX-EN-Y ESOPHAGOJEJUNAL ANASTOMOSIS: ICD-10-CM

## 2022-08-17 DIAGNOSIS — Z98.0 S/P TOTAL GASTRECTOMY AND ROUX-EN-Y ESOPHAGOJEJUNAL ANASTOMOSIS: ICD-10-CM

## 2022-08-17 PROCEDURE — 90677 PCV20 VACCINE IM: CPT | Performed by: INTERNAL MEDICINE

## 2022-08-17 PROCEDURE — 90471 IMMUNIZATION ADMIN: CPT | Performed by: INTERNAL MEDICINE

## 2022-08-17 PROCEDURE — G8427 DOCREV CUR MEDS BY ELIG CLIN: HCPCS | Performed by: INTERNAL MEDICINE

## 2022-08-17 PROCEDURE — 1036F TOBACCO NON-USER: CPT | Performed by: INTERNAL MEDICINE

## 2022-08-17 PROCEDURE — 3017F COLORECTAL CA SCREEN DOC REV: CPT | Performed by: INTERNAL MEDICINE

## 2022-08-17 PROCEDURE — 99214 OFFICE O/P EST MOD 30 MIN: CPT | Performed by: INTERNAL MEDICINE

## 2022-08-17 PROCEDURE — G8417 CALC BMI ABV UP PARAM F/U: HCPCS | Performed by: INTERNAL MEDICINE

## 2022-08-17 RX ORDER — AMOXICILLIN 500 MG/1
500 CAPSULE ORAL 3 TIMES DAILY
COMMUNITY
End: 2022-10-17 | Stop reason: ALTCHOICE

## 2022-08-17 NOTE — PROGRESS NOTES
PULMONARY outpatient progress note        REFERRED BY: Frida Palencia MD    REASON FOR CONSULTATION: Multiple lung nodules and bronchial asthma    Patient is being seen in follow-up for-  1. Multiple nodules of lung    2. Mild intermittent asthma without complication    3. S/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    4. Malignant carcinoid tumor of stomach ([address]    5. Post-nasal drip          HISTORY OF PRESENT ILLNESS:    Mallory Rodriguez is a 62y.o. year old female here for evaluation of above problems  Denied any hospitalization or ER visits for breathing problems since last evaluated in office  No increasing shortness of breath or wheezing  No significant cough or sputum production  Denied any weight loss or loss of appetite  No fever chills or night sweats  No hemoptysis  No nighttime symptoms of bronchial asthma  No postnasal discharge  Patient has been using Flonase  No bone pain, headaches or abdominal pain      PAST MEDICAL HISTORY:       Diagnosis Date    Anxiety     Arthritis     knee    Asthma     Colon polyp 02/21/2019    tubular adenoma; hyperplastic polyp    Colon polyps     Cyst of kidney, acquired     bilat.     Fatigue     Hypertension     Hypothyroidism     Lung nodule     Neuroendocrine tumor 02/21/2019    of stomach    Obesity     Pharyngoesophageal dysphagia     Vocal cord polyps     Wears glasses        SURGICAL HISTORY:    Past Surgical History:   Procedure Laterality Date    CHOLECYSTECTOMY  10/09/2014    COLONOSCOPY  02/21/2019    tubular adenoma; hyperplastic polyp    COLONOSCOPY      over 5 yrs ago per pt with polyps (2-)    CYSTOURETHROSCOPY/STENT REMOVAL  05/03/2011    ENDOSCOPIC ULTRASOUND (LOWER) N/A 01/22/2020    ENDOSCOPIC ULTRASOUND WITH POSSIBLE EMR performed by Elinor De La Paz MD at [facility]    ERCP      GASTRECTOMY N/A 11/30/2020    HIP SURGERY Left     soft tissue tumor removal    THROAT SURGERY      vocal cord polyps removed    UPPER GASTROINTESTINAL ENDOSCOPY  02/21/2019    Neuroendocrine tumor    UPPER GASTROINTESTINAL ENDOSCOPY  09/23/2019    UPPER GASTROINTESTINAL ENDOSCOPY N/A 09/23/2019    EGD BIOPSY performed by Davida Ferraro MD at [address] [place] [address] 10/23/2019    EGD W/ EMR performed by Fouzia Oliver MD at [address] [place] [address] N/A 10/29/2019    EGD CONTROL HEMORRHAGE performed by Lee Goodpasture, MD at [address] [place] [address] N/A 04/26/2021    EGD BIOPSY performed by Nava Menon MD at [address] [place] [address] N/A 8/30/2021    EGD ESOPHAGOGASTRODUODENOSCOPY performed by Janis Lopez MD at [address] [place] [address] 2/7/2022    EGD DILATION BALLOON performed by Fouzia Oliver MD at  [facility]:    Allergies   Allergen Reactions    Erythromycin Diarrhea       MEDICATIONS:   Current Outpatient Medications   Medication Sig Dispense Refill    amoxicillin (AMOXIL) 500 MG capsule Take 500 mg by mouth 3 times daily      levothyroxine (SYNTHROID) 88 MCG tablet TAKE 1 TABLET BY MOUTH FIVE TIMES WEEKLY ONLY WED-SUN (SKIP MON AND TUES) 30 tablet 2    hydroCHLOROthiazide (HYDRODIURIL) 25 MG tablet TAKE 1 TABLET BY MOUTH EVERY DAY 30 tablet 0    traZODone (DESYREL) 50 MG tablet TAKE 1 TABLET BY MOUTH EVERY DAY AT BEDTIME AS NEEDED FOR SLEEP 90 tablet 0    docusate sodium (COLACE) 100 mg capsule TAKE 1 CAPSULE BY MOUTH TWICE A DAY 60 capsule 5    fluticasone (FLONASE) 50 MCG/ACT nasal spray 2 sprays by Each Nostril route daily (Patient taking differently: 2 sprays by Each Nostril route daily PRN) 16 g 5    metoprolol tartrate (LOPRESSOR) 25 MG tablet Take 1 tablet by mouth 2 times daily 180 tablet 1    metoclopramide (REGLAN) 10 MG tablet TAKE 1 TABLET BY MOUTH 3 TIMES DAILY (BEFORE MEALS) 90 tablet 5    Multiple Vitamin (MULTI-DAY VITAMINS PO) Take 1 tablet by mouth daily        No current facility-administered medications for this visit. FAMILY HISTORY: family history includes High Blood Pressure in her mother and sister; No Known Problems in her father; Other in her sister; Stomach Cancer in her sister. SOCIAL AND OCCUPATIONAL HEALTH:  The patient is a Past smoker of 25 pack years and quit smoking in 2012. There  is not history of TB or TB exposure. There is not asbestos or silica dust exposure. The patient reports does not have coal, foundry, quarry or [employer] exposure. Travel history reveals no significant history of risk factors for pulm disease. There is not  history of recreational or IV drug use. The patient does not have pets, dogs, cats turtles or exotic birds. Review of Systems:  Review of Systems -   General ROS: Completed and except as mentioned above were negative   Psychological ROS:  Completed and except as mentioned above were negative  Ophthalmic ROS:  Completed and except as mentioned above were negative  ENT ROS:  Completed and except as mentioned above were negative  Allergy and Immunology ROS:  Completed and except as mentioned above were negative  Hematological and Lymphatic ROS:  Completed and except as mentioned above were negative  Endocrine ROS: Completed and except as mentioned above were negative  Breast ROS:  Completed and except as mentioned above were negative  Respiratory ROS:  Completed and except as mentioned above were negative  Cardiovascular ROS:  Completed and except as mentioned above were negative  Gastrointestinal ROS: Completed and except as mentioned above were negative  Genito-Urinary ROS:  Completed and except as mentioned above were negative  Musculoskeletal ROS:  Completed and except as mentioned above were negative  Neurological ROS:  Completed and except as mentioned above were negative  Dermatological ROS:  Completed and except as mentioned above were negative    SLEEP  No epistaxis sor sore throat.  does not have fatigue, sleepiness ortiredness in am.   No MVA. No edema. PHYSICAL EXAMINATION:  Vitals:    08/17/22 1127 08/17/22 1128   BP: (!) 142/100 (!) 148/104   Site: Right Upper Arm    Position: Sitting    Cuff Size: Medium Adult    Resp: 14    Weight: 183 lb (83 kg)    Height: 5' 5\" (1.651 m)      PHYSICAL EXAMINATION:  Vitals:    08/17/22 1127 08/17/22 1128   BP: (!) 142/100 (!) 148/104   Site: Right Upper Arm    Position: Sitting    Cuff Size: Medium Adult    Resp: 14    Weight: 183 lb (83 kg)    Height: 5' 5\" (1.651 m)      Constitutional: Appears well, no distress, obese  EENT: PERRLA, sclera clear, anicteric, oropharynx clear, no lesions, neck supple with midline trachea. Neck: Supple, symmetrical, trachea midline, no adenopathy, no jvd skin normal  Respiratory: clear to auscultation, no wheezes or rales and unlabored breathing. No intercostal tenderness  Cardiovascular: regular rate and rhythm, normal S1, S2, no murmur noted  Abdomen: soft, nontender, nondistended, no masses or organomegaly  Extremities: No pedal edema, no clubbing or cyanosis       DATA:     Pulmonary function tests: none        XR CHEST (2 VW)    Result Date: 10/29/2021  New rounded opacity in the right lateral chest.  Cannot exclude a new mass lesion versus infection. Attention on CT chest recommended. Right-sided PICC line with the tip at the mid to distal SVC. No pneumothorax. CT CHEST WO CONTRAST    Result Date: 11/8/2021  *Interval development of numerous enlarged nodules as detailed above concerning for metastasis given history of known neoplasm. Acute infectious/inflammatory process is an additional diagnostic consideration though felt less likely. Recommend further evaluation with PET-CT and/or percutaneous biopsy. Alternatively post treatment chest CT in 6-8 weeks to reassess may be considered. *Multiple incidental/chronic findings as above including mild anterior pericardial fluid.  The findings were sent to the Radiology Results Communication Center at 10:18 am on 11/8/2021to be communicated to a licensed caregiver. Patient had a PET scan done on 12/2/2021 which showed-  1. No abnormal uptake associated with the bilateral pulmonary nodules. Many   of the nodules show interval improvement and there is development of new   subpleural nodules in the bilateral lower lobes. The overall appearance   favors an ongoing infectious or inflammatory process with neoplasm less   likely. Continued follow-up CT scan of the chest is recommended to ensure   resolution. CT scan of the chest was done on 4/4/2022 and it showed-       1. Waxing and waning process. Previous scattered nodules in the right and   left lower lobe and in the left upper lobe have resolved but there is new   large irregular patchy consolidation in the right upper lobe surrounding   pre-existing 13 mm nodule. Additional new nodules up to 5 mm are present in   the middle lobe where previous 8 mm nodule has resolved. Differential   considerations would include cryptogenic organizing pneumonia or chronic   eosinophilic pneumonia. Malignancy considered less likely. Continued   follow-up advised. Consider three-month follow-up. Tissue sampling may be   indicated. 2. Stable findings in the upper abdomen including renal cysts and   postsurgical changes at the GE junction. CT scan of the chest was done on 8/1/2022 and it showed-    Interval resolution bilateral scattered lung infiltrates and nodules with   some residual hazy bandlike densities representing scarring. Another 6 month   follow-up may be considered to assure continued stability. IMPRESSION:   1. Multiple nodules of lung    2. Mild intermittent asthma without complication    3. S/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    4. Malignant carcinoid tumor of stomach ([address]    5.  Post-nasal drip    The lung nodules are concerning for metastatic disease               PLAN:       Reviewed

## 2022-09-02 RX ORDER — HYDROCHLOROTHIAZIDE 25 MG/1
TABLET ORAL
Qty: 30 TABLET | Refills: 0 | Status: SHIPPED | OUTPATIENT
Start: 2022-09-02 | End: 2022-09-13

## 2022-09-02 NOTE — TELEPHONE ENCOUNTER
Fer Opal is calling to request a refill on the following medication(s):    Medication Request:  Requested Prescriptions     Pending Prescriptions Disp Refills    hydroCHLOROthiazide (HYDRODIURIL) 25 MG tablet [Pharmacy Med Name: HYDROCHLOROTHIAZIDE 25 MG TAB] 30 tablet 0     Sig: TAKE 1 TABLET BY MOUTH EVERY DAY       Last Visit Date (If Applicable):  6/4/5872    Next Visit Date:    Visit date not found

## 2022-09-12 ENCOUNTER — TELEPHONE (OUTPATIENT)
Dept: INTERNAL MEDICINE CLINIC | Age: 62
End: 2022-09-12

## 2022-09-13 ENCOUNTER — OFFICE VISIT (OUTPATIENT)
Dept: GASTROENTEROLOGY | Age: 62
End: 2022-09-13
Payer: MEDICAID

## 2022-09-13 VITALS
WEIGHT: 184 LBS | DIASTOLIC BLOOD PRESSURE: 90 MMHG | TEMPERATURE: 97.3 F | BODY MASS INDEX: 30.62 KG/M2 | SYSTOLIC BLOOD PRESSURE: 133 MMHG

## 2022-09-13 DIAGNOSIS — F41.9 ANXIETY: ICD-10-CM

## 2022-09-13 DIAGNOSIS — Z90.3 S/P GASTRECTOMY: ICD-10-CM

## 2022-09-13 DIAGNOSIS — R11.14 BILIOUS VOMITING WITH NAUSEA: ICD-10-CM

## 2022-09-13 DIAGNOSIS — D3A.8 NEUROENDOCRINE NEOPLASM OF STOMACH: Primary | ICD-10-CM

## 2022-09-13 PROCEDURE — G8417 CALC BMI ABV UP PARAM F/U: HCPCS | Performed by: INTERNAL MEDICINE

## 2022-09-13 PROCEDURE — 1036F TOBACCO NON-USER: CPT | Performed by: INTERNAL MEDICINE

## 2022-09-13 PROCEDURE — 99214 OFFICE O/P EST MOD 30 MIN: CPT | Performed by: INTERNAL MEDICINE

## 2022-09-13 PROCEDURE — 3017F COLORECTAL CA SCREEN DOC REV: CPT | Performed by: INTERNAL MEDICINE

## 2022-09-13 PROCEDURE — G8427 DOCREV CUR MEDS BY ELIG CLIN: HCPCS | Performed by: INTERNAL MEDICINE

## 2022-09-13 RX ORDER — HYDROCHLOROTHIAZIDE 25 MG/1
TABLET ORAL
Qty: 30 TABLET | Refills: 0 | Status: SHIPPED | OUTPATIENT
Start: 2022-09-13 | End: 2022-10-28

## 2022-09-13 RX ORDER — LEVOTHYROXINE SODIUM 88 UG/1
TABLET ORAL
Qty: 75 TABLET | Refills: 1 | Status: SHIPPED | OUTPATIENT
Start: 2022-09-13

## 2022-09-13 RX ORDER — TRAZODONE HYDROCHLORIDE 50 MG/1
TABLET ORAL
Qty: 90 TABLET | Refills: 0 | Status: SHIPPED | OUTPATIENT
Start: 2022-09-13

## 2022-09-13 ASSESSMENT — ENCOUNTER SYMPTOMS
ANAL BLEEDING: 0
COUGH: 0
BLOOD IN STOOL: 0
WHEEZING: 0
ABDOMINAL DISTENTION: 1
SORE THROAT: 0
ABDOMINAL PAIN: 0
SHORTNESS OF BREATH: 0
TROUBLE SWALLOWING: 1
NAUSEA: 1
VOMITING: 0
RECTAL PAIN: 0
CHOKING: 0
CONSTIPATION: 0
VOICE CHANGE: 0
DIARRHEA: 0

## 2022-09-13 NOTE — PROGRESS NOTES
GI CLINIC FOLLOW UP    NTERVAL HISTORY:   No referring provider defined for this encounter. Chief Complaint   Patient presents with    Dysphagia     Pt is here today due to trouble swallowing, prev Dr Jermain Navarro pt last seen on 03/17/22 for pharyngoesophageal dysphagia. 1. Neuroendocrine neoplasm of stomach    2. Anxiety    3. Bilious vomiting with nausea    4. S/P gastrectomy      This patient seen my office for the first time previously she has been seen and followed by my partner who has left the practice now    She has history significant multiple medical issues including history for multiple endocrine neoplasia of the stomach multiple lesions were found she end of having almost total gastrectomy she has some nausea vomiting dysphagia issues previously been seen by my partner apparently has some anastomotic narrowing which was dilated    Patient has been complaining of some abdominal pains, off and on cramping  Also complains of abdominal bloating and gas  Has off and on nausea without any sig vomiting  Has some alternating constipation and diarrhea  Has no weight loss  Has some anxiety issues    She is complaining of some abdominal discomfort pain associated with some mild nausea without any overt bleeding melena melanotic stools    She denies any smoking alcohol abuse illicit drug use    She has been seen and followed by hematology oncology      Previous work-up was reviewed with her    HISTORY OF PRESENT ILLNESS: Cathleen Wong is a 62 y.o. female with a past history remarkable for , referred for evaluation of   Chief Complaint   Patient presents with    Dysphagia     Pt is here today due to trouble swallowing, prev Dr Jermain Navarro pt last seen on 03/17/22 for pharyngoesophageal dysphagia. .    Past Medical,Family, and Social History reviewed and does contribute to the patient presenting condition.     Patient's PMH/PSH,SH,PSYCH Hx, MEDs, ALLERGIES, and ROS were all reviewed and updated in the appropriate sections. PAST MEDICAL HISTORY:  Past Medical History:   Diagnosis Date    Anxiety     Arthritis     knee    Asthma     Colon polyp 02/21/2019    tubular adenoma; hyperplastic polyp    Colon polyps     Cyst of kidney, acquired     bilat.     Fatigue     Hypertension     Hypothyroidism     Lung nodule     Neuroendocrine tumor 02/21/2019    of stomach    Obesity     Pharyngoesophageal dysphagia     Vocal cord polyps     Wears glasses        Past Surgical History:   Procedure Laterality Date    CHOLECYSTECTOMY  10/09/2014    COLONOSCOPY  02/21/2019    tubular adenoma; hyperplastic polyp    COLONOSCOPY      over 5 yrs ago per pt with polyps (2-)    CYSTOURETHROSCOPY/STENT REMOVAL  05/03/2011    ENDOSCOPIC ULTRASOUND (LOWER) N/A 01/22/2020    ENDOSCOPIC ULTRASOUND WITH POSSIBLE EMR performed by Rafael Quiroz MD at [facility]    ERCP      GASTRECTOMY N/A 11/30/2020    HIP SURGERY Left     soft tissue tumor removal    THROAT SURGERY      vocal cord polyps removed    UPPER GASTROINTESTINAL ENDOSCOPY  02/21/2019    Neuroendocrine tumor    UPPER GASTROINTESTINAL ENDOSCOPY  09/23/2019    UPPER GASTROINTESTINAL ENDOSCOPY N/A 09/23/2019    EGD BIOPSY performed by Jakob Chandler MD at [address] [place] [address] 10/23/2019    EGD W/ EMR performed by Rafael Quiroz MD at [address] [place] [address] 10/29/2019    EGD CONTROL HEMORRHAGE performed by Dominique Mota MD at [address] [place] [address],[facility] N/A 04/26/2021    EGD BIOPSY performed by Juan M Tinoco MD at [address] [place] [address],[facility] N/A 8/30/2021    EGD ESOPHAGOGASTRODUODENOSCOPY performed by Shar Ramos MD at [address] [place] [address] 2/7/2022    EGD DILATION BALLOON [facility]:    Current Outpatient Medications:     traZODone (DESYREL) 50 MG tablet, TAKE 1 TABLET BY MOUTH AT BEDTIME AS NEEDED FOR SLEEP, Disp: 90 tablet, Rfl: 0    hydroCHLOROthiazide (HYDRODIURIL) 25 MG tablet, TAKE 1 TABLET BY MOUTH EVERY DAY, Disp: 30 tablet, Rfl: 0    levothyroxine (SYNTHROID) 88 MCG tablet, TAKE 1 TABLET BY MOUTH FIVE TIMES WEEKLY ONLY WED-SUN (SKIP MON AND TUES), Disp: 75 tablet, Rfl: 1    amoxicillin (AMOXIL) 500 MG capsule, Take 500 mg by mouth 3 times daily, Disp: , Rfl:     docusate sodium (COLACE) 100 mg capsule, TAKE 1 CAPSULE BY MOUTH TWICE A DAY, Disp: 60 capsule, Rfl: 5    fluticasone (FLONASE) 50 MCG/ACT nasal spray, 2 sprays by Each Nostril route daily (Patient taking differently: 2 sprays by Each Nostril route daily PRN), Disp: 16 g, Rfl: 5    metoprolol tartrate (LOPRESSOR) 25 MG tablet, Take 1 tablet by mouth 2 times daily, Disp: 180 tablet, Rfl: 1    metoclopramide (REGLAN) 10 MG tablet, TAKE 1 TABLET BY MOUTH 3 TIMES DAILY (BEFORE MEALS), Disp: 90 tablet, Rfl: 5    Multiple Vitamin (MULTI-DAY VITAMINS PO), Take 1 tablet by mouth daily , Disp: , Rfl:     ALLERGIES:   Allergies   Allergen Reactions    Erythromycin Diarrhea       FAMILY HISTORY:       Problem Relation Age of Onset    High Blood Pressure Mother     High Blood Pressure Sister     Stomach Cancer Sister     Other Sister         epilepsy    No Known Problems Father          SOCIAL HISTORY:   Social History     Socioeconomic History    Marital status: Single     Spouse name: Not on file    Number of children: Not on file    Years of education: Not on file    Highest education level: Not on file   Occupational History    Not on file   Tobacco Use    Smoking status: Former     Packs/day: 1.00     Years: 25.00     Pack years: 25.00     Types: Cigarettes     Quit date: 2007     Years since quittin.7    Smokeless tobacco: Never   Vaping Use    Vaping Use: Never used   Substance and Sexual Activity    Alcohol use: Not Currently     Comment: 3 times a month    Drug use: No    Sexual activity: Not on file   Other Topics Concern    Not on file   Social History Narrative    Not on file     Social Determinants of Health     Financial Resource Strain: Not on file   Food Insecurity: Not on file   Transportation Needs: Not on file   Physical Activity: Not on file   Stress: Not on file   Social Connections: Not on file   Intimate Partner Violence: Not on file   Housing Stability: Not on file         REVIEW OF SYSTEMS:         Review of Systems   Constitutional:  Positive for fatigue. Negative for appetite change and unexpected weight change. HENT:  Positive for trouble swallowing. Negative for sore throat and voice change. Respiratory:  Negative for cough, choking, shortness of breath and wheezing. Cardiovascular:  Negative for chest pain, palpitations and leg swelling. Gastrointestinal:  Positive for abdominal distention and nausea. Negative for abdominal pain, anal bleeding, blood in stool, constipation, diarrhea, rectal pain and vomiting. Neurological:  Positive for light-headedness (in the A.M.). Negative for dizziness, weakness, numbness and headaches. Hematological:  Does not bruise/bleed easily. Psychiatric/Behavioral:  Negative for confusion and sleep disturbance. The patient is not nervous/anxious. PHYSICAL EXAMINATION: Vital signs reviewed per the nursing documentation. BP (!) 133/90   Temp 97.3 °F (36.3 °C)   Wt 184 lb (83.5 kg)   LMP 01/01/1994   BMI 30.62 kg/m²   Body mass index is 30.62 kg/m². Physical Exam  Nursing note reviewed. Constitutional:       Appearance: She is well-developed. Comments: Anxious    HENT:      Head: Normocephalic and atraumatic. Eyes:      Conjunctiva/sclera: Conjunctivae normal.      Pupils: Pupils are equal, round, and reactive to light. Cardiovascular:      Heart sounds: Normal heart sounds. Pulmonary:      Effort: Pulmonary effort is normal.      Breath sounds: Normal breath sounds.    Abdominal:      General: Bowel sounds are normal.      Palpations: Abdomen is soft. Comments: NON TENDER, NON DISTENTED  LIVER SPLEEN AND HERNIAS ARE NOT  PALPABLE  BOWEL SOUNDS ARE POSITIVE      Musculoskeletal:         General: Normal range of motion. Cervical back: Normal range of motion and neck supple. Skin:     General: Skin is warm. Neurological:      Mental Status: She is alert and oriented to person, place, and time. Psychiatric:         Behavior: Behavior normal.         LABORATORY DATA: Reviewed  Lab Results   Component Value Date    WBC 5.1 06/30/2022    HGB 12.3 06/30/2022    HCT 40.8 06/30/2022    MCV 91.5 06/30/2022     06/30/2022     07/11/2022    K 3.7 07/11/2022     07/11/2022    CO2 24 07/11/2022    BUN 11 07/11/2022    CREATININE 0.76 07/11/2022    LABALBU 3.0 (L) 04/16/2022    BILITOT 0.26 (L) 04/16/2022    ALKPHOS 106 (H) 04/16/2022    AST 27 05/05/2022    ALT 14 05/05/2022    INR 1.0 04/16/2022         Lab Results   Component Value Date    RBC 4.46 06/30/2022    HGB 12.3 06/30/2022    MCV 91.5 06/30/2022    MCH 27.6 06/30/2022    MCHC 30.1 06/30/2022    RDW 15.9 (H) 06/30/2022    MPV 9.4 06/30/2022    BASOPCT 0 06/30/2022    LYMPHSABS 2.16 06/30/2022    MONOSABS 0.34 06/30/2022    NEUTROABS 2.50 06/30/2022    EOSABS 0.09 06/30/2022    BASOSABS <0.03 06/30/2022         DIAGNOSTIC TESTING:     No results found. Assessment  1. Neuroendocrine neoplasm of stomach    2. Anxiety    3. Bilious vomiting with nausea    4. S/P gastrectomy        Plan    MRCP    Small frequent meals    Pt was advised in detail about some life style and dietary modifications. She was advised about avoidance of caffeine, nicotine and chocolate. Pt was also told to stay away from any kind of fast foods, soda pops. She was also advised to avoid lots of spices, grease and fried food etc.     Instructions were also given about trying to arrange the timing, quality and quantity of food.     Instructions were given about using ample amount of fiber including dietary and supplemental fiber either metamucil, bennafiber or citrucell etc.  Pt was advised about drinking ample amount of water without any colors or chemicals. Stress was given about regular exercise. Pt has verbalized understanding and agreement to these modifications. The patient was instructed to start taking some OTC Probiotics products   These are available over the counter at the Pharmacy stores and Grocery stores  He was explained about the beneficial effects they have in the GI track  They will help to establish the good bacterial betty and will help with the digestion and bowel movements  The patient has verbalized understanding and agreement to this plan    More than half of patient's clinic visit time was spent in counseling about lifestyle and dietary modifications  Patient's  questions were answered in this regard as well  The patient has verbalized understanding and agreement     Quit soda and processed  foods and drinks        Thank you for allowing me to participate in the care of Ms. Cedric Smith. For any further questions please do not hesitate to contact me. I have reviewed and agree with the ROS entered by the MA/Nurse.          Anuel Gore MD, [facility]  Board Certified in Gastroenterology and [address] [place] [address] Gastroenterology  Office #: [phone]

## 2022-09-13 NOTE — TELEPHONE ENCOUNTER
Wellington Lo is calling to request a refill on the following medication(s):    Medication Request:  Requested Prescriptions     Pending Prescriptions Disp Refills    traZODone (DESYREL) 50 MG tablet [Pharmacy Med Name: TRAZODONE 50 MG TABLET] 90 tablet 0     Sig: TAKE 1 TABLET BY MOUTH AT BEDTIME AS NEEDED FOR SLEEP    hydroCHLOROthiazide (HYDRODIURIL) 25 MG tablet [Pharmacy Med Name: HYDROCHLOROTHIAZIDE 25 MG TAB] 30 tablet 0     Sig: TAKE 1 TABLET BY MOUTH EVERY DAY    levothyroxine (SYNTHROID) 88 MCG tablet [Pharmacy Med Name: LEVOTHYROXINE 88 MCG TABLET] 75 tablet 1     Sig: TAKE 1 TABLET BY MOUTH FIVE TIMES WEEKLY ONLY WED-SUN (SKIP MON AND TUES)     Last fill 9/2/22  Last Visit Date (If Applicable):  1/4/9684    Next Visit Date:

## 2022-09-16 RX ORDER — METOCLOPRAMIDE 10 MG/1
10 TABLET ORAL
Qty: 90 TABLET | Refills: 1 | Status: SHIPPED | OUTPATIENT
Start: 2022-09-16

## 2022-09-16 NOTE — TELEPHONE ENCOUNTER
Vaishnavi Shearer is calling to request a refill on the following medication(s):    Medication Request:  Requested Prescriptions     Pending Prescriptions Disp Refills    metoclopramide (REGLAN) 10 MG tablet 90 tablet 1     Sig: Take 1 tablet by mouth 3 times daily (before meals)       Last Visit Date (If Applicable):  7/3/1503    Next Visit Date:    Visit date not found

## 2022-09-26 ENCOUNTER — HOSPITAL ENCOUNTER (OUTPATIENT)
Dept: MRI IMAGING | Age: 62
Discharge: HOME | End: 2022-09-28
Payer: MEDICAID

## 2022-09-26 DIAGNOSIS — F41.9 ANXIETY: ICD-10-CM

## 2022-09-26 DIAGNOSIS — R11.14 BILIOUS VOMITING WITH NAUSEA: ICD-10-CM

## 2022-09-26 DIAGNOSIS — D3A.8 NEUROENDOCRINE NEOPLASM OF STOMACH: ICD-10-CM

## 2022-09-26 LAB
CREAT SERPL-MCNC: 0.89 MG/DL (ref 0.5–0.9)
GFR AFRICAN AMERICAN: >60 ML/MIN
GFR NON-AFRICAN AMERICAN: >60 ML/MIN
GFR SERPL CREATININE-BSD FRML MDRD: NORMAL ML/MIN/{1.73_M2}

## 2022-09-26 PROCEDURE — 6360000004 HC RX CONTRAST MEDICATION: Performed by: INTERNAL MEDICINE

## 2022-09-26 PROCEDURE — A9579 GAD-BASE MR CONTRAST NOS,1ML: HCPCS | Performed by: INTERNAL MEDICINE

## 2022-09-26 PROCEDURE — 36415 COLL VENOUS BLD VENIPUNCTURE: CPT

## 2022-09-26 PROCEDURE — 82565 ASSAY OF CREATININE: CPT

## 2022-09-26 PROCEDURE — 2580000003 HC RX 258: Performed by: INTERNAL MEDICINE

## 2022-09-26 PROCEDURE — 74183 MRI ABD W/O CNTR FLWD CNTR: CPT

## 2022-09-26 RX ORDER — 0.9 % SODIUM CHLORIDE 0.9 %
50 INTRAVENOUS SOLUTION INTRAVENOUS ONCE
Status: COMPLETED | OUTPATIENT
Start: 2022-09-26 | End: 2022-09-26

## 2022-09-26 RX ORDER — SODIUM CHLORIDE 0.9 % (FLUSH) 0.9 %
10 SYRINGE (ML) INJECTION ONCE
Status: COMPLETED | OUTPATIENT
Start: 2022-09-26 | End: 2022-09-26

## 2022-09-26 RX ADMIN — GADOTERIDOL 17 ML: 279.3 INJECTION, SOLUTION INTRAVENOUS at 07:37

## 2022-09-26 RX ADMIN — SODIUM CHLORIDE 50 ML: 9 INJECTION, SOLUTION INTRAVENOUS at 07:37

## 2022-09-26 RX ADMIN — SODIUM CHLORIDE, PRESERVATIVE FREE 10 ML: 5 INJECTION INTRAVENOUS at 07:37

## 2022-10-17 ENCOUNTER — OFFICE VISIT (OUTPATIENT)
Dept: INTERNAL MEDICINE CLINIC | Age: 62
End: 2022-10-17
Payer: MEDICAID

## 2022-10-17 VITALS
RESPIRATION RATE: 20 BRPM | BODY MASS INDEX: 31.42 KG/M2 | HEIGHT: 65 IN | TEMPERATURE: 97.3 F | DIASTOLIC BLOOD PRESSURE: 72 MMHG | SYSTOLIC BLOOD PRESSURE: 94 MMHG | WEIGHT: 188.6 LBS | OXYGEN SATURATION: 98 % | HEART RATE: 76 BPM

## 2022-10-17 DIAGNOSIS — F41.1 GENERALIZED ANXIETY DISORDER: ICD-10-CM

## 2022-10-17 DIAGNOSIS — R53.83 OTHER FATIGUE: Primary | ICD-10-CM

## 2022-10-17 DIAGNOSIS — I10 ESSENTIAL HYPERTENSION: ICD-10-CM

## 2022-10-17 PROCEDURE — G8417 CALC BMI ABV UP PARAM F/U: HCPCS | Performed by: INTERNAL MEDICINE

## 2022-10-17 PROCEDURE — G8484 FLU IMMUNIZE NO ADMIN: HCPCS | Performed by: INTERNAL MEDICINE

## 2022-10-17 PROCEDURE — G8427 DOCREV CUR MEDS BY ELIG CLIN: HCPCS | Performed by: INTERNAL MEDICINE

## 2022-10-17 PROCEDURE — 3017F COLORECTAL CA SCREEN DOC REV: CPT | Performed by: INTERNAL MEDICINE

## 2022-10-17 PROCEDURE — 1036F TOBACCO NON-USER: CPT | Performed by: INTERNAL MEDICINE

## 2022-10-17 PROCEDURE — 99214 OFFICE O/P EST MOD 30 MIN: CPT | Performed by: INTERNAL MEDICINE

## 2022-10-17 SDOH — ECONOMIC STABILITY: FOOD INSECURITY: WITHIN THE PAST 12 MONTHS, THE FOOD YOU BOUGHT JUST DIDN'T LAST AND YOU DIDN'T HAVE MONEY TO GET MORE.: NEVER TRUE

## 2022-10-17 SDOH — ECONOMIC STABILITY: FOOD INSECURITY: WITHIN THE PAST 12 MONTHS, YOU WORRIED THAT YOUR FOOD WOULD RUN OUT BEFORE YOU GOT MONEY TO BUY MORE.: NEVER TRUE

## 2022-10-17 ASSESSMENT — SOCIAL DETERMINANTS OF HEALTH (SDOH): HOW HARD IS IT FOR YOU TO PAY FOR THE VERY BASICS LIKE FOOD, HOUSING, MEDICAL CARE, AND HEATING?: NOT HARD AT ALL

## 2022-10-17 NOTE — PROGRESS NOTES
Bebo Worley is a 65 y.o. female who presents for   Chief Complaint   Patient presents with    Fatigue     Patient states she was fatigue; states she is feeling better; also states her heart is beating fast     Health Maintenance     Hiv, hep c, tdap, cervical, shingles, ldct, colo, covid, flu    and follow up of chronic medical problems.   Patient Active Problem List   Diagnosis    Asthma    Anxiety    Obesity    Hyperlipidemia    Hypothyroidism    Colon polyps    Vertigo    Mass of left hip region    General weakness    Pharyngoesophageal dysphagia    Colon polyp    Neuroendocrine tumor    Chest pain    Shortness of breath    Anemia    GI bleed    Essential hypertension    Neuroendocrine neoplasm of stomach    Polyp, stomach    Melena    Gastric ulcer with hemorrhage    Constipation    Hypokalemia    Hypomagnesemia    Bilateral pulmonary embolism (HCC)    Abdominal pain, epigastric    Elevated d-dimer    Malignant carcinoid tumor of stomach (HCC)    Intractable vomiting    Intractable nausea and vomiting    S/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    Food intolerance    Severe malnutrition (HCC)    Sinus tachycardia    Weight loss    Projectile vomiting with nausea    On total parenteral nutrition    H/O blood clots    Pneumonia    Sepsis (HCC)    Bilious vomiting with nausea    Asymptomatic bacteriuria    Pulmonary infiltrates on CXR    Tachycardia, paroxysmal (HCC)    Pneumonia of right upper lobe due to Mycoplasma pneumoniae    Vitamin D deficiency    S/P gastrectomy     HPI  Here for evaluation of fatigue and palpitations and similar to panic attacks happened 2 and half weeks back now feeling better    Current Outpatient Medications   Medication Sig Dispense Refill    metoclopramide (REGLAN) 10 MG tablet Take 1 tablet by mouth 3 times daily (before meals) 90 tablet 1    traZODone (DESYREL) 50 MG tablet TAKE 1 TABLET BY MOUTH AT BEDTIME AS NEEDED FOR SLEEP 90 tablet 0 hydroCHLOROthiazide (HYDRODIURIL) 25 MG tablet TAKE 1 TABLET BY MOUTH EVERY DAY 30 tablet 0    levothyroxine (SYNTHROID) 88 MCG tablet TAKE 1 TABLET BY MOUTH FIVE TIMES WEEKLY ONLY WED-SUN (SKIP MON AND TUES) 75 tablet 1    docusate sodium (COLACE) 100 mg capsule TAKE 1 CAPSULE BY MOUTH TWICE A DAY 60 capsule 5    fluticasone (FLONASE) 50 MCG/ACT nasal spray 2 sprays by Each Nostril route daily (Patient taking differently: 2 sprays by Each Nostril route daily PRN) 16 g 5    metoprolol tartrate (LOPRESSOR) 25 MG tablet Take 1 tablet by mouth 2 times daily 180 tablet 1    Multiple Vitamin (MULTI-DAY VITAMINS PO) Take 1 tablet by mouth daily        No current facility-administered medications for this visit. Allergies   Allergen Reactions    Erythromycin Diarrhea       Past Medical History:   Diagnosis Date    Anxiety     Arthritis     knee    Asthma     Colon polyp 02/21/2019    tubular adenoma; hyperplastic polyp    Colon polyps     Cyst of kidney, acquired     bilat.     Fatigue     Hypertension     Hypothyroidism     Lung nodule     Neuroendocrine tumor 02/21/2019    of stomach    Obesity     Pharyngoesophageal dysphagia     Vocal cord polyps     Wears glasses        Past Surgical History:   Procedure Laterality Date    CHOLECYSTECTOMY  10/09/2014    COLONOSCOPY  02/21/2019    tubular adenoma; hyperplastic polyp    COLONOSCOPY      over 5 yrs ago per pt with polyps (2-)    CYSTOURETHROSCOPY/STENT REMOVAL  05/03/2011    ENDOSCOPIC ULTRASOUND (LOWER) N/A 01/22/2020    ENDOSCOPIC ULTRASOUND WITH POSSIBLE EMR [facility]    ERCP      GASTRECTOMY N/A 11/30/2020    HIP SURGERY Left     soft tissue tumor removal    THROAT SURGERY      vocal cord polyps removed    UPPER GASTROINTESTINAL ENDOSCOPY  02/21/2019    Neuroendocrine tumor    UPPER GASTROINTESTINAL ENDOSCOPY  09/23/2019    UPPER GASTROINTESTINAL ENDOSCOPY N/A 09/23/2019    EGD BIOPSY performed by Yancey Osler, MD at [facility] [place]    UPPER GASTROINTESTINAL ENDOSCOPY N/A 10/23/2019    EGD W/ EMR performed by Carl Rodríguez MD at [facility] 1 N/A 10/29/2019    EGD CONTROL HEMORRHAGE performed by Edna Ascencio MD at [facility] 1 N/A 04/26/2021    EGD BIOPSY performed by Jami Ojeda MD at [facility] 1 N/A 8/30/2021    EGD ESOPHAGOGASTRODUODENOSCOPY performed by Cate Thomason MD at [address] [place] 2/7/2022    EGD DILATION BALLOON performed by Carl Rodríguez MD at [address] History   Problem Relation Age of Onset    High Blood Pressure Mother     High Blood Pressure Sister     Stomach Cancer Sister     Other Sister         epilepsy    No Known Problems Father      ROS  Constitutional:  Negative for fatigue, loss of appetite and unexpected weight change  HEENT            : Negative for neck stiffness and pain, no congestion or sinus pressure  Eyes                : No visual disturbance or pain  Cardiovascular: No chest pain or palpitations or leg swelling  Respiratory      : Negative for cough, shortness of breath or wheezing  Gastrointestinal: Negative for abdominal pain, constipation or diarrhea and bloating No nausea or vomiting  Genitourinary:     No urgency or frequency, no burning or hematuria  Musculoskeletal: No arthralgias, back pain or myalgias  Skin                  : Negative for rash or erythema  Neurological    : Negative for dizziness, weakness, tremors ,light headedness or syncope  Psychiatric       : Negative for dysphoric mood, sleep disturbances, nervous or anxious, or decreased concentration  All other review of systems was negative    Objective  Physical Examination:    Nursing note reviewed    BP 94/72 (Site: Right Upper Arm, Position: Sitting, Cuff Size: Medium Adult)   Pulse 76   Temp 97.3 °F (36.3 °C) (Temporal)   Resp 20   Ht 5' 5\" (1.651 m)   Wt 188 lb 9.6 oz (85.5 kg)   LMP 01/01/1994   SpO2 98%   BMI 31.38 kg/m²   BP Readings from Last 3 Encounters:   10/17/22 94/72   09/13/22 (!) 133/90   08/17/22 (!) 148/104         Constitutional:  Jyotsna Velasco is oriented to place, person and time ,appears well-developed and well-nourished  HEENT:  Atraumatic and normocephalic, external ears normal bilaterally, nose normal no oropharyngeal exudate and is clear and moist  Eyes:  EOCM normal; conjunctivae normal; PERRLA bilaterally  Neck:  Normal range of motion, neck supple, no JVD and no thyromegaly  Cardiovascular:  RRR, normal heart sounds and intact distal pulses  Pulmonary:  effort normal and breath sounds normal bilaterally,no wheezes or rales, no respiratory distress  Abdominal:  Soft, non-tender; normal bowel sounds, no masses  Musculoskeletal:  Normal range of motion and no edema or tenderness bilaterally  No lymphadenopathy  Neurological:  alert, oriented, and normal reflexes bilaterally  Skin: warm and dry  Psychiatric:  normal mood and effect; behavior normal.    Labs:   No results found for: LABA1C  Lab Results   Component Value Date    CHOL 182 08/27/2020     Lab Results   Component Value Date    HDL 63 05/05/2022     No results found for: [address]  Lab Results   Component Value Date    TRIG 64 11/23/2021     No results found for: [place], [place]  Lab Results   Component Value Date    WBC 5.1 06/30/2022    HGB 12.3 06/30/2022    HCT 40.8 06/30/2022    MCV 91.5 06/30/2022     06/30/2022     Lab Results   Component Value Date    INR 1.0 04/16/2022    PROTIME 10.6 04/16/2022     Lab Results   Component Value Date    GLUCOSE 158 (H) 07/11/2022    CREATININE 0.89 09/26/2022    BUN 11 07/11/2022     07/11/2022    K 3.7 07/11/2022     07/11/2022    CO2 24 07/11/2022     Lab Results   Component Value Date    ALT 14 05/05/2022    AST 27 05/05/2022    ALKPHOS 106 (H) 04/16/2022    BILITOT 0.26 (L) 04/16/2022     Lab Results   Component Value Date    LABALBU 3.0 (L) 04/16/2022     Lab Results   Component Value Date    TSH 2.57 06/17/2022     Assessment:  1. Other fatigue    2. Essential hypertension    3. Generalized anxiety disorder        Plan:  Patient states that she had some fatigue and anxiety and palpitations 2 and half weeks back but now feeling better and eating well and gained about 19 pounds since the last visit and still having some occasional anxiety spells and so advised patient to try Trintellix in office samples given for 2 weeks for 5 mg daily and advised her to call me back in 2 weeks  Patient's visit to the GI and reports reviewed and also discussed about Reglan and its side effects  Blood pressure is stable and continue current treatment  Patient's fatigue resolved now and last TSH in June was within normal limits  Review of scheduled           1. Philipp Arvizu received counseling on the following healthy behaviors: nutrition and exercise    2. Prior labs and health maintenance reviewed. 3.  Discussed use, benefit, and side effects of prescribed medications. Barriers to medication compliance addressed. All her questions were answered. Pt voiced understanding. Philipp Arvizu will continue current medications, diet and exercise. No orders of the defined types were placed in this encounter. Completed Refills               Requested Prescriptions      No prescriptions requested or ordered in this encounter     4. Patient given educational materials - see patient instructions    5. Was a self-tracking handout given in paper form or via [employer]? NO    No orders of the defined types were placed in this encounter. No follow-ups on file. Patient voiced understanding and agreed to treatment plan.      Electronically signed by Isabella Browne MD on 10/17/2022 at 10:32 AM    This note is created with a voice recognition program and while intend to generate a document that accurately reflects the content of the visit, no guarantee can be provided that every mistake has been identified and corrected by editing.

## 2022-10-28 RX ORDER — HYDROCHLOROTHIAZIDE 25 MG/1
TABLET ORAL
Qty: 90 TABLET | Refills: 1 | Status: SHIPPED | OUTPATIENT
Start: 2022-10-28

## 2022-10-28 NOTE — TELEPHONE ENCOUNTER
Ines Oh is calling to request a refill on the following medication(s):    Medication Request:  Requested Prescriptions     Pending Prescriptions Disp Refills    hydroCHLOROthiazide (HYDRODIURIL) 25 MG tablet [Pharmacy Med Name: HYDROCHLOROTHIAZIDE 25 MG TAB] 30 tablet 0     Sig: TAKE 1 TABLET BY MOUTH EVERY DAY     Last filled 9/13/22 30 no refill  Order pending     Last Visit Date (If Applicable):  86/69/7485    Next Visit Date:    Visit date not found

## 2022-12-12 RX ORDER — TRAZODONE HYDROCHLORIDE 50 MG/1
TABLET ORAL
Qty: 90 TABLET | Refills: 0 | Status: SHIPPED | OUTPATIENT
Start: 2022-12-12

## 2022-12-12 NOTE — TELEPHONE ENCOUNTER
Bijan Grider is calling to request a refill on the following medication(s):    Medication Request:  Requested Prescriptions     Pending Prescriptions Disp Refills    traZODone (DESYREL) 50 MG tablet [Pharmacy Med Name: TRAZODONE 50 MG TABLET] 90 tablet 0     Sig: TAKE 1 TABLET BY MOUTH EVERY DAY AT BEDTIME AS NEEDED FOR SLEEP       Last Visit Date (If Applicable):  02/69/5628    Next Visit Date:    Visit date not found

## 2023-01-03 ENCOUNTER — HOSPITAL ENCOUNTER (OUTPATIENT)
Age: 63
Setting detail: SPECIMEN
Discharge: HOME | End: 2023-01-03

## 2023-01-03 DIAGNOSIS — C7A.092 MALIGNANT CARCINOID TUMOR OF STOMACH (HCC): ICD-10-CM

## 2023-01-03 LAB
ABSOLUTE EOS #: 0.08 K/UL (ref 0–0.44)
ABSOLUTE IMMATURE GRANULOCYTE: <0.03 K/UL (ref 0–0.3)
ABSOLUTE LYMPH #: 2.31 K/UL (ref 1.1–3.7)
ABSOLUTE MONO #: 0.5 K/UL (ref 0.1–1.2)
ALBUMIN SERPL-MCNC: 3.8 G/DL (ref 3.5–5.2)
ALBUMIN/GLOBULIN RATIO: 1.1 (ref 1–2.5)
ALP BLD-CCNC: 183 U/L (ref 35–104)
ALT SERPL-CCNC: 15 U/L (ref 5–33)
ANION GAP SERPL CALCULATED.3IONS-SCNC: 12 MMOL/L (ref 9–17)
AST SERPL-CCNC: 27 U/L
BASOPHILS # BLD: 1 % (ref 0–2)
BASOPHILS ABSOLUTE: 0.03 K/UL (ref 0–0.2)
BILIRUB SERPL-MCNC: <0.1 MG/DL (ref 0.3–1.2)
BUN BLDV-MCNC: 13 MG/DL (ref 8–23)
CALCIUM SERPL-MCNC: 8.9 MG/DL (ref 8.6–10.4)
CHLORIDE BLD-SCNC: 104 MMOL/L (ref 98–107)
CO2: 25 MMOL/L (ref 20–31)
CREAT SERPL-MCNC: 1 MG/DL (ref 0.5–0.9)
EOSINOPHILS RELATIVE PERCENT: 1 % (ref 1–4)
FERRITIN: 14 NG/ML (ref 13–150)
GFR SERPL CREATININE-BSD FRML MDRD: >60 ML/MIN/1.73M2
GLUCOSE BLD-MCNC: 120 MG/DL (ref 70–99)
HCT VFR BLD CALC: 42.8 % (ref 36.3–47.1)
HEMOGLOBIN: 13.6 G/DL (ref 11.9–15.1)
IMMATURE GRANULOCYTES: 0 %
IRON SATURATION: 11 % (ref 20–55)
IRON: 45 UG/DL (ref 37–145)
LYMPHOCYTES # BLD: 36 % (ref 24–43)
MCH RBC QN AUTO: 28.9 PG (ref 25.2–33.5)
MCHC RBC AUTO-ENTMCNC: 31.8 G/DL (ref 28.4–34.8)
MCV RBC AUTO: 90.9 FL (ref 82.6–102.9)
MONOCYTES # BLD: 8 % (ref 3–12)
NRBC AUTOMATED: 0 PER 100 WBC
PDW BLD-RTO: 13.1 % (ref 11.8–14.4)
PLATELET # BLD: 297 K/UL (ref 138–453)
PMV BLD AUTO: 9.8 FL (ref 8.1–13.5)
POTASSIUM SERPL-SCNC: 3.7 MMOL/L (ref 3.7–5.3)
RBC # BLD: 4.71 M/UL (ref 3.95–5.11)
SEG NEUTROPHILS: 54 % (ref 36–65)
SEGMENTED NEUTROPHILS ABSOLUTE COUNT: 3.42 K/UL (ref 1.5–8.1)
SODIUM BLD-SCNC: 141 MMOL/L (ref 135–144)
TOTAL IRON BINDING CAPACITY: 421 UG/DL (ref 250–450)
TOTAL PROTEIN: 7.3 G/DL (ref 6.4–8.3)
UNSATURATED IRON BINDING CAPACITY: 376 UG/DL (ref 112–347)
WBC # BLD: 6.4 K/UL (ref 3.5–11.3)

## 2023-01-03 PROCEDURE — 80053 COMPREHEN METABOLIC PANEL: CPT

## 2023-01-03 PROCEDURE — 83540 ASSAY OF IRON: CPT

## 2023-01-03 PROCEDURE — 86316 IMMUNOASSAY TUMOR OTHER: CPT

## 2023-01-03 PROCEDURE — 84260 ASSAY OF SEROTONIN: CPT

## 2023-01-03 PROCEDURE — 36415 COLL VENOUS BLD VENIPUNCTURE: CPT

## 2023-01-03 PROCEDURE — 82728 ASSAY OF FERRITIN: CPT

## 2023-01-03 PROCEDURE — 83550 IRON BINDING TEST: CPT

## 2023-01-03 PROCEDURE — 85025 COMPLETE CBC W/AUTO DIFF WBC: CPT

## 2023-01-05 LAB — CHROMOGRANIN A: 67 NG/ML (ref 0–103)

## 2023-01-08 LAB — SEROTONIN BLOOD: 380 NG/ML (ref 50–220)

## 2023-01-10 ENCOUNTER — OFFICE VISIT (OUTPATIENT)
Dept: ONCOLOGY | Age: 63
End: 2023-01-10
Payer: COMMERCIAL

## 2023-01-10 ENCOUNTER — TELEPHONE (OUTPATIENT)
Dept: ONCOLOGY | Age: 63
End: 2023-01-10

## 2023-01-10 VITALS
HEART RATE: 69 BPM | SYSTOLIC BLOOD PRESSURE: 121 MMHG | RESPIRATION RATE: 16 BRPM | WEIGHT: 201 LBS | TEMPERATURE: 97.2 F | BODY MASS INDEX: 33.45 KG/M2 | DIASTOLIC BLOOD PRESSURE: 86 MMHG

## 2023-01-10 DIAGNOSIS — C7A.092 MALIGNANT CARCINOID TUMOR OF STOMACH (HCC): Primary | ICD-10-CM

## 2023-01-10 PROCEDURE — 99214 OFFICE O/P EST MOD 30 MIN: CPT | Performed by: INTERNAL MEDICINE

## 2023-01-10 PROCEDURE — 3074F SYST BP LT 130 MM HG: CPT | Performed by: INTERNAL MEDICINE

## 2023-01-10 PROCEDURE — 3079F DIAST BP 80-89 MM HG: CPT | Performed by: INTERNAL MEDICINE

## 2023-01-10 PROCEDURE — 99211 OFF/OP EST MAY X REQ PHY/QHP: CPT | Performed by: INTERNAL MEDICINE

## 2023-01-10 RX ORDER — CHOLECALCIFEROL (VITAMIN D3) 125 MCG
500 CAPSULE ORAL DAILY
COMMUNITY

## 2023-01-10 RX ORDER — ACETAMINOPHEN 160 MG
1000 TABLET,DISINTEGRATING ORAL DAILY
COMMUNITY

## 2023-01-10 NOTE — PROGRESS NOTES
_           Chief Complaint   Patient presents with    Follow-up    Discuss Labs    Other     Has been having a \" sharp gut pain \"     Fatigue     Any vitamins you can recommend ? DIAGNOSIS:       Malignant carcinoid tumor of the stomach  Recent GI bleeding after endoscopic mucosal resection of stomach carcinoid on October 23, 2019. Evidence of recurrent disease on repeated EGD January 2020 and continued elevation of tumor marker chromogranin A  Iron deficiency secondary to above  History of hypothyroidism  Multiple comorbidities as listed      CURRENT THERAPY:         Status post endoscopic mucosal resection of stomach carcinoid October 23, 2019  S/p gastric resection at [facility] November 20, 2020. BRIEF CASE HISTORY:      Ms. Matt Augustin is a very pleasant 65 y.o. female with history of multiple comorbidities as listed. The patient seen because of recent diagnosis of carcinoid tumor. Patient had problem with dysphagia for about 4 to 5 months. That problem resolved spontaneously. She was evaluated by gastroenterology. She had EGD in September 2019. She was noted to have gastric tumor which was biopsied and was positive for malignant neuroendocrine tumor. Subsequently patient was evaluated by abdominal MRI. Patient was having no evidence of gastric disease or gastric wall deep penetration. She underwent endoscopic mucosal resection October 23, 2019. Patient had recent admission to [place] because of GI bleeding. She had EGD again and cauterization of bleeding. She is having weakness and fatigue. No other symptoms. No abdominal pain or cramps. No hot flashes or night sweats. No weight loss or decreased appetite. No wheezing. The patient had lab testing in July 2019 with chromogranin A level 1500.   Patient was not aware of that test.  Patient's twin sister had carcinoid tumor more than 20 years ago. She had gastric resection at that time and there is no recurrence. Patient smokes half pack per day for the last 40 years. Social alcohol drinking. Patient carcinoid tumor was resected October 23, 2019. She has repeated EGD in January 2020 and she was found to have local recurrence with multiple lesions. She was referred to [facility]. She had complete gastric resection 11/30/2020. Following surgery, she had multiple hospitalizations due to dehydration and persistent N/V and dehydration. INTERIM HISTORY:   The patient seen for follow-up gastric carcinoid. Clinically she is doing very well. Nocturnal pain. No nausea or vomiting. No GI bleeding. No abdominal cramps. No diarrhea. No chest pain. No shortness of breath. No wheezing. Patient is gaining weight. He feels much much better. PAST MEDICAL HISTORY: has a past medical history of Anxiety, Arthritis, Asthma, Colon polyp, Colon polyps, Cyst of kidney, acquired, Fatigue, Hypertension, Hypothyroidism, Lung nodule, Neuroendocrine tumor, Obesity, Pharyngoesophageal dysphagia, Vocal cord polyps, and Wears glasses. PAST SURGICAL HISTORY: has a past surgical history that includes cystourethroscopy/stent removal (05/03/2011); Colonoscopy (02/21/2019); ERCP; Cholecystectomy (10/09/2014); hip surgery (Left); Throat surgery; Colonoscopy; Upper gastrointestinal endoscopy (02/21/2019); Upper gastrointestinal endoscopy (09/23/2019); Upper gastrointestinal endoscopy (N/A, 09/23/2019); Upper gastrointestinal endoscopy (N/A, 10/23/2019); Upper gastrointestinal endoscopy (N/A, 10/29/2019); Endoscopic ultrasonography, GI (N/A, 01/22/2020); Upper gastrointestinal endoscopy (N/A, 04/26/2021); gastrectomy (N/A, 11/30/2020); Upper gastrointestinal endoscopy (N/A, 8/30/2021); and Upper gastrointestinal endoscopy (N/A, 2/7/2022).      CURRENT MEDICATIONS:  has a current medication list which includes the following prescription(s): vitamin d3, vitamin b-12, trazodone, hydrochlorothiazide, metoclopramide, levothyroxine, docusate sodium, fluticasone, metoprolol tartrate, and multiple vitamin. ALLERGIES:  is allergic to erythromycin. FAMILY HISTORY: Patient's twin sister had gastric carcinoid more than 20 years ago status post partial gastric resection with no recurrence. Otherwise negative for any hematological or oncological conditions. SOCIAL HISTORY:  reports that she quit smoking about 15 years ago. Her smoking use included cigarettes. She has a 25.00 pack-year smoking history. She has never used smokeless tobacco. She reports that she does not currently use alcohol. She reports that she does not use drugs. REVIEW OF SYSTEMS:     General: Positive for weakness and fatigue. + unanticipated weight loss. No fever or chills. Eyes: No blurred vision, eye pain or double vision. Ears: No hearing problems or drainage. No tinnitus. Throat: No sore throat, problems with swallowing or dysphagia. Respiratory: As above. Cardiovascular: No chest pain, orthopnea or PND. No lower extremity edema. No palpitation. Gastrointestinal: As above. Genitourinary: No dysuria, hematuria, frequency or urgency. Musculoskeletal: No muscle aches or pains. No limitation of movement. No back pain. No gait disturbance, No joint complaints. Dermatologic: No skin rashes or pruritus. No skin lesions or discolorations. Psychiatric: No depression, anxiety, or stress or signs of schizophrenia. No change in mood or affect. Hematologic: No history of bleeding tendency. No bruises or ecchymosis. No history of clotting problems. Infectious disease: No fever, chills or frequent infections. Endocrine: No problems with obesity. No polydipsia or polyuria. No temperature intolerance. Neurologic: No headaches or dizziness. No weakness or numbness of the extremities. No changes in balance, coordination,  memory, mentation, behavior.    Allergic/Immunologic: No nasal congestion or hives. No repeated infections. PHYSICAL EXAM:  The patient is not in acute distress. Vital signs: Blood pressure 121/86, pulse 69, temperature 97.2 °F (36.2 °C), temperature source Temporal, resp. rate 16, weight 201 lb (91.2 kg), last menstrual period 01/01/1994, not currently breastfeeding. General appearance - well appearing, not in pain or distress  Mental status - good mood, alert and oriented  Eyes - pupils equal and reactive, extraocular eye movements intact  Ears - bilateral TM's and external ear canals normal  Nose - normal and patent, no erythema, discharge or polyps  Mouth - mucous membranes moist, pharynx normal without lesions  Neck - supple, no significant adenopathy  Lymphatics - no palpable lymphadenopathy, no hepatosplenomegaly  Chest -bilateral rhonchi.   Heart - normal rate, regular rhythm, normal S1, S2, no murmurs, rubs, clicks or gallops  Abdomen - soft, nontender, nondistended, no masses or organomegaly  Neurological - alert, oriented, normal speech, no focal findings or movement disorder noted  Musculoskeletal - no joint tenderness, deformity or swelling  Extremities - peripheral pulses normal, no pedal edema, no clubbing or cyanosis  Skin - normal coloration and turgor, no rashes, no suspicious skin lesions noted     Review of Diagnostic data:   Lab Results   Component Value Date    WBC 6.4 01/03/2023    HGB 13.6 01/03/2023    HCT 42.8 01/03/2023    MCV 90.9 01/03/2023     01/03/2023       Chemistry        Component Value Date/Time     01/03/2023 0952    K 3.7 01/03/2023 0952     01/03/2023 0952    CO2 25 01/03/2023 0952    BUN 13 01/03/2023 0952    CREATININE 1.00 (H) 01/03/2023 0952        Component Value Date/Time    CALCIUM 8.9 01/03/2023 0952    ALKPHOS 183 (H) 01/03/2023 0952    AST 27 01/03/2023 0952    ALT 15 01/03/2023 0952    BILITOT <0.1 (L) 01/03/2023 0952        Abdominal MRI 9/18/2019:  Impression   Subcentimeter gastric mucosal enhancing masses in the fundus and along the   lesser curvature are demonstrated, corresponding to the patient's known GI   stromal tumors. No evidence for extension through the gastric wall or   metastatic disease. Status post cholecystectomy. MRCP within normal limits. Pelvic right kidney, incompletely visualized. Bilateral renal cysts again   demonstrated. CT chest October 26, 2019:     FINDINGS:   Pulmonary Arteries: Suboptimal contrast timing. Limited evaluation of the   segmental branches. No evidence for central pulmonary embolism. The main   pulmonary artery is normal in size. Mediastinum: No evidence of mediastinal lymphadenopathy. The heart and   pericardium demonstrate no acute abnormality. There is no acute abnormality   of the thoracic aorta. Lungs/pleura: The lungs are without acute process. No focal consolidation or   pulmonary edema. No evidence of pleural effusion or pneumothorax. Upper Abdomen: Partially visualized left renal cysts. Dense area of   calcification adjacent to the splenic artery is noted, which may represent a   thrombosed aneurysm. Small lymph node in the gastrohepatic ligament. Status   post cholecystectomy. Soft Tissues/Bones: No acute bone or soft tissue abnormality. Impression   Suboptimal contrast timing. No evidence for central pulmonary embolism. No acute airspace disease identified. Pathology results from September 23, 2019:  Collected: 9/23/2019   Received: 9/23/2019   Reported: 9/25/2019 10:17     -- Diagnosis --   1. STOMACH, ANTRUM, BIOPSY:   - UNREMARKABLE GASTRIC MUCOSA. - NEGATIVE FOR HELICOBACTER PYLORI INFECTION. 2.  STOMACH, LESION, BIOPSIES:   - WELL-DIFFERENTIATED GASTRIC NEUROENDOCRINE TUMOR (CARCINOID TUMOR). - FOCAL ACTIVE CHRONIC GASTRITIS AND INTESTINAL METAPLASIA ARE PRESENT   IN    THE NONNEOPLASTIC GASTRIC MUCOSA. SEE COMMENT.    Pathology results from October 23, 2019:  Collected: 10/23/2019   Received: 10/24/2019   Reported: 10/28/2019 13:13     -- Diagnosis --   GASTRIC TISSUES, EXCISION:        - WELL DIFFERENTIATED NEUROENDOCRINE TUMOR, GRADE 2.     7/23/2019 10:10 PM - Julia Garzon Incoming Lab Results From [facility]     Component Value Ref Range & Units Status Collected Lab   Chromogranin A 1553High   0 - 95 ng/mL Final 07/22/2019  7:55 AM [facility]   (NOTE)      Lab Results   Component Value Date    WBC 6.4 01/03/2023    HGB 13.6 01/03/2023    HCT 42.8 01/03/2023    MCV 90.9 01/03/2023     01/03/2023       Chemistry        Component Value Date/Time     01/03/2023 0952    K 3.7 01/03/2023 0952     01/03/2023 0952    CO2 25 01/03/2023 0952    BUN 13 01/03/2023 0952    CREATININE 1.00 (H) 01/03/2023 0952        Component Value Date/Time    CALCIUM 8.9 01/03/2023 0952    ALKPHOS 183 (H) 01/03/2023 0952    AST 27 01/03/2023 0952    ALT 15 01/03/2023 0952    BILITOT <0.1 (L) 01/03/2023 0952        Lab Results   Component Value Date    IRON 45 01/03/2023    TIBC 421 01/03/2023    FERRITIN 14 01/03/2023           IMPRESSION:   Malignant carcinoid tumor of the stomach  Recent GI bleeding after endoscopic mucosal resection of stomach carcinoid on October 23, 2019. Evidence of recurrent disease on repeated EGD January 2020 and continued elevation of tumor marker chromogranin A  Iron deficiency secondary to above  History of hypothyroidism  Multiple comorbidities as listed    PLAN: Records and labs and images were reviewed and discussed with the patient. Clinically she is stable with significant improvement over the last 2 to 3 months. She is asymptomatic and she is gaining weight. No GI symptoms. Tumor marker chromogranin A is normal.  Discussed further care for carcinoid. We will monitor with scans and tumors markers. We will do labs again in 6 months.   She will continue follow up in [facility]  Patient's questions were answered to the best of her satisfaction and she verbalized full understanding and agreement.

## 2023-01-10 NOTE — TELEPHONE ENCOUNTER
Lyla Mccurdy MD VISIT  DR Marisol Varghese IN TO SEE PATIENT  ORDERS RECEIVED  RV 6 MONTHS BEFORE RV  LABS CDP CMP FE TIBC FERRITIN CHROMOGRANIN A SEROTONIN TO BE DONE 07/11/23, ORDERS MAILED TO PT  MD VISIT 07/18/23 @8AM  AVS PRINTED AND GIVEN TO PATIENT WITH INSTRUCTIONS  PATIENT DISCHARGED AMBULATORY

## 2023-01-24 NOTE — TELEPHONE ENCOUNTER
Elinor Almanza is calling to request a refill on the following medication(s):    Medication Request:  Requested Prescriptions     Pending Prescriptions Disp Refills    triamcinolone (KENALOG) 0.1 % ointment [Pharmacy Med Name: TRIAMCINOLONE 0.1% OINTMENT] 30 g 1     Sig: APPLY TO AFFECTED AREA TWICE A DAY       Last Visit Date (If Applicable):  10/17/2022    Next Visit Date:    Visit date not found

## 2023-01-25 DIAGNOSIS — C7A.092 MALIGNANT CARCINOID TUMOR OF STOMACH (HCC): Primary | ICD-10-CM

## 2023-01-30 ENCOUNTER — OFFICE VISIT (OUTPATIENT)
Dept: INTERNAL MEDICINE CLINIC | Age: 63
End: 2023-01-30
Payer: COMMERCIAL

## 2023-01-30 ENCOUNTER — HOSPITAL ENCOUNTER (OUTPATIENT)
Age: 63
Setting detail: SPECIMEN
Discharge: HOME | End: 2023-01-30

## 2023-01-30 VITALS
OXYGEN SATURATION: 99 % | HEIGHT: 65 IN | SYSTOLIC BLOOD PRESSURE: 94 MMHG | WEIGHT: 202.2 LBS | RESPIRATION RATE: 18 BRPM | BODY MASS INDEX: 33.69 KG/M2 | TEMPERATURE: 97.1 F | DIASTOLIC BLOOD PRESSURE: 60 MMHG | HEART RATE: 67 BPM

## 2023-01-30 DIAGNOSIS — R53.83 OTHER FATIGUE: Primary | ICD-10-CM

## 2023-01-30 DIAGNOSIS — F32.A DEPRESSION, UNSPECIFIED DEPRESSION TYPE: ICD-10-CM

## 2023-01-30 DIAGNOSIS — E53.8 B12 DEFICIENCY: ICD-10-CM

## 2023-01-30 DIAGNOSIS — E55.9 VITAMIN D DEFICIENCY: ICD-10-CM

## 2023-01-30 DIAGNOSIS — R53.83 OTHER FATIGUE: ICD-10-CM

## 2023-01-30 LAB
FOLATE: >20 NG/ML
MAGNESIUM: 2.1 MG/DL (ref 1.6–2.6)
THYROXINE, FREE: 0.85 NG/DL (ref 0.93–1.7)
TSH SERPL DL<=0.05 MIU/L-ACNC: 9.51 UIU/ML (ref 0.3–5)
VITAMIN B-12: 919 PG/ML (ref 232–1245)
VITAMIN D 25-HYDROXY: 31.4 NG/ML

## 2023-01-30 PROCEDURE — G8484 FLU IMMUNIZE NO ADMIN: HCPCS | Performed by: INTERNAL MEDICINE

## 2023-01-30 PROCEDURE — 3078F DIAST BP <80 MM HG: CPT | Performed by: INTERNAL MEDICINE

## 2023-01-30 PROCEDURE — 96160 PT-FOCUSED HLTH RISK ASSMT: CPT | Performed by: INTERNAL MEDICINE

## 2023-01-30 PROCEDURE — 99214 OFFICE O/P EST MOD 30 MIN: CPT | Performed by: INTERNAL MEDICINE

## 2023-01-30 PROCEDURE — 1036F TOBACCO NON-USER: CPT | Performed by: INTERNAL MEDICINE

## 2023-01-30 PROCEDURE — G8427 DOCREV CUR MEDS BY ELIG CLIN: HCPCS | Performed by: INTERNAL MEDICINE

## 2023-01-30 PROCEDURE — 3074F SYST BP LT 130 MM HG: CPT | Performed by: INTERNAL MEDICINE

## 2023-01-30 PROCEDURE — G8417 CALC BMI ABV UP PARAM F/U: HCPCS | Performed by: INTERNAL MEDICINE

## 2023-01-30 PROCEDURE — 3017F COLORECTAL CA SCREEN DOC REV: CPT | Performed by: INTERNAL MEDICINE

## 2023-01-30 ASSESSMENT — PATIENT HEALTH QUESTIONNAIRE - PHQ9
SUM OF ALL RESPONSES TO PHQ QUESTIONS 1-9: 0
2. FEELING DOWN, DEPRESSED OR HOPELESS: 0
SUM OF ALL RESPONSES TO PHQ9 QUESTIONS 1 & 2: 0
1. LITTLE INTEREST OR PLEASURE IN DOING THINGS: 0
SUM OF ALL RESPONSES TO PHQ QUESTIONS 1-9: 0

## 2023-01-30 NOTE — PROGRESS NOTES
Falguni Hinojosa is a 65 y.o. female who presents for   Chief Complaint   Patient presents with    Health Maintenance     HIV, hep c, dtap, cervical screen, shingles, covid, colorectal screen, flu     Fatigue     Called last week felt very fatigued and was having trouble breathing ; feeling better now     Discuss Medications     Pt would like to know if she is taking enough vitamins ; Dr. Peña Party stated her stomach is not holding b12  No refills needed today     and follow up of chronic medical problems.   Patient Active Problem List   Diagnosis    Asthma    Anxiety    Obesity    Hyperlipidemia    Hypothyroidism    Colon polyps    Vertigo    Mass of left hip region    General weakness    Pharyngoesophageal dysphagia    Colon polyp    Neuroendocrine tumor    Chest pain    Shortness of breath    Anemia    GI bleed    Essential hypertension    Neuroendocrine neoplasm of stomach    Polyp, stomach    Melena    Gastric ulcer with hemorrhage    Constipation    Hypokalemia    Hypomagnesemia    Bilateral pulmonary embolism (HCC)    Abdominal pain, epigastric    Elevated d-dimer    Malignant carcinoid tumor of stomach (HCC)    Intractable vomiting    Intractable nausea and vomiting    S/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    Food intolerance    Severe malnutrition (HCC)    Sinus tachycardia    Weight loss    Projectile vomiting with nausea    On total parenteral nutrition    H/O blood clots    Pneumonia    Sepsis (HCC)    Bilious vomiting with nausea    Asymptomatic bacteriuria    Pulmonary infiltrates on CXR    Tachycardia, paroxysmal (HCC)    Pneumonia of right upper lobe due to Mycoplasma pneumoniae    Vitamin D deficiency    S/P gastrectomy     HPI  Patient had fatigue last week but now getting better denies any other complaints and had lab work done recently    Current Outpatient Medications   Medication Sig Dispense Refill    triamcinolone (KENALOG) 0.1 % ointment APPLY TO AFFECTED AREA TWICE A DAY 30 g 1    Cholecalciferol (VITAMIN D3) 50 MCG (2000 UT) CAPS Take 1,000 Units by mouth daily      vitamin B-12 (CYANOCOBALAMIN) 500 MCG tablet Take 500 mcg by mouth daily      traZODone (DESYREL) 50 MG tablet TAKE 1 TABLET BY MOUTH EVERY DAY AT BEDTIME AS NEEDED FOR SLEEP 90 tablet 0    hydroCHLOROthiazide (HYDRODIURIL) 25 MG tablet TAKE 1 TABLET BY MOUTH EVERY DAY 90 tablet 1    metoclopramide (REGLAN) 10 MG tablet Take 1 tablet by mouth 3 times daily (before meals) 90 tablet 1    levothyroxine (SYNTHROID) 88 MCG tablet TAKE 1 TABLET BY MOUTH FIVE TIMES WEEKLY ONLY WED-SUN (SKIP MON AND TUES) 75 tablet 1    docusate sodium (COLACE) 100 mg capsule TAKE 1 CAPSULE BY MOUTH TWICE A DAY 60 capsule 5    fluticasone (FLONASE) 50 MCG/ACT nasal spray 2 sprays by Each Nostril route daily (Patient taking differently: 2 sprays by Each Nostril route daily PRN) 16 g 5    metoprolol tartrate (LOPRESSOR) 25 MG tablet Take 1 tablet by mouth 2 times daily 180 tablet 1    Multiple Vitamin (MULTI-DAY VITAMINS PO) Take 1 tablet by mouth daily        No current facility-administered medications for this visit. Allergies   Allergen Reactions    Erythromycin Diarrhea       Past Medical History:   Diagnosis Date    Anxiety     Arthritis     knee    Asthma     Colon polyp 02/21/2019    tubular adenoma; hyperplastic polyp    Colon polyps     Cyst of kidney, acquired     bilat.     Fatigue     Hypertension     Hypothyroidism     Lung nodule     Neuroendocrine tumor 02/21/2019    of stomach    Obesity     Pharyngoesophageal dysphagia     Vocal cord polyps     Wears glasses        Past Surgical History:   Procedure Laterality Date    CHOLECYSTECTOMY  10/09/2014    COLONOSCOPY  02/21/2019    tubular adenoma; hyperplastic polyp    COLONOSCOPY      over 5 yrs ago per pt with polyps (2-)    CYSTOURETHROSCOPY/STENT REMOVAL  05/03/2011    ENDOSCOPIC ULTRASOUND (LOWER) N/A 01/22/2020    ENDOSCOPIC ULTRASOUND WITH POSSIBLE EMR performed by James Shields MD at [facility]    ERCP      GASTRECTOMY N/A 11/30/2020    HIP SURGERY Left     soft tissue tumor removal    THROAT SURGERY      vocal cord polyps removed    UPPER GASTROINTESTINAL ENDOSCOPY  02/21/2019    Neuroendocrine tumor    UPPER GASTROINTESTINAL ENDOSCOPY  09/23/2019    UPPER GASTROINTESTINAL ENDOSCOPY N/A 09/23/2019    EGD BIOPSY performed by Barby Nelson MD at [address] [place] [address] 10/23/2019    EGD W/ EMR performed by James Shields MD at [facility] N/A 10/29/2019    EGD CONTROL HEMORRHAGE performed by Chuckie Blount MD at [facility] N/A 04/26/2021    EGD BIOPSY performed by Eryn Whittington MD at [facility] N/A 8/30/2021    EGD ESOPHAGOGASTRODUODENOSCOPY performed by Ramos Donahue MD at [address] [place] [address] 2/7/2022    EGD DILATION BALLOON performed by James Shields MD at [facility] History   Problem Relation Age of Onset    High Blood Pressure Mother     High Blood Pressure Sister     Stomach Cancer Sister     Other Sister         epilepsy    No Known Problems Father      ROS  Constitutional:  Negative for fatigue, loss of appetite and unexpected weight change  HEENT            : Negative for neck stiffness and pain, no congestion or sinus pressure  Eyes                : No visual disturbance or pain  Cardiovascular: No chest pain or palpitations or leg swelling  Respiratory      : Negative for cough, shortness of breath or wheezing  Gastrointestinal: Negative for abdominal pain, constipation or diarrhea and bloating No nausea or vomiting  Genitourinary:     No urgency or frequency, no burning or hematuria  Musculoskeletal: No arthralgias, back pain or myalgias  Skin                  : Negative for rash or erythema  Neurological    : Negative for dizziness, weakness, tremors ,light headedness or syncope  Psychiatric       : Negative for dysphoric mood, sleep disturbances, nervous or anxious, or decreased concentration  All other review of systems was negative    Objective  Physical Examination:    Nursing note reviewed    BP 94/60 (Site: Right Upper Arm, Position: Sitting, Cuff Size: Large Adult)   Pulse 67   Temp 97.1 °F (36.2 °C) (Temporal)   Resp 18   Ht 5' 5\" (1.651 m)   Wt 202 lb 3.2 oz (91.7 kg)   LMP 01/01/1994   SpO2 99%   BMI 33.65 kg/m²   BP Readings from Last 3 Encounters:   01/30/23 94/60   01/10/23 121/86   10/17/22 94/72         Constitutional:  Joe Craft is oriented to place, person and time ,appears well-developed and well-nourished  HEENT:  Atraumatic and normocephalic, external ears normal bilaterally, nose normal no oropharyngeal exudate and is clear and moist  Eyes:  EOCM normal; conjunctivae normal; PERRLA bilaterally  Neck:  Normal range of motion, neck supple, no JVD and no thyromegaly  Cardiovascular:  RRR, normal heart sounds and intact distal pulses  Pulmonary:  effort normal and breath sounds normal bilaterally,no wheezes or rales, no respiratory distress  Abdominal:  Soft, non-tender; normal bowel sounds, no masses  Musculoskeletal:  Normal range of motion and no edema or tenderness bilaterally  No lymphadenopathy  Neurological:  alert, oriented, and normal reflexes bilaterally  Skin: warm and dry  Psychiatric:  normal mood and effect; behavior normal.    Labs:   No results found for: LABA1C  Lab Results   Component Value Date    CHOL 182 08/27/2020     Lab Results   Component Value Date    HDL 63 05/05/2022     No results found for: [facility]  Lab Results   Component Value Date    TRIG 64 11/23/2021     No results found for: [place], [place]  Lab Results   Component Value Date    WBC 6.4 01/03/2023    HGB 13.6 01/03/2023    HCT 42.8 01/03/2023    MCV 90.9 01/03/2023     01/03/2023     Lab Results   Component Value Date    INR 1.0 04/16/2022    PROTIME 10.6 04/16/2022 Lab Results   Component Value Date    GLUCOSE 120 (H) 01/03/2023    CREATININE 1.00 (H) 01/03/2023    BUN 13 01/03/2023     01/03/2023    K 3.7 01/03/2023     01/03/2023    CO2 25 01/03/2023     Lab Results   Component Value Date    ALT 15 01/03/2023    AST 27 01/03/2023    ALKPHOS 183 (H) 01/03/2023    BILITOT <0.1 (L) 01/03/2023     Lab Results   Component Value Date    LABALBU 3.8 01/03/2023     Lab Results   Component Value Date    TSH 2.57 06/17/2022     Assessment:   Diagnosis Orders   1. Other fatigue  Vitamin B12    Magnesium    Vitamin D 25 Hydroxy    T4, Free    TSH    Folate      2. B12 deficiency  Vitamin B12    Magnesium    Vitamin D 25 Hydroxy    T4, Free    TSH    Folate      3. Vitamin D deficiency  Vitamin B12    Magnesium    Vitamin D 25 Hydroxy    T4, Free    TSH    Folate      4. Depression, unspecified depression type              Plan:  Patient's fatigue improved but patient admits that she has mild depression as she is not doing any job at this time and I did suggest her to try some medications and Trintellix 5 mg office samples given and advised her to call me back in 2 weeks and side effects explained  Patient had a history of vitamin B12 deficiency and vitamin D deficiency and repeat lab work  Labs from Hematology and Oncology including CBC and CMP reviewed and were within normal limits  Also patient had been taking Synthroid and last TSH was within normal limits and will repeat to ensure it is stable  Review in 4 months           1. Anton Avendaño received counseling on the following healthy behaviors: nutrition and exercise    2. Prior labs and health maintenance reviewed. 3.  Discussed use, benefit, and side effects of prescribed medications. Barriers to medication compliance addressed. All her questions were answered. Pt voiced understanding. Anton Avendaño will continue current medications, diet and exercise.             No orders of the defined types were placed in this encounter.         Completed Refills               Requested Prescriptions      No prescriptions requested or ordered in this encounter     4. Patient given educational materials - see patient instructions    5. Was a self-tracking handout given in paper form or via [email or web address]?  NO    Orders Placed This Encounter   Procedures    Vitamin B12     Standing Status:   Future     Standing Expiration Date:   1/30/2024    Magnesium     Standing Status:   Future     Standing Expiration Date:   1/30/2024    Vitamin D 25 Hydroxy     Standing Status:   Future     Standing Expiration Date:   1/30/2024    T4, Free     Standing Status:   Future     Standing Expiration Date:   1/30/2024    TSH     Standing Status:   Future     Standing Expiration Date:   1/30/2024    Folate     Standing Status:   Future     Standing Expiration Date:   1/30/2024     No follow-ups on file.  Patient voiced understanding and agreed to treatment plan.     Electronically signed by Brock Starks MD on 1/30/2023 at 10:43 AM    This note is created with a voice recognition program and while intend to generate a document that accurately reflects the content of the visit, no guarantee can be provided that every mistake has been identified and corrected by editing.

## 2023-01-30 NOTE — PLAN OF CARE
Problem: Falls - Risk of:  Goal: Will remain free from falls  Description: Will remain free from falls  Outcome: Completed  Goal: Absence of physical injury  Description: Absence of physical injury  Outcome: Completed     Problem: Nausea/Vomiting:  Goal: Absence of nausea/vomiting  Description: Absence of nausea/vomiting  3/28/2021 1200 by Guy Ramirez RN  Outcome: Completed  3/28/2021 0118 by Dany Garza  Outcome: Ongoing  Goal: Able to drink  Description: Able to drink  3/28/2021 1200 by Guy Ramirez RN  Outcome: Completed  3/28/2021 0118 by Dany Garza  Outcome: Ongoing  Goal: Able to eat  Description: Able to eat  3/28/2021 1200 by Guy Ramirez RN  Outcome: Completed  3/28/2021 0118 by Dany Garza  Outcome: Ongoing  Goal: Ability to achieve adequate nutritional intake will improve  Description: Ability to achieve adequate nutritional intake will improve  3/28/2021 1200 by Guy Ramirez RN  Outcome: Completed  3/28/2021 0118 by Dany Garza  Outcome: Ongoing Cellcept Pregnancy And Lactation Text: This medication is Pregnancy Category D and isn't considered safe during pregnancy. It is unknown if this medication is excreted in breast milk.

## 2023-02-01 ENCOUNTER — TELEPHONE (OUTPATIENT)
Dept: INTERNAL MEDICINE CLINIC | Age: 63
End: 2023-02-01

## 2023-02-01 DIAGNOSIS — E03.9 HYPOTHYROIDISM, UNSPECIFIED TYPE: Primary | ICD-10-CM

## 2023-02-01 RX ORDER — LEVOTHYROXINE SODIUM 0.1 MG/1
100 TABLET ORAL DAILY
Qty: 30 TABLET | Refills: 1 | Status: SHIPPED | OUTPATIENT
Start: 2023-02-01 | End: 2023-02-27

## 2023-02-01 NOTE — TELEPHONE ENCOUNTER
----- Message from Oscar White MD sent at 1/31/2023  5:09 PM EST -----  Increase levothyroxine to 100 mcg daily and repeat TSH and free T4 in 6 weeks

## 2023-02-06 ENCOUNTER — OFFICE VISIT (OUTPATIENT)
Dept: GASTROENTEROLOGY | Age: 63
End: 2023-02-06
Payer: COMMERCIAL

## 2023-02-06 VITALS
BODY MASS INDEX: 33.28 KG/M2 | WEIGHT: 200 LBS | SYSTOLIC BLOOD PRESSURE: 127 MMHG | TEMPERATURE: 97.4 F | DIASTOLIC BLOOD PRESSURE: 87 MMHG

## 2023-02-06 DIAGNOSIS — R11.2 INTRACTABLE NAUSEA AND VOMITING: ICD-10-CM

## 2023-02-06 DIAGNOSIS — K31.7 POLYP, STOMACH: ICD-10-CM

## 2023-02-06 DIAGNOSIS — Z90.3 S/P TOTAL GASTRECTOMY AND ROUX-EN-Y ESOPHAGOJEJUNAL ANASTOMOSIS: Primary | ICD-10-CM

## 2023-02-06 DIAGNOSIS — K59.09 OTHER CONSTIPATION: ICD-10-CM

## 2023-02-06 DIAGNOSIS — C7A.092 MALIGNANT CARCINOID TUMOR OF STOMACH (HCC): ICD-10-CM

## 2023-02-06 DIAGNOSIS — Z98.0 S/P TOTAL GASTRECTOMY AND ROUX-EN-Y ESOPHAGOJEJUNAL ANASTOMOSIS: Primary | ICD-10-CM

## 2023-02-06 DIAGNOSIS — K31.7 POLYP, STOMACH: Primary | ICD-10-CM

## 2023-02-06 DIAGNOSIS — D12.2 ADENOMATOUS POLYP OF ASCENDING COLON: ICD-10-CM

## 2023-02-06 PROCEDURE — G8417 CALC BMI ABV UP PARAM F/U: HCPCS | Performed by: INTERNAL MEDICINE

## 2023-02-06 PROCEDURE — 99214 OFFICE O/P EST MOD 30 MIN: CPT | Performed by: INTERNAL MEDICINE

## 2023-02-06 PROCEDURE — G8484 FLU IMMUNIZE NO ADMIN: HCPCS | Performed by: INTERNAL MEDICINE

## 2023-02-06 PROCEDURE — 1036F TOBACCO NON-USER: CPT | Performed by: INTERNAL MEDICINE

## 2023-02-06 PROCEDURE — 3017F COLORECTAL CA SCREEN DOC REV: CPT | Performed by: INTERNAL MEDICINE

## 2023-02-06 PROCEDURE — 3079F DIAST BP 80-89 MM HG: CPT | Performed by: INTERNAL MEDICINE

## 2023-02-06 PROCEDURE — 3074F SYST BP LT 130 MM HG: CPT | Performed by: INTERNAL MEDICINE

## 2023-02-06 PROCEDURE — G8427 DOCREV CUR MEDS BY ELIG CLIN: HCPCS | Performed by: INTERNAL MEDICINE

## 2023-02-06 ASSESSMENT — ENCOUNTER SYMPTOMS
ABDOMINAL DISTENTION: 1
NAUSEA: 1
ANAL BLEEDING: 0
COUGH: 0
ABDOMINAL PAIN: 0
CHOKING: 0
TROUBLE SWALLOWING: 1
RECTAL PAIN: 0
SORE THROAT: 0
VOMITING: 0
SHORTNESS OF BREATH: 0
BLOOD IN STOOL: 0
VOICE CHANGE: 0
DIARRHEA: 0
CONSTIPATION: 1
WHEEZING: 0

## 2023-02-06 NOTE — PROGRESS NOTES
GI CLINIC FOLLOW UP    NTERVAL HISTORY:   No referring provider defined for this encounter. Chief Complaint   Patient presents with    Results     Pt is here today for a f/u on MRI results. 1. S/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    2. Intractable nausea and vomiting    3. Malignant carcinoid tumor of stomach ([address]    4. Polyp, stomach    5. Adenomatous polyp of ascending colon    6. Other constipation      This patient evaluated in my office as a follow-up accompanied by her sister during the interview  She history significant for multiple and neuroendocrine tumors of the stomach underwent surgery done in the past also had an endoscopy ultrasound  She has been seen and followed by hematology oncology  Has been complaining of mild nausea symptoms but however gained some weight and overall seems to be doing okay  She has not had a colonoscopy done in a long while  Denies any rectal bleeding melanotic stools  Patient has been complaining of some abdominal pains, off and on cramping  Also complains of abdominal bloating and gas  Has off and on nausea without any sig vomiting  Has some alternating constipation and diarrhea  Has no weight loss  Has some anxiety issues  No smoking alcohol abuse illicit drug usage  Her previous records were reviewed with her    HISTORY OF PRESENT ILLNESS: Gisela Irvin is a 65 y.o. female with a past history remarkable for , referred for evaluation of   Chief Complaint   Patient presents with    Results     Pt is here today for a f/u on MRI results. .    Past Medical,Family, and Social History reviewed and does contribute to the patient presenting condition. Patient's PMH/PSH,SH,PSYCH Hx, MEDs, ALLERGIES, and ROS were all reviewed and updated in the appropriate sections.     PAST MEDICAL HISTORY:  Past Medical History:   Diagnosis Date    Anxiety     Arthritis     knee    Asthma     Colon polyp 02/21/2019    tubular adenoma; hyperplastic polyp    Colon polyps     Cyst of kidney, acquired     bilat.     Fatigue     Hypertension     Hypothyroidism     Lung nodule     Neuroendocrine tumor 02/21/2019    of stomach    Obesity     Pharyngoesophageal dysphagia     Vocal cord polyps     Wears glasses        Past Surgical History:   Procedure Laterality Date    CHOLECYSTECTOMY  10/09/2014    COLONOSCOPY  02/21/2019    tubular adenoma; hyperplastic polyp    COLONOSCOPY      over 5 yrs ago per pt with polyps (2-)    CYSTOURETHROSCOPY/STENT REMOVAL  05/03/2011    ENDOSCOPIC ULTRASOUND (LOWER) N/A 01/22/2020    ENDOSCOPIC ULTRASOUND WITH POSSIBLE EMR performed by Kelley Ray MD at [facility]    ERCP      GASTRECTOMY N/A 11/30/2020    HIP SURGERY Left     soft tissue tumor removal    THROAT SURGERY      vocal cord polyps removed    UPPER GASTROINTESTINAL ENDOSCOPY  02/21/2019    Neuroendocrine tumor    UPPER GASTROINTESTINAL ENDOSCOPY  09/23/2019    UPPER GASTROINTESTINAL ENDOSCOPY N/A 09/23/2019    EGD BIOPSY performed by Bárbara Tabares MD at [address] [place] [address] 10/23/2019    EGD W/ EMR performed by Kelley Ray MD at [facility] 1 N/A 10/29/2019    EGD CONTROL HEMORRHAGE performed by Stephanie Lane MD at [facility] 1 N/A 04/26/2021    EGD BIOPSY performed by Vern Saunders MD at [facility] 1 N/A 8/30/2021    EGD ESOPHAGOGASTRODUODENOSCOPY performed by Anna Rangel MD at [address] [place] [address] 2/7/2022    EGD DILATION BALLOON performed by Kelley Ray MD at [address]:    Current Outpatient Medications:     levothyroxine (SYNTHROID) 100 MCG tablet, Take 1 tablet by mouth daily, Disp: 30 tablet, Rfl: 1    VORTIoxetine (TRINTELLIX) 5 MG tablet, Take 1 tablet by mouth daily for 14 days, Disp: 14 tablet, Rfl: 0    triamcinolone (KENALOG) 0.1 % ointment, APPLY TO AFFECTED AREA TWICE A DAY, Disp: 30 g, Rfl: 1    Cholecalciferol (VITAMIN D3) 50 MCG (2000 UT) CAPS, Take 1,000 Units by mouth daily, Disp: , Rfl:     vitamin B-12 (CYANOCOBALAMIN) 500 MCG tablet, Take 500 mcg by mouth daily, Disp: , Rfl:     traZODone (DESYREL) 50 MG tablet, TAKE 1 TABLET BY MOUTH EVERY DAY AT BEDTIME AS NEEDED FOR SLEEP, Disp: 90 tablet, Rfl: 0    hydroCHLOROthiazide (HYDRODIURIL) 25 MG tablet, TAKE 1 TABLET BY MOUTH EVERY DAY, Disp: 90 tablet, Rfl: 1    metoclopramide (REGLAN) 10 MG tablet, Take 1 tablet by mouth 3 times daily (before meals), Disp: 90 tablet, Rfl: 1    docusate sodium (COLACE) 100 mg capsule, TAKE 1 CAPSULE BY MOUTH TWICE A DAY, Disp: 60 capsule, Rfl: 5    fluticasone (FLONASE) 50 MCG/ACT nasal spray, 2 sprays by Each Nostril route daily (Patient taking differently: 2 sprays by Each Nostril route daily PRN), Disp: 16 g, Rfl: 5    metoprolol tartrate (LOPRESSOR) 25 MG tablet, Take 1 tablet by mouth 2 times daily, Disp: 180 tablet, Rfl: 1    Multiple Vitamin (MULTI-DAY VITAMINS PO), Take 1 tablet by mouth daily , Disp: , Rfl:     ALLERGIES:   Allergies   Allergen Reactions    Erythromycin Diarrhea       FAMILY HISTORY:       Problem Relation Age of Onset    High Blood Pressure Mother     High Blood Pressure Sister     Stomach Cancer Sister     Other Sister         epilepsy    No Known Problems Father          SOCIAL HISTORY:   Social History     Socioeconomic History    Marital status: Single     Spouse name: Not on file    Number of children: Not on file    Years of education: Not on file    Highest education level: Not on file   Occupational History    Not on file   Tobacco Use    Smoking status: Former     Packs/day: 1.00     Years: 25.00     Pack years: 25.00     Types: Cigarettes     Quit date: 12/2/2007     Years since quitting: 15.1    Smokeless tobacco: Never   Vaping Use    Vaping Use: Never used   Substance and Sexual Activity    Alcohol use: Not Currently Comment: 3 times a month    Drug use: No    Sexual activity: Not on file   Other Topics Concern    Not on file   Social History Narrative    Not on file     Social Determinants of Health     Financial Resource Strain: Low Risk     Difficulty of Paying Living Expenses: Not hard at all   Food Insecurity: No Food Insecurity    Worried About Running Out of Food in the Last Year: Never true    Ran Out of Food in the Last Year: Never true   Transportation Needs: Not on file   Physical Activity: Not on file   Stress: Not on file   Social Connections: Not on file   Intimate Partner Violence: Not on file   Housing Stability: Not on file         REVIEW OF SYSTEMS:         Review of Systems   Constitutional:  Positive for appetite change. Negative for fatigue and unexpected weight change. HENT:  Positive for trouble swallowing. Negative for sore throat and voice change. Respiratory:  Negative for cough, choking, shortness of breath and wheezing. Cardiovascular:  Negative for chest pain, palpitations and leg swelling. Gastrointestinal:  Positive for abdominal distention, constipation and nausea. Negative for abdominal pain, anal bleeding, blood in stool, diarrhea, rectal pain and vomiting. Neurological:  Negative for dizziness, weakness, light-headedness, numbness and headaches. Hematological:  Does not bruise/bleed easily. Psychiatric/Behavioral:  Negative for confusion and sleep disturbance. The patient is not nervous/anxious. PHYSICAL EXAMINATION: Vital signs reviewed per the nursing documentation. /87   Temp 97.4 °F (36.3 °C)   Wt 200 lb (90.7 kg)   LMP 01/01/1994   BMI 33.28 kg/m²   Body mass index is 33.28 kg/m². Physical Exam  Nursing note reviewed. Constitutional:       Appearance: She is well-developed. Comments: Anxious    HENT:      Head: Normocephalic and atraumatic.    Eyes:      Conjunctiva/sclera: Conjunctivae normal.      Pupils: Pupils are equal, round, and reactive to light. Cardiovascular:      Heart sounds: Normal heart sounds. Pulmonary:      Effort: Pulmonary effort is normal.      Breath sounds: Normal breath sounds. Abdominal:      General: Bowel sounds are normal.      Palpations: Abdomen is soft. Comments: NON TENDER, NON DISTENTED  LIVER SPLEEN AND HERNIAS ARE NOT  PALPABLE  BOWEL SOUNDS ARE POSITIVE      Musculoskeletal:         General: Normal range of motion. Cervical back: Normal range of motion and neck supple. Skin:     General: Skin is warm. Neurological:      Mental Status: She is alert and oriented to person, place, and time. Psychiatric:         Behavior: Behavior normal.         LABORATORY DATA: Reviewed  Lab Results   Component Value Date    WBC 6.4 01/03/2023    HGB 13.6 01/03/2023    HCT 42.8 01/03/2023    MCV 90.9 01/03/2023     01/03/2023     01/03/2023    K 3.7 01/03/2023     01/03/2023    CO2 25 01/03/2023    BUN 13 01/03/2023    CREATININE 1.00 (H) 01/03/2023    LABALBU 3.8 01/03/2023    BILITOT <0.1 (L) 01/03/2023    ALKPHOS 183 (H) 01/03/2023    AST 27 01/03/2023    ALT 15 01/03/2023    INR 1.0 04/16/2022         Lab Results   Component Value Date    RBC 4.71 01/03/2023    HGB 13.6 01/03/2023    MCV 90.9 01/03/2023    MCH 28.9 01/03/2023    MCHC 31.8 01/03/2023    RDW 13.1 01/03/2023    MPV 9.8 01/03/2023    BASOPCT 1 01/03/2023    LYMPHSABS 2.31 01/03/2023    MONOSABS 0.50 01/03/2023    NEUTROABS 3.42 01/03/2023    EOSABS 0.08 01/03/2023    BASOSABS 0.03 01/03/2023         DIAGNOSTIC TESTING:     No results found. Assessment  1. S/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    2. Intractable nausea and vomiting    3. Malignant carcinoid tumor of stomach ([address]    4. Polyp, stomach    5. Adenomatous polyp of ascending colon    6.  Other constipation        Plan    Plan Colonoscopy and EGD to evaluate    The Endoscopic procedure was explained to the patient in detail  The prep and NPO were explained  All the Risks, Benefits, and Alternatives were explained  Risk of Bleeding, Perforation and Cardio Respiratory risks were explained  her questions were answered  The procedure has been scheduled with the  in the office  Patient was asked to give us a call for any questions  The patient has verbalized understanding and agreement to this plan. Pt was given instructions and advice in detail about the symptom of constipation. She was explained about avoidance of fast food, soda pops, cheese and red meat. Was also told to avoid sedatives narcotics and pain killers if possible. Pt was advised to start drinking ample amount of water and liquid. Was told to adapt and follow an exercise regimen. Instructions were given to increase the amount of fiber including dietary in terms of bran, cereals, whole wheat, brown bread etc. Was also instructed to start using supplemental fiber either Metamucil, citrucell or bennafiber with ample liquids. She was told to start drinking prune juice which is good for constipation. If symptoms don't resolve she will require medicines to assist with her symptoms    Pt has verbalized understanding and agreement to this plan. Pt was advised in detail about some life style and dietary modifications. She was advised about avoidance of caffeine, nicotine and chocolate. Pt was also told to stay away from any kind of fast foods, soda pops. She was also advised to avoid lots of spices, grease and fried food etc.     Instructions were also given about trying to arrange the timing, quality and quantity of food. Instructions were given about using ample amount of fiber including dietary and supplemental fiber either metamucil, bennafiber or citrucell etc.  Pt was advised about drinking ample amount of water without any colors or chemicals. Stress was given about regular exercise. Pt has verbalized understanding and agreement to these modifications.     Pt seems to have signs and symptoms consistent with GERD, acid indigestion and heartburns. She was discussed  in detail about some possible life style and dietary modifications. She was stressed about the maintenance  of appropriate weight and effect of obesity contributing to reflux symptoms. Routine exercise was streesed. Avoidance of Caffeine, nicotine and chocolate were explained. Pt was asked to avoid spices grease and fried food. Advices were also given about avoidance of any kind of fast foods, soda pops and high energy drinks. Pt was advised to place two small block under the head end of the bed which may help with night time reflux. Was advised not to eat any thin at least 2-3 hrs before going to bed and walk especially after dinner    Pt has verbalized understanding and agreement to this plan. More than half of patient's clinic visit time was spent in counseling about lifestyle and dietary modifications  Patient's  questions were answered in this regard as well  The patient has verbalized understanding and agreement       Thank you for allowing me to participate in the care of Ms. Talya Wheatley. For any further questions please do not hesitate to contact me. I have reviewed and agree with the ROS entered by the MA/Nurse.          Luan Carreno MD, [facility]  Board Certified in Gastroenterology and [address] [place] [address] Gastroenterology  Office #: [phone]

## 2023-02-09 RX ORDER — BISACODYL 5 MG
TABLET, DELAYED RELEASE (ENTERIC COATED) ORAL
Qty: 4 TABLET | Refills: 0 | Status: SHIPPED | OUTPATIENT
Start: 2023-02-09

## 2023-02-09 RX ORDER — POLYETHYLENE GLYCOL 3350 17 G/17G
POWDER, FOR SOLUTION ORAL
Qty: 238 G | Refills: 0 | Status: SHIPPED | OUTPATIENT
Start: 2023-02-09

## 2023-02-21 ENCOUNTER — HOSPITAL ENCOUNTER (OUTPATIENT)
Dept: CT IMAGING | Age: 63
Discharge: HOME | End: 2023-02-23
Payer: COMMERCIAL

## 2023-02-21 VITALS — WEIGHT: 200 LBS | HEIGHT: 65 IN | BODY MASS INDEX: 33.32 KG/M2

## 2023-02-21 DIAGNOSIS — R91.8 MULTIPLE NODULES OF LUNG: ICD-10-CM

## 2023-02-21 PROCEDURE — 71250 CT THORAX DX C-: CPT

## 2023-02-25 DIAGNOSIS — E03.9 HYPOTHYROIDISM, UNSPECIFIED TYPE: ICD-10-CM

## 2023-02-27 RX ORDER — LEVOTHYROXINE SODIUM 0.1 MG/1
TABLET ORAL
Qty: 30 TABLET | Refills: 3 | Status: SHIPPED | OUTPATIENT
Start: 2023-02-27

## 2023-02-27 NOTE — TELEPHONE ENCOUNTER
Stephani Stanton is calling to request a refill on the following medication(s):    Medication Request:  Requested Prescriptions     Pending Prescriptions Disp Refills    levothyroxine (SYNTHROID) 100 MCG tablet [Pharmacy Med Name: LEVOTHYROXINE 100 MCG TABLET] 30 tablet 3     Sig: TAKE 1 TABLET BY MOUTH EVERY DAY       Last Visit Date (If Applicable):  4/04/1919    Next Visit Date:    Visit date not found

## 2023-03-01 ENCOUNTER — OFFICE VISIT (OUTPATIENT)
Dept: PULMONOLOGY | Age: 63
End: 2023-03-01
Payer: COMMERCIAL

## 2023-03-01 VITALS
DIASTOLIC BLOOD PRESSURE: 79 MMHG | WEIGHT: 200 LBS | SYSTOLIC BLOOD PRESSURE: 113 MMHG | RESPIRATION RATE: 18 BRPM | OXYGEN SATURATION: 99 % | HEIGHT: 65 IN | BODY MASS INDEX: 33.32 KG/M2 | HEART RATE: 72 BPM

## 2023-03-01 DIAGNOSIS — C7A.092 MALIGNANT CARCINOID TUMOR OF STOMACH (HCC): ICD-10-CM

## 2023-03-01 DIAGNOSIS — J45.20 MILD INTERMITTENT ASTHMA WITHOUT COMPLICATION: ICD-10-CM

## 2023-03-01 DIAGNOSIS — R91.8 MULTIPLE NODULES OF LUNG: Primary | ICD-10-CM

## 2023-03-01 DIAGNOSIS — R09.82 POST-NASAL DRIP: ICD-10-CM

## 2023-03-01 DIAGNOSIS — Z98.0 S/P TOTAL GASTRECTOMY AND ROUX-EN-Y ESOPHAGOJEJUNAL ANASTOMOSIS: ICD-10-CM

## 2023-03-01 DIAGNOSIS — Z90.3 S/P TOTAL GASTRECTOMY AND ROUX-EN-Y ESOPHAGOJEJUNAL ANASTOMOSIS: ICD-10-CM

## 2023-03-01 PROCEDURE — 3078F DIAST BP <80 MM HG: CPT | Performed by: INTERNAL MEDICINE

## 2023-03-01 PROCEDURE — 3074F SYST BP LT 130 MM HG: CPT | Performed by: INTERNAL MEDICINE

## 2023-03-01 PROCEDURE — 99213 OFFICE O/P EST LOW 20 MIN: CPT | Performed by: INTERNAL MEDICINE

## 2023-03-01 NOTE — PROGRESS NOTES
PULMONARY outpatient progress note        REFERRED BY: Maulik Cheung MD    REASON FOR CONSULTATION: Multiple lung nodules and bronchial asthma    Patient is being seen in follow-up for-  1. Multiple nodules of lung    2. Mild intermittent asthma without complication    3. S/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    4. Post-nasal drip    5. Malignant carcinoid tumor of stomach ([address]          HISTORY OF PRESENT ILLNESS:    Nina Guillory is a 65y.o. year old female here for evaluation of above problems  Denied any hospitalization or ER visits for breathing problems since last evaluated in office  No increasing shortness of breath or wheezing  No significant cough or sputum production  Denied any weight loss or loss of appetite  No fever chills or night sweats  No hemoptysis  No nighttime symptoms of bronchial asthma  No postnasal discharge  Patient has been using Flonase  No bone pain, headaches or abdominal pain      PAST MEDICAL HISTORY:       Diagnosis Date    Anxiety     Arthritis     knee    Asthma     Colon polyp 02/21/2019    tubular adenoma; hyperplastic polyp    Colon polyps     Cyst of kidney, acquired     bilat.     Fatigue     Hypertension     Hypothyroidism     Lung nodule     Neuroendocrine tumor 02/21/2019    of stomach    Obesity     Pharyngoesophageal dysphagia     Vocal cord polyps     Wears glasses        SURGICAL HISTORY:    Past Surgical History:   Procedure Laterality Date    CHOLECYSTECTOMY  10/09/2014    COLONOSCOPY  02/21/2019    tubular adenoma; hyperplastic polyp    COLONOSCOPY      over 5 yrs ago per pt with polyps (2-)    CYSTOURETHROSCOPY/STENT REMOVAL  05/03/2011    ENDOSCOPIC ULTRASOUND (LOWER) N/A 01/22/2020    ENDOSCOPIC ULTRASOUND WITH POSSIBLE EMR performed by Moises Dalton MD at [facility]    ERCP      GASTRECTOMY N/A 11/30/2020    HIP SURGERY Left     soft tissue tumor removal    THROAT SURGERY      vocal cord polyps removed    UPPER GASTROINTESTINAL ENDOSCOPY  02/21/2019    Neuroendocrine tumor    UPPER GASTROINTESTINAL ENDOSCOPY  09/23/2019    UPPER GASTROINTESTINAL ENDOSCOPY N/A 09/23/2019    EGD BIOPSY performed by Diana Cao MD at [address] [place] [address] 10/23/2019    EGD W/ EMR performed by Marcio Duncan MD at [address] [place] [address] 10/29/2019    EGD CONTROL HEMORRHAGE performed by Rafael Bolanos MD at [address] [place] [address],[facility] N/A 04/26/2021    EGD BIOPSY performed by Karina Killian MD at [address] [place] [address],[facility] N/A 8/30/2021    EGD ESOPHAGOGASTRODUODENOSCOPY performed by Dot Louise MD at [address] [place] [address] 2/7/2022    EGD DILATION BALLOON performed by Marcio Duncan MD at [address] [place] [address]:    Allergies   Allergen Reactions    Erythromycin Diarrhea       MEDICATIONS:   Current Outpatient Medications   Medication Sig Dispense Refill    levothyroxine (SYNTHROID) 100 MCG tablet TAKE 1 TABLET BY MOUTH EVERY DAY 30 tablet 3    polyethylene glycol (GLYCOLAX) 17 GM/SCOOP powder Please follow instructions provided to you by your provider. 238 g 0    bisacodyl 5 MG EC tablet Please follow instructions given to you by your provider.  4 tablet 0    triamcinolone (KENALOG) 0.1 % ointment APPLY TO AFFECTED AREA TWICE A DAY 30 g 1    Cholecalciferol (VITAMIN D3) 50 MCG (2000 UT) CAPS Take 1,000 Units by mouth daily      vitamin B-12 (CYANOCOBALAMIN) 500 MCG tablet Take 500 mcg by mouth daily      traZODone (DESYREL) 50 MG tablet TAKE 1 TABLET BY MOUTH EVERY DAY AT BEDTIME AS NEEDED FOR SLEEP 90 tablet 0    hydroCHLOROthiazide (HYDRODIURIL) 25 MG tablet TAKE 1 TABLET BY MOUTH EVERY DAY 90 tablet 1    metoclopramide (REGLAN) 10 MG tablet Take 1 tablet by mouth 3 times daily (before meals) 90 tablet 1    docusate sodium (COLACE) 100 mg capsule TAKE 1 CAPSULE BY MOUTH TWICE A DAY 60 capsule 5 fluticasone (FLONASE) 50 MCG/ACT nasal spray 2 sprays by Each Nostril route daily (Patient taking differently: 2 sprays by Each Nostril route daily PRN) 16 g 5    metoprolol tartrate (LOPRESSOR) 25 MG tablet Take 1 tablet by mouth 2 times daily 180 tablet 1    Multiple Vitamin (MULTI-DAY VITAMINS PO) Take 1 tablet by mouth daily        No current facility-administered medications for this visit. FAMILY HISTORY: family history includes High Blood Pressure in her mother and sister; No Known Problems in her father; Other in her sister; Stomach Cancer in her sister. SOCIAL AND OCCUPATIONAL HEALTH:  The patient is a Past smoker of 25 pack years and quit smoking in 2012. There  is not history of TB or TB exposure. There is not asbestos or silica dust exposure. The patient reports does not have coal, foundry, quarry or [employer] exposure. Travel history reveals no significant history of risk factors for pulm disease. There is not  history of recreational or IV drug use. The patient does not have pets, dogs, cats turtles or exotic birds.         Review of Systems:  Review of Systems -   General ROS: Completed and except as mentioned above were negative   Psychological ROS:  Completed and except as mentioned above were negative  Ophthalmic ROS:  Completed and except as mentioned above were negative  ENT ROS:  Completed and except as mentioned above were negative  Allergy and Immunology ROS:  Completed and except as mentioned above were negative  Hematological and Lymphatic ROS:  Completed and except as mentioned above were negative  Endocrine ROS: Completed and except as mentioned above were negative  Breast ROS:  Completed and except as mentioned above were negative  Respiratory ROS:  Completed and except as mentioned above were negative  Cardiovascular ROS:  Completed and except as mentioned above were negative  Gastrointestinal ROS: Completed and except as mentioned above were negative  Genito-Urinary ROS: Completed and except as mentioned above were negative  Musculoskeletal ROS:  Completed and except as mentioned above were negative  Neurological ROS:  Completed and except as mentioned above were negative  Dermatological ROS:  Completed and except as mentioned above were negative    SLEEP  No epistaxis sor sore throat. does not have fatigue, sleepiness ortiredness in am.   No MVA. No edema. PHYSICAL EXAMINATION:  Vitals:    03/01/23 0924   BP: 113/79   Site: Right Upper Arm   Position: Sitting   Cuff Size: Medium Adult   Pulse: 72   Resp: 18   SpO2: 99%   Weight: 200 lb (90.7 kg)   Height: 5' 5\" (1.651 m)     PHYSICAL EXAMINATION:  Vitals:    03/01/23 0924   BP: 113/79   Site: Right Upper Arm   Position: Sitting   Cuff Size: Medium Adult   Pulse: 72   Resp: 18   SpO2: 99%   Weight: 200 lb (90.7 kg)   Height: 5' 5\" (1.651 m)     Constitutional: Appears well, no distress, obese  EENT: PERRLA, sclera clear, anicteric, oropharynx clear, no lesions, neck supple with midline trachea. Neck: Supple, symmetrical, trachea midline, no adenopathy, no jvd skin normal  Respiratory: clear to auscultation, no wheezes or rales and unlabored breathing. No intercostal tenderness  Cardiovascular: regular rate and rhythm, normal S1, S2, no murmur noted  Abdomen: soft, nontender, nondistended, no masses or organomegaly  Extremities: No pedal edema, no clubbing or cyanosis       DATA:     Pulmonary function tests: none        XR CHEST (2 VW)    Result Date: 10/29/2021  New rounded opacity in the right lateral chest.  Cannot exclude a new mass lesion versus infection. Attention on CT chest recommended. Right-sided PICC line with the tip at the mid to distal SVC. No pneumothorax. CT CHEST WO CONTRAST    Result Date: 11/8/2021  *Interval development of numerous enlarged nodules as detailed above concerning for metastasis given history of known neoplasm.   Acute infectious/inflammatory process is an additional diagnostic consideration though felt less likely. Recommend further evaluation with PET-CT and/or percutaneous biopsy. Alternatively post treatment chest CT in 6-8 weeks to reassess may be considered. *Multiple incidental/chronic findings as above including mild anterior pericardial fluid. The findings were sent to the Radiology Results [id number] at 10:18 am on 11/8/2021to be communicated to a licensed caregiver. Patient had a PET scan done on 12/2/2021 which showed-  1. No abnormal uptake associated with the bilateral pulmonary nodules. Many   of the nodules show interval improvement and there is development of new   subpleural nodules in the bilateral lower lobes. The overall appearance   favors an ongoing infectious or inflammatory process with neoplasm less   likely. Continued follow-up CT scan of the chest is recommended to ensure   resolution. CT scan of the chest was done on 4/4/2022 and it showed-       1. Waxing and waning process. Previous scattered nodules in the right and   left lower lobe and in the left upper lobe have resolved but there is new   large irregular patchy consolidation in the right upper lobe surrounding   pre-existing 13 mm nodule. Additional new nodules up to 5 mm are present in   the middle lobe where previous 8 mm nodule has resolved. Differential   considerations would include cryptogenic organizing pneumonia or chronic   eosinophilic pneumonia. Malignancy considered less likely. Continued   follow-up advised. Consider three-month follow-up. Tissue sampling may be   indicated. 2. Stable findings in the upper abdomen including renal cysts and   postsurgical changes at the GE junction. CT scan of the chest was done on 8/1/2022 and it showed-    Interval resolution bilateral scattered lung infiltrates and nodules with   some residual hazy bandlike densities representing scarring. Another 6 month   follow-up may be considered to assure continued stability. CT scan of the chest done on 2/21/2023 showed-    Hazy thin bandlike areas of scarring/atelectasis in both upper lobes and    left   lower lobe less prominent compared to. No significant lung nodule or mass. Sliding hiatal hernia and patulous distal esophagus. Reflux disease   possible. There is evidence for gastric bypass surgery. RECOMMENDATIONS:   No specific follow-up recommended. IMPRESSION:   1. Multiple nodules of lung    2. Mild intermittent asthma without complication    3. S/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    4. Post-nasal drip    5. Malignant carcinoid tumor of stomach ([facility] [address]                   PLAN:       Reviewed the CT scans from February 23. Since there is no more lung nodules seen, patient would not need a follow-up CT scan  Refills were provided -none  Showed the patient how to use Flonase  Patient was recommended to have prednisone and an antibiotic available for use during an exacerbation  Educated and clarified the medication use. Discussed use, benefit, and side effects of prescribed medications. Barriers to medication compliance addressed. Elias Heredia received counseling on the following healthy behaviors: nutrition, exercise and medication adherence  Recommend flu vaccination in the fall annually. Recommendations given regarding pneumococcal vaccinations. Patient had the COVID-19 vaccination. Patient needs the 2022 booster  Patient is not uptodate with vaccinations from pulmonary perspective. Maintain an active lifestyle. continue smoking cessation. All the questions that the patient has had were answered to   her satisfaction. Home O2 evaluation was done.   Supplemental oxygen was not indicated   Pt met the criteria for lung cancer screening and was explained the possibility of having findings that would need monitoring such as lung nodules and the need for more than yearly screening ct chest. Pt acknowledges understanding and questions answered to their satisfaction. We'll see the patient back in 12 months or earlier if needed. Patient will call us if she is sick, so she can be seen sooner. Thank you for having us involved in the care of your patient. Please call us if you have any questions or concerns.               James Lane MD MD  3/1/2023 9:50 AM

## 2023-03-13 RX ORDER — TRAZODONE HYDROCHLORIDE 50 MG/1
TABLET ORAL
Qty: 90 TABLET | Refills: 0 | Status: SHIPPED | OUTPATIENT
Start: 2023-03-13

## 2023-03-13 NOTE — TELEPHONE ENCOUNTER
Juan Dumont is calling to request a refill on the following medication(s):    Medication Request:  Requested Prescriptions     Pending Prescriptions Disp Refills    traZODone (DESYREL) 50 MG tablet [Pharmacy Med Name: TRAZODONE 50 MG TABLET] 90 tablet 0     Sig: TAKE 1 TABLET BY MOUTH AT BEDTIME AS NEEDED FOR SLEEP       Last Visit Date (If Applicable):  1/50/6699    Next Visit Date:    Visit date not found

## 2023-03-16 DIAGNOSIS — E03.9 HYPOTHYROIDISM, UNSPECIFIED TYPE: ICD-10-CM

## 2023-03-16 RX ORDER — METOCLOPRAMIDE 10 MG/1
10 TABLET ORAL
Qty: 90 TABLET | Refills: 1 | Status: SHIPPED | OUTPATIENT
Start: 2023-03-16

## 2023-03-16 RX ORDER — LEVOTHYROXINE SODIUM 0.1 MG/1
TABLET ORAL
Qty: 90 TABLET | Refills: 1 | Status: SHIPPED | OUTPATIENT
Start: 2023-03-16

## 2023-03-16 RX ORDER — HYDROCHLOROTHIAZIDE 25 MG/1
TABLET ORAL
Qty: 90 TABLET | Refills: 1 | Status: SHIPPED | OUTPATIENT
Start: 2023-03-16

## 2023-03-16 NOTE — TELEPHONE ENCOUNTER
Suzie Aiken is calling to request a refill on the following medication(s):    Medication Request:  Requested Prescriptions     Pending Prescriptions Disp Refills    metoclopramide (REGLAN) 10 MG tablet [Pharmacy Med Name: METOCLOPRAMIDE 10 MG TABLET] 90 tablet 1     Sig: TAKE 1 TABLET BY MOUTH 3 TIMES DAILY (BEFORE MEALS).     levothyroxine (SYNTHROID) 100 MCG tablet [Pharmacy Med Name: LEVOTHYROXINE 100 MCG TABLET] 90 tablet 1     Sig: TAKE 1 TABLET BY MOUTH EVERY DAY    hydroCHLOROthiazide (HYDRODIURIL) 25 MG tablet [Pharmacy Med Name: HYDROCHLOROTHIAZIDE 25 MG TAB] 90 tablet 1     Sig: TAKE 1 TABLET BY MOUTH EVERY DAY       Last Visit Date (If Applicable):  1/94/6413    Next Visit Date:    Visit date not found

## 2023-05-16 ENCOUNTER — TELEPHONE (OUTPATIENT)
Dept: GASTROENTEROLOGY | Age: 63
End: 2023-05-16

## 2023-05-16 NOTE — TELEPHONE ENCOUNTER
Called the patient and confirmed procedure for 5/18/23. Aware that due to provider conflict the procedure will be at 9:15 a.m. and to arrive by 7:45 a.m. The patient was agreeable. Writer thanked and call ended.

## 2023-05-17 NOTE — TELEPHONE ENCOUNTER
Patient LVM for a call back regarding timing. Returned call and LVM to arrive by 8:45 a.m. for a 10:15 a.m. procedure.

## 2023-05-17 NOTE — TELEPHONE ENCOUNTER
Called the patient to inform of some changes for 5/18/23 and call was disconnected. Tried calling back and went to [facility] advising that due to provider conflict her procedure time has been moved to 10:15 a.m. and will need to arrive by 8:45 a.m. @ [facility]. To call if any questions.

## 2023-05-18 ENCOUNTER — ANESTHESIA (OUTPATIENT)
Dept: OPERATING ROOM | Age: 63
End: 2023-05-18
Payer: MEDICARE

## 2023-05-18 ENCOUNTER — HOSPITAL ENCOUNTER (OUTPATIENT)
Age: 63
Setting detail: OUTPATIENT SURGERY
Discharge: HOME | End: 2023-05-18
Attending: INTERNAL MEDICINE | Admitting: INTERNAL MEDICINE
Payer: MEDICARE

## 2023-05-18 ENCOUNTER — ANESTHESIA EVENT (OUTPATIENT)
Dept: OPERATING ROOM | Age: 63
End: 2023-05-18
Payer: MEDICARE

## 2023-05-18 VITALS
OXYGEN SATURATION: 96 % | DIASTOLIC BLOOD PRESSURE: 88 MMHG | RESPIRATION RATE: 11 BRPM | BODY MASS INDEX: 33.32 KG/M2 | TEMPERATURE: 97.3 F | HEIGHT: 65 IN | SYSTOLIC BLOOD PRESSURE: 149 MMHG | WEIGHT: 200 LBS | HEART RATE: 55 BPM

## 2023-05-18 PROBLEM — Z12.12 SCREENING FOR COLORECTAL CANCER: Status: ACTIVE | Noted: 2023-05-18

## 2023-05-18 PROBLEM — Z12.11 SCREENING FOR COLORECTAL CANCER: Status: ACTIVE | Noted: 2023-05-18

## 2023-05-18 PROBLEM — R11.0 NAUSEA: Status: ACTIVE | Noted: 2023-05-18

## 2023-05-18 PROCEDURE — 3700000001 HC ADD 15 MINUTES (ANESTHESIA): Performed by: INTERNAL MEDICINE

## 2023-05-18 PROCEDURE — 2580000003 HC RX 258: Performed by: STUDENT IN AN ORGANIZED HEALTH CARE EDUCATION/TRAINING PROGRAM

## 2023-05-18 PROCEDURE — 3609027000 HC COLONOSCOPY: Performed by: INTERNAL MEDICINE

## 2023-05-18 PROCEDURE — 7100000010 HC PHASE II RECOVERY - FIRST 15 MIN: Performed by: INTERNAL MEDICINE

## 2023-05-18 PROCEDURE — 6360000002 HC RX W HCPCS: Performed by: NURSE ANESTHETIST, CERTIFIED REGISTERED

## 2023-05-18 PROCEDURE — 7100000011 HC PHASE II RECOVERY - ADDTL 15 MIN: Performed by: INTERNAL MEDICINE

## 2023-05-18 PROCEDURE — 3700000000 HC ANESTHESIA ATTENDED CARE: Performed by: INTERNAL MEDICINE

## 2023-05-18 PROCEDURE — 43235 EGD DIAGNOSTIC BRUSH WASH: CPT | Performed by: INTERNAL MEDICINE

## 2023-05-18 PROCEDURE — 2500000003 HC RX 250 WO HCPCS: Performed by: NURSE ANESTHETIST, CERTIFIED REGISTERED

## 2023-05-18 PROCEDURE — G0121 COLON CA SCRN NOT HI RSK IND: HCPCS | Performed by: INTERNAL MEDICINE

## 2023-05-18 PROCEDURE — 3609017100 HC EGD: Performed by: INTERNAL MEDICINE

## 2023-05-18 PROCEDURE — 2709999900 HC NON-CHARGEABLE SUPPLY: Performed by: INTERNAL MEDICINE

## 2023-05-18 RX ORDER — SODIUM CHLORIDE 9 MG/ML
INJECTION, SOLUTION INTRAVENOUS PRN
Status: DISCONTINUED | OUTPATIENT
Start: 2023-05-18 | End: 2023-05-18 | Stop reason: HOSPADM

## 2023-05-18 RX ORDER — PROPOFOL 10 MG/ML
INJECTION, EMULSION INTRAVENOUS PRN
Status: DISCONTINUED | OUTPATIENT
Start: 2023-05-18 | End: 2023-05-18 | Stop reason: SDUPTHER

## 2023-05-18 RX ORDER — SODIUM CHLORIDE 0.9 % (FLUSH) 0.9 %
5-40 SYRINGE (ML) INJECTION PRN
Status: DISCONTINUED | OUTPATIENT
Start: 2023-05-18 | End: 2023-05-18 | Stop reason: HOSPADM

## 2023-05-18 RX ORDER — SODIUM CHLORIDE 0.9 % (FLUSH) 0.9 %
5-40 SYRINGE (ML) INJECTION EVERY 12 HOURS SCHEDULED
Status: DISCONTINUED | OUTPATIENT
Start: 2023-05-18 | End: 2023-05-18 | Stop reason: HOSPADM

## 2023-05-18 RX ORDER — SODIUM CHLORIDE, SODIUM LACTATE, POTASSIUM CHLORIDE, CALCIUM CHLORIDE 600; 310; 30; 20 MG/100ML; MG/100ML; MG/100ML; MG/100ML
INJECTION, SOLUTION INTRAVENOUS CONTINUOUS
Status: DISCONTINUED | OUTPATIENT
Start: 2023-05-18 | End: 2023-05-18 | Stop reason: HOSPADM

## 2023-05-18 RX ORDER — LIDOCAINE HYDROCHLORIDE 20 MG/ML
INJECTION, SOLUTION INFILTRATION; PERINEURAL PRN
Status: DISCONTINUED | OUTPATIENT
Start: 2023-05-18 | End: 2023-05-18 | Stop reason: SDUPTHER

## 2023-05-18 RX ADMIN — PROPOFOL 50 MG: 10 INJECTION, EMULSION INTRAVENOUS at 11:03

## 2023-05-18 RX ADMIN — PROPOFOL 50 MG: 10 INJECTION, EMULSION INTRAVENOUS at 10:49

## 2023-05-18 RX ADMIN — PROPOFOL 50 MG: 10 INJECTION, EMULSION INTRAVENOUS at 10:51

## 2023-05-18 RX ADMIN — PROPOFOL 50 MG: 10 INJECTION, EMULSION INTRAVENOUS at 11:14

## 2023-05-18 RX ADMIN — PROPOFOL 50 MG: 10 INJECTION, EMULSION INTRAVENOUS at 11:09

## 2023-05-18 RX ADMIN — PROPOFOL 50 MG: 10 INJECTION, EMULSION INTRAVENOUS at 10:55

## 2023-05-18 RX ADMIN — PROPOFOL 50 MG: 10 INJECTION, EMULSION INTRAVENOUS at 11:01

## 2023-05-18 RX ADMIN — PROPOFOL 50 MG: 10 INJECTION, EMULSION INTRAVENOUS at 10:48

## 2023-05-18 RX ADMIN — SODIUM CHLORIDE, POTASSIUM CHLORIDE, SODIUM LACTATE AND CALCIUM CHLORIDE: 600; 310; 30; 20 INJECTION, SOLUTION INTRAVENOUS at 09:13

## 2023-05-18 RX ADMIN — LIDOCAINE HYDROCHLORIDE 100 MG: 20 INJECTION, SOLUTION INFILTRATION; PERINEURAL at 10:48

## 2023-05-18 ASSESSMENT — PAIN - FUNCTIONAL ASSESSMENT: PAIN_FUNCTIONAL_ASSESSMENT: 0-10

## 2023-05-18 NOTE — OP NOTE
PROCEDURE NOTE    DATE OF PROCEDURE: 5/18/2023     SURGEON: Yancey Osler, MD    ASSISTANT: None    PREOPERATIVE DIAGNOSIS: HX OF GASTRIC CARCINOIDS AND HX OF GASTRIC SURGERY     NAUSEA    POSTOPERATIVE DIAGNOSIS: As described below    OPERATION: Upper GI endoscopy with Biopsy    ANESTHESIA: MAC PER ANESTHESIA     ESTIMATED BLOOD LOSS: Less than 50 ml    COMPLICATIONS: None. SPECIMENS:  Was Not Obtained    HISTORY: The patient is a 65y.o. year old female with history of above preop diagnosis. I recommended esophagogastroduodenoscopy with possible biopsy and I explained the risk, benefits, expected outcome, and alternatives to the procedure. Risks included but are not limited to bleeding, infection, respiratory distress, hypotension, and perforation of the esophagus, stomach, or duodenum. Patient understands and is in agreement. PROCEDURE: The patient was given IV conscious sedation. The patient's SPO2 remained above 90% throughout the procedure. The gastroscope was inserted orally and advanced under direct vision through the esophagus, through the stomach, through the pylorus, and into the descending duodenum. Findings:    Retropharyngeal area was grossly normal appearing    Esophagus: normal    Stomach:    SURGICALLY ABSENT    Duodenum:   NORMAL APPEARING SMALL BOWEL MUCOSA   NO LUMINAL NARROWING NOTED    The scope was removed and the patient tolerated the procedure well.      Recommendations/Plan:     F/U In Office in 3-4 weeks  Discussed with the family  Post sedation patient was stable with stable vital signs and stable O2 saturations    Electronically signed by Yancey Osler, MD  on 5/18/2023 at 10:53 AM

## 2023-05-18 NOTE — ANESTHESIA PRE PROCEDURE
Department of Anesthesiology  Preprocedure Note       Name:  Waynette Mcburney   Age:  65 y.o.  :  1960                                          MRN:  [id number]         Date:  2023      Surgeon: Delia Schwartz):  Sindy Arizmendi MD    Procedure: Procedure(s):  COLONOSCOPY DIAGNOSTIC  EGD ESOPHAGOGASTRODUODENOSCOPY    Medications prior to admission:   Prior to Admission medications    Medication Sig Start Date End Date Taking? Authorizing Provider   metoclopramide (REGLAN) 10 MG tablet TAKE 1 TABLET BY MOUTH 3 TIMES DAILY (BEFORE MEALS). 3/16/23   Sary Marie MD   levothyroxine (SYNTHROID) 100 MCG tablet TAKE 1 TABLET BY MOUTH EVERY DAY 3/16/23   Sary Marie MD   hydroCHLOROthiazide (HYDRODIURIL) 25 MG tablet TAKE 1 TABLET BY MOUTH EVERY DAY 3/16/23   Sary Marie MD   traZODone (DESYREL) 50 MG tablet TAKE 1 TABLET BY MOUTH AT BEDTIME AS NEEDED FOR SLEEP 3/13/23   Sary Marie MD   polyethylene glycol [facility]) 17 GM/SCOOP powder Please follow instructions provided to you by your provider. 23   Sindy Arizmendi MD   bisacodyl 5 MG EC tablet Please follow instructions given to you by your provider.  23   Sindy Arizmendi MD   triamcinolone (KENALOG) 0.1 % ointment APPLY TO AFFECTED AREA TWICE A DAY 23   Sary Marie MD   Cholecalciferol (VITAMIN D3) 50 MCG (2000 UT) CAPS Take 1,000 Units by mouth daily    Historical Provider, MD   vitamin B-12 (CYANOCOBALAMIN) 500 MCG tablet Take 500 mcg by mouth daily    Historical Provider, MD   docusate sodium (COLACE) 100 mg capsule TAKE 1 CAPSULE BY MOUTH TWICE A DAY 6/10/22   Fermín Aaron MD   fluticasone (FLONASE) 50 MCG/ACT nasal spray 2 sprays by Each Nostril route daily  Patient taking differently: 2 sprays by Each Nostril route daily PRN 21   Jules Maher MD   metoprolol tartrate (LOPRESSOR) 25 MG tablet Take 1 tablet by mouth 2 times daily 21   Jesika Benitez MD   Multiple Vitamin (MULTI-DAY VITAMINS

## 2023-05-18 NOTE — ANESTHESIA POSTPROCEDURE EVALUATION
Department of Anesthesiology  Postprocedure Note    Patient: Kirstie Escalera  MRN: [id number]  [place]: 1960  Date of evaluation: 5/18/2023      Procedure Summary     Date: 05/18/23 Room / Location: [place] [address] / [facility] - INPATIENT    Anesthesia Start: 0696 Anesthesia Stop: 2181    Procedures:       COLONOSCOPY DIAGNOSTIC (Abdomen/Perineum)      EGD ESOPHAGOGASTRODUODENOSCOPY (Esophagus) Diagnosis:       Polyp of ascending colon, unspecified type      Constipation, unspecified constipation type      Nausea and vomiting, unspecified vomiting type      (Polyp of ascending colon, unspecified type [K63.5])      (Constipation, unspecified constipation type [K59.00])      (Nausea and vomiting, unspecified vomiting type [R11.2])    Surgeons: Alberto Diaz MD Responsible Provider: Luna Brennan MD    Anesthesia Type: MAC ASA Status: 3          Anesthesia Type: No value filed. Reji Phase I:      Reji Phase II: Reji Score: 10      Anesthesia Post Evaluation    Patient location during evaluation: PACU  Patient participation: complete - patient participated  Level of consciousness: awake  Airway patency: patent  Nausea & Vomiting: no nausea and no vomiting  Complications: no  Cardiovascular status: hemodynamically stable  Respiratory status: acceptable  Hydration status: stable  There was medical reason for not using a multimodal analgesia pain management approach.

## 2023-05-18 NOTE — DISCHARGE INSTRUCTIONS
Colonoscopy: What to Expect at Home  Your Recovery  Your doctor will talk to you about when you will need your next colonoscopy. The results of your test and your risk for colorectal cancer will help your doctor decide how often you need to be checked. After the test, you may be bloated or have gas pains. You may need to pass gas. If a biopsy was done or a polyp was removed, you may have streaks of blood in your stool (feces) for a few days. How can you care for yourself at home? Activity  Rest as much as you need to after you go home. You should be able to go back to your usual activities the day after the test.  Diet  Follow your doctors directions for eating. Drink plenty of fluids (unless your doctor has told you not to) to replace the fluids that were lost during the colon prep. Medicines  If polyps were removed or a biopsy was done during the test, your doctor may tell you not to take aspirin or other anti-inflammatory medicines, such as ibuprofen (Advil, Motrin) and naproxen (Aleve), for a few days. Follow-up care is a key part of your treatment and safety. Be sure to make and go to all appointments, and call your doctor if you are having problems. When should you call for help? Call 911 anytime you think you may need emergency care. For example, call if:  You passed out (lost consciousness). You pass maroon or bloody stools. You have severe belly pain. Call your doctor now or seek immediate medical care if:  Your stools are black and tarlike. Your stools have streaks of blood, but you did not have a biopsy or any polyps removed. You have belly pain, or your belly is swollen and firm. You vomit. You have a fever. You are very dizzy. Watch closely for changes in your health, and be sure to contact your doctor if you have any problems. Where can you learn more? Go to [email or web address] and sign in to your [employer] account.  Enter [id number] in the [address]

## 2023-05-18 NOTE — H&P
History and Physical Service   [place] [address]    HISTORY AND PHYSICAL EXAMINATION            Date of Evaluation: 5/18/2023  Patient name:  Kirstie Escalera  MRN:   [id number]  YOB: 1960  PCP:    Etta Cowart MD    History Obtained From:     Patient, medical records    History of Present Illness: This is Kirstie Escalera a 65 y.o. female with multiple comorbidities managed by PCP and specialists. She arrived for her scheduled COLONOSCOPY DIAGNOSTIC, EGD ESOPHAGOGASTRODUODENOSCOPY by Alberto Diaz MD for Polyp of ascending colon, unspecified type; Constipation, unspecified cons*. Patient currently followed with Dr Julieta Bo in past and now with Dr Zoila Leventhal. Patient Hx malignant neuroendocrine tumor resected October 23, 2019 and follows with Oncologist Dr Mirta Stoner Per his notes \"patient had repeated EGD in January 2020 and she was found to have local recurrence with multiple lesions. She was referred to [facility]. She had complete gastric resection 11/30/2020 and several other hospitalizations for dehydration and n/v. The patient is doing much better when seen for her Oncology visit. Last seen by Gastroenterologist, Dr Lorena Antunez on 2/6/23 she c/o some abdominal discomfort with cramping, bloating and gaseousness, . Sometimes after eating has quick loosely formed stools. He advised colonoscopy and EGD  Patient presents for her testing and followed the bowel prep as directed until watery clear Denies fever, chills, shortness of breath, cough, congestion, wheezing, chest pain, open sores or wounds. Past Medical History:     Past Medical History:   Diagnosis Date    Anxiety     Arthritis     knee    Asthma     Colon polyp 02/21/2019    tubular adenoma; hyperplastic polyp    Colon polyps     Cyst of kidney, acquired     bilat.     Fatigue     Hx of blood clots     Hypertension     Hypothyroidism     Lung nodule     Neuroendocrine tumor 02/21/2019    of stomach    Obesity

## 2023-05-18 NOTE — OP NOTE
PROCEDURE NOTE    DATE OF PROCEDURE: 5/18/2023    SURGEON: Kenya Wilburn MD    ASSISTANT: None    PREOPERATIVE DIAGNOSIS: SCREENING  HX OF GASTRIC CARCINOIDS    POSTOPERATIVE DIAGNOSIS: as described below    OPERATION: Total colonoscopy     ANESTHESIA: MAC PER ANESTHESIA     ESTIMATED BLOOD LOSS: less than 50     COMPLICATIONS: None. SPECIMENS:  Was Not Obtained    HISTORY: The patient is a 65y.o. year old female with history of above preop diagnosis. I recommended colonoscopy with possible biopsy or polypectomy and I explained the risk, benefits, expected outcome, and alternatives to the procedure. Risks included but are not limited to bleeding, infection, respiratory distress, hypotension, and perforation of the colon and possibility of missing a lesion. The patient understands and is in agreement. PROCEDURE: The patient was given IV conscious sedation. The patient's SPO2 remained above 90% throughout the procedure. The colonoscope was inserted per rectum and advanced under direct vision to the cecum without difficulty. The prep was fair TO GOOD  SOME RETAINED PASTY STOOLS     Findings:  Terminal ileum: normal    Cecum/Ascending colon: normal    Transverse colon: normal    Descending/Sigmoid colon: abnormal: FEW DIVERTICULI    Rectum/Anus: examined in normal and retroflexed positions and was abnormal: INT HEMORRHOIDS    Withdrawal Time was (minutes): 12    The colon was decompressed and the scope was removed. The patient tolerated the procedure well. Recommendations/Plan:   Lifestyle and dietary modifications as discussed  F/U In Office in 3-4 weeks  Discussed with the family  Colonoscopy Recall :5 year  POST SEDATION PATIENT WAS STABLE WITH STABLE VITAL SIGNS AND OXYGEN SATURATIONS AND WAS DISCHARGED HOME WITH RIDE IN A STABLE CONDITION.     Electronically signed by Kenya Wilburn MD  on 5/18/2023 at 11:21 AM

## 2023-06-06 ENCOUNTER — OFFICE VISIT (OUTPATIENT)
Dept: GASTROENTEROLOGY | Age: 63
End: 2023-06-06
Payer: MEDICARE

## 2023-06-06 VITALS
BODY MASS INDEX: 34.78 KG/M2 | DIASTOLIC BLOOD PRESSURE: 83 MMHG | TEMPERATURE: 97.3 F | SYSTOLIC BLOOD PRESSURE: 119 MMHG | WEIGHT: 209 LBS

## 2023-06-06 DIAGNOSIS — F41.9 ANXIETY: ICD-10-CM

## 2023-06-06 DIAGNOSIS — C7A.092 MALIGNANT CARCINOID TUMOR OF STOMACH (HCC): ICD-10-CM

## 2023-06-06 DIAGNOSIS — Z90.3 S/P GASTRECTOMY: ICD-10-CM

## 2023-06-06 DIAGNOSIS — Z12.11 SCREENING FOR COLORECTAL CANCER: Primary | ICD-10-CM

## 2023-06-06 DIAGNOSIS — K59.01 SLOW TRANSIT CONSTIPATION: ICD-10-CM

## 2023-06-06 DIAGNOSIS — Z12.12 SCREENING FOR COLORECTAL CANCER: Primary | ICD-10-CM

## 2023-06-06 DIAGNOSIS — R11.0 NAUSEA: ICD-10-CM

## 2023-06-06 PROCEDURE — G8427 DOCREV CUR MEDS BY ELIG CLIN: HCPCS | Performed by: INTERNAL MEDICINE

## 2023-06-06 PROCEDURE — 3017F COLORECTAL CA SCREEN DOC REV: CPT | Performed by: INTERNAL MEDICINE

## 2023-06-06 PROCEDURE — G8417 CALC BMI ABV UP PARAM F/U: HCPCS | Performed by: INTERNAL MEDICINE

## 2023-06-06 PROCEDURE — 1036F TOBACCO NON-USER: CPT | Performed by: INTERNAL MEDICINE

## 2023-06-06 PROCEDURE — 3074F SYST BP LT 130 MM HG: CPT | Performed by: INTERNAL MEDICINE

## 2023-06-06 PROCEDURE — 99214 OFFICE O/P EST MOD 30 MIN: CPT | Performed by: INTERNAL MEDICINE

## 2023-06-06 PROCEDURE — 3079F DIAST BP 80-89 MM HG: CPT | Performed by: INTERNAL MEDICINE

## 2023-06-06 ASSESSMENT — ENCOUNTER SYMPTOMS
BLOOD IN STOOL: 0
ABDOMINAL DISTENTION: 1
NAUSEA: 0
VOMITING: 0
CHOKING: 0
VOICE CHANGE: 0
WHEEZING: 0
ABDOMINAL PAIN: 0
CONSTIPATION: 0
COUGH: 0
TROUBLE SWALLOWING: 0
RECTAL PAIN: 0
SORE THROAT: 0
DIARRHEA: 0
ANAL BLEEDING: 0
SHORTNESS OF BREATH: 0

## 2023-06-06 NOTE — PROGRESS NOTES
tablet, Rfl: 0    metoclopramide (REGLAN) 10 MG tablet, TAKE 1 TABLET BY MOUTH 3 TIMES DAILY (BEFORE MEALS). , Disp: 90 tablet, Rfl: 1    levothyroxine (SYNTHROID) 100 MCG tablet, TAKE 1 TABLET BY MOUTH EVERY DAY, Disp: 90 tablet, Rfl: 1    hydroCHLOROthiazide (HYDRODIURIL) 25 MG tablet, TAKE 1 TABLET BY MOUTH EVERY DAY, Disp: 90 tablet, Rfl: 1    traZODone (DESYREL) 50 MG tablet, TAKE 1 TABLET BY MOUTH AT BEDTIME AS NEEDED FOR SLEEP, Disp: 90 tablet, Rfl: 0    polyethylene glycol (GLYCOLAX) 17 GM/SCOOP powder, Please follow instructions provided to you by your provider., Disp: 238 g, Rfl: 0    bisacodyl 5 MG EC tablet, Please follow instructions given to you by your provider., Disp: 4 tablet, Rfl: 0    triamcinolone (KENALOG) 0.1 % ointment, APPLY TO AFFECTED AREA TWICE A DAY, Disp: 30 g, Rfl: 1    Cholecalciferol (VITAMIN D3) 50 MCG (2000 UT) CAPS, Take 1,000 Units by mouth daily, Disp: , Rfl:     vitamin B-12 (CYANOCOBALAMIN) 500 MCG tablet, Take 1 tablet by mouth daily, Disp: , Rfl:     docusate sodium (COLACE) 100 mg capsule, TAKE 1 CAPSULE BY MOUTH TWICE A DAY, Disp: 60 capsule, Rfl: 5    fluticasone (FLONASE) 50 MCG/ACT nasal spray, 2 sprays by Each Nostril route daily (Patient taking differently: 2 sprays by Each Nostril route daily PRN), Disp: 16 g, Rfl: 5    metoprolol tartrate (LOPRESSOR) 25 MG tablet, Take 1 tablet by mouth 2 times daily, Disp: 180 tablet, Rfl: 1    Multiple Vitamin (MULTI-DAY VITAMINS PO), Take 1 tablet by mouth daily , Disp: , Rfl:     ALLERGIES:   Allergies   Allergen Reactions    Erythromycin Diarrhea       FAMILY HISTORY:       Problem Relation Age of Onset    High Blood Pressure Mother     High Blood Pressure Sister     Stomach Cancer Sister     Other Sister         epilepsy    No Known Problems Father          SOCIAL HISTORY:   Social History     Socioeconomic History    Marital status: Single     Spouse name: Not on file    Number of children: Not on file    Years of education:

## 2023-06-09 NOTE — TELEPHONE ENCOUNTER
Nely Fish is calling to request a refill on the following medication(s):    Medication Request:  Requested Prescriptions     Pending Prescriptions Disp Refills    triamcinolone (KENALOG) 0.1 % ointment [Pharmacy Med Name: TRIAMCINOLONE 0.1% OINTMENT] 30 g 1     Sig: APPLY TO AFFECTED AREA TWICE A DAY       Last Visit Date (If Applicable):  9/94/0743    Next Visit Date:    Visit date not found    Last refill 1/24/23. Prescription pending.

## 2023-06-17 PROBLEM — Z12.12 SCREENING FOR COLORECTAL CANCER: Status: RESOLVED | Noted: 2023-05-18 | Resolved: 2023-06-17

## 2023-06-17 PROBLEM — Z12.11 SCREENING FOR COLORECTAL CANCER: Status: RESOLVED | Noted: 2023-05-18 | Resolved: 2023-06-17

## 2023-07-10 RX ORDER — METOCLOPRAMIDE 10 MG/1
10 TABLET ORAL
Qty: 90 TABLET | Refills: 1 | Status: SHIPPED | OUTPATIENT
Start: 2023-07-10

## 2023-07-10 NOTE — TELEPHONE ENCOUNTER
Dario Sarabia is calling to request a refill on the following medication(s):    Medication Request:  Requested Prescriptions     Pending Prescriptions Disp Refills    metoclopramide (REGLAN) 10 MG tablet [Pharmacy Med Name: METOCLOPRAMIDE 10 MG TABLET] 90 tablet 1     Sig: TAKE 1 TABLET BY MOUTH 3 TIMES DAILY (BEFORE MEALS). Last Visit Date (If Applicable):  5/10/4971    Next Visit Date:    Visit date not found      Last refill 3/16/23. Prescription pending.

## 2023-07-11 ENCOUNTER — HOSPITAL ENCOUNTER (OUTPATIENT)
Age: 63
Setting detail: SPECIMEN
Discharge: HOME | End: 2023-07-11
Payer: MEDICARE

## 2023-07-11 DIAGNOSIS — C7A.092 MALIGNANT CARCINOID TUMOR OF STOMACH (HCC): ICD-10-CM

## 2023-07-11 LAB
ALBUMIN SERPL-MCNC: 3.7 G/DL (ref 3.5–5.2)
ALP SERPL-CCNC: 174 U/L (ref 35–104)
ALT SERPL-CCNC: 7 U/L (ref 5–33)
ANION GAP SERPL CALCULATED.3IONS-SCNC: 11 MMOL/L (ref 9–17)
AST SERPL-CCNC: 16 U/L
BASOPHILS # BLD: 0.04 K/UL (ref 0–0.2)
BASOPHILS NFR BLD: 1 % (ref 0–2)
BILIRUB SERPL-MCNC: 0.2 MG/DL (ref 0.3–1.2)
BUN SERPL-MCNC: 11 MG/DL (ref 8–23)
BUN/CREAT SERPL: 12 (ref 9–20)
CALCIUM SERPL-MCNC: 8.7 MG/DL (ref 8.6–10.4)
CHLORIDE SERPL-SCNC: 105 MMOL/L (ref 98–107)
CO2 SERPL-SCNC: 25 MMOL/L (ref 20–31)
CREAT SERPL-MCNC: 0.9 MG/DL (ref 0.5–0.9)
EOSINOPHIL # BLD: 0.18 K/UL (ref 0–0.44)
EOSINOPHILS RELATIVE PERCENT: 3 % (ref 1–4)
ERYTHROCYTE [DISTWIDTH] IN BLOOD BY AUTOMATED COUNT: 13.3 % (ref 11.8–14.4)
FERRITIN SERPL-MCNC: 19 NG/ML (ref 13–150)
GFR SERPL CREATININE-BSD FRML MDRD: >60 ML/MIN/1.73M2
GLUCOSE SERPL-MCNC: 90 MG/DL (ref 70–99)
HCT VFR BLD AUTO: 40.2 % (ref 36.3–47.1)
HGB BLD-MCNC: 12.9 G/DL (ref 11.9–15.1)
IMM GRANULOCYTES # BLD AUTO: 0.02 K/UL (ref 0–0.3)
IMM GRANULOCYTES NFR BLD: 0 %
IRON SATN MFR SERPL: 22 % (ref 20–55)
IRON SERPL-MCNC: 81 UG/DL (ref 37–145)
LYMPHOCYTES # BLD: 36 % (ref 24–43)
LYMPHOCYTES NFR BLD: 2.23 K/UL (ref 1.1–3.7)
MCH RBC QN AUTO: 28.3 PG (ref 25.2–33.5)
MCHC RBC AUTO-ENTMCNC: 32.1 G/DL (ref 28.4–34.8)
MCV RBC AUTO: 88.2 FL (ref 82.6–102.9)
MONOCYTES NFR BLD: 0.52 K/UL (ref 0.1–1.2)
MONOCYTES NFR BLD: 8 % (ref 3–12)
NEUTROPHILS NFR BLD: 52 % (ref 36–65)
NEUTS SEG NFR BLD: 3.21 K/UL (ref 1.5–8.1)
NRBC BLD-RTO: 0 PER 100 WBC
PLATELET # BLD AUTO: 291 K/UL (ref 138–453)
PMV BLD AUTO: 9.1 FL (ref 8.1–13.5)
POTASSIUM SERPL-SCNC: 3.5 MMOL/L (ref 3.7–5.3)
PROT SERPL-MCNC: 7 G/DL (ref 6.4–8.3)
RBC # BLD AUTO: 4.56 M/UL (ref 3.95–5.11)
SODIUM SERPL-SCNC: 141 MMOL/L (ref 135–144)
TIBC SERPL-MCNC: 362 UG/DL (ref 250–450)
UNSATURATED IRON BINDING CAPACITY: 281 UG/DL (ref 112–347)
WBC OTHER # BLD: 6.2 K/UL (ref 3.5–11.3)

## 2023-07-11 PROCEDURE — 86316 IMMUNOASSAY TUMOR OTHER: CPT

## 2023-07-11 PROCEDURE — 83540 ASSAY OF IRON: CPT

## 2023-07-11 PROCEDURE — 83550 IRON BINDING TEST: CPT

## 2023-07-11 PROCEDURE — 84260 ASSAY OF SEROTONIN: CPT

## 2023-07-11 PROCEDURE — 80053 COMPREHEN METABOLIC PANEL: CPT

## 2023-07-11 PROCEDURE — 85027 COMPLETE CBC AUTOMATED: CPT

## 2023-07-11 PROCEDURE — 82728 ASSAY OF FERRITIN: CPT

## 2023-07-11 PROCEDURE — 36415 COLL VENOUS BLD VENIPUNCTURE: CPT

## 2023-07-13 LAB — CHROMOGRANIN A: 56 NG/ML (ref 0–103)

## 2023-07-14 LAB — SEROTONIN SER-MCNC: 248 NG/ML (ref 50–220)

## 2023-07-18 ENCOUNTER — TELEPHONE (OUTPATIENT)
Dept: ONCOLOGY | Age: 63
End: 2023-07-18

## 2023-07-18 ENCOUNTER — OFFICE VISIT (OUTPATIENT)
Dept: ONCOLOGY | Age: 63
End: 2023-07-18
Payer: MEDICARE

## 2023-07-18 VITALS
OXYGEN SATURATION: 97 % | TEMPERATURE: 96.8 F | HEART RATE: 62 BPM | BODY MASS INDEX: 35.05 KG/M2 | WEIGHT: 210.6 LBS | RESPIRATION RATE: 18 BRPM | DIASTOLIC BLOOD PRESSURE: 84 MMHG | SYSTOLIC BLOOD PRESSURE: 127 MMHG

## 2023-07-18 DIAGNOSIS — C7A.092 MALIGNANT CARCINOID TUMOR OF STOMACH (HCC): Primary | ICD-10-CM

## 2023-07-18 PROCEDURE — 3074F SYST BP LT 130 MM HG: CPT | Performed by: INTERNAL MEDICINE

## 2023-07-18 PROCEDURE — 99214 OFFICE O/P EST MOD 30 MIN: CPT | Performed by: INTERNAL MEDICINE

## 2023-07-18 PROCEDURE — G8417 CALC BMI ABV UP PARAM F/U: HCPCS | Performed by: INTERNAL MEDICINE

## 2023-07-18 PROCEDURE — 3017F COLORECTAL CA SCREEN DOC REV: CPT | Performed by: INTERNAL MEDICINE

## 2023-07-18 PROCEDURE — 1036F TOBACCO NON-USER: CPT | Performed by: INTERNAL MEDICINE

## 2023-07-18 PROCEDURE — G8427 DOCREV CUR MEDS BY ELIG CLIN: HCPCS | Performed by: INTERNAL MEDICINE

## 2023-07-18 PROCEDURE — 99211 OFF/OP EST MAY X REQ PHY/QHP: CPT | Performed by: INTERNAL MEDICINE

## 2023-07-18 PROCEDURE — 3079F DIAST BP 80-89 MM HG: CPT | Performed by: INTERNAL MEDICINE

## 2023-07-18 NOTE — TELEPHONE ENCOUNTER
Evon Hartmann MD VISIT  DR Rahul Burton IN TO SEE PATIENT  ORDERS RECEIVED  RV 6 MONTHS WITH LABS  LABS CDP CMP FE TIBC FERRITIN CHROMOGRANIN A SEROTONIN SERUM DUE 01/11/24, ORDERS MAILED TO PT  MD VISIT 01/18/24 @8AM  AVS PRINTED AND GIVEN TO PATIENT WITH INSTRUCTIONS  PATIENT DISCHARGED AMBULATORY

## 2023-07-18 NOTE — PROGRESS NOTES
_           Chief Complaint   Patient presents with    Follow-up     6 month     DIAGNOSIS:       Malignant carcinoid tumor of the stomach  Recent GI bleeding after endoscopic mucosal resection of stomach carcinoid on October 23, 2019. Evidence of recurrent disease on repeated EGD January 2020 and continued elevation of tumor marker chromogranin A  Iron deficiency secondary to above  History of hypothyroidism  Multiple comorbidities as listed      CURRENT THERAPY:         Status post endoscopic mucosal resection of stomach carcinoid October 23, 2019  S/p gastric resection at [facility] November 20, 2020. BRIEF CASE HISTORY:      Ms. Agusto Olivera is a very pleasant 65 y.o. female with history of multiple comorbidities as listed. The patient seen because of recent diagnosis of carcinoid tumor. Patient had problem with dysphagia for about 4 to 5 months. That problem resolved spontaneously. She was evaluated by gastroenterology. She had EGD in September 2019. She was noted to have gastric tumor which was biopsied and was positive for malignant neuroendocrine tumor. Subsequently patient was evaluated by abdominal MRI. Patient was having no evidence of gastric disease or gastric wall deep penetration. She underwent endoscopic mucosal resection October 23, 2019. Patient had recent admission to [place] because of GI bleeding. She had EGD again and cauterization of bleeding. She is having weakness and fatigue. No other symptoms. No abdominal pain or cramps. No hot flashes or night sweats. No weight loss or decreased appetite. No wheezing. The patient had lab testing in July 2019 with chromogranin A level 1500. Patient was not aware of that test.  Patient's twin sister had carcinoid tumor more than 20 years ago. She had gastric resection at that time and there is no recurrence.   Patient smokes half pack

## 2023-07-25 DIAGNOSIS — C7A.092 MALIGNANT CARCINOID TUMOR OF STOMACH (HCC): Primary | ICD-10-CM

## 2023-07-26 ENCOUNTER — TELEMEDICINE (OUTPATIENT)
Dept: INTERNAL MEDICINE CLINIC | Age: 63
End: 2023-07-26
Payer: MEDICARE

## 2023-07-26 DIAGNOSIS — J02.9 PHARYNGITIS, UNSPECIFIED ETIOLOGY: Primary | ICD-10-CM

## 2023-07-26 PROCEDURE — 99443 PR PHYS/QHP TELEPHONE EVALUATION 21-30 MIN: CPT | Performed by: INTERNAL MEDICINE

## 2023-07-26 RX ORDER — CEPHALEXIN 500 MG/1
500 CAPSULE ORAL 3 TIMES DAILY
Qty: 21 CAPSULE | Refills: 0 | Status: SHIPPED | OUTPATIENT
Start: 2023-07-26 | End: 2023-08-02

## 2023-07-26 SDOH — ECONOMIC STABILITY: INCOME INSECURITY: HOW HARD IS IT FOR YOU TO PAY FOR THE VERY BASICS LIKE FOOD, HOUSING, MEDICAL CARE, AND HEATING?: NOT HARD AT ALL

## 2023-07-26 SDOH — ECONOMIC STABILITY: FOOD INSECURITY: WITHIN THE PAST 12 MONTHS, YOU WORRIED THAT YOUR FOOD WOULD RUN OUT BEFORE YOU GOT MONEY TO BUY MORE.: NEVER TRUE

## 2023-07-26 SDOH — ECONOMIC STABILITY: FOOD INSECURITY: WITHIN THE PAST 12 MONTHS, THE FOOD YOU BOUGHT JUST DIDN'T LAST AND YOU DIDN'T HAVE MONEY TO GET MORE.: NEVER TRUE

## 2023-07-26 SDOH — ECONOMIC STABILITY: HOUSING INSECURITY
IN THE LAST 12 MONTHS, WAS THERE A TIME WHEN YOU DID NOT HAVE A STEADY PLACE TO SLEEP OR SLEPT IN A SHELTER (INCLUDING NOW)?: NO

## 2023-08-01 DIAGNOSIS — Z12.31 ENCOUNTER FOR SCREENING MAMMOGRAM FOR BREAST CANCER: Primary | ICD-10-CM

## 2023-08-30 ENCOUNTER — HOSPITAL ENCOUNTER (OUTPATIENT)
Dept: MAMMOGRAPHY | Age: 63
Discharge: HOME | End: 2023-09-01
Attending: INTERNAL MEDICINE
Payer: MEDICARE

## 2023-08-30 DIAGNOSIS — Z12.31 ENCOUNTER FOR SCREENING MAMMOGRAM FOR BREAST CANCER: ICD-10-CM

## 2023-08-30 PROCEDURE — 77063 BREAST TOMOSYNTHESIS BI: CPT

## 2023-09-15 RX ORDER — METOCLOPRAMIDE 10 MG/1
10 TABLET ORAL
Qty: 90 TABLET | Refills: 1 | Status: SHIPPED | OUTPATIENT
Start: 2023-09-15

## 2023-09-15 NOTE — TELEPHONE ENCOUNTER
Thien Martinez is calling to request a refill on the following medication(s):    Medication Request:  Requested Prescriptions     Pending Prescriptions Disp Refills    metoclopramide (REGLAN) 10 MG tablet 90 tablet 1     Sig: Take 1 tablet by mouth 3 times daily (before meals)       Last Visit Date (If Applicable):  6/26/5314    Next Visit Date:    Visit date not found

## 2023-09-18 RX ORDER — HYDROCHLOROTHIAZIDE 25 MG/1
TABLET ORAL
Qty: 90 TABLET | Refills: 1 | Status: SHIPPED | OUTPATIENT
Start: 2023-09-18

## 2023-09-18 RX ORDER — TRIAMCINOLONE ACETONIDE 1 MG/G
OINTMENT TOPICAL
Qty: 30 G | Refills: 3 | Status: SHIPPED | OUTPATIENT
Start: 2023-09-18

## 2023-09-18 NOTE — TELEPHONE ENCOUNTER
Jonah Rivera is calling to request a refill on the following medication(s):    Medication Request:  Requested Prescriptions     Pending Prescriptions Disp Refills    triamcinolone (KENALOG) 0.1 % ointment [Pharmacy Med Name: TRIAMCINOLONE 0.1% OINTMENT] 30 g 1     Sig: APPLY TO AFFECTED AREA TWICE A DAY    hydroCHLOROthiazide (HYDRODIURIL) 25 MG tablet [Pharmacy Med Name: HYDROCHLOROTHIAZIDE 25 MG TAB] 90 tablet 1     Sig: TAKE 1 TABLET BY MOUTH EVERY DAY       Last Visit Date (If Applicable):  8/41/1549    Next Visit Date:    Visit date not found      Last refills 3/16/23 6/9/23. Prescriptions pending.

## 2023-12-15 ENCOUNTER — TELEPHONE (OUTPATIENT)
Dept: INTERNAL MEDICINE CLINIC | Age: 63
End: 2023-12-15

## 2023-12-15 NOTE — TELEPHONE ENCOUNTER
----- Message from Tao Will MA sent at 12/15/2023  9:19 AM EST -----  Subject: Message to Provider    QUESTIONS  Information for Provider? Was referred to Eye MD and needs the Name and   phone number. Please call patient.   ---------------------------------------------------------------------------  --------------  Latoya Cintron [facility]  [phone]; OK to leave message on voicemail  ---------------------------------------------------------------------------  --------------  SCRIPT ANSWERS  Relationship to Patient?  Self

## 2024-01-10 ENCOUNTER — HOSPITAL ENCOUNTER (OUTPATIENT)
Age: 64
Setting detail: SPECIMEN
Discharge: HOME | End: 2024-01-10
Payer: MEDICARE

## 2024-01-10 DIAGNOSIS — E03.9 HYPOTHYROIDISM, UNSPECIFIED TYPE: ICD-10-CM

## 2024-01-10 DIAGNOSIS — C7A.092 MALIGNANT CARCINOID TUMOR OF STOMACH (HCC): ICD-10-CM

## 2024-01-10 LAB
ALBUMIN SERPL-MCNC: 3.7 G/DL (ref 3.5–5.2)
ALP SERPL-CCNC: 196 U/L (ref 35–104)
ALT SERPL-CCNC: 8 U/L (ref 5–33)
ANION GAP SERPL CALCULATED.3IONS-SCNC: 10 MMOL/L (ref 9–17)
AST SERPL-CCNC: 17 U/L
BASOPHILS # BLD: 0.03 K/UL (ref 0–0.2)
BASOPHILS NFR BLD: 1 % (ref 0–2)
BILIRUB SERPL-MCNC: 0.3 MG/DL (ref 0.3–1.2)
BUN SERPL-MCNC: 10 MG/DL (ref 8–23)
BUN/CREAT SERPL: 13 (ref 9–20)
CALCIUM SERPL-MCNC: 8.8 MG/DL (ref 8.6–10.4)
CHLORIDE SERPL-SCNC: 104 MMOL/L (ref 98–107)
CO2 SERPL-SCNC: 25 MMOL/L (ref 20–31)
CREAT SERPL-MCNC: 0.8 MG/DL (ref 0.5–0.9)
EOSINOPHIL # BLD: 0.1 K/UL (ref 0–0.44)
EOSINOPHILS RELATIVE PERCENT: 2 % (ref 1–4)
ERYTHROCYTE [DISTWIDTH] IN BLOOD BY AUTOMATED COUNT: 14.2 % (ref 11.8–14.4)
FERRITIN SERPL-MCNC: 20 NG/ML (ref 13–150)
GFR SERPL CREATININE-BSD FRML MDRD: >60 ML/MIN/1.73M2
GLUCOSE SERPL-MCNC: 84 MG/DL (ref 70–99)
HCT VFR BLD AUTO: 43.4 % (ref 36.3–47.1)
HGB BLD-MCNC: 14.5 G/DL (ref 11.9–15.1)
IMM GRANULOCYTES # BLD AUTO: 0.01 K/UL (ref 0–0.3)
IMM GRANULOCYTES NFR BLD: 0 %
IRON SATN MFR SERPL: 30 % (ref 20–55)
IRON SERPL-MCNC: 106 UG/DL (ref 37–145)
LYMPHOCYTES NFR BLD: 2.32 K/UL (ref 1.1–3.7)
LYMPHOCYTES RELATIVE PERCENT: 36 % (ref 24–43)
MCH RBC QN AUTO: 28.9 PG (ref 25.2–33.5)
MCHC RBC AUTO-ENTMCNC: 33.4 G/DL (ref 28.4–34.8)
MCV RBC AUTO: 86.6 FL (ref 82.6–102.9)
MONOCYTES NFR BLD: 0.49 K/UL (ref 0.1–1.2)
MONOCYTES NFR BLD: 8 % (ref 3–12)
NEUTROPHILS NFR BLD: 53 % (ref 36–65)
NEUTS SEG NFR BLD: 3.49 K/UL (ref 1.5–8.1)
NRBC BLD-RTO: 0 PER 100 WBC
PLATELET # BLD AUTO: 338 K/UL (ref 138–453)
PMV BLD AUTO: 9.3 FL (ref 8.1–13.5)
POTASSIUM SERPL-SCNC: 3.7 MMOL/L (ref 3.7–5.3)
PROT SERPL-MCNC: 7.1 G/DL (ref 6.4–8.3)
RBC # BLD AUTO: 5.01 M/UL (ref 3.95–5.11)
SODIUM SERPL-SCNC: 139 MMOL/L (ref 135–144)
TIBC SERPL-MCNC: 350 UG/DL (ref 250–450)
UNSATURATED IRON BINDING CAPACITY: 244 UG/DL (ref 112–347)
WBC OTHER # BLD: 6.4 K/UL (ref 3.5–11.3)

## 2024-01-10 PROCEDURE — 85025 COMPLETE CBC W/AUTO DIFF WBC: CPT

## 2024-01-10 PROCEDURE — 80053 COMPREHEN METABOLIC PANEL: CPT

## 2024-01-10 PROCEDURE — 83540 ASSAY OF IRON: CPT

## 2024-01-10 PROCEDURE — 86316 IMMUNOASSAY TUMOR OTHER: CPT

## 2024-01-10 PROCEDURE — 36415 COLL VENOUS BLD VENIPUNCTURE: CPT

## 2024-01-10 PROCEDURE — 82728 ASSAY OF FERRITIN: CPT

## 2024-01-10 PROCEDURE — 83550 IRON BINDING TEST: CPT

## 2024-01-10 PROCEDURE — 84260 ASSAY OF SEROTONIN: CPT

## 2024-01-10 RX ORDER — LEVOTHYROXINE SODIUM 0.1 MG/1
100 TABLET ORAL DAILY
Qty: 90 TABLET | Refills: 1 | Status: SHIPPED | OUTPATIENT
Start: 2024-01-10

## 2024-01-10 NOTE — TELEPHONE ENCOUNTER
Elinor Almanza is calling to request a refill on the following medication(s):    Medication Request:  Requested Prescriptions     Pending Prescriptions Disp Refills    levothyroxine (SYNTHROID) 100 MCG tablet 90 tablet 1     Sig: Take 1 tablet by mouth daily       Last Visit Date (If Applicable):  7/26/2023    Next Visit Date:    Visit date not found

## 2024-01-13 LAB — CHROMOGRANIN A: 61 NG/ML (ref 0–103)

## 2024-01-14 LAB — SEROTONIN SER-MCNC: 236 NG/ML (ref 50–220)

## 2024-01-17 RX ORDER — DOCUSATE SODIUM 100 MG/1
100 CAPSULE, LIQUID FILLED ORAL 2 TIMES DAILY
Qty: 60 CAPSULE | Refills: 0 | Status: SHIPPED | OUTPATIENT
Start: 2024-01-17

## 2024-01-17 RX ORDER — ACETAMINOPHEN 160 MG
2000 TABLET,DISINTEGRATING ORAL DAILY
Qty: 30 CAPSULE | Refills: 0 | Status: SHIPPED | OUTPATIENT
Start: 2024-01-17

## 2024-01-17 NOTE — TELEPHONE ENCOUNTER
Elinor Almanza is calling to request a refill on the following medication(s):    Medication Request:  Requested Prescriptions     Pending Prescriptions Disp Refills    Cholecalciferol (VITAMIN D3) 50 MCG (2000 UT) CAPS 30 capsule      Sig: Take 1 capsule by mouth daily    metoprolol tartrate (LOPRESSOR) 25 MG tablet 180 tablet 1     Sig: Take 1 tablet by mouth 2 times daily    docusate sodium (COLACE) 100 MG capsule 60 capsule 5     Sig: Take 1 capsule by mouth 2 times daily       Last Visit Date (If Applicable):  7/26/2023    Next Visit Date:    Visit date not found

## 2024-01-18 ENCOUNTER — OFFICE VISIT (OUTPATIENT)
Dept: ONCOLOGY | Age: 64
End: 2024-01-18
Payer: MEDICARE

## 2024-01-18 ENCOUNTER — TELEPHONE (OUTPATIENT)
Dept: ONCOLOGY | Age: 64
End: 2024-01-18

## 2024-01-18 VITALS
SYSTOLIC BLOOD PRESSURE: 133 MMHG | TEMPERATURE: 97.9 F | BODY MASS INDEX: 35.94 KG/M2 | RESPIRATION RATE: 16 BRPM | WEIGHT: 216 LBS | DIASTOLIC BLOOD PRESSURE: 88 MMHG | HEART RATE: 66 BPM

## 2024-01-18 DIAGNOSIS — C7A.092 MALIGNANT CARCINOID TUMOR OF STOMACH (HCC): Primary | ICD-10-CM

## 2024-01-18 PROCEDURE — 99214 OFFICE O/P EST MOD 30 MIN: CPT | Performed by: INTERNAL MEDICINE

## 2024-01-18 PROCEDURE — G8484 FLU IMMUNIZE NO ADMIN: HCPCS | Performed by: INTERNAL MEDICINE

## 2024-01-18 PROCEDURE — 1036F TOBACCO NON-USER: CPT | Performed by: INTERNAL MEDICINE

## 2024-01-18 PROCEDURE — 3075F SYST BP GE 130 - 139MM HG: CPT | Performed by: INTERNAL MEDICINE

## 2024-01-18 PROCEDURE — G8427 DOCREV CUR MEDS BY ELIG CLIN: HCPCS | Performed by: INTERNAL MEDICINE

## 2024-01-18 PROCEDURE — 3017F COLORECTAL CA SCREEN DOC REV: CPT | Performed by: INTERNAL MEDICINE

## 2024-01-18 PROCEDURE — G8417 CALC BMI ABV UP PARAM F/U: HCPCS | Performed by: INTERNAL MEDICINE

## 2024-01-18 PROCEDURE — 3079F DIAST BP 80-89 MM HG: CPT | Performed by: INTERNAL MEDICINE

## 2024-01-18 PROCEDURE — 99211 OFF/OP EST MAY X REQ PHY/QHP: CPT | Performed by: INTERNAL MEDICINE

## 2024-01-18 NOTE — PROGRESS NOTES
_           Chief Complaint   Patient presents with    Follow-up    Discuss Labs    Other     Stomach pain, nausea, gas the last couple months      DIAGNOSIS:       Malignant carcinoid tumor of the stomach  Recent GI bleeding after endoscopic mucosal resection of stomach carcinoid on October 23, 2019.  Evidence of recurrent disease on repeated EGD January 2020 and continued elevation of tumor marker chromogranin A  Iron deficiency secondary to above  History of hypothyroidism  Multiple comorbidities as listed      CURRENT THERAPY:         Status post endoscopic mucosal resection of stomach carcinoid October 23, 2019  S/p gastric resection at [facility] November 20, 2020.      BRIEF CASE HISTORY:      Ms. Elinor Almanza is a very pleasant 63 y.o. female with history of multiple comorbidities as listed.  The patient seen because of recent diagnosis of carcinoid tumor.  Patient had problem with dysphagia for about 4 to 5 months.  That problem resolved spontaneously.  She was evaluated by gastroenterology.  She had EGD in September 2019.  She was noted to have gastric tumor which was biopsied and was positive for malignant neuroendocrine tumor.  Subsequently patient was evaluated by abdominal MRI.  Patient was having no evidence of gastric disease or gastric wall deep penetration.  She underwent endoscopic mucosal resection October 23, 2019.  Patient had recent admission to [facility] because of GI bleeding.  She had EGD again and cauterization of bleeding.  She is having weakness and fatigue.  No other symptoms.  No abdominal pain or cramps.  No hot flashes or night sweats.  No weight loss or decreased appetite.  No wheezing.  The patient had lab testing in July 2019 with chromogranin A level 1500.  Patient was not aware of that test.  Patient's twin sister had carcinoid tumor more than 20 years ago.  She had gastric resection

## 2024-01-18 NOTE — TELEPHONE ENCOUNTER
BUFFY HERE FOR MD VISIT  CT scan soon  RV 6 months with labs before rv   CT SCAN IS ON 1/23/24 @ 11AM AT [facility] ARRIVAL @ 9:30AM  LABS TO BE DONE 1 WK PRIOR TO RV 7/11/24  MD VISIT 7/11/24 @ 8:15AM  AVS PRINTED W/ INSTRUCTIONS AND GIVEN  TO PT ON EXIT

## 2024-01-22 ENCOUNTER — OFFICE VISIT (OUTPATIENT)
Dept: GASTROENTEROLOGY | Age: 64
End: 2024-01-22
Payer: MEDICARE

## 2024-01-22 VITALS
DIASTOLIC BLOOD PRESSURE: 91 MMHG | SYSTOLIC BLOOD PRESSURE: 156 MMHG | WEIGHT: 217 LBS | BODY MASS INDEX: 36.11 KG/M2 | TEMPERATURE: 97.7 F

## 2024-01-22 DIAGNOSIS — R11.0 NAUSEA: ICD-10-CM

## 2024-01-22 DIAGNOSIS — R11.14 BILIOUS VOMITING WITH NAUSEA: ICD-10-CM

## 2024-01-22 DIAGNOSIS — C7A.092 MALIGNANT CARCINOID TUMOR OF STOMACH (HCC): Primary | ICD-10-CM

## 2024-01-22 DIAGNOSIS — Z90.3 S/P GASTRECTOMY: ICD-10-CM

## 2024-01-22 PROCEDURE — 3017F COLORECTAL CA SCREEN DOC REV: CPT | Performed by: INTERNAL MEDICINE

## 2024-01-22 PROCEDURE — 3080F DIAST BP >= 90 MM HG: CPT | Performed by: INTERNAL MEDICINE

## 2024-01-22 PROCEDURE — 3077F SYST BP >= 140 MM HG: CPT | Performed by: INTERNAL MEDICINE

## 2024-01-22 PROCEDURE — 99214 OFFICE O/P EST MOD 30 MIN: CPT | Performed by: INTERNAL MEDICINE

## 2024-01-22 PROCEDURE — G8417 CALC BMI ABV UP PARAM F/U: HCPCS | Performed by: INTERNAL MEDICINE

## 2024-01-22 PROCEDURE — G8484 FLU IMMUNIZE NO ADMIN: HCPCS | Performed by: INTERNAL MEDICINE

## 2024-01-22 PROCEDURE — 1036F TOBACCO NON-USER: CPT | Performed by: INTERNAL MEDICINE

## 2024-01-22 PROCEDURE — G8427 DOCREV CUR MEDS BY ELIG CLIN: HCPCS | Performed by: INTERNAL MEDICINE

## 2024-01-22 ASSESSMENT — ENCOUNTER SYMPTOMS
BLOOD IN STOOL: 0
ABDOMINAL PAIN: 1
CONSTIPATION: 0
RECTAL PAIN: 0
TROUBLE SWALLOWING: 1
CHOKING: 0
SHORTNESS OF BREATH: 0
VOICE CHANGE: 0
ABDOMINAL DISTENTION: 1
SORE THROAT: 0
DIARRHEA: 0
ANAL BLEEDING: 0
WHEEZING: 0
COUGH: 0
VOMITING: 0
NAUSEA: 1

## 2024-01-22 NOTE — PROGRESS NOTES
GI CLINIC FOLLOW UP    NTERVAL HISTORY:   No referring provider defined for this encounter.    Chief Complaint   Patient presents with    Abdominal Pain     Pt is here today due to abdominal pain, pt last seen on 6/6/23 for nausea, malignant carcinoid tumor of the stomach, & slow transit constipation.       1. Malignant carcinoid tumor of stomach (HCC)    2. Nausea    3. Bilious vomiting with nausea    4. S/P gastrectomy        This patient seen my office as a follow-up  She has history significant for carcinoid of the stomach  Had surgery done in the past  Lately has been complaining of some bilious vomiting with nausea  Complaining of upper abdominal pain and discomfort  She is being seen and followed by hematology oncology  A CT scan of the abdomen and pelvis has been ordered for tomorrow  Previous records including endoscopy reports were reviewed with her last EGD has not revealed any evidence of carcinoid in the remains of the stomach  No smoking alcohol abuse illicit drug usage    HISTORY OF PRESENT ILLNESS: Ms.Jacqueline Almanza is a 63 y.o. female with a past history remarkable for , referred for evaluation of   Chief Complaint   Patient presents with    Abdominal Pain     Pt is here today due to abdominal pain, pt last seen on 6/6/23 for nausea, malignant carcinoid tumor of the stomach, & slow transit constipation.   .    Past Medical,Family, and Social History reviewed and does contribute to the patient presenting condition.    Patient's PMH/PSH,SH,PSYCH Hx, MEDs, ALLERGIES, and ROS were all reviewed and updated in the appropriate sections.    PAST MEDICAL HISTORY:  Past Medical History:   Diagnosis Date    Anxiety     Arthritis     knee    Asthma     Colon polyp 02/21/2019    tubular adenoma; hyperplastic polyp    Colon polyps     Cyst of kidney, acquired     bilat.    Fatigue     Hx of blood clots     Hypertension     Hypothyroidism     Lung nodule     Neuroendocrine tumor 02/21/2019    of

## 2024-01-23 ENCOUNTER — HOSPITAL ENCOUNTER (OUTPATIENT)
Dept: CT IMAGING | Age: 64
Discharge: HOME | End: 2024-01-25
Attending: INTERNAL MEDICINE
Payer: MEDICARE

## 2024-01-23 DIAGNOSIS — C7A.092 MALIGNANT CARCINOID TUMOR OF STOMACH (HCC): ICD-10-CM

## 2024-01-23 PROCEDURE — 74177 CT ABD & PELVIS W/CONTRAST: CPT

## 2024-01-23 PROCEDURE — 6360000004 HC RX CONTRAST MEDICATION: Performed by: INTERNAL MEDICINE

## 2024-01-23 PROCEDURE — 2500000003 HC RX 250 WO HCPCS: Performed by: INTERNAL MEDICINE

## 2024-01-23 RX ADMIN — IOPAMIDOL 100 ML: 755 INJECTION, SOLUTION INTRAVENOUS at 10:47

## 2024-01-23 RX ADMIN — BARIUM SULFATE 450 ML: 20 SUSPENSION ORAL at 10:47

## 2024-01-31 ENCOUNTER — TELEPHONE (OUTPATIENT)
Dept: INTERNAL MEDICINE CLINIC | Age: 64
End: 2024-01-31

## 2024-01-31 NOTE — TELEPHONE ENCOUNTER
----- Message from Deandra Henry sent at 1/31/2024  1:14 PM EST -----  Subject: Appointment Request    Reason for Call: Established Patient Appointment needed: Routine Existing   Condition Follow Up    QUESTIONS    Reason for appointment request? No appointments available during search     Additional Information for Provider? Patient believes that her BP is   acting up, lightheadedness (but not at the present time) cold and anxious   not appointments until April 2024. Said she feels okay at this time right   now it comes and goes.   ---------------------------------------------------------------------------  --------------  CALL BACK INFO  [phone]; OK to leave message on voicemail  ---------------------------------------------------------------------------  --------------  SCRIPT ANSWERS

## 2024-01-31 NOTE — TELEPHONE ENCOUNTER
----- Message from Deandra Henry sent at 1/31/2024  1:14 PM EST -----  Subject: Appointment Request    Reason for Call: Established Patient Appointment needed: Routine Existing   Condition Follow Up    QUESTIONS    Reason for appointment request? No appointments available during search     Additional Information for Provider? Patient believes that her BP is   acting up, lightheadedness (but not at the present time) cold and anxious   not appointments until April 2024. Said she feels okay at this time right   now it comes and goes.   ---------------------------------------------------------------------------  --------------  CALL BACK INFO  [phone]; OK to leave message on voicemail  ---------------------------------------------------------------------------  --------------  SCRIPT ANSWERS

## 2024-02-07 ENCOUNTER — OFFICE VISIT (OUTPATIENT)
Dept: INTERNAL MEDICINE CLINIC | Age: 64
End: 2024-02-07
Payer: MEDICARE

## 2024-02-07 VITALS
HEIGHT: 65 IN | WEIGHT: 218.6 LBS | SYSTOLIC BLOOD PRESSURE: 122 MMHG | RESPIRATION RATE: 18 BRPM | TEMPERATURE: 97.1 F | BODY MASS INDEX: 36.42 KG/M2 | DIASTOLIC BLOOD PRESSURE: 82 MMHG | HEART RATE: 71 BPM | OXYGEN SATURATION: 100 %

## 2024-02-07 DIAGNOSIS — R07.9 CHEST PAIN, UNSPECIFIED TYPE: ICD-10-CM

## 2024-02-07 DIAGNOSIS — E53.8 B12 DEFICIENCY: ICD-10-CM

## 2024-02-07 DIAGNOSIS — E78.5 HYPERLIPIDEMIA, UNSPECIFIED HYPERLIPIDEMIA TYPE: ICD-10-CM

## 2024-02-07 DIAGNOSIS — I10 ESSENTIAL HYPERTENSION: ICD-10-CM

## 2024-02-07 DIAGNOSIS — E03.9 HYPOTHYROIDISM, UNSPECIFIED TYPE: Primary | ICD-10-CM

## 2024-02-07 PROCEDURE — 3074F SYST BP LT 130 MM HG: CPT | Performed by: INTERNAL MEDICINE

## 2024-02-07 PROCEDURE — 99214 OFFICE O/P EST MOD 30 MIN: CPT | Performed by: INTERNAL MEDICINE

## 2024-02-07 PROCEDURE — G8417 CALC BMI ABV UP PARAM F/U: HCPCS | Performed by: INTERNAL MEDICINE

## 2024-02-07 PROCEDURE — G8484 FLU IMMUNIZE NO ADMIN: HCPCS | Performed by: INTERNAL MEDICINE

## 2024-02-07 PROCEDURE — G8427 DOCREV CUR MEDS BY ELIG CLIN: HCPCS | Performed by: INTERNAL MEDICINE

## 2024-02-07 PROCEDURE — 3079F DIAST BP 80-89 MM HG: CPT | Performed by: INTERNAL MEDICINE

## 2024-02-07 PROCEDURE — 1036F TOBACCO NON-USER: CPT | Performed by: INTERNAL MEDICINE

## 2024-02-07 PROCEDURE — 3017F COLORECTAL CA SCREEN DOC REV: CPT | Performed by: INTERNAL MEDICINE

## 2024-02-07 RX ORDER — DOCUSATE SODIUM 100 MG/1
100 CAPSULE, LIQUID FILLED ORAL 2 TIMES DAILY
Qty: 60 CAPSULE | Refills: 2 | Status: SHIPPED | OUTPATIENT
Start: 2024-02-07

## 2024-02-07 ASSESSMENT — PATIENT HEALTH QUESTIONNAIRE - PHQ9
8. MOVING OR SPEAKING SO SLOWLY THAT OTHER PEOPLE COULD HAVE NOTICED. OR THE OPPOSITE, BEING SO FIGETY OR RESTLESS THAT YOU HAVE BEEN MOVING AROUND A LOT MORE THAN USUAL: 0
10. IF YOU CHECKED OFF ANY PROBLEMS, HOW DIFFICULT HAVE THESE PROBLEMS MADE IT FOR YOU TO DO YOUR WORK, TAKE CARE OF THINGS AT HOME, OR GET ALONG WITH OTHER PEOPLE: 0
SUM OF ALL RESPONSES TO PHQ QUESTIONS 1-9: 0
5. POOR APPETITE OR OVEREATING: 0
SUM OF ALL RESPONSES TO PHQ QUESTIONS 1-9: 0
4. FEELING TIRED OR HAVING LITTLE ENERGY: 0
SUM OF ALL RESPONSES TO PHQ9 QUESTIONS 1 & 2: 0
9. THOUGHTS THAT YOU WOULD BE BETTER OFF DEAD, OR OF HURTING YOURSELF: 0
7. TROUBLE CONCENTRATING ON THINGS, SUCH AS READING THE NEWSPAPER OR WATCHING TELEVISION: 0
1. LITTLE INTEREST OR PLEASURE IN DOING THINGS: 0
2. FEELING DOWN, DEPRESSED OR HOPELESS: 0
3. TROUBLE FALLING OR STAYING ASLEEP: 0

## 2024-02-09 ENCOUNTER — TELEPHONE (OUTPATIENT)
Dept: INTERNAL MEDICINE CLINIC | Age: 64
End: 2024-02-09

## 2024-02-09 ENCOUNTER — HOSPITAL ENCOUNTER (OUTPATIENT)
Age: 64
Setting detail: SPECIMEN
Discharge: HOME | End: 2024-02-09

## 2024-02-09 DIAGNOSIS — E78.5 HYPERLIPIDEMIA, UNSPECIFIED HYPERLIPIDEMIA TYPE: ICD-10-CM

## 2024-02-09 DIAGNOSIS — E03.9 HYPOTHYROIDISM, UNSPECIFIED TYPE: ICD-10-CM

## 2024-02-09 DIAGNOSIS — E53.8 B12 DEFICIENCY: ICD-10-CM

## 2024-02-09 DIAGNOSIS — E87.6 LOW BLOOD POTASSIUM: Primary | ICD-10-CM

## 2024-02-09 DIAGNOSIS — R07.9 CHEST PAIN, UNSPECIFIED TYPE: ICD-10-CM

## 2024-02-09 DIAGNOSIS — I10 ESSENTIAL HYPERTENSION: ICD-10-CM

## 2024-02-09 LAB
ALBUMIN SERPL-MCNC: 3.7 G/DL (ref 3.5–5.2)
ALBUMIN/GLOB SERPL: 1 {RATIO} (ref 1–2.5)
ALP SERPL-CCNC: 186 U/L (ref 35–104)
ALT SERPL-CCNC: 9 U/L (ref 10–35)
ANION GAP SERPL CALCULATED.3IONS-SCNC: 12 MMOL/L (ref 9–16)
AST SERPL-CCNC: 24 U/L (ref 10–35)
BILIRUB SERPL-MCNC: 0.3 MG/DL (ref 0–1.2)
BUN SERPL-MCNC: 12 MG/DL (ref 8–23)
CALCIUM SERPL-MCNC: 8.9 MG/DL (ref 8.6–10.4)
CHLORIDE SERPL-SCNC: 106 MMOL/L (ref 98–107)
CHOLEST SERPL-MCNC: 194 MG/DL (ref 0–199)
CHOLESTEROL/HDL RATIO: 3
CO2 SERPL-SCNC: 22 MMOL/L (ref 20–31)
CREAT SERPL-MCNC: 0.8 MG/DL (ref 0.5–0.9)
ERYTHROCYTE [DISTWIDTH] IN BLOOD BY AUTOMATED COUNT: 13.4 % (ref 11.8–14.4)
GFR SERPL CREATININE-BSD FRML MDRD: >60 ML/MIN/1.73M2
GLUCOSE SERPL-MCNC: 95 MG/DL (ref 74–99)
HCT VFR BLD AUTO: 41.6 % (ref 36.3–47.1)
HDLC SERPL-MCNC: 58 MG/DL
HGB BLD-MCNC: 13.7 G/DL (ref 11.9–15.1)
LDLC SERPL CALC-MCNC: 124 MG/DL (ref 0–100)
MAGNESIUM SERPL-MCNC: 1.7 MG/DL (ref 1.6–2.4)
MCH RBC QN AUTO: 28.4 PG (ref 25.2–33.5)
MCHC RBC AUTO-ENTMCNC: 32.9 G/DL (ref 28.4–34.8)
MCV RBC AUTO: 86.1 FL (ref 82.6–102.9)
NRBC BLD-RTO: 0 PER 100 WBC
PLATELET # BLD AUTO: 311 K/UL (ref 138–453)
PMV BLD AUTO: 10.5 FL (ref 8.1–13.5)
POTASSIUM SERPL-SCNC: 3.5 MMOL/L (ref 3.7–5.3)
PROT SERPL-MCNC: 6.9 G/DL (ref 6.6–8.7)
RBC # BLD AUTO: 4.83 M/UL (ref 3.95–5.11)
SODIUM SERPL-SCNC: 140 MMOL/L (ref 136–145)
T4 FREE SERPL-MCNC: 1.2 NG/DL (ref 0.93–1.7)
TRIGL SERPL-MCNC: 64 MG/DL
TSH SERPL DL<=0.05 MIU/L-ACNC: 0.88 UIU/ML (ref 0.27–4.2)
VIT B12 SERPL-MCNC: 882 PG/ML (ref 232–1245)
VLDLC SERPL CALC-MCNC: 13 MG/DL
WBC OTHER # BLD: 5.4 K/UL (ref 3.5–11.3)

## 2024-02-09 NOTE — TELEPHONE ENCOUNTER
Per Dr. LOPEZ - All labs Ok except potassium slightly low   Repeat in 2 weeks         Patient notified through detailed voicemail   Labs ordered

## 2024-02-14 RX ORDER — DOCUSATE SODIUM 100 MG/1
100 CAPSULE, LIQUID FILLED ORAL 2 TIMES DAILY
Qty: 180 CAPSULE | Refills: 1 | Status: SHIPPED | OUTPATIENT
Start: 2024-02-14

## 2024-02-14 RX ORDER — METOCLOPRAMIDE 10 MG/1
10 TABLET ORAL
Qty: 270 TABLET | Refills: 1 | Status: SHIPPED | OUTPATIENT
Start: 2024-02-14

## 2024-02-14 RX ORDER — ACETAMINOPHEN 160 MG
2000 TABLET,DISINTEGRATING ORAL DAILY
Qty: 90 CAPSULE | Refills: 1 | Status: SHIPPED | OUTPATIENT
Start: 2024-02-14

## 2024-02-14 NOTE — TELEPHONE ENCOUNTER
Elinor Almanza is calling to request a refill on the following medication(s):    Last Visit Date (If Applicable):  2/7/2024    Next Visit Date:    5/7/2024    Medication Request:  Requested Prescriptions     Pending Prescriptions Disp Refills    Cholecalciferol (VITAMIN D3) 50 MCG (2000 UT) CAPS 90 capsule 1     Sig: Take 1 capsule by mouth daily    docusate sodium (COLACE) 100 MG capsule 180 capsule 1     Sig: Take 1 capsule by mouth 2 times daily    metoclopramide (REGLAN) 10 MG tablet 270 tablet 1     Sig: Take 1 tablet by mouth 3 times daily (before meals)

## 2024-02-28 ENCOUNTER — HOSPITAL ENCOUNTER (OUTPATIENT)
Age: 64
Setting detail: SPECIMEN
Discharge: HOME | End: 2024-02-28

## 2024-02-28 DIAGNOSIS — E87.6 LOW BLOOD POTASSIUM: ICD-10-CM

## 2024-02-28 LAB — POTASSIUM SERPL-SCNC: 3.8 MMOL/L (ref 3.7–5.3)

## 2024-03-06 ENCOUNTER — OFFICE VISIT (OUTPATIENT)
Dept: PULMONOLOGY | Age: 64
End: 2024-03-06
Payer: MEDICARE

## 2024-03-06 VITALS
HEIGHT: 65 IN | HEART RATE: 72 BPM | TEMPERATURE: 97.2 F | BODY MASS INDEX: 36.15 KG/M2 | RESPIRATION RATE: 16 BRPM | DIASTOLIC BLOOD PRESSURE: 85 MMHG | WEIGHT: 217 LBS | OXYGEN SATURATION: 97 % | SYSTOLIC BLOOD PRESSURE: 114 MMHG

## 2024-03-06 DIAGNOSIS — Z90.3 S/P TOTAL GASTRECTOMY AND ROUX-EN-Y ESOPHAGOJEJUNAL ANASTOMOSIS: ICD-10-CM

## 2024-03-06 DIAGNOSIS — R09.82 POST-NASAL DRIP: ICD-10-CM

## 2024-03-06 DIAGNOSIS — Z98.0 S/P TOTAL GASTRECTOMY AND ROUX-EN-Y ESOPHAGOJEJUNAL ANASTOMOSIS: ICD-10-CM

## 2024-03-06 DIAGNOSIS — R91.8 MULTIPLE NODULES OF LUNG: Primary | ICD-10-CM

## 2024-03-06 DIAGNOSIS — J45.20 MILD INTERMITTENT ASTHMA WITHOUT COMPLICATION: ICD-10-CM

## 2024-03-06 DIAGNOSIS — C7A.092 MALIGNANT CARCINOID TUMOR OF STOMACH (HCC): ICD-10-CM

## 2024-03-06 PROCEDURE — 1036F TOBACCO NON-USER: CPT | Performed by: INTERNAL MEDICINE

## 2024-03-06 PROCEDURE — 3074F SYST BP LT 130 MM HG: CPT | Performed by: INTERNAL MEDICINE

## 2024-03-06 PROCEDURE — 3079F DIAST BP 80-89 MM HG: CPT | Performed by: INTERNAL MEDICINE

## 2024-03-06 PROCEDURE — G8427 DOCREV CUR MEDS BY ELIG CLIN: HCPCS | Performed by: INTERNAL MEDICINE

## 2024-03-06 PROCEDURE — G8484 FLU IMMUNIZE NO ADMIN: HCPCS | Performed by: INTERNAL MEDICINE

## 2024-03-06 PROCEDURE — G8417 CALC BMI ABV UP PARAM F/U: HCPCS | Performed by: INTERNAL MEDICINE

## 2024-03-06 PROCEDURE — 3017F COLORECTAL CA SCREEN DOC REV: CPT | Performed by: INTERNAL MEDICINE

## 2024-03-06 PROCEDURE — 99213 OFFICE O/P EST LOW 20 MIN: CPT | Performed by: INTERNAL MEDICINE

## 2024-03-06 NOTE — PROGRESS NOTES
done.  Supplemental oxygen was not indicated   Pt met the criteria for lung cancer screening and was explained the possibility of having findings that would need monitoring such as lung nodules and the need for more than yearly screening ct chest. Pt acknowledges understanding and questions answered to their satisfaction.  She will have her primary care physician ordered a CT scan to screen for lung cancer annually.  We'll see the patient back in as needed.  Patient will call us if she is sick, so she can be seen sooner.  Thank you for having us involved in the care of your patient. Please call us if you have any questions or concerns.              Tommy Caballero MD MD  3/6/2024 9:44 AM

## 2024-04-16 RX ORDER — HYDROCHLOROTHIAZIDE 25 MG/1
TABLET ORAL
Qty: 90 TABLET | Refills: 1 | Status: SHIPPED | OUTPATIENT
Start: 2024-04-16

## 2024-04-16 NOTE — TELEPHONE ENCOUNTER
Elinor Almanza is calling to request a refill on the following medication(s):    Medication Request:  Requested Prescriptions     Pending Prescriptions Disp Refills    hydroCHLOROthiazide (HYDRODIURIL) 25 MG tablet [Pharmacy Med Name: HYDROCHLOROTHIAZIDE 25 MG TAB] 90 tablet 1     Sig: TAKE 1 TABLET BY MOUTH EVERY DAY       Last Visit Date (If Applicable):  2/7/2024    Next Visit Date:    5/7/2024      Last refill 9/18/23. Prescription pending.

## 2024-04-26 ENCOUNTER — TELEPHONE (OUTPATIENT)
Dept: GASTROENTEROLOGY | Age: 64
End: 2024-04-26

## 2024-04-29 ENCOUNTER — HOSPITAL ENCOUNTER (OUTPATIENT)
Age: 64
Setting detail: OUTPATIENT SURGERY
Discharge: HOME | End: 2024-04-29
Attending: INTERNAL MEDICINE | Admitting: INTERNAL MEDICINE
Payer: MEDICARE

## 2024-04-29 ENCOUNTER — ANESTHESIA (OUTPATIENT)
Dept: OPERATING ROOM | Age: 64
End: 2024-04-29
Payer: MEDICARE

## 2024-04-29 ENCOUNTER — ANESTHESIA EVENT (OUTPATIENT)
Dept: OPERATING ROOM | Age: 64
End: 2024-04-29
Payer: MEDICARE

## 2024-04-29 VITALS
HEART RATE: 62 BPM | BODY MASS INDEX: 36.32 KG/M2 | SYSTOLIC BLOOD PRESSURE: 152 MMHG | TEMPERATURE: 97.2 F | HEIGHT: 65 IN | DIASTOLIC BLOOD PRESSURE: 89 MMHG | WEIGHT: 218 LBS | RESPIRATION RATE: 17 BRPM | OXYGEN SATURATION: 99 %

## 2024-04-29 PROCEDURE — 43235 EGD DIAGNOSTIC BRUSH WASH: CPT | Performed by: INTERNAL MEDICINE

## 2024-04-29 PROCEDURE — 2500000003 HC RX 250 WO HCPCS: Performed by: SPECIALIST

## 2024-04-29 PROCEDURE — 2709999900 HC NON-CHARGEABLE SUPPLY: Performed by: INTERNAL MEDICINE

## 2024-04-29 PROCEDURE — 7100000010 HC PHASE II RECOVERY - FIRST 15 MIN: Performed by: INTERNAL MEDICINE

## 2024-04-29 PROCEDURE — 2580000003 HC RX 258: Performed by: ANESTHESIOLOGY

## 2024-04-29 PROCEDURE — 3700000000 HC ANESTHESIA ATTENDED CARE: Performed by: INTERNAL MEDICINE

## 2024-04-29 PROCEDURE — 3609017100 HC EGD: Performed by: INTERNAL MEDICINE

## 2024-04-29 PROCEDURE — 6360000002 HC RX W HCPCS: Performed by: SPECIALIST

## 2024-04-29 PROCEDURE — 2580000003 HC RX 258: Performed by: SPECIALIST

## 2024-04-29 PROCEDURE — 7100000011 HC PHASE II RECOVERY - ADDTL 15 MIN: Performed by: INTERNAL MEDICINE

## 2024-04-29 RX ORDER — SODIUM CHLORIDE 0.9 % (FLUSH) 0.9 %
5-40 SYRINGE (ML) INJECTION PRN
Status: DISCONTINUED | OUTPATIENT
Start: 2024-04-29 | End: 2024-04-29 | Stop reason: HOSPADM

## 2024-04-29 RX ORDER — PROPOFOL 10 MG/ML
INJECTION, EMULSION INTRAVENOUS PRN
Status: DISCONTINUED | OUTPATIENT
Start: 2024-04-29 | End: 2024-04-29 | Stop reason: SDUPTHER

## 2024-04-29 RX ORDER — LIDOCAINE HYDROCHLORIDE 10 MG/ML
INJECTION, SOLUTION EPIDURAL; INFILTRATION; INTRACAUDAL; PERINEURAL PRN
Status: DISCONTINUED | OUTPATIENT
Start: 2024-04-29 | End: 2024-04-29 | Stop reason: SDUPTHER

## 2024-04-29 RX ORDER — SODIUM CHLORIDE, SODIUM LACTATE, POTASSIUM CHLORIDE, CALCIUM CHLORIDE 600; 310; 30; 20 MG/100ML; MG/100ML; MG/100ML; MG/100ML
INJECTION, SOLUTION INTRAVENOUS CONTINUOUS PRN
Status: DISCONTINUED | OUTPATIENT
Start: 2024-04-29 | End: 2024-04-29 | Stop reason: SDUPTHER

## 2024-04-29 RX ORDER — SODIUM CHLORIDE 9 MG/ML
INJECTION, SOLUTION INTRAVENOUS PRN
Status: DISCONTINUED | OUTPATIENT
Start: 2024-04-29 | End: 2024-04-29 | Stop reason: HOSPADM

## 2024-04-29 RX ORDER — SODIUM CHLORIDE 0.9 % (FLUSH) 0.9 %
5-40 SYRINGE (ML) INJECTION EVERY 12 HOURS SCHEDULED
Status: DISCONTINUED | OUTPATIENT
Start: 2024-04-29 | End: 2024-04-29 | Stop reason: HOSPADM

## 2024-04-29 RX ORDER — SODIUM CHLORIDE 9 MG/ML
INJECTION, SOLUTION INTRAVENOUS CONTINUOUS
Status: DISCONTINUED | OUTPATIENT
Start: 2024-04-29 | End: 2024-04-29

## 2024-04-29 RX ORDER — SODIUM CHLORIDE, SODIUM LACTATE, POTASSIUM CHLORIDE, CALCIUM CHLORIDE 600; 310; 30; 20 MG/100ML; MG/100ML; MG/100ML; MG/100ML
INJECTION, SOLUTION INTRAVENOUS CONTINUOUS
Status: DISCONTINUED | OUTPATIENT
Start: 2024-04-29 | End: 2024-04-29 | Stop reason: HOSPADM

## 2024-04-29 RX ORDER — LIDOCAINE HYDROCHLORIDE 10 MG/ML
1 INJECTION, SOLUTION EPIDURAL; INFILTRATION; INTRACAUDAL; PERINEURAL
Status: DISCONTINUED | OUTPATIENT
Start: 2024-04-29 | End: 2024-04-29 | Stop reason: HOSPADM

## 2024-04-29 RX ADMIN — PROPOFOL 50 MG: 10 INJECTION, EMULSION INTRAVENOUS at 09:20

## 2024-04-29 RX ADMIN — SODIUM CHLORIDE, POTASSIUM CHLORIDE, SODIUM LACTATE AND CALCIUM CHLORIDE: 600; 310; 30; 20 INJECTION, SOLUTION INTRAVENOUS at 08:00

## 2024-04-29 RX ADMIN — SODIUM CHLORIDE, POTASSIUM CHLORIDE, SODIUM LACTATE AND CALCIUM CHLORIDE: 600; 310; 30; 20 INJECTION, SOLUTION INTRAVENOUS at 08:07

## 2024-04-29 RX ADMIN — LIDOCAINE HYDROCHLORIDE 50 MG: 10 INJECTION, SOLUTION EPIDURAL; INFILTRATION; INTRACAUDAL; PERINEURAL at 09:15

## 2024-04-29 RX ADMIN — PROPOFOL 50 MG: 10 INJECTION, EMULSION INTRAVENOUS at 09:15

## 2024-04-29 ASSESSMENT — ENCOUNTER SYMPTOMS: SHORTNESS OF BREATH: 1

## 2024-04-29 ASSESSMENT — PAIN SCALES - GENERAL: PAINLEVEL_OUTOF10: 0

## 2024-04-29 ASSESSMENT — PAIN - FUNCTIONAL ASSESSMENT: PAIN_FUNCTIONAL_ASSESSMENT: FACE, LEGS, ACTIVITY, CRY, AND CONSOLABILITY (FLACC)

## 2024-04-29 NOTE — ANESTHESIA PRE PROCEDURE
Department of Anesthesiology  Preprocedure Note       Name:  Elinor Almanza   Age:  63 y.o.  :  1960                                          MRN:  [id number]         Date:  2024      Surgeon: Surgeon(s):  Rachel Gutierrez MD    Procedure: Procedure(s):  ESOPHAGOGASTRODUODENOSCOPY    Medications prior to admission:   Prior to Admission medications    Medication Sig Start Date End Date Taking? Authorizing Provider   hydroCHLOROthiazide (HYDRODIURIL) 25 MG tablet TAKE 1 TABLET BY MOUTH EVERY DAY 24   Brock Starks MD   Cholecalciferol (VITAMIN D3) 50 MCG ( UT) CAPS Take 1 capsule by mouth daily 24   Brock Starks MD   docusate sodium (COLACE) 100 MG capsule Take 1 capsule by mouth 2 times daily 24   Brock Starks MD   metoclopramide (REGLAN) 10 MG tablet Take 1 tablet by mouth 3 times daily (before meals) 24   Brock Starks MD   metoprolol tartrate (LOPRESSOR) 25 MG tablet Take 1 tablet by mouth 2 times daily 24   Brock Starks MD   levothyroxine (SYNTHROID) 100 MCG tablet Take 1 tablet by mouth daily 1/10/24   Brock Starks MD   triamcinolone (KENALOG) 0.1 % ointment APPLY TO AFFECTED AREA TWICE A DAY 23   Brock Starks MD   traZODone (DESYREL) 50 MG tablet TAKE 1 TABLET BY MOUTH AT BEDTIME AS NEEDED FOR SLEEP 3/13/23   Brock Starks MD   vitamin B-12 (CYANOCOBALAMIN) 500 MCG tablet Take 1 tablet by mouth daily    ProviderMohinder MD   fluticasone (FLONASE) 50 MCG/ACT nasal spray 2 sprays by Each Nostril route daily  Patient not taking: Reported on 2024   Tommy Caballero MD   Multiple Vitamin (MULTI-DAY VITAMINS PO) Take 1 tablet by mouth daily     Provider, MD Mohinder       Current medications:    Current Facility-Administered Medications   Medication Dose Route Frequency Provider Last Rate Last Admin    lidocaine PF 1 % injection 1 mL  1 mL IntraDERmal Once PRN Davy Farfan MD        lactated

## 2024-04-29 NOTE — H&P
cigarettes. She started smoking about 41 years ago. She has a 25.0 pack-year smoking history. She has never used smokeless tobacco.   reports that she does not currently use alcohol.   reports no history of drug use.    ROS: Pertinent findings in the HPI above.  A comprehensive review of systems was essentially negative  DENIES . exertional chest pain, dyspnea, gastrointestinal symptoms, musculoskeletal symptoms, and neurologic symptoms Patient's last menstrual period was 1994.        OBJECTIVE:   VITALS:  height is 1.651 m (5' 5\") and weight is 98.9 kg (218 lb). Her temperature is 97.5 °F (36.4 °C). Her blood pressure is 122/85 and her pulse is 66. Her respiration is 18 and oxygen saturation is 97%.   CONSTITUTIONAL:This is a 63 y.o. female who is cooperative, pleasant, alert & orientated x 3, and in no acute distress   SKIN:  Warm and dry, no rashes   HEAD:  Normocephalic, atraumatic   EYES: PERRL.  EOMs intact.    EARS:  Hearing grossly WNL.    NOSE:  Nares patent.  No rhinorrhea   THROAT:  Airway is patent, membranes are moist   NECK:supple, no lymphadenopathy  LUNGS: Clear to auscultation bilaterally, no wheezes, rales, or rhonchi.    CARDIOVASCULAR: Heart sounds are normal.  Regular rate and rhythm without murmur, gallop or rub.   ABDOMEN: soft, non tender, non distended, bowel sounds present X 4  EXTREMITIES: no peripheral edema bilateral  PULSES = 2 BILATERALLY NO CALF TENDERNESS   NEURO: Cranial nerves II-XII grossly intact Strength 5+/5+     Testing:   EKG:     Lab Review:  CBC:   Lab Results   Component Value Date/Time    WBC 5.4 2024 09:02 AM    RBC 4.83 2024 09:02 AM    HGB 13.7 2024 09:02 AM    HCT 41.6 2024 09:02 AM    MCV 86.1 2024 09:02 AM    MCH 28.4 2024 09:02 AM    MCHC 32.9 2024 09:02 AM    RDW 13.4 2024 09:02 AM     2024 09:02 AM       IMPRESSIONS:   HISTORY OF CARCINOID GASTRIC  TUMORS    has a past medical history of

## 2024-04-29 NOTE — OP NOTE
PROCEDURE NOTE    DATE OF PROCEDURE: 4/29/2024     SURGEON: Rachel Gutierrez MD    ASSISTANT: None    PREOPERATIVE DIAGNOSIS: HX OF GASTRIC CARCINOID    POSTOPERATIVE DIAGNOSIS: As described below    OPERATION: Upper GI endoscopy with Biopsy    ANESTHESIA: MAC PER ANESTHESIA     ESTIMATED BLOOD LOSS: Less than 50 ml    COMPLICATIONS: None.     SPECIMENS:  Was Not Obtained    HISTORY: The patient is a 63 y.o. year old female with history of above preop diagnosis.  I recommended esophagogastroduodenoscopy with possible biopsy and I explained the risk, benefits, expected outcome, and alternatives to the procedure.  Risks included but are not limited to bleeding, infection, respiratory distress, hypotension, and perforation of the esophagus, stomach, or duodenum.  Patient understands and is in agreement.    PROCEDURE: The patient was given IV conscious sedation.  The patient's SPO2 remained above 90% throughout the procedure. The gastroscope was inserted orally and advanced under direct vision through the esophagus, through the stomach, through the pylorus, and into the descending duodenum.      Findings:    Retropharyngeal area was grossly normal appearing    Esophagus: normal    Stomach:  STOMACH SURGICALLY ABSENT    Duodenum:   SMALL BOWEL LOOPS WERE NORMAL APPEARING  PICTURES WERE TAKEN     The scope was removed and the patient tolerated the procedure well.     Recommendations/Plan:     F/U In Office in 3-4 weeks  Discussed with the family  Post sedation patient was stable with stable vital signs and stable O2 saturations    Electronically signed by Rachel Gutierrez MD  on 4/29/2024 at 9:21 AM

## 2024-04-29 NOTE — DISCHARGE INSTRUCTIONS
Upper GI Endoscopy: What to Expect at Home  Your Recovery  You may have a sore throat for a day or two after the test.  How can you care for yourself at home?  Activity  Rest as much as you need to after you go home.  You should be able to go back to your usual activities the day after the test.  Diet  Drink plenty of fluids (unless your doctor has told you not to).  Follow-up care is a key part of your treatment and safety. Be sure to make and go to all appointments, and call your doctor if you are having problems.  When should you call for help?  Call 911 anytime you think you may need emergency care. For example, call if:  You passed out (lost consciousness).  You cough up blood.  You vomit blood or what looks like coffee grounds.  You pass maroon or very bloody stools.  Call your doctor now or seek immediate medical care if:  You have trouble swallowing.  You have belly pain.  Your stools are black and tarlike or have streaks of blood.  You are sick to your stomach or cannot keep fluids down.  Watch closely for changes in your health, and be sure to contact your doctor if:  Your throat still hurts after a day or two.  You do not get better as expected.   Where can you learn more?   Go to [email or web address] and sign in to your [employer] account. Enter J454 in the Search Health Information box to learn more about “Upper GI Endoscopy: What to Expect at Home.”    .     © 5290-1695 [employer]. Care instructions adapted under license by [employer]. This care instruction is for use with your licensed healthcare professional. If you have questions about a medical condition or this instruction, always ask your healthcare professional. [employer] disclaims any warranty or liability for your use of this information.  Content Version: 9.9.646523; Last Revised: February 20, 2013

## 2024-04-29 NOTE — ANESTHESIA POSTPROCEDURE EVALUATION
Department of Anesthesiology  Postprocedure Note    Patient: Elinor Alamnza  MRN: [id number]  YOB: 1960  Date of evaluation: 4/29/2024    Procedure Summary       Date: 04/29/24 Room / Location: [address]    Anesthesia Start: 0908 Anesthesia Stop: 0932    Procedure: ESOPHAGOGASTRODUODENOSCOPY Diagnosis:       Malignant carcinoid tumor of stomach (HCC)      Nausea      Bilious vomiting with nausea      S/P gastrectomy      (Malignant carcinoid tumor of stomach (HCC) [C7A.092])      (Nausea [R11.0])      (Bilious vomiting with nausea [R11.14])      (S/P gastrectomy [Z90.3])    Surgeons: Rachel Gutierrez MD Responsible Provider: Chanelle Walker MD    Anesthesia Type: general ASA Status: 2            Anesthesia Type: No value filed.    Reji Phase I: Reji Score: 10    Reji Phase II: Reji Score: 10    Anesthesia Post Evaluation    Cardiovascular status: hemodynamically stable    No notable events documented.

## 2024-05-10 ENCOUNTER — TELEPHONE (OUTPATIENT)
Dept: INTERNAL MEDICINE CLINIC | Age: 64
End: 2024-05-10

## 2024-05-10 ENCOUNTER — OFFICE VISIT (OUTPATIENT)
Dept: FAMILY MEDICINE CLINIC | Age: 64
End: 2024-05-10
Payer: MEDICARE

## 2024-05-10 VITALS
DIASTOLIC BLOOD PRESSURE: 84 MMHG | TEMPERATURE: 98.4 F | SYSTOLIC BLOOD PRESSURE: 121 MMHG | OXYGEN SATURATION: 97 % | HEART RATE: 78 BPM

## 2024-05-10 DIAGNOSIS — R09.82 PND (POST-NASAL DRIP): ICD-10-CM

## 2024-05-10 DIAGNOSIS — S00.512A ABRASION OF ORAL CAVITY, INITIAL ENCOUNTER: ICD-10-CM

## 2024-05-10 DIAGNOSIS — J02.9 SORE THROAT: Primary | ICD-10-CM

## 2024-05-10 LAB — S PYO AG THROAT QL: NORMAL

## 2024-05-10 PROCEDURE — 87880 STREP A ASSAY W/OPTIC: CPT | Performed by: NURSE PRACTITIONER

## 2024-05-10 PROCEDURE — G8427 DOCREV CUR MEDS BY ELIG CLIN: HCPCS | Performed by: NURSE PRACTITIONER

## 2024-05-10 PROCEDURE — 3017F COLORECTAL CA SCREEN DOC REV: CPT | Performed by: NURSE PRACTITIONER

## 2024-05-10 PROCEDURE — G8417 CALC BMI ABV UP PARAM F/U: HCPCS | Performed by: NURSE PRACTITIONER

## 2024-05-10 PROCEDURE — 3079F DIAST BP 80-89 MM HG: CPT | Performed by: NURSE PRACTITIONER

## 2024-05-10 PROCEDURE — 99213 OFFICE O/P EST LOW 20 MIN: CPT | Performed by: NURSE PRACTITIONER

## 2024-05-10 PROCEDURE — 1036F TOBACCO NON-USER: CPT | Performed by: NURSE PRACTITIONER

## 2024-05-10 PROCEDURE — 3074F SYST BP LT 130 MM HG: CPT | Performed by: NURSE PRACTITIONER

## 2024-05-10 RX ORDER — FLUTICASONE PROPIONATE 50 MCG
2 SPRAY, SUSPENSION (ML) NASAL DAILY
Qty: 16 G | Refills: 0 | Status: SHIPPED | OUTPATIENT
Start: 2024-05-10

## 2024-05-10 ASSESSMENT — ENCOUNTER SYMPTOMS
SINUS PRESSURE: 0
VOMITING: 0
VOICE CHANGE: 0
SORE THROAT: 1
SINUS PAIN: 0
NAUSEA: 0
TROUBLE SWALLOWING: 0
COUGH: 0
SWOLLEN GLANDS: 0
RHINORRHEA: 0
FACIAL SWELLING: 0

## 2024-05-10 NOTE — PROGRESS NOTES
[facility] PHYSICIANS [facility], [facility] WALK-IN FAMILY MEDICINE  [address]  [address]  [facility] [address]  Dept: [phone]  Dept Fax: [phone]    Elinor Almanza is a 63 y.o. female who presents to the urgent care today for her medical conditions/complaints as notedbelow.  Elinor Almanza is c/o of Pharyngitis (Onset for 4 days with red bumps on back of tongue and hurts to swallow.)      HPI:     63 yr old female presents for st 4 days with red bumps to back of tongue.   Has had pnd  Roof of mouth also hurts where there is bony bump she noticed approx 42 yrs ago, bump does not feel bigger, just tender right there  Dentist has never said anything about the bump  Does not recall any injury to the area  No strep or ill exposure    Pharyngitis  This is a new problem. The current episode started in the past 7 days (4d). Associated symptoms include a sore throat. Pertinent negatives include no chest pain, chills, congestion, coughing, fever, headaches, myalgias, nausea, swollen glands or vomiting. Associated symptoms comments: pnd. The symptoms are aggravated by swallowing. She has tried acetaminophen for the symptoms. The treatment provided mild relief.       Past Medical History:   Diagnosis Date    Anxiety     Asthma     Colon polyp 02/21/2019    tubular adenoma; hyperplastic polyp    Cyst of kidney, acquired     bilat.    Fatigue     Hx of blood clots     Pulmonary Embolism    Hypertension     Hypothyroidism     Lung nodule     Neuroendocrine tumor 02/21/2019    of stomach    Obesity     Pharyngoesophageal dysphagia     Pulmonary embolism (HCC) 2021    Vocal cord polyps     Wears glasses         Current Outpatient Medications   Medication Sig Dispense Refill    fluticasone (FLONASE) 50 MCG/ACT nasal spray 2 sprays by Each Nostril route daily 16 g 0    hydroCHLOROthiazide (HYDRODIURIL) 25 MG tablet TAKE 1 TABLET BY MOUTH EVERY DAY 90 tablet 1    Cholecalciferol (VITAMIN

## 2024-05-10 NOTE — TELEPHONE ENCOUNTER
Patient is calling stating she has sore throat x 4 days , painful swallowing and red bumps on the back of her tongue.     Walk in clinic information given to the patient so she can get tested/evaluated and treated. Patient voiced understanding and will go to the walk in clinic.

## 2024-06-02 RX ORDER — FLUTICASONE PROPIONATE 50 MCG
2 SPRAY, SUSPENSION (ML) NASAL DAILY
Refills: 1 | OUTPATIENT
Start: 2024-06-02

## 2024-07-03 ENCOUNTER — HOSPITAL ENCOUNTER (OUTPATIENT)
Age: 64
Setting detail: SPECIMEN
Discharge: HOME | End: 2024-07-03
Payer: MEDICARE

## 2024-07-03 DIAGNOSIS — C7A.092 MALIGNANT CARCINOID TUMOR OF STOMACH (HCC): ICD-10-CM

## 2024-07-03 LAB
ALBUMIN SERPL-MCNC: 3.6 G/DL (ref 3.5–5.2)
ALP SERPL-CCNC: 186 U/L (ref 35–104)
ALT SERPL-CCNC: 7 U/L (ref 5–33)
ANION GAP SERPL CALCULATED.3IONS-SCNC: 11 MMOL/L (ref 9–17)
AST SERPL-CCNC: 14 U/L
BASOPHILS # BLD: 0.03 K/UL (ref 0–0.2)
BASOPHILS NFR BLD: 1 % (ref 0–2)
BILIRUB SERPL-MCNC: 0.2 MG/DL (ref 0.3–1.2)
BUN SERPL-MCNC: 11 MG/DL (ref 8–23)
BUN/CREAT SERPL: 14 (ref 9–20)
CALCIUM SERPL-MCNC: 8.5 MG/DL (ref 8.6–10.4)
CHLORIDE SERPL-SCNC: 103 MMOL/L (ref 98–107)
CO2 SERPL-SCNC: 26 MMOL/L (ref 20–31)
CREAT SERPL-MCNC: 0.8 MG/DL (ref 0.5–0.9)
EOSINOPHIL # BLD: 0.13 K/UL (ref 0–0.44)
EOSINOPHILS RELATIVE PERCENT: 2 % (ref 1–4)
ERYTHROCYTE [DISTWIDTH] IN BLOOD BY AUTOMATED COUNT: 13.4 % (ref 11.8–14.4)
FERRITIN SERPL-MCNC: 16 NG/ML (ref 13–150)
GFR, ESTIMATED: 83 ML/MIN/1.73M2
GLUCOSE SERPL-MCNC: 90 MG/DL (ref 70–99)
HCT VFR BLD AUTO: 40.8 % (ref 36.3–47.1)
HGB BLD-MCNC: 13.2 G/DL (ref 11.9–15.1)
IMM GRANULOCYTES # BLD AUTO: 0.02 K/UL (ref 0–0.3)
IMM GRANULOCYTES NFR BLD: 0 %
IRON SATN MFR SERPL: 10 % (ref 20–55)
IRON SERPL-MCNC: 38 UG/DL (ref 37–145)
LYMPHOCYTES NFR BLD: 2.04 K/UL (ref 1.1–3.7)
LYMPHOCYTES RELATIVE PERCENT: 33 % (ref 24–43)
MCH RBC QN AUTO: 28 PG (ref 25.2–33.5)
MCHC RBC AUTO-ENTMCNC: 32.4 G/DL (ref 28.4–34.8)
MCV RBC AUTO: 86.4 FL (ref 82.6–102.9)
MONOCYTES NFR BLD: 0.47 K/UL (ref 0.1–1.2)
MONOCYTES NFR BLD: 8 % (ref 3–12)
NEUTROPHILS NFR BLD: 56 % (ref 36–65)
NEUTS SEG NFR BLD: 3.57 K/UL (ref 1.5–8.1)
NRBC BLD-RTO: 0 PER 100 WBC
PLATELET # BLD AUTO: 329 K/UL (ref 138–453)
PMV BLD AUTO: 9.2 FL (ref 8.1–13.5)
POTASSIUM SERPL-SCNC: 3.6 MMOL/L (ref 3.7–5.3)
PROT SERPL-MCNC: 6.9 G/DL (ref 6.4–8.3)
RBC # BLD AUTO: 4.72 M/UL (ref 3.95–5.11)
SODIUM SERPL-SCNC: 140 MMOL/L (ref 135–144)
TIBC SERPL-MCNC: 372 UG/DL (ref 250–450)
UNSATURATED IRON BINDING CAPACITY: 334 UG/DL (ref 112–347)
WBC OTHER # BLD: 6.3 K/UL (ref 3.5–11.3)

## 2024-07-03 PROCEDURE — 82728 ASSAY OF FERRITIN: CPT

## 2024-07-03 PROCEDURE — 83540 ASSAY OF IRON: CPT

## 2024-07-03 PROCEDURE — 83550 IRON BINDING TEST: CPT

## 2024-07-03 PROCEDURE — 84260 ASSAY OF SEROTONIN: CPT

## 2024-07-03 PROCEDURE — 86316 IMMUNOASSAY TUMOR OTHER: CPT

## 2024-07-03 PROCEDURE — 85025 COMPLETE CBC W/AUTO DIFF WBC: CPT

## 2024-07-03 PROCEDURE — 80053 COMPREHEN METABOLIC PANEL: CPT

## 2024-07-03 PROCEDURE — 36415 COLL VENOUS BLD VENIPUNCTURE: CPT

## 2024-07-05 LAB — CHROMOGRANIN A: 58 NG/ML (ref 0–187)

## 2024-07-06 LAB — SEROTONIN SER-MCNC: 328 NG/ML (ref 50–220)

## 2024-07-11 ENCOUNTER — TELEPHONE (OUTPATIENT)
Dept: ONCOLOGY | Age: 64
End: 2024-07-11

## 2024-07-11 ENCOUNTER — OFFICE VISIT (OUTPATIENT)
Dept: ONCOLOGY | Age: 64
End: 2024-07-11
Payer: MEDICARE

## 2024-07-11 VITALS
SYSTOLIC BLOOD PRESSURE: 111 MMHG | WEIGHT: 220.8 LBS | DIASTOLIC BLOOD PRESSURE: 75 MMHG | TEMPERATURE: 97 F | RESPIRATION RATE: 16 BRPM | HEART RATE: 60 BPM | BODY MASS INDEX: 36.74 KG/M2

## 2024-07-11 DIAGNOSIS — C7A.092 MALIGNANT CARCINOID TUMOR OF STOMACH (HCC): Primary | ICD-10-CM

## 2024-07-11 PROCEDURE — G8417 CALC BMI ABV UP PARAM F/U: HCPCS | Performed by: INTERNAL MEDICINE

## 2024-07-11 PROCEDURE — 3074F SYST BP LT 130 MM HG: CPT | Performed by: INTERNAL MEDICINE

## 2024-07-11 PROCEDURE — 1036F TOBACCO NON-USER: CPT | Performed by: INTERNAL MEDICINE

## 2024-07-11 PROCEDURE — 99214 OFFICE O/P EST MOD 30 MIN: CPT | Performed by: INTERNAL MEDICINE

## 2024-07-11 PROCEDURE — G8427 DOCREV CUR MEDS BY ELIG CLIN: HCPCS | Performed by: INTERNAL MEDICINE

## 2024-07-11 PROCEDURE — 3078F DIAST BP <80 MM HG: CPT | Performed by: INTERNAL MEDICINE

## 2024-07-11 PROCEDURE — 3017F COLORECTAL CA SCREEN DOC REV: CPT | Performed by: INTERNAL MEDICINE

## 2024-07-11 PROCEDURE — 99211 OFF/OP EST MAY X REQ PHY/QHP: CPT | Performed by: INTERNAL MEDICINE

## 2024-07-11 NOTE — TELEPHONE ENCOUNTER
BUFFY HERE FOR FOLLOW UP   RV 6 months with labs before RV  LABS ORDERED: CMP CBC FERRITIN IRON & TIBC CHROMOGRANIN A SEROTONIN SERUM   MD VISIT: WRITER WILL CALL ONCE CALENDAR IS MADE  LABS WILL BE MAILED TO PT   AVS PRINTED AND GIVEN ON EXIT

## 2024-07-12 DIAGNOSIS — C7A.092 MALIGNANT CARCINOID TUMOR OF STOMACH (HCC): Primary | ICD-10-CM

## 2024-07-12 NOTE — PROGRESS NOTES
intolerance.  Neurologic: No headaches or dizziness. No weakness or numbness of the extremities. No changes in balance, coordination,  memory, mentation, behavior.   Allergic/Immunologic: No nasal congestion or hives. No repeated infections.       PHYSICAL EXAM:  The patient is not in acute distress.  Vital signs: Blood pressure 111/75, pulse 60, temperature 97 °F (36.1 °C), temperature source Temporal, resp. rate 16, weight 100.2 kg (220 lb 12.8 oz), last menstrual period 01/01/1994, not currently breastfeeding.     General appearance - well appearing, not in pain or distress  Mental status - good mood, alert and oriented  Eyes - pupils equal and reactive, extraocular eye movements intact  Ears - bilateral TM's and external ear canals normal  Nose - normal and patent, no erythema, discharge or polyps  Mouth - mucous membranes moist, pharynx normal without lesions  Neck - supple, no significant adenopathy  Lymphatics - no palpable lymphadenopathy, no hepatosplenomegaly  Chest -bilateral rhonchi.  Heart - normal rate, regular rhythm, normal S1, S2, no murmurs, rubs, clicks or gallops  Abdomen - soft, nontender, nondistended, no masses or organomegaly  Neurological - alert, oriented, normal speech, no focal findings or movement disorder noted  Musculoskeletal - no joint tenderness, deformity or swelling  Extremities - peripheral pulses normal, no pedal edema, no clubbing or cyanosis  Skin - normal coloration and turgor, no rashes, no suspicious skin lesions noted     Review of Diagnostic data:   Lab Results   Component Value Date    WBC 6.3 07/03/2024    HGB 13.2 07/03/2024    HCT 40.8 07/03/2024    MCV 86.4 07/03/2024     07/03/2024       Chemistry        Component Value Date/Time     07/03/2024 0922    K 3.6 (L) 07/03/2024 0922     07/03/2024 0922    CO2 26 07/03/2024 0922    BUN 11 07/03/2024 0922    CREATININE 0.8 07/03/2024 0922        Component Value Date/Time    CALCIUM 8.5 (L) 07/03/2024

## 2024-07-30 ENCOUNTER — OFFICE VISIT (OUTPATIENT)
Dept: FAMILY MEDICINE CLINIC | Age: 64
End: 2024-07-30
Payer: MEDICARE

## 2024-07-30 VITALS
HEIGHT: 65 IN | WEIGHT: 222.2 LBS | TEMPERATURE: 98.6 F | DIASTOLIC BLOOD PRESSURE: 78 MMHG | RESPIRATION RATE: 16 BRPM | HEART RATE: 77 BPM | BODY MASS INDEX: 37.02 KG/M2 | SYSTOLIC BLOOD PRESSURE: 124 MMHG | OXYGEN SATURATION: 96 %

## 2024-07-30 DIAGNOSIS — Z01.419 NORMAL PELVIC EXAM: ICD-10-CM

## 2024-07-30 DIAGNOSIS — J34.9 SINUS PROBLEM: ICD-10-CM

## 2024-07-30 DIAGNOSIS — E03.9 HYPOTHYROIDISM, UNSPECIFIED TYPE: ICD-10-CM

## 2024-07-30 DIAGNOSIS — R06.02 SOB (SHORTNESS OF BREATH): Primary | ICD-10-CM

## 2024-07-30 PROCEDURE — G8417 CALC BMI ABV UP PARAM F/U: HCPCS | Performed by: INTERNAL MEDICINE

## 2024-07-30 PROCEDURE — G8427 DOCREV CUR MEDS BY ELIG CLIN: HCPCS | Performed by: INTERNAL MEDICINE

## 2024-07-30 PROCEDURE — 3078F DIAST BP <80 MM HG: CPT | Performed by: INTERNAL MEDICINE

## 2024-07-30 PROCEDURE — 3017F COLORECTAL CA SCREEN DOC REV: CPT | Performed by: INTERNAL MEDICINE

## 2024-07-30 PROCEDURE — 99214 OFFICE O/P EST MOD 30 MIN: CPT | Performed by: INTERNAL MEDICINE

## 2024-07-30 PROCEDURE — 3074F SYST BP LT 130 MM HG: CPT | Performed by: INTERNAL MEDICINE

## 2024-07-30 PROCEDURE — 1036F TOBACCO NON-USER: CPT | Performed by: INTERNAL MEDICINE

## 2024-07-30 RX ORDER — CEFUROXIME AXETIL 500 MG/1
500 TABLET ORAL 2 TIMES DAILY
Qty: 14 TABLET | Refills: 0 | Status: SHIPPED | OUTPATIENT
Start: 2024-07-30 | End: 2024-08-06

## 2024-07-30 RX ORDER — ALBUTEROL SULFATE 90 UG/1
2 AEROSOL, METERED RESPIRATORY (INHALATION) EVERY 6 HOURS PRN
Qty: 18 G | Refills: 0 | Status: SHIPPED | OUTPATIENT
Start: 2024-07-30

## 2024-07-30 SDOH — ECONOMIC STABILITY: INCOME INSECURITY: HOW HARD IS IT FOR YOU TO PAY FOR THE VERY BASICS LIKE FOOD, HOUSING, MEDICAL CARE, AND HEATING?: NOT HARD AT ALL

## 2024-07-30 SDOH — ECONOMIC STABILITY: FOOD INSECURITY: WITHIN THE PAST 12 MONTHS, THE FOOD YOU BOUGHT JUST DIDN'T LAST AND YOU DIDN'T HAVE MONEY TO GET MORE.: NEVER TRUE

## 2024-07-30 SDOH — ECONOMIC STABILITY: FOOD INSECURITY: WITHIN THE PAST 12 MONTHS, YOU WORRIED THAT YOUR FOOD WOULD RUN OUT BEFORE YOU GOT MONEY TO BUY MORE.: NEVER TRUE

## 2024-07-30 ASSESSMENT — PATIENT HEALTH QUESTIONNAIRE - PHQ9
10. IF YOU CHECKED OFF ANY PROBLEMS, HOW DIFFICULT HAVE THESE PROBLEMS MADE IT FOR YOU TO DO YOUR WORK, TAKE CARE OF THINGS AT HOME, OR GET ALONG WITH OTHER PEOPLE: NOT DIFFICULT AT ALL
SUM OF ALL RESPONSES TO PHQ QUESTIONS 1-9: 4
8. MOVING OR SPEAKING SO SLOWLY THAT OTHER PEOPLE COULD HAVE NOTICED. OR THE OPPOSITE, BEING SO FIGETY OR RESTLESS THAT YOU HAVE BEEN MOVING AROUND A LOT MORE THAN USUAL: NOT AT ALL
6. FEELING BAD ABOUT YOURSELF - OR THAT YOU ARE A FAILURE OR HAVE LET YOURSELF OR YOUR FAMILY DOWN: NOT AT ALL
9. THOUGHTS THAT YOU WOULD BE BETTER OFF DEAD, OR OF HURTING YOURSELF: NOT AT ALL
3. TROUBLE FALLING OR STAYING ASLEEP: NEARLY EVERY DAY
2. FEELING DOWN, DEPRESSED OR HOPELESS: SEVERAL DAYS
1. LITTLE INTEREST OR PLEASURE IN DOING THINGS: NOT AT ALL
4. FEELING TIRED OR HAVING LITTLE ENERGY: NOT AT ALL
5. POOR APPETITE OR OVEREATING: NOT AT ALL
SUM OF ALL RESPONSES TO PHQ QUESTIONS 1-9: 4
SUM OF ALL RESPONSES TO PHQ QUESTIONS 1-9: 4
SUM OF ALL RESPONSES TO PHQ9 QUESTIONS 1 & 2: 1
SUM OF ALL RESPONSES TO PHQ QUESTIONS 1-9: 4
7. TROUBLE CONCENTRATING ON THINGS, SUCH AS READING THE NEWSPAPER OR WATCHING TELEVISION: NOT AT ALL

## 2024-07-30 NOTE — PROGRESS NOTES
02/09/2024     No components found for: \"LDLCALC\"  Lab Results   Component Value Date    TRIG 64 02/09/2024     No results found for: \"CHOLHDL\"  Lab Results   Component Value Date    WBC 6.3 07/03/2024    HGB 13.2 07/03/2024    HCT 40.8 07/03/2024    MCV 86.4 07/03/2024     07/03/2024     Lab Results   Component Value Date    INR 1.0 04/16/2022    PROTIME 10.6 04/16/2022     Lab Results   Component Value Date    GLUCOSE 90 07/03/2024    CREATININE 0.8 07/03/2024    BUN 11 07/03/2024     07/03/2024    K 3.6 (L) 07/03/2024     07/03/2024    CO2 26 07/03/2024     Lab Results   Component Value Date    ALT 7 07/03/2024    AST 14 07/03/2024    ALKPHOS 186 (H) 07/03/2024    BILITOT 0.2 (L) 07/03/2024     No results found for: \"LABPROT\", \"LABALBU\"  Lab Results   Component Value Date    TSH 0.88 02/09/2024     Assessment:  1. SOB (shortness of breath)    2. Normal pelvic exam    3. Sinus problem    4. Hypothyroidism, unspecified type        Plan:  Start on albuterol inhaler 2 puffs 3 times a day  Chest x-ray ordered  Zithromax 500 mg daily for 5 days  Patient had recent lab work done CBC and CMP were within normal limits  Oxygen levels were normal at 96% on room air  Patient had a stress test done 4 months back by Dr. Che cardiology and according to patient was within normal limits and will get a copy of the report  Patient advised to call me back next week  Patient wants to see OB/GYN and had no recent Pap smear and referral done  Review as scheduled           1.  Elinor received counseling on the following healthy behaviors: nutrition and exercise    2. Prior labs and health maintenance reviewed.     3.  Discussed use, benefit, and side effects of prescribed medications.  Barriers to medication compliance addressed.  All her questions were answered.  Pt voiced understanding.   Elinor will continue current medications, diet and exercise.              Orders Placed This Encounter   Medications

## 2024-07-31 ENCOUNTER — HOSPITAL ENCOUNTER (OUTPATIENT)
Age: 64
Discharge: HOME | End: 2024-08-02
Payer: MEDICARE

## 2024-07-31 ENCOUNTER — HOSPITAL ENCOUNTER (OUTPATIENT)
Dept: GENERAL RADIOLOGY | Age: 64
Discharge: HOME | End: 2024-08-02
Payer: MEDICARE

## 2024-07-31 DIAGNOSIS — R06.02 SOB (SHORTNESS OF BREATH): ICD-10-CM

## 2024-07-31 DIAGNOSIS — E03.9 HYPOTHYROIDISM, UNSPECIFIED TYPE: ICD-10-CM

## 2024-07-31 PROCEDURE — 71046 X-RAY EXAM CHEST 2 VIEWS: CPT

## 2024-07-31 RX ORDER — METOCLOPRAMIDE 10 MG/1
10 TABLET ORAL
Qty: 270 TABLET | Refills: 1 | Status: SHIPPED | OUTPATIENT
Start: 2024-07-31

## 2024-07-31 RX ORDER — HYDROCHLOROTHIAZIDE 25 MG/1
TABLET ORAL
Qty: 90 TABLET | Refills: 1 | Status: SHIPPED | OUTPATIENT
Start: 2024-07-31

## 2024-07-31 RX ORDER — LEVOTHYROXINE SODIUM 0.1 MG/1
100 TABLET ORAL DAILY
Qty: 90 TABLET | Refills: 1 | Status: SHIPPED | OUTPATIENT
Start: 2024-07-31

## 2024-07-31 RX ORDER — FLUTICASONE PROPIONATE 50 MCG
2 SPRAY, SUSPENSION (ML) NASAL DAILY
OUTPATIENT
Start: 2024-07-31

## 2024-07-31 NOTE — TELEPHONE ENCOUNTER
Elinor Almanza is calling to request a refill on the following medication(s):    Medication Request:  Requested Prescriptions     Pending Prescriptions Disp Refills    levothyroxine (SYNTHROID) 100 MCG tablet [Pharmacy Med Name: LEVOTHYROXINE 100 MCG TABLET] 90 tablet 1     Sig: TAKE 1 TABLET BY MOUTH EVERY DAY    metoclopramide (REGLAN) 10 MG tablet [Pharmacy Med Name: METOCLOPRAMIDE 10 MG TABLET] 270 tablet 1     Sig: TAKE 1 TABLET BY MOUTH 3 TIMES DAILY (BEFORE MEALS).    metoprolol tartrate (LOPRESSOR) 25 MG tablet [Pharmacy Med Name: METOPROLOL TARTRATE 25 MG TAB] 60 tablet 0     Sig: TAKE 1 TABLET BY MOUTH TWICE A DAY    hydroCHLOROthiazide (HYDRODIURIL) 25 MG tablet [Pharmacy Med Name: HYDROCHLOROTHIAZIDE 25 MG TAB] 90 tablet 1     Sig: TAKE 1 TABLET BY MOUTH EVERY DAY       Last Visit Date (If Applicable):  7/30/2024    Next Visit Date:    10/15/2024

## 2024-08-26 RX ORDER — METOPROLOL TARTRATE 25 MG/1
25 TABLET, FILM COATED ORAL 2 TIMES DAILY
Qty: 180 TABLET | Refills: 1 | Status: SHIPPED | OUTPATIENT
Start: 2024-08-26

## 2024-08-26 NOTE — TELEPHONE ENCOUNTER
Elinor Almanza is calling to request a refill on the following medication(s):    Medication Request:  Requested Prescriptions     Pending Prescriptions Disp Refills    metoprolol tartrate (LOPRESSOR) 25 MG tablet [Pharmacy Med Name: METOPROLOL TARTRATE 25 MG TAB] 180 tablet 1     Sig: TAKE 1 TABLET BY MOUTH TWICE A DAY       Last Visit Date (If Applicable):  7/30/2024    Next Visit Date:    10/15/2024

## 2024-09-10 ENCOUNTER — OFFICE VISIT (OUTPATIENT)
Dept: OBGYN CLINIC | Age: 64
End: 2024-09-10

## 2024-09-10 ENCOUNTER — HOSPITAL ENCOUNTER (OUTPATIENT)
Age: 64
Setting detail: SPECIMEN
Discharge: HOME | End: 2024-09-10

## 2024-09-10 VITALS
WEIGHT: 221 LBS | BODY MASS INDEX: 36.82 KG/M2 | DIASTOLIC BLOOD PRESSURE: 82 MMHG | HEIGHT: 65 IN | SYSTOLIC BLOOD PRESSURE: 122 MMHG

## 2024-09-10 DIAGNOSIS — Z12.31 VISIT FOR SCREENING MAMMOGRAM: ICD-10-CM

## 2024-09-10 DIAGNOSIS — Z01.419 ENCOUNTER FOR GYNECOLOGICAL EXAMINATION WITHOUT ABNORMAL FINDING: Primary | ICD-10-CM

## 2024-09-10 DIAGNOSIS — N95.1 MENOPAUSAL STATE: ICD-10-CM

## 2024-09-10 DIAGNOSIS — Z13.820 SCREENING FOR OSTEOPOROSIS: ICD-10-CM

## 2024-09-10 DIAGNOSIS — M85.80 OSTEOPENIA, UNSPECIFIED LOCATION: ICD-10-CM

## 2024-09-10 ASSESSMENT — PATIENT HEALTH QUESTIONNAIRE - PHQ9
SUM OF ALL RESPONSES TO PHQ QUESTIONS 1-9: 0
SUM OF ALL RESPONSES TO PHQ9 QUESTIONS 1 & 2: 0
SUM OF ALL RESPONSES TO PHQ QUESTIONS 1-9: 0
2. FEELING DOWN, DEPRESSED OR HOPELESS: NOT AT ALL
SUM OF ALL RESPONSES TO PHQ QUESTIONS 1-9: 0
SUM OF ALL RESPONSES TO PHQ QUESTIONS 1-9: 0
1. LITTLE INTEREST OR PLEASURE IN DOING THINGS: NOT AT ALL

## 2024-09-19 LAB — CYTOLOGY REPORT: NORMAL

## 2024-10-02 ENCOUNTER — HOSPITAL ENCOUNTER (OUTPATIENT)
Dept: MAMMOGRAPHY | Age: 64
Discharge: HOME | End: 2024-10-04
Attending: OBSTETRICS & GYNECOLOGY
Payer: COMMERCIAL

## 2024-10-02 DIAGNOSIS — Z12.31 VISIT FOR SCREENING MAMMOGRAM: ICD-10-CM

## 2024-10-02 PROCEDURE — 77063 BREAST TOMOSYNTHESIS BI: CPT

## 2024-10-15 ENCOUNTER — OFFICE VISIT (OUTPATIENT)
Dept: FAMILY MEDICINE CLINIC | Age: 64
End: 2024-10-15
Payer: COMMERCIAL

## 2024-10-15 VITALS
TEMPERATURE: 97.8 F | WEIGHT: 227 LBS | OXYGEN SATURATION: 99 % | DIASTOLIC BLOOD PRESSURE: 72 MMHG | HEART RATE: 67 BPM | SYSTOLIC BLOOD PRESSURE: 112 MMHG | BODY MASS INDEX: 37.82 KG/M2 | HEIGHT: 65 IN

## 2024-10-15 DIAGNOSIS — Z23 NEED FOR PROPHYLACTIC VACCINATION AGAINST DIPHTHERIA-TETANUS-PERTUSSIS (DTP): ICD-10-CM

## 2024-10-15 DIAGNOSIS — Z00.00 WELCOME TO MEDICARE PREVENTIVE VISIT: ICD-10-CM

## 2024-10-15 DIAGNOSIS — Z23 NEED FOR INFLUENZA VACCINATION: Primary | ICD-10-CM

## 2024-10-15 DIAGNOSIS — G47.00 INSOMNIA, UNSPECIFIED TYPE: ICD-10-CM

## 2024-10-15 DIAGNOSIS — Z23 NEED FOR PROPHYLACTIC VACCINATION AND INOCULATION AGAINST VARICELLA: ICD-10-CM

## 2024-10-15 PROCEDURE — G0402 INITIAL PREVENTIVE EXAM: HCPCS | Performed by: INTERNAL MEDICINE

## 2024-10-15 PROCEDURE — G0008 ADMIN INFLUENZA VIRUS VAC: HCPCS | Performed by: INTERNAL MEDICINE

## 2024-10-15 PROCEDURE — 3074F SYST BP LT 130 MM HG: CPT | Performed by: INTERNAL MEDICINE

## 2024-10-15 PROCEDURE — 3078F DIAST BP <80 MM HG: CPT | Performed by: INTERNAL MEDICINE

## 2024-10-15 PROCEDURE — 90661 CCIIV3 VAC ABX FR 0.5 ML IM: CPT | Performed by: INTERNAL MEDICINE

## 2024-10-15 RX ORDER — QUETIAPINE FUMARATE 25 MG/1
25 TABLET, FILM COATED ORAL 2 TIMES DAILY
Qty: 60 TABLET | Refills: 3 | Status: SHIPPED | OUTPATIENT
Start: 2024-10-15 | End: 2024-10-15 | Stop reason: CLARIF

## 2024-10-15 RX ORDER — RESPIRATORY SYNCYTIAL VISUS VACCINE RECOMBINANT, ADJUVANTED 120MCG/0.5
0.5 KIT INTRAMUSCULAR ONCE
Qty: 0.5 ML | Refills: 0 | Status: SHIPPED | OUTPATIENT
Start: 2024-10-15 | End: 2024-10-15

## 2024-10-15 RX ORDER — ZOLPIDEM TARTRATE 5 MG/1
5 TABLET ORAL NIGHTLY PRN
Qty: 14 TABLET | Refills: 0 | Status: SHIPPED | OUTPATIENT
Start: 2024-10-15 | End: 2024-10-29

## 2024-10-15 ASSESSMENT — PATIENT HEALTH QUESTIONNAIRE - PHQ9
3. TROUBLE FALLING OR STAYING ASLEEP: NEARLY EVERY DAY
4. FEELING TIRED OR HAVING LITTLE ENERGY: SEVERAL DAYS
10. IF YOU CHECKED OFF ANY PROBLEMS, HOW DIFFICULT HAVE THESE PROBLEMS MADE IT FOR YOU TO DO YOUR WORK, TAKE CARE OF THINGS AT HOME, OR GET ALONG WITH OTHER PEOPLE: NOT DIFFICULT AT ALL
5. POOR APPETITE OR OVEREATING: NOT AT ALL
SUM OF ALL RESPONSES TO PHQ9 QUESTIONS 1 & 2: 0
2. FEELING DOWN, DEPRESSED OR HOPELESS: NOT AT ALL
6. FEELING BAD ABOUT YOURSELF - OR THAT YOU ARE A FAILURE OR HAVE LET YOURSELF OR YOUR FAMILY DOWN: NOT AT ALL
SUM OF ALL RESPONSES TO PHQ QUESTIONS 1-9: 4
9. THOUGHTS THAT YOU WOULD BE BETTER OFF DEAD, OR OF HURTING YOURSELF: NOT AT ALL
SUM OF ALL RESPONSES TO PHQ QUESTIONS 1-9: 4
8. MOVING OR SPEAKING SO SLOWLY THAT OTHER PEOPLE COULD HAVE NOTICED. OR THE OPPOSITE, BEING SO FIGETY OR RESTLESS THAT YOU HAVE BEEN MOVING AROUND A LOT MORE THAN USUAL: NOT AT ALL
SUM OF ALL RESPONSES TO PHQ QUESTIONS 1-9: 4
7. TROUBLE CONCENTRATING ON THINGS, SUCH AS READING THE NEWSPAPER OR WATCHING TELEVISION: NOT AT ALL
SUM OF ALL RESPONSES TO PHQ QUESTIONS 1-9: 4
1. LITTLE INTEREST OR PLEASURE IN DOING THINGS: NOT AT ALL

## 2024-10-15 ASSESSMENT — LIFESTYLE VARIABLES
HOW OFTEN DO YOU HAVE A DRINK CONTAINING ALCOHOL: MONTHLY OR LESS
HOW MANY STANDARD DRINKS CONTAINING ALCOHOL DO YOU HAVE ON A TYPICAL DAY: 1 OR 2

## 2024-10-15 NOTE — PROGRESS NOTES
Wheezing Yes Brock Starks MD   fluticasone (FLONASE) 50 MCG/ACT nasal spray 2 sprays by Each Nostril route daily Yes Lara Smart APRN - CNP   Cholecalciferol (VITAMIN D3) 50 MCG (2000 UT) CAPS Take 1 capsule by mouth daily Yes Brock Starks MD   docusate sodium (COLACE) 100 MG capsule Take 1 capsule by mouth 2 times daily Yes Brock Starks MD   triamcinolone (KENALOG) 0.1 % ointment APPLY TO AFFECTED AREA TWICE A DAY Yes Brock Starks MD   traZODone (DESYREL) 50 MG tablet TAKE 1 TABLET BY MOUTH AT BEDTIME AS NEEDED FOR SLEEP Yes Brokc Starks MD   vitamin B-12 (CYANOCOBALAMIN) 500 MCG tablet Take 1 tablet by mouth daily Yes ProviderMohinder MD   Multiple Vitamin (MULTI-DAY VITAMINS PO) Take 1 tablet by mouth daily  Yes ProviderMohinder MD       CareTeam (Including outside providers/suppliers regularly involved in providing care):   Patient Care Team:  Brock Starks MD as PCP - General  Brock Starks MD as PCP - Empaneled Provider  Rachel Gutierrez MD as Consulting Physician (Gastroenterology)  Tommy Caballero MD as Consulting Physician (Pulmonary Disease)      Reviewed and updated this visit:  Tobacco  Allergies  Meds  Med Hx  Surg Hx  Soc Hx  Fam Hx

## 2024-11-26 RX ORDER — TRIAMCINOLONE ACETONIDE 1 MG/G
OINTMENT TOPICAL
Qty: 30 G | Refills: 3 | Status: SHIPPED | OUTPATIENT
Start: 2024-11-26

## 2024-11-26 NOTE — TELEPHONE ENCOUNTER
Elinor Almanza is calling to request a refill on the following medication(s):    Medication Request:  Requested Prescriptions     Pending Prescriptions Disp Refills    triamcinolone (KENALOG) 0.1 % ointment [Pharmacy Med Name: TRIAMCINOLONE 0.1% OINTMENT] 30 g 3     Sig: APPLY TO AFFECTED AREA TWICE A DAY       Last Visit Date (If Applicable):  10/15/2024    Next Visit Date:    Visit date not found

## 2024-12-14 RX ORDER — FLUTICASONE PROPIONATE 50 MCG
2 SPRAY, SUSPENSION (ML) NASAL DAILY
OUTPATIENT
Start: 2024-12-14

## 2024-12-16 RX ORDER — METOPROLOL TARTRATE 25 MG/1
25 TABLET, FILM COATED ORAL 2 TIMES DAILY
Qty: 180 TABLET | Refills: 1 | Status: SHIPPED | OUTPATIENT
Start: 2024-12-16

## 2024-12-16 RX ORDER — METOCLOPRAMIDE 10 MG/1
10 TABLET ORAL
Qty: 270 TABLET | Refills: 1 | Status: SHIPPED | OUTPATIENT
Start: 2024-12-16

## 2024-12-16 NOTE — TELEPHONE ENCOUNTER
Elinor Almanza is calling to request a refill on the following medication(s):    Medication Request:  Requested Prescriptions     Pending Prescriptions Disp Refills    metoprolol tartrate (LOPRESSOR) 25 MG tablet [Pharmacy Med Name: METOPROLOL TARTRATE 25 MG TAB] 180 tablet 1     Sig: TAKE 1 TABLET BY MOUTH TWICE A DAY    metoclopramide (REGLAN) 10 MG tablet [Pharmacy Med Name: METOCLOPRAMIDE 10 MG TABLET] 270 tablet 1     Sig: TAKE 1 TABLET BY MOUTH 3 TIMES DAILY (BEFORE MEALS).       Last Visit Date (If Applicable):  10/15/2024    Next Visit Date:    Visit date not found

## 2024-12-31 ENCOUNTER — HOSPITAL ENCOUNTER (OUTPATIENT)
Age: 64
Setting detail: SPECIMEN
Discharge: HOME | End: 2024-12-31
Payer: COMMERCIAL

## 2024-12-31 DIAGNOSIS — C7A.092 MALIGNANT CARCINOID TUMOR OF STOMACH (HCC): ICD-10-CM

## 2024-12-31 LAB
ALBUMIN SERPL-MCNC: 3.8 G/DL (ref 3.5–5.2)
ALBUMIN/GLOB SERPL: 1.1 {RATIO} (ref 1–2.5)
ALP SERPL-CCNC: 183 U/L (ref 35–104)
ALT SERPL-CCNC: 6 U/L (ref 10–35)
ANION GAP SERPL CALCULATED.3IONS-SCNC: 14 MMOL/L (ref 9–16)
AST SERPL-CCNC: 22 U/L (ref 10–35)
BASOPHILS # BLD: 0.03 K/UL (ref 0–0.2)
BASOPHILS NFR BLD: 1 % (ref 0–2)
BILIRUB SERPL-MCNC: 0.2 MG/DL (ref 0–1.2)
BUN SERPL-MCNC: 10 MG/DL (ref 8–23)
CALCIUM SERPL-MCNC: 9 MG/DL (ref 8.6–10.4)
CHLORIDE SERPL-SCNC: 104 MMOL/L (ref 98–107)
CO2 SERPL-SCNC: 22 MMOL/L (ref 20–31)
CREAT SERPL-MCNC: 0.9 MG/DL (ref 0.6–0.9)
EOSINOPHIL # BLD: 0.06 K/UL (ref 0–0.44)
EOSINOPHILS RELATIVE PERCENT: 1 % (ref 1–4)
ERYTHROCYTE [DISTWIDTH] IN BLOOD BY AUTOMATED COUNT: 14.3 % (ref 11.8–14.4)
FERRITIN SERPL-MCNC: 15 NG/ML
GFR, ESTIMATED: 72 ML/MIN/1.73M2
GLUCOSE SERPL-MCNC: 112 MG/DL (ref 74–99)
HCT VFR BLD AUTO: 43.9 % (ref 36.3–47.1)
HGB BLD-MCNC: 13.5 G/DL (ref 11.9–15.1)
IMM GRANULOCYTES # BLD AUTO: <0.03 K/UL (ref 0–0.3)
IMM GRANULOCYTES NFR BLD: 0 %
IRON SATN MFR SERPL: 13 % (ref 20–55)
IRON SERPL-MCNC: 55 UG/DL (ref 37–145)
LYMPHOCYTES NFR BLD: 1.97 K/UL (ref 1.1–3.7)
LYMPHOCYTES RELATIVE PERCENT: 32 % (ref 24–43)
MCH RBC QN AUTO: 27.2 PG (ref 25.2–33.5)
MCHC RBC AUTO-ENTMCNC: 30.8 G/DL (ref 28.4–34.8)
MCV RBC AUTO: 88.3 FL (ref 82.6–102.9)
MONOCYTES NFR BLD: 0.48 K/UL (ref 0.1–1.2)
MONOCYTES NFR BLD: 8 % (ref 3–12)
NEUTROPHILS NFR BLD: 58 % (ref 36–65)
NEUTS SEG NFR BLD: 3.62 K/UL (ref 1.5–8.1)
NRBC BLD-RTO: 0 PER 100 WBC
PLATELET # BLD AUTO: 278 K/UL (ref 138–453)
PMV BLD AUTO: 9.9 FL (ref 8.1–13.5)
POTASSIUM SERPL-SCNC: 3.2 MMOL/L (ref 3.7–5.3)
PROT SERPL-MCNC: 7.2 G/DL (ref 6.6–8.7)
RBC # BLD AUTO: 4.97 M/UL (ref 3.95–5.11)
SODIUM SERPL-SCNC: 140 MMOL/L (ref 136–145)
TIBC SERPL-MCNC: 419 UG/DL (ref 250–450)
UNSATURATED IRON BINDING CAPACITY: 364 UG/DL (ref 112–347)
WBC OTHER # BLD: 6.2 K/UL (ref 3.5–11.3)

## 2024-12-31 PROCEDURE — 86316 IMMUNOASSAY TUMOR OTHER: CPT

## 2024-12-31 PROCEDURE — 85025 COMPLETE CBC W/AUTO DIFF WBC: CPT

## 2024-12-31 PROCEDURE — 83550 IRON BINDING TEST: CPT

## 2024-12-31 PROCEDURE — 84260 ASSAY OF SEROTONIN: CPT

## 2024-12-31 PROCEDURE — 83540 ASSAY OF IRON: CPT

## 2024-12-31 PROCEDURE — 82728 ASSAY OF FERRITIN: CPT

## 2024-12-31 PROCEDURE — 36415 COLL VENOUS BLD VENIPUNCTURE: CPT

## 2024-12-31 PROCEDURE — 80053 COMPREHEN METABOLIC PANEL: CPT

## 2025-01-02 LAB — CHROMOGRANIN A: 53 NG/ML (ref 0–187)

## 2025-01-03 LAB — SEROTONIN SER-MCNC: 246 NG/ML (ref 50–220)

## 2025-01-07 ENCOUNTER — OFFICE VISIT (OUTPATIENT)
Dept: ONCOLOGY | Age: 65
End: 2025-01-07
Payer: COMMERCIAL

## 2025-01-07 ENCOUNTER — TELEPHONE (OUTPATIENT)
Dept: ONCOLOGY | Age: 65
End: 2025-01-07

## 2025-01-07 VITALS
WEIGHT: 224 LBS | TEMPERATURE: 97.6 F | SYSTOLIC BLOOD PRESSURE: 113 MMHG | RESPIRATION RATE: 16 BRPM | HEART RATE: 77 BPM | DIASTOLIC BLOOD PRESSURE: 76 MMHG | BODY MASS INDEX: 37.28 KG/M2

## 2025-01-07 DIAGNOSIS — C7A.092 MALIGNANT CARCINOID TUMOR OF STOMACH (HCC): Primary | ICD-10-CM

## 2025-01-07 PROCEDURE — 3074F SYST BP LT 130 MM HG: CPT | Performed by: INTERNAL MEDICINE

## 2025-01-07 PROCEDURE — 3078F DIAST BP <80 MM HG: CPT | Performed by: INTERNAL MEDICINE

## 2025-01-07 PROCEDURE — 99214 OFFICE O/P EST MOD 30 MIN: CPT | Performed by: INTERNAL MEDICINE

## 2025-01-07 PROCEDURE — 99211 OFF/OP EST MAY X REQ PHY/QHP: CPT | Performed by: INTERNAL MEDICINE

## 2025-01-07 RX ORDER — POTASSIUM CHLORIDE 1500 MG/1
20 TABLET, EXTENDED RELEASE ORAL DAILY
Qty: 14 TABLET | Refills: 0 | Status: SHIPPED | OUTPATIENT
Start: 2025-01-07

## 2025-01-07 NOTE — PROGRESS NOTES
_           Chief Complaint   Patient presents with    Follow-up    Discuss Labs    Other     Having sharp pains underneath navel area / off and on , after eating      DIAGNOSIS:       Malignant carcinoid tumor of the stomach  Recent GI bleeding after endoscopic mucosal resection of stomach carcinoid on October 23, 2019.  Evidence of recurrent disease on repeated EGD January 2020 and continued elevation of tumor marker chromogranin A  Iron deficiency secondary to above  History of hypothyroidism  Multiple comorbidities as listed      CURRENT THERAPY:         Status post endoscopic mucosal resection of stomach carcinoid October 23, 2019  S/p gastric resection at [facility] November 20, 2020.      BRIEF CASE HISTORY:      Ms. Elinor Almanza is a very pleasant 64 y.o. female with history of multiple comorbidities as listed.  The patient seen because of recent diagnosis of carcinoid tumor.  Patient had problem with dysphagia for about 4 to 5 months.  That problem resolved spontaneously.  She was evaluated by gastroenterology.  She had EGD in September 2019.  She was noted to have gastric tumor which was biopsied and was positive for malignant neuroendocrine tumor.  Subsequently patient was evaluated by abdominal MRI.  Patient was having no evidence of gastric disease or gastric wall deep penetration.  She underwent endoscopic mucosal resection October 23, 2019.  Patient had recent admission to [facility] because of GI bleeding.  She had EGD again and cauterization of bleeding.  She is having weakness and fatigue.  No other symptoms.  No abdominal pain or cramps.  No hot flashes or night sweats.  No weight loss or decreased appetite.  No wheezing.  The patient had lab testing in July 2019 with chromogranin A level 1500.  Patient was not aware of that test.  Patient's twin sister had carcinoid tumor more than 20 years ago.  She

## 2025-01-07 NOTE — TELEPHONE ENCOUNTER
BUFFY HERE FOR FOLLOW UP   RV 6 months with labs before RV  LABS ORDERED: CBC, FERRITIN, IRON & TIBC, CHROMOGRANIN A, CMP, SEROTONIN SERUM   MD VISIT: 7/8/25 @ 8:15AM   LABS PRINTED AND GIVEN ON EXIT   AVS PRINTED AND GIVEN ON EXIT

## 2025-01-24 ENCOUNTER — OFFICE VISIT (OUTPATIENT)
Dept: GASTROENTEROLOGY | Age: 65
End: 2025-01-24

## 2025-01-24 VITALS
WEIGHT: 220 LBS | TEMPERATURE: 97.9 F | DIASTOLIC BLOOD PRESSURE: 86 MMHG | BODY MASS INDEX: 36.61 KG/M2 | SYSTOLIC BLOOD PRESSURE: 121 MMHG

## 2025-01-24 DIAGNOSIS — K59.01 SLOW TRANSIT CONSTIPATION: ICD-10-CM

## 2025-01-24 DIAGNOSIS — Z12.11 SCREENING FOR COLORECTAL CANCER: ICD-10-CM

## 2025-01-24 DIAGNOSIS — K31.7 POLYP, STOMACH: ICD-10-CM

## 2025-01-24 DIAGNOSIS — F41.9 ANXIETY: ICD-10-CM

## 2025-01-24 DIAGNOSIS — C7A.092 MALIGNANT CARCINOID TUMOR OF STOMACH (HCC): ICD-10-CM

## 2025-01-24 DIAGNOSIS — R11.0 NAUSEA: ICD-10-CM

## 2025-01-24 DIAGNOSIS — Z90.3 S/P TOTAL GASTRECTOMY AND ROUX-EN-Y ESOPHAGOJEJUNAL ANASTOMOSIS: Primary | ICD-10-CM

## 2025-01-24 DIAGNOSIS — Z98.0 S/P TOTAL GASTRECTOMY AND ROUX-EN-Y ESOPHAGOJEJUNAL ANASTOMOSIS: Primary | ICD-10-CM

## 2025-01-24 DIAGNOSIS — R11.2 INTRACTABLE NAUSEA AND VOMITING: ICD-10-CM

## 2025-01-24 DIAGNOSIS — Z90.3 S/P GASTRECTOMY: ICD-10-CM

## 2025-01-24 DIAGNOSIS — R11.14 BILIOUS VOMITING WITH NAUSEA: ICD-10-CM

## 2025-01-24 DIAGNOSIS — Z12.12 SCREENING FOR COLORECTAL CANCER: ICD-10-CM

## 2025-01-24 DIAGNOSIS — D12.2 ADENOMATOUS POLYP OF ASCENDING COLON: ICD-10-CM

## 2025-01-24 ASSESSMENT — ENCOUNTER SYMPTOMS
TROUBLE SWALLOWING: 1
NAUSEA: 0
ANAL BLEEDING: 0
BLOOD IN STOOL: 0
ABDOMINAL DISTENTION: 0
VOMITING: 0
CHOKING: 0
CONSTIPATION: 0
VOICE CHANGE: 0
DIARRHEA: 0
COUGH: 0
WHEEZING: 0
RECTAL PAIN: 0
ABDOMINAL PAIN: 1
SHORTNESS OF BREATH: 0
SORE THROAT: 0

## 2025-01-24 NOTE — PROGRESS NOTES
GI CLINIC FOLLOW UP    NTERVAL HISTORY:   No referring provider defined for this encounter.    Chief Complaint   Patient presents with    GI Problem     Patient is here today due to trouble swallowing, pt last seen on 1/22/24 for malignant carcinoid tumor of stomach & bilious vomiting with nausea.       1. S/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    2. Nausea    3. Bilious vomiting with nausea    4. S/P gastrectomy    5. Slow transit constipation    6. Screening for colorectal cancer    7. Malignant carcinoid tumor of stomach (HCC)    8. Anxiety    9. Polyp, stomach    10. Adenomatous polyp of ascending colon    11. Intractable nausea and vomiting      This patient seen my office after January 2020 for she has history significant for carcinoid tumor history for gastrectomy at that time she went upper endoscopy stomach was found to be surgically absent small bowel loops are grossly normal-appearing    She has been seen and followed by hematology oncology    She has been complaining of upper stomach pain and discomfort and some food getting stuck at the lower end of the esophagus area    Has moderate obesity    She denies overt bleeding melena    Some irritable bowel syndrome like symptoms    Stress and anxiety issues    Lab workup revealed stable hemoglobin    Iron saturations are low    Has mild elevation of the alkaline phosphatase    This has been stable in the past    Denies alcohol abuse illicit drug use    HISTORY OF PRESENT ILLNESS: Ms.Jacqueline Almanza is a 64 y.o. female with a past history remarkable for , referred for evaluation of   Chief Complaint   Patient presents with    GI Problem     Patient is here today due to trouble swallowing, pt last seen on 1/22/24 for malignant carcinoid tumor of stomach & bilious vomiting with nausea.   .    Past Medical,Family, and Social History reviewed and does contribute to the patient presenting condition.    Patient's PMH/PSH,SH,PSYCH

## 2025-01-30 ENCOUNTER — TELEPHONE (OUTPATIENT)
Dept: GASTROENTEROLOGY | Age: 65
End: 2025-01-30

## 2025-01-30 ENCOUNTER — PREP FOR PROCEDURE (OUTPATIENT)
Dept: GASTROENTEROLOGY | Age: 65
End: 2025-01-30

## 2025-01-30 DIAGNOSIS — K31.7 POLYP OF STOMACH: ICD-10-CM

## 2025-01-30 DIAGNOSIS — K59.01 SLOW TRANSIT CONSTIPATION: ICD-10-CM

## 2025-01-30 DIAGNOSIS — D12.2 ADENOMATOUS POLYP OF ASCENDING COLON: ICD-10-CM

## 2025-01-30 PROBLEM — K21.9 GERD (GASTROESOPHAGEAL REFLUX DISEASE): Status: ACTIVE | Noted: 2025-01-30

## 2025-01-30 NOTE — TELEPHONE ENCOUNTER
Procedure scheduled/Dr ABDIRIZAK Gutierrez  Procedure: EGD  Dx:    1. S/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    2. Nausea    3. Bilious vomiting with nausea    4. S/P gastrectomy    5. Slow transit constipation    6. Screening for colorectal cancer    7. Malignant carcinoid tumor of stomach (HCC)    8. Anxiety    9. Polyp, stomach    10. Adenomatous polyp of ascending colon    11. Intractable nausea and vomiting      Date: 6/17/25  Time: 11:45 a.m.  Hospital: [facility]   Bowel Prep instructions given:  In office/via phone: office   Clearance needed: yes   Cardiology - Dr Che  GLP - 1: N/A

## 2025-02-17 ENCOUNTER — TELEPHONE (OUTPATIENT)
Dept: GASTROENTEROLOGY | Age: 65
End: 2025-02-17

## 2025-02-17 DIAGNOSIS — R11.14 BILIOUS VOMITING WITH NAUSEA: ICD-10-CM

## 2025-02-17 DIAGNOSIS — D12.2 ADENOMATOUS POLYP OF ASCENDING COLON: ICD-10-CM

## 2025-02-17 DIAGNOSIS — Z90.3 S/P GASTRECTOMY: ICD-10-CM

## 2025-02-17 DIAGNOSIS — R11.0 NAUSEA: ICD-10-CM

## 2025-02-17 DIAGNOSIS — Z98.0 S/P TOTAL GASTRECTOMY AND ROUX-EN-Y ESOPHAGOJEJUNAL ANASTOMOSIS: ICD-10-CM

## 2025-02-17 DIAGNOSIS — C7A.092 MALIGNANT CARCINOID TUMOR OF STOMACH (HCC): ICD-10-CM

## 2025-02-17 DIAGNOSIS — R11.2 INTRACTABLE NAUSEA AND VOMITING: ICD-10-CM

## 2025-02-17 DIAGNOSIS — K59.01 SLOW TRANSIT CONSTIPATION: Primary | ICD-10-CM

## 2025-02-17 DIAGNOSIS — Z90.3 S/P TOTAL GASTRECTOMY AND ROUX-EN-Y ESOPHAGOJEJUNAL ANASTOMOSIS: ICD-10-CM

## 2025-02-17 DIAGNOSIS — K31.7 POLYP OF STOMACH: ICD-10-CM

## 2025-02-18 ENCOUNTER — HOSPITAL ENCOUNTER (OUTPATIENT)
Dept: CT IMAGING | Age: 65
Discharge: HOME | End: 2025-02-20
Attending: INTERNAL MEDICINE
Payer: COMMERCIAL

## 2025-02-18 DIAGNOSIS — C7A.092 MALIGNANT CARCINOID TUMOR OF STOMACH (HCC): ICD-10-CM

## 2025-02-18 DIAGNOSIS — D12.2 ADENOMATOUS POLYP OF ASCENDING COLON: ICD-10-CM

## 2025-02-18 DIAGNOSIS — R11.0 NAUSEA: ICD-10-CM

## 2025-02-18 DIAGNOSIS — R11.14 BILIOUS VOMITING WITH NAUSEA: ICD-10-CM

## 2025-02-18 DIAGNOSIS — Z90.3 S/P TOTAL GASTRECTOMY AND ROUX-EN-Y ESOPHAGOJEJUNAL ANASTOMOSIS: ICD-10-CM

## 2025-02-18 DIAGNOSIS — Z98.0 S/P TOTAL GASTRECTOMY AND ROUX-EN-Y ESOPHAGOJEJUNAL ANASTOMOSIS: ICD-10-CM

## 2025-02-18 DIAGNOSIS — K59.01 SLOW TRANSIT CONSTIPATION: ICD-10-CM

## 2025-02-18 DIAGNOSIS — R11.2 INTRACTABLE NAUSEA AND VOMITING: ICD-10-CM

## 2025-02-18 DIAGNOSIS — Z90.3 S/P GASTRECTOMY: ICD-10-CM

## 2025-02-18 DIAGNOSIS — K31.7 POLYP OF STOMACH: ICD-10-CM

## 2025-02-18 LAB
CREAT SERPL-MCNC: 1 MG/DL (ref 0.5–0.9)
GFR, ESTIMATED: 65 ML/MIN/1.73M2

## 2025-02-18 PROCEDURE — 82565 ASSAY OF CREATININE: CPT

## 2025-02-18 PROCEDURE — 36415 COLL VENOUS BLD VENIPUNCTURE: CPT

## 2025-02-18 PROCEDURE — 74177 CT ABD & PELVIS W/CONTRAST: CPT

## 2025-02-18 PROCEDURE — 6360000004 HC RX CONTRAST MEDICATION: Performed by: INTERNAL MEDICINE

## 2025-02-18 PROCEDURE — 2500000003 HC RX 250 WO HCPCS: Performed by: INTERNAL MEDICINE

## 2025-02-18 PROCEDURE — 2580000003 HC RX 258: Performed by: INTERNAL MEDICINE

## 2025-02-18 RX ORDER — IOPAMIDOL 755 MG/ML
75 INJECTION, SOLUTION INTRAVASCULAR
Status: COMPLETED | OUTPATIENT
Start: 2025-02-18 | End: 2025-02-18

## 2025-02-18 RX ORDER — SODIUM CHLORIDE 0.9 % (FLUSH) 0.9 %
10 SYRINGE (ML) INJECTION ONCE
Status: COMPLETED | OUTPATIENT
Start: 2025-02-18 | End: 2025-02-18

## 2025-02-18 RX ORDER — 0.9 % SODIUM CHLORIDE 0.9 %
80 INTRAVENOUS SOLUTION INTRAVENOUS ONCE
Status: COMPLETED | OUTPATIENT
Start: 2025-02-18 | End: 2025-02-18

## 2025-02-18 RX ADMIN — SODIUM CHLORIDE 80 ML: 9 INJECTION, SOLUTION INTRAVENOUS at 08:17

## 2025-02-18 RX ADMIN — SODIUM CHLORIDE, PRESERVATIVE FREE 10 ML: 5 INJECTION INTRAVENOUS at 08:17

## 2025-02-18 RX ADMIN — IOPAMIDOL 75 ML: 755 INJECTION, SOLUTION INTRAVENOUS at 08:13

## 2025-02-18 RX ADMIN — BARIUM SULFATE 450 ML: 20 SUSPENSION ORAL at 08:17

## 2025-02-23 PROBLEM — Z12.12 SCREENING FOR COLORECTAL CANCER: Status: RESOLVED | Noted: 2023-05-18 | Resolved: 2025-02-23

## 2025-02-23 PROBLEM — Z12.11 SCREENING FOR COLORECTAL CANCER: Status: RESOLVED | Noted: 2023-05-18 | Resolved: 2025-02-23

## 2025-02-24 RX ORDER — FLUTICASONE PROPIONATE 50 MCG
2 SPRAY, SUSPENSION (ML) NASAL DAILY
Qty: 1 EACH | Refills: 0 | Status: SHIPPED | OUTPATIENT
Start: 2025-02-24

## 2025-04-15 ENCOUNTER — TELEPHONE (OUTPATIENT)
Dept: GASTROENTEROLOGY | Age: 65
End: 2025-04-15

## 2025-04-15 NOTE — TELEPHONE ENCOUNTER
Sharon from Dr. Che office calling stating patient was referred to do a cardiac clearance for a procedure that is  scheduled with Dr. Rachel Gutierrez, , they are unable to reach the patient and would like the office to call patient and let her know to call and schedule, any questions please call [phone] Dr. Che's office psc/sc

## 2025-04-16 ENCOUNTER — OFFICE VISIT (OUTPATIENT)
Dept: FAMILY MEDICINE CLINIC | Age: 65
End: 2025-04-16
Payer: COMMERCIAL

## 2025-04-16 VITALS
TEMPERATURE: 97.7 F | HEIGHT: 65 IN | DIASTOLIC BLOOD PRESSURE: 88 MMHG | BODY MASS INDEX: 37.95 KG/M2 | WEIGHT: 227.8 LBS | SYSTOLIC BLOOD PRESSURE: 128 MMHG | HEART RATE: 60 BPM | OXYGEN SATURATION: 98 % | RESPIRATION RATE: 12 BRPM

## 2025-04-16 DIAGNOSIS — K51.40 PSEUDOPOLYPOSIS OF COLON, UNSPECIFIED COMPLICATION STATUS, UNSPECIFIED PART OF COLON (HCC): ICD-10-CM

## 2025-04-16 DIAGNOSIS — E03.9 HYPOTHYROIDISM, UNSPECIFIED TYPE: ICD-10-CM

## 2025-04-16 DIAGNOSIS — D3A.8: ICD-10-CM

## 2025-04-16 DIAGNOSIS — J45.20 MILD INTERMITTENT ASTHMA WITHOUT COMPLICATION: ICD-10-CM

## 2025-04-16 DIAGNOSIS — I26.99 BILATERAL PULMONARY EMBOLISM (HCC): ICD-10-CM

## 2025-04-16 DIAGNOSIS — E78.5 DYSLIPIDEMIA: ICD-10-CM

## 2025-04-16 DIAGNOSIS — R42 VERTIGO: ICD-10-CM

## 2025-04-16 DIAGNOSIS — Z90.3 S/P TOTAL GASTRECTOMY AND ROUX-EN-Y ESOPHAGOJEJUNAL ANASTOMOSIS: ICD-10-CM

## 2025-04-16 DIAGNOSIS — R22.42 MASS OF LEFT HIP REGION: ICD-10-CM

## 2025-04-16 DIAGNOSIS — Z98.0 S/P TOTAL GASTRECTOMY AND ROUX-EN-Y ESOPHAGOJEJUNAL ANASTOMOSIS: ICD-10-CM

## 2025-04-16 DIAGNOSIS — I10 ESSENTIAL HYPERTENSION: ICD-10-CM

## 2025-04-16 DIAGNOSIS — F41.9 ANXIETY: ICD-10-CM

## 2025-04-16 DIAGNOSIS — Z76.89 ENCOUNTER TO ESTABLISH CARE WITH NEW PROVIDER: Primary | ICD-10-CM

## 2025-04-16 PROBLEM — K31.7 POLYP, STOMACH: Status: RESOLVED | Noted: 2019-10-28 | Resolved: 2025-04-16

## 2025-04-16 PROBLEM — K63.5 COLON POLYP: Status: RESOLVED | Noted: 2019-02-21 | Resolved: 2025-04-16

## 2025-04-16 PROBLEM — J18.9 PNEUMONIA: Status: RESOLVED | Noted: 2021-11-21 | Resolved: 2025-04-16

## 2025-04-16 PROBLEM — R91.8 PULMONARY INFILTRATES ON CXR: Status: RESOLVED | Noted: 2022-04-17 | Resolved: 2025-04-16

## 2025-04-16 PROBLEM — K59.00 CONSTIPATION: Status: RESOLVED | Noted: 2019-11-08 | Resolved: 2025-04-16

## 2025-04-16 PROBLEM — Z86.718 H/O BLOOD CLOTS: Status: RESOLVED | Noted: 2021-09-02 | Resolved: 2025-04-16

## 2025-04-16 PROBLEM — R11.10 INTRACTABLE VOMITING: Status: RESOLVED | Noted: 2021-03-27 | Resolved: 2025-04-16

## 2025-04-16 PROBLEM — R11.2 INTRACTABLE NAUSEA AND VOMITING: Status: RESOLVED | Noted: 2021-04-26 | Resolved: 2025-04-16

## 2025-04-16 PROBLEM — R63.4 WEIGHT LOSS: Status: RESOLVED | Noted: 2021-08-27 | Resolved: 2025-04-16

## 2025-04-16 PROBLEM — Z78.9 ON TOTAL PARENTERAL NUTRITION: Status: RESOLVED | Noted: 2021-09-01 | Resolved: 2025-04-16

## 2025-04-16 PROBLEM — K59.01 SLOW TRANSIT CONSTIPATION: Status: RESOLVED | Noted: 2025-01-30 | Resolved: 2025-04-16

## 2025-04-16 PROBLEM — K21.9 GERD (GASTROESOPHAGEAL REFLUX DISEASE): Status: RESOLVED | Noted: 2025-01-30 | Resolved: 2025-04-16

## 2025-04-16 PROBLEM — E87.6 HYPOKALEMIA: Status: RESOLVED | Noted: 2021-03-26 | Resolved: 2025-04-16

## 2025-04-16 PROBLEM — E55.9 VITAMIN D DEFICIENCY: Status: RESOLVED | Noted: 2022-04-18 | Resolved: 2025-04-16

## 2025-04-16 PROBLEM — R11.0 NAUSEA: Status: RESOLVED | Noted: 2023-05-18 | Resolved: 2025-04-16

## 2025-04-16 PROBLEM — R07.9 CHEST PAIN: Status: RESOLVED | Noted: 2019-10-26 | Resolved: 2025-04-16

## 2025-04-16 PROBLEM — J15.7 PNEUMONIA OF RIGHT UPPER LOBE DUE TO MYCOPLASMA PNEUMONIAE: Status: RESOLVED | Noted: 2022-04-18 | Resolved: 2025-04-16

## 2025-04-16 PROBLEM — K92.2 GI BLEED: Status: RESOLVED | Noted: 2019-10-28 | Resolved: 2025-04-16

## 2025-04-16 PROBLEM — R82.71 ASYMPTOMATIC BACTERIURIA: Status: RESOLVED | Noted: 2022-04-16 | Resolved: 2025-04-16

## 2025-04-16 PROBLEM — D12.2 ADENOMATOUS POLYP OF ASCENDING COLON: Status: RESOLVED | Noted: 2025-01-30 | Resolved: 2025-04-16

## 2025-04-16 PROBLEM — R00.0 SINUS TACHYCARDIA: Status: RESOLVED | Noted: 2021-08-27 | Resolved: 2025-04-16

## 2025-04-16 PROBLEM — E43 SEVERE MALNUTRITION: Chronic | Status: RESOLVED | Noted: 2021-08-30 | Resolved: 2025-04-16

## 2025-04-16 PROBLEM — A41.9 SEPSIS (HCC): Status: RESOLVED | Noted: 2021-11-21 | Resolved: 2025-04-16

## 2025-04-16 PROBLEM — E83.42 HYPOMAGNESEMIA: Status: RESOLVED | Noted: 2021-03-26 | Resolved: 2025-04-16

## 2025-04-16 PROBLEM — I47.9 TACHYCARDIA, PAROXYSMAL (HCC): Status: RESOLVED | Noted: 2022-04-17 | Resolved: 2025-04-16

## 2025-04-16 PROBLEM — K31.7 POLYP OF STOMACH: Status: RESOLVED | Noted: 2025-01-30 | Resolved: 2025-04-16

## 2025-04-16 PROCEDURE — 3074F SYST BP LT 130 MM HG: CPT | Performed by: FAMILY MEDICINE

## 2025-04-16 PROCEDURE — 3079F DIAST BP 80-89 MM HG: CPT | Performed by: FAMILY MEDICINE

## 2025-04-16 PROCEDURE — 99214 OFFICE O/P EST MOD 30 MIN: CPT | Performed by: FAMILY MEDICINE

## 2025-04-16 RX ORDER — POTASSIUM CHLORIDE 1500 MG/1
20 TABLET, EXTENDED RELEASE ORAL DAILY
Qty: 90 TABLET | Refills: 1 | Status: SHIPPED | OUTPATIENT
Start: 2025-04-16

## 2025-04-16 RX ORDER — LEVOTHYROXINE SODIUM 100 UG/1
100 TABLET ORAL DAILY
Qty: 90 TABLET | Refills: 1 | Status: SHIPPED | OUTPATIENT
Start: 2025-04-16

## 2025-04-16 SDOH — ECONOMIC STABILITY: FOOD INSECURITY: WITHIN THE PAST 12 MONTHS, YOU WORRIED THAT YOUR FOOD WOULD RUN OUT BEFORE YOU GOT MONEY TO BUY MORE.: NEVER TRUE

## 2025-04-16 SDOH — ECONOMIC STABILITY: FOOD INSECURITY: WITHIN THE PAST 12 MONTHS, THE FOOD YOU BOUGHT JUST DIDN'T LAST AND YOU DIDN'T HAVE MONEY TO GET MORE.: NEVER TRUE

## 2025-04-16 ASSESSMENT — PATIENT HEALTH QUESTIONNAIRE - PHQ9
2. FEELING DOWN, DEPRESSED OR HOPELESS: NOT AT ALL
SUM OF ALL RESPONSES TO PHQ QUESTIONS 1-9: 0
SUM OF ALL RESPONSES TO PHQ QUESTIONS 1-9: 0
1. LITTLE INTEREST OR PLEASURE IN DOING THINGS: NOT AT ALL
SUM OF ALL RESPONSES TO PHQ QUESTIONS 1-9: 0
SUM OF ALL RESPONSES TO PHQ QUESTIONS 1-9: 0

## 2025-04-16 ASSESSMENT — ENCOUNTER SYMPTOMS
HEARTBURN: 1
EYES NEGATIVE: 1
ALLERGIC/IMMUNOLOGIC NEGATIVE: 1
RESPIRATORY NEGATIVE: 1

## 2025-04-16 NOTE — PROGRESS NOTES
Subjective:      Patient ID: Elinor Almanza is a 64 y.o. female.    Gastroesophageal Reflux  She complains of heartburn. This is a chronic problem. The current episode started more than 1 month ago. The problem occurs occasionally. The problem has been waxing and waning. The heartburn duration is less than a minute. The heartburn is located in the substernum. The heartburn is of mild intensity. The heartburn does not wake her from sleep. The heartburn does not limit her activity. The heartburn doesn't change with position. The symptoms are aggravated by stress. Risk factors include obesity. She has tried a PPI for the symptoms. The treatment provided moderate relief. Past procedures include an EGD. Past invasive treatments include gastroplasty.       Review of Systems   Constitutional: Negative.    HENT: Negative.     Eyes: Negative.    Respiratory: Negative.     Cardiovascular: Negative.    Gastrointestinal:  Positive for heartburn.   Endocrine: Negative.    Musculoskeletal:  Positive for arthralgias.   Skin: Negative.    Allergic/Immunologic: Negative.    Neurological: Negative.    Hematological: Negative.    Psychiatric/Behavioral:  Positive for dysphoric mood. The patient is nervous/anxious.      Past family and social history unremarkable.   Diagnosis Orders   1. Encounter to establish care with new provider        2. Hypothyroidism, unspecified type  levothyroxine (SYNTHROID) 100 MCG tablet      3. Mild intermittent asthma without complication        4. Anxiety        5. Dyslipidemia        6. Pseudopolyposis of colon, unspecified complication status, unspecified part of colon (HCC)        7. Vertigo        8. Mass of left hip region        9. Essential hypertension        10. Neuroendocrine neoplasm of stomach (HCC)        11. Bilateral pulmonary embolism (HCC)        12. S/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            Objective:   Physical Exam  Vitals and nursing note reviewed.

## 2025-04-23 RX ORDER — HYDROCHLOROTHIAZIDE 25 MG/1
25 TABLET ORAL DAILY
Qty: 90 TABLET | Refills: 1 | Status: SHIPPED | OUTPATIENT
Start: 2025-04-23

## 2025-04-23 NOTE — TELEPHONE ENCOUNTER
Elinor Almanza is calling to request a refill on the following medication(s):    Medication Request:  Requested Prescriptions     Pending Prescriptions Disp Refills    hydroCHLOROthiazide (HYDRODIURIL) 25 MG tablet [Pharmacy Med Name: HYDROCHLOROTHIAZIDE 25 MG TAB] 90 tablet 1     Sig: TAKE 1 TABLET BY MOUTH EVERY DAY       Last Visit Date (If Applicable):  10/15/2024    Next Visit Date:    7/17/2025

## 2025-05-01 RX ORDER — METOCLOPRAMIDE 10 MG/1
10 TABLET ORAL
Qty: 270 TABLET | Refills: 1 | OUTPATIENT
Start: 2025-05-01

## 2025-05-01 NOTE — TELEPHONE ENCOUNTER
Elinor Almanza is calling to request a refill on the following medication(s):    Medication Request:  Requested Prescriptions     Pending Prescriptions Disp Refills    metoclopramide (REGLAN) 10 MG tablet [Pharmacy Med Name: METOCLOPRAMIDE 10 MG TABLET] 270 tablet 1     Sig: TAKE 1 TABLET BY MOUTH 3 TIMES DAILY (BEFORE MEALS).       Last Visit Date (If Applicable):  10/15/2024    Next Visit Date:    7/17/2025    Last refilled 12/16/2024  Rx Pending

## 2025-06-16 PROBLEM — K59.01 SLOW TRANSIT CONSTIPATION: Status: ACTIVE | Noted: 2025-01-30

## 2025-06-16 PROBLEM — Z90.3 S/P GASTRECTOMY: Status: ACTIVE | Noted: 2025-01-30

## 2025-06-16 PROBLEM — R11.0 NAUSEA: Status: ACTIVE | Noted: 2025-01-30

## 2025-06-16 PROBLEM — K31.7 POLYP OF STOMACH: Status: ACTIVE | Noted: 2025-01-30

## 2025-06-16 PROBLEM — R11.14 BILIOUS VOMITING WITH NAUSEA: Status: ACTIVE | Noted: 2025-01-30

## 2025-06-16 PROBLEM — D12.2 ADENOMATOUS POLYP OF ASCENDING COLON: Status: ACTIVE | Noted: 2025-01-30

## 2025-06-16 PROBLEM — K21.9 GERD (GASTROESOPHAGEAL REFLUX DISEASE): Status: ACTIVE | Noted: 2025-01-30

## 2025-06-17 ENCOUNTER — ANESTHESIA (OUTPATIENT)
Dept: OPERATING ROOM | Age: 65
End: 2025-06-17
Payer: COMMERCIAL

## 2025-06-17 ENCOUNTER — ANESTHESIA EVENT (OUTPATIENT)
Dept: OPERATING ROOM | Age: 65
End: 2025-06-17
Payer: COMMERCIAL

## 2025-06-17 ENCOUNTER — HOSPITAL ENCOUNTER (OUTPATIENT)
Age: 65
Setting detail: OUTPATIENT SURGERY
Discharge: HOME | End: 2025-06-17
Attending: INTERNAL MEDICINE | Admitting: INTERNAL MEDICINE
Payer: COMMERCIAL

## 2025-06-17 VITALS
BODY MASS INDEX: 37.65 KG/M2 | TEMPERATURE: 97 F | OXYGEN SATURATION: 99 % | WEIGHT: 226 LBS | SYSTOLIC BLOOD PRESSURE: 152 MMHG | RESPIRATION RATE: 14 BRPM | HEIGHT: 65 IN | DIASTOLIC BLOOD PRESSURE: 77 MMHG | HEART RATE: 57 BPM

## 2025-06-17 PROCEDURE — 3700000001 HC ADD 15 MINUTES (ANESTHESIA): Performed by: INTERNAL MEDICINE

## 2025-06-17 PROCEDURE — 2709999900 HC NON-CHARGEABLE SUPPLY: Performed by: INTERNAL MEDICINE

## 2025-06-17 PROCEDURE — 6360000002 HC RX W HCPCS: Performed by: NURSE ANESTHETIST, CERTIFIED REGISTERED

## 2025-06-17 PROCEDURE — 3700000000 HC ANESTHESIA ATTENDED CARE: Performed by: INTERNAL MEDICINE

## 2025-06-17 PROCEDURE — 3609017100 HC EGD: Performed by: INTERNAL MEDICINE

## 2025-06-17 PROCEDURE — 2580000003 HC RX 258: Performed by: ANESTHESIOLOGY

## 2025-06-17 PROCEDURE — 43235 EGD DIAGNOSTIC BRUSH WASH: CPT | Performed by: INTERNAL MEDICINE

## 2025-06-17 PROCEDURE — 7100000011 HC PHASE II RECOVERY - ADDTL 15 MIN: Performed by: INTERNAL MEDICINE

## 2025-06-17 PROCEDURE — 7100000010 HC PHASE II RECOVERY - FIRST 15 MIN: Performed by: INTERNAL MEDICINE

## 2025-06-17 RX ORDER — SODIUM CHLORIDE 9 MG/ML
INJECTION, SOLUTION INTRAVENOUS PRN
Status: DISCONTINUED | OUTPATIENT
Start: 2025-06-17 | End: 2025-06-17 | Stop reason: HOSPADM

## 2025-06-17 RX ORDER — SODIUM CHLORIDE 0.9 % (FLUSH) 0.9 %
5-40 SYRINGE (ML) INJECTION PRN
Status: DISCONTINUED | OUTPATIENT
Start: 2025-06-17 | End: 2025-06-17 | Stop reason: HOSPADM

## 2025-06-17 RX ORDER — SODIUM CHLORIDE 0.9 % (FLUSH) 0.9 %
5-40 SYRINGE (ML) INJECTION EVERY 12 HOURS SCHEDULED
Status: DISCONTINUED | OUTPATIENT
Start: 2025-06-17 | End: 2025-06-17 | Stop reason: HOSPADM

## 2025-06-17 RX ORDER — LIDOCAINE HYDROCHLORIDE 10 MG/ML
1 INJECTION, SOLUTION EPIDURAL; INFILTRATION; INTRACAUDAL; PERINEURAL
Status: DISCONTINUED | OUTPATIENT
Start: 2025-06-18 | End: 2025-06-17 | Stop reason: HOSPADM

## 2025-06-17 RX ORDER — SODIUM CHLORIDE 9 MG/ML
INJECTION, SOLUTION INTRAVENOUS CONTINUOUS
Status: DISCONTINUED | OUTPATIENT
Start: 2025-06-17 | End: 2025-06-17 | Stop reason: HOSPADM

## 2025-06-17 RX ORDER — PROPOFOL 10 MG/ML
INJECTION, EMULSION INTRAVENOUS
Status: DISCONTINUED | OUTPATIENT
Start: 2025-06-17 | End: 2025-06-17 | Stop reason: SDUPTHER

## 2025-06-17 RX ORDER — SUCRALFATE ORAL 1 G/10ML
1 SUSPENSION ORAL 4 TIMES DAILY
Qty: 1200 ML | Refills: 3 | Status: SHIPPED | OUTPATIENT
Start: 2025-06-17

## 2025-06-17 RX ORDER — SODIUM CHLORIDE, SODIUM LACTATE, POTASSIUM CHLORIDE, CALCIUM CHLORIDE 600; 310; 30; 20 MG/100ML; MG/100ML; MG/100ML; MG/100ML
INJECTION, SOLUTION INTRAVENOUS CONTINUOUS
Status: DISCONTINUED | OUTPATIENT
Start: 2025-06-17 | End: 2025-06-17 | Stop reason: HOSPADM

## 2025-06-17 RX ORDER — LIDOCAINE HYDROCHLORIDE 20 MG/ML
INJECTION, SOLUTION INFILTRATION; PERINEURAL
Status: DISCONTINUED | OUTPATIENT
Start: 2025-06-17 | End: 2025-06-17 | Stop reason: SDUPTHER

## 2025-06-17 RX ADMIN — PROPOFOL 50 MG: 10 INJECTION, EMULSION INTRAVENOUS at 11:32

## 2025-06-17 RX ADMIN — LIDOCAINE HYDROCHLORIDE 100 MG: 20 INJECTION, SOLUTION INFILTRATION; PERINEURAL at 11:32

## 2025-06-17 RX ADMIN — SODIUM CHLORIDE, SODIUM LACTATE, POTASSIUM CHLORIDE, AND CALCIUM CHLORIDE: .6; .31; .03; .02 INJECTION, SOLUTION INTRAVENOUS at 10:34

## 2025-06-17 RX ADMIN — PROPOFOL 50 MG: 10 INJECTION, EMULSION INTRAVENOUS at 11:34

## 2025-06-17 RX ADMIN — PROPOFOL 50 MG: 10 INJECTION, EMULSION INTRAVENOUS at 11:40

## 2025-06-17 RX ADMIN — PROPOFOL 50 MG: 10 INJECTION, EMULSION INTRAVENOUS at 11:37

## 2025-06-17 RX ADMIN — PROPOFOL 50 MG: 10 INJECTION, EMULSION INTRAVENOUS at 11:42

## 2025-06-17 ASSESSMENT — ENCOUNTER SYMPTOMS
BACK PAIN: 1
SINUS PRESSURE: 0
TROUBLE SWALLOWING: 0
WHEEZING: 0
CHEST TIGHTNESS: 0
SORE THROAT: 0
COUGH: 0
SHORTNESS OF BREATH: 0
RHINORRHEA: 0
APNEA: 0

## 2025-06-17 ASSESSMENT — PAIN - FUNCTIONAL ASSESSMENT: PAIN_FUNCTIONAL_ASSESSMENT: 0-10

## 2025-06-17 NOTE — OP NOTE
.PROCEDURE NOTE    DATE OF PROCEDURE: 6/17/2025     SURGEON: Rachel Gutierrez MD    ASSISTANT: None    PREOPERATIVE DIAGNOSIS: HX OF CARCINOIDS OF STOMACH  S/P TOTAL GASTRECTOMY  DYSPHAGIA     POSTOPERATIVE DIAGNOSIS: As described below    OPERATION: Upper GI endoscopy with Biopsy    ANESTHESIA: MAC PER ANESTHESIA     ESTIMATED BLOOD LOSS: Less than 50 ml    COMPLICATIONS: None.     SPECIMENS:  Was Not Obtained    HISTORY: The patient is a 64 y.o. year old female with history of above preop diagnosis.  I recommended esophagogastroduodenoscopy with possible biopsy and I explained the risk, benefits, expected outcome, and alternatives to the procedure.  Risks included but are not limited to bleeding, infection, respiratory distress, hypotension, and perforation of the esophagus, stomach, or duodenum.  Patient understands and is in agreement.    PROCEDURE: The patient was given IV conscious sedation.  The patient's SPO2 remained above 90% throughout the procedure. The gastroscope was inserted orally and advanced under direct vision through the esophagus, through the stomach, through the pylorus, and into the descending duodenum.      Findings:    Retropharyngeal area was grossly normal appearing    Esophagus: normal  SOME TERTIARY CONTRACTIONS WERE NOTED  SOME REFLUX FROTHY BILE  NO LUMINAL NARROWING SIGNS OF EoE OR ANY LESIONS NOTED  SUTURE WAS NOTED AT THE GEJ WIDE OPEN   PICTURES WERE TAKEN   Stomach:  SURGICALLY ABSENT    Duodenum:   SMALL BOWEL LOOPS NORMAL APPEARING     The scope was removed and the patient tolerated the procedure well.     Recommendations/Plan:     F/U In Office in 3-4 weeks  Discussed with the family  Post sedation patient was stable with stable vital signs and stable O2 saturations    Electronically signed by Rachel Gutierrez MD  on 6/17/2025 at 11:43 AM

## 2025-06-17 NOTE — ANESTHESIA POSTPROCEDURE EVALUATION
Department of Anesthesiology  Postprocedure Note    Patient: Elinor Almanza  MRN: [id number]  YOB: 1960  Date of evaluation: 6/17/2025    Procedure Summary       Date: 06/17/25 Room / Location: [address]    Anesthesia Start: 1130 Anesthesia Stop: 1150    Procedure: ESOPHAGOGASTRODUODENOSCOPY Diagnosis:       Nausea      Bilious vomiting with nausea      S/P gastrectomy      Slow transit constipation      Polyp of stomach      GERD (gastroesophageal reflux disease)      Adenomatous polyp of ascending colon      (Nausea [R11.0])      (Bilious vomiting with nausea [R11.14])      (S/P gastrectomy [Z90.3])      (Slow transit constipation [K59.01])      (Polyp of stomach [K31.7])      (GERD (gastroesophageal reflux disease) [K21.9])      (Adenomatous polyp of ascending colon [D12.2])    Surgeons: Rachel Gutierrez MD Responsible Provider: Mino Drake MD    Anesthesia Type: MAC ASA Status: 3            Anesthesia Type: No value filed.    Reji Phase I: Reji Score: 10    Reji Phase II:      Anesthesia Post Evaluation    Patient location during evaluation: PACU  Patient participation: complete - patient participated  Level of consciousness: awake and alert  Airway patency: patent  Nausea & Vomiting: no nausea and no vomiting  Cardiovascular status: hemodynamically stable  Respiratory status: acceptable  Hydration status: euvolemic  Pain management: adequate    No notable events documented.

## 2025-06-17 NOTE — ANESTHESIA PRE PROCEDURE
12/31/2024 09:37 AM    ALKPHOS 183 12/31/2024 09:37 AM    AST 22 12/31/2024 09:37 AM    ALT 6 12/31/2024 09:37 AM       POC Tests: No results for input(s): \"POCGLU\", \"POCNA\", \"POCK\", \"POCCL\", \"POCBUN\", \"POCHEMO\", \"POCHCT\" in the last 72 hours.    Coags:   Lab Results   Component Value Date/Time    PROTIME 10.6 04/16/2022 01:17 PM    INR 1.0 04/16/2022 01:17 PM    APTT 31.4 04/16/2022 01:17 PM       HCG (If Applicable): No results found for: \"PREGTESTUR\", \"PREGSERUM\", \"HCG\", \"HCGQUANT\"     ABGs: No results found for: \"PHART\", \"PO2ART\", \"ISC4ZUV\", \"PAQ2SRX\", \"BEART\", \"F9JPOINC\"     Type & Screen (If Applicable):  No results found for: \"LABABO\"    Drug/Infectious Status (If Applicable):  No results found for: \"HIV\", \"HEPCAB\"    COVID-19 Screening (If Applicable):   Lab Results   Component Value Date/Time    COVID19 Not Detected 04/16/2022 01:14 PM    COVID19 Not Detected 11/23/2021 12:36 PM           Anesthesia Evaluation  Patient summary reviewed   no history of anesthetic complications:   Airway: Mallampati: II  TM distance: >3 FB   Neck ROM: full  Mouth opening: > = 3 FB   Dental:          Pulmonary:   (+)           asthma:     (-) COPD and sleep apnea                           Cardiovascular:  Exercise tolerance: good (>4 METS)  (+) hypertension:, dysrhythmias: SVT, hyperlipidemia      ECG reviewed      Echocardiogram reviewed         Beta Blocker:  Dose within 24 Hrs      ROS comment: Echo 2021:  Summary  Normal LV size , mildly increased wall thickness.  No obvious wall motion abnormality seen.  Normal LV systolic function with LVEF >55%.  Normal RV size and function. RV systolic pressure 29 mmHg  LA and RA appears normal in size.  No obvious significant structural valvular abnormality noted.  No significant valvular stenosis or regurgitation noted.  Normal aortic root dimension.  Small pericardial effusion noted.  No obvious intra-cardiac mass or shunt noted.  IVC normal diameter and inspiratory variation

## 2025-06-17 NOTE — H&P
History and Physical Service   [facility]    HISTORY AND PHYSICAL EXAMINATION            Date of Evaluation: 6/17/2025  Patient name:  Elinor Almanza  MRN:   [id number]  YOB: 1960  PCP:    Eboni Caldwell MD    History Obtained From:     Patient, medical records    History of Present Illness:     This is Elinor Almanza a 64 y.o. female who presents today for a ESOPHAGOGASTRODUODENOSCOPY BIOPSY by Rachel Gutierrez MD for Nausea; Bilious vomiting with nausea; S/P gastrectomy; Slow transit consti*. Patient has a history of stomach cancer s/p gastrectomy in 2020. She is currently having upper abdominal pain, nausea, acid reflux, and feeling as if food is getting stuck in the lower esophogeal area. She denies vomiting, melena, unintentional weight loss, or decreased appetite. Last EGD April 2024. Denies hx of diabetes. Denies any current blood thinning medications.      Past Medical History:     Past Medical History:   Diagnosis Date    Anxiety     Asthma     Colon polyp 02/21/2019    tubular adenoma; hyperplastic polyp    Cyst of kidney, acquired     bilat.    Fatigue     Hx of blood clots     Pulmonary Embolism    Hypertension     Hypothyroidism     Lung nodule     Neuroendocrine tumor (HCC) 02/21/2019    of stomach    Obesity     Pharyngoesophageal dysphagia     Pulmonary embolism (HCC) 2021    Vocal cord polyps     Wears glasses         Past Surgical History:     Past Surgical History:   Procedure Laterality Date    CHOLECYSTECTOMY  10/09/2014    COLONOSCOPY  02/21/2019    tubular adenoma; hyperplastic polyp    COLONOSCOPY      over 5 yrs ago per pt with polyps (2-)    COLONOSCOPY N/A 5/18/2023    COLONOSCOPY DIAGNOSTIC performed by Rachel Gutierrez MD at [facility] OR    CYSTOURETHROSCOPY/STENT REMOVAL  05/03/2011    ENDOSCOPIC ULTRASOUND (LOWER) N/A 01/22/2020    ENDOSCOPIC ULTRASOUND WITH POSSIBLE EMR performed by Fermín Aaron MD at [facility]    ENDOSCOPY, COLON, DIAGNOSTIC

## 2025-06-20 DIAGNOSIS — K21.9 GASTROESOPHAGEAL REFLUX DISEASE, UNSPECIFIED WHETHER ESOPHAGITIS PRESENT: Primary | ICD-10-CM

## 2025-06-20 NOTE — TELEPHONE ENCOUNTER
Changing order to Carafate 1mg oral tabs in replace of oral suspension, pending order to provider for sign off

## 2025-06-20 NOTE — TELEPHONE ENCOUNTER
Received call from patient stating the medication sucralfate that Dr. Gutierrez prescribed her is not covered by her insurance, would like to know if there is something else that can be prescribed that would be covered by her insurance?    Please call into: [facility] on [address].    Please advise.    Call back #: [phone]

## 2025-06-22 RX ORDER — METOCLOPRAMIDE 10 MG/1
10 TABLET ORAL
Qty: 270 TABLET | Refills: 1 | OUTPATIENT
Start: 2025-06-22

## 2025-06-23 RX ORDER — SUCRALFATE 1 G/1
1 TABLET ORAL 4 TIMES DAILY
Qty: 120 TABLET | Refills: 2 | Status: SHIPPED | OUTPATIENT
Start: 2025-06-23

## 2025-07-01 ENCOUNTER — HOSPITAL ENCOUNTER (OUTPATIENT)
Age: 65
Discharge: HOME | End: 2025-07-01
Payer: COMMERCIAL

## 2025-07-01 DIAGNOSIS — C7A.092 MALIGNANT CARCINOID TUMOR OF STOMACH (HCC): ICD-10-CM

## 2025-07-01 LAB
ALBUMIN SERPL-MCNC: 3.8 G/DL (ref 3.5–5.2)
ALBUMIN/GLOB SERPL: 1.1 {RATIO} (ref 1–2.5)
ALT SERPL-CCNC: 9 U/L (ref 10–35)
ANION GAP SERPL CALCULATED.3IONS-SCNC: 12 MMOL/L (ref 9–16)
AST SERPL-CCNC: 20 U/L (ref 10–35)
BASOPHILS # BLD: 0.03 K/UL (ref 0–0.2)
BASOPHILS NFR BLD: 0 % (ref 0–2)
BILIRUB SERPL-MCNC: 0.4 MG/DL (ref 0–1.2)
BUN SERPL-MCNC: 10 MG/DL (ref 8–23)
CALCIUM SERPL-MCNC: 9.1 MG/DL (ref 8.6–10.4)
CHLORIDE SERPL-SCNC: 104 MMOL/L (ref 98–107)
CO2 SERPL-SCNC: 23 MMOL/L (ref 20–31)
CREAT SERPL-MCNC: 0.9 MG/DL (ref 0.6–0.9)
EOSINOPHILS RELATIVE PERCENT: 2 % (ref 1–4)
ERYTHROCYTE [DISTWIDTH] IN BLOOD BY AUTOMATED COUNT: 14.4 % (ref 11.8–14.4)
FERRITIN SERPL-MCNC: 16 NG/ML
GFR, ESTIMATED: 72 ML/MIN/1.73M2
GLUCOSE SERPL-MCNC: 96 MG/DL (ref 74–99)
HCT VFR BLD AUTO: 42.6 % (ref 36.3–47.1)
HGB BLD-MCNC: 13.6 G/DL (ref 11.9–15.1)
IMM GRANULOCYTES # BLD AUTO: <0.03 K/UL (ref 0–0.3)
IMM GRANULOCYTES NFR BLD: 0 %
IRON SATN MFR SERPL: 21 % (ref 20–55)
IRON SERPL-MCNC: 84 UG/DL (ref 37–145)
LYMPHOCYTES NFR BLD: 1.83 K/UL (ref 1.1–3.7)
LYMPHOCYTES RELATIVE PERCENT: 27 % (ref 24–43)
MCH RBC QN AUTO: 27.7 PG (ref 25.2–33.5)
MCHC RBC AUTO-ENTMCNC: 31.9 G/DL (ref 28.4–34.8)
MCV RBC AUTO: 86.8 FL (ref 82.6–102.9)
MONOCYTES NFR BLD: 0.41 K/UL (ref 0.1–1.2)
NEUTROPHILS NFR BLD: 65 % (ref 36–65)
NEUTS SEG NFR BLD: 4.35 K/UL (ref 1.5–8.1)
NRBC BLD-RTO: 0 PER 100 WBC
PLATELET # BLD AUTO: 268 K/UL (ref 138–453)
PMV BLD AUTO: 10 FL (ref 8.1–13.5)
POTASSIUM SERPL-SCNC: 3.4 MMOL/L (ref 3.7–5.3)
PROT SERPL-MCNC: 7.2 G/DL (ref 6.6–8.7)
RBC # BLD AUTO: 4.91 M/UL (ref 3.95–5.11)
SODIUM SERPL-SCNC: 139 MMOL/L (ref 136–145)
TIBC SERPL-MCNC: 401 UG/DL (ref 250–450)
UNSATURATED IRON BINDING CAPACITY: 317 UG/DL (ref 112–347)

## 2025-07-01 PROCEDURE — 85025 COMPLETE CBC W/AUTO DIFF WBC: CPT

## 2025-07-01 PROCEDURE — 84260 ASSAY OF SEROTONIN: CPT

## 2025-07-01 PROCEDURE — 86316 IMMUNOASSAY TUMOR OTHER: CPT

## 2025-07-01 PROCEDURE — 36415 COLL VENOUS BLD VENIPUNCTURE: CPT

## 2025-07-01 PROCEDURE — 83540 ASSAY OF IRON: CPT

## 2025-07-01 PROCEDURE — 83550 IRON BINDING TEST: CPT

## 2025-07-01 PROCEDURE — 82728 ASSAY OF FERRITIN: CPT

## 2025-07-01 PROCEDURE — 80053 COMPREHEN METABOLIC PANEL: CPT

## 2025-07-04 LAB — SEROTONIN SER-MCNC: 242 NG/ML (ref 50–220)

## 2025-07-07 ENCOUNTER — HOSPITAL ENCOUNTER (OUTPATIENT)
Facility: MEDICAL CENTER | Age: 65
End: 2025-07-07

## 2025-07-08 ENCOUNTER — OFFICE VISIT (OUTPATIENT)
Age: 65
End: 2025-07-08
Payer: COMMERCIAL

## 2025-07-08 ENCOUNTER — TELEPHONE (OUTPATIENT)
Age: 65
End: 2025-07-08

## 2025-07-08 VITALS
SYSTOLIC BLOOD PRESSURE: 128 MMHG | DIASTOLIC BLOOD PRESSURE: 83 MMHG | RESPIRATION RATE: 16 BRPM | BODY MASS INDEX: 37.77 KG/M2 | HEART RATE: 67 BPM | TEMPERATURE: 97.3 F | WEIGHT: 227 LBS

## 2025-07-08 DIAGNOSIS — C7A.092 MALIGNANT CARCINOID TUMOR OF STOMACH (HCC): Primary | ICD-10-CM

## 2025-07-08 PROCEDURE — 3074F SYST BP LT 130 MM HG: CPT | Performed by: INTERNAL MEDICINE

## 2025-07-08 PROCEDURE — 3079F DIAST BP 80-89 MM HG: CPT | Performed by: INTERNAL MEDICINE

## 2025-07-08 PROCEDURE — 99214 OFFICE O/P EST MOD 30 MIN: CPT | Performed by: INTERNAL MEDICINE

## 2025-07-08 RX ORDER — METOCLOPRAMIDE 10 MG/1
10 TABLET ORAL
Qty: 270 TABLET | Refills: 1 | Status: SHIPPED | OUTPATIENT
Start: 2025-07-08

## 2025-07-08 NOTE — PROGRESS NOTES
_           Chief Complaint   Patient presents with    Follow-up    Discuss Labs    Medication Refill     DIAGNOSIS:       Malignant carcinoid tumor of the stomach  Recent GI bleeding after endoscopic mucosal resection of stomach carcinoid on October 23, 2019.  Evidence of recurrent disease on repeated EGD January 2020 and continued elevation of tumor marker chromogranin A  Iron deficiency secondary to above  History of hypothyroidism  Multiple comorbidities as listed      CURRENT THERAPY:         Status post endoscopic mucosal resection of stomach carcinoid October 23, 2019  S/p gastric resection at [facility] November 20, 2020.      BRIEF CASE HISTORY:      Ms. Elinor Almanza is a very pleasant 64 y.o. female with history of multiple comorbidities as listed.  The patient seen because of recent diagnosis of carcinoid tumor.  Patient had problem with dysphagia for about 4 to 5 months.  That problem resolved spontaneously.  She was evaluated by gastroenterology.  She had EGD in September 2019.  She was noted to have gastric tumor which was biopsied and was positive for malignant neuroendocrine tumor.  Subsequently patient was evaluated by abdominal MRI.  Patient was having no evidence of gastric disease or gastric wall deep penetration.  She underwent endoscopic mucosal resection October 23, 2019.  Patient had recent admission to [facility] because of GI bleeding.  She had EGD again and cauterization of bleeding.  She is having weakness and fatigue.  No other symptoms.  No abdominal pain or cramps.  No hot flashes or night sweats.  No weight loss or decreased appetite.  No wheezing.  The patient had lab testing in July 2019 with chromogranin A level 1500.  Patient was not aware of that test.  Patient's twin sister had carcinoid tumor more than 20 years ago.  She had gastric resection at that time and there is no

## 2025-07-08 NOTE — TELEPHONE ENCOUNTER
BUFFY HERE FOR MD VISIT  RV 6 months with labs before RV  LAB ORDERS GIVEN TO PT ON EXIT  MD VISIT 1/13/26 @ 8:15AM  AVS PRINTED W/ INSTRUCTIONS AND GIVEN TO PT ON EXIT

## 2025-07-10 ENCOUNTER — TELEPHONE (OUTPATIENT)
Dept: GASTROENTEROLOGY | Age: 65
End: 2025-07-10

## 2025-07-10 NOTE — TELEPHONE ENCOUNTER
Writer called to reschedule 8/18/25 anita Chapman dr is out of office - [facility]

## 2025-07-16 DIAGNOSIS — K21.9 GASTROESOPHAGEAL REFLUX DISEASE, UNSPECIFIED WHETHER ESOPHAGITIS PRESENT: ICD-10-CM

## 2025-07-16 RX ORDER — SUCRALFATE 1 G/1
1 TABLET ORAL 4 TIMES DAILY
Qty: 360 TABLET | Refills: 1 | Status: SHIPPED | OUTPATIENT
Start: 2025-07-16

## 2025-07-17 ENCOUNTER — OFFICE VISIT (OUTPATIENT)
Dept: FAMILY MEDICINE CLINIC | Age: 65
End: 2025-07-17
Payer: COMMERCIAL

## 2025-07-17 ENCOUNTER — HOSPITAL ENCOUNTER (OUTPATIENT)
Age: 65
Discharge: HOME | End: 2025-07-17
Payer: COMMERCIAL

## 2025-07-17 VITALS
BODY MASS INDEX: 38.15 KG/M2 | DIASTOLIC BLOOD PRESSURE: 60 MMHG | SYSTOLIC BLOOD PRESSURE: 100 MMHG | RESPIRATION RATE: 12 BRPM | HEART RATE: 70 BPM | OXYGEN SATURATION: 97 % | WEIGHT: 229 LBS | HEIGHT: 65 IN | TEMPERATURE: 97.6 F

## 2025-07-17 DIAGNOSIS — I26.99 BILATERAL PULMONARY EMBOLISM (HCC): ICD-10-CM

## 2025-07-17 DIAGNOSIS — Z11.4 SCREENING FOR HIV WITHOUT PRESENCE OF RISK FACTORS: ICD-10-CM

## 2025-07-17 DIAGNOSIS — D3A.8: ICD-10-CM

## 2025-07-17 DIAGNOSIS — E03.8 OTHER SPECIFIED HYPOTHYROIDISM: ICD-10-CM

## 2025-07-17 DIAGNOSIS — R11.0 NAUSEA: ICD-10-CM

## 2025-07-17 DIAGNOSIS — Z90.3 S/P TOTAL GASTRECTOMY AND ROUX-EN-Y ESOPHAGOJEJUNAL ANASTOMOSIS: ICD-10-CM

## 2025-07-17 DIAGNOSIS — R42 VERTIGO: ICD-10-CM

## 2025-07-17 DIAGNOSIS — F41.9 ANXIETY: ICD-10-CM

## 2025-07-17 DIAGNOSIS — E78.5 DYSLIPIDEMIA: ICD-10-CM

## 2025-07-17 DIAGNOSIS — D12.6 ADENOMATOUS POLYP OF COLON, UNSPECIFIED PART OF COLON: ICD-10-CM

## 2025-07-17 DIAGNOSIS — Z11.59 NEED FOR HEPATITIS C SCREENING TEST: ICD-10-CM

## 2025-07-17 DIAGNOSIS — Z00.00 INITIAL MEDICARE ANNUAL WELLNESS VISIT: Primary | ICD-10-CM

## 2025-07-17 DIAGNOSIS — Z98.0 S/P TOTAL GASTRECTOMY AND ROUX-EN-Y ESOPHAGOJEJUNAL ANASTOMOSIS: ICD-10-CM

## 2025-07-17 DIAGNOSIS — I10 ESSENTIAL HYPERTENSION: ICD-10-CM

## 2025-07-17 DIAGNOSIS — J45.20 MILD INTERMITTENT ASTHMA WITHOUT COMPLICATION: ICD-10-CM

## 2025-07-17 DIAGNOSIS — K21.9 GASTROESOPHAGEAL REFLUX DISEASE WITHOUT ESOPHAGITIS: ICD-10-CM

## 2025-07-17 PROBLEM — K59.01 SLOW TRANSIT CONSTIPATION: Status: RESOLVED | Noted: 2025-01-30 | Resolved: 2025-07-17

## 2025-07-17 PROBLEM — D12.2 ADENOMATOUS POLYP OF ASCENDING COLON: Status: RESOLVED | Noted: 2025-01-30 | Resolved: 2025-07-17

## 2025-07-17 PROBLEM — K31.7 POLYP OF STOMACH: Status: RESOLVED | Noted: 2025-01-30 | Resolved: 2025-07-17

## 2025-07-17 PROBLEM — R11.14 BILIOUS VOMITING WITH NAUSEA: Status: RESOLVED | Noted: 2025-01-30 | Resolved: 2025-07-17

## 2025-07-17 LAB
HCV AB SERPL QL IA: NONREACTIVE
HIV 1+2 AB+HIV1 P24 AG SERPL QL IA: NONREACTIVE

## 2025-07-17 PROCEDURE — G0438 PPPS, INITIAL VISIT: HCPCS | Performed by: FAMILY MEDICINE

## 2025-07-17 PROCEDURE — 87389 HIV-1 AG W/HIV-1&-2 AB AG IA: CPT

## 2025-07-17 PROCEDURE — 86803 HEPATITIS C AB TEST: CPT

## 2025-07-17 PROCEDURE — 3074F SYST BP LT 130 MM HG: CPT | Performed by: FAMILY MEDICINE

## 2025-07-17 PROCEDURE — 3078F DIAST BP <80 MM HG: CPT | Performed by: FAMILY MEDICINE

## 2025-07-17 PROCEDURE — 36415 COLL VENOUS BLD VENIPUNCTURE: CPT

## 2025-07-17 RX ORDER — DOCUSATE SODIUM 100 MG/1
100 CAPSULE, LIQUID FILLED ORAL 2 TIMES DAILY
Qty: 180 CAPSULE | Refills: 1 | Status: SHIPPED | OUTPATIENT
Start: 2025-07-17

## 2025-07-17 SDOH — ECONOMIC STABILITY: FOOD INSECURITY: WITHIN THE PAST 12 MONTHS, THE FOOD YOU BOUGHT JUST DIDN'T LAST AND YOU DIDN'T HAVE MONEY TO GET MORE.: NEVER TRUE

## 2025-07-17 SDOH — ECONOMIC STABILITY: FOOD INSECURITY: WITHIN THE PAST 12 MONTHS, YOU WORRIED THAT YOUR FOOD WOULD RUN OUT BEFORE YOU GOT MONEY TO BUY MORE.: NEVER TRUE

## 2025-07-17 ASSESSMENT — PATIENT HEALTH QUESTIONNAIRE - PHQ9
SUM OF ALL RESPONSES TO PHQ QUESTIONS 1-9: 2
2. FEELING DOWN, DEPRESSED OR HOPELESS: NOT AT ALL
SUM OF ALL RESPONSES TO PHQ QUESTIONS 1-9: 0
SUM OF ALL RESPONSES TO PHQ QUESTIONS 1-9: 0
1. LITTLE INTEREST OR PLEASURE IN DOING THINGS: SEVERAL DAYS
2. FEELING DOWN, DEPRESSED OR HOPELESS: SEVERAL DAYS
SUM OF ALL RESPONSES TO PHQ QUESTIONS 1-9: 0
SUM OF ALL RESPONSES TO PHQ QUESTIONS 1-9: 2
SUM OF ALL RESPONSES TO PHQ QUESTIONS 1-9: 0
SUM OF ALL RESPONSES TO PHQ QUESTIONS 1-9: 2
SUM OF ALL RESPONSES TO PHQ QUESTIONS 1-9: 2
1. LITTLE INTEREST OR PLEASURE IN DOING THINGS: NOT AT ALL

## 2025-07-17 ASSESSMENT — LIFESTYLE VARIABLES
HOW OFTEN DO YOU HAVE A DRINK CONTAINING ALCOHOL: 2-4 TIMES A MONTH
HOW MANY STANDARD DRINKS CONTAINING ALCOHOL DO YOU HAVE ON A TYPICAL DAY: 1 OR 2

## 2025-07-17 NOTE — PATIENT INSTRUCTIONS
weight than is likely. A weight loss of 5% to 10% of your body weight may be enough to improve your health.  Get family and friends involved to provide support. Talk to them about why you are trying to lose weight, and ask them to help. They can help by participating in exercise and having meals with you, even if they may be eating something different.  Find what works best for you. If you do not have time or do not like to cook, a program that offers meal replacement bars or shakes may be better for you. Or if you like to prepare meals, finding a plan that includes daily menus and recipes may be best.  Ask your doctor about other health professionals who can help you achieve your weight-loss goals.  A dietitian can help you make healthy changes in your diet.  An exercise specialist or  can help you develop a safe and effective exercise program.  A counselor or psychiatrist can help you cope with issues such as depression, anxiety, or family problems that can make it hard to focus on weight loss.  Consider joining a support group for people who are trying to lose weight. Your doctor can suggest groups in your area.  Where can you learn more?  Go to https://www.[employer].net/patientEd and enter U357 to learn more about \"Starting a Weight-Loss Plan: Care Instructions.\"  Current as of: April 30, 2024  Content Version: 14.5  © 6351-1345 [employer].   Care instructions adapted under license by [employer]. If you have questions about a medical condition or this instruction, always ask your healthcare professional. [employer] disclaims any warranty or liability for your use of this information.         Advance Directives: Care Instructions  Overview  An advance directive is a legal way to state your wishes at the end of your life. It tells your loved ones and doctor what to do if you can't say what you want.  There are two main types of advance directives. You can change them any

## 2025-07-17 NOTE — PROGRESS NOTES
Medicare Annual Wellness Visit    Elinor Almanza is here for Medicare AWV and Follow-up (3 month follow up)    Assessment & Plan   Initial Medicare annual wellness visit  Screening for HIV without presence of risk factors  -     HIV Screen; Future  Need for hepatitis C screening test  -     Hepatitis C Antibody; Future  Mild intermittent asthma without complication  Anxiety  Dyslipidemia  Other specified hypothyroidism  Adenomatous polyp of colon, unspecified part of colon  Vertigo  Essential hypertension  Neuroendocrine neoplasm of stomach (HCC)  Bilateral pulmonary embolism (HCC)  S/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  Nausea  Gastroesophageal reflux disease without esophagitis       Return in about 6 months (around 1/17/2026).     Subjective       Patient's complete Health Risk Assessment and screening values have been reviewed and are found in Flowsheets. The following problems were reviewed today and where indicated follow up appointments were made and/or referrals ordered.    Positive Risk Factor Screenings with Interventions:       Alcohol Screening:  AUDIT-C Score: 3     Total Score Interpretation: 0-7 suggests low risk alcohol consumption but assess individual risks     Interventions:               General HRA Questions:  Select all that apply: (!) New or Increased Fatigue  Interventions Fatigue:        Inactivity:  On average, how many days per week do you engage in moderate to strenuous exercise (like a brisk walk)?: 1 day (!) Abnormal  On average, how many minutes do you engage in exercise at this level?: 10 min    Interventions:       Abnormal BMI (obese):  Body mass index is 38.11 kg/m². (!) Abnormal    Interventions:             Safety:  Do you have any tripping hazards - loose or unsecured carpets or rugs?: (!) Yes  Do you have non-slip mats or non-slip surfaces or shower bars or grab bars in your shower or bathtub?: (!) No    Interventions:       ADL's:   Patient reports

## 2025-08-04 RX ORDER — OMEPRAZOLE 20 MG/1
20 CAPSULE, DELAYED RELEASE ORAL
Qty: 90 CAPSULE | Refills: 3 | Status: SHIPPED | OUTPATIENT
Start: 2025-08-04

## 2025-08-04 RX ORDER — OMEPRAZOLE 20 MG/1
20 CAPSULE, DELAYED RELEASE ORAL
Qty: 90 CAPSULE | Refills: 1 | OUTPATIENT
Start: 2025-08-04

## 2025-08-05 RX ORDER — METOPROLOL TARTRATE 25 MG/1
25 TABLET, FILM COATED ORAL 2 TIMES DAILY
Qty: 180 TABLET | Refills: 1 | Status: SHIPPED | OUTPATIENT
Start: 2025-08-05

## 2025-08-13 DIAGNOSIS — E03.9 HYPOTHYROIDISM, UNSPECIFIED TYPE: ICD-10-CM

## 2025-08-13 RX ORDER — LEVOTHYROXINE SODIUM 100 UG/1
100 TABLET ORAL DAILY
Qty: 90 TABLET | Refills: 1 | Status: SHIPPED | OUTPATIENT
Start: 2025-08-13

## 2025-08-22 ENCOUNTER — HOSPITAL ENCOUNTER (OUTPATIENT)
Dept: LAB | Age: 65
Discharge: HOME | End: 2025-08-22
Payer: COMMERCIAL

## 2025-08-22 LAB
25(OH)D3 SERPL-MCNC: 27.5 NG/ML (ref 30–100)
EST. AVERAGE GLUCOSE BLD GHB EST-MCNC: 126 MG/DL
HBA1C MFR BLD: 6 % (ref 4–6)
MAGNESIUM SERPL-MCNC: 1.5 MG/DL (ref 1.6–2.4)
T4 FREE SERPL-MCNC: 0.8 NG/DL (ref 0.92–1.68)
TSH SERPL DL<=0.05 MIU/L-ACNC: 10.3 UIU/ML (ref 0.27–4.2)

## 2025-08-22 PROCEDURE — 83735 ASSAY OF MAGNESIUM: CPT

## 2025-08-22 PROCEDURE — 36415 COLL VENOUS BLD VENIPUNCTURE: CPT

## 2025-08-22 PROCEDURE — 83036 HEMOGLOBIN GLYCOSYLATED A1C: CPT

## 2025-08-22 PROCEDURE — 84439 ASSAY OF FREE THYROXINE: CPT

## 2025-08-22 PROCEDURE — 82306 VITAMIN D 25 HYDROXY: CPT

## 2025-08-22 PROCEDURE — 84443 ASSAY THYROID STIM HORMONE: CPT

## 2025-08-28 ENCOUNTER — OFFICE VISIT (OUTPATIENT)
Dept: GASTROENTEROLOGY | Age: 65
End: 2025-08-28
Payer: COMMERCIAL

## 2025-08-28 VITALS — BODY MASS INDEX: 37.44 KG/M2 | SYSTOLIC BLOOD PRESSURE: 125 MMHG | WEIGHT: 225 LBS | DIASTOLIC BLOOD PRESSURE: 81 MMHG

## 2025-08-28 DIAGNOSIS — D12.2 ADENOMATOUS POLYP OF ASCENDING COLON: ICD-10-CM

## 2025-08-28 DIAGNOSIS — C7A.092 MALIGNANT CARCINOID TUMOR OF STOMACH (HCC): ICD-10-CM

## 2025-08-28 DIAGNOSIS — R11.14 BILIOUS VOMITING WITH NAUSEA: ICD-10-CM

## 2025-08-28 DIAGNOSIS — D3A.8: ICD-10-CM

## 2025-08-28 DIAGNOSIS — Z90.3 S/P GASTRECTOMY: ICD-10-CM

## 2025-08-28 DIAGNOSIS — R11.0 NAUSEA: ICD-10-CM

## 2025-08-28 DIAGNOSIS — K59.01 SLOW TRANSIT CONSTIPATION: ICD-10-CM

## 2025-08-28 DIAGNOSIS — K21.9 GASTROESOPHAGEAL REFLUX DISEASE, UNSPECIFIED WHETHER ESOPHAGITIS PRESENT: Primary | ICD-10-CM

## 2025-08-28 PROCEDURE — 3079F DIAST BP 80-89 MM HG: CPT | Performed by: INTERNAL MEDICINE

## 2025-08-28 PROCEDURE — 3074F SYST BP LT 130 MM HG: CPT | Performed by: INTERNAL MEDICINE

## 2025-08-28 PROCEDURE — 99214 OFFICE O/P EST MOD 30 MIN: CPT | Performed by: INTERNAL MEDICINE

## 2025-08-28 RX ORDER — LINACLOTIDE 290 UG/1
290 CAPSULE, GELATIN COATED ORAL
Qty: 30 CAPSULE | Refills: 2 | Status: SHIPPED | OUTPATIENT
Start: 2025-08-28

## 2025-08-28 ASSESSMENT — ENCOUNTER SYMPTOMS
ABDOMINAL PAIN: 1
WHEEZING: 0
CHOKING: 0
NAUSEA: 1
VOMITING: 0
ABDOMINAL DISTENTION: 0
ANAL BLEEDING: 0
BLOOD IN STOOL: 0
DIARRHEA: 0
SORE THROAT: 1
RECTAL PAIN: 0
SHORTNESS OF BREATH: 0
TROUBLE SWALLOWING: 1
CONSTIPATION: 0
COUGH: 0
VOICE CHANGE: 0

## 2025-09-03 ENCOUNTER — OFFICE VISIT (OUTPATIENT)
Dept: FAMILY MEDICINE CLINIC | Age: 65
End: 2025-09-03
Payer: COMMERCIAL

## 2025-09-03 VITALS
OXYGEN SATURATION: 99 % | SYSTOLIC BLOOD PRESSURE: 120 MMHG | DIASTOLIC BLOOD PRESSURE: 78 MMHG | HEART RATE: 70 BPM | TEMPERATURE: 97.3 F

## 2025-09-03 DIAGNOSIS — C7A.092 MALIGNANT CARCINOID TUMOR OF STOMACH (HCC): ICD-10-CM

## 2025-09-03 DIAGNOSIS — K21.9 GASTROESOPHAGEAL REFLUX DISEASE, UNSPECIFIED WHETHER ESOPHAGITIS PRESENT: ICD-10-CM

## 2025-09-03 DIAGNOSIS — D3A.8: Primary | ICD-10-CM

## 2025-09-03 PROCEDURE — 99213 OFFICE O/P EST LOW 20 MIN: CPT | Performed by: NURSE PRACTITIONER

## 2025-09-03 PROCEDURE — 3074F SYST BP LT 130 MM HG: CPT | Performed by: NURSE PRACTITIONER

## 2025-09-03 PROCEDURE — 3078F DIAST BP <80 MM HG: CPT | Performed by: NURSE PRACTITIONER

## 2025-09-03 ASSESSMENT — ENCOUNTER SYMPTOMS
COLOR CHANGE: 0
ABDOMINAL PAIN: 0
VOICE CHANGE: 0
RHINORRHEA: 0
TROUBLE SWALLOWING: 0
FACIAL SWELLING: 0
SINUS PRESSURE: 0
SORE THROAT: 0

## (undated) DEVICE — NEEDLE SCLERO 23GA L240CM OD064MM ID032MM CLR INTERJECT

## (undated) DEVICE — BASIN EMSIS 700ML GRAPHITE PLAS KID SHP GRAD

## (undated) DEVICE — MEDICINE CUP, GRADUATED, STER: Brand: MEDLINE

## (undated) DEVICE — BLOCK BITE 60FR RUBBER ADLT DENTAL

## (undated) DEVICE — UNDERPAD HOSP W30XL36IN GRN POLYPR HI ABSRB DISP

## (undated) DEVICE — GAUZE,SPONGE,4"X4",16PLY,STRL,LF,10/TRAY: Brand: MEDLINE

## (undated) DEVICE — GLOVE ORTHO 8   MSG9480

## (undated) DEVICE — NEEDLE SCLERO L4MM L200CM OD25GA ODSEC18MM 038IN LOK CLR HUB

## (undated) DEVICE — PATIENT RETURN ELECTRODE, SINGLE-USE, CONTACT QUALITY MONITORING, ADULT, WITH 9FT CORD, FOR PATIENTS WEIGING OVER 33LBS. (15KG): Brand: MEGADYNE

## (undated) DEVICE — INFLATION DEVICE KIT: Brand: ENCORE™ 26 ADVANTAGE KIT

## (undated) DEVICE — SOLUTION IRRIG 1000ML H2O PIC PLAS SHATTERPROOF CONTAINER

## (undated) DEVICE — CO2 CANNULA,SUPERSOFT, ADLT,7'O2,7'CO2: Brand: MEDLINE

## (undated) DEVICE — SYRINGE MED GEL ORISE SUBMUCOSAL LIFTING AGENT WITH NDL

## (undated) DEVICE — Device: Brand: DEFENDO VALVE AND CONNECTOR KIT

## (undated) DEVICE — CUP MED 1OZ CLR POLYPR FEED GRAD W/O LID

## (undated) DEVICE — 4-PORT MANIFOLD: Brand: NEPTUNE 2

## (undated) DEVICE — YANKAUER,FLEXIBLE HANDLE,REGLR CAPACITY: Brand: MEDLINE INDUSTRIES, INC.

## (undated) DEVICE — FORCEPS BX L240CM WRK CHN 2.8MM STD CAP W/ NDL MIC MESH

## (undated) DEVICE — FIAPC® PROBE W/ FILTER 2200 A OD 2.3MM/6.9FR; L 2.2M/7.2FT: Brand: ERBE

## (undated) DEVICE — SNARE ENDOSCP L240CM OD2.4MM LOOP W15MM RND INSUL STIFF

## (undated) DEVICE — CATHETER DIL 6FR L180CM BLLN INFLATED 36-40.5-45FR L8CM ES

## (undated) DEVICE — GLOVE SURG 8 11.7IN BEAD CUF LIGHT BRN SENSICARE LTX FREE

## (undated) DEVICE — GOWN POLY REINF SONT XLG: Brand: MEDLINE INDUSTRIES, INC.

## (undated) DEVICE — STAZ ENDO KIT: Brand: MEDLINE INDUSTRIES, INC.

## (undated) DEVICE — BITEBLOCK ENDOSCP 60FR MAXI WHT POLYETH STURDY W/ VELC WVN

## (undated) DEVICE — ELECTRODE PT RET AD L9FT HI MOIST COND ADH HYDRGEL CORDED

## (undated) DEVICE — ADAPTER TBNG LUER STUB 15 GA INTMED

## (undated) DEVICE — CONMED DISPOSABLE GASTROINTESTINAL CYTOLOGY BRUSH, STRAIGHT HANDLE, 2.5 MM X 160 CM: Brand: CONMED

## (undated) DEVICE — ENDOSCOPIC KIT 1.1+ 10 FT DE 2 GWN AAMI LEVEL 3

## (undated) DEVICE — CLIP LIG L235CM RESOL 360 BX/20

## (undated) DEVICE — SINGLE-USE POLYPECTOMY SNARE: Brand: CAPTIVATOR

## (undated) DEVICE — NET RETRV STD FOREIGN BODY ROTH PED

## (undated) DEVICE — BITE BLOCK ENDOSCP AD 60 FR W/ ADJ STRP PLAS GRN BLOX

## (undated) DEVICE — JELLY,LUBE,STERILE,FLIP TOP,TUBE,2-OZ: Brand: MEDLINE

## (undated) DEVICE — BAG SPECIMEN BIOHAZARD 6IN X 9IN

## (undated) DEVICE — FORCEPS BX L240CM JAW DIA2.4MM ORNG L CAP W/ NDL DISP RAD

## (undated) DEVICE — SYRINGE MED 50ML LUERLOCK TIP

## (undated) DEVICE — TOWEL,OR,DSP,ST,BLUE,DLX,XR,4/PK,20PK/CS: Brand: MEDLINE